# Patient Record
Sex: MALE | Employment: OTHER | URBAN - METROPOLITAN AREA
[De-identification: names, ages, dates, MRNs, and addresses within clinical notes are randomized per-mention and may not be internally consistent; named-entity substitution may affect disease eponyms.]

---

## 2019-01-14 ENCOUNTER — NEW PATIENT (OUTPATIENT)
Dept: URBAN - METROPOLITAN AREA CLINIC 19 | Facility: CLINIC | Age: 64
End: 2019-01-14

## 2019-01-14 DIAGNOSIS — H43.391: ICD-10-CM

## 2019-01-14 DIAGNOSIS — H43.813: ICD-10-CM

## 2019-01-14 PROCEDURE — 92134 CPTRZ OPH DX IMG PST SGM RTA: CPT

## 2019-01-14 PROCEDURE — 1036F TOBACCO NON-USER: CPT

## 2019-01-14 PROCEDURE — G8427 DOCREV CUR MEDS BY ELIG CLIN: HCPCS

## 2019-01-14 PROCEDURE — 92225 OPHTHALMOSCOPY (INITIAL): CPT

## 2019-01-14 PROCEDURE — 4040F PNEUMOC VAC/ADMIN/RCVD: CPT

## 2019-01-14 PROCEDURE — G8482 FLU IMMUNIZE ORDER/ADMIN: HCPCS

## 2019-01-14 PROCEDURE — 99244 OFF/OP CNSLTJ NEW/EST MOD 40: CPT

## 2019-01-14 PROCEDURE — G9903 PT SCRN TBCO ID AS NON USER: HCPCS

## 2019-01-14 ASSESSMENT — VISUAL ACUITY
OD_CC: 20/16-2
OS_CC: 20/16
OD_PH: 20/16

## 2019-01-14 ASSESSMENT — TONOMETRY
OD_IOP_MMHG: 16
OS_IOP_MMHG: 11

## 2019-02-25 ENCOUNTER — TRANSFERRED RECORDS (OUTPATIENT)
Dept: HEALTH INFORMATION MANAGEMENT | Facility: CLINIC | Age: 64
End: 2019-02-25

## 2019-02-28 ENCOUNTER — TELEPHONE (OUTPATIENT)
Dept: OPHTHALMOLOGY | Facility: CLINIC | Age: 64
End: 2019-02-28

## 2019-02-28 NOTE — TELEPHONE ENCOUNTER
2-25-19 referral note states possible TIA-- not new event  Reviewed with pt     Vision changes note January 11th, 2019  Visual field defect right eye temporally and not noticed in left eye  kaleidescope effects in right eye lasting few minutes daily     Concern of brain lesion also    Note to facilitator/Dr. La to assist in review of notes/visual field  Visual field not scanned-- may have been mailed for review and f/u if earlier appt applicable/able with Dr. Mauricio  Pt currently scheduled in 2 weeks march 15th  Reviewed to call clinic for any new/sudden vision changes  Reviewed if has s/s of TIA/stroke to go to emergency room  Pt verbally demonstrated understanding  States no previous s/s of stroke/TIA prior to vision changes  Elias Gallagher RN 12:20 PM 02/28/19        [facility]    Phone Message    May a detailed message be left on voicemail: yes    Reason for Call: Other: Pt is wondering if Dr. Jorge Mauricio feels that pt should come in earlier then 3/15/19 as he feels it urgent and dealing with the brain, possible TIA per pt. Please call the pt back. Thank you     Action Taken: Message routed to:  [facility] ([facility] Eye

## 2019-03-01 ENCOUNTER — TELEPHONE (OUTPATIENT)
Dept: OPHTHALMOLOGY | Facility: CLINIC | Age: 64
End: 2019-03-01

## 2019-03-01 NOTE — TELEPHONE ENCOUNTER
Left message with direct number to review further  Stated may want to order a MRI , will assess at visit, may repeat visual field, likely dilate, may have other images  Elias Gallagher RN 11:13 AM 03/01/19        [facility]    Phone Message    May a detailed message be left on voicemail: yes    Reason for Call: Other: per pt- has a new neuro appt on 03/6/19 with - pt is wondering what the visit will consist of, if he will need an MRI. etc.. please call pt back thank you      Action Taken: Message routed to:  [facility]

## 2019-03-04 ENCOUNTER — DOCUMENTATION ONLY (OUTPATIENT)
Dept: CARE COORDINATION | Facility: CLINIC | Age: 64
End: 2019-03-04

## 2019-03-04 ENCOUNTER — PRE VISIT (OUTPATIENT)
Dept: OPHTHALMOLOGY | Facility: CLINIC | Age: 64
End: 2019-03-04

## 2019-03-04 NOTE — TELEPHONE ENCOUNTER
FUTURE VISIT INFORMATION      FUTURE VISIT INFORMATION:    Date: 3/6/19     Time: 8:00am    Location: [facility]  REFERRAL INFORMATION:    Referring provider:  Dr. Corinne Huggins    Referring providers clinic:  [facility]    Reason for visit/diagnosis  Possible occiptal lobe lesion v. an old TIA    RECORDS REQUESTED FROM:       Clinic name Comments Records Status Imaging Status   [facility] Records scanned into Chart EPIC

## 2019-03-06 ENCOUNTER — OFFICE VISIT (OUTPATIENT)
Dept: OPHTHALMOLOGY | Facility: CLINIC | Age: 64
End: 2019-03-06
Payer: COMMERCIAL

## 2019-03-06 ENCOUNTER — ANCILLARY PROCEDURE (OUTPATIENT)
Dept: MRI IMAGING | Facility: CLINIC | Age: 64
End: 2019-03-06
Attending: OPHTHALMOLOGY
Payer: COMMERCIAL

## 2019-03-06 DIAGNOSIS — H53.10 SUBJECTIVE VISUAL DISTURBANCE: Primary | ICD-10-CM

## 2019-03-06 DIAGNOSIS — H53.40 VISUAL FIELD DEFECT: ICD-10-CM

## 2019-03-06 DIAGNOSIS — D49.6 BRAIN TUMOR (H): ICD-10-CM

## 2019-03-06 DIAGNOSIS — H53.40 VISUAL FIELD DEFECT: Primary | ICD-10-CM

## 2019-03-06 LAB — RADIOLOGIST FLAGS: NORMAL

## 2019-03-06 RX ORDER — LISINOPRIL/HYDROCHLOROTHIAZIDE 10-12.5 MG
1 TABLET ORAL DAILY
COMMUNITY
End: 2019-03-13

## 2019-03-06 RX ORDER — METHOTREXATE 25 MG/ML
INJECTION, SOLUTION INTRA-ARTERIAL; INTRAMUSCULAR; INTRAVENOUS
Status: ON HOLD | COMMUNITY
End: 2019-03-25

## 2019-03-06 RX ORDER — ATENOLOL 50 MG/1
50 TABLET ORAL DAILY
COMMUNITY
End: 2019-11-17

## 2019-03-06 RX ORDER — GADOBUTROL 604.72 MG/ML
10 INJECTION INTRAVENOUS ONCE
Status: COMPLETED | OUTPATIENT
Start: 2019-03-06 | End: 2019-03-06

## 2019-03-06 RX ADMIN — GADOBUTROL 10 ML: 604.72 INJECTION INTRAVENOUS at 15:30

## 2019-03-06 ASSESSMENT — VISUAL ACUITY
OD_CC: 20/20
METHOD: SNELLEN - LINEAR
OD_CC+: -1
OS_CC: 20/20
CORRECTION_TYPE: GLASSES

## 2019-03-06 ASSESSMENT — TONOMETRY
IOP_METHOD: ICARE
OS_IOP_MMHG: 08
OD_IOP_MMHG: 09

## 2019-03-06 ASSESSMENT — SLIT LAMP EXAM - LIDS
COMMENTS: NORMAL
COMMENTS: NORMAL

## 2019-03-06 ASSESSMENT — CONF VISUAL FIELD
OS_NORMAL: 1
OD_INFERIOR_TEMPORAL_RESTRICTION: 3
METHOD: COUNTING FINGERS

## 2019-03-06 ASSESSMENT — CUP TO DISC RATIO
OS_RATIO: 0.2
OD_RATIO: 0.2

## 2019-03-06 NOTE — LETTER
3/6/2019         RE:  :  MRN: Jayden Lcakey  1955  [id number]     Dear Dr. Huggins,    Thank you for asking me to see your very pleasant patient, Jayden Lackey, in neuro-ophthalmic consultation.  I would like to thank you for sending your records and I have summarized them in the history of present illness.  My assessment and plan are below.  For further details, please see my attached clinic note.      Assessment & Plan   Jyaden Lackey is a 64 year old male with the following diagnoses:   1. Visual field defect       On 2019 noticed difficulty seeing to the right while on a cruise.  When he got back from the cruise the next day, he had to drive to [place] to because his father in law was dying.  He saw a retina specialist in [place] who found no difficulties.  He returned to [place], and a visual field was performed. He has seen [place] lights in the area of deficit several times a day.   He can see it with his eyes closed.  He has not had a brain scan.  He is not sure if it is in just the RIGHT eye vs. Both eyes.  He thinks it has stayed the same since onset.  History of hypertension, hypercholesterolemia, and rheumatoid arthritis.      Visual acuity 20/20 both eyes.  Anterior segment exam with mild nuclear sclerotic cataract both eyes.  Fundus exam normal both eyes.  Visual field showed a right visual field defect both eyes but does not respect the vertical.  The visual casiano from Dr. Huggins's office did not show a clear homonymous hemianopia either.      It is my impression that patient has a visual field defect in both eyes.  It is possible that this represents a stroke given that it was painless, sudden in onset, and absolute. I will obtain MRI brain and I have asked him to take a baby aspirin daily.  If he had a stroke, then will ask him to see a stroke neurologist.       Again, thank you for allowing me to participate in the care of your patient.      Sincerely,    MD Magdy Hughes   Ophthalmology Residency   Director of Neuro-Ophthalmology  [facility] Endowed Chair  Departments of Ophthalmology, Neurology, and Neurosurgery  [facility] [address] [place]  [address]  T - [phone]  F - [phone]  BAIRON [email or web address]

## 2019-03-06 NOTE — PROGRESS NOTES
Assessment & Plan     Jayden Lackey is a 64 year old male with the following diagnoses:   1. Visual field defect       On January 11, 2019 noticed difficulty seeing to the right while on a cruise.  When he got back from the cruise the next day, he had to drive to [place] to because his father in law was dying.  He saw a retina specialist in [place] who found no difficulties.  He returned to [place], and a visual field was performed. He has seen [place] lights in the area of deficit several times a day.   He can see it with his eyes closed.  He has not had a brain scan.  He is not sure if it is in just the RIGHT eye vs. Both eyes.  He thinks it has stayed the same since onset.  History of hypertension, hypercholesterolemia, and rheumatoid arthritis.      Visual acuity 20/20 both eyes.  Anterior segment exam with mild nuclear sclerotic cataract both eyes.  Fundus exam normal both eyes.  Visual field showed a right visual field defect both eyes but does not respect the vertical.  The visual casiano from Dr. Huggins's office did not show a clear homonymous hemianopia either.      It is my impression that patient has a visual field defect in both eyes.  It is possible that this represents a stroke given that it was painless, sudden in onset, and absolute. I will obtain MRI brain and I have asked him to take a baby aspirin daily.  If he had a stroke, then will ask him to see a stroke neurologist.            Attending Physician Attestation:  Complete documentation of historical and exam elements from today's encounter can be found in the full encounter summary report (not reduplicated in this progress note).  I personally obtained the chief complaint(s) and history of present illness.  I confirmed and edited as necessary the review of systems, past medical/surgical history, family history, social history, and examination findings as documented by others; and I examined the patient myself.  I personally reviewed the relevant tests,  images, and reports as documented above.  I formulated and edited as necessary the assessment and plan and discussed the findings and management plan with the patient and family. - Jorge Mauricio MD

## 2019-03-06 NOTE — DISCHARGE INSTRUCTIONS
MRI Contrast Discharge Instructions    The IV contrast you received today will pass out of your body in your  urine. This will happen in the next 24 hours. You will not feel this process.  Your urine will not change color.    Drink at least 4 extra glasses of water or juice today (unless your doctor  has restricted your fluids). This reduces the stress on your kidneys.  You may take your regular medicines.    If you are on dialysis: It is best to have dialysis today.    If you have a reaction: Most reactions happen right away. If you have  any new symptoms after leaving the hospital (such as hives or swelling),  call your hospital at the correct number below. Or call your family doctor.  If you have breathing distress or wheezing, call 911.    Special instructions: ***    I have read and understand the above information.    Signature:______________________________________ Date:___________    Staff:__________________________________________ Date:___________     Time:__________    [place] [facility] Departments:    ___[place]: [phone]  ___[facility]: [phone]  ___[place]: [phone] ___[facility]: [phone]  ___[facility]: [phone]  ___[facility]: [phone]  ___[phone]  ___[facility]: [phone]  ___[phone]

## 2019-03-07 ENCOUNTER — TELEPHONE (OUTPATIENT)
Dept: OPHTHALMOLOGY | Facility: CLINIC | Age: 64
End: 2019-03-07

## 2019-03-07 ENCOUNTER — HOSPITAL ENCOUNTER (OUTPATIENT)
Dept: CT IMAGING | Facility: CLINIC | Age: 64
Discharge: HOME | End: 2019-03-07
Attending: OPHTHALMOLOGY | Admitting: OPHTHALMOLOGY
Payer: COMMERCIAL

## 2019-03-07 DIAGNOSIS — H53.40 VISUAL FIELD DEFECT: ICD-10-CM

## 2019-03-07 DIAGNOSIS — D49.6 BRAIN TUMOR (H): ICD-10-CM

## 2019-03-07 LAB — RADIOLOGIST FLAGS: NORMAL

## 2019-03-07 PROCEDURE — 25000128 H RX IP 250 OP 636: Performed by: OPHTHALMOLOGY

## 2019-03-07 PROCEDURE — 71260 CT THORAX DX C+: CPT

## 2019-03-07 PROCEDURE — 74177 CT ABD & PELVIS W/CONTRAST: CPT

## 2019-03-07 PROCEDURE — 25000125 ZZHC RX 250: Performed by: OPHTHALMOLOGY

## 2019-03-07 RX ORDER — IOPAMIDOL 755 MG/ML
100 INJECTION, SOLUTION INTRAVASCULAR ONCE
Status: COMPLETED | OUTPATIENT
Start: 2019-03-07 | End: 2019-03-07

## 2019-03-07 RX ADMIN — SODIUM CHLORIDE 59 ML: 9 INJECTION, SOLUTION INTRAVENOUS at 15:44

## 2019-03-07 RX ADMIN — IOPAMIDOL 100 ML: 755 INJECTION, SOLUTION INTRAVENOUS at 15:43

## 2019-03-07 NOTE — TELEPHONE ENCOUNTER
Left  for patient to call me about his MRI results.  I did not talk about what was seen on the MRI and I would like to tell him myself.

## 2019-03-07 NOTE — RESULT ENCOUNTER NOTE
MrDelisa Lackey,    As we discussed on the phone, the radiologist read your scan as suggestive of some kind of cancerous process.  I have reached out to Dr. Olmos, our neuro-oncologist, who asked me to obtain a CT scan of your chest/abdomen/pelvis.  Someone from her office should be contacting you very soon, probably today, to set up an appointment soon. After we get things better figured out, we should recheck your visual field, maybe in a couple of months.     Thank you for allowing me to share in your care.     Jorge Mauricio MD

## 2019-03-08 ENCOUNTER — TELEPHONE (OUTPATIENT)
Dept: OPHTHALMOLOGY | Facility: CLINIC | Age: 64
End: 2019-03-08

## 2019-03-08 NOTE — TELEPHONE ENCOUNTER
Jayden F Nelson called wanting to know the results of his CT scan done yesterday.  Please call patient with results.  Best number to call [phone].

## 2019-03-11 ENCOUNTER — OFFICE VISIT (OUTPATIENT)
Dept: NEUROSURGERY | Facility: CLINIC | Age: 64
End: 2019-03-11
Attending: NEUROLOGICAL SURGERY
Payer: COMMERCIAL

## 2019-03-11 ENCOUNTER — CARE COORDINATION (OUTPATIENT)
Dept: ONCOLOGY | Facility: CLINIC | Age: 64
End: 2019-03-11

## 2019-03-11 ENCOUNTER — TUMOR CONFERENCE (OUTPATIENT)
Dept: ONCOLOGY | Facility: CLINIC | Age: 64
End: 2019-03-11

## 2019-03-11 VITALS
DIASTOLIC BLOOD PRESSURE: 81 MMHG | WEIGHT: 218.7 LBS | OXYGEN SATURATION: 99 % | TEMPERATURE: 97 F | SYSTOLIC BLOOD PRESSURE: 137 MMHG | HEART RATE: 64 BPM

## 2019-03-11 DIAGNOSIS — C71.4 MALIGNANT NEOPLASM OF OCCIPITAL LOBE OF BRAIN (H): Primary | ICD-10-CM

## 2019-03-11 DIAGNOSIS — C71.4 GLIOBLASTOMA MULTIFORME OF OCCIPITAL LOBE (H): Primary | ICD-10-CM

## 2019-03-11 PROCEDURE — G0463 HOSPITAL OUTPT CLINIC VISIT: HCPCS

## 2019-03-11 RX ORDER — ROSUVASTATIN CALCIUM 5 MG/1
5 TABLET, COATED ORAL AT BEDTIME
COMMUNITY
Start: 2019-03-11 | End: 2019-10-17

## 2019-03-11 RX ORDER — OMEGA-3 FATTY ACIDS/FISH OIL 300-1000MG
1 CAPSULE ORAL AT BEDTIME
Status: ON HOLD | COMMUNITY
Start: 2014-06-09 | End: 2019-03-25

## 2019-03-11 RX ORDER — ASPIRIN 81 MG/1
81 TABLET, CHEWABLE ORAL AT BEDTIME
Status: ON HOLD | COMMUNITY
End: 2019-03-25

## 2019-03-11 RX ORDER — METHOTREXATE 25 MG/ML
INJECTION INTRA-ARTERIAL; INTRAMUSCULAR; INTRATHECAL; INTRAVENOUS
Refills: 0 | Status: ON HOLD | COMMUNITY
Start: 2018-06-07 | End: 2019-03-25

## 2019-03-11 RX ORDER — NAPROXEN 500 MG/1
500 TABLET ORAL PRN
Status: ON HOLD | COMMUNITY
Start: 2017-12-07 | End: 2019-03-25

## 2019-03-11 NOTE — TUMOR CONFERENCE
Tumor Conference Information  Tumor Conference:    Specialties Present:  Surgery, Medical Oncology, Radiation Oncology, Radiology  Patient Status:  New patient  Pathology:  Not Discussed  Treatment to Date:  None  Clinical Trial Eligibility:  Not discussed  Recommended Plan:  Referral to (see comment), Surgery (Comment: see Dr. Hayes to discuss surgical options)  Did the review exceed 30 minutes?:  did not           Documentation / Disclaimer Cancer Tumor Board Note  Cancer tumor board recommendations do not override what is determined to be reasonable care and treatment, which is dependent on the circumstances of a patient's case; the patient's medical, social, and personal concerns; and the clinical judgment of the oncologist [physician].

## 2019-03-11 NOTE — NURSING NOTE
Oncology Rooming Note    March 11, 2019 2:12 PM   Jayden Lackey is a 64 year old male who presents for:    Chief Complaint   Patient presents with     Consult     New Eval- brain tumor     Initial Vitals: /81 (BP Location: Right arm, Patient Position: Sitting, Cuff Size: Adult Regular)   Pulse 64   Temp 97  F (36.1  C) (Oral)   Wt 99.2 kg (218 lb 11.2 oz)   SpO2 99%  There is no height or weight on file to calculate BMI. There is no height or weight on file to calculate BSA.  Data Unavailable Comment: Data Unavailable   No LMP for male patient.  Allergies reviewed: Yes  Medications reviewed: Yes    Medications: Medication refills not needed today.  Pharmacy name entered into [employer]: [id number] [place] [id number]    Clinical concerns: Talk about possible side-efects of Methotrexate. Discuss when biopsy should be done.     Victoria Shaikh, CMA

## 2019-03-11 NOTE — LETTER
3/11/2019       RE: Jayden Lackey  [address]  [facility] [address]     Dear Colleague,    Thank you for referring your patient, Jayden Lackey, to the [facility]. Please see a copy of my visit note below.    3/11/2019  Neurosurgery Clinic Visit - New Patient    History of present illness:  Mr. Lackey is a 63 yo M with PMH HTN, RA on methotrexate referred by ophthalmology from Dr. Mauricio presenting for evaluation of brain mass. Symptoms started 1/11/19 while on a cruise, had difficulty seeing to the right, seeing flashing [place] lights on the right lasting about 2 minutes. Found to have right visual field deficit in both eyes. Stable over past 2 months. Denies headache, pain, blurry vision, nausea/vomiting, weakness, numbness/tingling, bowel/bladder symptoms. Been on methotrexate for over 20 years for rheumatoid arthritis, wonders if that could have played a factor in development of the brain mass.    Past Medical History:   Past Medical History:   Diagnosis Date     Hypertension    Rheumatoid arthritis  HLD    Surgical History:   Lumbar surgeries x2 2009    Social history:   Social History     Tobacco Use     Smoking status: Former Smoker     Smokeless tobacco: Never Used     Tobacco comment: 40+ years ago   Substance Use Topics     Alcohol use: Yes     Comment: 1-3 beers a week     Drug use: Not on file   Quit over 40 years ago. Lives in [place]. 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    Family history:   Family History   Problem Relation Age of Onset     Macular Degeneration Mother      Glaucoma No family hx of    Grandmother - pancreatic cancer  - Autoimmune conditions    Medications:  Current Outpatient Medications   Medication     aspirin (ASA) 81 MG chewable tablet     atenolol (TENORMIN) 50 MG tablet     FOLIC ACID PO     lisinopril-hydrochlorothiazide (PRINZIDE/ZESTORETIC) 10-12.5 MG tablet     methotrexate 50 MG/2ML injection CHEMO      methotrexate sodium, pres-free, 50 MG/2ML SOLN injection CHEMO     omega 3 1000 MG CAPS     rosuvastatin (CRESTOR) 5 MG tablet     naproxen (NAPROSYN) 500 MG tablet     No current facility-administered medications for this visit.    Aspirin    Allergies:     Allergies   Allergen Reactions     No Clinical Screening - See Comments      Lupine bean doesn't remember reaction     Shellfish Allergy Difficulty breathing, Nausea and Vomiting and Swelling       Review of systems: 10 point ROS negative except for as detailed in HPI    Physical exam:   /81 (BP Location: Right arm, Patient Position: Sitting, Cuff Size: Adult Regular)   Pulse 64   Temp 97  F (36.1  C) (Oral)   Wt 99.2 kg (218 lb 11.2 oz)   SpO2 99%     General: Awake and alert and in no acute distress.  Pulm: Breathing comfortably on room air  CN: Symmetric browlift, smile, tongue protrusion, palate elevation, and sternocleidomastoids. No dysarthria. Extraocular muscles are all intact. Pupils react bilaterally and equally. Right visual field deficit in both eyes.  Coordination: Intact finger-nose-finger bilaterally.   Motor: No pronator drift. Good muscle bulk throughout. 5 out of 5 strength in bilateral upper and lower extremities  Sensation: Sensation grossly intact to light touch in all extremities.  Gait: Intact gait, unsteady tandem gait.  Reflexes: 2+ reflexes bilateral biceps, brachioradialis, and patellar tendons.     Imaging:  MRI brain 3/6/19:  1. Masses in the left occipital lobe suspicious for high-grade primary  brain tumor or lymphoma. Highly cellular metastatic tumors such as  small cell lung carcinoma are other possibilities.   2. Normal MR angiography of the brain and neck.    Assessment:    # Masses in the left occipital lobe suspicious for high-grade primary  brain tumor, lymphoma, or metastases, with right visual field deficit in both eyes  # RA on methotrexate    Plan:    - Plan for surgical resection. He will need to hold his  methotrexate around the time of surgery. The risks benefits and alternatives to the procedure were discussed, questions solicited and answered, and the patient wishes to proceed with surgery.       Patient seen and discussed with Dr. Irving Padgett MD  Neurosurgery Resident PGY1    I saw the patient with the resident.  I have reviewed and edited the resident note and agree with the plan of care.      Irving Hayes MD

## 2019-03-11 NOTE — PROGRESS NOTES
RN Care Coordination Note  Writer met with Jayden and his wife Kailey Edmondson after clinic visit/consultation appt with Dr. Hayes.  Pre-operative packet provided to pt re: surgical procedure (date tbd - Keturah will call in the next few days), need for History and Physical Preoperative Assessment Center appointment (date TBD).  Introduced self and role of RN Care Coordinator at [facility]. Provided Keshia Gonzalez RN CC's contact information, [facility] Nurse Triage Line (available 24/7) and [facility] Scheduling Line.    Auth to Discuss PHI form completed by pt and original sent to scanning.   Jayden's wife is an occupational therapist and would like to receive some information about support for families with brain cancer (financial and emotional). Writer sent referral to onc SW and pt's wife knows to expect a phone call from same.  Jayden and Kailey Edmondson voiced understanding and appreciation of above information and denies any further questions, and they understand that Keshia Gonzalez RN will follow as RN CC and provide coordination as needed.  Sharon Lopez, RN, BSN, OCN  Care Coordinator  [facility]

## 2019-03-11 NOTE — PROGRESS NOTES
3/11/2019  Neurosurgery Clinic Visit - New Patient    History of present illness:  Mr. Lackey is a 63 yo M with PMH HTN, RA on methotrexate referred by ophthalmology from Dr. Mauricio presenting for evaluation of brain mass. Symptoms started 1/11/19 while on a cruise, had difficulty seeing to the right, seeing flashing [place] lights on the right lasting about 2 minutes. Found to have right visual field deficit in both eyes. Stable over past 2 months. Denies headache, pain, blurry vision, nausea/vomiting, weakness, numbness/tingling, bowel/bladder symptoms. Been on methotrexate for over 20 years for rheumatoid arthritis, wonders if that could have played a factor in development of the brain mass.    Past Medical History:   Past Medical History:   Diagnosis Date     Hypertension    Rheumatoid arthritis  HLD    Surgical History:   Lumbar surgeries x2 2009    Social history:   Social History     Tobacco Use     Smoking status: Former Smoker     Smokeless tobacco: Never Used     Tobacco comment: 40+ years ago   Substance Use Topics     Alcohol use: Yes     Comment: 1-3 beers a week     Drug use: Not on file   Quit over 40 years ago. Lives in [place]. 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    Family history:   Family History   Problem Relation Age of Onset     Macular Degeneration Mother      Glaucoma No family hx of    Grandmother - pancreatic cancer  - Autoimmune conditions    Medications:  Current Outpatient Medications   Medication     aspirin (ASA) 81 MG chewable tablet     atenolol (TENORMIN) 50 MG tablet     FOLIC ACID PO     lisinopril-hydrochlorothiazide (PRINZIDE/ZESTORETIC) 10-12.5 MG tablet     methotrexate 50 MG/2ML injection CHEMO     methotrexate sodium, pres-free, 50 MG/2ML SOLN injection CHEMO     omega 3 1000 MG CAPS     rosuvastatin (CRESTOR) 5 MG tablet     naproxen (NAPROSYN) 500 MG tablet     No current facility-administered medications for this visit.     Aspirin    Allergies:     Allergies   Allergen Reactions     No Clinical Screening - See Comments      Lupine bean doesn't remember reaction     Shellfish Allergy Difficulty breathing, Nausea and Vomiting and Swelling       Review of systems: 10 point ROS negative except for as detailed in HPI    Physical exam:   /81 (BP Location: Right arm, Patient Position: Sitting, Cuff Size: Adult Regular)   Pulse 64   Temp 97  F (36.1  C) (Oral)   Wt 99.2 kg (218 lb 11.2 oz)   SpO2 99%     General: Awake and alert and in no acute distress.  Pulm: Breathing comfortably on room air  CN: Symmetric browlift, smile, tongue protrusion, palate elevation, and sternocleidomastoids. No dysarthria. Extraocular muscles are all intact. Pupils react bilaterally and equally. Right visual field deficit in both eyes.  Coordination: Intact finger-nose-finger bilaterally.   Motor: No pronator drift. Good muscle bulk throughout. 5 out of 5 strength in bilateral upper and lower extremities  Sensation: Sensation grossly intact to light touch in all extremities.  Gait: Intact gait, unsteady tandem gait.  Reflexes: 2+ reflexes bilateral biceps, brachioradialis, and patellar tendons.     Imaging:  MRI brain 3/6/19:  1. Masses in the left occipital lobe suspicious for high-grade primary  brain tumor or lymphoma. Highly cellular metastatic tumors such as  small cell lung carcinoma are other possibilities.   2. Normal MR angiography of the brain and neck.    Assessment:    # Masses in the left occipital lobe suspicious for high-grade primary  brain tumor, lymphoma, or metastases, with right visual field deficit in both eyes  # RA on methotrexate    Plan:    - Plan for surgical resection. He will need to hold his methotrexate around the time of surgery. The risks benefits and alternatives to the procedure were discussed, questions solicited and answered, and the patient wishes to proceed with surgery.       Patient seen and discussed with   Irving Padgett MD  Neurosurgery Resident PGY1    I saw the patient with the resident.  I have reviewed and edited the resident note and agree with the plan of care.      Irving Hayes MD

## 2019-03-13 ENCOUNTER — OFFICE VISIT (OUTPATIENT)
Dept: SURGERY | Facility: CLINIC | Age: 64
End: 2019-03-13
Payer: COMMERCIAL

## 2019-03-13 ENCOUNTER — ANESTHESIA EVENT (OUTPATIENT)
Dept: SURGERY | Facility: CLINIC | Age: 64
DRG: 027 | End: 2019-03-13
Payer: COMMERCIAL

## 2019-03-13 ENCOUNTER — CARE COORDINATION (OUTPATIENT)
Dept: ONCOLOGY | Facility: CLINIC | Age: 64
End: 2019-03-13

## 2019-03-13 VITALS
TEMPERATURE: 98.9 F | HEART RATE: 70 BPM | OXYGEN SATURATION: 98 % | HEIGHT: 74 IN | WEIGHT: 217.1 LBS | DIASTOLIC BLOOD PRESSURE: 94 MMHG | SYSTOLIC BLOOD PRESSURE: 149 MMHG | BODY MASS INDEX: 27.86 KG/M2 | RESPIRATION RATE: 18 BRPM

## 2019-03-13 DIAGNOSIS — G93.89 BRAIN MASS: Primary | ICD-10-CM

## 2019-03-13 DIAGNOSIS — Z01.818 PREOP EXAMINATION: ICD-10-CM

## 2019-03-13 DIAGNOSIS — G93.89 BRAIN MASS: ICD-10-CM

## 2019-03-13 LAB
ANION GAP SERPL CALCULATED.3IONS-SCNC: 3 MMOL/L (ref 3–14)
BUN SERPL-MCNC: 14 MG/DL (ref 7–30)
CALCIUM SERPL-MCNC: 8.4 MG/DL (ref 8.5–10.1)
CHLORIDE SERPL-SCNC: 106 MMOL/L (ref 94–109)
CO2 SERPL-SCNC: 30 MMOL/L (ref 20–32)
CREAT SERPL-MCNC: 0.83 MG/DL (ref 0.66–1.25)
ERYTHROCYTE [DISTWIDTH] IN BLOOD BY AUTOMATED COUNT: 14.1 % (ref 10–15)
GFR SERPL CREATININE-BSD FRML MDRD: >90 ML/MIN/{1.73_M2}
GLUCOSE SERPL-MCNC: 95 MG/DL (ref 70–99)
HCT VFR BLD AUTO: 42.2 % (ref 40–53)
HGB BLD-MCNC: 14.3 G/DL (ref 13.3–17.7)
INR PPP: 1.1 (ref 0.86–1.14)
MCH RBC QN AUTO: 30.1 PG (ref 26.5–33)
MCHC RBC AUTO-ENTMCNC: 33.9 G/DL (ref 31.5–36.5)
MCV RBC AUTO: 89 FL (ref 78–100)
PLATELET # BLD AUTO: 217 10E9/L (ref 150–450)
POTASSIUM SERPL-SCNC: 3.7 MMOL/L (ref 3.4–5.3)
RBC # BLD AUTO: 4.75 10E12/L (ref 4.4–5.9)
SODIUM SERPL-SCNC: 138 MMOL/L (ref 133–144)
WBC # BLD AUTO: 4.6 10E9/L (ref 4–11)

## 2019-03-13 RX ORDER — LISINOPRIL AND HYDROCHLOROTHIAZIDE 20; 25 MG/1; MG/1
1 TABLET ORAL AT BEDTIME
COMMUNITY
Start: 2019-01-24 | End: 2019-10-17

## 2019-03-13 ASSESSMENT — MIFFLIN-ST. JEOR: SCORE: 1841.57

## 2019-03-13 ASSESSMENT — ENCOUNTER SYMPTOMS: SEIZURES: 1

## 2019-03-13 ASSESSMENT — LIFESTYLE VARIABLES: TOBACCO_USE: 1

## 2019-03-13 NOTE — PATIENT INSTRUCTIONS
Preparing for Your Surgery      Name:  Jayden Lackey   MRN:  [id number]   :  1955   Today's Date:  3/13/2019     Arriving for surgery:  Surgery date:  3/22/19  Arrival time:  05:00 am  Please come to:       [facility] [address]  [address] [place]  [address]    -   parking is available in front of the hospital from 5:15 am to 8:00 pm    -  Stop at the Information Desk in the lobby    -   Inform the information person that you are here for surgery. An escort to 3c will be provided. If you would not like an escort, please proceed to 3C on the 3rd floor. [phone]     What can I eat or drink?  -  You may have solid food or milk products until 8 hours prior to your surgery.  -  You may have water, apple juice or 7up/Sprite until 2 hours prior to your surgery.    Which medicines can I take?  Stop Aspirin, vitamins and supplements ( Fish oil ) one week prior to surgery.  Hold Ibuprofen for 24 hours and/or Naproxen for 48 hours prior to surgery.   -  Do NOT take these medications in the morning, the day of surgery:  Lisinopril-hydrochlorothiazide if normally taken in the morning.  -  Please take these medications the day of surgery:  Tylenol if needed; take all other scheduled medications normally taken in the morning.    How do I prepare myself?  -  Take two showers: one the night before surgery; and one the morning of surgery.         Use Scrubcare or Hibiclens to wash from neck down.  You may use your own     shampoo and conditioner. No other hair products.   -  Do NOT use lotion, powder, deodorant, or antiperspirant the day of your surgery.  -  Do NOT wear any makeup, fingernail polish or jewelry.  - Do not bring your own medications to the hospital, except for inhalers and eye   drops.  -  Bring your ID and insurance card.    Questions or Concerns:  -If you have questions or concerns regarding the day of surgery, please call [phone].     -For questions  after surgery please call your surgeons office.

## 2019-03-13 NOTE — H&P
"  Pre-Operative H & P     CC:  Preoperative exam to assess for increased cardiopulmonary risk while undergoing surgery and anesthesia.    Date of Encounter: 3/13/2019  Primary Care Physician:  Gideon Pineda  Reason for visit: Glioblastoma multiforme of occipital lobe (H) [C71.4]  - Primary     HPI  Jayden Lackey is a 64 year old male who presents for pre-operative H & P in preparation for Left stealth craniotomy and tumor resection with Dr. Hayes on 3/22/19 at [facility]. History is obtained from the patient.     Patient who was recently referred to Dr. Hayes with concerns for newly discovered brain mass. His symptoms began on 1/11/19 while on a cruise. He noted difficulty seeing to the right and saw flashing lights (\"[place] lights\") on the right lasting for two minutes. After his trip he was found to have a right visual field defect in both eyes. His imaging revealed left occipital lobe masses concerning for malignancy. He has been counseled for above procedure.     His history is otherwise significant for dyslipidemia, HTN, and RA on Methotrexate.     Past Medical History  Past Medical History:   Diagnosis Date     Brain mass      Colon polyp      Dyslipidemia      Hypertension      Lumbar disc herniation     L4-5     Rheumatoid arthritis (H)        Past Surgical History  Past Surgical History:   Procedure Laterality Date     ANAL SPHINCTEROTOMY       BACK SURGERY  2009    L4-5     HERNIA REPAIR       SEPTOPLASTY       VASECTOMY         Hx of Blood transfusions/reactions: Denies.      Hx of abnormal bleeding or anti-platelet use: ASA 81 mg daily.     Menstrual history: No LMP for male patient.    Steroid use in the last year: Denies.     Personal or FH with difficulty with Anesthesia:  Denies.     Prior to Admission Medications  Current Outpatient Medications   Medication Sig Dispense Refill     aspirin (ASA) 81 MG chewable tablet Take 81 mg by mouth At Bedtime    "     atenolol (TENORMIN) 50 MG tablet Take 50 mg by mouth At Bedtime        FOLIC ACID PO Take 1 mg by mouth At Bedtime        lisinopril-hydrochlorothiazide (PRINZIDE/ZESTORETIC) 20-25 MG tablet Take 1 tablet by mouth At Bedtime       methotrexate 50 MG/2ML injection CHEMO Doesn't inject, drinks solution       methotrexate sodium, pres-free, 50 MG/2ML SOLN injection CHEMO INJECT 17.5 MG (0.70ML) INTRAMUSCULAR/INTRAVENOUS ONCE WEEKLY.  0     Nutritional Supplements (JUICE PLUS FIBRE PO) Take 1 tablet by mouth At Bedtime       omega 3 1000 MG CAPS Take 1 tablet by mouth At Bedtime        rosuvastatin (CRESTOR) 5 MG tablet Take 5 mg by mouth At Bedtime        naproxen (NAPROSYN) 500 MG tablet Take 500 mg by mouth as needed          Allergies  Allergies   Allergen Reactions     No Clinical Screening - See Comments      Lupine bean doesn't remember reaction     Shellfish Allergy Difficulty breathing, Nausea and Vomiting and Swelling       Social History  Social History     Socioeconomic History     Marital status:      Spouse name: Not on file     Number of children: Not on file     Years of education: Not on file     Highest education level: Not on file   Occupational History     Not on file   Social Needs     Financial resource strain: Not on file     Food insecurity:     Worry: Not on file     Inability: Not on file     Transportation needs:     Medical: Not on file     Non-medical: Not on file   Tobacco Use     Smoking status: Former Smoker     Smokeless tobacco: Never Used     Tobacco comment: 40+ years ago   Substance and Sexual Activity     Alcohol use: Yes     Comment: 1-3 beers a week     Drug use: Not on file     Sexual activity: Not on file   Lifestyle     Physical activity:     Days per week: Not on file     Minutes per session: Not on file     Stress: Not on file   Relationships     Social connections:     Talks on phone: Not on file     Gets together: Not on file     Attends Muslim service: Not on  file     Active member of club or organization: Not on file     Attends meetings of clubs or organizations: Not on file     Relationship status: Not on file     Intimate partner violence:     Fear of current or ex partner: Not on file     Emotionally abused: Not on file     Physically abused: Not on file     Forced sexual activity: Not on file   Other Topics Concern     Not on file   Social History Narrative     Not on file       Family History  Family History   Problem Relation Age of Onset     Macular Degeneration Mother      Diabetes Mother      Heart Failure Father      Diabetes Sister      Heart Disease Maternal Grandfather      Glaucoma No family hx of        Review of Systems    ROS/MED HX  The complete review of systems is negative other than noted in the HPI or here.   ENT/Pulmonary:     (+)tobacco use, Past use , . .    Neurologic:     (+)other neuro occipital brain masses    Cardiovascular:     (+) Dyslipidemia, hypertension----. Taking blood thinners Pt has received instructions: Instructions Given to patient: Planned 7 day hold for surgery. . . :. . No previous cardiac testing       METS/Exercise Tolerance:  >4 METS   Hematologic:  - neg hematologic  ROS       Musculoskeletal:   (+) , , other musculoskeletal- RA      GI/Hepatic:  - neg GI/hepatic ROS       Renal/Genitourinary:  - ROS Renal section negative       Endo:  - neg endo ROS       Psychiatric:  - neg psychiatric ROS       Infectious Disease:  - neg infectious disease ROS       Malignancy:   (+) Malignancy History of Other  Other CA brain mass concerrning for malignancy Active status post         Other:    (+) No chance of pregnancy C-spine cleared: N/A, no H/O Chronic Pain,no other significant disability            PHYSICAL EXAM:   Mental Status/Neuro: A/A/O; Age Appropriate   Airway: Facies: Feasible  Mallampati: I  Mouth/Opening: Full  TM distance: > 6 cm  Neck ROM: Full   Respiratory: Auscultation: CTAB     Resp. Rate: Normal     Resp.  "Effort: Normal      CV: Rhythm: Regular  Heart: Normal Sounds   Comments:      Preop Vitals  BP Readings from Last 3 Encounters:   03/11/19 137/81    Pulse Readings from Last 3 Encounters:   03/11/19 64      Resp Readings from Last 3 Encounters:   No data found for Resp    SpO2 Readings from Last 3 Encounters:   03/11/19 99%      Temp Readings from Last 1 Encounters:   03/11/19 97  F (36.1  C) (Oral)    Ht Readings from Last 1 Encounters:   No data found for Ht      Wt Readings from Last 1 Encounters:   03/11/19 99.2 kg (218 lb 11.2 oz)    There is no height or weight on file to calculate BMI.         Temp: 98.9  F (37.2  C) Temp src: Oral BP: (!) 149/94 Pulse: 70   Resp: 18 SpO2: 98 %         217 lbs 1.6 oz  6' 1.5\"   Body mass index is 28.25 kg/m .       Physical Exam  Constitutional: Awake, alert, cooperative, no apparent distress, and appears stated age. Accompanied by wife.   Eyes: No light to eyes, extra ocular muscles intact, sclera clear, conjunctiva normal. Glasses on.  HENT: Normocephalic, oral pharynx with moist mucus membranes, good dentition. No goiter appreciated.   Respiratory: Clear to auscultation bilaterally, no crackles or wheezing. No cough or obvious dyspnea.  Cardiovascular: Regular rate and rhythm, normal S1 and S2, and no murmur noted.  Carotids +2, no bruits. No edema. Palpable pulses to radial  DP and PT arteries.   GI: Normal bowel sounds, soft, non-distended, non-tender, no masses palpated, no hepatosplenomegaly.    Lymph/Hematologic: No cervical lymphadenopathy and no supraclavicular lymphadenopathy.  Genitourinary:  Deferred.   Skin: Warm and dry.  No rashes at anticipated surgical site.   Musculoskeletal: Full ROM of neck. There is no redness, warmth, or swelling of the joints. Gross motor strength is normal.    Neurologic: Awake, alert, oriented to name, place and time. Cranial nerves II-XII are grossly intact. Gait is normal.   Neuropsychiatric: Calm, cooperative. Normal affect. "     Labs: (personally reviewed)   Lab Results   Component Value Date    WBC 4.6 03/13/2019     Lab Results   Component Value Date    RBC 4.75 03/13/2019     Lab Results   Component Value Date    HGB 14.3 03/13/2019     Lab Results   Component Value Date    HCT 42.2 03/13/2019     Lab Results   Component Value Date    MCV 89 03/13/2019     Lab Results   Component Value Date    MCH 30.1 03/13/2019     Lab Results   Component Value Date    MCHC 33.9 03/13/2019     Lab Results   Component Value Date    RDW 14.1 03/13/2019     Lab Results   Component Value Date     03/13/2019     Last Comprehensive Metabolic Panel:  Sodium   Date Value Ref Range Status   03/13/2019 138 133 - 144 mmol/L Final     Potassium   Date Value Ref Range Status   03/13/2019 3.7 3.4 - 5.3 mmol/L Final     Chloride   Date Value Ref Range Status   03/13/2019 106 94 - 109 mmol/L Final     Carbon Dioxide   Date Value Ref Range Status   03/13/2019 30 20 - 32 mmol/L Final     Anion Gap   Date Value Ref Range Status   03/13/2019 3 3 - 14 mmol/L Final     Glucose   Date Value Ref Range Status   03/13/2019 95 70 - 99 mg/dL Final     Urea Nitrogen   Date Value Ref Range Status   03/13/2019 14 7 - 30 mg/dL Final     Creatinine   Date Value Ref Range Status   03/13/2019 0.83 0.66 - 1.25 mg/dL Final     GFR Estimate   Date Value Ref Range Status   03/13/2019 >90 >60 mL/min/[1.73_m2] Final     Comment:     Non  GFR Calc  Starting 12/18/2018, serum creatinine based estimated GFR (eGFR) will be   calculated using the Chronic Kidney Disease Epidemiology Collaboration   (CKD-EPI) equation.       Calcium   Date Value Ref Range Status   03/13/2019 8.4 (L) 8.5 - 10.1 mg/dL Final   INR 1.10  EKG: Not indicated.   CT CAP 3/7/19                                                                   Impression:  1. Multiple indeterminate bilateral pulmonary nodules measuring up to  4 mm which could be related to metastasis considered to  patient's  history. Attention on follow-up studies.  2. 1.4 cm soft tissue nodule in the left breast at the upper inner  quadrant. Recommend further evaluation with dedicated left breast  mammogram and ultrasound for better characterization.  3. The stomach is decompressed with fatty mural infiltration likely  from chronic inflammatory changes.  4. Enhancing focus measuring 2.9 cm along the gastric antrum/pylorus  posterior wall. Recommend further evaluation with upper GI endoscopy  for better characterization.  5. Colonic diverticulosis.  6. Prominent retroperitoneal and allie hepatis lymph nodes, not  enlarged by size criteria. Attention on follow-up studies.     [Consider Follow Up: Left breast nodule; gastric wall enhancing  focus.]     MRI Brain 3/6/19                                                                   Impression:   1. Masses in the left occipital lobe suspicious for high-grade primary  brain tumor or lymphoma. Highly cellular metastatic tumors such as  small cell lung carcinoma are other possibilities.   2. Normal MR angiography of the brain and neck.      [Consider Follow Up: Left occipital lobe masses]     Imaging reviewed by this provider    Outside records reviewed from: Care Everywhere    ASSESSMENT and PLAN  Jayden Lackey is a 64 year old male scheduled to undergo Left stealth craniotomy and tumor resection with Dr. Hayes on 3/22/19. He has the following specific operative considerations:   - RCRI : 0.4% risk of major adverse cardiac event.   - Anesthesia considerations:  Refer to PAC assessment in anesthesia records  - VTE risk: 3%  - NICOLÁS # of risks 3/8 = Intermediate risk  - Risk of PONV score = 2.  If > 2, anti-emetic intervention recommended.    --Left occipital lobe masses causing right visual field defect. Some flashing lights, thought possibly to be seizure activity. Above procedure now planned.   --Dyslipidemia. Rosuvasatin. HTN. Atenolol and Lisinopril-hydrochlorothiazide at HS.  ASA 81 but will hold for 7 days prior to surgery. No other cardiac history, symptoms or meds. History of EKG and negative stress test at age 51 due to family history. Good exercise tolerance. Plays hockey.  --Former smoker. Quit 40 years ago. Denies pulmonary symptoms. Snores but history of neg sleep study.   --Rheumatoid arthritis, small joints. Methotrexate weekly for many years. Was concerned about holding. He will contact Rheumatology for specific instructions for surgery.   --History of lumbar surgery X 2 in 2009. Left knee injury. Will require careful positioning.   Type and screen drawn today.     Arrival time, NPO, shower and medication instructions provided by nursing staff today. Preparing For Your Surgery handout given.    Patient was discussed with Dr Wilson.    ALEJA Rodríguez CNS  Preoperative Assessment Center  [facility]  Cl[facility] and [facility] Center  Phone: [phone]  Fax: [phone]

## 2019-03-13 NOTE — PROGRESS NOTES
Placed call to patient to discussed questions related to upcoming surgery. Pt  verbalizes understanding of Dr. Hayes's plan of care. Pre-operative instructions/routine and requirements to include:  Surgical procedure, need for History and Physical Preoperative Assessment Center appointment, NPO prior to surgery, showering instructions, medications to avoid,  post-operative expectations, pain control, follow-up after surgery and contact information for questions or concerns prior to surgery. Also briefly discussed chemo/radation and what to except for future testing. All of patient's questions were answered to his satisfaction.

## 2019-03-13 NOTE — ANESTHESIA PREPROCEDURE EVALUATION
Anesthesia Pre-Procedure Evaluation    Patient: Jayden Lackey   MRN:     [id number] Gender:   male   Age:    64 year old :      1955        Preoperative Diagnosis: Glioblastoma Multiforme Of Occipital Lobe   Procedure(s):  Left Stealth Assisted Craniotomy And Tumor Resection     Past Medical History:   Diagnosis Date     Brain mass      Colon polyp      Dyslipidemia      Hypertension      Lumbar disc herniation     L4-5     Rheumatoid arthritis (H)       Past Surgical History:   Procedure Laterality Date     ANAL SPHINCTEROTOMY       BACK SURGERY  2009    L4-5     HERNIA REPAIR       SEPTOPLASTY       VASECTOMY            Anesthesia Evaluation     . Pt has had prior anesthetic. Type: General and MAC    No history of anesthetic complications          ROS/MED HX    ENT/Pulmonary: Comment: History neg sleep study several years ago    (+)NICOLÁS risk factors snores loudly, hypertension, tobacco use, Past use , . .    Neurologic:     (+)seizures features: recent intermittent flashing lights, possible seizure activity, other neuro occipital brain masses    Cardiovascular: Comment: Reports EKG and stress test age 51 due to family history neg    (+) Dyslipidemia, hypertension-range: 120s/80s at home, ---. Taking blood thinners Pt has received instructions: Instructions Given to patient: Planned 7 day hold for surgery. . . :. . No previous cardiac testing       METS/Exercise Tolerance: Comment: Plays hockey. >4 METS   Hematologic:  - neg hematologic  ROS       Musculoskeletal: Comment: Left knee injury, s/p two back surgeries in past  (+) , , other musculoskeletal- RA small joints      GI/Hepatic:  - neg GI/hepatic ROS       Renal/Genitourinary:  - ROS Renal section negative       Endo:  - neg endo ROS       Psychiatric:  - neg psychiatric ROS       Infectious Disease:  - neg infectious disease ROS       Malignancy:   (+) Malignancy History of Other  Other CA brain mass concerrning for malignancy Active status post          Other:    (+) No chance of pregnancy C-spine cleared: N/A, no H/O Chronic Pain,no other significant disability                        PHYSICAL EXAM:   Mental Status/Neuro: A/A/O; Age Appropriate   Airway: Facies: Feasible  Mallampati: II  Mouth/Opening: Full  TM distance: > 6 cm  Neck ROM: Full   Respiratory: Auscultation: CTAB     Resp. Rate: Normal     Resp. Effort: Normal      CV: Rhythm: Regular  Heart: Normal Sounds   Comments:      Dental: Normal                  No results found for: WBC, HGB, HCT, PLT, CRP, SED, NA, POTASSIUM, CHLORIDE, CO2, BUN, CR, GLC, BEATRIS, PHOS, MAG, ALBUMIN, PROTTOTAL, ALT, AST, GGT, ALKPHOS, BILITOTAL, BILIDIRECT, LIPASE, AMYLASE, AURORA, PTT, INR, FIBR, TSH, T4, T3, HCG, HCGS, CKTOTAL, CKMB, TROPN    Preop Vitals  BP Readings from Last 3 Encounters:   03/11/19 137/81    Pulse Readings from Last 3 Encounters:   03/11/19 64      Resp Readings from Last 3 Encounters:   No data found for Resp    SpO2 Readings from Last 3 Encounters:   03/11/19 99%      Temp Readings from Last 1 Encounters:   03/11/19 97  F (36.1  C) (Oral)    Ht Readings from Last 1 Encounters:   No data found for Ht      Wt Readings from Last 1 Encounters:   03/11/19 99.2 kg (218 lb 11.2 oz)    There is no height or weight on file to calculate BMI.     LDA:            Assessment:   ASA SCORE: 3    NPO Status: > 6 hours since completed [employer]   Documentation: H&P complete; Preop Testing complete; Consents complete   Proceeding: Proceed without further delay  Tobacco Use:  NO Active use of Tobacco/UNKNOWN Tobacco use status     Plan:   Anes. Type:  General   Pre-Induction: Acetaminophen PO     Drips/Meds-Preparation: Phenylephrine   Induction:  IV (Standard)   Airway: Oral ETT   Access/Monitoring: PIV; 2nd PIV; US; A-Line     Advanced Monitoring: BIS   Maintenance: Balanced   Emergence: Procedure Site   Logistics: ICU Admission     Postop Pain/Sedation Strategy:  Standard-Options: Opioids PRN     PONV  Management:  Adult Risk Factors:, Non-Smoker, Postop Opioids  Prevention: Ondansetron; Dexamethasone     CONSENT: Direct conversation   Plan and risks discussed with: Patient   Blood Products: Consented (ALL Blood Products)                  PAC Discussion and Assessment    ASA Classification: 3  Case is suitable for: Coy  Anesthetic techniques and relevant risks discussed: GA  Invasive monitoring and risk discussed: No  Types:   Possibility and Risk of blood transfusion discussed: Yes  NPO instructions given:   Additional anesthetic preparation and risks discussed:   Needs early admission to pre-op area:   Other:     PAC Resident/NP Anesthesia Assessment:  Jayden Lackey is a 64 year old male scheduled to undergo Left stealth craniotomy and tumor resection with Dr. Hayes on 3/22/19. He has the following specific operative considerations:   - RCRI : 0.4% risk of major adverse cardiac event.   - VTE risk: 3%  - NICOLÁS # of risks 3/8 = Intermediate risk  - Risk of PONV score = 2.  If > 2, anti-emetic intervention recommended.    --Left occipital lobe masses causing right visual field defect. Some flashing lights, thought possibly to be seizure activity. Above procedure now planned.   --Dyslipidemia. Rosuvasatin. HTN. Atenolol and Lisinopril-hydrochlorothiazide at HS. ASA 81 but will hold for 7 days prior to surgery. No other cardiac history, symptoms or meds. History of EKG and negative stress test at age 51 due to family history. Good exercise tolerance. Plays hockey.  --Former smoker. Quit 40 years ago. Denies pulmonary symptoms. Snores but history of neg sleep study.   --Rheumatoid arthritis, small joints. Methotrexate weekly for many years. Was concerned about holding. He will contact Rheumatology for specific instructions for surgery.   --History of lumbar surgery X 2 in 2009. Left knee injury. Will require careful positioning.   Type and screen drawn today.     Patient was discussed with Dr Wilson.      Reviewed  and Signed by PAC Mid-Level Provider/Resident  Mid-Level Provider/Resident: ALEJA Jaramillo CNS  Date: 3/13/19  Time: 2:52pm    Attending Anesthesiologist Anesthesia Assessment:        Anesthesiologist:   Date:   Time:   Pass/Fail:   Disposition:     PAC Pharmacist Assessment:        Pharmacist:   Date:   Time:        ALEJA Rodríguez

## 2019-03-22 ENCOUNTER — HOSPITAL ENCOUNTER (INPATIENT)
Facility: CLINIC | Age: 64
LOS: 3 days | Discharge: HOME | DRG: 027 | End: 2019-03-25
Attending: NEUROLOGICAL SURGERY | Admitting: NEUROLOGICAL SURGERY
Payer: COMMERCIAL

## 2019-03-22 ENCOUNTER — HOSPITAL ENCOUNTER (OUTPATIENT)
Dept: CT IMAGING | Facility: CLINIC | Age: 64
DRG: 027 | End: 2019-03-22
Attending: NEUROLOGICAL SURGERY | Admitting: NEUROLOGICAL SURGERY
Payer: COMMERCIAL

## 2019-03-22 ENCOUNTER — HOSPITAL ENCOUNTER (OUTPATIENT)
Dept: MRI IMAGING | Facility: CLINIC | Age: 64
DRG: 027 | End: 2019-03-22
Attending: NEUROLOGICAL SURGERY | Admitting: NEUROLOGICAL SURGERY
Payer: COMMERCIAL

## 2019-03-22 ENCOUNTER — ANESTHESIA (OUTPATIENT)
Dept: SURGERY | Facility: CLINIC | Age: 64
DRG: 027 | End: 2019-03-22
Payer: COMMERCIAL

## 2019-03-22 DIAGNOSIS — C71.9 GLIOMA (H): ICD-10-CM

## 2019-03-22 DIAGNOSIS — Z98.890 S/P CRANIOTOMY: Primary | ICD-10-CM

## 2019-03-22 DIAGNOSIS — C71.4 GLIOBLASTOMA MULTIFORME OF OCCIPITAL LOBE (H): ICD-10-CM

## 2019-03-22 LAB
ABO + RH BLD: NORMAL
ABO + RH BLD: NORMAL
BASE EXCESS BLDA CALC-SCNC: 1.1 MMOL/L
BLD GP AB SCN SERPL QL: NORMAL
BLOOD BANK CMNT PATIENT-IMP: NORMAL
CA-I BLD-MCNC: 4.6 MG/DL (ref 4.4–5.2)
GLUCOSE BLD-MCNC: 128 MG/DL (ref 70–99)
GLUCOSE BLDC GLUCOMTR-MCNC: 108 MG/DL (ref 70–99)
GLUCOSE BLDC GLUCOMTR-MCNC: 132 MG/DL (ref 70–99)
HCO3 BLD-SCNC: 26 MMOL/L (ref 21–28)
HGB BLD-MCNC: 12.3 G/DL (ref 13.3–17.7)
O2/TOTAL GAS SETTING VFR VENT: 33 %
PCO2 BLD: 41 MM HG (ref 35–45)
PH BLD: 7.41 PH (ref 7.35–7.45)
PO2 BLD: 148 MM HG (ref 80–105)
POTASSIUM BLD-SCNC: 3.7 MMOL/L (ref 3.4–5.3)
SODIUM BLD-SCNC: 138 MMOL/L (ref 133–144)
SPECIMEN EXP DATE BLD: NORMAL

## 2019-03-22 PROCEDURE — 71000014 ZZH RECOVERY PHASE 1 LEVEL 2 FIRST HR: Performed by: NEUROLOGICAL SURGERY

## 2019-03-22 PROCEDURE — 37000009 ZZH ANESTHESIA TECHNICAL FEE, EACH ADDTL 15 MIN: Performed by: NEUROLOGICAL SURGERY

## 2019-03-22 PROCEDURE — 88342 IMHCHEM/IMCYTCHM 1ST ANTB: CPT | Performed by: NEUROLOGICAL SURGERY

## 2019-03-22 PROCEDURE — 25000132 ZZH RX MED GY IP 250 OP 250 PS 637: Performed by: NURSE PRACTITIONER

## 2019-03-22 PROCEDURE — 27210995 ZZH RX 272: Performed by: NEUROLOGICAL SURGERY

## 2019-03-22 PROCEDURE — 25000128 H RX IP 250 OP 636: Performed by: NEUROLOGICAL SURGERY

## 2019-03-22 PROCEDURE — 40000014 ZZH STATISTIC ARTERIAL MONITORING DAILY

## 2019-03-22 PROCEDURE — 20000004 ZZH R&B ICU UMMC

## 2019-03-22 PROCEDURE — 82947 ASSAY GLUCOSE BLOOD QUANT: CPT | Performed by: ANESTHESIOLOGY

## 2019-03-22 PROCEDURE — 25000131 ZZH RX MED GY IP 250 OP 636 PS 637: Performed by: NURSE PRACTITIONER

## 2019-03-22 PROCEDURE — 27810169 ZZH OR IMPLANT GENERAL: Performed by: NEUROLOGICAL SURGERY

## 2019-03-22 PROCEDURE — 25800030 ZZH RX IP 258 OP 636: Performed by: STUDENT IN AN ORGANIZED HEALTH CARE EDUCATION/TRAINING PROGRAM

## 2019-03-22 PROCEDURE — 25000128 H RX IP 250 OP 636: Performed by: STUDENT IN AN ORGANIZED HEALTH CARE EDUCATION/TRAINING PROGRAM

## 2019-03-22 PROCEDURE — 40000275 ZZH STATISTIC RCP TIME EA 10 MIN

## 2019-03-22 PROCEDURE — 25000125 ZZHC RX 250: Performed by: NEUROLOGICAL SURGERY

## 2019-03-22 PROCEDURE — 88307 TISSUE EXAM BY PATHOLOGIST: CPT | Performed by: NEUROLOGICAL SURGERY

## 2019-03-22 PROCEDURE — 84295 ASSAY OF SERUM SODIUM: CPT | Performed by: ANESTHESIOLOGY

## 2019-03-22 PROCEDURE — 70552 MRI BRAIN STEM W/DYE: CPT

## 2019-03-22 PROCEDURE — 36000076 ZZH SURGERY LEVEL 6 EA 15 ADDTL MIN - UMMC: Performed by: NEUROLOGICAL SURGERY

## 2019-03-22 PROCEDURE — C1713 ANCHOR/SCREW BN/BN,TIS/BN: HCPCS | Performed by: NEUROLOGICAL SURGERY

## 2019-03-22 PROCEDURE — 36000074 ZZH SURGERY LEVEL 6 1ST 30 MIN - UMMC: Performed by: NEUROLOGICAL SURGERY

## 2019-03-22 PROCEDURE — A9585 GADOBUTROL INJECTION: HCPCS | Performed by: NEUROLOGICAL SURGERY

## 2019-03-22 PROCEDURE — 40000170 ZZH STATISTIC PRE-PROCEDURE ASSESSMENT II: Performed by: NEUROLOGICAL SURGERY

## 2019-03-22 PROCEDURE — 00B00ZZ EXCISION OF BRAIN, OPEN APPROACH: ICD-10-PCS | Performed by: NEUROLOGICAL SURGERY

## 2019-03-22 PROCEDURE — 00000146 ZZHCL STATISTIC GLUCOSE BY METER IP

## 2019-03-22 PROCEDURE — 25500064 ZZH RX 255 OP 636: Performed by: NEUROLOGICAL SURGERY

## 2019-03-22 PROCEDURE — 25000566 ZZH SEVOFLURANE, EA 15 MIN: Performed by: NEUROLOGICAL SURGERY

## 2019-03-22 PROCEDURE — 82330 ASSAY OF CALCIUM: CPT | Performed by: ANESTHESIOLOGY

## 2019-03-22 PROCEDURE — 25000128 H RX IP 250 OP 636: Performed by: NURSE PRACTITIONER

## 2019-03-22 PROCEDURE — 25000125 ZZHC RX 250: Performed by: STUDENT IN AN ORGANIZED HEALTH CARE EDUCATION/TRAINING PROGRAM

## 2019-03-22 PROCEDURE — 81120 IDH1 COMMON VARIANTS: CPT | Performed by: NEUROLOGICAL SURGERY

## 2019-03-22 PROCEDURE — 25800030 ZZH RX IP 258 OP 636: Performed by: NEUROLOGICAL SURGERY

## 2019-03-22 PROCEDURE — 81405 MOPATH PROCEDURE LEVEL 6: CPT | Performed by: NEUROLOGICAL SURGERY

## 2019-03-22 PROCEDURE — 81287 MGMT GENE PRMTR MTHYLTN ALYS: CPT | Performed by: NEUROLOGICAL SURGERY

## 2019-03-22 PROCEDURE — 88331 PATH CONSLTJ SURG 1 BLK 1SPC: CPT | Performed by: NEUROLOGICAL SURGERY

## 2019-03-22 PROCEDURE — 81121 IDH2 COMMON VARIANTS: CPT | Performed by: NEUROLOGICAL SURGERY

## 2019-03-22 PROCEDURE — 27210794 ZZH OR GENERAL SUPPLY STERILE: Performed by: NEUROLOGICAL SURGERY

## 2019-03-22 PROCEDURE — 25800025 ZZH RX 258: Performed by: NEUROLOGICAL SURGERY

## 2019-03-22 PROCEDURE — 27211241 CT HEAD W/O CONTRAST

## 2019-03-22 PROCEDURE — 37000008 ZZH ANESTHESIA TECHNICAL FEE, 1ST 30 MIN: Performed by: NEUROLOGICAL SURGERY

## 2019-03-22 PROCEDURE — 8E09XBZ COMPUTER ASSISTED PROCEDURE OF HEAD AND NECK REGION: ICD-10-PCS | Performed by: NEUROLOGICAL SURGERY

## 2019-03-22 PROCEDURE — 88341 IMHCHEM/IMCYTCHM EA ADD ANTB: CPT | Performed by: NEUROLOGICAL SURGERY

## 2019-03-22 PROCEDURE — 00000159 ZZHCL STATISTIC H-SEND OUTS PREP: Performed by: NEUROLOGICAL SURGERY

## 2019-03-22 PROCEDURE — 82803 BLOOD GASES ANY COMBINATION: CPT | Performed by: ANESTHESIOLOGY

## 2019-03-22 PROCEDURE — 84132 ASSAY OF SERUM POTASSIUM: CPT | Performed by: ANESTHESIOLOGY

## 2019-03-22 PROCEDURE — 25800030 ZZH RX IP 258 OP 636: Performed by: NURSE PRACTITIONER

## 2019-03-22 PROCEDURE — 71000015 ZZH RECOVERY PHASE 1 LEVEL 2 EA ADDTL HR: Performed by: NEUROLOGICAL SURGERY

## 2019-03-22 DEVICE — IMP SCR SYN MATRIX LOW PRO 1.5X04MM SELF DRILL 04.503.104.01
Type: IMPLANTABLE DEVICE | Site: SKULL | Status: NON-FUNCTIONAL
Removed: 2019-10-24

## 2019-03-22 DEVICE — IMP BUR HOLE COVER 17MM LOW PROFILE TI 421.527
Type: IMPLANTABLE DEVICE | Site: SKULL | Status: NON-FUNCTIONAL
Removed: 2019-10-24

## 2019-03-22 RX ORDER — SODIUM CHLORIDE, SODIUM LACTATE, POTASSIUM CHLORIDE, CALCIUM CHLORIDE 600; 310; 30; 20 MG/100ML; MG/100ML; MG/100ML; MG/100ML
INJECTION, SOLUTION INTRAVENOUS CONTINUOUS
Status: DISCONTINUED | OUTPATIENT
Start: 2019-03-22 | End: 2019-03-22 | Stop reason: HOSPADM

## 2019-03-22 RX ORDER — ACETAMINOPHEN 325 MG/1
650 TABLET ORAL EVERY 4 HOURS PRN
Status: DISCONTINUED | OUTPATIENT
Start: 2019-03-25 | End: 2019-03-25 | Stop reason: HOSPADM

## 2019-03-22 RX ORDER — LABETALOL 20 MG/4 ML (5 MG/ML) INTRAVENOUS SYRINGE
10 EVERY 10 MIN PRN
Status: DISCONTINUED | OUTPATIENT
Start: 2019-03-22 | End: 2019-03-22 | Stop reason: HOSPADM

## 2019-03-22 RX ORDER — PROCHLORPERAZINE MALEATE 10 MG
10 TABLET ORAL EVERY 6 HOURS PRN
Status: DISCONTINUED | OUTPATIENT
Start: 2019-03-22 | End: 2019-03-25 | Stop reason: HOSPADM

## 2019-03-22 RX ORDER — LIDOCAINE HYDROCHLORIDE 20 MG/ML
INJECTION, SOLUTION INFILTRATION; PERINEURAL PRN
Status: DISCONTINUED | OUTPATIENT
Start: 2019-03-22 | End: 2019-03-22

## 2019-03-22 RX ORDER — LIDOCAINE 40 MG/G
CREAM TOPICAL
Status: DISCONTINUED | OUTPATIENT
Start: 2019-03-22 | End: 2019-03-25 | Stop reason: HOSPADM

## 2019-03-22 RX ORDER — ONDANSETRON 2 MG/ML
4 INJECTION INTRAMUSCULAR; INTRAVENOUS EVERY 30 MIN PRN
Status: DISCONTINUED | OUTPATIENT
Start: 2019-03-22 | End: 2019-03-22 | Stop reason: HOSPADM

## 2019-03-22 RX ORDER — ROSUVASTATIN CALCIUM 5 MG/1
5 TABLET, COATED ORAL AT BEDTIME
Status: DISCONTINUED | OUTPATIENT
Start: 2019-03-22 | End: 2019-03-25 | Stop reason: HOSPADM

## 2019-03-22 RX ORDER — ATENOLOL 50 MG/1
50 TABLET ORAL AT BEDTIME
Status: DISCONTINUED | OUTPATIENT
Start: 2019-03-22 | End: 2019-03-25 | Stop reason: HOSPADM

## 2019-03-22 RX ORDER — FENTANYL CITRATE 50 UG/ML
INJECTION, SOLUTION INTRAMUSCULAR; INTRAVENOUS PRN
Status: DISCONTINUED | OUTPATIENT
Start: 2019-03-22 | End: 2019-03-22

## 2019-03-22 RX ORDER — DEXAMETHASONE SODIUM PHOSPHATE 4 MG/ML
INJECTION, SOLUTION INTRA-ARTICULAR; INTRALESIONAL; INTRAMUSCULAR; INTRAVENOUS; SOFT TISSUE PRN
Status: DISCONTINUED | OUTPATIENT
Start: 2019-03-22 | End: 2019-03-22

## 2019-03-22 RX ORDER — ACETAMINOPHEN 325 MG/1
975 TABLET ORAL EVERY 8 HOURS
Status: COMPLETED | OUTPATIENT
Start: 2019-03-22 | End: 2019-03-25

## 2019-03-22 RX ORDER — BUPIVACAINE HYDROCHLORIDE AND EPINEPHRINE 5; 5 MG/ML; UG/ML
INJECTION, SOLUTION PERINEURAL PRN
Status: DISCONTINUED | OUTPATIENT
Start: 2019-03-22 | End: 2019-03-22

## 2019-03-22 RX ORDER — PANTOPRAZOLE SODIUM 40 MG/1
40 TABLET, DELAYED RELEASE ORAL
Status: DISCONTINUED | OUTPATIENT
Start: 2019-03-23 | End: 2019-03-25 | Stop reason: HOSPADM

## 2019-03-22 RX ORDER — LISINOPRIL AND HYDROCHLOROTHIAZIDE 20; 25 MG/1; MG/1
1 TABLET ORAL AT BEDTIME
Status: DISCONTINUED | OUTPATIENT
Start: 2019-03-22 | End: 2019-03-25 | Stop reason: HOSPADM

## 2019-03-22 RX ORDER — CEFAZOLIN SODIUM 1 G/3ML
1 INJECTION, POWDER, FOR SOLUTION INTRAMUSCULAR; INTRAVENOUS SEE ADMIN INSTRUCTIONS
Status: DISCONTINUED | OUTPATIENT
Start: 2019-03-22 | End: 2019-03-22 | Stop reason: HOSPADM

## 2019-03-22 RX ORDER — NALOXONE HYDROCHLORIDE 0.4 MG/ML
.1-.4 INJECTION, SOLUTION INTRAMUSCULAR; INTRAVENOUS; SUBCUTANEOUS
Status: DISCONTINUED | OUTPATIENT
Start: 2019-03-22 | End: 2019-03-25 | Stop reason: HOSPADM

## 2019-03-22 RX ORDER — AMOXICILLIN 250 MG
1 CAPSULE ORAL 2 TIMES DAILY
Status: DISCONTINUED | OUTPATIENT
Start: 2019-03-22 | End: 2019-03-25 | Stop reason: HOSPADM

## 2019-03-22 RX ORDER — CEFAZOLIN SODIUM 2 G/100ML
2 INJECTION, SOLUTION INTRAVENOUS
Status: COMPLETED | OUTPATIENT
Start: 2019-03-22 | End: 2019-03-22

## 2019-03-22 RX ORDER — ONDANSETRON 2 MG/ML
INJECTION INTRAMUSCULAR; INTRAVENOUS PRN
Status: DISCONTINUED | OUTPATIENT
Start: 2019-03-22 | End: 2019-03-22

## 2019-03-22 RX ORDER — ONDANSETRON 4 MG/1
4 TABLET, ORALLY DISINTEGRATING ORAL EVERY 30 MIN PRN
Status: DISCONTINUED | OUTPATIENT
Start: 2019-03-22 | End: 2019-03-22 | Stop reason: HOSPADM

## 2019-03-22 RX ORDER — DEXAMETHASONE 1 MG
2 TABLET ORAL DAILY
Status: DISCONTINUED | OUTPATIENT
Start: 2019-04-03 | End: 2019-03-25 | Stop reason: HOSPADM

## 2019-03-22 RX ORDER — GADOBUTROL 604.72 MG/ML
10 INJECTION INTRAVENOUS ONCE
Status: COMPLETED | OUTPATIENT
Start: 2019-03-22 | End: 2019-03-22

## 2019-03-22 RX ORDER — POLYETHYLENE GLYCOL 3350 17 G/17G
17 POWDER, FOR SOLUTION ORAL DAILY
Status: DISCONTINUED | OUTPATIENT
Start: 2019-03-22 | End: 2019-03-25 | Stop reason: HOSPADM

## 2019-03-22 RX ORDER — FENTANYL CITRATE 50 UG/ML
25-50 INJECTION, SOLUTION INTRAMUSCULAR; INTRAVENOUS
Status: DISCONTINUED | OUTPATIENT
Start: 2019-03-22 | End: 2019-03-22 | Stop reason: HOSPADM

## 2019-03-22 RX ORDER — NALOXONE HYDROCHLORIDE 0.4 MG/ML
.1-.4 INJECTION, SOLUTION INTRAMUSCULAR; INTRAVENOUS; SUBCUTANEOUS
Status: DISCONTINUED | OUTPATIENT
Start: 2019-03-22 | End: 2019-03-22

## 2019-03-22 RX ORDER — SODIUM CHLORIDE, SODIUM LACTATE, POTASSIUM CHLORIDE, CALCIUM CHLORIDE 600; 310; 30; 20 MG/100ML; MG/100ML; MG/100ML; MG/100ML
INJECTION, SOLUTION INTRAVENOUS CONTINUOUS PRN
Status: DISCONTINUED | OUTPATIENT
Start: 2019-03-22 | End: 2019-03-22

## 2019-03-22 RX ORDER — HYDROMORPHONE HYDROCHLORIDE 1 MG/ML
.3-.5 INJECTION, SOLUTION INTRAMUSCULAR; INTRAVENOUS; SUBCUTANEOUS EVERY 5 MIN PRN
Status: DISCONTINUED | OUTPATIENT
Start: 2019-03-22 | End: 2019-03-22 | Stop reason: HOSPADM

## 2019-03-22 RX ORDER — SODIUM CHLORIDE, SODIUM LACTATE, POTASSIUM CHLORIDE, AND CALCIUM CHLORIDE .6; .31; .03; .02 G/100ML; G/100ML; G/100ML; G/100ML
IRRIGANT IRRIGATION PRN
Status: DISCONTINUED | OUTPATIENT
Start: 2019-03-22 | End: 2019-03-22

## 2019-03-22 RX ORDER — OXYCODONE HYDROCHLORIDE 5 MG/1
5-10 TABLET ORAL
Status: DISCONTINUED | OUTPATIENT
Start: 2019-03-22 | End: 2019-03-25 | Stop reason: HOSPADM

## 2019-03-22 RX ORDER — ONDANSETRON 2 MG/ML
4 INJECTION INTRAMUSCULAR; INTRAVENOUS EVERY 6 HOURS PRN
Status: DISCONTINUED | OUTPATIENT
Start: 2019-03-22 | End: 2019-03-25 | Stop reason: HOSPADM

## 2019-03-22 RX ORDER — AMOXICILLIN 250 MG
2 CAPSULE ORAL 2 TIMES DAILY
Status: DISCONTINUED | OUTPATIENT
Start: 2019-03-22 | End: 2019-03-25 | Stop reason: HOSPADM

## 2019-03-22 RX ORDER — ONDANSETRON 4 MG/1
4 TABLET, ORALLY DISINTEGRATING ORAL EVERY 6 HOURS PRN
Status: DISCONTINUED | OUTPATIENT
Start: 2019-03-22 | End: 2019-03-25 | Stop reason: HOSPADM

## 2019-03-22 RX ORDER — DIAZEPAM 10 MG/2ML
5 INJECTION, SOLUTION INTRAMUSCULAR; INTRAVENOUS EVERY 6 HOURS PRN
Status: DISCONTINUED | OUTPATIENT
Start: 2019-03-22 | End: 2019-03-25 | Stop reason: HOSPADM

## 2019-03-22 RX ORDER — HYDRALAZINE HYDROCHLORIDE 20 MG/ML
10-20 INJECTION INTRAMUSCULAR; INTRAVENOUS EVERY 30 MIN PRN
Status: DISCONTINUED | OUTPATIENT
Start: 2019-03-22 | End: 2019-03-25 | Stop reason: HOSPADM

## 2019-03-22 RX ORDER — LABETALOL 20 MG/4 ML (5 MG/ML) INTRAVENOUS SYRINGE
PRN
Status: DISCONTINUED | OUTPATIENT
Start: 2019-03-22 | End: 2019-03-22

## 2019-03-22 RX ORDER — DEXAMETHASONE 4 MG/1
4 TABLET ORAL EVERY 12 HOURS SCHEDULED
Status: DISCONTINUED | OUTPATIENT
Start: 2019-03-25 | End: 2019-03-25 | Stop reason: HOSPADM

## 2019-03-22 RX ORDER — LABETALOL 20 MG/4 ML (5 MG/ML) INTRAVENOUS SYRINGE
10-40 EVERY 10 MIN PRN
Status: DISCONTINUED | OUTPATIENT
Start: 2019-03-22 | End: 2019-03-25 | Stop reason: HOSPADM

## 2019-03-22 RX ORDER — SODIUM CHLORIDE 9 MG/ML
INJECTION, SOLUTION INTRAVENOUS CONTINUOUS
Status: DISCONTINUED | OUTPATIENT
Start: 2019-03-22 | End: 2019-03-23

## 2019-03-22 RX ORDER — PROPOFOL 10 MG/ML
INJECTION, EMULSION INTRAVENOUS PRN
Status: DISCONTINUED | OUTPATIENT
Start: 2019-03-22 | End: 2019-03-22

## 2019-03-22 RX ORDER — DEXAMETHASONE 1 MG
2 TABLET ORAL EVERY 12 HOURS SCHEDULED
Status: DISCONTINUED | OUTPATIENT
Start: 2019-03-29 | End: 2019-03-25 | Stop reason: HOSPADM

## 2019-03-22 RX ORDER — FOLIC ACID 1 MG/1
1 TABLET ORAL AT BEDTIME
Status: DISCONTINUED | OUTPATIENT
Start: 2019-03-22 | End: 2019-03-25 | Stop reason: HOSPADM

## 2019-03-22 RX ADMIN — LABETALOL 20 MG/4 ML (5 MG/ML) INTRAVENOUS SYRINGE 5 MG: at 13:47

## 2019-03-22 RX ADMIN — LISINOPRIL AND HYDROCHLOROTHIAZIDE 1 TABLET: 25; 20 TABLET ORAL at 21:49

## 2019-03-22 RX ADMIN — Medication 0.3 MG: at 17:50

## 2019-03-22 RX ADMIN — ROCURONIUM BROMIDE 10 MG: 10 INJECTION INTRAVENOUS at 10:46

## 2019-03-22 RX ADMIN — ROCURONIUM BROMIDE 10 MG: 10 INJECTION INTRAVENOUS at 10:15

## 2019-03-22 RX ADMIN — FENTANYL CITRATE 50 MCG: 50 INJECTION, SOLUTION INTRAMUSCULAR; INTRAVENOUS at 09:20

## 2019-03-22 RX ADMIN — HYDRALAZINE HYDROCHLORIDE 10 MG: 20 INJECTION INTRAMUSCULAR; INTRAVENOUS at 22:29

## 2019-03-22 RX ADMIN — LIDOCAINE HYDROCHLORIDE 100 MG: 20 INJECTION, SOLUTION INFILTRATION; PERINEURAL at 07:58

## 2019-03-22 RX ADMIN — PROPOFOL 20 MG: 10 INJECTION, EMULSION INTRAVENOUS at 09:20

## 2019-03-22 RX ADMIN — CEFAZOLIN 1 G: 1 INJECTION, POWDER, FOR SOLUTION INTRAMUSCULAR; INTRAVENOUS at 10:46

## 2019-03-22 RX ADMIN — HYDRALAZINE HYDROCHLORIDE 10 MG: 20 INJECTION INTRAMUSCULAR; INTRAVENOUS at 15:48

## 2019-03-22 RX ADMIN — PROPOFOL 30 MG: 10 INJECTION, EMULSION INTRAVENOUS at 08:10

## 2019-03-22 RX ADMIN — ONDANSETRON 4 MG: 2 INJECTION INTRAMUSCULAR; INTRAVENOUS at 18:33

## 2019-03-22 RX ADMIN — DEXAMETHASONE SODIUM PHOSPHATE 8 MG: 4 INJECTION, SOLUTION INTRA-ARTICULAR; INTRALESIONAL; INTRAMUSCULAR; INTRAVENOUS; SOFT TISSUE at 08:57

## 2019-03-22 RX ADMIN — SUGAMMADEX 200 MG: 100 INJECTION, SOLUTION INTRAVENOUS at 13:53

## 2019-03-22 RX ADMIN — LABETALOL 20 MG/4 ML (5 MG/ML) INTRAVENOUS SYRINGE 5 MG: at 13:42

## 2019-03-22 RX ADMIN — PROPOFOL 30 MG: 10 INJECTION, EMULSION INTRAVENOUS at 09:02

## 2019-03-22 RX ADMIN — HYDRALAZINE HYDROCHLORIDE 10 MG: 20 INJECTION INTRAMUSCULAR; INTRAVENOUS at 14:30

## 2019-03-22 RX ADMIN — ONDANSETRON 4 MG: 2 INJECTION INTRAMUSCULAR; INTRAVENOUS at 13:14

## 2019-03-22 RX ADMIN — CEFAZOLIN 1 G: 1 INJECTION, POWDER, FOR SOLUTION INTRAMUSCULAR; INTRAVENOUS at 12:47

## 2019-03-22 RX ADMIN — FENTANYL CITRATE 250 MCG: 50 INJECTION, SOLUTION INTRAMUSCULAR; INTRAVENOUS at 07:57

## 2019-03-22 RX ADMIN — DEXAMETHASONE 6 MG: 2 TABLET ORAL at 20:56

## 2019-03-22 RX ADMIN — Medication 0.3 MG: at 16:15

## 2019-03-22 RX ADMIN — FENTANYL CITRATE 50 MCG: 50 INJECTION, SOLUTION INTRAMUSCULAR; INTRAVENOUS at 09:47

## 2019-03-22 RX ADMIN — GADOBUTROL 10 ML: 604.72 INJECTION INTRAVENOUS at 07:17

## 2019-03-22 RX ADMIN — LABETALOL 20 MG/4 ML (5 MG/ML) INTRAVENOUS SYRINGE 5 MG: at 13:36

## 2019-03-22 RX ADMIN — FENTANYL CITRATE 50 MCG: 50 INJECTION, SOLUTION INTRAMUSCULAR; INTRAVENOUS at 13:05

## 2019-03-22 RX ADMIN — LABETALOL 20 MG/4 ML (5 MG/ML) INTRAVENOUS SYRINGE 5 MG: at 14:06

## 2019-03-22 RX ADMIN — ACETAMINOPHEN 975 MG: 325 TABLET, FILM COATED ORAL at 20:58

## 2019-03-22 RX ADMIN — PROPOFOL 150 MG: 10 INJECTION, EMULSION INTRAVENOUS at 07:57

## 2019-03-22 RX ADMIN — ROSUVASTATIN CALCIUM 5 MG: 5 TABLET, FILM COATED ORAL at 21:49

## 2019-03-22 RX ADMIN — HYDRALAZINE HYDROCHLORIDE 10 MG: 20 INJECTION INTRAMUSCULAR; INTRAVENOUS at 19:51

## 2019-03-22 RX ADMIN — FOLIC ACID 1 MG: 1 TABLET ORAL at 21:49

## 2019-03-22 RX ADMIN — ATENOLOL 50 MG: 50 TABLET ORAL at 21:48

## 2019-03-22 RX ADMIN — FENTANYL CITRATE 50 MCG: 50 INJECTION INTRAMUSCULAR; INTRAVENOUS at 15:04

## 2019-03-22 RX ADMIN — ROCURONIUM BROMIDE 100 MG: 10 INJECTION INTRAVENOUS at 07:58

## 2019-03-22 RX ADMIN — FENTANYL CITRATE 50 MCG: 50 INJECTION, SOLUTION INTRAMUSCULAR; INTRAVENOUS at 11:01

## 2019-03-22 RX ADMIN — SODIUM CHLORIDE 75 ML/HR: 9 INJECTION, SOLUTION INTRAVENOUS at 16:01

## 2019-03-22 RX ADMIN — ROCURONIUM BROMIDE 20 MG: 10 INJECTION INTRAVENOUS at 12:18

## 2019-03-22 RX ADMIN — ROCURONIUM BROMIDE 20 MG: 10 INJECTION INTRAVENOUS at 09:30

## 2019-03-22 RX ADMIN — HYDRALAZINE HYDROCHLORIDE 10 MG: 20 INJECTION INTRAMUSCULAR; INTRAVENOUS at 21:06

## 2019-03-22 RX ADMIN — CEFAZOLIN SODIUM 2 G: 2 INJECTION, SOLUTION INTRAVENOUS at 08:45

## 2019-03-22 RX ADMIN — ROCURONIUM BROMIDE 10 MG: 10 INJECTION INTRAVENOUS at 12:45

## 2019-03-22 RX ADMIN — LABETALOL 20 MG/4 ML (5 MG/ML) INTRAVENOUS SYRINGE 10 MG: at 18:34

## 2019-03-22 RX ADMIN — SODIUM CHLORIDE, POTASSIUM CHLORIDE, SODIUM LACTATE AND CALCIUM CHLORIDE: 600; 310; 30; 20 INJECTION, SOLUTION INTRAVENOUS at 07:52

## 2019-03-22 RX ADMIN — PROCHLORPERAZINE EDISYLATE 10 MG: 5 INJECTION INTRAMUSCULAR; INTRAVENOUS at 19:56

## 2019-03-22 RX ADMIN — ROCURONIUM BROMIDE 20 MG: 10 INJECTION INTRAVENOUS at 11:22

## 2019-03-22 ASSESSMENT — ACTIVITIES OF DAILY LIVING (ADL)
SWALLOWING: 0-->SWALLOWS FOODS/LIQUIDS WITHOUT DIFFICULTY
RETIRED_COMMUNICATION: 0-->UNDERSTANDS/COMMUNICATES WITHOUT DIFFICULTY
BATHING: 0-->INDEPENDENT
AMBULATION: 0-->INDEPENDENT
COGNITION: 0 - NO COGNITION ISSUES REPORTED
FALL_HISTORY_WITHIN_LAST_SIX_MONTHS: NO
ADLS_ACUITY_SCORE: 13
DRESS: 0-->INDEPENDENT
RETIRED_EATING: 0-->INDEPENDENT
TRANSFERRING: 0-->INDEPENDENT
TOILETING: 0-->INDEPENDENT

## 2019-03-22 ASSESSMENT — VISUAL ACUITY
OU: NORMAL ACUITY

## 2019-03-22 ASSESSMENT — MIFFLIN-ST. JEOR: SCORE: 1844.88

## 2019-03-22 NOTE — ANESTHESIA POSTPROCEDURE EVALUATION
Anesthesia POST Procedure Evaluation    Patient: Jayden Lackey   MRN:     [id number] Gender:   male   Age:    64 year old :      1955        Preoperative Diagnosis: Glioblastoma Multiforme Of Occipital Lobe   Procedure(s):  Left Stealth Assisted Craniotomy And Tumor Resection   Postop Comments: No value filed.       Anesthesia Type:  General    JZG FV AN POST EVALUATION    Last Anesthesia Record Vitals:  CRNA VITALS  3/22/2019 1328 - 3/22/2019 1428      3/22/2019             Resp Rate (set):  10          Last PACU/Preop Vitals:  Vitals:    19 1445 19 1500 19 1515   BP: 133/77 127/71 121/68   Pulse:  65    Resp: 16 15 10   Temp: 36.2  C (97.2  F)     SpO2: 99% 100% 98%         Electronically Signed By: Naga Rudolph MD, 2019, 4:18 PM

## 2019-03-22 NOTE — OR NURSING
"Patient confused to date/year and having word finding difficulties. Patient states, \"I am having trouble processing\". Neurosurgery paged and came to evaluate patient. Plan to continue to monitor and page if any further changes.   "

## 2019-03-22 NOTE — ANESTHESIA CARE TRANSFER NOTE
Patient: Jayden Lackey    Procedure(s):  Left Stealth Assisted Craniotomy And Tumor Resection    Diagnosis: Glioblastoma Multiforme Of Occipital Lobe  Diagnosis Additional Information: No value filed.    Anesthesia Type:   No value filed.     Note:  Airway :Face Mask  Patient transferred to:PACU  Handoff Report: Identifed the Patient, Identified the Reponsible Provider, Reviewed the pertinent medical history, Discussed the surgical course, Reviewed Intra-OP anesthesia mangement and issues during anesthesia, Set expectations for post-procedure period and Allowed opportunity for questions and acknowledgement of understanding      Vitals: (Last set prior to Anesthesia Care Transfer)    CRNA VITALS  3/22/2019 1328 - 3/22/2019 1407      3/22/2019             Resp Rate (set):  10                Electronically Signed By: Anabel Horowitz MD  March 22, 2019  2:07 PM

## 2019-03-22 NOTE — BRIEF OP NOTE
[facility], [place]    Brief Operative Note    Pre-operative diagnosis: Glioblastoma Multiforme Of Occipital Lobe  Post-operative diagnosis * No post-op diagnosis entered *  Procedure: Procedure(s):  Left Stealth Assisted Craniotomy And Tumor Resection  Surgeon: Surgeon(s) and Role:     * Irving Hayes MD - Primary     * Gerardo Solares MD - Resident - Assisting  Anesthesia: General   Estimated blood loss: Minimal  Drains: None  Specimens:   ID Type Source Tests Collected by Time Destination   A : Left Occipital Tumor Tissue Brain SURGICAL PATHOLOGY EXAM Irving Hayes MD 3/22/2019 10:20 AM    B : Left Occipital Tumor Gopi Contents Tissue Brain SURGICAL PATHOLOGY EXAM Irving Hayes MD 3/22/2019 11:44 AM    C : Left Occipital Tumor Tissue Brain SURGICAL PATHOLOGY EXAM Irving Hayes MD 3/22/2019 11:49 AM      Findings:   None.  Complications: None.  Implants: None.

## 2019-03-22 NOTE — LETTER
Transition Communication Hand-off for Care Transitions to Next Level of Care Provider    Name: Jayden Lackey  : 1955  MRN #: [id number]  Primary Care Provider: FARRUKH SOUZA     Primary Clinic: [address] [address]     Reason for Hospitalization:  Brain tumor (H) [D49.6]  Admit Date/Time: 3/22/2019  5:03 AM  Discharge Date: 3/25/2019  Payor Source: Payor: [facility] / Plan: [facility] COMMERCIAL / Product Type: HMO /     64 male s/p intraventricular tumor resection    Discharge Plan:  Home with outpatient OT and SLP.      Discharge Needs Assessment:  Needs      Most Recent Value   Equipment Currently Used at Home  none        Follow-up plan:    Future Appointments   Date Time Provider Department Center   3/26/2019  6:00 AM Ani Cheatham OT [facility]   2019 12:00 PM Dolly Ramirez PA-C [facility]       Any outstanding tests or procedures:        Referrals     Future Labs/Procedures    Occupational Therapy Referral     Process Instructions:    Work Related Injury: Functional Capacity and Work Conditioning are only offered at [facility] and [facility] (service can be provided by PT or OT).    *This therapy referral will be filtered to a centralized scheduling office at [facility] and the patient will receive a call to schedule an appointment at a [place] location most convenient for them. *    Comments:    If you have not heard from the scheduling office within 2 business days, please call [phone] for all locations, with the exception of [place], please call [phone] and [place], please call [phone].    Please be aware that coverage of these services is subject to the terms and limitations of your health insurance plan.  Call member services at your health plan with any benefit or coverage questions.    Speech Therapy Referral     Process Instructions:    *This therapy referral will be  filtered to a centralized scheduling office at [facility] and the patient will receive a call to schedule an appointment at a [place] location most convenient for them. *    Comments:    If you have not heard from the scheduling office within 2 business days, please call [phone] for all locations, with the exception of [place], please call [phone] and [place], please call [phone].    Please be aware that coverage of these services is subject to the terms and limitations of your health insurance plan.  Call member services at your health plan with any benefit or coverage questions.            Brianna Espana RNCC    AVS/Discharge Summary is the source of truth; this is a helpful guide for improved communication of patient story

## 2019-03-22 NOTE — ANESTHESIA PROCEDURE NOTES
Arterial Line Procedure Note  Staff:     Anesthesiologist:  Jhoan Hollis MD    Resident/CRNA:  Anabel Horowitz MD    Arterial line performed by resident/CRNA in presence of a teaching physician    Location: In OR After Induction  Procedure Start/Stop Times:     patient identified, IV checked, site marked, risks and benefits discussed, informed consent, monitors and equipment checked, pre-op evaluation and at physician/surgeon's request      Correct Patient: Yes      Correct Position: Yes      Correct Site: Yes      Correct Procedure: Yes      Correct Laterality:  Yes    Site Marked:  Yes  Line Placement:     Procedure:  Arterial Line    Insertion Site:  Radial    Insertion laterality:  Left    Skin Prep: Chloraprep      Patient Prep: patient draped, mask, sterile gloves, hat and hand hygiene      Local skin infiltration:  None    Ultrasound Guided?: No      Catheter size:  20 gauge, 12 cm    Cath secured with: suture      Dressing:  Tegaderm    Complications:  None obvious    Arterial waveform: Yes      IBP within 10% of NIBP: Yes

## 2019-03-23 ENCOUNTER — APPOINTMENT (OUTPATIENT)
Dept: OCCUPATIONAL THERAPY | Facility: CLINIC | Age: 64
DRG: 027 | End: 2019-03-23
Attending: NURSE PRACTITIONER
Payer: COMMERCIAL

## 2019-03-23 ENCOUNTER — APPOINTMENT (OUTPATIENT)
Dept: MRI IMAGING | Facility: CLINIC | Age: 64
DRG: 027 | End: 2019-03-23
Attending: STUDENT IN AN ORGANIZED HEALTH CARE EDUCATION/TRAINING PROGRAM
Payer: COMMERCIAL

## 2019-03-23 LAB
ANION GAP SERPL CALCULATED.3IONS-SCNC: 10 MMOL/L (ref 3–14)
BUN SERPL-MCNC: 16 MG/DL (ref 7–30)
CALCIUM SERPL-MCNC: 7.6 MG/DL (ref 8.5–10.1)
CHLORIDE SERPL-SCNC: 106 MMOL/L (ref 94–109)
CO2 SERPL-SCNC: 23 MMOL/L (ref 20–32)
CREAT SERPL-MCNC: 0.84 MG/DL (ref 0.66–1.25)
ERYTHROCYTE [DISTWIDTH] IN BLOOD BY AUTOMATED COUNT: 14.6 % (ref 10–15)
GFR SERPL CREATININE-BSD FRML MDRD: >90 ML/MIN/{1.73_M2}
GLUCOSE SERPL-MCNC: 146 MG/DL (ref 70–99)
HCT VFR BLD AUTO: 29.5 % (ref 40–53)
HGB BLD-MCNC: 10.4 G/DL (ref 13.3–17.7)
MCH RBC QN AUTO: 31.4 PG (ref 26.5–33)
MCHC RBC AUTO-ENTMCNC: 35.3 G/DL (ref 31.5–36.5)
MCV RBC AUTO: 89 FL (ref 78–100)
PLATELET # BLD AUTO: 174 10E9/L (ref 150–450)
POTASSIUM SERPL-SCNC: 3.3 MMOL/L (ref 3.4–5.3)
RADIOLOGIST FLAGS: ABNORMAL
RBC # BLD AUTO: 3.31 10E12/L (ref 4.4–5.9)
SODIUM SERPL-SCNC: 139 MMOL/L (ref 133–144)
SP GR UR STRIP: 1 (ref 1–1.03)
WBC # BLD AUTO: 11.7 10E9/L (ref 4–11)

## 2019-03-23 PROCEDURE — 40000141 ZZH STATISTIC PERIPHERAL IV START W/O US GUIDANCE

## 2019-03-23 PROCEDURE — 40000014 ZZH STATISTIC ARTERIAL MONITORING DAILY

## 2019-03-23 PROCEDURE — 97530 THERAPEUTIC ACTIVITIES: CPT | Mod: GO | Performed by: OCCUPATIONAL THERAPIST

## 2019-03-23 PROCEDURE — 25000132 ZZH RX MED GY IP 250 OP 250 PS 637: Performed by: STUDENT IN AN ORGANIZED HEALTH CARE EDUCATION/TRAINING PROGRAM

## 2019-03-23 PROCEDURE — 85027 COMPLETE CBC AUTOMATED: CPT | Performed by: NEUROLOGICAL SURGERY

## 2019-03-23 PROCEDURE — 97535 SELF CARE MNGMENT TRAINING: CPT | Mod: GO | Performed by: OCCUPATIONAL THERAPIST

## 2019-03-23 PROCEDURE — 25000132 ZZH RX MED GY IP 250 OP 250 PS 637: Performed by: NURSE PRACTITIONER

## 2019-03-23 PROCEDURE — 97165 OT EVAL LOW COMPLEX 30 MIN: CPT | Mod: GO | Performed by: OCCUPATIONAL THERAPIST

## 2019-03-23 PROCEDURE — 12000001 ZZH R&B MED SURG/OB UMMC

## 2019-03-23 PROCEDURE — 81003 URINALYSIS AUTO W/O SCOPE: CPT | Performed by: NEUROLOGICAL SURGERY

## 2019-03-23 PROCEDURE — A9585 GADOBUTROL INJECTION: HCPCS | Performed by: NEUROLOGICAL SURGERY

## 2019-03-23 PROCEDURE — 25000131 ZZH RX MED GY IP 250 OP 636 PS 637: Performed by: NURSE PRACTITIONER

## 2019-03-23 PROCEDURE — 80048 BASIC METABOLIC PNL TOTAL CA: CPT | Performed by: NEUROLOGICAL SURGERY

## 2019-03-23 PROCEDURE — 25500064 ZZH RX 255 OP 636: Performed by: NEUROLOGICAL SURGERY

## 2019-03-23 PROCEDURE — 70553 MRI BRAIN STEM W/O & W/DYE: CPT

## 2019-03-23 RX ORDER — POTASSIUM CHLORIDE 29.8 MG/ML
20 INJECTION INTRAVENOUS
Status: DISCONTINUED | OUTPATIENT
Start: 2019-03-23 | End: 2019-03-25 | Stop reason: HOSPADM

## 2019-03-23 RX ORDER — MAGNESIUM SULFATE HEPTAHYDRATE 40 MG/ML
4 INJECTION, SOLUTION INTRAVENOUS EVERY 4 HOURS PRN
Status: DISCONTINUED | OUTPATIENT
Start: 2019-03-23 | End: 2019-03-25 | Stop reason: HOSPADM

## 2019-03-23 RX ORDER — GADOBUTROL 604.72 MG/ML
10 INJECTION INTRAVENOUS ONCE
Status: COMPLETED | OUTPATIENT
Start: 2019-03-23 | End: 2019-03-23

## 2019-03-23 RX ORDER — POTASSIUM CHLORIDE 7.45 MG/ML
10 INJECTION INTRAVENOUS
Status: DISCONTINUED | OUTPATIENT
Start: 2019-03-23 | End: 2019-03-25 | Stop reason: HOSPADM

## 2019-03-23 RX ORDER — POTASSIUM CL/LIDO/0.9 % NACL 10MEQ/0.1L
10 INTRAVENOUS SOLUTION, PIGGYBACK (ML) INTRAVENOUS
Status: DISCONTINUED | OUTPATIENT
Start: 2019-03-23 | End: 2019-03-25 | Stop reason: HOSPADM

## 2019-03-23 RX ORDER — POTASSIUM CHLORIDE 750 MG/1
20-40 TABLET, EXTENDED RELEASE ORAL
Status: DISCONTINUED | OUTPATIENT
Start: 2019-03-23 | End: 2019-03-25 | Stop reason: HOSPADM

## 2019-03-23 RX ORDER — POTASSIUM CHLORIDE 1.5 G/1.58G
20-40 POWDER, FOR SOLUTION ORAL
Status: DISCONTINUED | OUTPATIENT
Start: 2019-03-23 | End: 2019-03-25 | Stop reason: HOSPADM

## 2019-03-23 RX ADMIN — ACETAMINOPHEN 975 MG: 325 TABLET, FILM COATED ORAL at 04:45

## 2019-03-23 RX ADMIN — SENNOSIDES AND DOCUSATE SODIUM 2 TABLET: 8.6; 5 TABLET ORAL at 21:09

## 2019-03-23 RX ADMIN — DEXAMETHASONE 6 MG: 2 TABLET ORAL at 08:17

## 2019-03-23 RX ADMIN — POTASSIUM CHLORIDE 40 MEQ: 750 TABLET, EXTENDED RELEASE ORAL at 10:18

## 2019-03-23 RX ADMIN — ATENOLOL 50 MG: 50 TABLET ORAL at 21:10

## 2019-03-23 RX ADMIN — ACETAMINOPHEN 975 MG: 325 TABLET, FILM COATED ORAL at 21:08

## 2019-03-23 RX ADMIN — DEXAMETHASONE 6 MG: 2 TABLET ORAL at 21:08

## 2019-03-23 RX ADMIN — ACETAMINOPHEN 975 MG: 325 TABLET, FILM COATED ORAL at 12:44

## 2019-03-23 RX ADMIN — FOLIC ACID 1 MG: 1 TABLET ORAL at 21:10

## 2019-03-23 RX ADMIN — SENNOSIDES AND DOCUSATE SODIUM 1 TABLET: 8.6; 5 TABLET ORAL at 08:17

## 2019-03-23 RX ADMIN — POTASSIUM CHLORIDE 20 MEQ: 750 TABLET, EXTENDED RELEASE ORAL at 13:42

## 2019-03-23 RX ADMIN — GADOBUTROL 10 ML: 604.72 INJECTION INTRAVENOUS at 15:38

## 2019-03-23 RX ADMIN — LISINOPRIL AND HYDROCHLOROTHIAZIDE 1 TABLET: 25; 20 TABLET ORAL at 21:08

## 2019-03-23 RX ADMIN — PANTOPRAZOLE SODIUM 40 MG: 40 TABLET, DELAYED RELEASE ORAL at 08:17

## 2019-03-23 RX ADMIN — ROSUVASTATIN CALCIUM 5 MG: 5 TABLET, FILM COATED ORAL at 22:15

## 2019-03-23 ASSESSMENT — VISUAL ACUITY
OU: NORMAL ACUITY

## 2019-03-23 ASSESSMENT — MIFFLIN-ST. JEOR: SCORE: 1963.88

## 2019-03-23 ASSESSMENT — ACTIVITIES OF DAILY LIVING (ADL)
ADLS_ACUITY_SCORE: 13
ADLS_ACUITY_SCORE: 13
ADLS_ACUITY_SCORE: 15
ADLS_ACUITY_SCORE: 12
ADLS_ACUITY_SCORE: 13
ADLS_ACUITY_SCORE: 13

## 2019-03-23 NOTE — PROGRESS NOTES
Neurosurgery Progress Note    S: does not believe that his vision has changed much on the right side     O:  Exam:  General: Awake;  Alert, In No Acute Distress  Pulm: Breathing Comfortably  Mental status: Oriented x3  Cranial Nerves: face symmetric. No dysarthria. Symmetric tongue protrusion. Extraocular muscles intact. Right homonymous hemianopsia present.   Strength:      Del Tr Bi WE WF Gr  R 5 5 5 5 5 5  L 5 5 5 5 5 5     HF KE KF DF PF   R 5 5 5 5 5   L 5 5 5 5 5   Pronator Drift: slight right sided pronator drift present   Sensory: Intact to Light Touch  INCISION: clean/dry/intact     Assessment:   64M s/p intraventricular tumor resection; doing well post-operatively.     Plan by problem:     Neuro:   Decadron taper to 2 mg qday for treatment of cerebral edema   Sutures out: 4/5   Required imaging: post-op MRI brain     Rheum: hold methotrexate for 2 weeks to facilitate wound healing     Cardiovascular: continue home atenolol, lisinopril, HCTZ, statin     Pulmonary: Incentive spirometry     Gastrointestinal: regular diet     Renal: discontinue reyes; monitor intake and output     Heme: No issues     Endocrine: No issues    Infectious Monitor for fever    PT/OT: pending    DVT prophylaxis: Sequential compression devices    Ulcer prophylaxis: protonix while on decadron     DISPO:  Transfer to     Barriers: MRI brain, evaluation by therapies      -----------------------------------  Misael Best MD, MS  Neurosurgery PGY-2      I have reviewed the history. I have seen and examined the patient myself and agree with the assessment and plan above. Postoperative MRI shows good resection of tumor with no gross residual; ischemic changes in left occipital pole. Continue current care.   JOHN Gaines MD

## 2019-03-23 NOTE — PLAN OF CARE
4A PT: CANCEL and DEFER- per discussion with OT, patient is moving well and all needs can be addressed with one discipline. Will defer PT evaluation and patient is mobilizing safely with SBA only.

## 2019-03-23 NOTE — PROGRESS NOTES
03/23/19 1045   Quick Adds   Type of Visit Initial Occupational Therapy Evaluation   Living Environment   Lives With spouse   Living Arrangements other (see comments)  (townhouse)   Home Accessibility stairs to enter home   Number of Stairs, Main Entrance 2  (through garage)   Transportation Anticipated family or friend will provide   Living Environment Comment lives with spouse (who is an OT) in a townhouse, everything on main level. has walk in shower and jacuzi tub. pt.very indep. at baseline, playing hockey~3 days ago.   Self-Care   Usual Activity Tolerance good   Current Activity Tolerance fair   Equipment Currently Used at Home none   Functional Level   Ambulation 0-->independent   Transferring 0-->independent   Toileting 0-->independent   Bathing 0-->independent   Dressing 0-->independent   Eating 0-->independent   Communication 0-->understands/communicates without difficulty   Swallowing 0-->swallows foods/liquids without difficulty   Cognition 0 - no cognition issues reported   Fall history within last six months no   Which of the above functional risks had a recent onset or change? ambulation;transferring;toileting;bathing;dressing   General Information   Onset of Illness/Injury or Date of Surgery - Date 03/22/19   Referring Physician Maryanne Henderson, APRN CNP   Additional Occupational Profile Info/Pertinent History of Current Problem Glioblastoma Multiforme Of Occipital Lobe; Left Stealth Assisted Craniotomy And Tumor Resections   Precautions/Limitations other (see comments)  (crani precautions)   General Info Comments activity orders; up with A   Cognitive Status Examination   Orientation orientation to person, place and time   Level of Consciousness alert   Cognitive Comment pt. noting word finding deficits at times   Visual Perception   Visual Perception Comments R field cut prior to crani, pt. reporting increased deficit and [place] involvement. pt. able to read menu using scanning  "strategies, past midline to R side of page or screen of tv.  pt. able to see/utilize cell phone indep.   Pain Assessment   Patient Currently in Pain (\"I can feel they did something.\")   Range of Motion (ROM)   ROM Quick Adds No deficits were identified   Strength   Manual Muscle Testing Quick Adds (general post op weakness)   Transfer Skill: Bed to Chair/Chair to Bed   Level of [place]: Bed to Chair contact guard   Physical Assist/Nonphysical Assist: Bed to Chair set-up required;1 person assist   Transfer Skill: Sit to Stand   Level of [place]: Sit/Stand stand-by assist   Physical Assist/Nonphysical Assist: Sit/Stand set-up required;1 person assist   Upper Body Dressing   Level of [place]: Dress Upper Body stand-by assist   Physical Assist/Nonphysical Assist: Dress Upper Body set-up required   Lower Body Dressing   Level of [place]: Dress Lower Body stand-by assist   Physical Assist/Nonphysical Assist: Dress Lower Body set-up required   Grooming   Level of [place]: Grooming stand-by assist   Eating/Self Feeding   Level of [place]: Eating independent   Instrumental Activities of Daily Living (IADL)   Previous Responsibilities housekeeping;meal prep;laundry;shopping;medication management;driving;finances  (plays hockey regularly)   IADL Comments spouse and sister are both OTs, involved and very supportive   Activities of Daily Living Analysis   Impairments Contributing to Impaired Activities of Daily Living post surgical precautions;strength decreased;balance impaired   General Therapy Interventions   Planned Therapy Interventions ADL retraining;home program guidelines;visual perception;transfer training;strengthening   Clinical Impression   Criteria for Skilled Therapeutic Interventions Met yes, treatment indicated   OT Diagnosis impaired ADLs/mobility   Influenced by the following impairments post op  crani, visual deficits   Assessment of Occupational Performance 3-5 Performance " "Deficits   Identified Performance Deficits dressing, toileting, bathing, home mgmt.   Clinical Decision Making (Complexity) Low complexity   Therapy Frequency daily   Predicted Duration of Therapy Intervention (days/wks) ~5 days   Anticipated Equipment Needs at Discharge (TBD)   Anticipated Discharge Disposition Home with Assist   Risks and Benefits of Treatment have been explained. Yes   Patient, Family & other staff in agreement with plan of care Yes   [facility] TM \"6 Clicks\"   2016, Trustees of [facility], under license to [employer].  All rights reserved.   6 Clicks Short Forms Daily Activity Inpatient Short Form   [facility]  \"6 Clicks\" Daily Activity Inpatient Short Form   1. Putting on and taking off regular lower body clothing? 3 - A Little   2. Bathing (including washing, rinsing, drying)? 3 - A Little   3. Toileting, which includes using toilet, bedpan or urinal? 3 - A Little   4. Putting on and taking off regular upper body clothing? 4 - None   5. Taking care of personal grooming such as brushing teeth? 4 - None   6. Eating meals? 4 - None   Daily Activity Raw Score (Score out of 24.Lower scores equate to lower levels of function) 21   Total Evaluation Time   Total Evaluation Time (Minutes) 10     "

## 2019-03-23 NOTE — PROGRESS NOTES
Admitted/transferred from: PACU  Reason for admission/transfer: crani  Patient status upon admission/transfer: stable  Interventions: zofran, labetalol  Plan: Q neuros  2 RN skin assessment: completed by Emperatriz  Result of skin assessment and interventions/actions: preventative mepilex in place  Height, weight, drug calc weight: done  Patient belongings: in room- 2 bags  MDRO education (if applicable): NA

## 2019-03-23 NOTE — PROGRESS NOTES
"Social Work Services Progress Note    Hospital Day: 2  Date of Initial Social Work Evaluation:  Not completed   Collaborated with:  Pt's sister, Griselda     Data:  SW received handoff note that pt's wife would like to apply for Social Security Disability \"asap\". No additional information provided in pt's chart.     Intervention:  Met with pt's sister Griselda, who was in pt's room when SW came in. Pt sleeping soundly. Per Griselda, pt's wife had gone home to get some rest. Provided Griselda with typed information sheet on the 3 ways one can apply for SSDI (online/phone/local office). Griselda will give this to pt's wife and wrote down SW's information if pt's wife has additional questions.     Griselda attended pt's OT session this morning and reports it went well. She and pt's wife are both former OT's. She stated that the current discharge plan is for pt to return home. Pt lives in a townhouse with everything on the main level.     No additional concerns identified at this time.     Addendum 1600  [facility] Reviewed SSDI information. Answered her questions related to this. Provided supportive counseling related to pt's prognosis, upcoming surgeries and medical appointments, and feelings of caregiver burden. Discussed transportation options for pt's radiation treatment, which they would like to do at [facility]. Cristiana may be interested in applying for [employer].     Discussed that pt will need to apply for additional healthcare coverage as his COBRA benefits are ending in 4-5 months, per Cristiana. Provided information on [employer] healthcare exchange ([place] [employer]) and how to apply for this, per Cristiana's request.     Cristiana is aware of how to contact  for additional questions.      Plan:    Anticipated Disposition:  Home with services    Barriers to d/c plan:  None identified     Follow Up:  No follow up needed at this time. Please place a new consult if needs arise.     Nely Allen " BECK LICSW  3/23/2019    ON CALL PAGER   0800 - 1600   [phone]    ON CALL COVERAGE AFTER 1600  [phone]

## 2019-03-23 NOTE — PLAN OF CARE
Shift Summary [id number]:     Neuro: Pt is alert, intermittent reorientation to place, time req'd, intermittent word-finding difficulty present. Pupils equal and reactive. Pt has right visual field cut present (from center of right eye towards right) - MD aware, no orders recieved.  Pt this evening describes being unable to recognize what he is seeing at times. Pt moves all extremities equally and follows commands. Pt denies pain, is using own home pillow for positioning to alleviate incisional pain.     Cardiac: SR. HR 60s-80s.      Respiratory: On RA. Lung sounds clear.      GI/: Pt tolerating reg diet with no further nausea today.  Positive BS, passing flatus, no BM. High urine output today, see I/Os.  MDs aware, specific gravity sent 1.005, WNL. Beal removed at 1730, pt with one small void at 1745, assess closely for UO.     Incisons: Left head incision dressing dry/intact.     Lines: PIVx1.    K replaced, recheck to be drawn at 1800.     Family present at bedside.  Continue to monitor and update MD as needed. Continue plan of care. Pt to transfer to , will transport to do bedside neuro.

## 2019-03-23 NOTE — PLAN OF CARE
Discharge Planner OT   Patient plan for discharge: home with A prn  Current status: pt. SBA/I with BADLs and functional mobility, ambulating with/without iv pole. Pt. tolerated session well on RA. O2 90's, HR 50's-60's, /76 sitting EOB, 113/59 after amb. Vision deficits main barrier at time of session. Pt. adapting well, utilizing . to compensate for vision deficit.  Barriers to return to prior living situation: medical readiness  Recommendations for discharge: home with A; pt.s spouse and sister are very supportive/OTs by background.  Rationale for recommendations: max. Safety/indep. With ADLs/mobility in home/community setting.       Entered by: Kathia Morales 03/23/2019 11:08 AM

## 2019-03-23 NOTE — PLAN OF CARE
Shift Summary [id number]:    Neuro: Pt remains A&Ox4 with continued word-finding difficulty present. Pupils equal and reactive. Pt has right visual field cut present (from center of right eye towards right) - MD aware, no orders recieved. Pt moves all extremities equally and follows commands. Pt reports a mild headache that is improving.      Cardiac: SR. HR 60s-80s. SBP<140 - PRN Hydralazine given x3 with minimal effect.     Respiratory: On RA. Lung sounds clear.     GI/: At 1900, pt had nausea and emesis; PRN Compazine given which was effective. Pt since tolerating meds and clear liquids. Urine output 45-75ml/hr.    Incisons: Left head incision dressing dry/intact.    Lines: PIVx2. L radial arterial line.    Family present at bedside overnight.  Continue to monitor and update MD as needed. Continue plan of care.

## 2019-03-24 LAB — POTASSIUM SERPL-SCNC: 4 MMOL/L (ref 3.4–5.3)

## 2019-03-24 PROCEDURE — 36415 COLL VENOUS BLD VENIPUNCTURE: CPT | Performed by: NEUROLOGICAL SURGERY

## 2019-03-24 PROCEDURE — 12000001 ZZH R&B MED SURG/OB UMMC

## 2019-03-24 PROCEDURE — 84132 ASSAY OF SERUM POTASSIUM: CPT | Performed by: NEUROLOGICAL SURGERY

## 2019-03-24 PROCEDURE — 25000132 ZZH RX MED GY IP 250 OP 250 PS 637: Performed by: NURSE PRACTITIONER

## 2019-03-24 PROCEDURE — 25000131 ZZH RX MED GY IP 250 OP 636 PS 637: Performed by: NURSE PRACTITIONER

## 2019-03-24 RX ADMIN — POLYETHYLENE GLYCOL 3350 17 G: 17 POWDER, FOR SOLUTION ORAL at 08:20

## 2019-03-24 RX ADMIN — ATENOLOL 50 MG: 50 TABLET ORAL at 21:07

## 2019-03-24 RX ADMIN — ACETAMINOPHEN 975 MG: 325 TABLET, FILM COATED ORAL at 21:06

## 2019-03-24 RX ADMIN — SENNOSIDES AND DOCUSATE SODIUM 1 TABLET: 8.6; 5 TABLET ORAL at 21:07

## 2019-03-24 RX ADMIN — SENNOSIDES AND DOCUSATE SODIUM 2 TABLET: 8.6; 5 TABLET ORAL at 08:21

## 2019-03-24 RX ADMIN — PANTOPRAZOLE SODIUM 40 MG: 40 TABLET, DELAYED RELEASE ORAL at 08:20

## 2019-03-24 RX ADMIN — ACETAMINOPHEN 975 MG: 325 TABLET, FILM COATED ORAL at 03:42

## 2019-03-24 RX ADMIN — DEXAMETHASONE 6 MG: 2 TABLET ORAL at 08:21

## 2019-03-24 RX ADMIN — LISINOPRIL AND HYDROCHLOROTHIAZIDE 1 TABLET: 25; 20 TABLET ORAL at 21:06

## 2019-03-24 RX ADMIN — DEXAMETHASONE 6 MG: 2 TABLET ORAL at 21:07

## 2019-03-24 RX ADMIN — FOLIC ACID 1 MG: 1 TABLET ORAL at 21:07

## 2019-03-24 RX ADMIN — ROSUVASTATIN CALCIUM 5 MG: 5 TABLET, FILM COATED ORAL at 21:06

## 2019-03-24 RX ADMIN — ACETAMINOPHEN 975 MG: 325 TABLET, FILM COATED ORAL at 12:16

## 2019-03-24 ASSESSMENT — VISUAL ACUITY
OU: NORMAL ACUITY
OU: NORMAL ACUITY

## 2019-03-24 ASSESSMENT — ACTIVITIES OF DAILY LIVING (ADL)
ADLS_ACUITY_SCORE: 15

## 2019-03-24 ASSESSMENT — PAIN DESCRIPTION - DESCRIPTORS: DESCRIPTORS: HEADACHE

## 2019-03-24 NOTE — PLAN OF CARE
Cared for pt. [id number]  Status: Pt POD 1 R crani/glio resection    Vs: VSS  Neuros: A/O x1, R field cuts and blurred vision.  Gi: Reg diet.  Gu: Beal out this afternoon, voiding spontaneously without difficulty  Iv: PIV SL  Activity: Up w/SBA  Pain: Denies  Respiratory/Trach: RA, O2 sats stable, Lungs clear  Skin: R head incision dressing CDI  Social: Family at bedside  Plan of care: Continue to monitor and follow POC

## 2019-03-24 NOTE — PLAN OF CARE
Status: Pt is POD #2 Left side craniotomy for GBM resection.    VS: VSS on RA  Neuros: Slight waxing & waning of orientation. Oriented to self, month, & place. Pt. seems aware of their orientation issues & is easily reoriented. R side field cut & blurred vision. Pt is slightly [place].   GI: Reg diet. No BM overnight. Passing flatus.   : Voiding spontaneously w/o difficulty  IV: PIV SL  Activity: Up w/SBA & GB. Ambulated to bathroom x2.   Pain: Denies  Respiratory/Trach: LS clear  Skin: Left sided head incision covered w/ dressing - CDI. Mepliex on coccyx.   Plan of care: Continue to monitor and follow POC.

## 2019-03-24 NOTE — PROGRESS NOTES
Talked to family today, they are concerned about episodes of word finding and confusion intermitently today. We discussed Speech therapy and a recheck by Occupational therapy to ensure there are no additional needs/required cares at home prior to discharge. They were appreciative regarding this plan of care.

## 2019-03-24 NOTE — PROGRESS NOTES
Neurosurgery Progress Note    S: No acute events. Transferred to floor yesterday.    O:  Exam:  General: Awake;  Alert, In No Acute Distress  Pulm: Breathing Comfortably  Mental status: Oriented x3  Cranial Nerves: face symmetric. No dysarthria. Symmetric tongue protrusion. Extraocular muscles intact. Right homonymous hemianopsia present.   Strength:      Del Tr Bi WE WF Gr  R 5 5 5 5 5 5  L 5 5 5 5 5 5     HF KE KF DF PF   R 5 5 5 5 5   L 5 5 5 5 5   Pronator Drift: slight right sided pronator drift present   Sensory: Intact to Light Touch  INCISION: clean/dry/intact     Assessment:   64M s/p intraventricular tumor resection; doing well post-operatively.     Plan by problem:     Neuro:   Decadron taper to 2 mg qday for treatment of cerebral edema   Sutures out: 4/5   Post-op MRI brain obtained and reviewed    Rheum: hold methotrexate for 2 weeks to facilitate wound healing     Cardiovascular: continue home atenolol, lisinopril, HCTZ, statin     Pulmonary: Incentive spirometry     Gastrointestinal: regular diet     Renal: monitor intake and output     Heme: No issues     Endocrine: No issues    Infectious Monitor for fever    PT: no needs; OT: home with assist    DVT prophylaxis: Sequential compression devices    Ulcer prophylaxis: protonix while on decadron     DISPO: 6A    Barriers: none      -----------------------------------  Misael Best MD, MS  Neurosurgery PGY-2    I have reviewed the history. I have seen and examined the patient myself and agree with the assessment and plan above.  JOHN Gaines MD

## 2019-03-24 NOTE — PLAN OF CARE
Patient arrived here at 1847 via wheelchair and was accompany with wife and 4A RN with all their belongings. Neuro assessment done at bedside( See flow sheet). Call light in place. Report given for oncoming RN.Continue to monitor and update Neurosurgery team with any changes.

## 2019-03-24 NOTE — PLAN OF CARE
Vitals stable,oriented x 3,intermittent disorientation to time.Blurry vision,difficulty finding words,Denied numbness or tingling.Craniotomy incision site covered,dressing intact.Pain is negligible per patient and  comfortably managed with scheduled tylenol.Ambulated with standby assist and gait belt.Appetite fair.Voiding but not saving.,had a large bowel movement.Speech and OT consult ordered,yet to be seen.Continue per plan of care.

## 2019-03-25 ENCOUNTER — APPOINTMENT (OUTPATIENT)
Dept: SPEECH THERAPY | Facility: CLINIC | Age: 64
DRG: 027 | End: 2019-03-25
Attending: STUDENT IN AN ORGANIZED HEALTH CARE EDUCATION/TRAINING PROGRAM
Payer: COMMERCIAL

## 2019-03-25 ENCOUNTER — APPOINTMENT (OUTPATIENT)
Dept: OCCUPATIONAL THERAPY | Facility: CLINIC | Age: 64
DRG: 027 | End: 2019-03-25
Attending: NEUROLOGICAL SURGERY
Payer: COMMERCIAL

## 2019-03-25 VITALS
RESPIRATION RATE: 16 BRPM | SYSTOLIC BLOOD PRESSURE: 143 MMHG | DIASTOLIC BLOOD PRESSURE: 80 MMHG | HEART RATE: 64 BPM | BODY MASS INDEX: 31.32 KG/M2 | WEIGHT: 244.05 LBS | HEIGHT: 74 IN | OXYGEN SATURATION: 99 % | TEMPERATURE: 97.8 F

## 2019-03-25 PROCEDURE — 25000132 ZZH RX MED GY IP 250 OP 250 PS 637: Performed by: NURSE PRACTITIONER

## 2019-03-25 PROCEDURE — 25000131 ZZH RX MED GY IP 250 OP 636 PS 637: Performed by: NURSE PRACTITIONER

## 2019-03-25 PROCEDURE — 97535 SELF CARE MNGMENT TRAINING: CPT | Mod: GO

## 2019-03-25 PROCEDURE — 92523 SPEECH SOUND LANG COMPREHEN: CPT | Mod: GN

## 2019-03-25 PROCEDURE — 92507 TX SP LANG VOICE COMM INDIV: CPT | Mod: GN

## 2019-03-25 RX ORDER — DEXAMETHASONE 2 MG/1
2 TABLET ORAL
Qty: 60 TABLET | Refills: 1 | Status: SHIPPED | OUTPATIENT
Start: 2019-04-03 | End: 2019-04-09

## 2019-03-25 RX ORDER — DEXAMETHASONE 6 MG/1
6 TABLET ORAL DAILY
Qty: 1 TABLET | Refills: 0 | Status: SHIPPED | OUTPATIENT
Start: 2019-03-25 | End: 2019-04-09

## 2019-03-25 RX ORDER — PANTOPRAZOLE SODIUM 40 MG/1
40 TABLET, DELAYED RELEASE ORAL
Qty: 80 TABLET | Refills: 3 | Status: SHIPPED | OUTPATIENT
Start: 2019-03-26 | End: 2019-04-09

## 2019-03-25 RX ORDER — DEXAMETHASONE 2 MG/1
2 TABLET ORAL 2 TIMES DAILY WITH MEALS
Qty: 8 TABLET | Refills: 0 | Status: SHIPPED | OUTPATIENT
Start: 2019-03-30 | End: 2019-04-09

## 2019-03-25 RX ORDER — DEXAMETHASONE 4 MG/1
4 TABLET ORAL EVERY 12 HOURS
Qty: 8 TABLET | Refills: 0 | Status: SHIPPED | OUTPATIENT
Start: 2019-03-26 | End: 2019-04-09

## 2019-03-25 RX ADMIN — ACETAMINOPHEN 975 MG: 325 TABLET, FILM COATED ORAL at 12:42

## 2019-03-25 RX ADMIN — DEXAMETHASONE 6 MG: 2 TABLET ORAL at 08:14

## 2019-03-25 RX ADMIN — PANTOPRAZOLE SODIUM 40 MG: 40 TABLET, DELAYED RELEASE ORAL at 08:14

## 2019-03-25 RX ADMIN — SENNOSIDES AND DOCUSATE SODIUM 1 TABLET: 8.6; 5 TABLET ORAL at 08:15

## 2019-03-25 RX ADMIN — ACETAMINOPHEN 975 MG: 325 TABLET, FILM COATED ORAL at 04:17

## 2019-03-25 ASSESSMENT — VISUAL ACUITY: OU: NORMAL ACUITY

## 2019-03-25 ASSESSMENT — ACTIVITIES OF DAILY LIVING (ADL)
ADLS_ACUITY_SCORE: 15
ADLS_ACUITY_SCORE: 13

## 2019-03-25 NOTE — PROGRESS NOTES
03/25/19 1100   General Information   Type of Visit Initial   Start of care date 03/25/19   Patient/Family Goals Statement Go home    General Observations Pt and family's greatest concerns are pt's ongoing visual deficits, and intermittent word finding. Pt is retired, he was evaluated at bedside with wife and daughter present. Vision appears to be pt and family greatest concern. Pt's wife notes him to be a very thoughtful person, he does not act or speak quickly at baseline, but his current level of communication is below baseline. Pt feels his thinking and word generation are much more effortful that at baseline and he feels very slow during the tasks completed within the evaluation.    Precautions/Limitations: Hearing WFL   Precautions/Limitations: Vision impaired  (worse following surgery per pt and family )   General Info Comments Mr. Lackey is a 64-year-old gentleman who originally presented to neuroophthalmologist Dr. Mauricio for visual disturbance where he was found to have a right visual field deficit in both eyes, and went on to obtain an MRI which revealed a contrast-enhancing lesion in the left occipital lobe suspicious for a high-grade primary brain tumor.  He underwent the informed consent process for a left-sided craniotomy for resection of the lesion   Cognitive Status Examination   Behavioral Observations WFL   Orientation Oriented    Attention intact   Visual Field impaired   Visual Scanning Left To Right On Paper Task impaired   Visual Impairment Includes visual scanning   Short Term Memory (Pt reports impaired for first days of admission )   Cognitive Status Examination comments Overall, cognition appears intact, errors and communication deficits language based    Auditory Comprehension   Quick Adds Yes   SLP Auditory Comprehension   Auditory Comprehension intact  (For functional tasks during eval and up to 3 step commands )   Able To Identify Objects field of 4 or more   Able To Identify Pictures  field of 4 or more   Able To Follow Commands 3-step commands;Right/left discrimination   Able To Follow Commands 3-step commands   SLP Verbal Expression   Verbal Expression impaired   Repetition word;phrase;sentence   Fluency fluent   Confrontation Naming Intact   Responsive Naming -Object Function Intact   SLP Reading   Reading impaired  (Impacted by visual deficits )   Matching Ability picture/word;object/word;word/word   Comprehension sentence length   Writing   Quick Adds Yes   Writing comments Sementic and phonemic errors noted within writing tasks, pt is aware and able to self correct    Voice   Quick Adds Yes   General Therapy Interventions   Planned Therapy Interventions Language   Language Auditory comprehension;Reading comprehension;Verbal expression;Written expression   Clinical Impression   Criteria for skilled therapeutic interventions met Yes   SLP diagnosis Mild comprehensive aphasia, mild-moderate expressive aphasia    Influenced by the following factors/impairments Visual impairements    Rehab potential Good, to achieve stated therapy goals   Therapy frequency 5 times/week   Risks and benefits have been explained Yes   Patient, family and other staff in agreement with plan of care Yes   Clinical Impressions comment  Pt seen for speech/language evaluation secondary to pt and family's concern for pt's word finding deficits. Pt is s/p left side craniotomy. Pt and family's greatest concern at this time are pt's visual impairments and word finding. Pt denies any difficulty swallowing or dysarthria. Pt completed information evaluation to further assess speech and language. Pt is fully oriented, pt's family states this is an improvement over the last day or so, pt was making phonemic errors (saying it was May vs March etc). Pt feels his short term memory is slightly impaired, and wife feels this is primarily related to the first day or two he was hear prior to surgery, overall they otherwise feel his  cognition is intact. Pt was fully oriented, answer yes/no questions without difficulty, followed 1, 2 and 3 step commands. He was able to identify objects and pictures, and completed confrontational naming tasks without difficulty. Pt completed single word, phrase and sentence reading tasks with 100% accuracy - he reports difficulty with vision and additional effort required secondary to this, but regardless could complete tasks correctly and independently. Pt also completed functional reading/writing tasks with 100% accuracy, he noted this to be effortful and resulted in him being fatigued. Semantic errors noted within writing tasks, pt aware of these errors and able to self correct, independently. During evaluation right neglect was noted on 2 occasions when pt asked for directions as he was unsure how to complete the task as he did not see the right side of the page. Once attention was drawn to the information pt completed all tasks correctly. Pt presents with adequate comprehension within conversation and across tasks for evaluation, however recommend additional evaluation for more complex level tasks. Pt presents with mild expressive language errors and right visual neglect. He is aware of his errors and appears able to self correct and use strategies independently to communication. Recommend ongoing SLP services in the outpatient setting for a more thorough and formal/standardized evaluation of his speech/language. Education was provided to pt and family regarding recommendation and strategies provided to aid in communication and visual deficits.    Total Evaluation Time   Total Evaluation Time (Minutes) 30

## 2019-03-25 NOTE — PLAN OF CARE
POD #3 L crani. VSS aside from HTN within parameters. No c/o pain. L head incision intact with sutures. PIV SL. Adequate PO. Voiding spontaneously. Up with SBA. Discharge instructions/medications reviewed with pt and family; both stated understanding. Discharged to home at 1440. MGMT Promoter Methylation Tumor lab ordered but pt already discharged; MD Murillo notified.

## 2019-03-25 NOTE — PLAN OF CARE
Discharge Planner OT   Patient plan for discharge: Home with OP therapies  Current status: Completed in-depth visual assessment, pt demonstrating significant R sided neglect with majority of deficits in upper quadrant. Pt demonstrating fair saccades and pursuits, increased deficits in convergence/divergence as pt with minimal inward eye movement. Educated pt and family on inattention/neglect compensatory strategies, handout provided. Educated pt on convergence/eye exercises to strengthen occulomotor muscles for increased independence during ADLs/IADLs, handouts and resources provided.  Barriers to return to prior living situation: visual deficits, word finding difficulties  Recommendations for discharge: Home with assist as needed and OP OT and Speech services, will benefit from optometry/ophthalmology consult at discharge  Rationale for recommendations: Pt demonstrates safety and independence in compensatory strategies in order to complete ADLs/IADLs. Recommend OP OT services to address remaining visual deficits including muscle weakness/compensatory neglect strategies, pt and family in agreement. Also recommend optometry/ophthalmology consult following discharge for specialized lenses.       Entered by: Dagmar Schmidt 03/25/2019 11:14 AM     Occupational Therapy Discharge Summary    Reason for therapy discharge:    Discharged to home with outpatient therapy.    Progress towards therapy goal(s). See goals on Care Plan in [facility] electronic health record for goal details.  Goals partially met.  Barriers to achieving goals:   discharge from facility.    Therapy recommendation(s):    Continued therapy is recommended.  Rationale/Recommendations:  Pt would benefit from continued OP OT and Speech services due to continued deficits listed above in order to return to PLOF.

## 2019-03-25 NOTE — OP NOTE
Procedure Date: 03/22/2019      PREOPERATIVE DIAGNOSIS:  Left occipital brain tumor.      POSTOPERATIVE DIAGNOSIS:  Left occipital brain tumor.      PROCEDURES PERFORMED:   1. Left Stealth-assisted craniotomy for brain tumor.   2. Intraoperative use of microscope.      ATTENDING SURGEON:  Irving Hayes MD.      RESIDENT SURGEON:  Gerardo Solares MD.      ANESTHESIA:  General.      OPERATIVE INDICATIONS:  Mr. Lackey is a 64-year-old gentleman who originally presented to neuroophthalmologist Dr. Mauricio for visual disturbance where he was found to have a right visual field deficit in both eyes, and went on to obtain an MRI which revealed a contrast-enhancing lesion in the left occipital lobe suspicious for a high-grade primary brain tumor.  He underwent the informed consent process for a left-sided craniotomy for resection of the lesion including discussion of potential benefits, potential risks, and alternatives.      DESCRIPTION OF PROCEDURE:  After the informed consent was verified, Mr. Lackey was brought to the operating room where he underwent successful endotracheal intubation with general anesthesia.  An arterial line and Beal catheter were placed and a Loyd head sumner was applied to his skull.  He was flipped into the prone position on the operating room table where his head was affixed to the operating room table.      His preoperatively obtained stereotactic CT and MRI were transferred to the operating room navigation station where optimal planning of trajectory including the incision craniotomy and resection were performed.  His head was co-registered with the scans with excellent accuracy using the Medtronic Stealth system, and his head was shaved, cleaned, prepped, and draped in the usual sterile fashion and a timeout was performed.      Then 20 mL of 0.5% Marcaine with 1:200,000 epinephrine was injected into the subcutaneous tissue underlying a planned inverted trap door incision eccentric to the left  side.  After a sufficient amount of time was waited for maximal effect of local anesthetic and epinephrine, a #10 scalpel was used to sharply incise the skin along the planned incision.  Mariangel clips were used for hemostasis, as well as monopolar cautery which aided in dissection of the periosteum.  Once retractors were used to retract the myocutaneous flap inferiorly, neuronavigation was used to plan 3 alyssa holes including 2 parasagittal and 1 inferolateral on the left side.  A high speed drill with  was used to create these 3 alyssa holes and the edges of the alyssa holes were waxed and remaining bone fragments were removed with curettes.  The dura was carefully dissected off the inner table of the skull, between the alyssa holes and around the area of the proposed craniotomy.  A high speed drill with B1 bit and footplate was used to then create a square craniotomy flap exposing the left occipital and parietal lobe.  The bone flap was plated and placed in bacitracin irrigation.  Hemostasis obtained with bipolar cautery and Surgiflo.  The C1 bit and 4-0 Nurolon were used to provide tack ups around the edges of the craniotomy and a #15 scalpel was used to incise the dura flapping the dura medially further opening with Metzenbaum scissors.  The microscope was brought into the field and neuronavigation was used to identify a trajectory towards the contrast-enhancing lesions.  Bipolar cautery was used to provide hemostasis along a planned corticectomy and a #15 blade was used to sharply incise the cauterized sergio.  Bipolar cautery was then used to obtain hemostasis and carefully dissect towards the contrast-enhancing lesions, which were quickly encountered.  Frozen section was obtained and revealed to include features of a high-grade glioma.  Specimen forceps were used to remove more of the contrast-enhancing lesion, which appeared grossly abnormal and were sent for permanent pathology.      The Aly in combination  with bipolar cautery, was used to carefully resect abnormal tissue present within the contrast-enhancing lesion.  We followed the abnormal tissue laterally and superiorly and finally medially and inferiorly until the resection cavity was bordered by normal tissue.  Hemostasis was obtained with bipolar cautery, though when satisfied resection cavity was copiously irrigated and inspected for any bleeding.  No bleeding was noted and cottonoids were used to try the resection cavity.  When satisfied, we turned our attention to closing.      Dural flap was reapproximated and 4-0 Nurolon were used in an interrupted fashion.  The intracranial air was then displaced as much as possible with normal saline irrigation.  A piece of Duragen was applied over the dural reapproximation followed by a piece of Gelfoam.  A craniotomy flap was positioned with plated alyssa hole covers and 4 mm screws were used to secure the craniotomy peripherally.  The wound was then copiously irrigated with bacitracin irrigation and we turned our attention to closing the myocutaneous flap.  Inverted 2-0 Vicryl sutures were used to reapproximate the galea layer.  Bipolar cautery was used to obtain hemostasis and the skin was reapproximated with a running 3-0 nylon.  The wound was cleaned, prepped and bandaged.      All counts were correct at the end of the case x 2.      Dr. Davis was present for all critical portions of the operation and immediately available at all times.      Revised [place] 2019 [facility] [place]      I, Dr. Nazario Davis, was present for the key portions of the procedure and immediately available for the entire operation.         NAZARIO DAVIS MD       As dictated by CURT HOLLINS MD            D: 2019   T: 2019   MT: KANCHAN      Name:     ISAÍAS MALDONADO   MRN:      [id number]        Account:        [id number]   :      1955           Procedure Date: 2019      Document: [id number]

## 2019-03-25 NOTE — DISCHARGE SUMMARY
[facility] Discharge Summary and Instructions    Jayden Lackey MRN# [id number]   Age: 64 year old YOB: 1955     Date of Admission:  3/22/2019  Date of Discharge::  3/25/2019  2:40 PM  Admitting Physician:  Irving Hayes MD  Discharge Physician:  Irving Hayes MD          Admission Diagnoses:   Brain tumor (H) [D49.6]  Astrocytoma, grade pending          Discharge Diagnosis:   Glioblastoma.          Procedures:   3/22/19: Left craniotomy for tumor resection           Brief History of Illness:   Mr. Lackey is a 64-year-old gentleman who originally presented to neuroophthalmologist Dr. Mauricio for visual disturbance where he was found to have a right visual field deficit in both eyes, and went on to obtain an MRI which revealed a contrast-enhancing lesion in the left occipital lobe suspicious for a high-grade primary brain tumor.           Hospital Course:   Patient underwent above-mentioned procedure on 3/22/19. The operation was uncomplicated and he was admitted to the surgical ICU for routine post operative cares. He was found to have a worsened right homonymous hemianopsia postoperatively. 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 Following the MRI, POD#1, he was doing well and transferred to the floor. He had increased urine output, however, specific gravity was normal. On post operative day 3, he was ambulating, voiding without a reyes, eating a regular diet, and pain was well controlled. Therapies recommended outpatient OT and speech, which was arranged.    Exam on discharge:  General: Awake;  Alert, In No Acute Distress  Pulm: Breathing Comfortably  Mental status: Oriented x3  Cranial Nerves: face symmetric. No dysarthria. Symmetric tongue protrusion. Extraocular muscles intact. Right homonymous hemianopsia present.   Strength:      Del Tr Bi WE WF Gr  R 5 5 5 5 5 5  L 5 5 5 5 5 5     HF KE KF DF PF   R 5 5 5 5 5   L 5 5 5 5 5   Pronator  Drift: slight right sided pronator drift present   Sensory: Intact to Light Touch  INCISION: clean/dry/intact          Discharge Medications:     Current Discharge Medication List      START taking these medications    Details   !! dexamethasone (DECADRON) 2 MG tablet Take 2 mg by mouth 2 times daily (with meals) for 4 days.  Qty: 8 tablet, Refills: 0    Comments: 3rd in taper.  Associated Diagnoses: S/P craniotomy      !! dexamethasone (DECADRON) 2 MG tablet Take 2 mg by mouth daily (with breakfast).  Qty: 60 tablet, Refills: 1    Comments: Please include the quantity of tablets and (strength) per dose in sig.  Associated Diagnoses: S/P craniotomy      !! dexamethasone (DECADRON) 4 MG tablet Take 4 mg by mouth every 12 hours for 4 days.  Qty: 8 tablet, Refills: 0    Comments: 2nd in taper.  Associated Diagnoses: S/P craniotomy      !! dexamethasone (DECADRON) 6 MG tablet Take 6 mg by mouth daily.  Qty: 1 tablet, Refills: 0    Comments: 1st in taper.  Associated Diagnoses: S/P craniotomy      pantoprazole (PROTONIX) 40 MG EC tablet Take 1 tablet (40 mg) by mouth every morning (before breakfast)  Qty: 80 tablet, Refills: 3    Comments: Continue to take while taking decadron.  Associated Diagnoses: S/P craniotomy       !! - Potential duplicate medications found. Please discuss with provider.      CONTINUE these medications which have NOT CHANGED    Details   atenolol (TENORMIN) 50 MG tablet Take 50 mg by mouth At Bedtime       FOLIC ACID PO Take 1 mg by mouth At Bedtime       Nutritional Supplements (JUICE PLUS FIBRE PO) Take 1 tablet by mouth At Bedtime      rosuvastatin (CRESTOR) 5 MG tablet Take 5 mg by mouth At Bedtime       lisinopril-hydrochlorothiazide (PRINZIDE/ZESTORETIC) 20-25 MG tablet Take 1 tablet by mouth At Bedtime         STOP taking these medications       aspirin (ASA) 81 MG chewable tablet Comments:   Reason for Stopping:         methotrexate 50 MG/2ML injection CHEMO Comments:   Reason for  Stopping:         methotrexate sodium, pres-free, 50 MG/2ML SOLN injection CHEMO Comments:   Reason for Stopping:         naproxen (NAPROSYN) 500 MG tablet Comments:   Reason for Stopping:         omega 3 1000 MG CAPS Comments:   Reason for Stopping:                       Discharge Instructions and Follow-Up:   Discharge diet: Regular   Discharge activity: Do not do any bending, twisting, strenuous exercise, or heavy lifting (greater than 10 pounds) for 4-6 weeks. Be careful and ask for assistance when walking or going up and down stairs. Avoid any activities that could result in trauma to the surgical wound.     Discharge follow-up: You should follow up with Ana Paula Alvarez NP in Neurosurgery clinic in 2 weeks for suture removal (sutures to be removed 4/5/2019), wound check, and general post-operative care. You should call [phone] or [phone] if you have not heard from the hospital within 2 days of discharge regarding your follow-up appointment.     Wound care: You should keep the wound undressed and open to air. You are allowed to take showers and get the wound wet but you may not scrub or soak the wound or keep it submerged under water. If you do happen to get the wound wet, be sure to pat dry it rather than scrubbing it with a towel.       You may restart your aspirin 3/29. You may restart your methotrexate 4/5.    Occupational therapy referral  Speech Therapy referral    Please call if you have:  1. increased pain, redness, drainage, swelling at your incision  2. fevers > 101.5 F degrees  3. with any questions or concerns.  You may reach the Neurosurgery clinic at [phone] during regular work hours. ER at [phone].    and ask for the Neurosurgery Resident on call at [phone], during off hours or weekends.         Discharge Disposition:   Discharged to home

## 2019-03-25 NOTE — PLAN OF CARE
Status: Pt is POD #3 Left side craniotomy for GBM resection.    VS: VSS on RA  Neuros: D/o to time - needs choices. Mild word-finding difficulty & expressive aphasia. R side field cut unchanged per pt.   GI: Reg diet. No BM overnight.  : Voiding spontaneously w/o difficulty  IV: PIV SL  Activity: Up w/SBA & GB. Ambulated to bathroom x1.   Pain: Denies  Respiratory/Trach: LS clear  Skin: Left sided head incision RASHI, WNL.   Plan of care: Continue to monitor and follow POC. OT & speech to follow up before discharge.

## 2019-03-25 NOTE — PLAN OF CARE
Discharge Planner SLP   Patient plan for discharge: Home today   Current status: Pt seen for speech/language evaluation secondary to pt and family's concern for pt's word finding deficits. Pt is s/p left side craniotomy. Pt and family's greatest concern at this time are pt's visual impairments and word finding. Pt denies any difficulty swallowing or dysarthria. Pt completed information evaluation to further assess speech and language. Pt is fully oriented, pt's family states this is an improvement over the last day or so, pt was making phonemic errors (saying it was May vs March etc). Pt feels his short term memory is slightly impaired, and wife feels this is primarily related to the first day or two he was hear prior to surgery, overall they otherwise feel his cognition is intact. Pt was fully oriented, answer yes/no questions without difficulty, followed 1, 2 and 3 step commands. He was able to identify objects and pictures, and completed confrontational naming tasks without difficulty. Pt completed single word, phrase and sentence reading tasks with 100% accuracy - he reports difficulty with vision and additional effort required secondary to this, but regardless could complete tasks correctly and independently. Pt also completed functional reading/writing tasks with 100% accuracy, he noted this to be effortful and resulted in him being fatigued. Semantic errors noted within writing tasks, pt aware of these errors and able to self correct, independently. During evaluation right neglect was noted on 2 occasions when pt asked for directions as he was unsure how to complete the task as he did not see the right side of the page. Once attention was drawn to the information pt completed all tasks correctly. Education was provided to pt and family regarding recommendation and strategies provided to aid in communication and visual deficits.     Pt presents with adequate comprehension within conversation and across tasks  for evaluation, however recommend additional evaluation for more complex level tasks. Pt presents with mild expressive language errors and right visual neglect. He is aware of his errors and appears able to self correct and use strategies independently to communication.    Barriers to return to prior living situation: None per SLP  Recommendations for discharge: Home with OP SLP and OT  Rationale for recommendations: OP SLP d/t need for higher level speech/language evaluation and treatment given pt's language is below baseline          Entered by: Aminah Reich 03/25/2019 11:36 AM

## 2019-03-25 NOTE — PROGRESS NOTES
Neurosurgery Progress Note    S: no significant improvement in vision    O:  Exam:  General: Awake;  Alert, In No Acute Distress  Pulm: Breathing Comfortably  Mental status: Oriented x3  Cranial Nerves: face symmetric. No dysarthria. Symmetric tongue protrusion. Extraocular muscles intact. Right homonymous hemianopsia present.   Strength:      Del Tr Bi WE WF Gr  R 5 5 5 5 5 5  L 5 5 5 5 5 5     HF KE KF DF PF   R 5 5 5 5 5   L 5 5 5 5 5   Pronator Drift: slight right sided pronator drift present   Sensory: Intact to Light Touch  INCISION: clean/dry/intact     Assessment:   64M s/p intraventricular tumor resection; doing well post-operatively.     Plan by problem:     Neuro:   Decadron taper to 2 mg qday for treatment of cerebral edema   Sutures out: 4/5   Post-op MRI brain obtained and reviewed    Rheum: hold methotrexate for 2 weeks to facilitate wound healing     Cardiovascular: continue home atenolol, lisinopril, HCTZ, statin     Pulmonary: Incentive spirometry     Gastrointestinal: regular diet     Renal: monitor intake and output     Heme: No issues     Endocrine: No issues    Infectious Monitor for fever    PT: no needs; OT: home with assist    DVT prophylaxis: Sequential compression devices    Ulcer prophylaxis: protonix while on decadron     DISPO: 6A    Barriers: none    -----------------------------------  Misael Best MD, MS  Neurosurgery PGY-2      I have reviewed the history above and agree with the resident's assessment and plan.  JOHN Gaines MD

## 2019-03-25 NOTE — OP NOTE
Procedure Date: 03/22/2019      PREOPERATIVE DIAGNOSIS:  Brain tumor.      POSTOPERATIVE DIAGNOSIS:  Brain tumor.      PROCEDURES PERFORMED:   1.  Left Stealth-assisted craniotomy for brain tumor.   2.  Intraoperative use of microscope.      ATTENDING SURGEON:  Irving Hayes MD.      RESIDENT SURGEON:  Gerardo Solares MD.      ANESTHESIA:  General.      OPERATIVE INDICATIONS:  Mr. Lackey is a 64-year-old gentleman, who originally presented to neuroophthalmologist, Dr. Mauricio, for visual disturbance where he was found to have a right visual field deficit in both eyes, and went on to obtain an MRI which revealed a contrast-enhancing lesion in the left occipital lobe suspicious for a high-grade primary brain tumor.  He underwent the informed consent process for a left-sided craniotomy for resection of the lesion including discussion of potential benefits, potential risks, and alternatives.      DESCRIPTION OF PROCEDURE:  After the informed consent was verified, Mr. Lackey was brought to the operating room where he underwent successful endotracheal intubation with general anesthesia.  An arterial line and Beal catheter were placed and a Loyd head sumner was applied to his skull.  He was flipped into the prone position on the operating room table where his head was affixed to the operating room table.      His preoperatively obtained stereotactic CT and MRI were transferred to the operating room navigation station where optimal planning of trajectory including the incision craniotomy and resection were performed.  His head was co-registered with the scans with excellent accuracy using the Medtronic Stealth system, and his head was shaved, cleaned, prepped, and draped in the usual sterile fashion and a timeout was performed.        Then 20 mL of 0.5% Marcaine with 1:200,000 epinephrine was injected into the subcutaneous tissue underlying a planned inverted trap door incision eccentric to the left side.  After a  sufficient amount of time was waited for maximal effect of local anesthetic and epinephrine, a #10 scalpel was used to sharply incise the skin along the planned incision.  Mariangel clips were used for hemostasis, as well as monopolar cautery which aided in dissection of the periosteum.  Once retractors were used to retract the myocutaneous flap inferiorly, neuronavigation was used to plan 3 alyssa holes including 2 parasagittal and 1 inferolateral on the left side.  A high speed drill with  was used to create these 3 alyssa holes and the edges of the alyssa holes were waxed and remaining bone fragments were removed with curettes.  The dura was carefully dissected off the inner table of the skull, between the alyssa holes and around the area of the proposed craniotomy.  A high speed drill with B1 bit and footplate was used to then create a square craniotomy flap exposing the left occipital and parietal lobe.  The bone flap was plated and placed in bacitracin irrigation.  Hemostasis obtained with bipolar cautery and Surgiflo.  The C1 bit and 4-0 Nurolon were used to provide tack ups around the edges of the craniotomy and a #15 scalpel was used to incise the dura flapping the dura medially further opening with Metzenbaum scissors.  The microscope was brought into the field and neuronavigation was used to identify a trajectory towards the contrast-enhancing lesions.  Bipolar cautery was used to provide hemostasis along a planned corticectomy and a #15 blade was used to sharply incise the cauterized sergio.  Bipolar cautery was then used to obtain hemostasis and carefully dissect towards the contrast-enhancing lesions, which were quickly encountered.  Frozen section was obtained and revealed to include features of a high-grade glioma.  Specimen forceps were used to remove more of the contrast-enhancing lesion, which appeared grossly abnormal and were sent for permanent pathology.      The Aly, in combination with bipolar  cautery, was used to carefully resect abnormal tissue present within the contrast-enhancing lesion.  We followed the abnormal tissue laterally and superiorly and finally medially and inferiorly until the resection cavity was bordered by normal tissue.  Hemostasis was obtained with bipolar cautery, though when satisfied resection cavity was copiously irrigated and inspected for any bleeding.  No bleeding was noted and cottonoids were used to try the resection cavity.  When satisfied, we turned our attention to closing.      Dural flap was reapproximated and 4-0 Nurolon were used in an interrupted fashion.  The intracranial air was then displaced as much as possible with normal saline irrigation.  A piece of Duragen was applied over the dural reapproximation followed by a piece of Gelfoam.  A craniotomy flap was positioned with plated alyssa hole covers and 4 mm screws were used to secure the craniotomy peripherally.  The wound was then copiously irrigated with bacitracin irrigation and we turned our attention to closing the myocutaneous flap.  Inverted 2-0 Vicryl sutures were used to reapproximate the galea layer.  Bipolar cautery was used to obtain hemostasis and the skin was reapproximated with a running 3-0 nylon.  The wound was cleaned, prepped and bandaged.      All counts were correct at the end of the case x 2.      Dr. Davis was present for all critical portions of the operation and immediately available at all times.         NAZARIO DAVIS MD       As dictated by CURT HOLLINS MD            D: 2019   T: 2019   MT: SL      Name:     ISAÍAS MALDONADO   MRN:      [id number]        Account:        [id number]   :      1955           Procedure Date: 2019      Document: [id number]

## 2019-03-26 ENCOUNTER — PATIENT OUTREACH (OUTPATIENT)
Dept: CARE COORDINATION | Facility: CLINIC | Age: 64
End: 2019-03-26

## 2019-03-29 ENCOUNTER — TELEPHONE (OUTPATIENT)
Dept: CARE COORDINATION | Facility: CLINIC | Age: 64
End: 2019-03-29

## 2019-03-29 ENCOUNTER — CARE COORDINATION (OUTPATIENT)
Dept: ONCOLOGY | Facility: CLINIC | Age: 64
End: 2019-03-29
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
## 2019-03-29 NOTE — PROGRESS NOTES
Reason for Outgoing Call:   Patient's wife called inquiring about pathology results.  Patient Questions/Concerns:   Patient's wife states they have been calling number on discharge papers from inpatient stay. She states they have left several messages with no return calls.   Wife also questions when pathology results will be available as they were told it would be by Wednesday and have no heard anything yet.   Nursing Action/Patient Instruction:  Reviewed with patient's wife appropriate phone numbers to be calling for questions and or concerns. Instructed her to be calling [phone] to reach oncology clinic. Apologized for lack of return calls.   Reviewed discharge medications and dexamethasone taper. Wife states that patient is doing well. He has very little pain and seems to be recovering well from surgery. Does endorse he has had some loss of vision as result of surgery.   Also review pathology results are not yet available. Discussed that pathology can often take longer to result d/t different stains and sequencing tests that are often needed to determine tumor type and grade.     Patient Response/Evaluation:   Patient wife was very appreciative of call and information provided. At the end of the call she had not further questions or concerns.

## 2019-03-30 ENCOUNTER — TELEPHONE (OUTPATIENT)
Dept: NEUROSURGERY | Facility: CLINIC | Age: 64
End: 2019-03-30

## 2019-03-30 DIAGNOSIS — C71.4 GLIOBLASTOMA MULTIFORME OF OCCIPITAL LOBE (H): ICD-10-CM

## 2019-03-30 NOTE — TELEPHONE ENCOUNTER
----- Message from Keshia Gonzalez RN sent at 3/29/2019 11:35 AM CDT -----  Regarding: Path results   Hi Bill,     Another case that is very anxious to know path results. I did talk with about the results not being finalized yet. They are good for now but wanted to give you a heads up.     Thanks,  Keshia

## 2019-03-31 PROBLEM — C71.4: Status: ACTIVE | Noted: 2019-03-31

## 2019-03-31 NOTE — TELEPHONE ENCOUNTER
Spoke to patient and wife about pathology results and explained the upcoming treatments. We reviewed upcoming treatments including radiation oncology and chemotherapy. Will make referrals to Dr. Olmos and Radiation Oncology. They would prefer [facility] for radiation if possible.

## 2019-04-02 ENCOUNTER — CARE COORDINATION (OUTPATIENT)
Dept: ONCOLOGY | Facility: CLINIC | Age: 64
End: 2019-04-02

## 2019-04-02 ENCOUNTER — TELEPHONE (OUTPATIENT)
Dept: ONCOLOGY | Facility: CLINIC | Age: 64
End: 2019-04-02

## 2019-04-02 NOTE — TELEPHONE ENCOUNTER
ONCOLOGY INTAKE: Records Information      APPT INFORMATION: 4/8/19 at 9:30AM  Referring provider:  Dr. Irving Hayes  Referring provider s clinic:  [facility]  Reason for visit/diagnosis:  Glioblastoma Multiforme of Occipital Lobe    RECORDS INFORMATION:  Were the records received with the referral (via [employer])? No    Has patient been seen for any external appt for this diagnosis? No    Has patient had any imaging or procedures outside of [facility] for this condition? No    ADDITIONAL INFORMATION:  No - per pt's wife, he has not been seen or treated for this outside of [facility].

## 2019-04-02 NOTE — PROGRESS NOTES
"Patient's wife called stating they had questions regarding using Naproxen and Tylenol. Patient also has rheumatoid arthritis and was previously taking methotrexate. Per wife, he stopped taking \"couple weeks before surgery.\" Reports that he has been having increased arthritis pain in feet and ankles. They spoke with neurosurgery resident over weekend who instructed to use Naproxen sodium and Tylenol. RNCC discussed using this on a schedule. Naproxen every 12 hours and Tylenol between doses. Also discussed he may use 1000mg at a time but no more than 4x day to not exceed 4000mg daily max dose. Also suggested reaching out to rheumatologist for additional suggestions. Discussed patient may not be able to restart methotrexate for another 2 weeks (4 weeks from surgery) to ensure full healing. We also discussed methotrexate use will need to be discussed with Dr Olmos, as patient will likely be starting Temodar. Recommended not restarting methotrexate until initial oncology visit.     Patient's wife also states they have not been able to talk with new patient scheduling yet, she missed call and did not have call back number. Provided number for wife to call. States she will call them today. Requests [employer] message be sent with diagnosis and code. Patient is She had no further questions or concerns at this time.       "

## 2019-04-03 NOTE — PROGRESS NOTES
Reason for Outgoing Call:   Received call from patient's wife inquiring about restarting methotrexate   Nursing Action/Patient Instruction:  Per Dr Olmos: From a onc standpoint, ok to restart methotrexate.   I would have him touch base with the prescribing MD for the methotrexate to check his medications list for other contraindicated medications with methotrexate (NSAIDs.)   Thanks,   Nasra Olmos MD   Neuro-oncology   4/2/2019     RNCC please return call to patient's wife to discuss above. Advised to hold methotrexate until post-op appointment and suture removal on 4/5/19 with Dolly Ramirez PA-C. If incision is healing well without concerns, he may be able to restart with approval from [facility]. Encouraged them to follow-up with rheumatology regarding Naproxen use and methotrexate.   Unable to reach wife, left detailed message with above information. Asked she return call with any questions or concerns.

## 2019-04-04 LAB — COPATH REPORT: NORMAL

## 2019-04-05 ENCOUNTER — ONCOLOGY VISIT (OUTPATIENT)
Dept: ONCOLOGY | Facility: CLINIC | Age: 64
End: 2019-04-05
Attending: PHYSICIAN ASSISTANT
Payer: COMMERCIAL

## 2019-04-05 VITALS
BODY MASS INDEX: 27.46 KG/M2 | WEIGHT: 214 LBS | TEMPERATURE: 98.2 F | RESPIRATION RATE: 18 BRPM | HEART RATE: 49 BPM | HEIGHT: 74 IN | SYSTOLIC BLOOD PRESSURE: 121 MMHG | OXYGEN SATURATION: 99 % | DIASTOLIC BLOOD PRESSURE: 81 MMHG

## 2019-04-05 DIAGNOSIS — C71.9 GLIOBLASTOMA (H): Primary | ICD-10-CM

## 2019-04-05 LAB — COPATH REPORT: NORMAL

## 2019-04-05 PROCEDURE — 99213 OFFICE O/P EST LOW 20 MIN: CPT | Mod: ZP | Performed by: PHYSICIAN ASSISTANT

## 2019-04-05 PROCEDURE — G0463 HOSPITAL OUTPT CLINIC VISIT: HCPCS | Mod: ZF

## 2019-04-05 RX ORDER — METHOTREXATE 25 MG/ML
150 INJECTION INTRA-ARTERIAL; INTRAMUSCULAR; INTRATHECAL; INTRAVENOUS
Refills: 0 | Status: ON HOLD | COMMUNITY
Start: 2019-02-11 | End: 2019-10-28

## 2019-04-05 RX ORDER — FOLIC ACID 1 MG/1
1 TABLET ORAL DAILY
COMMUNITY
Start: 2019-03-20 | End: 2020-01-31

## 2019-04-05 ASSESSMENT — MIFFLIN-ST. JEOR: SCORE: 1827.58

## 2019-04-05 ASSESSMENT — PAIN SCALES - GENERAL: PAINLEVEL: MODERATE PAIN (5)

## 2019-04-05 NOTE — NURSING NOTE
"Oncology Rooming Note    April 5, 2019 8:52 AM   Jayden Lackey is a 64 year old male who presents for:    Chief Complaint   Patient presents with     Oncology Clinic Visit     Return visit related to Glioblastoma     Initial Vitals: /81 (BP Location: Left arm, Patient Position: Sitting, Cuff Size: Adult Large)   Pulse (!) 49   Temp 98.2  F (36.8  C) (Tympanic)   Resp 18   Ht 1.867 m (6' 1.5\")   Wt 97.1 kg (214 lb)   SpO2 99%   BMI 27.85 kg/m   Estimated body mass index is 27.85 kg/m  as calculated from the following:    Height as of this encounter: 1.867 m (6' 1.5\").    Weight as of this encounter: 97.1 kg (214 lb). Body surface area is 2.24 meters squared.  Moderate Pain (5) Comment: Data Unavailable   No LMP for male patient.  Allergies reviewed: Yes  Medications reviewed: Yes    Medications: Medication refills not needed today.  Pharmacy name entered into OB10: [id number] - [place] [id number]    Clinical concerns: No new concerns. Provider was notified.      Norma Urias LPN            "

## 2019-04-05 NOTE — PROGRESS NOTES
"Neurosurgery Post-Op Followup  Apr 5, 2019    Date of Surgery: 3/22/2019  Performed by: Dr. Irving Hayes  PROCEDURES:   1. Left Stealth-assisted craniotomy for brain tumor.   2. Intraoperative use of microscope.     Closed with 3-0 Nylon    Discharged on: POD#3, 3/25/19    HPI:  Mr. Lackey is a 64-year-old gentleman who originally presented to neuroophthalmologist Dr. Mauricio for visual disturbance where he was found to have a right visual field deficit in both eyes, and went on to obtain an MRI which revealed a contrast-enhancing lesion in the left occipital lobe suspicious for a high-grade primary brain tumor.    Interim History:  Jayden presents today with his wife, Cristiana.     He has been doing well overall. He is frustrated that he still does not have right sided vision. He has been adjusting to this, but will still bump into wall and things. He has not been able to drive and this is hard for his independence. Does have some difficulties with word finding ability which is also frustrating.     He has a h/o RA and had to stop his methotrexate for the surgery so he is having increasing arthritis pain. He mainly has pain in his right heel and left hip. He also had been having to limit his activity and he is usually an active person. He is having very little to no pain at the wound site. No erythema, discharge or edema to the area. No fevers or chills.     Status Report:  Work: Interested in applying for disability     Pathology:  A. Brain, \"left occipital tumor\", biopsy:   - GLIOBLASTOMA (WHO GRADE IV), SEE MICROSCOPIC DESCRIPTION AND COMMENT.     B. Brain, \"left occipital tumor Gopi contents\", excision:   - GLIOBLASTOMA (WHO GRADE IV), SEE MICROSCOPIC DESCRIPTION AND COMMENT.     C. Brain, \"left occipital tumor\", excision:   - GLIOBLASTOMA (WHO GRADE IV), SEE MICROSCOPIC DESCRIPTION AND COMMENT.     MGMT promoter: Positive for MGMT promoter methylation    Exam:  General: Alert, Oriented  HEENT: PERRLA, no sclera " icterus, mucus membranes moist, no lesions or thrush  CV: RRR, no m/r/g  Pulm: CTA-B, no wheezes or rales  Ext: No pitting edema  Neuro: Right sided hemianopsia b/l, strength 5/5 throughout, reflexes 2+ b/l, gait normal    Wound: No redness, drainage or swelling. Well healed, very little scabbing. Area cleaned with Chloroprep, sutures removed. Removed 2 staples from right side of head    Assessment and Plan:     Dx:  1. Glioblastoma   2. S/p Craniotomy   3. Post operative State    Answered all questions by Jayden and his wife. Brief overview of standard of care chemotherapy and radiation. Discussed common SE of TMZ and what QOL will look like on these therapies. Will have a more in depth conversation with Dr. Olmos on Monday.    Wound  -keep wound open to air  -ok to use mild shampoo after 40 hours, no conditioner for 2 weeks   -can swim/submerge wound when all scabbing disappears    Steroids  -currently on dexamethasone 2 mg daily. Discuss use and taper with Dr. Olmos    Seizures  -no known seizures. No need for AEDs    Pain  -no pain related to surgery  -hip and heel pain 2/2 RA. Can use Naproxen and APAP for pain right now, though will have to avoid NSAIDs with chemo    Activity   -should not be driving with hemianopsia. Will be getting visual OT. Could reassess in a couple months   -activity restriction for 1 month postop, no lifting over 10 lbs. Can resume normal physical activites within reason (besides lifting)    RA  -has been maintained on methotrexate, though stopped due the surgery. Wound is now healed and he is ok to restart at previous dose. Does not interact with TMZ    Followup: Will follow-up with Dr. Olmos, neuro-oncology, on Monday 4/8, expressed interest in rad onc at [facility]    No SW around today. Should meet with SW on Monday to discuss finance options.     Dolly Ramirez PA-C  [facility]  [address], [place] [id number] [phone]

## 2019-04-05 NOTE — LETTER
"4/5/2019      RE: Jayden Lackey  [address]  [place] [address]       Neurosurgery Post-Op Followup  Apr 5, 2019    Date of Surgery: 3/22/2019  Performed by: Dr. Irving Hayes  PROCEDURES:   1. Left Stealth-assisted craniotomy for brain tumor.   2. Intraoperative use of microscope.     Closed with 3-0 Nylon    Discharged on: POD#3, 3/25/19    HPI:  Mr. Lackey is a 64-year-old gentleman who originally presented to neuroophthalmologist Dr. Mauricio for visual disturbance where he was found to have a right visual field deficit in both eyes, and went on to obtain an MRI which revealed a contrast-enhancing lesion in the left occipital lobe suspicious for a high-grade primary brain tumor.    Interim History:  Jayden presents today with his wife, Cristiana.     He has been doing well overall. He is frustrated that he still does not have right sided vision. He has been adjusting to this, but will still bump into wall and things. He has not been able to drive and this is hard for his independence. Does have some difficulties with word finding ability which is also frustrating.     He has a h/o RA and had to stop his methotrexate for the surgery so he is having increasing arthritis pain. He mainly has pain in his right heel and left hip. He also had been having to limit his activity and he is usually an active person. He is having very little to no pain at the wound site. No erythema, discharge or edema to the area. No fevers or chills.     Status Report:  Work: Interested in applying for disability     Pathology:  A. Brain, \"left occipital tumor\", biopsy:   - GLIOBLASTOMA (WHO GRADE IV), SEE MICROSCOPIC DESCRIPTION AND COMMENT.     B. Brain, \"left occipital tumor Gopi contents\", excision:   - GLIOBLASTOMA (WHO GRADE IV), SEE MICROSCOPIC DESCRIPTION AND COMMENT.     C. Brain, \"left occipital tumor\", excision:   - GLIOBLASTOMA (WHO GRADE IV), SEE MICROSCOPIC DESCRIPTION AND COMMENT.     MGMT promoter: Positive for " MGMT promoter methylation    Exam:  General: Alert, Oriented  HEENT: PERRLA, no sclera icterus, mucus membranes moist, no lesions or thrush  CV: RRR, no m/r/g  Pulm: CTA-B, no wheezes or rales  Ext: No pitting edema  Neuro: Right sided hemianopsia b/l, strength 5/5 throughout, reflexes 2+ b/l, gait normal    Wound: No redness, drainage or swelling. Well healed, very little scabbing. Area cleaned with Chloroprep, sutures removed. Removed 2 staples from right side of head    Assessment and Plan:     Dx:  1. Glioblastoma   2. S/p Craniotomy   3. Post operative State    Answered all questions by Jayden and his wife. Brief overview of standard of care chemotherapy and radiation. Discussed common SE of TMZ and what QOL will look like on these therapies. Will have a more in depth conversation with Dr. Olmos on Monday.    Wound  -keep wound open to air  -ok to use mild shampoo after 40 hours, no conditioner for 2 weeks   -can swim/submerge wound when all scabbing disappears    Steroids  -currently on dexamethasone 2 mg daily. Discuss use and taper with Dr. Olmos    Seizures  -no known seizures. No need for AEDs    Pain  -no pain related to surgery  -hip and heel pain 2/2 RA. Can use Naproxen and APAP for pain right now, though will have to avoid NSAIDs with chemo    Activity   -should not be driving with hemianopsia. Will be getting visual OT. Could reassess in a couple months   -activity restriction for 1 month postop, no lifting over 10 lbs. Can resume normal physical activites within reason (besides lifting)    RA  -has been maintained on methotrexate, though stopped due the surgery. Wound is now healed and he is ok to restart at previous dose. Does not interact with TMZ    Followup: Will follow-up with Dr. Olmos, neuro-oncology, on Monday 4/8, expressed interest in rad onc at [facility] around today. Should meet with  on Monday to discuss finance options.     Dolly Ramirez PA-C  [facility] [address] [place] [address]  [phone]        Dolly Ramirez PA-C

## 2019-04-05 NOTE — TELEPHONE ENCOUNTER
RECORDS STATUS - ALL OTHER DIAGNOSIS      RECORDS RECEIVED FROM: [facility]   DATE RECEIVED: 4/5/19   NOTES STATUS DETAILS   OFFICE NOTE from referring provider  Epic   OFFICE NOTE from medical oncologist NA    DISCHARGE SUMMARY from hospital  Epic   DISCHARGE REPORT from the ER NA    OPERATIVE REPORT  Epic   MEDICATION LIST  [facility]   CLINICAL TRIAL TREATMENTS TO DATE     LABS     PATHOLOGY REPORTS 3/22/19 [facility]   ANYTHING RELATED TO DIAGNOSIS  Epic   GENONOMIC TESTING     TYPE: 3/22/19: Next Generation Sequencing Epic   IMAGING (NEED IMAGES & REPORT)     CT SCANS 3/22/19, 3/7/19 PACS   MRI 3/22/19, 3/6/19 PACS   MAMMO NA    ULTRASOUND NA    PET NA

## 2019-04-08 ENCOUNTER — ONCOLOGY VISIT (OUTPATIENT)
Dept: ONCOLOGY | Facility: CLINIC | Age: 64
End: 2019-04-08
Attending: NEUROLOGICAL SURGERY
Payer: COMMERCIAL

## 2019-04-08 ENCOUNTER — ONCOLOGY VISIT (OUTPATIENT)
Dept: ONCOLOGY | Facility: CLINIC | Age: 64
End: 2019-04-08

## 2019-04-08 ENCOUNTER — TELEPHONE (OUTPATIENT)
Dept: ONCOLOGY | Facility: CLINIC | Age: 64
End: 2019-04-08

## 2019-04-08 ENCOUNTER — TUMOR CONFERENCE (OUTPATIENT)
Dept: ONCOLOGY | Facility: CLINIC | Age: 64
End: 2019-04-08

## 2019-04-08 ENCOUNTER — PRE VISIT (OUTPATIENT)
Dept: ONCOLOGY | Facility: CLINIC | Age: 64
End: 2019-04-08

## 2019-04-08 VITALS
OXYGEN SATURATION: 99 % | SYSTOLIC BLOOD PRESSURE: 106 MMHG | DIASTOLIC BLOOD PRESSURE: 68 MMHG | TEMPERATURE: 98.8 F | HEART RATE: 53 BPM | WEIGHT: 217.8 LBS | HEIGHT: 74 IN | BODY MASS INDEX: 27.95 KG/M2

## 2019-04-08 DIAGNOSIS — R11.2 CHEMOTHERAPY-INDUCED NAUSEA AND VOMITING: ICD-10-CM

## 2019-04-08 DIAGNOSIS — T45.1X5A CHEMOTHERAPY INDUCED NEUTROPENIA (H): ICD-10-CM

## 2019-04-08 DIAGNOSIS — Z51.11 CHEMOTHERAPY MANAGEMENT, ENCOUNTER FOR: ICD-10-CM

## 2019-04-08 DIAGNOSIS — R91.8 PULMONARY NODULES: ICD-10-CM

## 2019-04-08 DIAGNOSIS — Z91.89 HIGH RISK FOR CHEMOTHERAPY-INDUCED INFECTIOUS COMPLICATION: ICD-10-CM

## 2019-04-08 DIAGNOSIS — T45.1X5A CHEMOTHERAPY-INDUCED NAUSEA AND VOMITING: ICD-10-CM

## 2019-04-08 DIAGNOSIS — H53.40 VISUAL FIELD CUT: ICD-10-CM

## 2019-04-08 DIAGNOSIS — D70.1 CHEMOTHERAPY INDUCED NEUTROPENIA (H): ICD-10-CM

## 2019-04-08 DIAGNOSIS — C71.9 GLIOBLASTOMA (H): Primary | ICD-10-CM

## 2019-04-08 DIAGNOSIS — Z79.899 ENCOUNTER FOR LONG-TERM (CURRENT) USE OF MEDICATIONS: ICD-10-CM

## 2019-04-08 PROBLEM — C71.4: Status: RESOLVED | Noted: 2019-03-31 | Resolved: 2019-04-08

## 2019-04-08 LAB
ALBUMIN SERPL-MCNC: 3.6 G/DL (ref 3.4–5)
ALP SERPL-CCNC: 55 U/L (ref 40–150)
ALT SERPL W P-5'-P-CCNC: 35 U/L (ref 0–70)
ANION GAP SERPL CALCULATED.3IONS-SCNC: 5 MMOL/L (ref 3–14)
AST SERPL W P-5'-P-CCNC: 21 U/L (ref 0–45)
BASOPHILS # BLD AUTO: 0.1 10E9/L (ref 0–0.2)
BASOPHILS NFR BLD AUTO: 0.5 %
BILIRUB SERPL-MCNC: 0.4 MG/DL (ref 0.2–1.3)
BUN SERPL-MCNC: 16 MG/DL (ref 7–30)
CALCIUM SERPL-MCNC: 8.9 MG/DL (ref 8.5–10.1)
CHLORIDE SERPL-SCNC: 104 MMOL/L (ref 94–109)
CO2 SERPL-SCNC: 26 MMOL/L (ref 20–32)
COPATH REPORT: NORMAL
CREAT SERPL-MCNC: 0.74 MG/DL (ref 0.66–1.25)
DIFFERENTIAL METHOD BLD: ABNORMAL
EOSINOPHIL # BLD AUTO: 0.2 10E9/L (ref 0–0.7)
EOSINOPHIL NFR BLD AUTO: 1.8 %
ERYTHROCYTE [DISTWIDTH] IN BLOOD BY AUTOMATED COUNT: 13.5 % (ref 10–15)
GFR SERPL CREATININE-BSD FRML MDRD: >90 ML/MIN/{1.73_M2}
GLUCOSE SERPL-MCNC: 96 MG/DL (ref 70–99)
HBV CORE AB SERPL QL IA: NONREACTIVE
HBV SURFACE AG SERPL QL IA: NONREACTIVE
HCT VFR BLD AUTO: 38.3 % (ref 40–53)
HGB BLD-MCNC: 12.6 G/DL (ref 13.3–17.7)
IMM GRANULOCYTES # BLD: 0 10E9/L (ref 0–0.4)
IMM GRANULOCYTES NFR BLD: 0.4 %
LYMPHOCYTES # BLD AUTO: 1.8 10E9/L (ref 0.8–5.3)
LYMPHOCYTES NFR BLD AUTO: 19.4 %
MCH RBC QN AUTO: 29.4 PG (ref 26.5–33)
MCHC RBC AUTO-ENTMCNC: 32.9 G/DL (ref 31.5–36.5)
MCV RBC AUTO: 90 FL (ref 78–100)
MONOCYTES # BLD AUTO: 0.6 10E9/L (ref 0–1.3)
MONOCYTES NFR BLD AUTO: 6.2 %
NEUTROPHILS # BLD AUTO: 6.7 10E9/L (ref 1.6–8.3)
NEUTROPHILS NFR BLD AUTO: 71.7 %
NRBC # BLD AUTO: 0 10*3/UL
NRBC BLD AUTO-RTO: 0 /100
PLATELET # BLD AUTO: 215 10E9/L (ref 150–450)
POTASSIUM SERPL-SCNC: 4 MMOL/L (ref 3.4–5.3)
PROT SERPL-MCNC: 6.8 G/DL (ref 6.8–8.8)
RBC # BLD AUTO: 4.28 10E12/L (ref 4.4–5.9)
SODIUM SERPL-SCNC: 136 MMOL/L (ref 133–144)
WBC # BLD AUTO: 9.4 10E9/L (ref 4–11)

## 2019-04-08 PROCEDURE — G0463 HOSPITAL OUTPT CLINIC VISIT: HCPCS | Mod: ZF

## 2019-04-08 PROCEDURE — 86704 HEP B CORE ANTIBODY TOTAL: CPT | Performed by: PSYCHIATRY & NEUROLOGY

## 2019-04-08 PROCEDURE — 99205 OFFICE O/P NEW HI 60 MIN: CPT | Mod: ZP | Performed by: PSYCHIATRY & NEUROLOGY

## 2019-04-08 PROCEDURE — 85025 COMPLETE CBC W/AUTO DIFF WBC: CPT | Performed by: PSYCHIATRY & NEUROLOGY

## 2019-04-08 PROCEDURE — 87340 HEPATITIS B SURFACE AG IA: CPT | Performed by: PSYCHIATRY & NEUROLOGY

## 2019-04-08 PROCEDURE — 80053 COMPREHEN METABOLIC PANEL: CPT | Performed by: PSYCHIATRY & NEUROLOGY

## 2019-04-08 RX ORDER — ONDANSETRON 4 MG/1
4 TABLET, FILM COATED ORAL AT BEDTIME
Qty: 30 TABLET | Refills: 3 | Status: SHIPPED | OUTPATIENT
Start: 2019-04-08 | End: 2019-07-08

## 2019-04-08 RX ORDER — CHLORAL HYDRATE 500 MG
1 CAPSULE ORAL DAILY
COMMUNITY
End: 2019-11-17

## 2019-04-08 RX ORDER — ASPIRIN 81 MG/1
81 TABLET ORAL AT BEDTIME
Status: ON HOLD | COMMUNITY
End: 2019-10-28

## 2019-04-08 RX ORDER — TEMOZOLOMIDE 180 MG/1
75 CAPSULE ORAL AT BEDTIME
Qty: 42 CAPSULE | Refills: 0 | Status: SHIPPED | OUTPATIENT
Start: 2019-04-08 | End: 2019-06-12

## 2019-04-08 RX ORDER — NAPROXEN 500 MG/1
500 TABLET ORAL 2 TIMES DAILY PRN
Status: ON HOLD | COMMUNITY
End: 2019-11-20

## 2019-04-08 ASSESSMENT — PAIN SCALES - GENERAL: PAINLEVEL: NO PAIN (0)

## 2019-04-08 ASSESSMENT — MIFFLIN-ST. JEOR: SCORE: 1849.81

## 2019-04-08 NOTE — LETTER
4/8/2019       RE: Jayden Lackey  [address]  [place] [address]     Dear Colleague,    Thank you for referring your patient, Jayden Lackey, to the [facility]. Please see a copy of my visit note below.    NEURO-ONCOLOGY INITIAL VISIT  Apr 8, 2019    CHIEF COMPLAINT: Mr. 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 He is presenting to this initial clinic visit as referred by Dr. Irving Hayes, neurosurgery at the [facility], for evaluation and recommendations on treatment.     Accompanying him to this visit is Oz (wife).      HISTORY OF PRESENT ILLNESS  A summary of the patient s oncologic history is as follows;   -1/2019 PRESENTATION: Visual disturbance.   -3/6/2019 Evaluated by Dr. Mauricio, neuro-ophthalmologist, found to have a right sided homonymous hemianopsia. MRI ordered.   -3/6/2019 MR brain imaging revealed a contrast-enhancing lesion in the left occipital lobe suspicious for a high-grade primary brain tumor.  -3/22/2019 SURGERY: Craniotomy with a gross total resection by Dr. Irving Hayes.   PATHOLOGY: Glioblastoma; Wildtype status for IDH1/2 by next generation sequencing. MGMT promoter methylated. Wildtype PTEN, TP53.  -4/8/2019 NEURO-ONC: Recommending chemoradiotherapy.     Today in clinic;   -Overall doing well, but Jayden continues to express frustration with issues related to his visual field cut. Bumping into things when walking. Not driving and this is hard for his independence.   -Some difficulties with word finding and short term memory issues.   -With restarting methotrexate, arthritis pain is about the same, hoping things improve in the next few weeks. Most pain is in his right heel and left hip.  -Denies any concerning changes in mood/ behavior, excessive fatigue that impedes ADLs, headaches, weakness or changes in  sensation.  -Denies any episodes concerning for a seizure, including unexplained episodes of loss of consciousness, unexplained falls, unexplained loss of bowel/ bladder control or tongue biting, unexplained bruising/ myalgia, periods of loss of time, or witnessed shaking/ jerking movements.   -Off dexamethasone.    REVIEW OF SYSTEMS  A comprehensive ROS negative except as in HPI.      MEDICATIONS   Current Outpatient Medications   Medication Sig Dispense Refill     aspirin 81 MG EC tablet Take 81 mg by mouth daily       atenolol (TENORMIN) 50 MG tablet Take 50 mg by mouth At Bedtime        fish oil-omega-3 fatty acids 1000 MG capsule Take by mouth daily Unknown dose       folic acid (FOLVITE) 1 MG tablet Take 1 mg by mouth       lisinopril-hydrochlorothiazide (PRINZIDE/ZESTORETIC) 20-25 MG tablet Take 1 tablet by mouth At Bedtime       methotrexate sodium, pres-free, 50 MG/2ML SOLN injection CHEMO TAKE 0.7ML (17.5MG) SUBCUTANEOUS WEEKLY ORAL.  0     naproxen (NAPROSYN DR) 500 MG EC tablet        Nutritional Supplements (JUICE PLUS FIBRE PO) Take 1 tablet by mouth At Bedtime       ondansetron (ZOFRAN) 4 MG tablet Take 1 tablet (4 mg) by mouth At Bedtime ; 30 minutes prior to chemotherapy dosing and repeat every 8 hours as needed for nausea. 30 tablet 3     rosuvastatin (CRESTOR) 5 MG tablet Take 5 mg by mouth At Bedtime        FOLIC ACID PO Take 1 mg by mouth At Bedtime        Temozolomide (TEMODAR) 180 MG capsule Take 1 capsule (180 mg) by mouth At Bedtime for 42 doses Take on an empty stomach with nausea medication 30-60 min before temozolomide. 42 capsule 0     DRUG ALLERGIES   Allergies   Allergen Reactions     No Clinical Screening - See Comments      Lupine bean doesn't remember reaction     Shellfish Allergy Difficulty breathing, Nausea and Vomiting and Swelling       PAST MEDICAL HISTORY   Past Medical History:   Diagnosis Date     Colon polyp      Dyslipidemia      Hypertension      Lumbar disc herniation   "   L4-5     Rheumatoid arthritis (H)      PAST SURGICAL HISTORY   Past Surgical History:   Procedure Laterality Date     ANAL SPHINCTEROTOMY       BACK SURGERY  2009    L4-5     HERNIA REPAIR       OPTICAL TRACKING SYSTEM CRANIOTOMY, EXCISE TUMOR, COMBINED Left 3/22/2019    Procedure: Left Stealth Assisted Craniotomy And Tumor Resection;  Surgeon: Irving Hayes MD;  Location: [facility] OR     SEPTOPLASTY       VASECTOMY       SOCIAL HISTORY   History   Smoking Status     Former Smoker   Smokeless Tobacco     Never Used     Comment: 40+ years ago    Social History    Substance and Sexual Activity      Alcohol use: Yes        Comment: 1-3 beers a week     History   Drug Use Not on file   .   Employment: On disability.     FAMILY HISTORY   Family History   Problem Relation Age of Onset     Macular Degeneration Mother      Diabetes Mother      Heart Failure Father      Diabetes Sister      Heart Disease Maternal Grandfather      Glaucoma No family hx of        PHYSICAL EXAMINATION  /68   Pulse 53   Temp 98.8  F (37.1  C) (Oral)   Ht 1.875 m (6' 1.82\")   Wt 98.8 kg (217 lb 12.8 oz)   SpO2 99%   BMI 28.10 kg/m      Wt Readings from Last 2 Encounters:   04/08/19 98.8 kg (217 lb 12.8 oz)   04/05/19 97.1 kg (214 lb)      Ht Readings from Last 2 Encounters:   04/08/19 1.875 m (6' 1.82\")   04/05/19 1.867 m (6' 1.5\")     KPS: 90    -Generally well appearing.  -Throat: No oral thrush.  -Respiratory: Normal breath sounds, no audible wheezing.   -Skin: No rashes. Healing head incision.  -Hematologic/ lymphatic: No abnormal bruising. No leg swelling.  -Psychiatric: Normal mood and affect. Pleasant, talkative.  -Neurologic:   MENTAL STATUS:     Alert, oriented to date.    Recall: Immediate 3/3, delayed 1/3, improved to 3/3 with clues.    Speech fluent. Comprehension intact to multi-step commands.   Normal naming, repetition. Able to read.   Good right-left orientation.     CRANIAL NERVES:     Discs flat on " fundoscopy.    Pupils are equal, round, reactive to light.     Extraocular movements full, patient denies diplopia.     Homonymous hemianopsia, right-side.    Facial sensation intact to light touch.   Symmetric facial movements.   Hearing intact.   Palate moves symmetrically.     Sternocleidomastoid and trapezius strength intact.   Tongue midline.  MOTOR:    Normal and symmetric tone.   Grossly 5/5 throughout.    No pronation or drift. No orbiting.   Able to rise from a chair without use of arms.   On toe/ heel walk, equal distance from floor to heels/ toes.   SENSATION:    Intact to light touch throughout.  COORDINATION:   Intact finger-nose with eyes open and closed on left, mild impairment on right.   REFLEXES:    Normal and symmetric.    Toes not tested. No clonus. No Hoffmans.   No grasp.    GAIT:  Walks without assistance.   Good speed. Normal stride length and heel strike. Normal turns. Normal arm swing.   Able to toe, heel walk. Able to tandem walk.       MEDICAL RECORDS  Obtained and personally reviewed all available outside medical records in addition to reviewing any records available in our electronic system.     LABS  Personally reviewed all available lab results.     IMAGING  Personally reviewed pre- and post-operative MR brain imaging from last month. To my eye, the heterogeneously contrast enhancing lesion in the left occipital lobe was gross totally resected.     Imaging was shown to and results were reviewed with Aracelis.     Imaging and case reviewed and discussed at Brain Tumor Conference.       IMPRESSION  For the 60 minute appointment, more than 50% of the encounter was spent discussing in detail the nature of this tumor, providing emotional support, answering questions pertaining to my recommendations, and devising the treatment plan as outlined below. It was explained to Aracelis that he has a brain tumor for which there is currently no cure. Therefore, cancer-directed  treatment strives to slow further growth and increase the time interval to recurrence.    With regard to cancer-directed therapy, a multimodal treatment approach first involves attempting a maximum safe surgical resection. In this case, a gross total resection was performed. Following surgery, standard of care for glioblastoma is chemoradiotherapy with temozolomide dosed at 75 mg/m2 daily concomitantly with radiation therapy.    Risks/ benefits of temozolomide were reviewed and the following common, anticipated side effects of this treatment were discussed today including, but not limited to, fatigue, nausea, and constipation. Any AST/ ALT elevations are typically reversible and any rash is manageable with antihistaminics and steroid premedication. Concomitant radiation can result in increased cerebral edema and therefore, symptoms of malaise and fatigue generally worsen, but do eventually improve. As a result, dexamethasone dosage may need to be increased. The combination therapy can result in bone marrow suppression; leukopenia and thrombocytopenia.     PROBLEM LIST  Glioblastoma, MGMT methylated and IDH1 wildtype by NGS  Visual field cut; homonymous hemianopsia, right-side  Memory issues    PLAN  -CANCER-DIRECTED THERAPY-  Will initiate chemoradiotherapy with temozolomide 75mg/m2 (180mg).   -Instructed to start temozolomide the evening before the start date of radiation and continue daily until the completion of radiation.   -Take temozolomide on an empty stomach, 30 minutes after Zofran dosing.  -Additional temozolomide teaching performed by RN/ pharmacy staff.     -Referral to Dr. La, radiation oncology at [facility].     -Initial labs ordered; CBC with differential, CMP without concerns. Hepatitis B serologies NR.  -Will continue weekly CBC at [facility] and repeat CBC, renal, and LFT at follow-up.    -Next generation sequencing can be ordered if further disease progression necessitates evaluating for  targeted therapy.    -Medications prescribed;        -Zofran 4mg (1 to 2 tabs qHS 30 minutes prior to chemotherapy and then PRN nausea).       -For lymphopenia, prophylactic antibiotics are recommended during concurrent treatment. Pentamidine inhalation for pneumocystis prophylaxis. (Bactrim with a class D interaction with methotrexate.)       -Docusate 100 mg; 1 cap orally 3 times a day (stool softener for constipation)       -Senna 8.6 m tabs orally at bedtime (laxative as needed for constipation)    -STEROIDS-  -Currently off dexamethasone.  -Dose may increase while undergoing radiation.    -SEIZURE MANAGEMENT-  -While this patient is at increased risk of having seizures, given the lack of seizure history, there is no indication to prescribe an antiepileptic at this time.     -Quality of life/ MOOD/ FATIGUE-  -Denies any mood issues.  -Continue to monitor mood as untreated/ undertreated depression can worsen fatigue, dysorexia, and quality of life.  -Added to Brain Tumor Support Group email list; [email or web address]    -MEMORY COMPLAINTS-  -Consideration for referral to Dr. Aleksandr Garcia for neuro-psych testing.     -OTHER SUPPORTIVE MEDICATIONS-  -Continue methotrexate for arthritis. No drug-drug interactions with temozolomide, Zofran, or pentamidine.     -ADDITIONAL SUPPORTIVE MANAGEMENT-  -Social work to discuss disability options.   -Continue to follow with OT for help with adaptation to visual field cut.   -CT with lung nodules; Referral to Lung Nodule Clinic.   -Will need a referral to GI for evaluation of the enhancing focus measuring 2.9 cm along the gastric antrum/pylorus posterior wall.    Return to clinic at midpoint of chemoradiotherapy in about 5 weeks.    In the meantime, Aracelis know to call with questions or concerns or to report new complaints and can be seen sooner if needed. Urgent evaluation is needed in the setting of acute onset of severe headache, abrupt change in mental  status, on-going seizures, new focal deficits, or new leg swelling/ pain. Everyone in attendance voiced understanding.    Nasra Olmos MD  Neuro-oncology

## 2019-04-08 NOTE — NURSING NOTE
"Oncology Rooming Note    April 8, 2019 9:42 AM   Jayden Lackey is a 64 year old male who presents for:    Chief Complaint   Patient presents with     Oncology Clinic Visit     New; Glioblastoma     Initial Vitals: /68   Pulse 53   Temp 98.8  F (37.1  C) (Oral)   Wt 98.8 kg (217 lb 12.8 oz)   SpO2 99%   BMI 28.34 kg/m   Estimated body mass index is 28.34 kg/m  as calculated from the following:    Height as of 4/5/19: 1.867 m (6' 1.5\").    Weight as of this encounter: 98.8 kg (217 lb 12.8 oz). Body surface area is 2.26 meters squared.  No Pain (0) Comment: Data Unavailable   No LMP for male patient.  Allergies reviewed: Yes  Medications reviewed: Yes    Medications: Medication refills not needed today.  Pharmacy name entered into [facility]: [id number] - [place] [id number]    Clinical concerns:  Here to discuss chemoradiation therapy.  Dr Olmos was notified.      Kimberly Evans CMA              "

## 2019-04-08 NOTE — PATIENT INSTRUCTIONS
Standard of care for glioblastoma is radiation therapy + chemotherapy with temozolomide (Temodar)  How to take your chemotherapy:  -Your temozolomide dose is 180 mg  -Start date: Evening before the start of radiation; TBD.  -Continue every day, regardless of radiation schedule, until the night before your last day of radiation  -Take on an empty stomach and after taking anti-nausea (Zofran) medication;    Stop eating 2 hours before bedtime   After 1.5 hours of fasting, take Zofran   Wait at least another 30 minutes   Take temozolomide and go to bed  -Only the patient should handle the chemotherapy capsules    Referral to radiation oncology to [facility].     -Weekly blood count testing: Plan: At [facility]     Anticipated side effects:  -Nausea   -Decreased blood counts   -Constipation   -Elevation in liver enzymes   -Fatigue/ Malaise* (*worse with combine radiation therapy)  -Rash (rare)     Supportive medications:  -Nausea: Zofran 4mg; 1 to 2 tabs 30 minutes prior to chemotherapy and then every 8 hours as needed  -Constipation: Docusate 100 mg; 1 cap orally 3 times a day (stool softener)**                         Senna 8.6 m tabs orally at bedtime (laxative)**  -Antibiotic precaution: Pentamidine inhalation for pneumocystis prophylaxis    Other supportive measures:  -Oral hygiene/ keep mouth and lips moist by rinsing with water every 2 hours while awake. If needed, add salt or baking soda (1/2 to 1 teaspoon in 8oz of water), use commercially available saliva substitute (ex. Biotene), apply lipmoisturizer/chap stick, and/ or suck on hard candies.    Always call with any questions or concerns or to report new complaints:  -Cancer patients are at increased risk of developing clots. Please call if you notice leg swelling.  -Mood changes (depression/ agitation/ anxiety) are to be expected and may be worsened by some medications, but please note that untreated/undertreated depression can decrease one s  energy, appetite, and mood.   -It is important to maintain a healthy, well-balanced diet. Please disclose all alternative medications/ supplements, as these substances may interact with or even reverse the effect of your cancer-directed treatments.  -Partake in moderate physical activity, as tolerated, to help boost energy and combat constipation.  -Strongly recommend annual flu vaccination, but vaccines should never be with a live virus.  -Call 9-1-1 or go to the nearest emergency room if you develop a severe headache, abrupt change in mental status, seizures, or significantly and new neurological symptoms.    Overall plan:  Following completion of radiation therapy + chemotherapy, there will be a 1 month period without treatment. After this time, there will be a new post-radiation MR brain scan and we will discuss starting temozolomide at a higher dose taken for 5 consecutive days/ month.      Orders for today:  Labs: CBC, CMP, Hep B testing.   Pentamidine inhalation.     Pharmacy to do teaching today.    to reach out.     Continue to follow with OT.     Added to Brain Tumor Support Group email list; [email or web address]    Referral to Lung Nodule Clinic.   Consideration for referral to Dr. Aleksandr Garcia for neuro-psych testing.     Return to clinic: At mid-point of chemoradiotherapy in about 5 weeks.     Nasra Olmos MD  Neuro-oncology  4/8/2019

## 2019-04-08 NOTE — LETTER
4/8/2019       RE: Jayden Lackey  [address]  [place] [address]     Dear Colleague,    Thank you for referring your patient, Jayden Lackey, to the [facility]. Please see a copy of my visit note below.    See Oral Onc Mgmt note.    Again, thank you for allowing me to participate in the care of your patient.      Sincerely,    Jose Vargas RPH

## 2019-04-08 NOTE — PROGRESS NOTES
NEURO-ONCOLOGY INITIAL VISIT  Apr 8, 2019    CHIEF COMPLAINT: Mr. 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 He is presenting to this initial clinic visit as referred by Dr. Irving Hayes, neurosurgery at the [facility], for evaluation and recommendations on treatment.     Accompanying him to this visit is Oz (wife).      HISTORY OF PRESENT ILLNESS  A summary of the patient s oncologic history is as follows;   -1/2019 PRESENTATION: Visual disturbance.   -3/6/2019 Evaluated by Dr. Mauricio, neuro-ophthalmologist, found to have a right sided homonymous hemianopsia. MRI ordered.   -3/6/2019 MR brain imaging revealed a contrast-enhancing lesion in the left occipital lobe suspicious for a high-grade primary brain tumor.  -3/22/2019 SURGERY: Craniotomy with a gross total resection by Dr. Irving Hayes.   PATHOLOGY: Glioblastoma; Wildtype status for IDH1/2 by next generation sequencing. MGMT promoter methylated. Wildtype PTEN, TP53.  -4/8/2019 NEURO-ONC: Recommending chemoradiotherapy.     Today in clinic;   -Overall doing well, but Jayden continues to express frustration with issues related to his visual field cut. Bumping into things when walking. Not driving and this is hard for his independence.   -Some difficulties with word finding and short term memory issues.   -With restarting methotrexate, arthritis pain is about the same, hoping things improve in the next few weeks. Most pain is in his right heel and left hip.  -Denies any concerning changes in mood/ behavior, excessive fatigue that impedes ADLs, headaches, weakness or changes in sensation.  -Denies any episodes concerning for a seizure, including unexplained episodes of loss of consciousness, unexplained falls, unexplained loss of bowel/ bladder control or tongue biting, unexplained bruising/ myalgia, periods of loss of time, or  witnessed shaking/ jerking movements.   -Off dexamethasone.    REVIEW OF SYSTEMS  A comprehensive ROS negative except as in HPI.      MEDICATIONS   Current Outpatient Medications   Medication Sig Dispense Refill     aspirin 81 MG EC tablet Take 81 mg by mouth daily       atenolol (TENORMIN) 50 MG tablet Take 50 mg by mouth At Bedtime        fish oil-omega-3 fatty acids 1000 MG capsule Take by mouth daily Unknown dose       folic acid (FOLVITE) 1 MG tablet Take 1 mg by mouth       lisinopril-hydrochlorothiazide (PRINZIDE/ZESTORETIC) 20-25 MG tablet Take 1 tablet by mouth At Bedtime       methotrexate sodium, pres-free, 50 MG/2ML SOLN injection CHEMO TAKE 0.7ML (17.5MG) SUBCUTANEOUS WEEKLY ORAL.  0     naproxen (NAPROSYN DR) 500 MG EC tablet        Nutritional Supplements (JUICE PLUS FIBRE PO) Take 1 tablet by mouth At Bedtime       ondansetron (ZOFRAN) 4 MG tablet Take 1 tablet (4 mg) by mouth At Bedtime ; 30 minutes prior to chemotherapy dosing and repeat every 8 hours as needed for nausea. 30 tablet 3     rosuvastatin (CRESTOR) 5 MG tablet Take 5 mg by mouth At Bedtime        FOLIC ACID PO Take 1 mg by mouth At Bedtime        Temozolomide (TEMODAR) 180 MG capsule Take 1 capsule (180 mg) by mouth At Bedtime for 42 doses Take on an empty stomach with nausea medication 30-60 min before temozolomide. 42 capsule 0     DRUG ALLERGIES   Allergies   Allergen Reactions     No Clinical Screening - See Comments      Lupine bean doesn't remember reaction     Shellfish Allergy Difficulty breathing, Nausea and Vomiting and Swelling       PAST MEDICAL HISTORY   Past Medical History:   Diagnosis Date     Colon polyp      Dyslipidemia      Hypertension      Lumbar disc herniation     L4-5     Rheumatoid arthritis (H)      PAST SURGICAL HISTORY   Past Surgical History:   Procedure Laterality Date     ANAL SPHINCTEROTOMY       BACK SURGERY  2009    L4-5     HERNIA REPAIR       OPTICAL TRACKING SYSTEM CRANIOTOMY, EXCISE TUMOR,  "COMBINED Left 3/22/2019    Procedure: Left Stealth Assisted Craniotomy And Tumor Resection;  Surgeon: Irving Hayes MD;  Location: [facility] OR     SEPTOPLASTY       VASECTOMY       SOCIAL HISTORY   History   Smoking Status     Former Smoker   Smokeless Tobacco     Never Used     Comment: 40+ years ago    Social History    Substance and Sexual Activity      Alcohol use: Yes        Comment: 1-3 beers a week     History   Drug Use Not on file   .   Employment: On disability.     FAMILY HISTORY   Family History   Problem Relation Age of Onset     Macular Degeneration Mother      Diabetes Mother      Heart Failure Father      Diabetes Sister      Heart Disease Maternal Grandfather      Glaucoma No family hx of        PHYSICAL EXAMINATION  /68   Pulse 53   Temp 98.8  F (37.1  C) (Oral)   Ht 1.875 m (6' 1.82\")   Wt 98.8 kg (217 lb 12.8 oz)   SpO2 99%   BMI 28.10 kg/m     Wt Readings from Last 2 Encounters:   04/08/19 98.8 kg (217 lb 12.8 oz)   04/05/19 97.1 kg (214 lb)      Ht Readings from Last 2 Encounters:   04/08/19 1.875 m (6' 1.82\")   04/05/19 1.867 m (6' 1.5\")     KPS: 90    -Generally well appearing.  -Throat: No oral thrush.  -Respiratory: Normal breath sounds, no audible wheezing.   -Skin: No rashes. Healing head incision.  -Hematologic/ lymphatic: No abnormal bruising. No leg swelling.  -Psychiatric: Normal mood and affect. Pleasant, talkative.  -Neurologic:   MENTAL STATUS:     Alert, oriented to date.    Recall: Immediate 3/3, delayed 1/3, improved to 3/3 with clues.    Speech fluent. Comprehension intact to multi-step commands.   Normal naming, repetition. Able to read.   Good right-left orientation.     CRANIAL NERVES:     Discs flat on fundoscopy.    Pupils are equal, round, reactive to light.     Extraocular movements full, patient denies diplopia.     Homonymous hemianopsia, right-side.    Facial sensation intact to light touch.   Symmetric facial movements.   Hearing intact.   Palate " moves symmetrically.     Sternocleidomastoid and trapezius strength intact.   Tongue midline.  MOTOR:    Normal and symmetric tone.   Grossly 5/5 throughout.    No pronation or drift. No orbiting.   Able to rise from a chair without use of arms.   On toe/ heel walk, equal distance from floor to heels/ toes.   SENSATION:    Intact to light touch throughout.  COORDINATION:   Intact finger-nose with eyes open and closed on left, mild impairment on right.   REFLEXES:    Normal and symmetric.    Toes not tested. No clonus. No Hoffmans.   No grasp.    GAIT:  Walks without assistance.   Good speed. Normal stride length and heel strike. Normal turns. Normal arm swing.   Able to toe, heel walk. Able to tandem walk.       MEDICAL RECORDS  Obtained and personally reviewed all available outside medical records in addition to reviewing any records available in our electronic system.     LABS  Personally reviewed all available lab results.     IMAGING  Personally reviewed pre- and post-operative MR brain imaging from last month. To my eye, the heterogeneously contrast enhancing lesion in the left occipital lobe was gross totally resected.     Imaging was shown to and results were reviewed with Aracelis.     Imaging and case reviewed and discussed at Brain Tumor Conference.       IMPRESSION  For the 60 minute appointment, more than 50% of the encounter was spent discussing in detail the nature of this tumor, providing emotional support, answering questions pertaining to my recommendations, and devising the treatment plan as outlined below. It was explained to Aracelis that he has a brain tumor for which there is currently no cure. Therefore, cancer-directed treatment strives to slow further growth and increase the time interval to recurrence.    With regard to cancer-directed therapy, a multimodal treatment approach first involves attempting a maximum safe surgical resection. In this case, a gross total resection  was performed. Following surgery, standard of care for glioblastoma is chemoradiotherapy with temozolomide dosed at 75 mg/m2 daily concomitantly with radiation therapy.    Risks/ benefits of temozolomide were reviewed and the following common, anticipated side effects of this treatment were discussed today including, but not limited to, fatigue, nausea, and constipation. Any AST/ ALT elevations are typically reversible and any rash is manageable with antihistaminics and steroid premedication. Concomitant radiation can result in increased cerebral edema and therefore, symptoms of malaise and fatigue generally worsen, but do eventually improve. As a result, dexamethasone dosage may need to be increased. The combination therapy can result in bone marrow suppression; leukopenia and thrombocytopenia.     PROBLEM LIST  Glioblastoma, MGMT methylated and IDH1 wildtype by NGS  Visual field cut; homonymous hemianopsia, right-side  Memory issues    PLAN  -CANCER-DIRECTED THERAPY-  Will initiate chemoradiotherapy with temozolomide 75mg/m2 (180mg).   -Instructed to start temozolomide the evening before the start date of radiation and continue daily until the completion of radiation.   -Take temozolomide on an empty stomach, 30 minutes after Zofran dosing.  -Additional temozolomide teaching performed by RN/ pharmacy staff.     -Referral to Dr. La, radiation oncology at [facility].     -Initial labs ordered; CBC with differential, CMP without concerns. Hepatitis B serologies NR.  -Will continue weekly CBC at [facility] and repeat CBC, renal, and LFT at follow-up.    -Next generation sequencing can be ordered if further disease progression necessitates evaluating for targeted therapy.    -Medications prescribed;        -Zofran 4mg (1 to 2 tabs qHS 30 minutes prior to chemotherapy and then PRN nausea).       -For lymphopenia, prophylactic antibiotics are recommended during concurrent treatment. Pentamidine inhalation for  pneumocystis prophylaxis. (Bactrim with a class D interaction with methotrexate.)       -Docusate 100 mg; 1 cap orally 3 times a day (stool softener for constipation)       -Senna 8.6 m tabs orally at bedtime (laxative as needed for constipation)    -STEROIDS-  -Currently off dexamethasone.  -Dose may increase while undergoing radiation.    -SEIZURE MANAGEMENT-  -While this patient is at increased risk of having seizures, given the lack of seizure history, there is no indication to prescribe an antiepileptic at this time.     -Quality of life/ MOOD/ FATIGUE-  -Denies any mood issues.  -Continue to monitor mood as untreated/ undertreated depression can worsen fatigue, dysorexia, and quality of life.  -Added to Brain Tumor Support Group email list; [email or web address]    -MEMORY COMPLAINTS-  -Consideration for referral to Dr. Aleksandr Garcia for neuro-psych testing.     -OTHER SUPPORTIVE MEDICATIONS-  -Continue methotrexate for arthritis. No drug-drug interactions with temozolomide, Zofran, or pentamidine.     -ADDITIONAL SUPPORTIVE MANAGEMENT-  -Social work to discuss disability options.   -Continue to follow with OT for help with adaptation to visual field cut.   -CT with lung nodules; Referral to Lung Nodule Clinic.   -Will need a referral to GI for evaluation of the enhancing focus measuring 2.9 cm along the gastric antrum/pylorus posterior wall.    Return to clinic at midpoint of chemoradiotherapy in about 5 weeks.    In the meantime, Jayden and Oz know to call with questions or concerns or to report new complaints and can be seen sooner if needed. Urgent evaluation is needed in the setting of acute onset of severe headache, abrupt change in mental status, on-going seizures, new focal deficits, or new leg swelling/ pain. Everyone in attendance voiced understanding.    Nasra Olmos MD  Neuro-oncology

## 2019-04-08 NOTE — ORAL ONC MGMT
"Oral Chemotherapy Monitoring Program    Primary Oncologist: Dr. Olmos  Primary Oncology Clinic: [facility]  Cancer Diagnosis: Glioblastoma    Drug: Temodar  Start Date: concurrent with radiation which will be done at [facility]  Dose is appropriate for patients: 2.27m2 BSA   Expected duration of therapy: 42 days concurrent with radiation    Drug Interaction Assessment: There were no significant drug interactions identified upon review of medication list with chemotherapy agents.     Drugs checked include: Aspirin -Atenolol -Dexamethasone -Folic Acid -Lisinopril and Hydrochlorothiazide -Methotrexate -Naproxen -Omega-3 Fatty Acids -Ondansetron -Pantoprazole -Rosuvastatin -Temodar -Pentamidine -Senna -Docusate -MiraLax    Lab Monitoring Plan  Monitoring plan for daily x 42 days concurrent with XRT regimen:  CBC weekly  CMP Q3 weeks  Labs drawn outside of [place]: at [facility]  Subjective/Objective:  Jayden Lackey is a 64 year old male seen in clinic for an initial visit for oral chemotherapy education.      ORAL CHEMOTHERAPY 4/8/2019   Drug Name Temodar (Temozolomide)   Current Dosage 180mg   Current Schedule QHS   Cycle Details Continuous for 42 days during XRT       Last PHQ-2 Score on record:   PHQ-2 ( 1999 [employer]) 3/6/2019   Q1: Little interest or pleasure in doing things 0   Q2: Feeling down, depressed or hopeless 0   PHQ-2 Score 0   Q1: Little interest or pleasure in doing things Not at all   Q2: Feeling down, depressed or hopeless Not at all   PHQ-2 Score 0       Vitals:  BP:   BP Readings from Last 1 Encounters:   04/08/19 106/68     Wt Readings from Last 1 Encounters:   04/08/19 98.8 kg (217 lb 12.8 oz)     Estimated body surface area is 2.27 meters squared as calculated from the following:    Height as of an earlier encounter on 4/8/19: 1.875 m (6' 1.82\").    Weight as of an earlier encounter on 4/8/19: 98.8 kg (217 lb 12.8 oz).      Labs:  _  Result Component Current Result Ref Range   Sodium " 136 (4/8/2019) 133 - 144 mmol/L     _  Result Component Current Result Ref Range   Potassium 4.0 (4/8/2019) 3.4 - 5.3 mmol/L     _  Result Component Current Result Ref Range   Calcium 8.9 (4/8/2019) 8.5 - 10.1 mg/dL     No results found for Mag within last 30 days.     No results found for Phos within last 30 days.     _  Result Component Current Result Ref Range   Albumin 3.6 (4/8/2019) 3.4 - 5.0 g/dL     _  Result Component Current Result Ref Range   Urea Nitrogen 16 (4/8/2019) 7 - 30 mg/dL     _  Result Component Current Result Ref Range   Creatinine 0.74 (4/8/2019) 0.66 - 1.25 mg/dL       _  Result Component Current Result Ref Range   AST 21 (4/8/2019) 0 - 45 U/L     _  Result Component Current Result Ref Range   ALT 35 (4/8/2019) 0 - 70 U/L     _  Result Component Current Result Ref Range   Bilirubin Total 0.4 (4/8/2019) 0.2 - 1.3 mg/dL       _  Result Component Current Result Ref Range   WBC 9.4 (4/8/2019) 4.0 - 11.0 10e9/L     _  Result Component Current Result Ref Range   Hemoglobin 12.6 (L) (4/8/2019) 13.3 - 17.7 g/dL     _  Result Component Current Result Ref Range   Platelet Count 215 (4/8/2019) 150 - 450 10e9/L     _  Result Component Current Result Ref Range   Absolute Neutrophil 6.7 (4/8/2019) 1.6 - 8.3 10e9/L       Assessment:  Patient is appropriate to start therapy.    Plan:  Basic chemotherapy teaching was reviewed with the patient and the patient's family including indication, start date of therapy, dose, administration, adverse effects, missed doses, food and drug interactions, monitoring, side effect management, office contact information, and safe handling. Written materials were provided and all questions answered to Jayden and his wife's stated satisfaction.    Follow-Up:  About one week after start of Jefe NguyễnD  [facility]  [phone]  April 8, 2019

## 2019-04-09 ENCOUNTER — COMMUNICATION - HEALTHEAST (OUTPATIENT)
Dept: ONCOLOGY | Facility: HOSPITAL | Age: 64
End: 2019-04-09

## 2019-04-09 PROBLEM — Z91.89 HIGH RISK FOR CHEMOTHERAPY-INDUCED INFECTIOUS COMPLICATION: Status: ACTIVE | Noted: 2019-04-09

## 2019-04-09 PROBLEM — Z51.11 CHEMOTHERAPY MANAGEMENT, ENCOUNTER FOR: Status: ACTIVE | Noted: 2019-04-09

## 2019-04-09 PROBLEM — D70.1 CHEMOTHERAPY INDUCED NEUTROPENIA (H): Status: ACTIVE | Noted: 2019-04-09

## 2019-04-09 PROBLEM — T45.1X5A CHEMOTHERAPY INDUCED NEUTROPENIA (H): Status: ACTIVE | Noted: 2019-04-09

## 2019-04-09 RX ORDER — PENTAMIDINE ISETHIONATE 300 MG/300MG
300 INHALANT RESPIRATORY (INHALATION) ONCE
Status: CANCELLED
Start: 2019-04-15

## 2019-04-09 RX ORDER — ALBUTEROL SULFATE 0.83 MG/ML
2.5 SOLUTION RESPIRATORY (INHALATION) ONCE
Status: CANCELLED
Start: 2019-04-15

## 2019-04-12 ENCOUNTER — OFFICE VISIT - HEALTHEAST (OUTPATIENT)
Dept: RADIATION ONCOLOGY | Facility: CLINIC | Age: 64
End: 2019-04-12

## 2019-04-12 DIAGNOSIS — C71.9 GBM (GLIOBLASTOMA MULTIFORME) (H): ICD-10-CM

## 2019-04-15 NOTE — TUMOR CONFERENCE
Tumor Conference Information  Tumor Conference:    Specialties Present:  Surgery, Medical Oncology, Radiation Oncology, Radiology, Pathology  Patient Status:  Current patient  Pathology:  Discussed (see comment) (Comment: Glioblastoma, WHO grade IV)  Treatment to Date:  Surgical intervention(s)  Clinical Trial Eligibility:  Not discussed  Recommended Plan:  Concurrent chemoradiation (Comment: patient to see Dr. Olmos and radiation oncology to discuss concurrent chemoradiation treatment)  Did the review exceed 30 minutes?:  did not           Documentation / Disclaimer Cancer Tumor Board Note  Cancer tumor board recommendations do not override what is determined to be reasonable care and treatment, which is dependent on the circumstances of a patient's case; the patient's medical, social, and personal concerns; and the clinical judgment of the oncologist [physician].

## 2019-04-16 ENCOUNTER — AMBULATORY - HEALTHEAST (OUTPATIENT)
Dept: ONCOLOGY | Facility: HOSPITAL | Age: 64
End: 2019-04-16

## 2019-04-16 ENCOUNTER — AMBULATORY - HEALTHEAST (OUTPATIENT)
Dept: RADIATION ONCOLOGY | Facility: HOSPITAL | Age: 64
End: 2019-04-16

## 2019-04-25 ENCOUNTER — AMBULATORY - HEALTHEAST (OUTPATIENT)
Dept: RADIATION ONCOLOGY | Facility: HOSPITAL | Age: 64
End: 2019-04-25

## 2019-04-29 ENCOUNTER — PATIENT OUTREACH (OUTPATIENT)
Dept: ONCOLOGY | Facility: CLINIC | Age: 64
End: 2019-04-29

## 2019-04-29 ENCOUNTER — TELEPHONE (OUTPATIENT)
Dept: ONCOLOGY | Facility: CLINIC | Age: 64
End: 2019-04-29

## 2019-04-29 NOTE — PROGRESS NOTES
"Received message from patient's wife asking to review \"things before starting chemo and radiation.\" Returned call to patient's wife. She states patient received a call from pharmacist and feels comfortable with the information he received. Per wife, she has several questions regarding chemotherapy safety precautions. Reviewed using gloves to handle chemo pills, using condoms during intercourse, and flushing toilet twice after use. Discussed rationale for these precautions. Also reviewed patient will be needing weekly CBC and pentamidine this week. Informed wife RNCC spoke with Aminah at [facility], who confirmed they were able to see all orders for lab draws and pentamidine. She will plan to follow up with [facility] for appointment times. She had no further questions or concerns and knows to call clinic with any additional questions or concerns.           "

## 2019-04-29 NOTE — ORAL ONC MGMT
Oral Chemotherapy Monitoring Program     Placed call to patient in follow up of Temodar therapy. Jayden said he plans to start Temodar tonight and first radiation appointment is scheduled for tomorrow. All of his questions were answered to his stated satisfaction.     Jose Vargas PharmD  [facility]  [phone]  April 29, 2019

## 2019-04-30 ENCOUNTER — AMBULATORY - HEALTHEAST (OUTPATIENT)
Dept: RADIATION ONCOLOGY | Facility: HOSPITAL | Age: 64
End: 2019-04-30

## 2019-04-30 ENCOUNTER — TELEPHONE (OUTPATIENT)
Dept: ONCOLOGY | Facility: CLINIC | Age: 64
End: 2019-04-30

## 2019-04-30 DIAGNOSIS — Z29.89 NEED FOR PNEUMOCYSTIS PROPHYLAXIS: ICD-10-CM

## 2019-04-30 DIAGNOSIS — C71.9 GBM (GLIOBLASTOMA MULTIFORME) (H): ICD-10-CM

## 2019-04-30 NOTE — TELEPHONE ENCOUNTER
Maryanne from [facility] called with questions about patient's order for Pentamidine.      This RNCC talked to Aminah at [facility] at the time this referral was made to [facility] and she (Aminah) reassured writer they were able to see the orders and were able to administer the nebulizer no problem.     Writer faxed Pentamidine order as well as standing CBC order to Maryanne at [facility] at [phone].     Writer updated patient's RNCC, Omar Gonzalez of this interaction as well.     Irena Vee, RN BSN   [facility]  Nurse Coordinator

## 2019-05-01 ENCOUNTER — AMBULATORY - HEALTHEAST (OUTPATIENT)
Dept: RADIATION ONCOLOGY | Facility: HOSPITAL | Age: 64
End: 2019-05-01

## 2019-05-01 ENCOUNTER — OFFICE VISIT - HEALTHEAST (OUTPATIENT)
Dept: RADIATION ONCOLOGY | Facility: HOSPITAL | Age: 64
End: 2019-05-01

## 2019-05-01 DIAGNOSIS — C71.9 GBM (GLIOBLASTOMA MULTIFORME) (H): ICD-10-CM

## 2019-05-02 ENCOUNTER — HOSPITAL ENCOUNTER (OUTPATIENT)
Dept: RESPIRATORY THERAPY | Facility: CLINIC | Age: 64
Discharge: HOME | End: 2019-05-02

## 2019-05-02 ENCOUNTER — AMBULATORY - HEALTHEAST (OUTPATIENT)
Dept: RADIATION ONCOLOGY | Facility: HOSPITAL | Age: 64
End: 2019-05-02

## 2019-05-02 DIAGNOSIS — Z29.89 NEED FOR PNEUMOCYSTIS PROPHYLAXIS: ICD-10-CM

## 2019-05-02 DIAGNOSIS — C71.9 GBM (GLIOBLASTOMA MULTIFORME) (H): ICD-10-CM

## 2019-05-03 ENCOUNTER — AMBULATORY - HEALTHEAST (OUTPATIENT)
Dept: RADIATION ONCOLOGY | Facility: HOSPITAL | Age: 64
End: 2019-05-03

## 2019-05-03 ENCOUNTER — AMBULATORY - HEALTHEAST (OUTPATIENT)
Dept: LAB | Facility: HOSPITAL | Age: 64
End: 2019-05-03

## 2019-05-03 DIAGNOSIS — C71.9 GBM (GLIOBLASTOMA MULTIFORME) (H): ICD-10-CM

## 2019-05-03 LAB
BASOPHILS # BLD AUTO: 0.1 THOU/UL (ref 0–0.2)
BASOPHILS NFR BLD AUTO: 1 % (ref 0–2)
EOSINOPHIL # BLD AUTO: 0.1 THOU/UL (ref 0–0.4)
EOSINOPHIL NFR BLD AUTO: 2 % (ref 0–6)
ERYTHROCYTE [DISTWIDTH] IN BLOOD BY AUTOMATED COUNT: 13.3 % (ref 11–14.5)
HCT VFR BLD AUTO: 37.9 % (ref 40–54)
HGB BLD-MCNC: 13.2 G/DL (ref 14–18)
LYMPHOCYTES # BLD AUTO: 2 THOU/UL (ref 0.8–4.4)
LYMPHOCYTES NFR BLD AUTO: 31 % (ref 20–40)
MCH RBC QN AUTO: 29.7 PG (ref 27–34)
MCHC RBC AUTO-ENTMCNC: 34.8 G/DL (ref 32–36)
MCV RBC AUTO: 85 FL (ref 80–100)
MONOCYTES # BLD AUTO: 0.8 THOU/UL (ref 0–0.9)
MONOCYTES NFR BLD AUTO: 12 % (ref 2–10)
NEUTROPHILS # BLD AUTO: 3.4 THOU/UL (ref 2–7.7)
NEUTROPHILS NFR BLD AUTO: 54 % (ref 50–70)
PLATELET # BLD AUTO: 245 THOU/UL (ref 140–440)
PMV BLD AUTO: 8.9 FL (ref 8.5–12.5)
RBC # BLD AUTO: 4.44 MILL/UL (ref 4.4–6.2)
WBC: 6.3 THOU/UL (ref 4–11)

## 2019-05-06 ENCOUNTER — AMBULATORY - HEALTHEAST (OUTPATIENT)
Dept: LAB | Facility: HOSPITAL | Age: 64
End: 2019-05-06

## 2019-05-06 ENCOUNTER — AMBULATORY - HEALTHEAST (OUTPATIENT)
Dept: ONCOLOGY | Facility: HOSPITAL | Age: 64
End: 2019-05-06

## 2019-05-06 ENCOUNTER — AMBULATORY - HEALTHEAST (OUTPATIENT)
Dept: RADIATION ONCOLOGY | Facility: HOSPITAL | Age: 64
End: 2019-05-06

## 2019-05-06 DIAGNOSIS — C71.9 GBM (GLIOBLASTOMA MULTIFORME) (H): ICD-10-CM

## 2019-05-06 LAB
BASOPHILS # BLD AUTO: 0.1 THOU/UL (ref 0–0.2)
BASOPHILS NFR BLD AUTO: 1 % (ref 0–2)
EOSINOPHIL # BLD AUTO: 0.1 THOU/UL (ref 0–0.4)
EOSINOPHIL NFR BLD AUTO: 2 % (ref 0–6)
ERYTHROCYTE [DISTWIDTH] IN BLOOD BY AUTOMATED COUNT: 13.7 % (ref 11–14.5)
HCT VFR BLD AUTO: 37.5 % (ref 40–54)
HGB BLD-MCNC: 13 G/DL (ref 14–18)
LYMPHOCYTES # BLD AUTO: 1.6 THOU/UL (ref 0.8–4.4)
LYMPHOCYTES NFR BLD AUTO: 26 % (ref 20–40)
MCH RBC QN AUTO: 30 PG (ref 27–34)
MCHC RBC AUTO-ENTMCNC: 34.7 G/DL (ref 32–36)
MCV RBC AUTO: 87 FL (ref 80–100)
MONOCYTES # BLD AUTO: 0.7 THOU/UL (ref 0–0.9)
MONOCYTES NFR BLD AUTO: 11 % (ref 2–10)
NEUTROPHILS # BLD AUTO: 3.8 THOU/UL (ref 2–7.7)
NEUTROPHILS NFR BLD AUTO: 60 % (ref 50–70)
PLATELET # BLD AUTO: 236 THOU/UL (ref 140–440)
PMV BLD AUTO: 8.8 FL (ref 8.5–12.5)
RBC # BLD AUTO: 4.33 MILL/UL (ref 4.4–6.2)
WBC: 6.3 THOU/UL (ref 4–11)

## 2019-05-07 ENCOUNTER — AMBULATORY - HEALTHEAST (OUTPATIENT)
Dept: RADIATION ONCOLOGY | Facility: HOSPITAL | Age: 64
End: 2019-05-07

## 2019-05-07 ENCOUNTER — TELEPHONE (OUTPATIENT)
Dept: ONCOLOGY | Facility: CLINIC | Age: 64
End: 2019-05-07

## 2019-05-07 ENCOUNTER — PATIENT OUTREACH (OUTPATIENT)
Dept: ONCOLOGY | Facility: CLINIC | Age: 64
End: 2019-05-07

## 2019-05-07 DIAGNOSIS — K31.89 GASTRIC MASS: Primary | ICD-10-CM

## 2019-05-07 NOTE — PROGRESS NOTES
Patient called with questions regarding nodules seen on chest CT. Per Dr Olmos, patient to be seen in Lung Nodule clinic, referral placed. RNCC sent message to scheduling team with scheduling request. Also discussed a spot in GI track was also seen, which Dr Olmos is recommending consult with GI team for workup. Patient voiced understanding.     Patient also questions how often he needs pentamidine. Discussed he will receive monthly during chemorads. During adj treatment, need it will be based on lab values. Patient voiced understanding and had no further questions or concerns.

## 2019-05-07 NOTE — TELEPHONE ENCOUNTER
RECORDS STATUS - ALL OTHER DIAGNOSIS      RECORDS RECEIVED FROM: [facility]   DATE RECEIVED: 5/7/19   NOTES STATUS DETAILS   OFFICE NOTE from referring provider  Epic   OFFICE NOTE from medical oncologist     DISCHARGE SUMMARY from hospital     DISCHARGE REPORT from the ER     OPERATIVE REPORT  Epic   MEDICATION LIST As of 4/8/19 [facility]   CLINICAL TRIAL TREATMENTS TO DATE     LABS     PATHOLOGY REPORTS  [facility]   ANYTHING RELATED TO DIAGNOSIS 5/6/19 Epic   GENONOMIC TESTING     TYPE:     IMAGING (NEED IMAGES & REPORT)     CT SCANS 3/22/19, 3/7/19 PACS   MRI 3/22/19, 3/6/19 PACS   MAMMO     ULTRASOUND     PET

## 2019-05-07 NOTE — ORAL ONC MGMT
Oral Chemotherapy Monitoring Program    Primary Oncologist: Dr. Olmos  Primary Oncology Clinic: [facility]  Cancer Diagnosis: Glioblastoma    Therapy: Temodar 180 mg capsule at bedtime for 42 days with concurrent XRT (done at [facility])  Start Date: 4/29/2019    Drug Interaction Assessment: No new drug interactions identified.    Lab Monitoring Plan  Monitoring plan for daily x 42 days concurrent with XRT regimen:  CBC weekly  CMP Q3 weeks  Labs drawn outside of [place]: at [facility]  Subjective/Objective:  Jayden Lackey is a 64 year old male contacted by phone for a follow-up visit for oral chemotherapy.  Jayden reports that he is tolerating his Temodar therapy well at this time. He states that he takes Zofran 30 minutes before taking Temodar on an empty stomach at bedtime and that has prevented him from having nausea/vomiting. He denies diarrhea, constipation, edema, mucositis, and skin rashes. He states that his appetite is pretty good. Jayden reports that his skin has been feeling a little dry. He agreed to trying a scent-free moisturizer as needed and increasing his water intake with a goal of 2 liters daily. His local lab results from [facility] from 5/6 were reviewed with him. There were no concerning abnormalities at this time. Jayden had a question regarding a nebulized antibiotic and scheduling a CT scan, which has been forwarded to his care team for review.    ORAL CHEMOTHERAPY 4/8/2019 5/7/2019   Drug Name Temodar (Temozolomide) Temodar (Temozolomide)   Current Dosage 180mg 180mg   Current Schedule QHS QHS   Cycle Details Continuous for 42 days during XRT Continuous for 42 days during XRT   Start Date of Last Cycle - 4/29/2019   Doses missed in last 2 weeks - 0   Adherence Assessment - Adherent   Adverse Effects - No AE identified during assessment   Home BPs - not needed   Any new drug interactions? - No   Is the dose as ordered appropriate for the patient? - Yes       Vitals:  BP:   BP  "Readings from Last 1 Encounters:   04/08/19 106/68     Wt Readings from Last 1 Encounters:   04/08/19 98.8 kg (217 lb 12.8 oz)     Estimated body surface area is 2.27 meters squared as calculated from the following:    Height as of 4/8/19: 1.875 m (6' 1.82\").    Weight as of 4/8/19: 98.8 kg (217 lb 12.8 oz).    Labs:  _  Result Component Current Result Ref Range   Sodium 136 (4/8/2019) 133 - 144 mmol/L     _  Result Component Current Result Ref Range   Potassium 4.0 (4/8/2019) 3.4 - 5.3 mmol/L     _  Result Component Current Result Ref Range   Calcium 8.9 (4/8/2019) 8.5 - 10.1 mg/dL     No results found for Mag within last 30 days.     No results found for Phos within last 30 days.     _  Result Component Current Result Ref Range   Albumin 3.6 (4/8/2019) 3.4 - 5.0 g/dL     _  Result Component Current Result Ref Range   Urea Nitrogen 16 (4/8/2019) 7 - 30 mg/dL     _  Result Component Current Result Ref Range   Creatinine 0.74 (4/8/2019) 0.66 - 1.25 mg/dL       _  Result Component Current Result Ref Range   AST 21 (4/8/2019) 0 - 45 U/L     _  Result Component Current Result Ref Range   ALT 35 (4/8/2019) 0 - 70 U/L     _  Result Component Current Result Ref Range   Bilirubin Total 0.4 (4/8/2019) 0.2 - 1.3 mg/dL       _  Result Component Current Result Ref Range   WBC 9.4 (4/8/2019) 4.0 - 11.0 10e9/L     _  Result Component Current Result Ref Range   Hemoglobin 12.6 (L) (4/8/2019) 13.3 - 17.7 g/dL     _  Result Component Current Result Ref Range   Platelet Count 215 (4/8/2019) 150 - 450 10e9/L     _  Result Component Current Result Ref Range   Absolute Neutrophil 6.7 (4/8/2019) 1.6 - 8.3 10e9/L         Assessment:  Patient is tolerating Temodar therapy concurrent with XRT well at this time with no reported side effects. He may benefit from increased water intake and moisturizing lotion to help with drier skin.    Plan:  - Continue Temodar therapy  - Use scent-less moisturizer as needed for dry skin  - Increase water " intake to 2 liters daily    Follow-Up:  Questions regarding CT scan and nebulizer treatment have been forwarded to care team for review.      Blessing Valadez  Pharmacy Intern  [facility]  [phone]

## 2019-05-08 ENCOUNTER — AMBULATORY - HEALTHEAST (OUTPATIENT)
Dept: RADIATION ONCOLOGY | Facility: HOSPITAL | Age: 64
End: 2019-05-08

## 2019-05-08 ENCOUNTER — OFFICE VISIT - HEALTHEAST (OUTPATIENT)
Dept: RADIATION ONCOLOGY | Facility: HOSPITAL | Age: 64
End: 2019-05-08

## 2019-05-08 DIAGNOSIS — C71.9 GBM (GLIOBLASTOMA MULTIFORME) (H): ICD-10-CM

## 2019-05-08 DIAGNOSIS — Z29.89 NEED FOR PNEUMOCYSTIS PROPHYLAXIS: ICD-10-CM

## 2019-05-09 ENCOUNTER — AMBULATORY - HEALTHEAST (OUTPATIENT)
Dept: RADIATION ONCOLOGY | Facility: HOSPITAL | Age: 64
End: 2019-05-09

## 2019-05-10 ENCOUNTER — AMBULATORY - HEALTHEAST (OUTPATIENT)
Dept: RADIATION ONCOLOGY | Facility: HOSPITAL | Age: 64
End: 2019-05-10

## 2019-05-11 DIAGNOSIS — R11.2 CHEMOTHERAPY-INDUCED NAUSEA AND VOMITING: ICD-10-CM

## 2019-05-11 DIAGNOSIS — T45.1X5A CHEMOTHERAPY-INDUCED NAUSEA AND VOMITING: ICD-10-CM

## 2019-05-11 DIAGNOSIS — Z51.11 CHEMOTHERAPY MANAGEMENT, ENCOUNTER FOR: ICD-10-CM

## 2019-05-13 ENCOUNTER — AMBULATORY - HEALTHEAST (OUTPATIENT)
Dept: LAB | Facility: HOSPITAL | Age: 64
End: 2019-05-13

## 2019-05-13 ENCOUNTER — AMBULATORY - HEALTHEAST (OUTPATIENT)
Dept: RADIATION ONCOLOGY | Facility: HOSPITAL | Age: 64
End: 2019-05-13

## 2019-05-13 ENCOUNTER — TELEPHONE (OUTPATIENT)
Dept: PHARMACY | Facility: CLINIC | Age: 64
End: 2019-05-13

## 2019-05-13 DIAGNOSIS — C71.9 GBM (GLIOBLASTOMA MULTIFORME) (H): ICD-10-CM

## 2019-05-13 LAB
BASOPHILS # BLD AUTO: 0.1 THOU/UL (ref 0–0.2)
BASOPHILS NFR BLD AUTO: 1 % (ref 0–2)
EOSINOPHIL # BLD AUTO: 0.1 THOU/UL (ref 0–0.4)
EOSINOPHIL NFR BLD AUTO: 2 % (ref 0–6)
ERYTHROCYTE [DISTWIDTH] IN BLOOD BY AUTOMATED COUNT: 13.7 % (ref 11–14.5)
HCT VFR BLD AUTO: 37.8 % (ref 40–54)
HGB BLD-MCNC: 13 G/DL (ref 14–18)
LYMPHOCYTES # BLD AUTO: 1.4 THOU/UL (ref 0.8–4.4)
LYMPHOCYTES NFR BLD AUTO: 21 % (ref 20–40)
MCH RBC QN AUTO: 29.5 PG (ref 27–34)
MCHC RBC AUTO-ENTMCNC: 34.4 G/DL (ref 32–36)
MCV RBC AUTO: 86 FL (ref 80–100)
MONOCYTES # BLD AUTO: 0.7 THOU/UL (ref 0–0.9)
MONOCYTES NFR BLD AUTO: 11 % (ref 2–10)
NEUTROPHILS # BLD AUTO: 4.4 THOU/UL (ref 2–7.7)
NEUTROPHILS NFR BLD AUTO: 66 % (ref 50–70)
PLATELET # BLD AUTO: 213 THOU/UL (ref 140–440)
PMV BLD AUTO: 8.9 FL (ref 8.5–12.5)
RBC # BLD AUTO: 4.41 MILL/UL (ref 4.4–6.2)
WBC: 6.8 THOU/UL (ref 4–11)

## 2019-05-13 NOTE — ORAL ONC MGMT
Oral Chemotherapy Monitoring Program  Lab Follow Up    Patient currently on temodar therapy.    Reviewed lab results from 5/13.    Labs:      Assessment & Plan:  No concerning abnormalities. Called patient to discuss results but had to leave a message. Phone number given if any follow up questions.    Follow-Up:  5/20: weekly labs    Eugene Schaffer PharmD, MS  Hematology/Oncology Clinical Pharmacist  [facility]  [facility]  [phone]

## 2019-05-14 ENCOUNTER — AMBULATORY - HEALTHEAST (OUTPATIENT)
Dept: RADIATION ONCOLOGY | Facility: HOSPITAL | Age: 64
End: 2019-05-14

## 2019-05-15 ENCOUNTER — OFFICE VISIT - HEALTHEAST (OUTPATIENT)
Dept: RADIATION ONCOLOGY | Facility: HOSPITAL | Age: 64
End: 2019-05-15

## 2019-05-15 ENCOUNTER — AMBULATORY - HEALTHEAST (OUTPATIENT)
Dept: RADIATION ONCOLOGY | Facility: HOSPITAL | Age: 64
End: 2019-05-15

## 2019-05-15 DIAGNOSIS — C71.9 GBM (GLIOBLASTOMA MULTIFORME) (H): ICD-10-CM

## 2019-05-15 DIAGNOSIS — K59.01 SLOW TRANSIT CONSTIPATION: ICD-10-CM

## 2019-05-16 ENCOUNTER — AMBULATORY - HEALTHEAST (OUTPATIENT)
Dept: RADIATION ONCOLOGY | Facility: HOSPITAL | Age: 64
End: 2019-05-16

## 2019-05-16 DIAGNOSIS — K31.89 GASTRIC MASS: Primary | ICD-10-CM

## 2019-05-17 ENCOUNTER — AMBULATORY - HEALTHEAST (OUTPATIENT)
Dept: RADIATION ONCOLOGY | Facility: HOSPITAL | Age: 64
End: 2019-05-17

## 2019-05-17 ENCOUNTER — TELEPHONE (OUTPATIENT)
Dept: GASTROENTEROLOGY | Facility: CLINIC | Age: 64
End: 2019-05-17

## 2019-05-18 RX ORDER — ONDANSETRON 4 MG/1
4 TABLET, FILM COATED ORAL AT BEDTIME
Qty: 30 TABLET | Refills: 3 | Status: CANCELLED | OUTPATIENT
Start: 2019-05-18

## 2019-05-19 ENCOUNTER — AMBULATORY - HEALTHEAST (OUTPATIENT)
Dept: RADIATION ONCOLOGY | Facility: HOSPITAL | Age: 64
End: 2019-05-19

## 2019-05-20 ENCOUNTER — AMBULATORY - HEALTHEAST (OUTPATIENT)
Dept: RADIATION ONCOLOGY | Facility: HOSPITAL | Age: 64
End: 2019-05-20

## 2019-05-20 ENCOUNTER — AMBULATORY - HEALTHEAST (OUTPATIENT)
Dept: ONCOLOGY | Facility: HOSPITAL | Age: 64
End: 2019-05-20

## 2019-05-20 ENCOUNTER — ONCOLOGY VISIT (OUTPATIENT)
Dept: ONCOLOGY | Facility: CLINIC | Age: 64
End: 2019-05-20
Attending: PSYCHIATRY & NEUROLOGY
Payer: COMMERCIAL

## 2019-05-20 VITALS
SYSTOLIC BLOOD PRESSURE: 127 MMHG | TEMPERATURE: 97.9 F | OXYGEN SATURATION: 100 % | BODY MASS INDEX: 28.62 KG/M2 | DIASTOLIC BLOOD PRESSURE: 79 MMHG | WEIGHT: 221.8 LBS | HEART RATE: 56 BPM

## 2019-05-20 DIAGNOSIS — C71.9 GLIOBLASTOMA (H): Primary | ICD-10-CM

## 2019-05-20 DIAGNOSIS — T45.1X5A CHEMOTHERAPY-INDUCED NAUSEA AND VOMITING: ICD-10-CM

## 2019-05-20 DIAGNOSIS — D70.1 CHEMOTHERAPY INDUCED NEUTROPENIA (H): ICD-10-CM

## 2019-05-20 DIAGNOSIS — T45.1X5A CHEMOTHERAPY INDUCED NEUTROPENIA (H): ICD-10-CM

## 2019-05-20 DIAGNOSIS — R53.0 NEOPLASTIC MALIGNANT RELATED FATIGUE: ICD-10-CM

## 2019-05-20 DIAGNOSIS — Z51.11 CHEMOTHERAPY MANAGEMENT, ENCOUNTER FOR: ICD-10-CM

## 2019-05-20 DIAGNOSIS — C71.9 GBM (GLIOBLASTOMA MULTIFORME) (H): ICD-10-CM

## 2019-05-20 DIAGNOSIS — H53.40 VISUAL FIELD CUT: ICD-10-CM

## 2019-05-20 DIAGNOSIS — R11.2 CHEMOTHERAPY-INDUCED NAUSEA AND VOMITING: ICD-10-CM

## 2019-05-20 LAB
BASOPHILS # BLD AUTO: 0 THOU/UL (ref 0–0.2)
BASOPHILS NFR BLD AUTO: 1 % (ref 0–2)
EOSINOPHIL # BLD AUTO: 0.1 THOU/UL (ref 0–0.4)
EOSINOPHIL NFR BLD AUTO: 3 % (ref 0–6)
ERYTHROCYTE [DISTWIDTH] IN BLOOD BY AUTOMATED COUNT: 13.9 % (ref 11–14.5)
HCT VFR BLD AUTO: 38 % (ref 40–54)
HGB BLD-MCNC: 13 G/DL (ref 14–18)
LYMPHOCYTES # BLD AUTO: 1 THOU/UL (ref 0.8–4.4)
LYMPHOCYTES NFR BLD AUTO: 21 % (ref 20–40)
MCH RBC QN AUTO: 29.5 PG (ref 27–34)
MCHC RBC AUTO-ENTMCNC: 34.2 G/DL (ref 32–36)
MCV RBC AUTO: 86 FL (ref 80–100)
MONOCYTES # BLD AUTO: 0.5 THOU/UL (ref 0–0.9)
MONOCYTES NFR BLD AUTO: 11 % (ref 2–10)
NEUTROPHILS # BLD AUTO: 3.1 THOU/UL (ref 2–7.7)
NEUTROPHILS NFR BLD AUTO: 65 % (ref 50–70)
PLATELET # BLD AUTO: 226 THOU/UL (ref 140–440)
PMV BLD AUTO: 8.9 FL (ref 8.5–12.5)
RBC # BLD AUTO: 4.41 MILL/UL (ref 4.4–6.2)
WBC: 4.7 THOU/UL (ref 4–11)

## 2019-05-20 PROCEDURE — G0463 HOSPITAL OUTPT CLINIC VISIT: HCPCS | Mod: ZF

## 2019-05-20 PROCEDURE — 99215 OFFICE O/P EST HI 40 MIN: CPT | Mod: ZP | Performed by: PSYCHIATRY & NEUROLOGY

## 2019-05-20 RX ORDER — DOCUSATE SODIUM 100 MG/1
100 CAPSULE, LIQUID FILLED ORAL
COMMUNITY
Start: 2019-05-15 | End: 2019-11-17

## 2019-05-20 RX ORDER — PENTAMIDINE ISETHIONATE 300 MG/300MG
300 INHALANT RESPIRATORY (INHALATION)
COMMUNITY
Start: 2019-05-30 | End: 2019-07-16

## 2019-05-20 ASSESSMENT — PAIN SCALES - GENERAL: PAINLEVEL: NO PAIN (0)

## 2019-05-20 NOTE — PATIENT INSTRUCTIONS
Suggested bowel regimen;       -Docusate 100 mg; 1-3 capsule(s) orally day (stool softener for constipation)       -Senna 8.6 m-2 tab(s) orally at bedtime (laxative as needed for constipation)       -MiraLax 1 package 1-2 times a day       * Adjust doses of the above medications as needed to meet a goal of one, soft bowel movement per day.    Consideration for referral to Dr. Eugene Vargas to discuss coping.   Consideration for starting mood medication as needed.     [place] neuro-oncologist.     Discussed the treatment option with the medical device, Optune, by [employer]. Please see their website ([email or web address]) for more information.    Return to clinic with Dolly Ramirez the week of .     Nasra lOmos MD  Neuro-oncology  2019

## 2019-05-20 NOTE — PROGRESS NOTES
NEURO-ONCOLOGY VISIT  May 20, 2019    CHIEF COMPLAINT: Mr. 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 He is currently undergoing chemoradiotherapy.     Jayden is presenting in follow-up for continued evaluation and recommendations on treatment accompanied by Oz (wife).      HISTORY OF PRESENT ILLNESS  -Overall doing well.  -Overall, tolerating chemoradiotherapy well. Nausea is well controlled on supportive medications. Decreased appetite.   -Issues with constipation on current bowel regimen.   -Continued visual field cut.   -Improved word finding and better short term memory. Continued difficulty with math/ numbers.   -Joint pain is improved.  -Energy reducing.  -Mood is down.   -Scalp tenderness at site of radiation.   -Denies any concerning headaches, weakness or changes in sensation.  -Denies any episodes concerning for a seizure.   -Off dexamethasone.    REVIEW OF SYSTEMS  A comprehensive ROS negative except as in HPI.      MEDICATIONS   Current Outpatient Medications   Medication Sig Dispense Refill     aspirin 81 MG EC tablet Take 81 mg by mouth daily       atenolol (TENORMIN) 50 MG tablet Take 50 mg by mouth At Bedtime        docusate sodium (COLACE) 100 MG capsule Take 100 mg by mouth       fish oil-omega-3 fatty acids 1000 MG capsule Take by mouth daily Unknown dose       FOLIC ACID PO Take 1 mg by mouth At Bedtime        lisinopril-hydrochlorothiazide (PRINZIDE/ZESTORETIC) 20-25 MG tablet Take 1 tablet by mouth At Bedtime       methotrexate sodium, pres-free, 50 MG/2ML SOLN injection CHEMO TAKE 0.7ML (17.5MG) SUBCUTANEOUS WEEKLY ORAL.  0     Nutritional Supplements (JUICE PLUS FIBRE PO) Take 1 tablet by mouth At Bedtime       ondansetron (ZOFRAN) 4 MG tablet Take 1 tablet (4 mg) by mouth At Bedtime ; 30 minutes prior to chemotherapy dosing and repeat every 8 hours  as needed for nausea. 30 tablet 3     [START ON 5/30/2019] pentamidine (NEBUPENT) 300 MG neb solution Inhale 300 mg into the lungs       rosuvastatin (CRESTOR) 5 MG tablet Take 5 mg by mouth At Bedtime        Temozolomide (TEMODAR) 180 MG capsule Take 1 capsule (180 mg) by mouth At Bedtime for 42 doses Take on an empty stomach with nausea medication 30-60 min before temozolomide. 42 capsule 0     folic acid (FOLVITE) 1 MG tablet Take 1 mg by mouth       naproxen (NAPROSYN DR) 500 MG EC tablet        DRUG ALLERGIES   Allergies   Allergen Reactions     No Clinical Screening - See Comments      Lupine bean doesn't remember reaction     Shellfish Allergy Difficulty breathing, Nausea and Vomiting and Swelling       ONCOLOGIC HISTORY  -1/2019 PRESENTATION: Visual disturbance.   -3/6/2019 Evaluated by Dr. Mauricio, neuro-ophthalmologist, found to have a right sided homonymous hemianopsia. MRI ordered.   -3/6/2019 MR brain imaging revealed a contrast-enhancing lesion in the left occipital lobe suspicious for a high-grade primary brain tumor.  -3/22/2019 SURGERY: Craniotomy with a gross total resection by Dr. Irving Hayes.   PATHOLOGY: Glioblastoma; Wildtype status for IDH1/2 by next generation sequencing. MGMT promoter methylated. Wildtype PTEN, TP53.  -4/8/2019 NEURO-ONC: Recommending chemoradiotherapy.   -4/30 - 6/11/2019 CHEMORADS 6000cGy in 30 fractions with concurrent temozolomide 75mg/m2 (180mg).  -5/20/2019 NEURO-ONC: Not interested in Optune at this time.     SOCIAL HISTORY   Tobacco use: Former smoker, quit 40 years ago.   Alcohol use: Social use.  Drug use: Denies marijuana use.  Supplement, complimentary/ alternative medicine: None.    .   Employment: On disability.       PHYSICAL EXAMINATION  /79   Pulse 56   Temp 97.9  F (36.6  C) (Oral)   Wt 100.6 kg (221 lb 12.8 oz)   SpO2 100%   BMI 28.62 kg/m     Wt Readings from Last 2 Encounters:   05/20/19 100.6 kg (221 lb 12.8 oz)   04/08/19 98.8 kg (217 lb  "12.8 oz)      Ht Readings from Last 2 Encounters:   04/08/19 1.875 m (6' 1.82\")   04/05/19 1.867 m (6' 1.5\")     KPS: 90    -Generally well appearing.  -Throat: No oral thrush.  -Respiratory: Normal breath sounds, no audible wheezing.   -Skin: No rashes. Healed head incision.  -Hematologic/ lymphatic: No abnormal bruising. No leg swelling.  -Psychiatric: Normal mood and affect. Pleasant, talkative.  -Neurologic:   MENTAL STATUS:     Alert, oriented to date.    Recall: Intact.   Issues to math, numbers.     Speech fluent. Comprehension intact to multi-step commands.   Normal naming, repetition. Able to read.   Good right-left orientation.     CRANIAL NERVES:     Discs flat on fundoscopy.    Pupils are equal, round, reactive to light.     Extraocular movements full, patient denies diplopia.     Homonymous hemianopsia, right-side.    Facial sensation intact to light touch.   Symmetric facial movements.   Hearing intact.   Palate moves symmetrically.     Sternocleidomastoid and trapezius strength intact.   Tongue midline.  MOTOR:    Normal and symmetric tone.   Grossly 5/5 throughout.    No pronation or drift. No orbiting.   Able to rise from a chair without use of arms.   On toe/ heel walk, equal distance from floor to heels/ toes.   SENSATION:    Intact to light touch throughout.  COORDINATION:   Intact finger-nose with eyes open and closed bilaterally.   REFLEXES:    Normal and symmetric.    Toes not tested. No clonus. No Hoffmans.   No grasp.    GAIT:  Walks without assistance.   Good speed. Normal stride length and heel strike. Normal turns. Normal arm swing.   Able to toe, heel walk. Able to tandem walk.       MEDICAL RECORDS  Obtained and personally reviewed all available outside medical records in addition to reviewing any records available in our electronic system.     LABS  Personally reviewed all available lab results.     IMAGING  No new neuro-imaging to review.        IMPRESSION  For the 30 minute " appointment, more than 50% of the encounter was spent discussing in detail the nature of this tumor in light of his current treatment plan. This is in addition to providing emotional support, answering questions pertaining to my recommendations, and devising the treatment plan as outlined below.    Currently undergoing chemoradiotherapy and doing well. Discussed Optune and Jayden is not interested in this treatment option at this time.    PROBLEM LIST  Glioblastoma, MGMT methylated and IDH1 wildtype by NGS  Visual field cut; homonymous hemianopsia, right-side  Memory issues  Drug-induced constipation    PLAN  -CANCER-DIRECTED THERAPY-  -Continue chemoradiotherapy with temozolomide 75mg/m2 (180mg).   -Radiation with Dr. Nasra Bailey at [facility].   -Supportive medications; Zofran and Pentamidine + bowel regimen.   -Will continue weekly CBC at [facility] and repeat CBC, renal, and LFT at follow-up.  -Not interested in Optune at this time.   -Dr. Thompson Zarco is a neuro-oncologist at [facility] in [place].     -STEROIDS-  -Currently off dexamethasone.  -Dose may increase while undergoing radiation.    -SEIZURE MANAGEMENT-  -While this patient is at increased risk of having seizures, given the lack of seizure history, there is no indication to prescribe an antiepileptic at this time.     -Quality of life/ MOOD/ FATIGUE-  -Increased mood issues; depression.  -Consideration for referral to Dr. Eugene Vargas to discuss coping.   -Consideration for starting mood medication as needed.   -Continue to monitor mood as untreated/ undertreated depression can worsen fatigue, dysorexia, and quality of life.  -Added to Brain Tumor Support Group email list; [email or web address]    -MEMORY COMPLAINTS-  -Consideration for referral to Dr. Aleksandr Garcia for neuro-psych testing.     -OTHER SUPPORTIVE MEDICATIONS-  -Continue methotrexate for arthritis. No drug-drug interactions with temozolomide, Zofran, or pentamidine.      -ADDITIONAL SUPPORTIVE MANAGEMENT-  -Social work discussed disability options.   -CT with lung nodules; Referral to [facility].   -Ordered EGD to evaluate enhancing focus measuring 2.9 cm along the gastric antrum/pylorus posterior wall.    Return to clinic with Dolly Ramirez at the end of chemoradiotherapy in about 3 weeks.    In the meantime, Jayden and Oz know to call with questions or concerns or to report new complaints and can be seen sooner if needed. Urgent evaluation is needed in the setting of acute onset of severe headache, abrupt change in mental status, on-going seizures, new focal deficits, or new leg swelling/ pain. Everyone in attendance voiced understanding.    Nasra Olmos MD  Neuro-oncology

## 2019-05-20 NOTE — LETTER
5/20/2019       RE: Jayden Lackey  [address]  [place] [address]     Dear Colleague,    Thank you for referring your patient, Jayden Lackey, to the [facility]. Please see a copy of my visit note below.    NEURO-ONCOLOGY VISIT  May 20, 2019    CHIEF COMPLAINT: Mr. 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 He is currently undergoing chemoradiotherapy.     Jayden is presenting in follow-up for continued evaluation and recommendations on treatment accompanied by Oz (wife).      HISTORY OF PRESENT ILLNESS  -Overall doing well.  -Overall, tolerating chemoradiotherapy well. Nausea is well controlled on supportive medications. Decreased appetite.   -Issues with constipation on current bowel regimen.   -Continued visual field cut.   -Improved word finding and better short term memory. Continued difficulty with math/ numbers.   -Joint pain is improved.  -Energy reducing.  -Mood is down.   -Scalp tenderness at site of radiation.   -Denies any concerning headaches, weakness or changes in sensation.  -Denies any episodes concerning for a seizure.   -Off dexamethasone.    REVIEW OF SYSTEMS  A comprehensive ROS negative except as in HPI.      MEDICATIONS   Current Outpatient Medications   Medication Sig Dispense Refill     aspirin 81 MG EC tablet Take 81 mg by mouth daily       atenolol (TENORMIN) 50 MG tablet Take 50 mg by mouth At Bedtime        docusate sodium (COLACE) 100 MG capsule Take 100 mg by mouth       fish oil-omega-3 fatty acids 1000 MG capsule Take by mouth daily Unknown dose       FOLIC ACID PO Take 1 mg by mouth At Bedtime        lisinopril-hydrochlorothiazide (PRINZIDE/ZESTORETIC) 20-25 MG tablet Take 1 tablet by mouth At Bedtime       methotrexate sodium, pres-free, 50 MG/2ML SOLN injection CHEMO TAKE 0.7ML (17.5MG) SUBCUTANEOUS  WEEKLY ORAL.  0     Nutritional Supplements (JUICE PLUS FIBRE PO) Take 1 tablet by mouth At Bedtime       ondansetron (ZOFRAN) 4 MG tablet Take 1 tablet (4 mg) by mouth At Bedtime ; 30 minutes prior to chemotherapy dosing and repeat every 8 hours as needed for nausea. 30 tablet 3     [START ON 5/30/2019] pentamidine (NEBUPENT) 300 MG neb solution Inhale 300 mg into the lungs       rosuvastatin (CRESTOR) 5 MG tablet Take 5 mg by mouth At Bedtime        Temozolomide (TEMODAR) 180 MG capsule Take 1 capsule (180 mg) by mouth At Bedtime for 42 doses Take on an empty stomach with nausea medication 30-60 min before temozolomide. 42 capsule 0     folic acid (FOLVITE) 1 MG tablet Take 1 mg by mouth       naproxen (NAPROSYN DR) 500 MG EC tablet        DRUG ALLERGIES   Allergies   Allergen Reactions     No Clinical Screening - See Comments      Lupine bean doesn't remember reaction     Shellfish Allergy Difficulty breathing, Nausea and Vomiting and Swelling       ONCOLOGIC HISTORY  -1/2019 PRESENTATION: Visual disturbance.   -3/6/2019 Evaluated by Dr. Mauricio, neuro-ophthalmologist, found to have a right sided homonymous hemianopsia. MRI ordered.   -3/6/2019 MR brain imaging revealed a contrast-enhancing lesion in the left occipital lobe suspicious for a high-grade primary brain tumor.  -3/22/2019 SURGERY: Craniotomy with a gross total resection by Dr. Irving Hayes.   PATHOLOGY: Glioblastoma; Wildtype status for IDH1/2 by next generation sequencing. MGMT promoter methylated. Wildtype PTEN, TP53.  -4/8/2019 NEURO-ONC: Recommending chemoradiotherapy.   -4/30 - 6/11/2019 CHEMORADS 6000cGy in 30 fractions with concurrent temozolomide 75mg/m2 (180mg).  -5/20/2019 NEURO-ONC: Not interested in Optune at this time.     SOCIAL HISTORY   Tobacco use: Former smoker, quit 40 years ago.   Alcohol use: Social use.  Drug use: Denies marijuana use.  Supplement, complimentary/ alternative medicine: None.    .   Employment: On disability.  "      PHYSICAL EXAMINATION  /79   Pulse 56   Temp 97.9  F (36.6  C) (Oral)   Wt 100.6 kg (221 lb 12.8 oz)   SpO2 100%   BMI 28.62 kg/m      Wt Readings from Last 2 Encounters:   05/20/19 100.6 kg (221 lb 12.8 oz)   04/08/19 98.8 kg (217 lb 12.8 oz)      Ht Readings from Last 2 Encounters:   04/08/19 1.875 m (6' 1.82\")   04/05/19 1.867 m (6' 1.5\")     KPS: 90    -Generally well appearing.  -Throat: No oral thrush.  -Respiratory: Normal breath sounds, no audible wheezing.   -Skin: No rashes. Healed head incision.  -Hematologic/ lymphatic: No abnormal bruising. No leg swelling.  -Psychiatric: Normal mood and affect. Pleasant, talkative.  -Neurologic:   MENTAL STATUS:     Alert, oriented to date.    Recall: Intact.   Issues to math, numbers.     Speech fluent. Comprehension intact to multi-step commands.   Normal naming, repetition. Able to read.   Good right-left orientation.     CRANIAL NERVES:     Discs flat on fundoscopy.    Pupils are equal, round, reactive to light.     Extraocular movements full, patient denies diplopia.     Homonymous hemianopsia, right-side.    Facial sensation intact to light touch.   Symmetric facial movements.   Hearing intact.   Palate moves symmetrically.     Sternocleidomastoid and trapezius strength intact.   Tongue midline.  MOTOR:    Normal and symmetric tone.   Grossly 5/5 throughout.    No pronation or drift. No orbiting.   Able to rise from a chair without use of arms.   On toe/ heel walk, equal distance from floor to heels/ toes.   SENSATION:    Intact to light touch throughout.  COORDINATION:   Intact finger-nose with eyes open and closed bilaterally.   REFLEXES:    Normal and symmetric.    Toes not tested. No clonus. No Hoffmans.   No grasp.    GAIT:  Walks without assistance.   Good speed. Normal stride length and heel strike. Normal turns. Normal arm swing.   Able to toe, heel walk. Able to tandem walk.       MEDICAL RECORDS  Obtained and personally reviewed all " available outside medical records in addition to reviewing any records available in our electronic system.     LABS  Personally reviewed all available lab results.     IMAGING  No new neuro-imaging to review.        IMPRESSION  For the 30 minute appointment, more than 50% of the encounter was spent discussing in detail the nature of this tumor in light of his current treatment plan. This is in addition to providing emotional support, answering questions pertaining to my recommendations, and devising the treatment plan as outlined below.    Currently undergoing chemoradiotherapy and doing well. Discussed Optune and Jayden is not interested in this treatment option at this time.    PROBLEM LIST  Glioblastoma, MGMT methylated and IDH1 wildtype by NGS  Visual field cut; homonymous hemianopsia, right-side  Memory issues  Drug-induced constipation    PLAN  -CANCER-DIRECTED THERAPY-  -Continue chemoradiotherapy with temozolomide 75mg/m2 (180mg).   -Radiation with Dr. Nasra Bailey at [facility].   -Supportive medications; Zofran and Pentamidine + bowel regimen.   -Will continue weekly CBC at [facility] and repeat CBC, renal, and LFT at follow-up.  -Not interested in Optune at this time.   -Dr. Thompson Zarco is a neuro-oncologist at [facility] in [place].     -STEROIDS-  -Currently off dexamethasone.  -Dose may increase while undergoing radiation.    -SEIZURE MANAGEMENT-  -While this patient is at increased risk of having seizures, given the lack of seizure history, there is no indication to prescribe an antiepileptic at this time.     -Quality of life/ MOOD/ FATIGUE-  -Increased mood issues; depression.  -Consideration for referral to Dr. Eugene Vargas to discuss coping.   -Consideration for starting mood medication as needed.   -Continue to monitor mood as untreated/ undertreated depression can worsen fatigue, dysorexia, and quality of life.  -Added to Brain Tumor Support Group email list;  [email or web address]    -MEMORY COMPLAINTS-  -Consideration for referral to Dr. Aleksandr Garcia for neuro-psych testing.     -OTHER SUPPORTIVE MEDICATIONS-  -Continue methotrexate for arthritis. No drug-drug interactions with temozolomide, Zofran, or pentamidine.     -ADDITIONAL SUPPORTIVE MANAGEMENT-  -Social work discussed disability options.   -CT with lung nodules; Referral to [facility]odule Clinic.   -Ordered EGD to evaluate enhancing focus measuring 2.9 cm along the gastric antrum/pylorus posterior wall.    Return to clinic with Dolly Ramirez at the end of chemoradiotherapy in about 3 weeks.    In the meantime, Jayden and Oz know to call with questions or concerns or to report new complaints and can be seen sooner if needed. Urgent evaluation is needed in the setting of acute onset of severe headache, abrupt change in mental status, on-going seizures, new focal deficits, or new leg swelling/ pain. Everyone in attendance voiced understanding.    Nasra Olmos MD  Neuro-oncology

## 2019-05-20 NOTE — NURSING NOTE
"Oncology Rooming Note    May 20, 2019 9:51 AM   Jayden Lackey is a 64 year old male who presents for:    Chief Complaint   Patient presents with     Oncology Clinic Visit     Return; Glioblastoma Multiforme of Occipitol Lobe     Initial Vitals: /79   Pulse 56   Temp 97.9  F (36.6  C) (Oral)   Wt 100.6 kg (221 lb 12.8 oz)   SpO2 100%   BMI 28.62 kg/m   Estimated body mass index is 28.62 kg/m  as calculated from the following:    Height as of 4/8/19: 1.875 m (6' 1.82\").    Weight as of this encounter: 100.6 kg (221 lb 12.8 oz). Body surface area is 2.29 meters squared.  No Pain (0) Comment: Data Unavailable   No LMP for male patient.  Allergies reviewed: Yes  Medications reviewed: Yes    Medications: Medication refills not needed today.  Pharmacy name entered into [employer]:    [id number] - [address] [facility] - [place], [place] - [address]    Clinical concerns: Patient would like to discuss more options for Stool softeners and Laxatives for preparation, as when he does get constipated, it is very difficult. They will be having a dorothy in insurance and would like to discuss filling medications to prepare for this too.  Nickolas was notified.      Marcy Lora [facility]              "

## 2019-05-21 ENCOUNTER — AMBULATORY - HEALTHEAST (OUTPATIENT)
Dept: RADIATION ONCOLOGY | Facility: HOSPITAL | Age: 64
End: 2019-05-21

## 2019-05-21 ENCOUNTER — PATIENT OUTREACH (OUTPATIENT)
Dept: ONCOLOGY | Facility: CLINIC | Age: 64
End: 2019-05-21

## 2019-05-21 DIAGNOSIS — K31.89 GASTRIC MASS: Primary | ICD-10-CM

## 2019-05-21 NOTE — PROGRESS NOTES
Placed call to patient's wife to discuss referral for gastric mass. Patient would like to be seen at [facility] for convenience. Discussed with patient's wife LEV ROMERO works with [facility]. We will send referral to them for consult. Patient will likely need to be seen in consult and that MD will decide if EGD is appropriate. If biopsy is done and if it shows malignancy they can then decide if they would like to see oncologist at [facility] or [facility]. Patient and wife both voiced understanding of plan and were in agreement. We also review upcoming follow up appointments with Dolly Ramirez PA-C. They had no further questions or concerns at this time.     Referral faxed to [facility] - at [phone]. Fax transmission received.

## 2019-05-22 ENCOUNTER — AMBULATORY - HEALTHEAST (OUTPATIENT)
Dept: RADIATION ONCOLOGY | Facility: HOSPITAL | Age: 64
End: 2019-05-22

## 2019-05-22 ENCOUNTER — COMMUNICATION - HEALTHEAST (OUTPATIENT)
Dept: ONCOLOGY | Facility: HOSPITAL | Age: 64
End: 2019-05-22

## 2019-05-22 ENCOUNTER — AMBULATORY - HEALTHEAST (OUTPATIENT)
Dept: ONCOLOGY | Facility: HOSPITAL | Age: 64
End: 2019-05-22

## 2019-05-22 ENCOUNTER — OFFICE VISIT - HEALTHEAST (OUTPATIENT)
Dept: RADIATION ONCOLOGY | Facility: HOSPITAL | Age: 64
End: 2019-05-22

## 2019-05-22 DIAGNOSIS — N63.20 LEFT BREAST MASS: ICD-10-CM

## 2019-05-22 DIAGNOSIS — K31.89 GASTRIC MASS: ICD-10-CM

## 2019-05-22 DIAGNOSIS — C71.9 GBM (GLIOBLASTOMA MULTIFORME) (H): ICD-10-CM

## 2019-05-23 ENCOUNTER — AMBULATORY - HEALTHEAST (OUTPATIENT)
Dept: RADIATION ONCOLOGY | Facility: HOSPITAL | Age: 64
End: 2019-05-23

## 2019-05-24 ENCOUNTER — RECORDS - HEALTHEAST (OUTPATIENT)
Dept: RADIOLOGY | Facility: CLINIC | Age: 64
End: 2019-05-24

## 2019-05-24 ENCOUNTER — AMBULATORY - HEALTHEAST (OUTPATIENT)
Dept: RADIATION ONCOLOGY | Facility: HOSPITAL | Age: 64
End: 2019-05-24

## 2019-05-24 ENCOUNTER — RECORDS - HEALTHEAST (OUTPATIENT)
Dept: ADMINISTRATIVE | Facility: OTHER | Age: 64
End: 2019-05-24

## 2019-05-28 ENCOUNTER — AMBULATORY - HEALTHEAST (OUTPATIENT)
Dept: RADIATION ONCOLOGY | Facility: HOSPITAL | Age: 64
End: 2019-05-28

## 2019-05-28 ENCOUNTER — AMBULATORY - HEALTHEAST (OUTPATIENT)
Dept: LAB | Facility: HOSPITAL | Age: 64
End: 2019-05-28

## 2019-05-28 DIAGNOSIS — C71.9 GBM (GLIOBLASTOMA MULTIFORME) (H): ICD-10-CM

## 2019-05-28 LAB
BASOPHILS # BLD AUTO: 0 THOU/UL (ref 0–0.2)
BASOPHILS NFR BLD AUTO: 1 % (ref 0–2)
EOSINOPHIL # BLD AUTO: 0.1 THOU/UL (ref 0–0.4)
EOSINOPHIL NFR BLD AUTO: 3 % (ref 0–6)
ERYTHROCYTE [DISTWIDTH] IN BLOOD BY AUTOMATED COUNT: 14.1 % (ref 11–14.5)
HCT VFR BLD AUTO: 39.6 % (ref 40–54)
HGB BLD-MCNC: 13.3 G/DL (ref 14–18)
LYMPHOCYTES # BLD AUTO: 1 THOU/UL (ref 0.8–4.4)
LYMPHOCYTES NFR BLD AUTO: 21 % (ref 20–40)
MCH RBC QN AUTO: 28.7 PG (ref 27–34)
MCHC RBC AUTO-ENTMCNC: 33.6 G/DL (ref 32–36)
MCV RBC AUTO: 86 FL (ref 80–100)
MONOCYTES # BLD AUTO: 0.6 THOU/UL (ref 0–0.9)
MONOCYTES NFR BLD AUTO: 13 % (ref 2–10)
NEUTROPHILS # BLD AUTO: 2.8 THOU/UL (ref 2–7.7)
NEUTROPHILS NFR BLD AUTO: 63 % (ref 50–70)
PLATELET # BLD AUTO: 216 THOU/UL (ref 140–440)
PMV BLD AUTO: 9.2 FL (ref 8.5–12.5)
RBC # BLD AUTO: 4.63 MILL/UL (ref 4.4–6.2)
WBC: 4.5 THOU/UL (ref 4–11)

## 2019-05-29 ENCOUNTER — HOSPITAL ENCOUNTER (OUTPATIENT)
Dept: MAMMOGRAPHY | Facility: CLINIC | Age: 64
Discharge: HOME | End: 2019-05-29
Attending: RADIOLOGY

## 2019-05-29 ENCOUNTER — OFFICE VISIT - HEALTHEAST (OUTPATIENT)
Dept: RADIATION ONCOLOGY | Facility: HOSPITAL | Age: 64
End: 2019-05-29

## 2019-05-29 ENCOUNTER — AMBULATORY - HEALTHEAST (OUTPATIENT)
Dept: ONCOLOGY | Facility: HOSPITAL | Age: 64
End: 2019-05-29

## 2019-05-29 ENCOUNTER — AMBULATORY - HEALTHEAST (OUTPATIENT)
Dept: RADIATION ONCOLOGY | Facility: HOSPITAL | Age: 64
End: 2019-05-29

## 2019-05-29 DIAGNOSIS — N63.20 LEFT BREAST MASS: ICD-10-CM

## 2019-05-29 DIAGNOSIS — C71.9 GBM (GLIOBLASTOMA MULTIFORME) (H): ICD-10-CM

## 2019-05-30 ENCOUNTER — AMBULATORY - HEALTHEAST (OUTPATIENT)
Dept: RADIATION ONCOLOGY | Facility: HOSPITAL | Age: 64
End: 2019-05-30

## 2019-05-30 ENCOUNTER — HOSPITAL ENCOUNTER (OUTPATIENT)
Dept: RESPIRATORY THERAPY | Facility: HOSPITAL | Age: 64
Discharge: HOME | End: 2019-05-30
Attending: RADIOLOGY

## 2019-05-30 ENCOUNTER — TELEPHONE (OUTPATIENT)
Dept: ONCOLOGY | Facility: CLINIC | Age: 64
End: 2019-05-30

## 2019-05-30 DIAGNOSIS — C71.9 GBM (GLIOBLASTOMA MULTIFORME) (H): ICD-10-CM

## 2019-05-30 DIAGNOSIS — Z29.89 NEED FOR PNEUMOCYSTIS PROPHYLAXIS: ICD-10-CM

## 2019-05-30 NOTE — ORAL ONC MGMT
Oral Chemotherapy Monitoring Program    Placed call to patient's wife, Cristiana, in follow up of lab results from 5/28/19. Patient reported that labs were discussed with them by Dr. Bailey at [facility]. There were no concerning abnormalities. They had no further questions or concerns.    Blessing Valadez  Pharmacy Intern  [facility]  [phone]

## 2019-05-31 ENCOUNTER — AMBULATORY - HEALTHEAST (OUTPATIENT)
Dept: RADIATION ONCOLOGY | Facility: HOSPITAL | Age: 64
End: 2019-05-31

## 2019-06-03 ENCOUNTER — AMBULATORY - HEALTHEAST (OUTPATIENT)
Dept: LAB | Facility: HOSPITAL | Age: 64
End: 2019-06-03

## 2019-06-03 ENCOUNTER — AMBULATORY - HEALTHEAST (OUTPATIENT)
Dept: RADIATION ONCOLOGY | Facility: HOSPITAL | Age: 64
End: 2019-06-03

## 2019-06-03 DIAGNOSIS — C71.9 GBM (GLIOBLASTOMA MULTIFORME) (H): ICD-10-CM

## 2019-06-03 LAB
BASOPHILS # BLD AUTO: 0 THOU/UL (ref 0–0.2)
BASOPHILS NFR BLD AUTO: 1 % (ref 0–2)
EOSINOPHIL # BLD AUTO: 0.1 THOU/UL (ref 0–0.4)
EOSINOPHIL NFR BLD AUTO: 2 % (ref 0–6)
ERYTHROCYTE [DISTWIDTH] IN BLOOD BY AUTOMATED COUNT: 14.2 % (ref 11–14.5)
HCT VFR BLD AUTO: 36.9 % (ref 40–54)
HGB BLD-MCNC: 12.6 G/DL (ref 14–18)
LYMPHOCYTES # BLD AUTO: 0.7 THOU/UL (ref 0.8–4.4)
LYMPHOCYTES NFR BLD AUTO: 14 % (ref 20–40)
MCH RBC QN AUTO: 29 PG (ref 27–34)
MCHC RBC AUTO-ENTMCNC: 34.1 G/DL (ref 32–36)
MCV RBC AUTO: 85 FL (ref 80–100)
MONOCYTES # BLD AUTO: 0.5 THOU/UL (ref 0–0.9)
MONOCYTES NFR BLD AUTO: 11 % (ref 2–10)
NEUTROPHILS # BLD AUTO: 3.6 THOU/UL (ref 2–7.7)
NEUTROPHILS NFR BLD AUTO: 72 % (ref 50–70)
PLATELET # BLD AUTO: 177 THOU/UL (ref 140–440)
PMV BLD AUTO: 8.9 FL (ref 8.5–12.5)
RBC # BLD AUTO: 4.34 MILL/UL (ref 4.4–6.2)
WBC: 5 THOU/UL (ref 4–11)

## 2019-06-04 ENCOUNTER — AMBULATORY - HEALTHEAST (OUTPATIENT)
Dept: RADIATION ONCOLOGY | Facility: HOSPITAL | Age: 64
End: 2019-06-04

## 2019-06-05 ENCOUNTER — HOSPITAL ENCOUNTER (OUTPATIENT)
Dept: MAMMOGRAPHY | Facility: CLINIC | Age: 64
Discharge: HOME | End: 2019-06-05
Attending: RADIOLOGY | Admitting: RADIOLOGY

## 2019-06-05 ENCOUNTER — HOSPITAL ENCOUNTER (OUTPATIENT)
Dept: MAMMOGRAPHY | Facility: CLINIC | Age: 64
Discharge: HOME | End: 2019-06-05
Attending: RADIOLOGY

## 2019-06-05 ENCOUNTER — OFFICE VISIT - HEALTHEAST (OUTPATIENT)
Dept: RADIATION ONCOLOGY | Facility: HOSPITAL | Age: 64
End: 2019-06-05

## 2019-06-05 ENCOUNTER — AMBULATORY - HEALTHEAST (OUTPATIENT)
Dept: RADIATION ONCOLOGY | Facility: HOSPITAL | Age: 64
End: 2019-06-05

## 2019-06-05 DIAGNOSIS — R92.8 ABNORMAL FINDING ON BREAST IMAGING: ICD-10-CM

## 2019-06-05 DIAGNOSIS — N63.20 LEFT BREAST MASS: ICD-10-CM

## 2019-06-05 DIAGNOSIS — C71.9 GBM (GLIOBLASTOMA MULTIFORME) (H): ICD-10-CM

## 2019-06-06 ENCOUNTER — TELEPHONE (OUTPATIENT)
Dept: ONCOLOGY | Facility: CLINIC | Age: 64
End: 2019-06-06

## 2019-06-06 ENCOUNTER — AMBULATORY - HEALTHEAST (OUTPATIENT)
Dept: RADIATION ONCOLOGY | Facility: HOSPITAL | Age: 64
End: 2019-06-06

## 2019-06-06 DIAGNOSIS — R11.2 CHEMOTHERAPY-INDUCED NAUSEA AND VOMITING: ICD-10-CM

## 2019-06-06 DIAGNOSIS — Z51.11 CHEMOTHERAPY MANAGEMENT, ENCOUNTER FOR: ICD-10-CM

## 2019-06-06 DIAGNOSIS — T45.1X5A CHEMOTHERAPY-INDUCED NAUSEA AND VOMITING: ICD-10-CM

## 2019-06-06 NOTE — ORAL ONC MGMT
"Oral Chemotherapy Monitoring Program    Primary Oncologist: Dr. Olmos  Primary Oncology Clinic: [facility]  Cancer Diagnosis: Glioblastoma     Therapy History:  Temodar 180 mg (1 X 180 mg) by mouth daily at bedtime for 42 days  C1D1=4/29/2019    Drug Interaction Assessment: No new known drug interactions    Lab Monitoring Plan  CBC weekly, CMP Q3 weeks  Subjective/Objective:  Jayden Lackey is a 64 year old male contacted by phone for a follow-up visit for oral chemotherapy. I spoke with Oz today about Jayden's Temodar and recent lab results. I notified her that I had reviewed Jayden's labs with a pharmacist, and there were no concerning abnormalities. When I asked about side effects of the Temodar, she said he was constipated for a little bit, but it is better now. She said he is more tired than he used to be, but it hasn't stopped him from doing his daily tasks. We talked about how getting outside and doing things he enjoys has helped with his fatigue lately. She said he has had hair loss, but she thinks it is the radiation causing this. He has vision issues that started after surgery and they have not gotten better or worse. She said he was in a \"depressive funk\" last week, so she scheduled a meeting with a psychologist for next week. She said he is feeling better this week. She noted that their insurance would be changing on August 1st and that she hopes to have his medication coverage figured out before then. She also noted how it is hard to get down to the [facility], so she wants the scheduling phone number so she can discuss times that work best for them.    ORAL CHEMOTHERAPY 4/8/2019 5/7/2019 6/6/2019   Drug Name Temodar (Temozolomide) Temodar (Temozolomide) Temodar (Temozolomide)   Current Dosage 180mg 180mg 180mg   Current Schedule QHS QHS QHS   Cycle Details Continuous for 42 days during XRT Continuous for 42 days during XRT Continuous for 42 days during XRT   Start Date of Last Cycle - 4/29/2019 " "4/29/2019   Doses missed in last 2 weeks - 0 0   Adherence Assessment - Adherent Adherent   Adverse Effects - No AE identified during assessment Constipation;Fatigue   Constipation - - Resolved due to intervention   Fatigue - - Grade 1   Pharmacist Intervention(fatigue) - - Yes   Intervention(s) - - Patient education   Home BPs - not needed -   Any new drug interactions? - No No   Is the dose as ordered appropriate for the patient? - Yes Yes     Vitals:  BP:   BP Readings from Last 1 Encounters:   05/20/19 127/79     Wt Readings from Last 1 Encounters:   05/20/19 100.6 kg (221 lb 12.8 oz)     Estimated body surface area is 2.29 meters squared as calculated from the following:    Height as of 4/8/19: 1.875 m (6' 1.82\").    Weight as of 5/20/19: 100.6 kg (221 lb 12.8 oz).    Assessment:  Jayden is tolerating Temodar therapy well    Plan:  -Continue Temodar therapy as prescribed   -DNAe LTDt message the scheduling phone # ayo Carpenter    Follow-Up:  Review Ramirez appt on 6/12    Jennie Song  Pharmacy Intern  [facility]  [phone]      "

## 2019-06-07 ENCOUNTER — AMBULATORY - HEALTHEAST (OUTPATIENT)
Dept: RADIATION ONCOLOGY | Facility: HOSPITAL | Age: 64
End: 2019-06-07

## 2019-06-07 LAB
LAB AP CHARGES (HE HISTORICAL CONVERSION): NORMAL
PATH REPORT.COMMENTS IMP SPEC: NORMAL
PATH REPORT.COMMENTS IMP SPEC: NORMAL
PATH REPORT.FINAL DX SPEC: NORMAL
PATH REPORT.GROSS SPEC: NORMAL
PATH REPORT.MICROSCOPIC SPEC OTHER STN: NORMAL
PATH REPORT.RELEVANT HX SPEC: NORMAL
RESULT FLAG (HE HISTORICAL CONVERSION): NORMAL

## 2019-06-10 ENCOUNTER — AMBULATORY - HEALTHEAST (OUTPATIENT)
Dept: LAB | Facility: HOSPITAL | Age: 64
End: 2019-06-10

## 2019-06-10 ENCOUNTER — COMMUNICATION - HEALTHEAST (OUTPATIENT)
Dept: MAMMOGRAPHY | Facility: CLINIC | Age: 64
End: 2019-06-10

## 2019-06-10 ENCOUNTER — AMBULATORY - HEALTHEAST (OUTPATIENT)
Dept: RADIATION ONCOLOGY | Facility: HOSPITAL | Age: 64
End: 2019-06-10

## 2019-06-10 DIAGNOSIS — C71.9 GBM (GLIOBLASTOMA MULTIFORME) (H): ICD-10-CM

## 2019-06-10 DIAGNOSIS — C71.9 GLIOBLASTOMA (H): ICD-10-CM

## 2019-06-10 LAB
BASOPHILS # BLD AUTO: 0 THOU/UL (ref 0–0.2)
BASOPHILS NFR BLD AUTO: 1 % (ref 0–2)
EOSINOPHIL # BLD AUTO: 0.2 THOU/UL (ref 0–0.4)
EOSINOPHIL NFR BLD AUTO: 4 % (ref 0–6)
ERYTHROCYTE [DISTWIDTH] IN BLOOD BY AUTOMATED COUNT: 14 % (ref 11–14.5)
HCT VFR BLD AUTO: 39.3 % (ref 40–54)
HGB BLD-MCNC: 13.1 G/DL (ref 14–18)
LYMPHOCYTES # BLD AUTO: 0.8 THOU/UL (ref 0.8–4.4)
LYMPHOCYTES NFR BLD AUTO: 19 % (ref 20–40)
MCH RBC QN AUTO: 29 PG (ref 27–34)
MCHC RBC AUTO-ENTMCNC: 33.3 G/DL (ref 32–36)
MCV RBC AUTO: 87 FL (ref 80–100)
MONOCYTES # BLD AUTO: 0.6 THOU/UL (ref 0–0.9)
MONOCYTES NFR BLD AUTO: 14 % (ref 2–10)
NEUTROPHILS # BLD AUTO: 2.7 THOU/UL (ref 2–7.7)
NEUTROPHILS NFR BLD AUTO: 63 % (ref 50–70)
PLATELET # BLD AUTO: 176 THOU/UL (ref 140–440)
PMV BLD AUTO: 9.2 FL (ref 8.5–12.5)
RBC # BLD AUTO: 4.52 MILL/UL (ref 4.4–6.2)
WBC: 4.3 THOU/UL (ref 4–11)

## 2019-06-11 ENCOUNTER — OFFICE VISIT - HEALTHEAST (OUTPATIENT)
Dept: ONCOLOGY | Facility: HOSPITAL | Age: 64
End: 2019-06-11

## 2019-06-11 ENCOUNTER — AMBULATORY - HEALTHEAST (OUTPATIENT)
Dept: RADIATION ONCOLOGY | Facility: HOSPITAL | Age: 64
End: 2019-06-11

## 2019-06-11 DIAGNOSIS — F43.23 ADJUSTMENT DISORDER WITH MIXED ANXIETY AND DEPRESSED MOOD: ICD-10-CM

## 2019-06-12 ENCOUNTER — OFFICE VISIT (OUTPATIENT)
Dept: PULMONOLOGY | Facility: CLINIC | Age: 64
End: 2019-06-12
Attending: PSYCHIATRY & NEUROLOGY
Payer: COMMERCIAL

## 2019-06-12 ENCOUNTER — ANCILLARY PROCEDURE (OUTPATIENT)
Dept: CT IMAGING | Facility: CLINIC | Age: 64
End: 2019-06-12
Attending: INTERNAL MEDICINE
Payer: COMMERCIAL

## 2019-06-12 ENCOUNTER — AMBULATORY - HEALTHEAST (OUTPATIENT)
Dept: ONCOLOGY | Facility: HOSPITAL | Age: 64
End: 2019-06-12

## 2019-06-12 ENCOUNTER — ONCOLOGY VISIT (OUTPATIENT)
Dept: ONCOLOGY | Facility: CLINIC | Age: 64
End: 2019-06-12
Attending: PSYCHIATRY & NEUROLOGY
Payer: COMMERCIAL

## 2019-06-12 ENCOUNTER — PRE VISIT (OUTPATIENT)
Dept: PULMONOLOGY | Facility: CLINIC | Age: 64
End: 2019-06-12

## 2019-06-12 VITALS
HEIGHT: 74 IN | SYSTOLIC BLOOD PRESSURE: 113 MMHG | RESPIRATION RATE: 18 BRPM | DIASTOLIC BLOOD PRESSURE: 72 MMHG | WEIGHT: 220.7 LBS | BODY MASS INDEX: 28.32 KG/M2 | TEMPERATURE: 97.6 F | OXYGEN SATURATION: 98 % | HEART RATE: 52 BPM

## 2019-06-12 VITALS
WEIGHT: 218 LBS | HEART RATE: 52 BPM | HEIGHT: 74 IN | OXYGEN SATURATION: 98 % | BODY MASS INDEX: 27.98 KG/M2 | RESPIRATION RATE: 18 BRPM | SYSTOLIC BLOOD PRESSURE: 113 MMHG | DIASTOLIC BLOOD PRESSURE: 72 MMHG | TEMPERATURE: 97.6 F

## 2019-06-12 DIAGNOSIS — C71.9 GLIOBLASTOMA (H): Primary | ICD-10-CM

## 2019-06-12 DIAGNOSIS — R91.8 PULMONARY NODULES: ICD-10-CM

## 2019-06-12 PROCEDURE — G0463 HOSPITAL OUTPT CLINIC VISIT: HCPCS | Mod: ZF

## 2019-06-12 PROCEDURE — 99214 OFFICE O/P EST MOD 30 MIN: CPT | Mod: ZP | Performed by: PHYSICIAN ASSISTANT

## 2019-06-12 ASSESSMENT — PAIN SCALES - GENERAL
PAINLEVEL: NO PAIN (0)
PAINLEVEL: NO PAIN (0)

## 2019-06-12 ASSESSMENT — MIFFLIN-ST. JEOR
SCORE: 1862.97
SCORE: 1850.72

## 2019-06-12 NOTE — NURSING NOTE
"Oncology Rooming Note    June 12, 2019 4:10 PM   Jayden Lackey is a 64 year old male who presents for:    Chief Complaint   Patient presents with     Oncology Clinic Visit     UMP NEW- LUNG NODULE     Initial Vitals: /72 (BP Location: Right arm, Patient Position: Chair, Cuff Size: Adult Regular)   Pulse 52   Temp 97.6  F (36.4  C) (Oral)   Resp 18   Ht 1.875 m (6' 1.82\")   Wt 100.1 kg (220 lb 11.2 oz)   SpO2 98%   BMI 28.48 kg/m   Estimated body mass index is 28.48 kg/m  as calculated from the following:    Height as of this encounter: 1.875 m (6' 1.82\").    Weight as of this encounter: 100.1 kg (220 lb 11.2 oz). Body surface area is 2.28 meters squared.  No Pain (0) Comment: Data Unavailable   No LMP for male patient.  Allergies reviewed: Yes  Medications reviewed: Yes    Medications: Medication refills not needed today.  Pharmacy name entered into [employer]:    [id number] - [address] [facility] - [place], [place] - [address]    Clinical concerns: No new concerns. Maggi was notified.      Emeterio Valdez LPN            "

## 2019-06-12 NOTE — NURSING NOTE
"Oncology Rooming Note    June 12, 2019 1:24 PM   Jayden Lackey is a 64 year old male who presents for:    Chief Complaint   Patient presents with     Oncology Clinic Visit     Return visit related to Glioblastoma     Initial Vitals: /72 (BP Location: Left arm, Patient Position: Sitting, Cuff Size: Adult Large)   Pulse 52   Temp 97.6  F (36.4  C) (Oral)   Resp 18   Ht 1.875 m (6' 1.82\")   Wt 98.9 kg (218 lb)   SpO2 98%   BMI 28.13 kg/m   Estimated body mass index is 28.13 kg/m  as calculated from the following:    Height as of this encounter: 1.875 m (6' 1.82\").    Weight as of this encounter: 98.9 kg (218 lb). Body surface area is 2.27 meters squared.  No Pain (0) Comment: Data Unavailable   No LMP for male patient.  Allergies reviewed: Yes  Medications reviewed: Yes    Medications: Medication refills not needed today.  Pharmacy name entered into [employer]:    [id number] - [address] [facility] - [place], [place] - [facility] [address]    Clinical concerns: No new concerns. Provider was notified.      Norma Urias LPN            "

## 2019-06-12 NOTE — LETTER
6/12/2019      RE: Jayden Lackey  [address]  [facility] [address]       NEURO-ONCOLOGY VISIT  Jun 12, 2019    CHIEF COMPLAINT: Mr. 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 He completed chemoradiotherapy yesterday.     Jayden is presenting in follow-up for continued evaluation accompanied by Oz (wife).      HISTORY OF PRESENT ILLNESS  -Appetite is decreased but still eating  -Saw SW for counseling. Has not gone recently. Plan to go more regularly once back from vacation. Has been having difficulties with depression and coping  -Has has not had N/V. Has some constipation though resolved with Colace daily  -Continues to struggle with word finding and numbers  -Working with OT on visual loss and being more aware. Feels like he may have gained some lower right vision back  -joint pain has been controlled. One flair  -Will see GI next month    He and his wife have a lot of questions about the plan and treatment.       REVIEW OF SYSTEMS  A comprehensive ROS negative except as in HPI.      MEDICATIONS   Current Outpatient Medications   Medication Sig Dispense Refill     aspirin 81 MG EC tablet Take 81 mg by mouth daily       atenolol (TENORMIN) 50 MG tablet Take 50 mg by mouth At Bedtime        docusate sodium (COLACE) 100 MG capsule Take 100 mg by mouth       fish oil-omega-3 fatty acids 1000 MG capsule Take by mouth daily Unknown dose       folic acid (FOLVITE) 1 MG tablet Take 1 mg by mouth       FOLIC ACID PO Take 1 mg by mouth At Bedtime        lisinopril-hydrochlorothiazide (PRINZIDE/ZESTORETIC) 20-25 MG tablet Take 1 tablet by mouth At Bedtime       methotrexate sodium, pres-free, 50 MG/2ML SOLN injection CHEMO TAKE 0.7ML (17.5MG) SUBCUTANEOUS WEEKLY ORAL.  0     naproxen (NAPROSYN DR) 500 MG EC tablet        Nutritional Supplements (JUICE PLUS FIBRE PO)  Take 1 tablet by mouth At Bedtime       ondansetron (ZOFRAN) 4 MG tablet Take 1 tablet (4 mg) by mouth At Bedtime ; 30 minutes prior to chemotherapy dosing and repeat every 8 hours as needed for nausea. 30 tablet 3     pentamidine (NEBUPENT) 300 MG neb solution Inhale 300 mg into the lungs       rosuvastatin (CRESTOR) 5 MG tablet Take 5 mg by mouth At Bedtime        Temozolomide (TEMODAR) 180 MG capsule Take 1 capsule (180 mg) by mouth At Bedtime for 42 doses Take on an empty stomach with nausea medication 30-60 min before temozolomide. 42 capsule 0     DRUG ALLERGIES   Allergies   Allergen Reactions     No Clinical Screening - See Comments      Lupine bean doesn't remember reaction     Shellfish Allergy Difficulty breathing, Nausea and Vomiting and Swelling       ONCOLOGIC HISTORY  -1/2019 PRESENTATION: Visual disturbance.   -3/6/2019 Evaluated by Dr. Mauricio, neuro-ophthalmologist, found to have a right sided homonymous hemianopsia. MRI ordered.   -3/6/2019 MR brain imaging revealed a contrast-enhancing lesion in the left occipital lobe suspicious for a high-grade primary brain tumor.  -3/22/2019 SURGERY: Craniotomy with a gross total resection by Dr. Irving Hayes.   PATHOLOGY: Glioblastoma; Wildtype status for IDH1/2 by next generation sequencing. MGMT promoter methylated. Wildtype PTEN, TP53.  -4/8/2019 NEURO-ONC: Recommending chemoradiotherapy.   -4/30 - 6/11/2019 CHEMORADS 6000cGy in 30 fractions with concurrent temozolomide 75mg/m2 (180mg).  -5/20/2019 NEURO-ONC: Not interested in Optune at this time.   -6/12/2019 NEURO-ONC: Completed radiation yesterday (6/11). Clinically stable.     SOCIAL HISTORY   Tobacco use: Former smoker, quit 40 years ago.   Alcohol use: Social use.  Drug use: Denies marijuana use.  Supplement, complimentary/ alternative medicine: None.    .   Employment: On disability.       PHYSICAL EXAMINATION  There were no vitals taken for this visit.   Wt Readings from Last 2 Encounters:  "  05/20/19 100.6 kg (221 lb 12.8 oz)   04/08/19 98.8 kg (217 lb 12.8 oz)      Ht Readings from Last 2 Encounters:   04/08/19 1.875 m (6' 1.82\")   04/05/19 1.867 m (6' 1.5\")     KPS: 90    -Generally well appearing.  -Throat: No oral thrush.  -Respiratory: Normal breath sounds, no audible wheezing.   -Skin: No rashes. Healed head incision.  -Hematologic/ lymphatic: No abnormal bruising. No leg swelling.  -Psychiatric: Normal mood and affect. Pleasant, talkative.  -Neurologic:   MENTAL STATUS:     Alert, oriented to date.    Recall: Intact.   Issues to math, numbers.     Speech fluent. Comprehension intact to multi-step commands.   Normal naming, repetition. Able to read.   Good right-left orientation.     CRANIAL NERVES:     Discs flat on fundoscopy.    Pupils are equal, round, reactive to light.     Extraocular movements full, patient denies diplopia.     Homonymous hemianopsia, right-side.    Facial sensation intact to light touch.   Symmetric facial movements.   Hearing intact.   Palate moves symmetrically.     Sternocleidomastoid and trapezius strength intact.   Tongue midline.  MOTOR:    Normal and symmetric tone.   Grossly 5/5 throughout.    No pronation or drift. No orbiting.   Able to rise from a chair without use of arms.   On toe/ heel walk, equal distance from floor to heels/ toes.   SENSATION:    Intact to light touch throughout.  COORDINATION:   Intact finger-nose with eyes open and closed bilaterally.   REFLEXES:    Normal and symmetric.    Toes not tested. No clonus. No Hoffmans.   No grasp.    GAIT:  Walks without assistance.   Good speed. Normal stride length and heel strike. Normal turns. Normal arm swing.   Able to toe, heel walk. Able to tandem walk.       MEDICAL RECORDS  Obtained and personally reviewed all available outside medical records in addition to reviewing any records available in our electronic system.     LABS  Personally reviewed all available lab results.     IMAGING  No new " "neuro-imaging to review.      IMPRESSION  Completed chemoradiation yesterday 6/11.     Discussed that now there will be a 1 month period without treatment and a new \"baseline\" MR brain scan will be performed. It was explained that this scan may appear worse, but the changes most like reflect anticipated response to treatment and not necessarily progressive disease. At this follow-up appointment, we will discuss starting adjuvant temozolomide cycle 1 dosed at 150mg/m2 for days 1-5 of a 28 day cycle.     PROBLEM LIST  Glioblastoma, MGMT methylated and IDH1 wildtype by NGS  Visual field cut; homonymous hemianopsia, right-side  Memory issues  Drug-induced constipation    PLAN  -CANCER-DIRECTED THERAPY-  -Completed chemoradiation on 6/11    -No further need for pentamidine nebs unless ALC persistently < 0.5  -Supportive meds prn now    -No labs needed until restart TMZ in 1 month  -Not interested in Optune at this time.   -Dr. Thompson Zarco is a neuro-oncologist at [facility] in [place].     -STEROIDS-  -Currently off dexamethasone.    -SEIZURE MANAGEMENT-  -While this patient is at increased risk of having seizures, given the lack of seizure history, there is no indication to prescribe an antiepileptic at this time.     -Quality of life/ MOOD/ FATIGUE-  -Increased mood issues; depression.  -Meeting with SW counseling. Should continue this as it will be beneficial for him   -Discussed starting Zoloft today, though he does not want to at this time. Has tried several medications in the past which were ineffective for him   -Continue to monitor mood as untreated/ undertreated depression can worsen fatigue, dysorexia, and quality of life.  -Added to Brain Tumor Support Group email list; [email or web address]    -MEMORY COMPLAINTS-  -Consideration for referral to Dr. Aleksandr Garcia for neuro-psych testing.   -Has not done ST, feel this would be beneficial     -OTHER SUPPORTIVE MEDICATIONS-  -Continue methotrexate for " arthritis. No drug-drug interactions with temozolomide, Zofran, or pentamidine.   -will have to monitor for cytopenias closely with higher dose TMZ    -ADDITIONAL SUPPORTIVE MANAGEMENT-  -Social work discussed disability options.   -CT with lung nodules.Will meet with lung Nodule Clinic today.   -Will meet with GI next month for consideration of EGD to evaluate enhancing focus measuring 2.9 cm along the gastric antrum/pylorus posterior wall.  -Breast biopsy showed necrotic issue, no malignancy     Will return in 4 weeks with new imaging and followup with Dr. Olmos.     In the meantime, Jayden and Oz know to call with questions or concerns or to report new complaints and can be seen sooner if needed. Urgent evaluation is needed in the setting of acute onset of severe headache, abrupt change in mental status, on-going seizures, new focal deficits, or new leg swelling/ pain. Everyone in attendance voiced understanding.    Dolly Ramirez PA-C  Neuro-oncology

## 2019-06-12 NOTE — PROGRESS NOTES
[facility] Nodule [facility] Initial Visit    Reason for Visit  Jayden Lackey is a 64 year old male who is referred by Dr Olmos for lung nodules  Pulmonary HPI    Had visual defects, led to brain MRI. He had glioblastoma resected, finished chemo first round and radiation all that is planned. Planning to resume in July and continue 6-12 months. CT chest done to eval for primary source of possible brain met (was ultimately determined to be glioblastoma) detected multiple tiny lung nodules.     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  The patient was seen and examined by Sylvia Tay MD   Current Outpatient Medications   Medication     aspirin 81 MG EC tablet     atenolol (TENORMIN) 50 MG tablet     docusate sodium (COLACE) 100 MG capsule     fish oil-omega-3 fatty acids 1000 MG capsule     folic acid (FOLVITE) 1 MG tablet     FOLIC ACID PO     lisinopril-hydrochlorothiazide (PRINZIDE/ZESTORETIC) 20-25 MG tablet     methotrexate sodium, pres-free, 50 MG/2ML SOLN injection CHEMO     naproxen (NAPROSYN DR) 500 MG EC tablet     Nutritional Supplements (JUICE PLUS FIBRE PO)     ondansetron (ZOFRAN) 4 MG tablet     pentamidine (NEBUPENT) 300 MG neb solution     rosuvastatin (CRESTOR) 5 MG tablet     No current facility-administered medications for this visit.      Allergies   Allergen Reactions     Shellfish Allergy Difficulty breathing, Nausea and Vomiting, Swelling and Shortness Of Breath     No Clinical Screening - See Comments       Lupine ferrell doesn't remember reaction     Other (Do Not Use)      Lupine bean doesn't remember reaction     Social History     Socioeconomic History     Marital status:      Spouse name: Not on file     Number of children: Not on file     Years of education: Not on file     Highest education level: Not on file   Occupational History     Not on file   Social Needs     Financial resource strain: Not on file     Food insecurity:     Worry: Not on file     Inability: Not on file     Transportation needs:     Medical: Not on file     Non-medical: Not on file   Tobacco Use     Smoking status: Former Smoker     Start date: 1974     Last attempt to quit: 1977     Years since quittin.0     Smokeless tobacco: Never Used   Substance and Sexual Activity     Alcohol use: Yes     Comment: 1-3 beers a week     Drug use: Not on file     Sexual activity: Not on file   Lifestyle     Physical activity:     Days per week: Not on file     Minutes per session: Not on file     Stress: Not on file   Relationships     Social connections:     Talks on phone: Not on file     Gets together: Not on file     Attends Hinduism service: Not on file     Active member of club or organization: Not on file     Attends meetings of clubs or organizations: Not on file     Relationship status: Not on file     Intimate partner violence:     Fear of current or ex partner: Not on file     Emotionally abused: Not on file     Physically abused: Not on file     Forced sexual activity: Not on file   Other Topics Concern     Not on file   Social History Narrative    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     Past Medical History:   Diagnosis Date     Colon polyp      Dyslipidemia      Hypertension      Lumbar disc herniation     L4-5     Rheumatoid arthritis (H)      Past Surgical History:   Procedure Laterality Date     ANAL SPHINCTEROTOMY       BACK SURGERY  2009    L4-5      "HERNIA REPAIR  1974     OPTICAL TRACKING SYSTEM CRANIOTOMY, EXCISE TUMOR, COMBINED Left 3/22/2019    Procedure: Left Stealth Assisted Craniotomy And Tumor Resection;  Surgeon: Irving Hayes MD;  Location: [facility] OR     SEPTOPLASTY       VASECTOMY       Family History   Problem Relation Age of Onset     Macular Degeneration Mother      Diabetes Mother      Heart Failure Father      Diabetes Sister      Heart Disease Maternal Grandfather      Pancreatic Cancer Maternal Grandmother      Glaucoma No family hx of        Exam:   /72 (BP Location: Right arm, Patient Position: Chair, Cuff Size: Adult Regular)   Pulse 52   Temp 97.6  F (36.4  C) (Oral)   Resp 18   Ht 1.875 m (6' 1.82\")   Wt 100.1 kg (220 lb 11.2 oz)   SpO2 98%   BMI 28.48 kg/m    GENERAL APPEARANCE: Well developed, well nourished, alert, and in no apparent distress.  EYES: PERRL, EOMI  HENT: Nasal mucosa with no edema and no hyperemia. No nasal polyps.  EARS: Canals clear, TMs normal  MOUTH: Oral mucosa is moist, without any lesions, no tonsillar enlargement, no oropharyngeal exudate.  NECK: supple, no masses, no thyromegaly.  LYMPHATICS: No significant axillary, cervical, or supraclavicular nodes.  RESP: normal percussion, good air flow throughout.  No crackles. No rhonchi. No wheezes.  CV: Normal S1, S2, regular rhythm, normal rate. No murmur.  No rub. No gallop. No LE edema.   ABDOMEN:  Bowel sounds normal, soft, nontender, no HSM or masses.   MS: extremities normal. No clubbing. No cyanosis.  SKIN: no rash on limited exam  NEURO: Mentation intact, speech normal, normal strength and tone, normal gait and stance  PSYCH: mentation appears normal. and affect normal/bright  Results:  - My interpretation of the images relevant for this visit includes: CT chest  3/7/19 images reviewed scattered tiny lung nodules compared to repeat CT ordered today stable    - My interpretation of the PFT's relevant for this visit includes: None "         Assessment and plan: Jayden is a 65 yo male with recently diagnosed glioblastoma and lung nodules  Lung nodule - seen on chest/abdomen CT, done to evaluate for lung primary source of brain lesion. Risk of lung cancer is low but rheumatoid nodules vs gliobastoma mets in the differential diagnosis.  Repeat CT ordered and performed today (after clinic visit). Patient returned to clinic to review images with me, which showed stable lung nodules.   RTC in 6 months with CT

## 2019-06-12 NOTE — LETTER
6/12/2019       RE: Jayden Lackey  [address]  [facility] [address]     Dear Colleague,    Thank you for referring your patient, Jayden Lackey, to the [facility] at [facility]. Please see a copy of my visit note below.    [facility] Nodule [facility] Initial Visit    Reason for Visit  Jayden Lackey is a 64 year old male who is referred by Dr Olmos for lung nodules  Pulmonary HPI    Had visual defects, led to brain MRI. He had glioblastoma resected, finished chemo first round and radiation all that is planned. Planning to resume in July and continue 6-12 months. CT chest done to eval for primary source of possible brain met (was ultimately determined to be glioblastoma) detected multiple tiny lung nodules.     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  The patient was seen and examined by Sylvia Tay MD   Current Outpatient Medications   Medication     aspirin 81 MG EC tablet     atenolol (TENORMIN) 50 MG tablet     docusate sodium (COLACE) 100 MG capsule     fish oil-omega-3 fatty acids 1000 MG capsule     folic acid (FOLVITE) 1 MG tablet     FOLIC ACID PO     lisinopril-hydrochlorothiazide (PRINZIDE/ZESTORETIC) 20-25 MG tablet     methotrexate sodium, pres-free, 50 MG/2ML SOLN injection CHEMO     naproxen (NAPROSYN DR) 500 MG EC tablet     Nutritional Supplements (JUICE PLUS FIBRE PO)     ondansetron (ZOFRAN) 4 MG tablet     pentamidine (NEBUPENT) 300 MG neb  solution     rosuvastatin (CRESTOR) 5 MG tablet     No current facility-administered medications for this visit.      Allergies   Allergen Reactions     Shellfish Allergy Difficulty breathing, Nausea and Vomiting, Swelling and Shortness Of Breath     No Clinical Screening - See Comments      Lupine bean doesn't remember reaction     Other (Do Not Use)      Lupine bean doesn't remember reaction     Social History     Socioeconomic History     Marital status:      Spouse name: Not on file     Number of children: Not on file     Years of education: Not on file     Highest education level: Not on file   Occupational History     Not on file   Social Needs     Financial resource strain: Not on file     Food insecurity:     Worry: Not on file     Inability: Not on file     Transportation needs:     Medical: Not on file     Non-medical: Not on file   Tobacco Use     Smoking status: Former Smoker     Start date: 1974     Last attempt to quit: 1977     Years since quittin.0     Smokeless tobacco: Never Used   Substance and Sexual Activity     Alcohol use: Yes     Comment: 1-3 beers a week     Drug use: Not on file     Sexual activity: Not on file   Lifestyle     Physical activity:     Days per week: Not on file     Minutes per session: Not on file     Stress: Not on file   Relationships     Social connections:     Talks on phone: Not on file     Gets together: Not on file     Attends Mu-ism service: Not on file     Active member of club or organization: Not on file     Attends meetings of clubs or organizations: Not on file     Relationship status: Not on file     Intimate partner violence:     Fear of current or ex partner: Not on file     Emotionally abused: Not on file     Physically abused: Not on file     Forced sexual activity: Not on file   Other Topics Concern     Not on file   Social History Narrative    Worked in construction/maintenance, [employer]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     Past Medical History:   Diagnosis Date     Colon polyp      Dyslipidemia      Hypertension      Lumbar disc herniation     L4-5     Rheumatoid arthritis (H)      Past Surgical History:   Procedure Laterality Date     ANAL SPHINCTEROTOMY       BACK SURGERY  2009    L4-5     HERNIA REPAIR  1974     OPTICAL TRACKING SYSTEM CRANIOTOMY, EXCISE TUMOR, COMBINED Left 3/22/2019    Procedure: Left Stealth Assisted Craniotomy And Tumor Resection;  Surgeon: Irving Hayes MD;  Location: [facility] OR     SEPTOPLASTY       VASECTOMY       Family History   Problem Relation Age of Onset     Macular Degeneration Mother      Diabetes Mother      Heart Failure Father      Diabetes Sister      Heart Disease Maternal Grandfather      Pancreatic Cancer Maternal Grandmother      Glaucoma No family hx of        Exam:   /72 (BP Location: Right arm, Patient Position: Chair, Cuff Size: Adult Regular)   Pulse 52   Temp 97.6  F (36.4  C) (Oral)   Resp 18   Ht 1.875 m (6' 1.82\")   Wt 100.1 kg (220 lb 11.2 oz)   SpO2 98%   BMI 28.48 kg/m     GENERAL APPEARANCE: Well developed, well nourished, alert, and in no apparent distress.  EYES: PERRL, EOMI  HENT: Nasal mucosa with no edema and no hyperemia. No nasal polyps.  EARS: Canals clear, TMs normal  MOUTH: Oral mucosa is moist, without any lesions, no tonsillar enlargement, no oropharyngeal exudate.  NECK: supple, no masses, no thyromegaly.  LYMPHATICS: No significant axillary, cervical, or supraclavicular nodes.  RESP: normal percussion, good air flow throughout.  No crackles. No rhonchi. No wheezes.  CV: Normal S1, S2, regular rhythm, normal rate. No murmur.  No rub. No gallop. No LE edema.   ABDOMEN:  Bowel sounds normal, soft, nontender, no HSM or masses.   MS: extremities normal. No clubbing. No cyanosis.  SKIN: no rash on limited exam  NEURO: Mentation intact, speech normal, normal strength and tone, normal gait and stance  PSYCH: mentation appears normal. and " affect normal/bright  Results:  - My interpretation of the images relevant for this visit includes: CT chest  3/7/19 images reviewed scattered tiny lung nodules compared to repeat CT ordered today stable    - My interpretation of the PFT's relevant for this visit includes: None         Assessment and plan: Jayden is a 63 yo male with recently diagnosed glioblastoma and lung nodules  Lung nodule - seen on chest/abdomen CT, done to evaluate for lung primary source of brain lesion. Risk of lung cancer is low but rheumatoid nodules vs gliobastoma mets in the differential diagnosis.  Repeat CT ordered and performed today (after clinic visit). Patient returned to clinic to review images with me, which showed stable lung nodules.   RTC in 6 months with CT         Again, thank you for allowing me to participate in the care of your patient.      Sincerely,    Sylvia Tay MD

## 2019-06-12 NOTE — PROGRESS NOTES
NEURO-ONCOLOGY VISIT  Jun 12, 2019    CHIEF COMPLAINT: Mr. 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 He completed chemoradiotherapy yesterday.     Jayden is presenting in follow-up for continued evaluation accompanied by Oz (wife).      HISTORY OF PRESENT ILLNESS  -Appetite is decreased but still eating  -Saw SW for counseling. Has not gone recently. Plan to go more regularly once back from vacation. Has been having difficulties with depression and coping  -Has has not had N/V. Has some constipation though resolved with Colace daily  -Continues to struggle with word finding and numbers  -Working with OT on visual loss and being more aware. Feels like he may have gained some lower right vision back  -joint pain has been controlled. One flair  -Will see GI next month    He and his wife have a lot of questions about the plan and treatment.       REVIEW OF SYSTEMS  A comprehensive ROS negative except as in HPI.      MEDICATIONS   Current Outpatient Medications   Medication Sig Dispense Refill     aspirin 81 MG EC tablet Take 81 mg by mouth daily       atenolol (TENORMIN) 50 MG tablet Take 50 mg by mouth At Bedtime        docusate sodium (COLACE) 100 MG capsule Take 100 mg by mouth       fish oil-omega-3 fatty acids 1000 MG capsule Take by mouth daily Unknown dose       folic acid (FOLVITE) 1 MG tablet Take 1 mg by mouth       FOLIC ACID PO Take 1 mg by mouth At Bedtime        lisinopril-hydrochlorothiazide (PRINZIDE/ZESTORETIC) 20-25 MG tablet Take 1 tablet by mouth At Bedtime       methotrexate sodium, pres-free, 50 MG/2ML SOLN injection CHEMO TAKE 0.7ML (17.5MG) SUBCUTANEOUS WEEKLY ORAL.  0     naproxen (NAPROSYN DR) 500 MG EC tablet        Nutritional Supplements (JUICE PLUS FIBRE PO) Take 1 tablet by mouth At Bedtime       ondansetron (ZOFRAN) 4 MG tablet Take 1 tablet (4  mg) by mouth At Bedtime ; 30 minutes prior to chemotherapy dosing and repeat every 8 hours as needed for nausea. 30 tablet 3     pentamidine (NEBUPENT) 300 MG neb solution Inhale 300 mg into the lungs       rosuvastatin (CRESTOR) 5 MG tablet Take 5 mg by mouth At Bedtime        Temozolomide (TEMODAR) 180 MG capsule Take 1 capsule (180 mg) by mouth At Bedtime for 42 doses Take on an empty stomach with nausea medication 30-60 min before temozolomide. 42 capsule 0     DRUG ALLERGIES   Allergies   Allergen Reactions     No Clinical Screening - See Comments      Lupine bean doesn't remember reaction     Shellfish Allergy Difficulty breathing, Nausea and Vomiting and Swelling       ONCOLOGIC HISTORY  -1/2019 PRESENTATION: Visual disturbance.   -3/6/2019 Evaluated by Dr. Mauricio, neuro-ophthalmologist, found to have a right sided homonymous hemianopsia. MRI ordered.   -3/6/2019 MR brain imaging revealed a contrast-enhancing lesion in the left occipital lobe suspicious for a high-grade primary brain tumor.  -3/22/2019 SURGERY: Craniotomy with a gross total resection by Dr. Irving Hayes.   PATHOLOGY: Glioblastoma; Wildtype status for IDH1/2 by next generation sequencing. MGMT promoter methylated. Wildtype PTEN, TP53.  -4/8/2019 NEURO-ONC: Recommending chemoradiotherapy.   -4/30 - 6/11/2019 CHEMORADS 6000cGy in 30 fractions with concurrent temozolomide 75mg/m2 (180mg).  -5/20/2019 NEURO-ONC: Not interested in [employer] at this time.   -6/12/2019 NEURO-ONC: Completed radiation yesterday (6/11). Clinically stable.     SOCIAL HISTORY   Tobacco use: Former smoker, quit 40 years ago.   Alcohol use: Social use.  Drug use: Denies marijuana use.  Supplement, complimentary/ alternative medicine: None.    .   Employment: On disability.       PHYSICAL EXAMINATION  There were no vitals taken for this visit.   Wt Readings from Last 2 Encounters:   05/20/19 100.6 kg (221 lb 12.8 oz)   04/08/19 98.8 kg (217 lb 12.8 oz)      Ht Readings from  "Last 2 Encounters:   04/08/19 1.875 m (6' 1.82\")   04/05/19 1.867 m (6' 1.5\")     KPS: 90    -Generally well appearing.  -Throat: No oral thrush.  -Respiratory: Normal breath sounds, no audible wheezing.   -Skin: No rashes. Healed head incision.  -Hematologic/ lymphatic: No abnormal bruising. No leg swelling.  -Psychiatric: Normal mood and affect. Pleasant, talkative.  -Neurologic:   MENTAL STATUS:     Alert, oriented to date.    Recall: Intact.   Issues to math, numbers.     Speech fluent. Comprehension intact to multi-step commands.   Normal naming, repetition. Able to read.   Good right-left orientation.     CRANIAL NERVES:     Discs flat on fundoscopy.    Pupils are equal, round, reactive to light.     Extraocular movements full, patient denies diplopia.     Homonymous hemianopsia, right-side.    Facial sensation intact to light touch.   Symmetric facial movements.   Hearing intact.   Palate moves symmetrically.     Sternocleidomastoid and trapezius strength intact.   Tongue midline.  MOTOR:    Normal and symmetric tone.   Grossly 5/5 throughout.    No pronation or drift. No orbiting.   Able to rise from a chair without use of arms.   On toe/ heel walk, equal distance from floor to heels/ toes.   SENSATION:    Intact to light touch throughout.  COORDINATION:   Intact finger-nose with eyes open and closed bilaterally.   REFLEXES:    Normal and symmetric.    Toes not tested. No clonus. No Hoffmans.   No grasp.    GAIT:  Walks without assistance.   Good speed. Normal stride length and heel strike. Normal turns. Normal arm swing.   Able to toe, heel walk. Able to tandem walk.       MEDICAL RECORDS  Obtained and personally reviewed all available outside medical records in addition to reviewing any records available in our electronic system.     LABS  Personally reviewed all available lab results.     IMAGING  No new neuro-imaging to review.      IMPRESSION  Completed chemoradiation yesterday 6/11.     Discussed that " "now there will be a 1 month period without treatment and a new \"baseline\" MR brain scan will be performed. It was explained that this scan may appear worse, but the changes most like reflect anticipated response to treatment and not necessarily progressive disease. At this follow-up appointment, we will discuss starting adjuvant temozolomide cycle 1 dosed at 150mg/m2 for days 1-5 of a 28 day cycle.     PROBLEM LIST  Glioblastoma, MGMT methylated and IDH1 wildtype by NGS  Visual field cut; homonymous hemianopsia, right-side  Memory issues  Drug-induced constipation    PLAN  -CANCER-DIRECTED THERAPY-  -Completed chemoradiation on 6/11    -No further need for pentamidine nebs unless ALC persistently < 0.5  -Supportive meds prn now    -No labs needed until restart TMZ in 1 month  -Not interested in "[employer] at this time.   -Dr. Thompson Zarco is a neuro-oncologist at [facility].     -STEROIDS-  -Currently off dexamethasone.    -SEIZURE MANAGEMENT-  -While this patient is at increased risk of having seizures, given the lack of seizure history, there is no indication to prescribe an antiepileptic at this time.     -Quality of life/ MOOD/ FATIGUE-  -Increased mood issues; depression.  -Meeting with SW counseling. Should continue this as it will be beneficial for him   -Discussed starting Zoloft today, though he does not want to at this time. Has tried several medications in the past which were ineffective for him   -Continue to monitor mood as untreated/ undertreated depression can worsen fatigue, dysorexia, and quality of life.  -Added to Brain Tumor Support Group email list; [email or web address]    -MEMORY COMPLAINTS-  -Consideration for referral to Dr. Aleksandr Garcia for neuro-psych testing.   -Has not done ST, feel this would be beneficial     -OTHER SUPPORTIVE MEDICATIONS-  -Continue methotrexate for arthritis. No drug-drug interactions with temozolomide, Zofran, or pentamidine.   -will have to monitor for " cytopenias closely with higher dose TMZ    -ADDITIONAL SUPPORTIVE MANAGEMENT-  -Social work discussed disability options.   -CT with lung nodules.Will meet with lung Nodule Clinic today.   -Will meet with GI next month for consideration of EGD to evaluate enhancing focus measuring 2.9 cm along the gastric antrum/pylorus posterior wall.  -Breast biopsy showed necrotic issue, no malignancy     Will return in 4 weeks with new imaging and followup with Dr. Olmos.     In the meantime, Jayden and Oz know to call with questions or concerns or to report new complaints and can be seen sooner if needed. Urgent evaluation is needed in the setting of acute onset of severe headache, abrupt change in mental status, on-going seizures, new focal deficits, or new leg swelling/ pain. Everyone in attendance voiced understanding.    Dolly Ramirez PA-C  Neuro-oncology

## 2019-06-13 LAB — RADIOLOGIST FLAGS: NORMAL

## 2019-06-14 ENCOUNTER — AMBULATORY - HEALTHEAST (OUTPATIENT)
Dept: RADIATION ONCOLOGY | Facility: HOSPITAL | Age: 64
End: 2019-06-14

## 2019-06-18 ENCOUNTER — OFFICE VISIT - HEALTHEAST (OUTPATIENT)
Dept: ONCOLOGY | Facility: HOSPITAL | Age: 64
End: 2019-06-18

## 2019-06-18 DIAGNOSIS — F43.25 ADJUSTMENT REACTION WITH MIXED DISTURBANCE OF EMOTIONS AND CONDUCT: ICD-10-CM

## 2019-06-19 ENCOUNTER — COMMUNICATION - HEALTHEAST (OUTPATIENT)
Dept: ADMINISTRATIVE | Facility: HOSPITAL | Age: 64
End: 2019-06-19

## 2019-06-20 ENCOUNTER — COMMUNICATION - HEALTHEAST (OUTPATIENT)
Dept: RADIATION ONCOLOGY | Facility: HOSPITAL | Age: 64
End: 2019-06-20

## 2019-06-22 ASSESSMENT — MIFFLIN-ST. JEOR: SCORE: 1845.87

## 2019-06-24 ASSESSMENT — MIFFLIN-ST. JEOR: SCORE: 1863.1

## 2019-06-25 ENCOUNTER — ANESTHESIA - HEALTHEAST (OUTPATIENT)
Dept: SURGERY | Facility: CLINIC | Age: 64
End: 2019-06-25

## 2019-06-25 ASSESSMENT — MIFFLIN-ST. JEOR: SCORE: 1847.68

## 2019-06-26 ENCOUNTER — SURGERY - HEALTHEAST (OUTPATIENT)
Dept: SURGERY | Facility: CLINIC | Age: 64
End: 2019-06-26

## 2019-06-26 ASSESSMENT — MIFFLIN-ST. JEOR: SCORE: 1816.38

## 2019-06-27 ENCOUNTER — AMBULATORY - HEALTHEAST (OUTPATIENT)
Dept: RADIATION ONCOLOGY | Facility: CLINIC | Age: 64
End: 2019-06-27

## 2019-06-27 DIAGNOSIS — K31.89 GASTRIC MASS: ICD-10-CM

## 2019-06-27 DIAGNOSIS — C71.9 GBM (GLIOBLASTOMA MULTIFORME) (H): ICD-10-CM

## 2019-06-27 ASSESSMENT — MIFFLIN-ST. JEOR: SCORE: 1810.03

## 2019-06-28 ENCOUNTER — HOME CARE/HOSPICE - HEALTHEAST (OUTPATIENT)
Dept: HOME HEALTH SERVICES | Facility: HOME HEALTH | Age: 64
End: 2019-06-28

## 2019-06-28 ENCOUNTER — COMMUNICATION - HEALTHEAST (OUTPATIENT)
Dept: RADIATION ONCOLOGY | Facility: HOSPITAL | Age: 64
End: 2019-06-28

## 2019-06-28 ASSESSMENT — MIFFLIN-ST. JEOR: SCORE: 1803.68

## 2019-06-30 ENCOUNTER — DOCUMENTATION ONLY (OUTPATIENT)
Dept: CARE COORDINATION | Facility: CLINIC | Age: 64
End: 2019-06-30

## 2019-06-30 NOTE — PROGRESS NOTES
Dear Gideon Knapp  Medicare Home Health regulations requires [facility] to provide an initial assessment visit either within 48 hours of the patient's return home, or on the physician ordered Start of Care date.    There will be a delay in the Initial Assessment for Jayden Lackey; MRN [id number]  We anticipate the Start of care will be 7/1/2019.      Sincerely [facility]  Curly Angel  [phone]

## 2019-07-01 ENCOUNTER — TELEPHONE (OUTPATIENT)
Dept: ONCOLOGY | Facility: CLINIC | Age: 64
End: 2019-07-01

## 2019-07-01 ENCOUNTER — TUMOR CONFERENCE (OUTPATIENT)
Dept: ONCOLOGY | Facility: CLINIC | Age: 64
End: 2019-07-01

## 2019-07-01 LAB — SODIUM SERPL-SCNC: 137 MMOL/L (ref 133–144)

## 2019-07-01 PROCEDURE — 84295 ASSAY OF SERUM SODIUM: CPT | Performed by: INTERNAL MEDICINE

## 2019-07-01 NOTE — TUMOR CONFERENCE
Tumor Conference Information  Tumor Conference:    Specialties Present:  Surgery, Medical Oncology, Radiation Oncology, Radiology, Pathology  Patient Status:  External consult  Pathology:  Not Discussed  Treatment to Date:  Surgical intervention(s), Chemoradiation  Clinical Trial Eligibility:  Not discussed  Recommended Plan:  Chemotherapy (Comment: start adjuvant chemotherapy, reimage in 1 month)  Did the review exceed 30 minutes?:  did not           Documentation / Disclaimer Cancer Tumor Board Note  Cancer tumor board recommendations do not override what is determined to be reasonable care and treatment, which is dependent on the circumstances of a patient's case; the patient's medical, social, and personal concerns; and the clinical judgment of the oncologist [physician].

## 2019-07-01 NOTE — TELEPHONE ENCOUNTER
Called and spoke to Jayden and Oz.  Discussed hospitalization; discussed recent outside imaging results, which we reviewed at Brain Tumor Conference today as well as outside pathology results.  Jayden is recovering well from the hospitalization/ seizures, but is still experiencing cognitive slowing.   Plan is for repeat MR brain imaging and appt with me as scheduled for 7/12.  Nasra Olmos MD  Neuro-oncology  7/1/2019

## 2019-07-08 RX ORDER — ONDANSETRON 4 MG/1
4 TABLET, FILM COATED ORAL AT BEDTIME
Qty: 60 TABLET | Refills: 3 | Status: SHIPPED | OUTPATIENT
Start: 2019-07-08 | End: 2019-07-12

## 2019-07-11 ENCOUNTER — AMBULATORY - HEALTHEAST (OUTPATIENT)
Dept: OTHER | Facility: CLINIC | Age: 64
End: 2019-07-11

## 2019-07-11 ENCOUNTER — DOCUMENTATION ONLY (OUTPATIENT)
Dept: OTHER | Facility: CLINIC | Age: 64
End: 2019-07-11

## 2019-07-12 ENCOUNTER — PATIENT OUTREACH (OUTPATIENT)
Dept: ONCOLOGY | Facility: CLINIC | Age: 64
End: 2019-07-12

## 2019-07-12 ENCOUNTER — HOSPITAL ENCOUNTER (OUTPATIENT)
Dept: MRI IMAGING | Facility: CLINIC | Age: 64
Discharge: HOME | End: 2019-07-12
Attending: PHYSICIAN ASSISTANT | Admitting: PHYSICIAN ASSISTANT
Payer: COMMERCIAL

## 2019-07-12 ENCOUNTER — ONCOLOGY VISIT (OUTPATIENT)
Dept: ONCOLOGY | Facility: CLINIC | Age: 64
End: 2019-07-12
Attending: PSYCHIATRY & NEUROLOGY
Payer: COMMERCIAL

## 2019-07-12 VITALS
RESPIRATION RATE: 14 BRPM | WEIGHT: 222 LBS | SYSTOLIC BLOOD PRESSURE: 134 MMHG | DIASTOLIC BLOOD PRESSURE: 79 MMHG | HEART RATE: 54 BPM | BODY MASS INDEX: 28.64 KG/M2 | OXYGEN SATURATION: 99 % | TEMPERATURE: 97.4 F

## 2019-07-12 DIAGNOSIS — T45.1X5A CHEMOTHERAPY-INDUCED NAUSEA AND VOMITING: ICD-10-CM

## 2019-07-12 DIAGNOSIS — C71.9 GLIOBLASTOMA (H): Primary | ICD-10-CM

## 2019-07-12 DIAGNOSIS — G93.6 BRAIN SWELLING (H): ICD-10-CM

## 2019-07-12 DIAGNOSIS — C71.9 GLIOBLASTOMA (H): ICD-10-CM

## 2019-07-12 DIAGNOSIS — R11.2 CHEMOTHERAPY-INDUCED NAUSEA AND VOMITING: ICD-10-CM

## 2019-07-12 LAB
BASOPHILS # BLD AUTO: 0 10E9/L (ref 0–0.2)
BASOPHILS NFR BLD AUTO: 0.7 %
DIFFERENTIAL METHOD BLD: ABNORMAL
EOSINOPHIL # BLD AUTO: 0.1 10E9/L (ref 0–0.7)
EOSINOPHIL NFR BLD AUTO: 4.6 %
ERYTHROCYTE [DISTWIDTH] IN BLOOD BY AUTOMATED COUNT: 14.1 % (ref 10–15)
HCT VFR BLD AUTO: 34.9 % (ref 40–53)
HGB BLD-MCNC: 11.7 G/DL (ref 13.3–17.7)
IMM GRANULOCYTES # BLD: 0 10E9/L (ref 0–0.4)
IMM GRANULOCYTES NFR BLD: 0 %
LYMPHOCYTES # BLD AUTO: 0.8 10E9/L (ref 0.8–5.3)
LYMPHOCYTES NFR BLD AUTO: 29.3 %
MCH RBC QN AUTO: 28.6 PG (ref 26.5–33)
MCHC RBC AUTO-ENTMCNC: 33.5 G/DL (ref 31.5–36.5)
MCV RBC AUTO: 85 FL (ref 78–100)
MONOCYTES # BLD AUTO: 0.3 10E9/L (ref 0–1.3)
MONOCYTES NFR BLD AUTO: 11.4 %
NEUTROPHILS # BLD AUTO: 1.5 10E9/L (ref 1.6–8.3)
NEUTROPHILS NFR BLD AUTO: 54 %
NRBC # BLD AUTO: 0 10*3/UL
NRBC BLD AUTO-RTO: 0 /100
PLATELET # BLD AUTO: 134 10E9/L (ref 150–450)
RBC # BLD AUTO: 4.09 10E12/L (ref 4.4–5.9)
WBC # BLD AUTO: 2.8 10E9/L (ref 4–11)

## 2019-07-12 PROCEDURE — 99215 OFFICE O/P EST HI 40 MIN: CPT | Mod: ZP | Performed by: PSYCHIATRY & NEUROLOGY

## 2019-07-12 PROCEDURE — 70553 MRI BRAIN STEM W/O & W/DYE: CPT

## 2019-07-12 PROCEDURE — 36415 COLL VENOUS BLD VENIPUNCTURE: CPT

## 2019-07-12 PROCEDURE — G0463 HOSPITAL OUTPT CLINIC VISIT: HCPCS | Mod: ZF

## 2019-07-12 PROCEDURE — A9585 GADOBUTROL INJECTION: HCPCS | Performed by: PHYSICIAN ASSISTANT

## 2019-07-12 PROCEDURE — 85025 COMPLETE CBC W/AUTO DIFF WBC: CPT | Performed by: PSYCHIATRY & NEUROLOGY

## 2019-07-12 PROCEDURE — 80053 COMPREHEN METABOLIC PANEL: CPT | Performed by: PSYCHIATRY & NEUROLOGY

## 2019-07-12 PROCEDURE — 25500064 ZZH RX 255 OP 636: Performed by: PHYSICIAN ASSISTANT

## 2019-07-12 RX ORDER — GADOBUTROL 604.72 MG/ML
10 INJECTION INTRAVENOUS ONCE
Status: COMPLETED | OUTPATIENT
Start: 2019-07-12 | End: 2019-07-12

## 2019-07-12 RX ORDER — LEVETIRACETAM 750 MG/1
1000 TABLET ORAL 2 TIMES DAILY
COMMUNITY
Start: 2019-06-28 | End: 2019-10-04 | Stop reason: DRUGHIGH

## 2019-07-12 RX ORDER — TEMOZOLOMIDE 180 MG/1
150 CAPSULE ORAL DAILY
Qty: 10 CAPSULE | Refills: 0 | Status: CANCELLED | OUTPATIENT
Start: 2019-07-12

## 2019-07-12 RX ORDER — DEXAMETHASONE 2 MG/1
2 TABLET ORAL
Qty: 30 TABLET | Refills: 3 | Status: SHIPPED | OUTPATIENT
Start: 2019-07-12 | End: 2019-08-12

## 2019-07-12 RX ORDER — ONDANSETRON 4 MG/1
4 TABLET, FILM COATED ORAL AT BEDTIME
Qty: 60 TABLET | Refills: 3 | Status: SHIPPED | OUTPATIENT
Start: 2019-07-12 | End: 2019-10-17

## 2019-07-12 RX ORDER — DILTIAZEM HYDROCHLORIDE 120 MG/1
180 CAPSULE, COATED, EXTENDED RELEASE ORAL DAILY
COMMUNITY
Start: 2019-06-29 | End: 2019-11-17

## 2019-07-12 RX ORDER — TEMOZOLOMIDE 180 MG/1
150 CAPSULE ORAL DAILY
Qty: 10 CAPSULE | Refills: 0 | Status: SHIPPED | OUTPATIENT
Start: 2019-07-12 | End: 2019-09-13

## 2019-07-12 RX ADMIN — GADOBUTROL 10 ML: 604.72 INJECTION INTRAVENOUS at 12:39

## 2019-07-12 ASSESSMENT — PAIN SCALES - GENERAL: PAINLEVEL: NO PAIN (0)

## 2019-07-12 NOTE — PATIENT INSTRUCTIONS
Monitor amount of fluid intake.   Continue to track sodium level.  Blood count labs today.   Repeat CBC and CMP on the week of  at [facility].    Starting adjuvant-dosed chemotherapy with temozolomide  -5 consectutive nights (In a 28 day cycle; 5 days on treatment and 23 days off.)  -Dose: 360mg  -Call clinic with your start date  -Take at bedtime on an empty stomach  -Take 30 minutes after Zofran dosing (can increase to 8mg if needed for control of nausea)   Dexamethasone 2mg daily as needed for nausea.   -Continue bowel regimen   Suggested bowel regimen;       -Docusate 100 mg; 1-3 capsule(s) orally day (stool softener for constipation)       -Senna 8.6 m-2 tab(s) orally at bedtime (laxative as needed for constipation)       -MiraLax 1 package 1-2 times a day       * Adjust doses of the above medications as needed to meet a goal of one, soft bowel movement per day.     Ok to return to playing drums and playing hockey.     Imaging to be discuss at Brain Tumor Conference.  I will call on Wednesday to update.     Return to clinic in 4 weeks to review cycle 1 and discuss dose escalation for cycle 2.    Nasra Olmos MD  Neuro-oncology  2019

## 2019-07-12 NOTE — PROGRESS NOTES
RN Care Coordination Note  1710 Met with pt after clinic visit today with Dr Olmos. Provided Oral Chemo team Temodar Pt Education folder per pharmacist request  Confirmed with [facility] retail pharmacy that  Temodar is ready downstairs for . Answered question about antinausea scripts to be picked up as well.  Explained that Oral Chemo team pharmacist will call on Monday   Asked pt / wife if they have any questions re: plan for me  Pt and his wife voiced understanding of above instructions and information and denied further questions today.  Sharon Lopez, RN, BSN, OCN  Care Coordinator  [facility]

## 2019-07-12 NOTE — PROGRESS NOTES
NEURO-ONCOLOGY VISIT  Jul 12, 2019    CHIEF COMPLAINT: Mr. 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 He completed chemoradiotherapy on 6/11/2019. Plan is to start adjuvant-dosed temozolomide.     Jayden is presenting in follow-up for continued evaluation accompanied by Oz (wife).      HISTORY OF PRESENT ILLNESS  -Enjoyed break from cancer-directed treatments, but interrupted by recent hospitalization.Recovering from hospitalization.   -No recurrent seizure events, tolerating Keppra well.   -Mood is slightly down; Continuing to have difficulties with depression and coping.  -Appetite remains decreased.  -Continues to struggle with word finding and numbers  -Working with OT on visual loss and being more aware. Visual field cut stable.   -Working with speech therapy; mild short-term memory issues and poor attention.   -Joint pain controlled.    REVIEW OF SYSTEMS  A comprehensive ROS negative except as in HPI.      MEDICATIONS   Current Outpatient Medications   Medication Sig Dispense Refill     aspirin 81 MG EC tablet Take 81 mg by mouth daily       atenolol (TENORMIN) 50 MG tablet Take 50 mg by mouth At Bedtime        dexamethasone (DECADRON) 2 MG tablet Take 1 tablet (2 mg) by mouth daily (with breakfast) 30 tablet 3     diltiazem ER COATED BEADS (CARDIZEM CD/CARTIA XT) 120 MG 24 hr capsule Take 120 mg by mouth       docusate sodium (COLACE) 100 MG capsule Take 100 mg by mouth       fish oil-omega-3 fatty acids 1000 MG capsule Take by mouth daily Unknown dose       folic acid (FOLVITE) 1 MG tablet Take 1 mg by mouth       FOLIC ACID PO Take 1 mg by mouth At Bedtime        levETIRAcetam (KEPPRA) 750 MG tablet Take 2 tablets by mouth       methotrexate sodium, pres-free, 50 MG/2ML SOLN injection CHEMO TAKE 0.7ML (17.5MG) SUBCUTANEOUS WEEKLY ORAL.  0     naproxen (NAPROSYN DR)  500 MG EC tablet        Nutritional Supplements (JUICE PLUS FIBRE PO) Take 1 tablet by mouth At Bedtime       ondansetron (ZOFRAN) 4 MG tablet Take 1 tablet (4 mg) by mouth At Bedtime ; 30 minutes prior to chemotherapy dosing and repeat every 8 hours as needed for nausea. 60 tablet 3     Temozolomide (TEMODAR) 180 MG capsule Take 2 capsules (360 mg) by mouth daily for 5 doses Take ondansetron 30-60 min before temozolomide. Take at bedtime on an empty stomach. 10 capsule 0     lisinopril-hydrochlorothiazide (PRINZIDE/ZESTORETIC) 20-25 MG tablet Take 1 tablet by mouth At Bedtime       rosuvastatin (CRESTOR) 5 MG tablet Take 5 mg by mouth At Bedtime        DRUG ALLERGIES   Allergies   Allergen Reactions     Shellfish Allergy Difficulty breathing, Nausea and Vomiting, Swelling and Shortness Of Breath     No Clinical Screening - See Comments      Lupine bean doesn't remember reaction     Other (Do Not Use)      Lupine bean doesn't remember reaction       ONCOLOGIC HISTORY  -1/2019 PRESENTATION: Visual disturbance.   -3/6/2019 Evaluated by Dr. Mauricio, neuro-ophthalmologist, found to have a right sided homonymous hemianopsia. MRI ordered.   -3/6/2019 MR brain imaging revealed a contrast-enhancing lesion in the left occipital lobe suspicious for a high-grade primary brain tumor.  -3/22/2019 SURGERY: Craniotomy with a gross total resection by Dr. Irving Hayes.   PATHOLOGY: Glioblastoma; Wildtype status for IDH1/2 by next generation sequencing. MGMT promoter methylated. Wildtype PTEN, TP53.  -4/8/2019 NEURO-ONC: Recommending chemoradiotherapy.   -4/30 - 6/11/2019 CHEMORADS 6000cGy in 30 fractions with concurrent temozolomide 75mg/m2 (180mg).  -5/20/2019 NEURO-ONC: Not interested in [employer] at this time.   -6/12/2019 NEURO-ONC: Clinically stable.   -6/21 - 6/28/2019 ADMISSION/ [facility]: Admitted to [facility] for first ever seizure event, provoked by hyponatremia. Urine studies consistent with SIADH ; etiology of SIADH unclear. Had 10mm  "gastric lesion biopsied on 6/26/2019 by way of an EGD.  PATHOLOGY of gastric lesion: Hyperplastic polyp with erosion and foci of atypia; favor reactive. No intestinal metaplasia, no helicobacter pylori, no high grade dysplasia or invasive carcinoma.   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   -7/12/2019 NEURO-ONC/ MRB/ CHEMO: Clinical stable. Imaging with stable to slightly increased enhancement of previously seen nodular lesions; associated with mild degree of elevated rCBV. Starting adjuvant-dosed temozolomide 150mg/m2 (360mg), cycle 1 (start date 7/12/2019). Monitor fluid intake, will be checking CMP/ Na+.     SOCIAL HISTORY   Tobacco use: Former smoker, quit 40 years ago.   Alcohol use: Social use.  Drug use: Denies marijuana use.  Supplement, complimentary/ alternative medicine: None.    .   Employment: On disability.       PHYSICAL EXAMINATION  /79   Pulse 54   Temp 97.4  F (36.3  C) (Oral)   Resp 14   Wt 100.7 kg (222 lb 0.1 oz)   SpO2 99%   BMI 28.64 kg/m     Wt Readings from Last 2 Encounters:   07/12/19 100.7 kg (222 lb 0.1 oz)   06/12/19 100.1 kg (220 lb 11.2 oz)      Ht Readings from Last 2 Encounters:   06/12/19 1.875 m (6' 1.82\")   06/12/19 1.875 m (6' 1.82\")     KPS: 90    -Generally well appearing. Slightly fatigued.   -Throat: No oral thrush.  -Respiratory: Normal breath sounds, no audible wheezing.   -Skin: No rashes. Healed head incision.  -Hematologic/ lymphatic: No abnormal bruising. No leg swelling.  -Psychiatric: Normal mood and affect. Pleasant, talkative.  -Neurologic:   MENTAL STATUS:     Alert, oriented to date.    Recall: Intact.   Issues to math, numbers.     Speech fluent. Comprehension intact to multi-step commands.   Normal naming, repetition. Able to read.   Good right-left orientation.     CRANIAL NERVES:     Discs flat on fundoscopy.    Pupils are equal, round, reactive to light.     Extraocular movements full, patient denies diplopia.     Homonymous " hemianopsia, right-side.    Facial sensation intact to light touch.   Symmetric facial movements.   Hearing intact.   Palate moves symmetrically.     Sternocleidomastoid and trapezius strength intact.   Tongue midline.  MOTOR:    Normal and symmetric tone.   Grossly 5/5 throughout.    No pronation or drift. No orbiting.   Able to rise from a chair without use of arms.   On toe/ heel walk, equal distance from floor to heels/ toes.   SENSATION:    Intact to light touch throughout.  COORDINATION:   Intact finger-nose with eyes open and closed bilaterally.   REFLEXES:    Normal and symmetric.    Toes not tested. No clonus. No Hoffmans.   No grasp.    GAIT:  Walks without assistance.   Good speed. Normal stride length and heel strike. Normal turns. Normal arm swing.   Able to toe, heel walk. Able to tandem walk.       MEDICAL RECORDS  Obtained and personally reviewed all available outside medical records in addition to reviewing any records available in our electronic system.     LABS  Personally reviewed all available lab results.     IMAGING  Personally reviewed MR brain imaging from today and compared to pre-radiation imaging and outside imaging from 2 weeks ago. To my eye,  imaging is largely stable to a slight increase in contrast enhancement of the previously seen nodular lesions about the right occipital resection cavity, which are associated with mild degree of elevated rCBV.     Imaging was shown to and results were reviewed with Aracel[facility].    Imaging and case reviewed and discussed at Brain Tumor Conference.         IMPRESSION  Clinic time was spent discussing in detail the nature of this tumor in light of repeat, post-radiation imaging from today and also answering many questions pertaining to his projected treatment plan and also, recent hospitalization. This is in addition to providing emotional support, answering questions pertaining to my recommendations, and devising the treatment plan as outlined  below.    With regard to cancer-directed therapy, a multimodal treatment approach first involves maximal surgical resection, followed by upfront chemoradiotherapy with temozolomide, and then, adjuvant temozolomide. In the adjuvant phase, temozolomide is dosed at 150mg/m2 for days 1-5 of a 28 day cycle for the first cycle, and then increased to 200mg/m2 if tolerated. The previously reviewed common side effects of temozolomide can still be anticipated and were discussed as including, but not limited to, fatigue, nausea, and constipation. Any AST/ ALT elevations are typically reversible and any rash is manageable with antihistaminics and steroid premedication. Bone marrow suppression can result in leukopenia and thrombocytopenia.     PROBLEM LIST  Glioblastoma, MGMT methylated and IDH1 wildtype by NGS  Visual field cut; homonymous hemianopsia, right-side  Memory issues  Drug-induced constipation  Hyperplastic gastric polyps, benign  H/o SIADH    PLAN  -CANCER DIRECTED THERAPY-  Will initiate adjuvant temozolomide at 150mg/m2 (360mg), cycle 1 (start date of 7/12).   -If this dose is well tolerated, will increase to 200mg/m2 for cycle 2.  -Instructed to take temozolomide in the evening on an empty stomach, 30 minutes after Zofran dosing.  -Supportive medications; Zofran + Dexamethasone 2mg daily as needed for nausea. Bowel regimen.   -If ALC is persistently < 0.5, will restart pentamidine.  -Repeat 28 day cycle if WBC >= 3, ANC >= 1.5, HgB >= 10, and platelets >= 100.    -Surveillance labs reviewed; WBC at 2.8, yet ANC at 1.5. Ok to start cycle.   -Will continue weekly CBC and repeat renal and LFT every 4 weeks.  -Next generation sequencing can be ordered if further disease progression necessitates evaluating for targeted therapy.    -Post-radiation imaging with changes most consistent with pseudoprogression/ radiation induced injury. Repeat imaging in 8 weeks, prior to cycle 3.    -Not interested in [employer] at this time.    -Dr. Thompson Zarco is a neuro-oncologist at [facility] in [place].     -STEROIDS-  -Currently off dexamethasone, but can use dexamethasone 2mg daily as needed for nausea. .    -SEIZURE MANAGEMENT-  -Given history of seizures, will continue Keppra.  -Epilepsy provoked by intracranial pathology, but also hyponatremia related to SIADH of unclear etiology.     -Quality of life/ MOOD/ FATIGUE-  -Continued mood issues; depression, poor coping.  -Previously discussed starting Zoloft today, though he does not want to at this time. Has tried several medications in the past which were ineffective for him.  -Continue to monitor mood as untreated/ undertreated depression can worsen fatigue, dysorexia, and quality of life.  -Added to Brain Tumor Support Group email list; [email or web address]    -HYPONATREMIA/ History of SIADH-  -Monitor amount of fluid intake.   -Continue to track sodium level on routine CMP.   -Repeat CBC and CMP on the week of 7/22 at [facility].    -MEMORY COMPLAINTS-  -Consideration for referral to Dr. Aleksandr Garcia for neuro-psych testing.   -Continue speech therapy.    -OTHER SUPPORTIVE MEDICATIONS-  -Continue methotrexate for arthritis. No drug-drug interactions with temozolomide, Zofran, or pentamidine. Bactrim with drug drug interactions.     -ADDITIONAL SUPPORTIVE MANAGEMENT-  -Social work following.   -CT with lung nodules. [facility].   -S/p EGD with benign pathology of the 10mm gastric antrum/pylorus posterior wall lesion.  -Breast biopsy showed necrotic issue, no malignancy.    Will return in 4 weeks with me + labs.      In the meantime, Jayden and Oz know to call with questions or concerns or to report new complaints and can be seen sooner if needed. Urgent evaluation is needed in the setting of acute onset of severe headache, abrupt change in mental status, on-going seizures, new focal deficits, or new leg swelling/ pain.    Nasra Olmos,  MD  Neuro-oncology

## 2019-07-12 NOTE — NURSING NOTE
"Oncology Rooming Note    July 12, 2019 2:54 PM   Jayden Lackey is a 64 year old male who presents for:    Chief Complaint   Patient presents with     Oncology Clinic Visit     GBM, MRI      Initial Vitals: /79   Pulse 54   Temp 97.4  F (36.3  C) (Oral)   Resp 14   Wt 100.7 kg (222 lb 0.1 oz)   SpO2 99%   BMI 28.64 kg/m   Estimated body mass index is 28.64 kg/m  as calculated from the following:    Height as of 6/12/19: 1.875 m (6' 1.82\").    Weight as of this encounter: 100.7 kg (222 lb 0.1 oz). Body surface area is 2.29 meters squared.  No Pain (0) Comment: Data Unavailable   No LMP for male patient.  Allergies reviewed: Yes  Medications reviewed: Yes    Medications: Medication refills not needed today.  Pharmacy name entered into [employer]:    [id number] - [place], [place] - [address] [place] [address] [facility] - [place], [place] - [address]    Clinical concerns: MRI results  Nickolas  was notified.      Rose Gomez MA              "

## 2019-07-12 NOTE — LETTER
7/12/2019       RE: Jayden Lackey  [address]  [place] [address]     Dear Colleague,    Thank you for referring your patient, Jayden Lackey, to the [facility]. Please see a copy of my visit note below.    NEURO-ONCOLOGY VISIT  Jul 12, 2019    CHIEF COMPLAINT: Mr. 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 He completed chemoradiotherapy on 6/11/2019. Plan is to start adjuvant-dosed temozolomide.     Jayden is presenting in follow-up for continued evaluation accompanied by Oz (wife).      HISTORY OF PRESENT ILLNESS  -Enjoyed break from cancer-directed treatments, but interrupted by recent hospitalization.Recovering from hospitalization.   -No recurrent seizure events, tolerating Keppra well.   -Mood is slightly down; Continuing to have difficulties with depression and coping.  -Appetite remains decreased.  -Continues to struggle with word finding and numbers  -Working with OT on visual loss and being more aware. Visual field cut stable.   -Working with speech therapy; mild short-term memory issues and poor attention.   -Joint pain controlled.    REVIEW OF SYSTEMS  A comprehensive ROS negative except as in HPI.      MEDICATIONS   Current Outpatient Medications   Medication Sig Dispense Refill     aspirin 81 MG EC tablet Take 81 mg by mouth daily       atenolol (TENORMIN) 50 MG tablet Take 50 mg by mouth At Bedtime        dexamethasone (DECADRON) 2 MG tablet Take 1 tablet (2 mg) by mouth daily (with breakfast) 30 tablet 3     diltiazem ER COATED BEADS (CARDIZEM CD/CARTIA XT) 120 MG 24 hr capsule Take 120 mg by mouth       docusate sodium (COLACE) 100 MG capsule Take 100 mg by mouth       fish oil-omega-3 fatty acids 1000 MG capsule Take by mouth daily Unknown dose       folic acid (FOLVITE) 1 MG tablet Take 1 mg by mouth       FOLIC ACID  PO Take 1 mg by mouth At Bedtime        levETIRAcetam (KEPPRA) 750 MG tablet Take 2 tablets by mouth       methotrexate sodium, pres-free, 50 MG/2ML SOLN injection CHEMO TAKE 0.7ML (17.5MG) SUBCUTANEOUS WEEKLY ORAL.  0     naproxen (NAPROSYN DR) 500 MG EC tablet        Nutritional Supplements (JUICE PLUS FIBRE PO) Take 1 tablet by mouth At Bedtime       ondansetron (ZOFRAN) 4 MG tablet Take 1 tablet (4 mg) by mouth At Bedtime ; 30 minutes prior to chemotherapy dosing and repeat every 8 hours as needed for nausea. 60 tablet 3     Temozolomide (TEMODAR) 180 MG capsule Take 2 capsules (360 mg) by mouth daily for 5 doses Take ondansetron 30-60 min before temozolomide. Take at bedtime on an empty stomach. 10 capsule 0     lisinopril-hydrochlorothiazide (PRINZIDE/ZESTORETIC) 20-25 MG tablet Take 1 tablet by mouth At Bedtime       rosuvastatin (CRESTOR) 5 MG tablet Take 5 mg by mouth At Bedtime        DRUG ALLERGIES   Allergies   Allergen Reactions     Shellfish Allergy Difficulty breathing, Nausea and Vomiting, Swelling and Shortness Of Breath     No Clinical Screening - See Comments      Lupine bean doesn't remember reaction     Other (Do Not Use)      Lupine bean doesn't remember reaction       ONCOLOGIC HISTORY  -1/2019 PRESENTATION: Visual disturbance.   -3/6/2019 Evaluated by Dr. Mauricio, neuro-ophthalmologist, found to have a right sided homonymous hemianopsia. MRI ordered.   -3/6/2019 MR brain imaging revealed a contrast-enhancing lesion in the left occipital lobe suspicious for a high-grade primary brain tumor.  -3/22/2019 SURGERY: Craniotomy with a gross total resection by Dr. Irving Hayes.   PATHOLOGY: Glioblastoma; Wildtype status for IDH1/2 by next generation sequencing. MGMT promoter methylated. Wildtype PTEN, TP53.  -4/8/2019 NEURO-ONC: Recommending chemoradiotherapy.   -4/30 - 6/11/2019 CHEMORADS 6000cGy in 30 fractions with concurrent temozolomide 75mg/m2 (180mg).  -5/20/2019 NEURO-ONC: Not interested in  "Optune at this time.   -6/12/2019 NEURO-ONC: Clinically stable.   -6/21 - 6/28/2019 ADMISSION/ SZ: Admitted to [facility] for first ever seizure event, provoked by hyponatremia. Urine studies consistent with SIADH ; etiology of SIADH unclear. Had 10mm gastric lesion biopsied on 6/26/2019 by way of an EGD.  PATHOLOGY of gastric lesion: Hyperplastic polyp with erosion and foci of atypia; favor reactive. No intestinal metaplasia, no helicobacter pylori, no high grade dysplasia or invasive carcinoma.   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   -7/12/2019 NEURO-ONC/ MRB/ CHEMO: Clinical stable. Imaging with stable to slightly increased enhancement of previously seen nodular lesions; associated with mild degree of elevated rCBV. Starting adjuvant-dosed temozolomide 150mg/m2 (360mg), cycle 1 (start date 7/12/2019). Monitor fluid intake, will be checking CMP/ Na+.     SOCIAL HISTORY   Tobacco use: Former smoker, quit 40 years ago.   Alcohol use: Social use.  Drug use: Denies marijuana use.  Supplement, complimentary/ alternative medicine: None.    .   Employment: On disability.       PHYSICAL EXAMINATION  /79   Pulse 54   Temp 97.4  F (36.3  C) (Oral)   Resp 14   Wt 100.7 kg (222 lb 0.1 oz)   SpO2 99%   BMI 28.64 kg/m      Wt Readings from Last 2 Encounters:   07/12/19 100.7 kg (222 lb 0.1 oz)   06/12/19 100.1 kg (220 lb 11.2 oz)      Ht Readings from Last 2 Encounters:   06/12/19 1.875 m (6' 1.82\")   06/12/19 1.875 m (6' 1.82\")     KPS: 90    -Generally well appearing. Slightly fatigued.   -Throat: No oral thrush.  -Respiratory: Normal breath sounds, no audible wheezing.   -Skin: No rashes. Healed head incision.  -Hematologic/ lymphatic: No abnormal bruising. No leg swelling.  -Psychiatric: Normal mood and affect. Pleasant, talkative.  -Neurologic:   MENTAL STATUS:     Alert, oriented to date.    Recall: Intact.   Issues to math, numbers.     Speech fluent. Comprehension intact to multi-step " commands.   Normal naming, repetition. Able to read.   Good right-left orientation.     CRANIAL NERVES:     Discs flat on fundoscopy.    Pupils are equal, round, reactive to light.     Extraocular movements full, patient denies diplopia.     Homonymous hemianopsia, right-side.    Facial sensation intact to light touch.   Symmetric facial movements.   Hearing intact.   Palate moves symmetrically.     Sternocleidomastoid and trapezius strength intact.   Tongue midline.  MOTOR:    Normal and symmetric tone.   Grossly 5/5 throughout.    No pronation or drift. No orbiting.   Able to rise from a chair without use of arms.   On toe/ heel walk, equal distance from floor to heels/ toes.   SENSATION:    Intact to light touch throughout.  COORDINATION:   Intact finger-nose with eyes open and closed bilaterally.   REFLEXES:    Normal and symmetric.    Toes not tested. No clonus. No Hoffmans.   No grasp.    GAIT:  Walks without assistance.   Good speed. Normal stride length and heel strike. Normal turns. Normal arm swing.   Able to toe, heel walk. Able to tandem walk.       MEDICAL RECORDS  Obtained and personally reviewed all available outside medical records in addition to reviewing any records available in our electronic system.     LABS  Personally reviewed all available lab results.     IMAGING  Personally reviewed MR brain imaging from today and compared to pre-radiation imaging and outside imaging from 2 weeks ago. To my eye,  imaging is largely stable to a slight increase in contrast enhancement of the previously seen nodular lesions about the right occipital resection cavity, which are associated with mild degree of elevated rCBV.     Imaging was shown to and results were reviewed with Aracelis.    Imaging and case reviewed and discussed at Brain Tumor Conference.         IMPRESSION  Clinic time was spent discussing in detail the nature of this tumor in light of repeat, post-radiation imaging from today and  also answering many questions pertaining to his projected treatment plan and also, recent hospitalization. This is in addition to providing emotional support, answering questions pertaining to my recommendations, and devising the treatment plan as outlined below.    With regard to cancer-directed therapy, a multimodal treatment approach first involves maximal surgical resection, followed by upfront chemoradiotherapy with temozolomide, and then, adjuvant temozolomide. In the adjuvant phase, temozolomide is dosed at 150mg/m2 for days 1-5 of a 28 day cycle for the first cycle, and then increased to 200mg/m2 if tolerated. The previously reviewed common side effects of temozolomide can still be anticipated and were discussed as including, but not limited to, fatigue, nausea, and constipation. Any AST/ ALT elevations are typically reversible and any rash is manageable with antihistaminics and steroid premedication. Bone marrow suppression can result in leukopenia and thrombocytopenia.     PROBLEM LIST  Glioblastoma, MGMT methylated and IDH1 wildtype by NGS  Visual field cut; homonymous hemianopsia, right-side  Memory issues  Drug-induced constipation  Hyperplastic gastric polyps, benign  H/o SIADH    PLAN  -CANCER DIRECTED THERAPY-  Will initiate adjuvant temozolomide at 150mg/m2 (360mg), cycle 1 (start date of 7/12).   -If this dose is well tolerated, will increase to 200mg/m2 for cycle 2.  -Instructed to take temozolomide in the evening on an empty stomach, 30 minutes after Zofran dosing.  -Supportive medications; Zofran + Dexamethasone 2mg daily as needed for nausea. Bowel regimen.   -If ALC is persistently < 0.5, will restart pentamidine.  -Repeat 28 day cycle if WBC >= 3, ANC >= 1.5, HgB >= 10, and platelets >= 100.    -Surveillance labs reviewed; WBC at 2.8, yet ANC at 1.5. Ok to start cycle.   -Will continue weekly CBC and repeat renal and LFT every 4 weeks.  -Next generation sequencing can be ordered if further  disease progression necessitates evaluating for targeted therapy.    -Post-radiation imaging with changes most consistent with pseudoprogression/ radiation induced injury. Repeat imaging in 8 weeks, prior to cycle 3.    -Not interested in [email or web address] at this time.   -Dr. Thompson Zarco is a neuro-oncologist at [facility] in [place].     -STEROIDS-  -Currently off dexamethasone, but can use dexamethasone 2mg daily as needed for nausea. .    -SEIZURE MANAGEMENT-  -Given history of seizures, will continue Keppra.  -Epilepsy provoked by intracranial pathology, but also hyponatremia related to SIADH of unclear etiology.     -Quality of life/ MOOD/ FATIGUE-  -Continued mood issues; depression, poor coping.  -Previously discussed starting Zoloft today, though he does not want to at this time. Has tried several medications in the past which were ineffective for him.  -Continue to monitor mood as untreated/ undertreated depression can worsen fatigue, dysorexia, and quality of life.  -Added to Brain Tumor Support Group email list; [email or web address]    -HYPONATREMIA/ History of SIADH-  -Monitor amount of fluid intake.   -Continue to track sodium level on routine CMP.   -Repeat CBC and CMP on the week of 7/22 at [facility].    -MEMORY COMPLAINTS-  -Consideration for referral to Dr. Aleksandr Garcia for neuro-psych testing.   -Continue speech therapy.    -OTHER SUPPORTIVE MEDICATIONS-  -Continue methotrexate for arthritis. No drug-drug interactions with temozolomide, Zofran, or pentamidine. Bactrim with drug drug interactions.     -ADDITIONAL SUPPORTIVE MANAGEMENT-  -Social work following.   -CT with lung nodules. Following with the [facility].   -S/p EGD with benign pathology of the 10mm gastric antrum/pylorus posterior wall lesion.  -Breast biopsy showed necrotic issue, no malignancy.    Will return in 4 weeks with me + labs.      In the meantime, Jayden and Oz know to call with questions or concerns or to report new  complaints and can be seen sooner if needed. Urgent evaluation is needed in the setting of acute onset of severe headache, abrupt change in mental status, on-going seizures, new focal deficits, or new leg swelling/ pain.    Nasra Olmos MD  Neuro-oncology

## 2019-07-15 ENCOUNTER — TUMOR CONFERENCE (OUTPATIENT)
Dept: ONCOLOGY | Facility: CLINIC | Age: 64
End: 2019-07-15

## 2019-07-15 ENCOUNTER — TELEPHONE (OUTPATIENT)
Dept: ONCOLOGY | Facility: CLINIC | Age: 64
End: 2019-07-15

## 2019-07-15 NOTE — TUMOR CONFERENCE
Tumor Conference Information  Tumor Conference:    Specialties Present:  Surgery, Medical Oncology, Radiation Oncology, Radiology, Pathology  Patient Status:  Current patient  Pathology:  Not Discussed  Treatment to Date:  Surgical intervention(s), Chemoradiation  Clinical Trial Eligibility:  Not discussed  Recommended Plan:  Chemotherapy (Comment: starting adjuvant chemo, radiology to review recent imaging and discuss with Dr. Olmos)  Did the review exceed 30 minutes?:  did not           Documentation / Disclaimer Cancer Tumor Board Note  Cancer tumor board recommendations do not override what is determined to be reasonable care and treatment, which is dependent on the circumstances of a patient's case; the patient's medical, social, and personal concerns; and the clinical judgment of the oncologist [physician].

## 2019-07-15 NOTE — TELEPHONE ENCOUNTER
"Oral Chemotherapy Monitoring Program    Primary Oncologist: Dr Nasra Olmos  Primary Oncology Clinic: [facility]  Cancer Diagnosis: Glioblastoma    Therapy History:  Temodar 360mg ([id number]) PO daily x 5 days, then 23 days off  C1D1 = 7/12/19    Lab Monitoring Plan  [facility] Q4 weeks; CBC per Dr Olmos (probably weekly)    Subjective/Objective:  Jayden Lackey is a 64 year old male contacted by phone for a follow-up visit for oral chemotherapy.  Patient stated that he felt kind of wiped out after the first couple of doses of Temodar, but had no nausea or vomiting.     ORAL CHEMOTHERAPY 4/8/2019 5/7/2019 6/6/2019 7/15/2019   Drug Name Temodar (Temozolomide) Temodar (Temozolomide) Temodar (Temozolomide) Temodar (Temozolomide)   Current Dosage 180mg 180mg 180mg 360mg   Current Schedule QHS QHS QHS Daily   Cycle Details Continuous for 42 days during XRT Continuous for 42 days during XRT Continuous for 42 days during XRT 5 days on, then 23 days off   Start Date of Last Cycle - 4/29/2019 4/29/2019 7/12/2019   Planned next cycle start date - - - 8/9/2019   Doses missed in last 2 weeks - 0 0 0   Adherence Assessment - Adherent Adherent Adherent   Adverse Effects - No AE identified during assessment Constipation;Fatigue No AE identified during assessment   Constipation - - Resolved due to intervention -   Fatigue - - Grade 1 -   Pharmacist Intervention(fatigue) - - Yes -   Intervention(s) - - Patient education -   Home BPs - not needed - -   Any new drug interactions? - No No -   Is the dose as ordered appropriate for the patient? - Yes Yes -     Vitals:  BP:   BP Readings from Last 1 Encounters:   07/12/19 134/79     Wt Readings from Last 1 Encounters:   07/12/19 100.7 kg (222 lb 0.1 oz)     Estimated body surface area is 2.29 meters squared as calculated from the following:    Height as of 6/12/19: 1.875 m (6' 1.82\").    Weight as of 7/12/19: 100.7 kg (222 lb 0.1 oz).    Labs:  _  Result Component Current Result Ref " Range   Sodium 137 (7/1/2019) 133 - 144 mmol/L     No results found for K within last 30 days.     No results found for CA within last 30 days.     No results found for Mag within last 30 days.     No results found for Phos within last 30 days.     No results found for ALBUMIN within last 30 days.     No results found for BUN within last 30 days.     No results found for CR within last 30 days.       No results found for AST within last 30 days.     No results found for ALT within last 30 days.     No results found for BILITOTAL within last 30 days.       _  Result Component Current Result Ref Range   WBC 2.8 (L) (7/12/2019) 4.0 - 11.0 10e9/L     _  Result Component Current Result Ref Range   Hemoglobin 11.7 (L) (7/12/2019) 13.3 - 17.7 g/dL     _  Result Component Current Result Ref Range   Platelet Count 134 (L) (7/12/2019) 150 - 450 10e9/L     _  Result Component Current Result Ref Range   Absolute Neutrophil 1.5 (L) (7/12/2019) 1.6 - 8.3 10e9/L       Assessment:  Patient has tolerated the start of his first cycle of adjuvant Temodar well.    Plan:  He will have his first labs done next week.    Follow-Up:  Will call patient next week with lab results.    Erika Whiteside, PharmD, BCPS, BCOP  Oncology Clinical Pharmacy Specialist  [facility]/ [facility]  [phone]

## 2019-07-18 ENCOUNTER — PATIENT OUTREACH (OUTPATIENT)
Dept: ONCOLOGY | Facility: CLINIC | Age: 64
End: 2019-07-18

## 2019-07-18 ENCOUNTER — TELEPHONE (OUTPATIENT)
Dept: CARE COORDINATION | Facility: CLINIC | Age: 64
End: 2019-07-18

## 2019-07-18 NOTE — TELEPHONE ENCOUNTER
Social Work Telephone Message Note  [facility] [place]    Patient Name:  Jayden Lackey  /Age:  1955 (64 year old)    Referral Source: [employer] LiaGriselda serrano  Reason for Referral:  Contact Patient's wife, Oz to offer resources and support.     contacted Oz via telephone on 2019. Oz answered, but was not able to talk at this time. Oz asked  to call back tomorrow.    Irena Levine [facility]  Outpatient Specialty Clinics  Direct Phone: [phone]  Pager:  [phone]

## 2019-07-18 NOTE — PROGRESS NOTES
Placed call to Kailey Edmondson, pt's wife to discuss her lab questions. She had already spoken to Dr Olmos today and had not additional questions.

## 2019-07-18 NOTE — PROGRESS NOTES
INCOMING CALL    Writer received VM 7/17 5:45pm  on 7/18 am:  Pt's wife reports:  We were supposed to be called by Dr. Olmos today re: lab results. We see on MyChart that some of the lab levels are very low, want to know what they mean and what we should do  CB number [phone]. I'll be working so I'd appreciate multiple attempts to reach me if needed.    Message routed to Oral Chemo monitoring team as it looks like they have been in touch with pt this week re: the lab results already.

## 2019-07-19 ENCOUNTER — AMBULATORY - HEALTHEAST (OUTPATIENT)
Dept: LAB | Facility: HOSPITAL | Age: 64
End: 2019-07-19

## 2019-07-19 ENCOUNTER — PATIENT OUTREACH (OUTPATIENT)
Dept: ONCOLOGY | Facility: CLINIC | Age: 64
End: 2019-07-19

## 2019-07-19 ENCOUNTER — TELEPHONE (OUTPATIENT)
Dept: ONCOLOGY | Facility: CLINIC | Age: 64
End: 2019-07-19

## 2019-07-19 DIAGNOSIS — E87.1 HYPONATREMIA: ICD-10-CM

## 2019-07-19 DIAGNOSIS — Z79.899 ENCOUNTER FOR LONG-TERM (CURRENT) USE OF MEDICATIONS: ICD-10-CM

## 2019-07-19 DIAGNOSIS — C71.9 BRAIN NEOPLASM MALIGNANT (H): ICD-10-CM

## 2019-07-19 LAB
BASOPHILS # BLD AUTO: 0 THOU/UL (ref 0–0.2)
BASOPHILS NFR BLD AUTO: 1 % (ref 0–2)
EOSINOPHIL # BLD AUTO: 0.2 THOU/UL (ref 0–0.4)
EOSINOPHIL NFR BLD AUTO: 5 % (ref 0–6)
ERYTHROCYTE [DISTWIDTH] IN BLOOD BY AUTOMATED COUNT: 14 % (ref 11–14.5)
HCT VFR BLD AUTO: 36.1 % (ref 40–54)
HGB BLD-MCNC: 12.3 G/DL (ref 14–18)
LYMPHOCYTES # BLD AUTO: 0.8 THOU/UL (ref 0.8–4.4)
LYMPHOCYTES NFR BLD AUTO: 25 % (ref 20–40)
MCH RBC QN AUTO: 28.5 PG (ref 27–34)
MCHC RBC AUTO-ENTMCNC: 34.1 G/DL (ref 32–36)
MCV RBC AUTO: 84 FL (ref 80–100)
MONOCYTES # BLD AUTO: 0.5 THOU/UL (ref 0–0.9)
MONOCYTES NFR BLD AUTO: 16 % (ref 2–10)
NEUTROPHILS # BLD AUTO: 1.8 THOU/UL (ref 2–7.7)
NEUTROPHILS NFR BLD AUTO: 54 % (ref 50–70)
PLATELET # BLD AUTO: 150 THOU/UL (ref 140–440)
PMV BLD AUTO: 9.3 FL (ref 8.5–12.5)
RBC # BLD AUTO: 4.32 MILL/UL (ref 4.4–6.2)
SODIUM SERPL-SCNC: 142 MMOL/L (ref 136–145)
WBC: 3.3 THOU/UL (ref 4–11)

## 2019-07-19 NOTE — TELEPHONE ENCOUNTER
Oral Chemotherapy Monitoring Program.    Patient currently on Temodar therapy.    Called to discuss lab results from today 7/19/19; no answer, so left voice mail.     No concerning abnormalities.    Will follow up next week with labs.    Erika Whiteside, PharmD, BCPS, [facility]  Oncology Clinical Pharmacy Specialist  [facility]/ [facility]  [phone]

## 2019-07-19 NOTE — PROGRESS NOTES
Standing lab orders (cbc) faxed to [facility] [phone]    Patient having labs drawn today

## 2019-07-26 ENCOUNTER — AMBULATORY - HEALTHEAST (OUTPATIENT)
Dept: LAB | Facility: HOSPITAL | Age: 64
End: 2019-07-26

## 2019-07-26 DIAGNOSIS — C71.9 GLIOBLASTOMA (H): ICD-10-CM

## 2019-07-26 LAB
BASOPHILS # BLD AUTO: 0 THOU/UL (ref 0–0.2)
BASOPHILS NFR BLD AUTO: 1 % (ref 0–2)
EOSINOPHIL # BLD AUTO: 0.1 THOU/UL (ref 0–0.4)
EOSINOPHIL NFR BLD AUTO: 3 % (ref 0–6)
ERYTHROCYTE [DISTWIDTH] IN BLOOD BY AUTOMATED COUNT: 14.4 % (ref 11–14.5)
HCT VFR BLD AUTO: 35.2 % (ref 40–54)
HGB BLD-MCNC: 12.1 G/DL (ref 14–18)
LYMPHOCYTES # BLD AUTO: 0.8 THOU/UL (ref 0.8–4.4)
LYMPHOCYTES NFR BLD AUTO: 22 % (ref 20–40)
MCH RBC QN AUTO: 28.7 PG (ref 27–34)
MCHC RBC AUTO-ENTMCNC: 34.4 G/DL (ref 32–36)
MCV RBC AUTO: 83 FL (ref 80–100)
MONOCYTES # BLD AUTO: 0.5 THOU/UL (ref 0–0.9)
MONOCYTES NFR BLD AUTO: 14 % (ref 2–10)
NEUTROPHILS # BLD AUTO: 2.3 THOU/UL (ref 2–7.7)
NEUTROPHILS NFR BLD AUTO: 60 % (ref 50–70)
PLATELET # BLD AUTO: 152 THOU/UL (ref 140–440)
PMV BLD AUTO: 9.5 FL (ref 8.5–12.5)
RBC # BLD AUTO: 4.22 MILL/UL (ref 4.4–6.2)
WBC: 3.8 THOU/UL (ref 4–11)

## 2019-07-29 ENCOUNTER — TELEPHONE (OUTPATIENT)
Dept: ONCOLOGY | Facility: CLINIC | Age: 64
End: 2019-07-29

## 2019-07-29 NOTE — ORAL ONC MGMT
Oral Chemotherapy Monitoring Program     I called and spoke with Oz, Jayden's wife about his labs from 7/26/19.  I told her that there wasn't anything concerning with the labs and specifically stated his WBC was 3.8, Plts 152.  Oz confirmed Jayden will get labs again on Friday 8/2/19.    Oz thanked me for the call.    Erika Del Toro, Pharm.D., [facility]  [phone]  07/29/19

## 2019-08-03 ENCOUNTER — AMBULATORY - HEALTHEAST (OUTPATIENT)
Dept: LAB | Facility: HOSPITAL | Age: 64
End: 2019-08-03

## 2019-08-03 DIAGNOSIS — C71.9 BRAIN NEOPLASM MALIGNANT (H): ICD-10-CM

## 2019-08-03 LAB
BASOPHILS # BLD AUTO: 0 THOU/UL (ref 0–0.2)
BASOPHILS NFR BLD AUTO: 1 % (ref 0–2)
EOSINOPHIL # BLD AUTO: 0.1 THOU/UL (ref 0–0.4)
EOSINOPHIL NFR BLD AUTO: 2 % (ref 0–6)
ERYTHROCYTE [DISTWIDTH] IN BLOOD BY AUTOMATED COUNT: 14.4 % (ref 11–14.5)
HCT VFR BLD AUTO: 36.2 % (ref 40–54)
HGB BLD-MCNC: 12.2 G/DL (ref 14–18)
LYMPHOCYTES # BLD AUTO: 0.9 THOU/UL (ref 0.8–4.4)
LYMPHOCYTES NFR BLD AUTO: 27 % (ref 20–40)
MCH RBC QN AUTO: 27.5 PG (ref 27–34)
MCHC RBC AUTO-ENTMCNC: 33.7 G/DL (ref 32–36)
MCV RBC AUTO: 82 FL (ref 80–100)
MONOCYTES # BLD AUTO: 0.3 THOU/UL (ref 0–0.9)
MONOCYTES NFR BLD AUTO: 10 % (ref 2–10)
NEUTROPHILS # BLD AUTO: 2 THOU/UL (ref 2–7.7)
NEUTROPHILS NFR BLD AUTO: 60 % (ref 50–70)
PLATELET # BLD AUTO: 169 THOU/UL (ref 140–440)
PMV BLD AUTO: 8.6 FL (ref 8.5–12.5)
RBC # BLD AUTO: 4.43 MILL/UL (ref 4.4–6.2)
WBC: 3.3 THOU/UL (ref 4–11)

## 2019-08-05 ENCOUNTER — TELEPHONE (OUTPATIENT)
Dept: ONCOLOGY | Facility: CLINIC | Age: 64
End: 2019-08-05

## 2019-08-05 ENCOUNTER — COMMUNICATION - HEALTHEAST (OUTPATIENT)
Dept: ONCOLOGY | Facility: HOSPITAL | Age: 64
End: 2019-08-05

## 2019-08-05 NOTE — TELEPHONE ENCOUNTER
Free medication approved 8/8/19 - 8/4/20  First shipment scheduled for 8/9 delivery.     [facility]oo[facility]  Oncology Pharmacy Liaison   [email or web address]  [phone] (phone  [phone] (fax

## 2019-08-05 NOTE — ORAL ONC MGMT
Oral Chemotherapy Monitoring Program  Placed call to patient's wife, Cristiana, in follow up of temozolomide therapy. Lab results show no concerning abnormalities and are stable from previous results.   I relayed to Cristiana that Dr. Olmos is considering a dose increase and thus wanted to wait for a refill until Jayden could be seen in clinic on 8/12/19. Cristiana was concerned about the financial burden of an increased dose as they are already paying a significant out-of-pocket amount each month. I deferred a discussion of Cristiana's financial concerns to the pharmacy liaison team.     HM1 (CBC with Diff) (08/03/2019 9:15 AM CDT)  Component Value Ref Range   WBC 3.3 (L) 4.0 - 11.0 thou/uL   RBC 4.43 4.40 - 6.20 mill/uL   Hemoglobin 12.2 (L) 14.0 - 18.0 g/dL   Hematocrit 36.2 (L) 40.0 - 54.0 %   MCV 82 80 - 100 fL   MCH 27.5 27.0 - 34.0 pg   MCHC 33.7 32.0 - 36.0 g/dL   RDW 14.4 11.0 - 14.5 %   Platelets 169 140 - 440 thou/uL   MPV 8.6 8.5 - 12.5 fL   Neutrophils % 60 50 - 70 %   Lymphocytes % 27 20 - 40 %   Monocytes % 10 2 - 10 %   Eosinophils % 2 0 - 6 %   Basophils % 1 0 - 2 %   Neutrophils Absolute 2.0 2.0 - 7.7 thou/uL   Lymphocytes Absolute 0.9 0.8 - 4.4 thou/uL   Monocytes Absolute 0.3 0.0 - 0.9 thou/uL   Eosinophils Absolute 0.1 0.0 - 0.4 thou/uL   Basophils Absolute 0.0 0.0 - 0.2 thou/uL     Chris Moe  Pharmacy Intern  Oral Chemotherapy Monitoring Program  [facility]  [phone]

## 2019-08-05 NOTE — TELEPHONE ENCOUNTER
Prior Authorization Approval    Authorization Effective Date: 8/1/2019  Authorization Expiration Date: 8/1/2020  Medication: Temozolomide PA Approval  Approved Dose/Quantity: 180mg 10/5  Reference #: [id number]   Insurance Company: [employer] [place] - Phone [phone] Fax [phone]  Expected CoPay: $961.59     CoPay Card Available:      Foundation Assistance Needed: Free Drug Application Pending  Which Pharmacy is filling the prescription (Not needed for infusion/clinic administered): [facility] [address] 6[address]  Pharmacy Notified: Yes  Patient Notified: Yes            Harlan Andre  [facility]  Oncology Pharmacy Liaison   [email or web address]  [phone] (phone  [phone] (fax

## 2019-08-10 ENCOUNTER — AMBULATORY - HEALTHEAST (OUTPATIENT)
Dept: LAB | Facility: HOSPITAL | Age: 64
End: 2019-08-10

## 2019-08-10 DIAGNOSIS — Z79.899 HISTORY OF LONG-TERM TREATMENT WITH HIGH-RISK MEDICATION: ICD-10-CM

## 2019-08-10 DIAGNOSIS — C71.9 BRAIN NEOPLASM MALIGNANT (H): ICD-10-CM

## 2019-08-10 LAB
BASOPHILS # BLD AUTO: 0.1 THOU/UL (ref 0–0.2)
BASOPHILS NFR BLD AUTO: 1 % (ref 0–2)
EOSINOPHIL # BLD AUTO: 0.2 THOU/UL (ref 0–0.4)
EOSINOPHIL NFR BLD AUTO: 4 % (ref 0–6)
ERYTHROCYTE [DISTWIDTH] IN BLOOD BY AUTOMATED COUNT: 14.7 % (ref 11–14.5)
HCT VFR BLD AUTO: 37 % (ref 40–54)
HGB BLD-MCNC: 12.6 G/DL (ref 14–18)
LYMPHOCYTES # BLD AUTO: 0.8 THOU/UL (ref 0.8–4.4)
LYMPHOCYTES NFR BLD AUTO: 20 % (ref 20–40)
MCH RBC QN AUTO: 28.3 PG (ref 27–34)
MCHC RBC AUTO-ENTMCNC: 34.1 G/DL (ref 32–36)
MCV RBC AUTO: 83 FL (ref 80–100)
MONOCYTES # BLD AUTO: 0.5 THOU/UL (ref 0–0.9)
MONOCYTES NFR BLD AUTO: 12 % (ref 2–10)
NEUTROPHILS # BLD AUTO: 2.7 THOU/UL (ref 2–7.7)
NEUTROPHILS NFR BLD AUTO: 63 % (ref 50–70)
PLATELET # BLD AUTO: 163 THOU/UL (ref 140–440)
PMV BLD AUTO: 8.5 FL (ref 8.5–12.5)
RBC # BLD AUTO: 4.46 MILL/UL (ref 4.4–6.2)
WBC: 4.3 THOU/UL (ref 4–11)

## 2019-08-12 ENCOUNTER — ONCOLOGY VISIT (OUTPATIENT)
Dept: ONCOLOGY | Facility: CLINIC | Age: 64
End: 2019-08-12
Attending: PSYCHIATRY & NEUROLOGY
Payer: COMMERCIAL

## 2019-08-12 ENCOUNTER — RECORDS - HEALTHEAST (OUTPATIENT)
Dept: ADMINISTRATIVE | Facility: OTHER | Age: 64
End: 2019-08-12

## 2019-08-12 VITALS
OXYGEN SATURATION: 99 % | DIASTOLIC BLOOD PRESSURE: 91 MMHG | SYSTOLIC BLOOD PRESSURE: 158 MMHG | WEIGHT: 218 LBS | HEART RATE: 57 BPM | BODY MASS INDEX: 28.13 KG/M2 | TEMPERATURE: 98.7 F | RESPIRATION RATE: 16 BRPM

## 2019-08-12 DIAGNOSIS — E87.1 HYPONATREMIA: ICD-10-CM

## 2019-08-12 DIAGNOSIS — Z51.11 CHEMOTHERAPY MANAGEMENT, ENCOUNTER FOR: ICD-10-CM

## 2019-08-12 DIAGNOSIS — C71.9 GLIOBLASTOMA (H): Primary | ICD-10-CM

## 2019-08-12 DIAGNOSIS — R11.2 CHEMOTHERAPY-INDUCED NAUSEA AND VOMITING: ICD-10-CM

## 2019-08-12 DIAGNOSIS — T45.1X5A CHEMOTHERAPY-INDUCED NAUSEA AND VOMITING: ICD-10-CM

## 2019-08-12 PROCEDURE — G0463 HOSPITAL OUTPT CLINIC VISIT: HCPCS | Mod: ZF

## 2019-08-12 PROCEDURE — 99214 OFFICE O/P EST MOD 30 MIN: CPT | Mod: ZP | Performed by: PSYCHIATRY & NEUROLOGY

## 2019-08-12 ASSESSMENT — PAIN SCALES - GENERAL: PAINLEVEL: NO PAIN (0)

## 2019-08-12 NOTE — PATIENT INSTRUCTIONS
Adjuvant-dosed temozolomide 150mg/m2 (360mg), cycle 2 (start date 8/13/2019).  Continue with labs; repeat on the week of 8/26 at [facility].   Need CMP drawn to look at sodium level.     Neuro-ophtho appt.     Repeat imaging in 4 weeks.     Return for clinic on 9/9 + imaging + labs.     Nasra Olmos MD  Neuro-oncology  8/12/2019

## 2019-08-12 NOTE — PROGRESS NOTES
NEURO-ONCOLOGY VISIT  Aug 12, 2019    CHIEF COMPLAINT: Mr. 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 He completed chemoradiotherapy on 6/11/2019. He is currently managed on adjuvant-dosed temozolomide.     Jayden is presenting in follow-up for continued evaluation accompanied by Oz (wife).      HISTORY OF PRESENT ILLNESS  -Overall, tolerating chemotherapy well. With cycle 1, nausea fairly well controlled on supportive medications, needing several additional doses of Zofran. Reduced appetite. Mild (like 1 event) of constipation on current bowel regimen.   -More fatigued with starting chemotherapy.   -No recurrent seizure events, tolerating Keppra well.   -Mood is better; some difficulties with depression and coping. Strong friend and family support.   -Continues to struggle with word finding and numbers  -Working with OT on visual loss and being more aware. Visual field cut stable.   -Working with speech therapy; mild short-term memory issues and poor attention.   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     REVIEW OF SYSTEMS  A comprehensive ROS negative except as in HPI.      MEDICATIONS   Current Outpatient Medications   Medication Sig Dispense Refill     aspirin 81 MG EC tablet Take 81 mg by mouth daily       atenolol (TENORMIN) 50 MG tablet Take 50 mg by mouth At Bedtime        diltiazem ER COATED BEADS (CARDIZEM CD/CARTIA XT) 120 MG 24 hr capsule Take 120 mg by mouth       docusate sodium (COLACE) 100 MG capsule Take 100 mg by mouth       fish oil-omega-3 fatty acids 1000 MG capsule Take by mouth daily Unknown dose       folic acid (FOLVITE) 1 MG tablet Take 1 mg by mouth       levETIRAcetam (KEPPRA) 750 MG tablet Take 2 tablets by mouth       lisinopril-hydrochlorothiazide (PRINZIDE/ZESTORETIC) 20-25 MG tablet Take 1 tablet by mouth At Bedtime       methotrexate  sodium, pres-free, 50 MG/2ML SOLN injection CHEMO TAKE 0.7ML (17.5MG) SUBCUTANEOUS WEEKLY ORAL.  0     naproxen (NAPROSYN DR) 500 MG EC tablet        Nutritional Supplements (JUICE PLUS FIBRE PO) Take 1 tablet by mouth At Bedtime       ondansetron (ZOFRAN) 4 MG tablet Take 1 tablet (4 mg) by mouth At Bedtime ; 30 minutes prior to chemotherapy dosing and repeat every 8 hours as needed for nausea. 60 tablet 3     rosuvastatin (CRESTOR) 5 MG tablet Take 5 mg by mouth At Bedtime        Temozolomide (TEMODAR) 180 MG capsule Take 2 capsules (360 mg) by mouth daily for 5 doses Take ondansetron 30-60 min before temozolomide. Take at bedtime on an empty stomach. 10 capsule 0     DRUG ALLERGIES   Allergies   Allergen Reactions     Shellfish Allergy Difficulty breathing, Nausea and Vomiting, Swelling and Shortness Of Breath     No Clinical Screening - See Comments      Lupine bean doesn't remember reaction     Other (Do Not Use)      Lupine bean doesn't remember reaction       ONCOLOGIC HISTORY  -1/2019 PRESENTATION: Visual disturbance.   -3/6/2019 Evaluated by Dr. Mauricio, neuro-ophthalmologist, found to have a right sided homonymous hemianopsia. MRI ordered.   -3/6/2019 MR brain imaging revealed a contrast-enhancing lesion in the left occipital lobe suspicious for a high-grade primary brain tumor.  -3/22/2019 SURGERY: Craniotomy with a gross total resection by Dr. Irving Hayes.   PATHOLOGY: Glioblastoma; Wildtype status for IDH1/2 by next generation sequencing. MGMT promoter methylated. Wildtype PTEN, TP53.  -4/8/2019 NEURO-ONC: Recommending chemoradiotherapy.   -4/30 - 6/11/2019 CHEMORADS 6000cGy in 30 fractions with concurrent temozolomide 75mg/m2 (180mg).  -5/20/2019 NEURO-ONC: Not interested in [employer] at this time.   -6/12/2019 NEURO-ONC: Clinically stable.   -6/21 - 6/28/2019 ADMISSION/ SZ: Admitted to [facility] for first ever seizure event, provoked by hyponatremia. Urine studies consistent with SIADH ; etiology of SIADH  "unclear. Had 10mm gastric lesion biopsied on 6/26/2019 by way of an EGD.  PATHOLOGY of gastric lesion: Hyperplastic polyp with erosion and foci of atypia; favor reactive. No intestinal metaplasia, no helicobacter pylori, no high grade dysplasia or invasive carcinoma.   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   -7/12/2019 NEURO-ONC/ MRB/ CHEMO: Clinical stable. Imaging with stable to slightly increased enhancement of previously seen nodular lesions; associated with mild degree of elevated rCBV. Starting adjuvant-dosed temozolomide 150mg/m2 (360mg), cycle 1 (start date 7/12/2019). Monitor fluid intake, will be checking CMP/ Na+.   -8/12/2019 NEURO-ONC/ CHEMO: Clinical stable. Adjuvant-dosed temozolomide 150mg/m2 (360mg), cycle 2 (start date 8/13/2019).    SOCIAL HISTORY   Tobacco use: Former smoker, quit 40 years ago.   Alcohol use: Social use.  Drug use: Denies marijuana use.  Supplement, complimentary/ alternative medicine: None.    .   Employment: On disability.       PHYSICAL EXAMINATION  BP (!) 158/91   Pulse 57   Temp 98.7  F (37.1  C) (Oral)   Resp 16   Wt 98.9 kg (218 lb)   SpO2 99%   BMI 28.13 kg/m     Wt Readings from Last 2 Encounters:   08/12/19 98.9 kg (218 lb)   07/12/19 100.7 kg (222 lb 0.1 oz)      Ht Readings from Last 2 Encounters:   06/12/19 1.875 m (6' 1.82\")   06/12/19 1.875 m (6' 1.82\")     KPS: 90    -Generally well appearing. Slightly fatigued.   -Throat: No oral thrush.  -Respiratory: Normal breath sounds, no audible wheezing.   -Skin: No rashes. Healed head incision.  -Hematologic/ lymphatic: No abnormal bruising. No leg swelling.  -Psychiatric: Normal mood and affect. Pleasant, talkative.  -Neurologic:   MENTAL STATUS:     Alert, oriented to date.    Recall: Intact.   Issues to math, numbers.     Speech fluent. Comprehension intact to multi-step commands.   Normal naming, repetition. Able to read.   Good right-left orientation.     CRANIAL NERVES:     Discs flat on " fundoscopy.    Pupils are equal, round, reactive to light.     Extraocular movements full, patient denies diplopia.     Homonymous hemianopsia, right-side.    Facial sensation intact to light touch.   Symmetric facial movements.   Hearing intact.   Palate moves symmetrically.     Sternocleidomastoid and trapezius strength intact.   Tongue midline.  MOTOR:    Normal and symmetric tone.   Grossly 5/5 throughout.    No pronation or drift. No orbiting.   Able to rise from a chair without use of arms.   On toe/ heel walk, equal distance from floor to heels/ toes.   SENSATION:    Intact to light touch throughout.  COORDINATION:   Intact finger-nose with eyes open and closed bilaterally.   REFLEXES:    Normal and symmetric.    Toes not tested. No clonus. No Hoffmans.   No grasp.    GAIT:  Walks without assistance.   Good speed. Normal stride length and heel strike. Normal turns. Normal arm swing.   Able to toe, heel walk. Able to tandem walk.       MEDICAL RECORDS  Obtained and personally reviewed all available outside medical records in addition to reviewing any records available in our electronic system.     LABS  Personally reviewed all available lab results.     IMAGING  No new neuro-imaging to review.         IMPRESSION  Clinic time was spent discussing in detail the nature of this tumor and his current treatment plan. This is in addition to providing emotional support, answering questions pertaining to my recommendations, and devising the treatment plan as outlined below.    With regard to cancer-directed therapy, cycle 1 of adjuvant temozolomide was dosed at 150mg/m2 and was fairly well tolerated except for a reduction in appetite and an increase in fatigue. 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 As a result, will not dose increased to 200mg/m2 for cycle 2.      PROBLEM LIST  Glioblastoma, MGMT methylated and IDH1 wildtype by NGS  Visual field cut; homonymous hemianopsia, right-side  Memory issues  Drug-induced constipation  Hyperplastic gastric polyps, benign  H/o SIADH    PLAN  -CANCER DIRECTED THERAPY-  -Continue adjuvant temozolomide at 150mg/m2 (360mg), cycle 2 (start date of 8/13).   -If this dose is well tolerated, will increase to 200mg/m2 for cycle 3.  -Instructed to take temozolomide in the evening on an empty stomach, 30 minutes after Zofran dosing.  -Supportive medications; Zofran + Dexamethasone 2mg daily as needed for nausea. Bowel regimen.   -If ALC is persistently < 0.5, will restart pentamidine.  -Repeat 28 day cycle if WBC >= 3, ANC >= 1.5, HgB >= 10, and platelets >= 100.    -Surveillance labs reviewed; Ok to start cycle.   -Will repeat every other week CBC at [facility] and repeat CMP every 4 weeks.  -Next generation sequencing can be ordered if further disease progression necessitates evaluating for targeted therapy.    -Repeat imaging in 4 weeks, prior to cycle 3.    -Not interested in [employer] at this time.   -Dr. Thompson Zarco is a neuro-oncologist at [facility].     -STEROIDS-  -Currently off dexamethasone, but can use dexamethasone 2mg daily as needed for nausea.    -SEIZURE MANAGEMENT-  -Given history of seizures, will continue Keppra.  -Epilepsy provoked by intracranial pathology, but also hyponatremia related to SIADH of unclear etiology.     -Quality of life/ MOOD/ FATIGUE-  -Continued mood issues; depression, poor coping.  -Previously discussed starting Zoloft.  -Continue to monitor mood as untreated/ undertreated depression can worsen fatigue, dysorexia, and quality of life.  -Added to Brain Tumor Support Group email list; [email or web address]    -HYPONATREMIA/ History of SIADH-  -Monitor amount of fluid intake.   -Continue to track sodium level on routine CMP (will repeat in 2 weeks).     -MEMORY  COMPLAINTS-  -Consideration for referral to Dr. Aleksandr Garcia for neuro-psych testing.   -Continue speech therapy.    -VISUAL FIELD LOSS-  -Looking to follow-up with neuro-ophtho.     -OTHER SUPPORTIVE MEDICATIONS-  -Continue methotrexate for arthritis. No drug-drug interactions with temozolomide, Zofran, or pentamidine. Bactrim with drug drug interactions.     -ADDITIONAL SUPPORTIVE MANAGEMENT-  -Social work following.   -CT with lung nodules. [facility].   -S/p EGD with benign pathology of the 10mm gastric antrum/pylorus posterior wall lesion.  -Breast biopsy showed necrotic issue, no malignancy.    Will return in 4 weeks with me + labs + imaging.      In the meantime, Jayden and Oz know to call with questions or concerns or to report new complaints and can be seen sooner if needed. Urgent evaluation is needed in the setting of acute onset of severe headache, abrupt change in mental status, on-going seizures, new focal deficits, or new leg swelling/ pain.    Nasra Olmos MD  Neuro-oncology

## 2019-08-12 NOTE — LETTER
8/12/2019       RE: Jayden Lackey  [address]  [place] [address]     Dear Colleague,    Thank you for referring your patient, Jayden Lackey, to the [facility]. Please see a copy of my visit note below.    NEURO-ONCOLOGY VISIT  Aug 12, 2019    CHIEF COMPLAINT: Mr. 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 He completed chemoradiotherapy on 6/11/2019. He is currently managed on adjuvant-dosed temozolomide.     Jayden is presenting in follow-up for continued evaluation accompanied by Oz (wife).      HISTORY OF PRESENT ILLNESS  -Overall, tolerating chemotherapy well. With cycle 1, nausea fairly well controlled on supportive medications, needing several additional doses of Zofran. Reduced appetite. Mild (like 1 event) of constipation on current bowel regimen.   -More fatigued with starting chemotherapy.   -No recurrent seizure events, tolerating Keppra well.   -Mood is better; some difficulties with depression and coping. Strong friend and family support.   -Continues to struggle with word finding and numbers  -Working with OT on visual loss and being more aware. Visual field cut stable.   -Working with speech therapy; mild short-term memory issues and poor attention.   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     REVIEW OF SYSTEMS  A comprehensive ROS negative except as in HPI.      MEDICATIONS   Current Outpatient Medications   Medication Sig Dispense Refill     aspirin 81 MG EC tablet Take 81 mg by mouth daily       atenolol (TENORMIN) 50 MG tablet Take 50 mg by mouth At Bedtime        diltiazem ER COATED BEADS (CARDIZEM CD/CARTIA XT) 120 MG 24 hr capsule Take 120 mg by mouth       docusate sodium (COLACE) 100 MG capsule Take 100 mg by mouth       fish oil-omega-3 fatty acids 1000 MG capsule Take by mouth daily Unknown dose        folic acid (FOLVITE) 1 MG tablet Take 1 mg by mouth       levETIRAcetam (KEPPRA) 750 MG tablet Take 2 tablets by mouth       lisinopril-hydrochlorothiazide (PRINZIDE/ZESTORETIC) 20-25 MG tablet Take 1 tablet by mouth At Bedtime       methotrexate sodium, pres-free, 50 MG/2ML SOLN injection CHEMO TAKE 0.7ML (17.5MG) SUBCUTANEOUS WEEKLY ORAL.  0     naproxen (NAPROSYN DR) 500 MG EC tablet        Nutritional Supplements (JUICE PLUS FIBRE PO) Take 1 tablet by mouth At Bedtime       ondansetron (ZOFRAN) 4 MG tablet Take 1 tablet (4 mg) by mouth At Bedtime ; 30 minutes prior to chemotherapy dosing and repeat every 8 hours as needed for nausea. 60 tablet 3     rosuvastatin (CRESTOR) 5 MG tablet Take 5 mg by mouth At Bedtime        Temozolomide (TEMODAR) 180 MG capsule Take 2 capsules (360 mg) by mouth daily for 5 doses Take ondansetron 30-60 min before temozolomide. Take at bedtime on an empty stomach. 10 capsule 0     DRUG ALLERGIES   Allergies   Allergen Reactions     Shellfish Allergy Difficulty breathing, Nausea and Vomiting, Swelling and Shortness Of Breath     No Clinical Screening - See Comments      Lupine bean doesn't remember reaction     Other (Do Not Use)      Lupine bean doesn't remember reaction       ONCOLOGIC HISTORY  -1/2019 PRESENTATION: Visual disturbance.   -3/6/2019 Evaluated by Dr. Mauricio, neuro-ophthalmologist, found to have a right sided homonymous hemianopsia. MRI ordered.   -3/6/2019 MR brain imaging revealed a contrast-enhancing lesion in the left occipital lobe suspicious for a high-grade primary brain tumor.  -3/22/2019 SURGERY: Craniotomy with a gross total resection by Dr. Irving Hayes.   PATHOLOGY: Glioblastoma; Wildtype status for IDH1/2 by next generation sequencing. MGMT promoter methylated. Wildtype PTEN, TP53.  -4/8/2019 NEURO-ONC: Recommending chemoradiotherapy.   -4/30 - 6/11/2019 CHEMORADS 6000cGy in 30 fractions with concurrent temozolomide 75mg/m2 (180mg).  -5/20/2019 NEURO-ONC: Not  "interested in [employer] at this time.   -6/12/2019 NEURO-ONC: Clinically stable.   -6/21 - 6/28/2019 ADMISSION/ SZ: Admitted to [facility] for first ever seizure event, provoked by hyponatremia. Urine studies consistent with SIADH ; etiology of SIADH unclear. Had 10mm gastric lesion biopsied on 6/26/2019 by way of an EGD.  PATHOLOGY of gastric lesion: Hyperplastic polyp with erosion and foci of atypia; favor reactive. No intestinal metaplasia, no helicobacter pylori, no high grade dysplasia or invasive carcinoma.   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   -7/12/2019 NEURO-ONC/ MRB/ CHEMO: Clinical stable. Imaging with stable to slightly increased enhancement of previously seen nodular lesions; associated with mild degree of elevated rCBV. Starting adjuvant-dosed temozolomide 150mg/m2 (360mg), cycle 1 (start date 7/12/2019). Monitor fluid intake, will be checking CMP/ Na+.   -8/12/2019 NEURO-ONC/ CHEMO: Clinical stable. Adjuvant-dosed temozolomide 150mg/m2 (360mg), cycle 2 (start date 8/13/2019).    SOCIAL HISTORY   Tobacco use: Former smoker, quit 40 years ago.   Alcohol use: Social use.  Drug use: Denies marijuana use.  Supplement, complimentary/ alternative medicine: None.    .   Employment: On disability.       PHYSICAL EXAMINATION  BP (!) 158/91   Pulse 57   Temp 98.7  F (37.1  C) (Oral)   Resp 16   Wt 98.9 kg (218 lb)   SpO2 99%   BMI 28.13 kg/m      Wt Readings from Last 2 Encounters:   08/12/19 98.9 kg (218 lb)   07/12/19 100.7 kg (222 lb 0.1 oz)      Ht Readings from Last 2 Encounters:   06/12/19 1.875 m (6' 1.82\")   06/12/19 1.875 m (6' 1.82\")     KPS: 90    -Generally well appearing. Slightly fatigued.   -Throat: No oral thrush.  -Respiratory: Normal breath sounds, no audible wheezing.   -Skin: No rashes. Healed head incision.  -Hematologic/ lymphatic: No abnormal bruising. No leg swelling.  -Psychiatric: Normal mood and affect. Pleasant, talkative.  -Neurologic:   MENTAL STATUS:  "    Alert, oriented to date.    Recall: Intact.   Issues to math, numbers.     Speech fluent. Comprehension intact to multi-step commands.   Normal naming, repetition. Able to read.   Good right-left orientation.     CRANIAL NERVES:     Discs flat on fundoscopy.    Pupils are equal, round, reactive to light.     Extraocular movements full, patient denies diplopia.     Homonymous hemianopsia, right-side.    Facial sensation intact to light touch.   Symmetric facial movements.   Hearing intact.   Palate moves symmetrically.     Sternocleidomastoid and trapezius strength intact.   Tongue midline.  MOTOR:    Normal and symmetric tone.   Grossly 5/5 throughout.    No pronation or drift. No orbiting.   Able to rise from a chair without use of arms.   On toe/ heel walk, equal distance from floor to heels/ toes.   SENSATION:    Intact to light touch throughout.  COORDINATION:   Intact finger-nose with eyes open and closed bilaterally.   REFLEXES:    Normal and symmetric.    Toes not tested. No clonus. No Hoffmans.   No grasp.    GAIT:  Walks without assistance.   Good speed. Normal stride length and heel strike. Normal turns. Normal arm swing.   Able to toe, heel walk. Able to tandem walk.       MEDICAL RECORDS  Obtained and personally reviewed all available outside medical records in addition to reviewing any records available in our electronic system.     LABS  Personally reviewed all available lab results.     IMAGING  No new neuro-imaging to review.         IMPRESSION  Clinic time was spent discussing in detail the nature of this tumor  and his current treatment plan. This is in addition to providing emotional support, answering questions pertaining to my recommendations, and devising the treatment plan as outlined below.    With regard to cancer-directed therapy, cycle 1 of adjuvant temozolomide was dosed at 150mg/m2 and was fairly well tolerated except for a reduction in appetite and an increase in fatigue. 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 As a result, will not dose increased to 200mg/m2 for cycle 2.     PROBLEM LIST  Glioblastoma, MGMT methylated and IDH1 wildtype by NGS  Visual field cut; homonymous hemianopsia, right-side  Memory issues  Drug-induced constipation  Hyperplastic gastric polyps, benign  H/o SIADH    PLAN  -CANCER DIRECTED THERAPY-  -Continue adjuvant temozolomide at 150mg/m2 (360mg), cycle 2 (start date of 8/13).   -If this dose is well tolerated, will increase to 200mg/m2 for cycle 3.  -Instructed to take temozolomide in the evening on an empty stomach, 30 minutes after Zofran dosing.  -Supportive medications; Zofran + Dexamethasone 2mg daily as needed for nausea. Bowel regimen.   -If ALC is persistently < 0.5, will restart pentamidine.  -Repeat 28 day cycle if WBC >= 3, ANC >= 1.5, HgB >= 10, and platelets >= 100.    -Surveillance labs reviewed; Ok to start cycle.   -Will repeat every other week CBC at [facility] and repeat CMP every 4 weeks.  -Next generation sequencing can be ordered if further disease progression necessitates evaluating for targeted therapy.    -Repeat imaging in 4 weeks, prior to cycle 3.    -Not interested in [employer] at this time.   -Dr. Thompson Zarco is a neuro-oncologist at [facility].     -STEROIDS-  -Currently off dexamethasone, but can use dexamethasone 2mg daily as needed for nausea.    -SEIZURE MANAGEMENT-  -Given history of seizures, will continue Keppra.  -Epilepsy provoked by intracranial pathology, but also hyponatremia related to SIADH of unclear etiology.     -Quality of life/ MOOD/ FATIGUE-  -Continued mood issues; depression, poor coping.  -Previously discussed starting Zoloft.  -Continue to monitor mood as untreated/ undertreated depression can worsen fatigue, dysorexia, and quality of  life.  -Added to Brain Tumor Support Group email list; [email or web address]    -HYPONATREMIA/ History of SIADH-  -Monitor amount of fluid intake.   -Continue to track sodium level on routine CMP (will repeat in 2 weeks).     -MEMORY COMPLAINTS-  -Consideration for referral to Dr. Aleksandr Garcia for neuro-psych testing.   -Continue speech therapy.    -VISUAL FIELD LOSS-  -Looking to follow-up with neuro-ophtho.     -OTHER SUPPORTIVE MEDICATIONS-  -Continue methotrexate for arthritis. No drug-drug interactions with temozolomide, Zofran, or pentamidine. Bactrim with drug drug interactions.     -ADDITIONAL SUPPORTIVE MANAGEMENT-  -Social work following.   -CT with lung nodules. Following with the [facility].   -S/p EGD with benign pathology of the 10mm gastric antrum/pylorus posterior wall lesion.  -Breast biopsy showed necrotic issue, no malignancy.    Will return in 4 weeks with me + labs + imaging.      In the meantime, Jayden and Oz know to call with questions or concerns or to report new complaints and can be seen sooner if needed. Urgent evaluation is needed in the setting of acute onset of severe headache, abrupt change in mental status, on-going seizures, new focal deficits, or new leg swelling/ pain.    Nasra Olmos MD  Neuro-oncology

## 2019-08-12 NOTE — NURSING NOTE
"Oncology Rooming Note    August 12, 2019 3:25 PM   Jayden Lackey is a 64 year old male who presents for:    Chief Complaint   Patient presents with     Oncology Clinic Visit     Glioblastoma     Initial Vitals: BP (!) 158/91   Pulse 57   Temp 98.7  F (37.1  C) (Oral)   Resp 16   Wt 98.9 kg (218 lb)   SpO2 99%   BMI 28.13 kg/m   Estimated body mass index is 28.13 kg/m  as calculated from the following:    Height as of 6/12/19: 1.875 m (6' 1.82\").    Weight as of this encounter: 98.9 kg (218 lb). Body surface area is 2.27 meters squared.  No Pain (0) Comment: Data Unavailable   No LMP for male patient.  Allergies reviewed: Yes  Medications reviewed: Yes    Medications: Medication refills not needed today.  Pharmacy name entered into [employer]:    [id number] - [address] [facility] - [place], [place] - [address]    Clinical concerns: n/a       UNRULY SCOTT CMA              "

## 2019-08-13 ENCOUNTER — PATIENT OUTREACH (OUTPATIENT)
Dept: ONCOLOGY | Facility: CLINIC | Age: 64
End: 2019-08-13

## 2019-08-13 NOTE — PROGRESS NOTES
Standing lab [facility] for [phone]. Patient's wife notified and verbalized understanding.    Griselda Jones, RN, BSN  Care Coordinator  [facility]

## 2019-08-16 ENCOUNTER — RECORDS - HEALTHEAST (OUTPATIENT)
Dept: LAB | Facility: HOSPITAL | Age: 64
End: 2019-08-16

## 2019-08-16 LAB — LEVETIRACETAM (KEPPRA): 10.2 UG/ML (ref 6–46)

## 2019-08-24 ENCOUNTER — AMBULATORY - HEALTHEAST (OUTPATIENT)
Dept: LAB | Facility: HOSPITAL | Age: 64
End: 2019-08-24

## 2019-08-24 DIAGNOSIS — C71.9 BRAIN NEOPLASM MALIGNANT (H): ICD-10-CM

## 2019-08-24 LAB
BASOPHILS # BLD AUTO: 0 THOU/UL (ref 0–0.2)
BASOPHILS NFR BLD AUTO: 1 % (ref 0–2)
EOSINOPHIL # BLD AUTO: 0.2 THOU/UL (ref 0–0.4)
EOSINOPHIL NFR BLD AUTO: 4 % (ref 0–6)
ERYTHROCYTE [DISTWIDTH] IN BLOOD BY AUTOMATED COUNT: 15.3 % (ref 11–14.5)
HCT VFR BLD AUTO: 34.8 % (ref 40–54)
HGB BLD-MCNC: 11.6 G/DL (ref 14–18)
LYMPHOCYTES # BLD AUTO: 1.1 THOU/UL (ref 0.8–4.4)
LYMPHOCYTES NFR BLD AUTO: 25 % (ref 20–40)
MCH RBC QN AUTO: 27.4 PG (ref 27–34)
MCHC RBC AUTO-ENTMCNC: 33.3 G/DL (ref 32–36)
MCV RBC AUTO: 82 FL (ref 80–100)
MONOCYTES # BLD AUTO: 0.5 THOU/UL (ref 0–0.9)
MONOCYTES NFR BLD AUTO: 11 % (ref 2–10)
NEUTROPHILS # BLD AUTO: 2.5 THOU/UL (ref 2–7.7)
NEUTROPHILS NFR BLD AUTO: 59 % (ref 50–70)
PLATELET # BLD AUTO: 180 THOU/UL (ref 140–440)
PMV BLD AUTO: 9.5 FL (ref 8.5–12.5)
RBC # BLD AUTO: 4.24 MILL/UL (ref 4.4–6.2)
WBC: 4.3 THOU/UL (ref 4–11)

## 2019-08-26 ENCOUNTER — TELEPHONE (OUTPATIENT)
Dept: ONCOLOGY | Facility: CLINIC | Age: 64
End: 2019-08-26

## 2019-08-26 NOTE — TELEPHONE ENCOUNTER
Oral Chemotherapy Monitoring Program    Primary Oncologist: Dr Nasra Olmos  Primary Oncology Clinic: [facility]  Cancer Diagnosis: Glioblastoma     Therapy History:  Temodar 360mg ([id number]) PO daily x 5 days, then 23 days off  C1D1 = 7/12/19  C2D1 = 8/13/19     Subjective/Objective:  Jayden Lackey is a 64 year old male contacted by phone (spoke to Oz, his wife) for a follow-up visit for oral chemotherapy.  She reports that after the last cycle he did have some days that he was not interested in eating much, and was more depressed.  She denied nausea/vomiting.    ORAL CHEMOTHERAPY 4/8/2019 5/7/2019 6/6/2019 7/15/2019 8/26/2019   Drug Name Temodar (Temozolomide) Temodar (Temozolomide) Temodar (Temozolomide) Temodar (Temozolomide) Temodar (Temozolomide)   Current Dosage 180mg 180mg 180mg 360mg 360mg   Current Schedule QHS QHS QHS Daily Daily   Cycle Details Continuous for 42 days during XRT Continuous for 42 days during XRT Continuous for 42 days during XRT 5 days on, then 23 days off 5 days on, then 23 days off   Start Date of Last Cycle - 4/29/2019 4/29/2019 7/12/2019 8/13/2019   Planned next cycle start date - - - 8/9/2019 9/10/2019   Doses missed in last 2 weeks - 0 0 0 0   Adherence Assessment - Adherent Adherent Adherent Adherent   Adverse Effects - No AE identified during assessment Constipation;Fatigue No AE identified during assessment No AE identified during assessment   Constipation - - Resolved due to intervention - -   Fatigue - - Grade 1 - -   Pharmacist Intervention(fatigue) - - Yes - -   Intervention(s) - - Patient education - -   Home BPs - not needed - - -   Any new drug interactions? - No No - -   Is the dose as ordered appropriate for the patient? - Yes Yes - Yes     Vitals:  BP:   BP Readings from Last 1 Encounters:   08/12/19 (!) 158/91     Wt Readings from Last 1 Encounters:   08/12/19 98.9 kg (218 lb)     Estimated body surface area is 2.27 meters squared as calculated from  "the following:    Height as of 6/12/19: 1.875 m (6' 1.82\").    Weight as of 8/12/19: 98.9 kg (218 lb).    Local labs showed 8/26: WBC 4.3, Hgb 11.6, , ANC 2.5, ALC 1.1.    Assessment:  Patient is tolerating Temodar treatment fairly well.    Plan:  No change at present.     Follow-Up:  Will call with biweekly labs.    Erika Whiteside, PharmD, BCPS, BCOP  Oncology Clinical Pharmacy Specialist  [facility]/ [facility]  [phone]    "

## 2019-09-04 ENCOUNTER — HOSPITAL ENCOUNTER (OUTPATIENT)
Dept: NEUROLOGY | Facility: HOSPITAL | Age: 64
Discharge: HOME | End: 2019-09-04
Attending: PSYCHIATRY & NEUROLOGY

## 2019-09-04 DIAGNOSIS — R56.9 SEIZURE (H): ICD-10-CM

## 2019-09-12 ENCOUNTER — TELEPHONE (OUTPATIENT)
Dept: ONCOLOGY | Facility: CLINIC | Age: 64
End: 2019-09-12

## 2019-09-12 NOTE — TELEPHONE ENCOUNTER
Called patient's spouse, relayed entire message below. She verbalized understanding. Message sent to scheduling for lab appt tomorrow morning.    Griselda Jones RN, BSN  Care Coordinator  [facility]        ----- Message from Nasra Olmos MD sent at 9/12/2019  1:42 PM CDT -----  Regarding: RE: Wife called again  Ok, they can redo CBC then too.   Thanks,   Nasra Olmos MD  Neuro-oncology  9/12/2019     ----- Message -----  From: Griselda Jones RN  Sent: 9/12/2019  10:46 AM  To: Nasra Olmos MD  Subject: RE: Wife called again                            He only got a BMP so not LFTs. I will get lab appt scheduled.    Griselda Florian  ----- Message -----  From: Nasra Olmos MD  Sent: 9/11/2019   4:51 PM  To: Griselda Jones RN  Subject: RE: Wife called again                            1. If he wants to restart for joint pain he can.   2. 8mg in AM and 4mg at noon starting tomorrow. I will continue to taper when I see them on Friday.   3. No MRI. If he got a CBC with diff and CMP this week, no labs needed.   Nasra Olmos MD  Neuro-oncology  9/11/2019         ----- Message -----  From: Griselda Jones RN  Sent: 9/11/2019   1:41 PM  To: Nasra Olmos MD  Subject: Wife called again                                Patient's wife called. She has some questions that she realizes can wait until Friday's appt but wanted me to ask in case you feel they are more urgent.     1. Hospital did not okay resuming Methotrexate so he has been off since being admitted. Should he resume?    2. He was discharged on 8mg Dex BID which she realizes is very high. Should she start tapering?    3. They've been getting calls to schedule an MRI. He had one at the hospital days ago. Do you want another one? Do you want labs?     I will call her with your responses.    Griselda Florian

## 2019-09-13 ENCOUNTER — ONCOLOGY VISIT (OUTPATIENT)
Dept: ONCOLOGY | Facility: CLINIC | Age: 64
End: 2019-09-13
Attending: PSYCHIATRY & NEUROLOGY
Payer: COMMERCIAL

## 2019-09-13 ENCOUNTER — APPOINTMENT (OUTPATIENT)
Dept: LAB | Facility: CLINIC | Age: 64
End: 2019-09-13
Attending: PSYCHIATRY & NEUROLOGY
Payer: COMMERCIAL

## 2019-09-13 VITALS
HEART RATE: 85 BPM | TEMPERATURE: 98.6 F | DIASTOLIC BLOOD PRESSURE: 80 MMHG | WEIGHT: 224.9 LBS | SYSTOLIC BLOOD PRESSURE: 127 MMHG | BODY MASS INDEX: 29.02 KG/M2 | OXYGEN SATURATION: 98 %

## 2019-09-13 DIAGNOSIS — R11.2 CHEMOTHERAPY-INDUCED NAUSEA AND VOMITING: ICD-10-CM

## 2019-09-13 DIAGNOSIS — C71.9 GLIOBLASTOMA (H): Primary | ICD-10-CM

## 2019-09-13 DIAGNOSIS — T45.1X5A CHEMOTHERAPY-INDUCED NAUSEA AND VOMITING: ICD-10-CM

## 2019-09-13 DIAGNOSIS — R06.6 HICCUPS: ICD-10-CM

## 2019-09-13 DIAGNOSIS — Z79.899 ENCOUNTER FOR LONG-TERM (CURRENT) USE OF MEDICATIONS: ICD-10-CM

## 2019-09-13 DIAGNOSIS — E87.1 HYPONATREMIA: ICD-10-CM

## 2019-09-13 DIAGNOSIS — Z51.11 CHEMOTHERAPY MANAGEMENT, ENCOUNTER FOR: ICD-10-CM

## 2019-09-13 DIAGNOSIS — K21.9 GASTROESOPHAGEAL REFLUX DISEASE WITHOUT ESOPHAGITIS: ICD-10-CM

## 2019-09-13 LAB
ALBUMIN SERPL-MCNC: 3 G/DL (ref 3.4–5)
ALP SERPL-CCNC: 53 U/L (ref 40–150)
ALT SERPL W P-5'-P-CCNC: 83 U/L (ref 0–70)
ANION GAP SERPL CALCULATED.3IONS-SCNC: 9 MMOL/L (ref 3–14)
AST SERPL W P-5'-P-CCNC: 16 U/L (ref 0–45)
BASOPHILS # BLD AUTO: 0 10E9/L (ref 0–0.2)
BASOPHILS NFR BLD AUTO: 0.2 %
BILIRUB SERPL-MCNC: 0.6 MG/DL (ref 0.2–1.3)
BUN SERPL-MCNC: 24 MG/DL (ref 7–30)
CALCIUM SERPL-MCNC: 7.8 MG/DL (ref 8.5–10.1)
CHLORIDE SERPL-SCNC: 105 MMOL/L (ref 94–109)
CO2 SERPL-SCNC: 24 MMOL/L (ref 20–32)
CREAT SERPL-MCNC: 0.76 MG/DL (ref 0.66–1.25)
DIFFERENTIAL METHOD BLD: ABNORMAL
EOSINOPHIL # BLD AUTO: 0 10E9/L (ref 0–0.7)
EOSINOPHIL NFR BLD AUTO: 0 %
ERYTHROCYTE [DISTWIDTH] IN BLOOD BY AUTOMATED COUNT: 15.9 % (ref 10–15)
GFR SERPL CREATININE-BSD FRML MDRD: >90 ML/MIN/{1.73_M2}
GLUCOSE SERPL-MCNC: 136 MG/DL (ref 70–99)
HCT VFR BLD AUTO: 37.9 % (ref 40–53)
HGB BLD-MCNC: 13 G/DL (ref 13.3–17.7)
IMM GRANULOCYTES # BLD: 0.2 10E9/L (ref 0–0.4)
IMM GRANULOCYTES NFR BLD: 1.8 %
LYMPHOCYTES # BLD AUTO: 0.6 10E9/L (ref 0.8–5.3)
LYMPHOCYTES NFR BLD AUTO: 5.1 %
MCH RBC QN AUTO: 28.4 PG (ref 26.5–33)
MCHC RBC AUTO-ENTMCNC: 34.3 G/DL (ref 31.5–36.5)
MCV RBC AUTO: 83 FL (ref 78–100)
MONOCYTES # BLD AUTO: 1.1 10E9/L (ref 0–1.3)
MONOCYTES NFR BLD AUTO: 9.3 %
NEUTROPHILS # BLD AUTO: 10.3 10E9/L (ref 1.6–8.3)
NEUTROPHILS NFR BLD AUTO: 83.6 %
NRBC # BLD AUTO: 0 10*3/UL
NRBC BLD AUTO-RTO: 0 /100
PLATELET # BLD AUTO: 229 10E9/L (ref 150–450)
POTASSIUM SERPL-SCNC: 3.7 MMOL/L (ref 3.4–5.3)
PROT SERPL-MCNC: 5.8 G/DL (ref 6.8–8.8)
RBC # BLD AUTO: 4.58 10E12/L (ref 4.4–5.9)
SODIUM SERPL-SCNC: 138 MMOL/L (ref 133–144)
WBC # BLD AUTO: 12.3 10E9/L (ref 4–11)

## 2019-09-13 PROCEDURE — 36415 COLL VENOUS BLD VENIPUNCTURE: CPT

## 2019-09-13 PROCEDURE — 85025 COMPLETE CBC W/AUTO DIFF WBC: CPT | Performed by: PSYCHIATRY & NEUROLOGY

## 2019-09-13 PROCEDURE — 99215 OFFICE O/P EST HI 40 MIN: CPT | Mod: ZP | Performed by: PSYCHIATRY & NEUROLOGY

## 2019-09-13 PROCEDURE — G0463 HOSPITAL OUTPT CLINIC VISIT: HCPCS | Mod: ZF

## 2019-09-13 PROCEDURE — 80053 COMPREHEN METABOLIC PANEL: CPT | Performed by: PSYCHIATRY & NEUROLOGY

## 2019-09-13 RX ORDER — FAMOTIDINE 20 MG/1
20 TABLET, FILM COATED ORAL 2 TIMES DAILY
COMMUNITY
End: 2019-09-13

## 2019-09-13 RX ORDER — FAMOTIDINE 20 MG/1
20 TABLET, FILM COATED ORAL 2 TIMES DAILY
Qty: 60 TABLET | Refills: 1 | Status: SHIPPED | OUTPATIENT
Start: 2019-09-13 | End: 2019-10-17

## 2019-09-13 RX ORDER — DEXAMETHASONE 4 MG/1
8 TABLET ORAL
Qty: 60 TABLET | Refills: 1 | Status: SHIPPED | OUTPATIENT
Start: 2019-09-13 | End: 2019-10-17

## 2019-09-13 RX ORDER — DEXAMETHASONE 4 MG/1
TABLET ORAL
Refills: 0 | COMMUNITY
Start: 2019-09-10 | End: 2019-09-13

## 2019-09-13 RX ORDER — LACOSAMIDE 100 MG/1
150 TABLET ORAL 2 TIMES DAILY
Status: ON HOLD | COMMUNITY
Start: 2019-09-10 | End: 2020-06-27

## 2019-09-13 ASSESSMENT — PAIN SCALES - GENERAL: PAINLEVEL: NO PAIN (0)

## 2019-09-13 NOTE — NURSING NOTE
"Oncology Rooming Note    September 13, 2019 10:21 AM   Jayden Lackey is a 64 year old male who presents for:    Chief Complaint   Patient presents with     Blood Draw     labs drawn via venipuncture by RN in lab     Oncology Clinic Visit     Glioblastoma , Labs      Initial Vitals: /80 (BP Location: Left arm, Patient Position: Chair, Cuff Size: Adult Regular)   Pulse 85   Temp 98.6  F (37  C)   Wt 102 kg (224 lb 14.4 oz)   SpO2 98%   BMI 29.02 kg/m   Estimated body mass index is 29.02 kg/m  as calculated from the following:    Height as of 6/12/19: 1.875 m (6' 1.82\").    Weight as of this encounter: 102 kg (224 lb 14.4 oz). Body surface area is 2.3 meters squared.  No Pain (0) Comment: Data Unavailable   No LMP for male patient.  Allergies reviewed: Yes  Medications reviewed: Yes    Medications: Medication refills not needed today.  Pharmacy name entered into [employer]:    [id number] - [place], [place] - [address] [place] [address] [facility] [place] [place] [facility] [address] [facility] [place], [place] [address]    Clinical concerns: Needs a perscription for Mary Olmos  was notified.      Rose Gomez MA              "

## 2019-09-13 NOTE — PATIENT INSTRUCTIONS
PET CT brain to be performed.     For hiccups;   -Pepcid (refilled)  -Weaning dexamethasone  -Possibly Baclofen (muscle relaxer)    Dexamethasone taper;   -Until 9/17: 8mg in AM and 4mg at noon.   -9/18 - 9/23: 8mg in AM  -9/24 - 9/29: 4mg in AM  -Call for continued instructions.   -Refilled.    Continue with home care + OT/ speech.     Return to clinic pending scan results.     Nasra Olmos MD  Neuro-oncology  9/13/2019

## 2019-09-13 NOTE — PROGRESS NOTES
NEURO-ONCOLOGY VISIT  Sep 13, 2019    CHIEF COMPLAINT: Mr. 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 He completed chemoradiotherapy on 6/11/2019. He is currently managed on adjuvant-dosed temozolomide. Quality of life has been compromised by recurrent seizure events requiring prolonged hospitalization.     Jayden is presenting in follow-up for continued evaluation accompanied by Oz (wife).      HISTORY OF PRESENT ILLNESS  -Jayden was admitted from 9/4 - 9/10 for seizurs; Dexamethasone and Keppra increased to 1000mg BID + Vimpat 100mg BID.   -Overall, did not tolerate chemotherapy well. While nausea fairly well controlled on supportive medications, he had reduced appetite, constipation, significant fatigue, and other neurological complaints (that may or may not be related to chemotherapy; seizure event, confusion, poor memory, etc.).   -Feels that QOL is poor and wanted to stop treatment.   -Endorsing hiccups.   -Mood is depressed; frustrated with memory issues, inability to read, and fatigue not allowing him to be active.   -Continues to struggle with word finding, now reading, and numbers  -Working with OT on visual loss and being more aware. Visual field cut stable.   -Working with speech therapy; mild short-term memory issues and poor attention.     REVIEW OF SYSTEMS  A comprehensive ROS negative except as in HPI.      MEDICATIONS   Current Outpatient Medications   Medication Sig Dispense Refill     aspirin 81 MG EC tablet Take 81 mg by mouth daily       atenolol (TENORMIN) 50 MG tablet Take 50 mg by mouth At Bedtime        dexamethasone (DECADRON) 4 MG tablet Take 2 tablets (8 mg) by mouth daily (with breakfast) 60 tablet 1     diltiazem ER COATED BEADS (CARDIZEM CD/CARTIA XT) 120 MG 24 hr capsule Take 180 mg by mouth daily        docusate sodium (COLACE)  100 MG capsule Take 100 mg by mouth       famotidine (PEPCID) 20 MG tablet Take 1 tablet (20 mg) by mouth 2 times daily 60 tablet 1     fish oil-omega-3 fatty acids 1000 MG capsule Take by mouth daily Unknown dose       folic acid (FOLVITE) 1 MG tablet Take 1 mg by mouth       Lacosamide (VIMPAT) 100 MG TABS tablet Take 100 mg by mouth       levETIRAcetam (KEPPRA) 750 MG tablet Take 1,000 tablets by mouth 2 times daily        Nutritional Supplements (JUICE PLUS FIBRE PO) Take 1 tablet by mouth At Bedtime       lisinopril-hydrochlorothiazide (PRINZIDE/ZESTORETIC) 20-25 MG tablet Take 1 tablet by mouth At Bedtime       methotrexate sodium, pres-free, 50 MG/2ML SOLN injection CHEMO TAKE 0.7ML (17.5MG) SUBCUTANEOUS WEEKLY ORAL.  0     naproxen (NAPROSYN DR) 500 MG EC tablet        ondansetron (ZOFRAN) 4 MG tablet Take 1 tablet (4 mg) by mouth At Bedtime ; 30 minutes prior to chemotherapy dosing and repeat every 8 hours as needed for nausea. (Patient not taking: Reported on 9/13/2019) 60 tablet 3     rosuvastatin (CRESTOR) 5 MG tablet Take 5 mg by mouth At Bedtime        DRUG ALLERGIES   Allergies   Allergen Reactions     Shellfish Allergy Difficulty breathing, Nausea and Vomiting, Swelling and Shortness Of Breath     No Clinical Screening - See Comments      Lupine bean doesn't remember reaction     Other (Do Not Use)      Lupine bean doesn't remember reaction       ONCOLOGIC HISTORY  -1/2019 PRESENTATION: Visual disturbance.   -3/6/2019 Evaluated by Dr. Mauricio, neuro-ophthalmologist, found to have a right sided homonymous hemianopsia. MRI ordered.   -3/6/2019 MR brain imaging revealed a contrast-enhancing lesion in the left occipital lobe suspicious for a high-grade primary brain tumor.  -3/22/2019 SURGERY: Craniotomy with a gross total resection by Dr. Irving Hayes.   PATHOLOGY: Glioblastoma; Wildtype status for IDH1/2 by next generation sequencing. MGMT promoter methylated. Wildtype PTEN, TP53.  -4/8/2019 NEURO-ONC:  "Recommending chemoradiotherapy.   -4/30 - 6/11/2019 CHEMORADS 6000cGy in 30 fractions with concurrent temozolomide 75mg/m2 (180mg).  -5/20/2019 NEURO-ONC: Not interested in Optune at this time.   -6/12/2019 NEURO-ONC: Clinically stable.   -6/21 - 6/28/2019 ADMISSION/ [facility]Z: Admitted to [facility] for first ever seizure event, provoked by hyponatremia. Urine studies consistent with SIADH ; etiology of SIADH unclear. Had 10mm gastric lesion biopsied on 6/26/2019 by way of an EGD.  PATHOLOGY of gastric lesion: Hyperplastic polyp with erosion and foci of atypia; favor reactive. No intestinal metaplasia, no helicobacter pylori, no high grade dysplasia or invasive carcinoma.   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   -7/12/2019 NEURO-ONC/ MRB/ CHEMO: Clinical stable. Imaging with stable to slightly increased enhancement of previously seen nodular lesions; associated with mild degree of elevated rCBV. Starting adjuvant-dosed temozolomide 150mg/m2 (360mg), cycle 1 (start date 7/12/2019). Monitor fluid intake, will be checking CMP/ Na+.   -8/12/2019 NEURO-ONC/ MRB/ CHEMO: Clinical decompensated, but improving since hospital discharge. MR brain scan from last week with concern for non-contrast enhancing >> enhancing disease progression. Ordering a PET brain scan. Holding adjuvant-dosed temozolomide, cycle 3.    SOCIAL HISTORY   Tobacco use: Former smoker, quit 40 years ago.   Alcohol use: Social use.  Drug use: Denies marijuana use.  Supplement, complimentary/ alternative medicine: None.    .   Employment: On disability.       PHYSICAL EXAMINATION  /80 (BP Location: Left arm, Patient Position: Chair, Cuff Size: Adult Regular)   Pulse 85   Temp 98.6  F (37  C)   Wt 102 kg (224 lb 14.4 oz)   SpO2 98%   BMI 29.02 kg/m     Wt Readings from Last 2 Encounters:   09/13/19 102 kg (224 lb 14.4 oz)   08/12/19 98.9 kg (218 lb)      Ht Readings from Last 2 Encounters:   06/12/19 1.875 m (6' 1.82\")   06/12/19 1.875 m " "(6' 1.82\")     KPS: 70    -Generally well appearing. Slightly fatigued.   -Respiratory: Normal breath sounds, no audible wheezing.   -Skin: No rashes. Healed head incision. Hair regrowing.   -Hematologic/ lymphatic: Bruising on arms from IV use. No leg swelling.  -Psychiatric: Flat mood and affect. Pleasant, talkative.  -Neurologic:   MENTAL STATUS:     Alert, easily confused.    Repeating questions.     Recall: Impaired.   Issues to math, numbers.     Slowed reading speed.    Speech nearly fluent, but with hesitation.    Comprehension intact to multi-step commands.   Near normal naming, repetition.   Good right-left orientation.     CRANIAL NERVES:     Pupils are equal, round, reactive to light.     Extraocular movements full, patient denies diplopia.     Homonymous hemianopsia, right-side.    Facial sensation intact to light touch.   Symmetric facial movements.   Hearing intact.   Palate moves symmetrically.     Tongue midline.  MOTOR:    Normal and symmetric tone.   Grossly 5/5 throughout.    No pronation or drift. No orbiting.   Able to rise from a chair without use of arms.  SENSATION:    Intact to light touch throughout.  COORDINATION:   Intact finger-nose with eyes open and closed bilaterally.   GAIT:  Walks without assistance.   Good speed. Normal stride length and heel strike. Normal turns. Normal arm swing.      MEDICAL RECORDS  Obtained and personally reviewed all available outside medical records in addition to reviewing any records available in our electronic system.     LABS  Personally reviewed all available lab results.     IMAGING  Personally reviewed outside MR brain imaging from last week and compared to prior imaging. To my eye, the nodules of contrast enhancement now have necrotic centers, but have joined into a larger contrast enhancing mass. T2 FLAIR is increased with extension into the medial temporal lobe, very distant from contrast enhancing disease in the occipital lobe.     Imaging was " shown to and results were reviewed with Jayden and Oz.      IMPRESSION  Clinic time was spent discussing in detail the nature of this tumor in light of concerning imaging from when he was hospitalized for seizures and how this effects his current treatment plan. This is in addition to providing emotional support, answering questions pertaining to my recommendations, and devising the treatment plan as outlined below.    Imaging is concerning for non-contrast enhancing >> contrast enhancing disease progression with extension into the medial temporal lobe. Involvement of the temporal lobe can certainly be the etiology of his recurrent seizure event with associated and prolonged confusion, memory issues, and aphasia. Will order a PET brain scan to further evaluate.     With regard to cancer-directed therapy, Jayden said that he is not tolerating temozolomide well and that his quality of life is grossly effected by fatigue + these recurrent hospitalizations for seizures. He is wanting to stop temozolomide. Pending the PET brain scan, if results are consistent with disease progression there can be a consideration for Avastin monotherapy and/ or Optune or hospice. If imaging is consistent with pseudoprogression will need to discuss better symptomatic support with adjuvant temozolomide and/ or Optune.     Jayden is following with neurology that is managing Keppra and Vimpat.     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 Will never look to dose increase to 200mg/m2 if restarted.   -Plans pending PET brain scan.     -STEROIDS-  -Dexamethasone taper;    -Until 9/17: 8mg in AM and 4mg at noon.    -9/18 - 9/23: 8mg in AM   -9/24 - 9/29: 4mg in AM  -Oz to call with new  symptoms related to dose decrease in steroids.     -SEIZURE MANAGEMENT-  -Given history of seizures, will continue Keppra + Vimpat.  -Epilepsy provoked by intracranial pathology, but also hyponatremia related to SIADH of unclear etiology.   -Followed by neurology at [facility].    -Quality of life/ MOOD/ FATIGUE-  -Continued mood issues; depression, poor coping.  -Previously discussed starting Zoloft.  -Continue to monitor mood as untreated/ undertreated depression can worsen fatigue, dysorexia, and quality of life.  -Added to Brain Tumor Support Group email list; [email or web address]    -HICCUPS-  -Pepcid (refilled).  -Weaning dexamethasone.  -Possibly Baclofen (muscle relaxer) if needed.    -HYPONATREMIA/ History of SIADH-  -Monitor amount of fluid intake.   -Continue to track sodium level on routine CMP.    -MEMORY COMPLAINTS-  -Consideration for referral to Dr. Aleksandr Garcia for neuro-psych testing.   -Continue speech therapy.    -VISUAL FIELD LOSS-  -Looking to follow-up with neuro-ophtho.   -Continue following with OT.     -OTHER SUPPORTIVE MEDICATIONS-  -Continue methotrexate for arthritis. No drug-drug interactions with temozolomide, Zofran, or pentamidine. Bactrim with drug drug interactions.     -ADDITIONAL SUPPORTIVE MANAGEMENT-  -Social work following.   -CT with lung nodules. [facility] with [facility].   -S/p EGD with benign pathology of the 10mm gastric antrum/pylorus posterior wall lesion.  -Breast biopsy showed necrotic issue, no malignancy.    Return to clinic pending PET brain results.       In the meantime, Aracelis know to call with questions or concerns or to report new complaints and can be seen sooner if needed. Urgent evaluation is needed in the setting of acute onset of severe headache, abrupt change in mental status, on-going seizures, new focal deficits, or new leg swelling/ pain.    Nasra Olmos MD  Neuro-oncology

## 2019-09-13 NOTE — LETTER
RE: Jayden Lackey  [address]  [facility] [address]     Dear Colleague,    Thank you for referring your patient, Jayden Lackey, to the [facility]. Please see a copy of my visit note below.    NEURO-ONCOLOGY VISIT  Sep 13, 2019    CHIEF COMPLAINT: Mr. 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 He completed chemoradiotherapy on 6/11/2019. He is currently managed on adjuvant-dosed temozolomide. Quality of life has been compromised by recurrent seizure events requiring prolonged hospitalization.     Jayden is presenting in follow-up for continued evaluation accompanied by Oz (wife).    HISTORY OF PRESENT ILLNESS  -Jayden was admitted from 9/4 - 9/10 for seizurs; Dexamethasone and Keppra increased to 1000mg BID + Vimpat 100mg BID.   -Overall, did not tolerate chemotherapy well. While nausea fairly well controlled on supportive medications, he had reduced appetite, constipation, significant fatigue, and other neurological complaints (that may or may not be related to chemotherapy; seizure event, confusion, poor memory, etc.).   -Feels that QOL is poor and wanted to stop treatment.   -Endorsing hiccups.   -Mood is depressed; frustrated with memory issues, inability to read, and fatigue not allowing him to be active.   -Continues to struggle with word finding, now reading, and numbers  -Working with OT on visual loss and being more aware. Visual field cut stable.   -Working with speech therapy; mild short-term memory issues and poor attention.     REVIEW OF SYSTEMS  A comprehensive ROS negative except as in HPI.    MEDICATIONS   Current Outpatient Medications   Medication Sig Dispense Refill     aspirin 81 MG EC tablet Take 81 mg by mouth daily       atenolol (TENORMIN) 50 MG tablet Take 50 mg by mouth At Bedtime         dexamethasone (DECADRON) 4 MG tablet Take 2 tablets (8 mg) by mouth daily (with breakfast) 60 tablet 1     diltiazem ER COATED BEADS (CARDIZEM CD/CARTIA XT) 120 MG 24 hr capsule Take 180 mg by mouth daily        docusate sodium (COLACE) 100 MG capsule Take 100 mg by mouth       famotidine (PEPCID) 20 MG tablet Take 1 tablet (20 mg) by mouth 2 times daily 60 tablet 1     fish oil-omega-3 fatty acids 1000 MG capsule Take by mouth daily Unknown dose       folic acid (FOLVITE) 1 MG tablet Take 1 mg by mouth       Lacosamide (VIMPAT) 100 MG TABS tablet Take 100 mg by mouth       levETIRAcetam (KEPPRA) 750 MG tablet Take 1,000 tablets by mouth 2 times daily        Nutritional Supplements (JUICE PLUS FIBRE PO) Take 1 tablet by mouth At Bedtime       lisinopril-hydrochlorothiazide (PRINZIDE/ZESTORETIC) 20-25 MG tablet Take 1 tablet by mouth At Bedtime       methotrexate sodium, pres-free, 50 MG/2ML SOLN injection CHEMO TAKE 0.7ML (17.5MG) SUBCUTANEOUS WEEKLY ORAL.  0     naproxen (NAPROSYN DR) 500 MG EC tablet        ondansetron (ZOFRAN) 4 MG tablet Take 1 tablet (4 mg) by mouth At Bedtime ; 30 minutes prior to chemotherapy dosing and repeat every 8 hours as needed for nausea. (Patient not taking: Reported on 9/13/2019) 60 tablet 3     rosuvastatin (CRESTOR) 5 MG tablet Take 5 mg by mouth At Bedtime        DRUG ALLERGIES   Allergies   Allergen Reactions     Shellfish Allergy Difficulty breathing, Nausea and Vomiting, Swelling and Shortness Of Breath     No Clinical Screening - See Comments      Lupine bean doesn't remember reaction     Other (Do Not Use)      Lupine bean doesn't remember reaction       ONCOLOGIC HISTORY  -1/2019 PRESENTATION: Visual disturbance.   -3/6/2019 Evaluated by Dr. Mauricio, neuro-ophthalmologist, found to have a right sided homonymous hemianopsia. MRI ordered.   -3/6/2019 MR brain imaging revealed a contrast-enhancing lesion in the left occipital lobe suspicious for a high-grade primary brain  tumor.  -3/22/2019 SURGERY: Craniotomy with a gross total resection by Dr. Irving Hayes.   PATHOLOGY: Glioblastoma; Wildtype status for IDH1/2 by next generation sequencing. MGMT promoter methylated. Wildtype PTEN, TP53.  -4/8/2019 NEURO-ONC: Recommending chemoradiotherapy.   -4/30 - 6/11/2019 CHEMORADS 6000cGy in 30 fractions with concurrent temozolomide 75mg/m2 (180mg).  -5/20/2019 NEURO-ONC: Not interested in Optune at this time.   -6/12/2019 NEURO-ONC: Clinically stable.   -6/21 - 6/28/2019 ADMISSION/ SZ: Admitted to [facility] for first ever seizure event, provoked by hyponatremia. Urine studies consistent with SIADH ; etiology of SIADH unclear. Had 10mm gastric lesion biopsied on 6/26/2019 by way of an EGD.  PATHOLOGY of gastric lesion: Hyperplastic polyp with erosion and foci of atypia; favor reactive. No intestinal metaplasia, no helicobacter pylori, no high grade dysplasia or invasive carcinoma.   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   -7/12/2019 NEURO-ONC/ MRB/ CHEMO: Clinical stable. Imaging with stable to slightly increased enhancement of previously seen nodular lesions; associated with mild degree of elevated rCBV. Starting adjuvant-dosed temozolomide 150mg/m2 (360mg), cycle 1 (start date 7/12/2019). Monitor fluid intake, will be checking CMP/ Na+.   -8/12/2019 NEURO-ONC/ MRB/ CHEMO: Clinical decompensated, but improving since hospital discharge. MR brain scan from last week with concern for non-contrast enhancing >> enhancing disease progression. Ordering a PET brain scan. Holding adjuvant-dosed temozolomide, cycle 3.    SOCIAL HISTORY   Tobacco use: Former smoker, quit 40 years ago.   Alcohol use: Social use.  Drug use: Denies marijuana use.  Supplement, complimentary/ alternative medicine: None.    .   Employment: On disability.       PHYSICAL EXAMINATION  /80 (BP Location: Left arm, Patient Position: Chair, Cuff Size: Adult Regular)   Pulse 85   Temp 98.6  F (37  C)   Wt 102  "kg (224 lb 14.4 oz)   SpO2 98%   BMI 29.02 kg/m      Wt Readings from Last 2 Encounters:   09/13/19 102 kg (224 lb 14.4 oz)   08/12/19 98.9 kg (218 lb)      Ht Readings from Last 2 Encounters:   06/12/19 1.875 m (6' 1.82\")   06/12/19 1.875 m (6' 1.82\")     KPS: 70    -Generally well appearing. Slightly fatigued.   -Respiratory: Normal breath sounds, no audible wheezing.   -Skin: No rashes. Healed head incision. Hair regrowing.   -Hematologic/ lymphatic: Bruising on arms from IV use. No leg swelling.  -Psychiatric: Flat mood and affect. Pleasant, talkative.  -Neurologic:   MENTAL STATUS:     Alert, easily confused.    Repeating questions.     Recall: Impaired.   Issues to math, numbers.     Slowed reading speed.    Speech nearly fluent, but with hesitation.    Comprehension intact to multi-step commands.   Near normal naming, repetition.   Good right-left orientation.     CRANIAL NERVES:     Pupils are equal, round, reactive to light.     Extraocular movements full, patient denies diplopia.     Homonymous hemianopsia, right-side.    Facial sensation intact to light touch.   Symmetric facial movements.   Hearing intact.   Palate moves symmetrically.     Tongue midline.  MOTOR:    Normal and symmetric tone.   Grossly 5/5 throughout.    No pronation or drift. No orbiting.   Able to rise from a chair without use of arms.  SENSATION:    Intact to light touch throughout.  COORDINATION:   Intact finger-nose with eyes open and closed bilaterally.   GAIT:  Walks without assistance.   Good speed. Normal stride length and heel strike. Normal turns. Normal arm swing.    MEDICAL RECORDS  Obtained and personally reviewed all available outside medical records in addition to reviewing any records available in our electronic system.     LABS  Personally reviewed all available lab results.     IMAGING  Personally reviewed outside MR brain imaging from last week and compared to prior imaging. To my eye, the nodules of contrast " enhancement now have necrotic centers, but have joined into a larger contrast enhancing mass. T2 FLAIR is increased with extension into the medial temporal lobe, very distant from contrast enhancing disease in the occipital lobe.     Imaging was shown to and results were reviewed with Jayden and Oz.    IMPRESSION  Clinic time was spent discussing in detail the nature of this tumor  in light of concerning imaging from when he was hospitalized for seizures and how this effects his current treatment plan. This is in addition to providing emotional support, answering questions pertaining to my recommendations, and devising the treatment plan as outlined below.    Imaging is concerning for non-contrast enhancing >> contrast enhancing disease progression with extension into the medial temporal lobe. Involvement of the temporal lobe can certainly be the etiology of his recurrent seizure event with associated and prolonged confusion, memory issues, and aphasia. Will order a PET brain scan to further evaluate.     With regard to cancer-directed therapy, Jayden said that he is not tolerating temozolomide well and that his quality of life is grossly effected by fatigue + these recurrent hospitalizations for seizures. He is wanting to stop temozolomide. Pending the PET brain scan, if results are consistent with disease progression there can be a consideration for Avastin monotherapy and/ or Optune or hospice. If imaging is consistent with pseudoprogression will need to discuss better symptomatic support with adjuvant temozolomide and/ or Optune.     Jayden is following with neurology that is managing Keppra and Vimpat.     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 Will  never look to dose increase to 200mg/m2 if restarted.   -Plans pending PET brain scan.     -STEROIDS-  -Dexamethasone taper;    -Until 9/17: 8mg in AM and 4mg at noon.    -9/18 - 9/23: 8mg in AM   -9/24 - 9/29: 4mg in AM  -Oz to call with new symptoms related to dose decrease in steroids.     -SEIZURE MANAGEMENT-  -Given history of seizures, will continue Keppra + Vimpat.  -Epilepsy provoked by intracranial pathology, but also hyponatremia related to SIADH of unclear etiology.   -Followed by neurology at [facility].    -Quality of life/ MOOD/ FATIGUE-  -Continued mood issues; depression, poor coping.  -Previously discussed starting Zoloft.  -Continue to monitor mood as untreated/ undertreated depression can worsen fatigue, dysorexia, and quality of life.  -Added to Brain Tumor Support Group email list; [email or web address]    -HICCUPS-  -Pepcid (refilled).  -Weaning dexamethasone.  -Possibly Baclofen (muscle relaxer) if needed.    -HYPONATREMIA/ History of SIADH-  -Monitor amount of fluid intake.   -Continue to track sodium level on routine CMP.    -MEMORY COMPLAINTS-  -Consideration for referral to Dr. Aleksandr Garcia for neuro-psych testing.   -Continue speech therapy.    -VISUAL FIELD LOSS-  -Looking to follow-up with neuro-ophtho.   -Continue following with OT.     -OTHER SUPPORTIVE MEDICATIONS-  -Continue methotrexate for arthritis. No drug-drug interactions with temozolomide, Zofran, or pentamidine. Bactrim with drug drug interactions.     -ADDITIONAL SUPPORTIVE MANAGEMENT-  -Social work following.   -CT with lung nodules. [facility].   -S/p EGD with benign pathology of the 10mm gastric antrum/pylorus posterior wall lesion.  -Breast biopsy showed necrotic issue, no malignancy.    Return to clinic pending PET brain results.       In the meantime, Jayden and Oz know to call with questions or concerns or to report new complaints and can be seen sooner if needed. Urgent  evaluation is needed in the setting of acute onset of severe headache, abrupt change in mental status, on-going seizures, new focal deficits, or new leg swelling/ pain.    Nasra Olmos MD  Neuro-oncology

## 2019-09-13 NOTE — NURSING NOTE
Chief Complaint   Patient presents with     Blood Draw     labs drawn via venipuncture by RN in lab     /80 (BP Location: Left arm, Patient Position: Chair, Cuff Size: Adult Regular)   Pulse 85   Temp 98.6  F (37  C)   Wt 102 kg (224 lb 14.4 oz)   SpO2 98%   BMI 29.02 kg/m      Labs collected and sent from left antecubital venipuncture in lab by RN. Pt tolerated well. Pt checked in for next appointment.    Rosanne Mtz RN

## 2019-09-20 ENCOUNTER — ANCILLARY PROCEDURE (OUTPATIENT)
Dept: PET IMAGING | Facility: CLINIC | Age: 64
End: 2019-09-20
Attending: PSYCHIATRY & NEUROLOGY
Payer: COMMERCIAL

## 2019-09-20 DIAGNOSIS — C71.9 GLIOBLASTOMA (H): ICD-10-CM

## 2019-09-20 LAB — GLUCOSE SERPL-MCNC: 88 MG/DL (ref 70–99)

## 2019-09-23 ENCOUNTER — TUMOR CONFERENCE (OUTPATIENT)
Dept: ONCOLOGY | Facility: CLINIC | Age: 64
End: 2019-09-23

## 2019-09-23 NOTE — TUMOR CONFERENCE
Tumor Conference Information  Tumor Conference:    Specialties Present:  Medical oncology, Surgery, Radiology, Pathology  Patient Status:  A current patient  Pathology:  Not Discussed  Treatment to Date:  Surgical intervention(s), Chemoradiation, Adjuvant chemotherapy  Clinical Trial Eligibility:  Not discussed  Recommended Plan:  Palliative chemotherapy, Surgery (Comment: discuss possible re-resection with HE surgeon, consider new chemotherapy treatment)  Did the review exceed 30 minutes?:  did not           Documentation / Disclaimer Cancer Tumor Board Note  Cancer tumor board recommendations do not override what is determined to be reasonable care and treatment, which is dependent on the circumstances of a patient's case; the patient's medical, social, and personal concerns; and the clinical judgment of the oncologist [physician].

## 2019-09-24 ENCOUNTER — TELEPHONE (OUTPATIENT)
Dept: ONCOLOGY | Facility: CLINIC | Age: 64
End: 2019-09-24

## 2019-09-24 ENCOUNTER — TELEPHONE (OUTPATIENT)
Dept: NEUROSURGERY | Facility: CLINIC | Age: 64
End: 2019-09-24

## 2019-09-24 NOTE — TELEPHONE ENCOUNTER
REASON FOR CALL: Per Dr. Hayes, OK to book pt either this Friday (09/27) or upcoming Monday 09/30 w/him to discuss plan of care. Likely repeat of resection.     Called pt's phone, but no answer. Was not able to LVM due to mailbox being full. Called EC/spouse, Oz, unable to reach her as well. LVM for a call back in regards to scheduling an appt w/Dr. Hayes.

## 2019-09-24 NOTE — TELEPHONE ENCOUNTER
Called and spoke with Jayden and Oz.     Discussed PET brain imaging results being consistent with disease progression.   Discussed the potential role of repeat resection with Dr. Hayes and he stated that this was an option. Jayden is interested in learning more about additional surgery. Dr. Hayes to arrange a clinic appt to answer questions about surgery.     Nasra Olmos MD  Neuro-oncology  9/24/2019

## 2019-09-25 ENCOUNTER — TELEPHONE (OUTPATIENT)
Dept: NEUROSURGERY | Facility: CLINIC | Age: 64
End: 2019-09-25

## 2019-09-25 DIAGNOSIS — C71.4 GLIOBLASTOMA MULTIFORME OF OCCIPITAL LOBE (H): Primary | ICD-10-CM

## 2019-09-25 NOTE — TELEPHONE ENCOUNTER
Per Dr. Hayes, if he prefers to see someone at [facility], he could see Dr. Packer. Patient's wife stated that they are still determining how they would like to move forward and will call back once they have made a decision.

## 2019-09-25 NOTE — TELEPHONE ENCOUNTER
Patients wife called in regards to VM left yesterday to set up appointment with Dr. Hayes. Wife is wondering if there is urgency to seeing Dr. Hayes and if surgery will need to be scheduled soon and if it is not urgent, can patient see someone closer to their home which is in [place]. Informed wife that I will route message to care team and get back to her.

## 2019-09-26 NOTE — TELEPHONE ENCOUNTER
Referral placed. Called and LVM with patients wife stating that they should be able to call and make appointment.

## 2019-09-26 NOTE — TELEPHONE ENCOUNTER
Message routed to Dr. Hayes to see if he can place referral for patient to see Dr. Packer. Called and updated patients wife. LVM.

## 2019-09-26 NOTE — TELEPHONE ENCOUNTER
FELIBERTO FOR CALL: Pt's EC called stating that they need a referral to be sent to Dr. Packer. She would like a call back once it is sent, and would like to know whether they will get a call to schedule an appt, or if they should be calling to make an appt.

## 2019-09-27 ENCOUNTER — PATIENT OUTREACH (OUTPATIENT)
Dept: ONCOLOGY | Facility: CLINIC | Age: 64
End: 2019-09-27

## 2019-09-30 ENCOUNTER — OFFICE VISIT - HEALTHEAST (OUTPATIENT)
Dept: NEUROSURGERY | Facility: CLINIC | Age: 64
End: 2019-09-30

## 2019-09-30 DIAGNOSIS — C71.4 GLIOBLASTOMA MULTIFORME OF OCCIPITAL LOBE (H): ICD-10-CM

## 2019-09-30 ASSESSMENT — MIFFLIN-ST. JEOR: SCORE: 1856.07

## 2019-09-30 NOTE — PROGRESS NOTES
RN Care Coordination Note  Incoming Call:   Patient's spouse calling for Dexamethasone taper instructions.   Provider Response:  Nasra Olmos MD Allard, Kathy, RN    4mg on odd numbered days and 2mg on even numbered days until 10/7 (alternating 4mg and 2mg, otherwise 3mg daily if that is easier), then 2mg daily.   Nasra Olmos MD   Neuro-oncology   9/27/2019   Outgoing Call:   Called patient's spouse, relayed entire message above. She verbalized understanding and will call with any questions or concerns.     Griselda Jones RN, BSN  Care Coordinator   [facility]

## 2019-10-01 ENCOUNTER — RECORDS - HEALTHEAST (OUTPATIENT)
Dept: LAB | Facility: HOSPITAL | Age: 64
End: 2019-10-01

## 2019-10-01 LAB — LEVETIRACETAM (KEPPRA): 21.4 UG/ML (ref 6–46)

## 2019-10-03 ENCOUNTER — TELEPHONE (OUTPATIENT)
Dept: ONCOLOGY | Facility: CLINIC | Age: 64
End: 2019-10-03

## 2019-10-03 NOTE — TELEPHONE ENCOUNTER
Called and spoke with Kailey Edmondson and Jayden.     Discussed the clinical trial option of [id number] with him and Kailey Edmondson. This trial involves use of a vaccine in combination with bevacizumab (Avastin). However, it is only enrolling patients with a specific HLA-subtype. As a result, if Jayden is interested, screening blood work for his HLA-subtype is the first step. It was made clear that this screening does not commit Jayden to the trial and if his HLA-subtype is favorable for the trial, more information would be given at that time. In addition, repeat resection would not exclude him from the trial, so this might be a treatment option for him regardless of their decision on surgery.    Jayden and Kailey Edmondson expressed understanding and interest in the screening for this clinic trial. They are looking forward to meeting the research RN/ staff tomorrow when they are in clinic seeing Dr. Packer.     Nasra Olmos MD  Neuro-oncology  10/3/2019

## 2019-10-04 ENCOUNTER — APPOINTMENT (OUTPATIENT)
Dept: ONCOLOGY | Facility: CLINIC | Age: 64
End: 2019-10-04
Attending: INTERNAL MEDICINE
Payer: COMMERCIAL

## 2019-10-04 ENCOUNTER — PATIENT OUTREACH (OUTPATIENT)
Dept: ONCOLOGY | Facility: CLINIC | Age: 64
End: 2019-10-04

## 2019-10-04 ENCOUNTER — RESEARCH ENCOUNTER (OUTPATIENT)
Dept: ONCOLOGY | Facility: CLINIC | Age: 64
End: 2019-10-04

## 2019-10-04 ENCOUNTER — OFFICE VISIT (OUTPATIENT)
Dept: NEUROSURGERY | Facility: CLINIC | Age: 64
End: 2019-10-04
Attending: NEUROLOGICAL SURGERY
Payer: COMMERCIAL

## 2019-10-04 VITALS
DIASTOLIC BLOOD PRESSURE: 88 MMHG | OXYGEN SATURATION: 98 % | TEMPERATURE: 97.4 F | RESPIRATION RATE: 18 BRPM | HEIGHT: 74 IN | SYSTOLIC BLOOD PRESSURE: 146 MMHG | WEIGHT: 225 LBS | HEART RATE: 55 BPM | BODY MASS INDEX: 28.88 KG/M2

## 2019-10-04 DIAGNOSIS — C71.4 GLIOBLASTOMA MULTIFORME OF OCCIPITAL LOBE (H): Primary | ICD-10-CM

## 2019-10-04 DIAGNOSIS — Z00.6 RESEARCH STUDY PATIENT: ICD-10-CM

## 2019-10-04 PROCEDURE — G0463 HOSPITAL OUTPT CLINIC VISIT: HCPCS | Mod: ZF

## 2019-10-04 RX ORDER — LEVETIRACETAM 1000 MG/1
1500 TABLET ORAL 2 TIMES DAILY
Refills: 1 | Status: ON HOLD | COMMUNITY
Start: 2019-09-30 | End: 2020-04-02

## 2019-10-04 ASSESSMENT — PAIN SCALES - GENERAL: PAINLEVEL: EXTREME PAIN (8)

## 2019-10-04 ASSESSMENT — MIFFLIN-ST. JEOR: SCORE: 1882.47

## 2019-10-04 NOTE — PROGRESS NOTES
Met with Farhad Edmondson after consultation appt with Dr. Packer.  Pt  verbalizes understanding of Dr. Packer's plan of care and denies questions or barriers to care today.    Introduced self and role of RN Care Coordinator at [facility].  Provided my contact information.    Answered question regarding cranial surgery    Verbal and writtien instructions provided re:     Pre-operative instructions/routine and requirements to include:  Surgical procedure, need for History and Physical Preoperative Assessment Center appointment, NPO prior to surgery, showering instructions, medications to avoid,  post-operative expectations, pain control, follow-up after surgery and contact information for questions or concerns prior to surgery.        Time was provided for patient to ask questions and they were answered to his/her stated satisfaction and understanding.      Griselda Jones, RN, BSN  Care Coordinator  [facility]

## 2019-10-04 NOTE — NURSING NOTE
[id number]: Informed Consent Note     The consent form, including purpose, risks and benefits, was reviewed with Jayden Lackey, and all questions were answered before he signed the consent form. The patient understands that the study involves an active treatment phase as well as a post-treatment follow up phase.     Present during the discussion were Kailey Edmondson and myself. A copy of the signed form was provided to the patient. No procedures specific to this study were performed prior to the patient signing the consent form.    Consent Version Date: 10/9/2018  Consent obtained by: Belia Cortes RN    Date: 10/4/2019  HIPAA authorization signed?: yes  HIPAA authorization version date: 07/18/2016  Belia Cortes RN  Clincial Research Coordinator  P: [phone]  Pager: [phone]      Form 503.03.01 (Version 2)     Effective date: 01AUG2018     Next Review Date: 01AUG2020

## 2019-10-04 NOTE — LETTER
10/4/2019       RE: Jayden Lackey  [address]  [place] [address]     Dear Colleague,    Thank you for referring your patient, Jayden Lackey, to the [facility]. Please see a copy of my visit note below.    Neurosurgery Clinic Note    Evaluation for surgical resection and clinical trial enrollment    64 RH M with glioblastoma and MR findings of disease progression. The patient presents for consideration of repeat resection and clinical trial enrollment. The left occipital glioblastoma was initially diagnosed in 3/2019 (IDH wild type, MGMT promoter methylated).  The patient completed chemoradiation on 6/2019. The patient's quality of life has been compromised by seizures, visual deficits (right homonymous hemianopsia and difficulty with facial recognition).  MRI on 7/2019 showed new CE+ lesion that is PET positive, concerning for cancer progression. The patient presents for consideration of tumor resection and for clinical trial enrollment.    Review of systems is notable for the homonymous hemianopsia as will as difficulty with facial recognition. The patient also complains of intermittent expressive aphasia (intact receptive understanding).    Past Medical History:   Diagnosis Date     Colon polyp      Dyslipidemia      Hypertension      Lumbar disc herniation     L4-5     Rheumatoid arthritis (H)        Current Outpatient Medications:      aspirin 81 MG EC tablet, Take 81 mg by mouth daily, Disp: , Rfl:      atenolol (TENORMIN) 50 MG tablet, Take 50 mg by mouth At Bedtime , Disp: , Rfl:      dexamethasone (DECADRON) 4 MG tablet, Take 2 tablets (8 mg) by mouth daily (with breakfast), Disp: 60 tablet, Rfl: 1     diltiazem ER COATED BEADS (CARDIZEM CD/CARTIA XT) 120 MG 24 hr capsule, Take 180 mg by mouth daily , Disp: , Rfl:      famotidine (PEPCID) 20 MG tablet, Take 1 tablet (20 mg) by mouth 2 times daily, Disp: 60 tablet, Rfl: 1     fish oil-omega-3 fatty acids 1000 MG  "capsule, Take by mouth daily Unknown dose, Disp: , Rfl:      folic acid (FOLVITE) 1 MG tablet, Take 1 mg by mouth, Disp: , Rfl:      Lacosamide (VIMPAT) 100 MG TABS tablet, Take 100 mg by mouth, Disp: , Rfl:      levETIRAcetam (KEPPRA) 1000 MG tablet, TK 1 T PO BID, Disp: , Rfl: 1     methotrexate sodium, pres-free, 50 MG/2ML SOLN injection CHEMO, TAKE 0.7ML (17.5MG) SUBCUTANEOUS WEEKLY ORAL., Disp: , Rfl: 0     naproxen (NAPROSYN DR) 500 MG EC tablet, , Disp: , Rfl:      Nutritional Supplements (JUICE PLUS FIBRE PO), Take 1 tablet by mouth At Bedtime, Disp: , Rfl:      docusate sodium (COLACE) 100 MG capsule, Take 100 mg by mouth, Disp: , Rfl:      lisinopril-hydrochlorothiazide (PRINZIDE/ZESTORETIC) 20-25 MG tablet, Take 1 tablet by mouth At Bedtime, Disp: , Rfl:      ondansetron (ZOFRAN) 4 MG tablet, Take 1 tablet (4 mg) by mouth At Bedtime ; 30 minutes prior to chemotherapy dosing and repeat every 8 hours as needed for nausea. (Patient not taking: Reported on 10/4/2019), Disp: 60 tablet, Rfl: 3     rosuvastatin (CRESTOR) 5 MG tablet, Take 5 mg by mouth At Bedtime , Disp: , Rfl:     Allergies   Allergen Reactions     Shellfish Allergy Difficulty breathing, Nausea and Vomiting, Swelling and Shortness Of Breath     No Clinical Screening - See Comments      Lupine bean doesn't remember reaction     Other (Do Not Use)      Lupine bean doesn't remember reaction     BP (!) 146/88 (BP Location: Left arm, Patient Position: Sitting, Cuff Size: Adult Large)   Pulse 55   Temp 97.4  F (36.3  C) (Oral)   Resp 18   Ht 1.875 m (6' 1.82\")   Wt 102.1 kg (225 lb)   SpO2 98%   BMI 29.03 kg/m       Examination notable for a dense right homonymous hemianopsia, slowed speech, decreased attention span.    MRI from 9/2019 reviewed and showed a 2.4 x 2.8 x 1.8 cm CE+ enhancing mass inferior to the previous resection cavity. I do not appreciate increased perfusion in this region, though the PET showed increased update corresponding " to this region.    AP: 64 M RH M with MR/PET findings suggestive of glioblastoma recurrence. I think that 5-ALA guided resection may be helpful to this patient as well as consideration for clinical trial enrollment. The patient and his wife is very concerned about the quality of life after the surgical resection, given that the patient was significantly compromised after the last surgery.  I explained that while there is no guarantee against new deficit after the resection, 5ALA guidance would minimize risk of injury to the normal brain. We also reviewed potential option for the Ziopharm study as well as the DSP study.  My  will follow-up with the patient on the matter in terms of trial slot availability and qualifications.    I have spent 45 minutes today, with the majority of the visit counseling the patient on the diagnosis. All questions were answered. Over 50% of my time on the unit was spent counseling the patient and/or coordinating care regarding the pertinent next steps.    Again, thank you for allowing me to participate in the care of your patient.      Sincerely,    Nicho Packer MD

## 2019-10-04 NOTE — NURSING NOTE
"Oncology Rooming Note    October 4, 2019 3:02 PM   Jayden Lackey is a 64 year old male who presents for:    Chief Complaint   Patient presents with     Oncology Clinic Visit     New patient visit related to Gliobastoma multiforme of occpital lobe     Initial Vitals: BP (!) 146/88 (BP Location: Left arm, Patient Position: Sitting, Cuff Size: Adult Large)   Pulse 55   Temp 97.4  F (36.3  C) (Oral)   Resp 18   Ht 1.875 m (6' 1.82\")   Wt 102.1 kg (225 lb)   SpO2 98%   BMI 29.03 kg/m   Estimated body mass index is 29.03 kg/m  as calculated from the following:    Height as of this encounter: 1.875 m (6' 1.82\").    Weight as of this encounter: 102.1 kg (225 lb). Body surface area is 2.31 meters squared.  Extreme Pain (8) Comment: Data Unavailable   No LMP for male patient.  Allergies reviewed: Yes  Medications reviewed: Yes    Medications: Medication refills not needed today.  Pharmacy name entered into [employer]:    [id number] - [place], [place] - [address] [facility] - [place], [place] - [address] [place] [address] [facility] [place] - [facility] [place] 2] [facility]  [id number] - [place], [place] - [address] [facility] [address]    Clinical concerns: No new concerns. Provider was notified.      Norma Urias LPN            "

## 2019-10-06 ENCOUNTER — PREP FOR PROCEDURE (OUTPATIENT)
Dept: NEUROSURGERY | Facility: CLINIC | Age: 64
End: 2019-10-06

## 2019-10-06 DIAGNOSIS — C71.1 MALIGNANT NEOPLASM OF FRONTAL LOBE OF BRAIN (H): Primary | ICD-10-CM

## 2019-10-07 ENCOUNTER — TUMOR CONFERENCE (OUTPATIENT)
Dept: ONCOLOGY | Facility: CLINIC | Age: 64
End: 2019-10-07

## 2019-10-07 NOTE — TUMOR CONFERENCE
Tumor Conference Information  Tumor Conference:    Specialties Present:  Medical oncology, Radiation oncology, Surgery, Radiology, Pathology  Patient Status:  A current patient  Pathology:  Not Discussed  Treatment to Date:  Surgical intervention(s), Chemoradiation, Adjuvant chemotherapy  Clinical Trial Eligibility:  Offered (Comment: DSP 7888)  Recommended Plan:  Other (see comment), Palliative chemotherapy, Surgery (Comment: patient considering surgical resection, was screened for clinic trial DSP 7888, plan to start avastin if not trial eligible)  Did the review exceed 30 minutes?:  did not           Documentation / Disclaimer Cancer Tumor Board Note  Cancer tumor board recommendations do not override what is determined to be reasonable care and treatment, which is dependent on the circumstances of a patient's case; the patient's medical, social, and personal concerns; and the clinical judgment of the oncologist [physician].

## 2019-10-07 NOTE — PROGRESS NOTES
Neurosurgery Clinic Note    Evaluation for surgical resection and clinical trial enrollment    64 RH M with glioblastoma and MR findings of disease progression. The patient presents for consideration of repeat resection and clinical trial enrollment. The left occipital glioblastoma was initially diagnosed in 3/2019 (IDH wild type, MGMT promoter methylated).  The patient completed chemoradiation on 6/2019. The patient's quality of life has been compromised by seizures, visual deficits (right homonymous hemianopsia and difficulty with facial recognition).  MRI on 7/2019 showed new CE+ lesion that is PET positive, concerning for cancer progression. The patient presents for consideration of tumor resection and for clinical trial enrollment.    Review of systems is notable for the homonymous hemianopsia as will as difficulty with facial recognition. The patient also complains of intermittent expressive aphasia (intact receptive understanding).    Past Medical History:   Diagnosis Date     Colon polyp      Dyslipidemia      Hypertension      Lumbar disc herniation     L4-5     Rheumatoid arthritis (H)        Current Outpatient Medications:      aspirin 81 MG EC tablet, Take 81 mg by mouth daily, Disp: , Rfl:      atenolol (TENORMIN) 50 MG tablet, Take 50 mg by mouth At Bedtime , Disp: , Rfl:      dexamethasone (DECADRON) 4 MG tablet, Take 2 tablets (8 mg) by mouth daily (with breakfast), Disp: 60 tablet, Rfl: 1     diltiazem ER COATED BEADS (CARDIZEM CD/CARTIA XT) 120 MG 24 hr capsule, Take 180 mg by mouth daily , Disp: , Rfl:      famotidine (PEPCID) 20 MG tablet, Take 1 tablet (20 mg) by mouth 2 times daily, Disp: 60 tablet, Rfl: 1     fish oil-omega-3 fatty acids 1000 MG capsule, Take by mouth daily Unknown dose, Disp: , Rfl:      folic acid (FOLVITE) 1 MG tablet, Take 1 mg by mouth, Disp: , Rfl:      Lacosamide (VIMPAT) 100 MG TABS tablet, Take 100 mg by mouth, Disp: , Rfl:      levETIRAcetam (KEPPRA) 1000 MG tablet,  "TK 1 T PO BID, Disp: , Rfl: 1     methotrexate sodium, pres-free, 50 MG/2ML SOLN injection CHEMO, TAKE 0.7ML (17.5MG) SUBCUTANEOUS WEEKLY ORAL., Disp: , Rfl: 0     naproxen (NAPROSYN DR) 500 MG EC tablet, , Disp: , Rfl:      Nutritional Supplements (JUICE PLUS FIBRE PO), Take 1 tablet by mouth At Bedtime, Disp: , Rfl:      docusate sodium (COLACE) 100 MG capsule, Take 100 mg by mouth, Disp: , Rfl:      lisinopril-hydrochlorothiazide (PRINZIDE/ZESTORETIC) 20-25 MG tablet, Take 1 tablet by mouth At Bedtime, Disp: , Rfl:      ondansetron (ZOFRAN) 4 MG tablet, Take 1 tablet (4 mg) by mouth At Bedtime ; 30 minutes prior to chemotherapy dosing and repeat every 8 hours as needed for nausea. (Patient not taking: Reported on 10/4/2019), Disp: 60 tablet, Rfl: 3     rosuvastatin (CRESTOR) 5 MG tablet, Take 5 mg by mouth At Bedtime , Disp: , Rfl:     Allergies   Allergen Reactions     Shellfish Allergy Difficulty breathing, Nausea and Vomiting, Swelling and Shortness Of Breath     No Clinical Screening - See Comments      Lupine bean doesn't remember reaction     Other (Do Not Use)      Lupine bean doesn't remember reaction     BP (!) 146/88 (BP Location: Left arm, Patient Position: Sitting, Cuff Size: Adult Large)   Pulse 55   Temp 97.4  F (36.3  C) (Oral)   Resp 18   Ht 1.875 m (6' 1.82\")   Wt 102.1 kg (225 lb)   SpO2 98%   BMI 29.03 kg/m      Examination notable for a dense right homonymous hemianopsia, slowed speech, decreased attention span.    MRI from 9/2019 reviewed and showed a 2.4 x 2.8 x 1.8 cm CE+ enhancing mass inferior to the previous resection cavity. I do not appreciate increased perfusion in this region, though the PET showed increased update corresponding to this region.    AP: 64 M RH M with MR/PET findings suggestive of glioblastoma recurrence. I think that 5-ALA guided resection may be helpful to this patient as well as consideration for clinical trial enrollment. The patient and his wife is very " concerned about the quality of life after the surgical resection, given that the patient was significantly compromised after the last surgery.  I explained that while there is no guarantee against new deficit after the resection, 5ALA guidance would minimize risk of injury to the normal brain. We also reviewed potential option for the Ziopharm study as well as the DSP study.  My  will follow-up with the patient on the matter in terms of trial slot availability and qualifications.    I have spent 45 minutes today, with the majority of the visit counseling the patient on the diagnosis. All questions were answered. Over 50% of my time on the unit was spent counseling the patient and/or coordinating care regarding the pertinent next steps.

## 2019-10-09 ENCOUNTER — TELEPHONE (OUTPATIENT)
Dept: NEUROSURGERY | Facility: CLINIC | Age: 64
End: 2019-10-09

## 2019-10-09 NOTE — TELEPHONE ENCOUNTER
Received a call from Jayden's wife stating that they were told that a surgery scheduler would call them on Tuesday to schedule with Dr. Packer.     I do not have the contact for neurosurgery. I explained that I schedule for GynOnc and was trying to find a number to transfer her to, but she said that she would call the nurse coordinator.    I did call the main line that she had called into and asked if my phone number had my specialty with it just to verify that there were no errors with my number.

## 2019-10-11 ENCOUNTER — PREP FOR PROCEDURE (OUTPATIENT)
Dept: NEUROSURGERY | Facility: CLINIC | Age: 64
End: 2019-10-11

## 2019-10-11 DIAGNOSIS — C71.4: Primary | ICD-10-CM

## 2019-10-11 PROBLEM — C71.1 MALIGNANT NEOPLASM OF FRONTAL LOBE OF BRAIN (H): Status: ACTIVE | Noted: 2019-10-11

## 2019-10-12 NOTE — TELEPHONE ENCOUNTER
FUTURE VISIT INFORMATION      SURGERY INFORMATION:    Date: 10/24/19    Location: [facility] OR    Surgeon: DR DAVIS    Anesthesia Type:GENERAL         RECORDS REQUESTED FROM:       Primary Care Provider: FARRUKH SOUZA     Pertinent Medical History:    Most recent EKG with tracings/strips:    Most recent ECHO:    Most recent Cardiac Stress Test:    Most recent Coronary Angiogram:

## 2019-10-14 PROBLEM — C71.4: Status: ACTIVE | Noted: 2019-10-11

## 2019-10-15 PROBLEM — M06.9 RA (RHEUMATOID ARTHRITIS) (H): Status: ACTIVE | Noted: 2019-10-15

## 2019-10-15 PROBLEM — J96.01 ACUTE RESPIRATORY FAILURE WITH HYPOXIA (H): Status: ACTIVE | Noted: 2019-10-15

## 2019-10-15 PROBLEM — I10 HYPERTENSION: Status: ACTIVE | Noted: 2019-10-15

## 2019-10-15 PROBLEM — K63.5 COLON POLYP: Status: ACTIVE | Noted: 2019-09-04

## 2019-10-15 PROBLEM — K31.89 GASTRIC MASS: Status: ACTIVE | Noted: 2019-06-21

## 2019-10-15 PROBLEM — C71.9 GBM (GLIOBLASTOMA MULTIFORME) (H): Status: ACTIVE | Noted: 2019-05-01

## 2019-10-15 PROBLEM — C71.9 MALIGNANT NEOPLASM OF BRAIN (H): Status: ACTIVE | Noted: 2019-01-01

## 2019-10-15 PROBLEM — E78.6 HDL DEFICIENCY: Status: ACTIVE | Noted: 2019-10-15

## 2019-10-15 PROBLEM — N17.9 ACUTE KIDNEY INJURY (H): Status: ACTIVE | Noted: 2019-10-15

## 2019-10-15 PROBLEM — G93.6 CEREBRAL EDEMA (H): Status: ACTIVE | Noted: 2019-09-04

## 2019-10-15 PROBLEM — I48.91 NEW ONSET A-FIB (H): Status: ACTIVE | Noted: 2019-10-15

## 2019-10-15 PROBLEM — R41.0 DELIRIUM: Status: ACTIVE | Noted: 2019-10-15

## 2019-10-15 PROBLEM — R73.01 IFG (IMPAIRED FASTING GLUCOSE): Status: ACTIVE | Noted: 2017-11-27

## 2019-10-16 RX ORDER — DEXAMETHASONE 2 MG/1
2 TABLET ORAL
Qty: 20 TABLET | Refills: 0 | Status: ON HOLD | OUTPATIENT
Start: 2019-10-16 | End: 2019-10-28

## 2019-10-17 ENCOUNTER — ANESTHESIA EVENT (OUTPATIENT)
Dept: SURGERY | Facility: CLINIC | Age: 64
End: 2019-10-17
Payer: COMMERCIAL

## 2019-10-17 ENCOUNTER — PRE VISIT (OUTPATIENT)
Dept: ORTHOPEDICS | Facility: CLINIC | Age: 64
End: 2019-10-17

## 2019-10-17 ENCOUNTER — OFFICE VISIT (OUTPATIENT)
Dept: SURGERY | Facility: CLINIC | Age: 64
End: 2019-10-17
Payer: COMMERCIAL

## 2019-10-17 VITALS
BODY MASS INDEX: 28.62 KG/M2 | RESPIRATION RATE: 16 BRPM | HEIGHT: 74 IN | TEMPERATURE: 98.2 F | DIASTOLIC BLOOD PRESSURE: 80 MMHG | WEIGHT: 223 LBS | HEART RATE: 65 BPM | OXYGEN SATURATION: 97 % | SYSTOLIC BLOOD PRESSURE: 126 MMHG

## 2019-10-17 DIAGNOSIS — Z01.818 PREOP EXAMINATION: Primary | ICD-10-CM

## 2019-10-17 DIAGNOSIS — Z01.818 PREOP EXAMINATION: ICD-10-CM

## 2019-10-17 DIAGNOSIS — C71.9 GLIOBLASTOMA (H): ICD-10-CM

## 2019-10-17 LAB
ANION GAP SERPL CALCULATED.3IONS-SCNC: 7 MMOL/L (ref 3–14)
APTT PPP: 33 SEC (ref 22–37)
BUN SERPL-MCNC: 17 MG/DL (ref 7–30)
CALCIUM SERPL-MCNC: 8.8 MG/DL (ref 8.5–10.1)
CHLORIDE SERPL-SCNC: 107 MMOL/L (ref 94–109)
CO2 SERPL-SCNC: 27 MMOL/L (ref 20–32)
CREAT SERPL-MCNC: 0.8 MG/DL (ref 0.66–1.25)
ERYTHROCYTE [DISTWIDTH] IN BLOOD BY AUTOMATED COUNT: 18.5 % (ref 10–15)
GFR SERPL CREATININE-BSD FRML MDRD: >90 ML/MIN/{1.73_M2}
GLUCOSE SERPL-MCNC: 134 MG/DL (ref 70–99)
HCT VFR BLD AUTO: 39.6 % (ref 40–53)
HGB BLD-MCNC: 12.8 G/DL (ref 13.3–17.7)
INR PPP: 1.06 (ref 0.86–1.14)
MCH RBC QN AUTO: 28.8 PG (ref 26.5–33)
MCHC RBC AUTO-ENTMCNC: 32.3 G/DL (ref 31.5–36.5)
MCV RBC AUTO: 89 FL (ref 78–100)
PLATELET # BLD AUTO: 101 10E9/L (ref 150–450)
POTASSIUM SERPL-SCNC: 4.2 MMOL/L (ref 3.4–5.3)
RBC # BLD AUTO: 4.44 10E12/L (ref 4.4–5.9)
SODIUM SERPL-SCNC: 140 MMOL/L (ref 133–144)
WBC # BLD AUTO: 4.9 10E9/L (ref 4–11)

## 2019-10-17 ASSESSMENT — LIFESTYLE VARIABLES: TOBACCO_USE: 1

## 2019-10-17 ASSESSMENT — MIFFLIN-ST. JEOR: SCORE: 1868.33

## 2019-10-17 ASSESSMENT — ENCOUNTER SYMPTOMS
DYSRHYTHMIAS: 1
SEIZURES: 1

## 2019-10-17 NOTE — PATIENT INSTRUCTIONS
Preparing for Your Surgery      Name:  Jayden Lackey   MRN:  [id number]   :  1955   Today's Date:  10/17/2019     Arriving for surgery:  Surgery date:  10/24/19  Arrival time:  10:00 am  Please come to:       [facility] [address]  [address] [place]  [address]    - ? parking is available in front of the hospital      -    Please proceed to [address] on the 3rd floor. [phone]?     - ?If you are in need of directions, wheelchair or escort please stop at the Information Desk in the lobby.  Inform the information person that you are here for surgery; a wheelchair and escort to [address] will be provided.?     What can I eat or drink?  -  You may have solid food or milk products until 8 hours prior to your surgery (4:00 am).  -  You may have water, apple juice or 7up/Sprite until 2 hours prior to your surgery (10:00 am).    Which medicines can I take?  Stop Aspirin, vitamins and supplements one week prior to surgery.  Hold Ibuprofen for 24 hours and/or Naproxen for 48 hours prior to surgery.   Hold Methotrexate this weekend.    -  Do NOT take these medications in the morning, the day of surgery:  Docusate Sodium (Colace)       -  Please take these medications the day of surgery:  Atenolol (Tenormin)   Lacosamide (Vimpat)  Dexamethasone (Decadron)  Levetiracetam (Keppra)  Diltiazem (Cardizem)        How do I prepare myself?  -  Take two showers: one the night before surgery; and one the morning of surgery.         Use Scrubcare or Hibiclens to wash from neck down, leave soap on your skin for up to one minute.  Do not get soap in your eyes or ears.  You may use your own shampoo and conditioner; no other hair products.   -  Do NOT use lotion, powder, deodorant, or antiperspirant the day of your surgery.  -  Do NOT wear any jewelry.  -  Do not bring your own medications to the hospital.  -  Bring your ID and insurance card.    Questions or Concerns:  -If you are  scheduled on the [facility] or [facility] campus and have questions or concerns regarding the day of surgery, please call Preadmission Nursing at [phone].     -For questions after surgery please call your surgeons office.     AFTER YOUR SURGERY  Breathing exercises   Breathing exercises help you recover faster. Take deep breaths and let the air out slowly. This will:     Help you wake up after surgery.    Help prevent complications like pneumonia.  Preventing complications will help you go home sooner.   We may give you a breathing device (incentive spirometer) to encourage you to breathe deeply.   Nausea and vomiting   You may feel sick to your stomach after surgery; if so, let your nurse know.    Pain control:  After surgery, you may have pain. Our goal is to help you manage your pain. Pain medicine will help you feel comfortable enough to do activities that will help you heal.  These activities may include breathing exercises, walking and physical therapy.   To help your health care team treat your pain we will ask: 1) If you have pain  2) where it is located 3) describe your pain in your words  Methods of pain control include medications given by mouth, vein or by nerve block for some surgeries.  Sequential Compression Device (SCD) or Pneumo Boots:  You may need to wear SCD S on your legs or feet. These are wraps connected to a machine that pumps in air and releases it. The repeated pumping helps prevent blood clots from forming.

## 2019-10-17 NOTE — RESULT ENCOUNTER NOTE
Federico Carpenter,    Your test results are attached. All of your labs are okay for surgery.  Your platelet count is a little low again, but not at a point where it is a problem for surgery.  I have ordered a recheck for the day of surgery.          Colleen Cheatham DNP, RN, ANP-C

## 2019-10-17 NOTE — ANESTHESIA PREPROCEDURE EVALUATION
Anesthesia Pre-Procedure Evaluation    Patient: Jayden Lackey   MRN:     [id number] Gender:   male   Age:    64 year old :      1955        Preoperative Diagnosis: Malignant neoplasm of frontal lobe of brain (H) [C71.1]   Procedure(s):  LEFT CRANIOTOMY, USING OPTICAL TRACKING SYSTEM, WITH NEOPLASM EXCISION     Past Medical History:   Diagnosis Date     Colon polyp      Dyslipidemia      Glioblastoma (H)      Hypertension      Lumbar disc herniation     L4-5     Rheumatoid arthritis (H)       Past Surgical History:   Procedure Laterality Date     ANAL SPHINCTEROTOMY       BACK SURGERY      L4-5     HERNIA REPAIR       OPTICAL TRACKING SYSTEM CRANIOTOMY, EXCISE TUMOR, COMBINED Left 3/22/2019    Procedure: Left Stealth Assisted Craniotomy And Tumor Resection;  Surgeon: Irving Hayes MD;  Location: [facility] OR     SEPTOPLASTY       VASECTOMY            Anesthesia Evaluation     . Pt has had prior anesthetic. Type: General    No history of anesthetic complications          ROS/MED HX    ENT/Pulmonary:     (+)NICOLÁS risk factors snores loudly, hypertension, other ENT- wears a hearing aid on the right, tobacco use, Past use 1 packs/day  , recent URI resolved treated with cefdinir 2 weeks ago for bronchitis.  wheezing has resolved, just has lingering cough: . .    Neurologic:     (+)neuropathy - bilateral toes, seizures last seizure: 2019 features: staring and grand mal, other neuro glioblastoma    Cardiovascular:     (+) Dyslipidemia, hypertension----. : . . . :. dysrhythmias a-fib and a-flutter, . Previous cardiac testing Echodate:2019results:Result Narrative    No previous study for comparison.    Left ventricle ejection fraction is normal. The calculated left   ventricular ejection fraction is 60%.    Normal left ventricular size and systolic function.    Normal right ventricular size and systolic function.    No hemodynamically significant valvular heart abnormalities.    date: results:ECG reviewed  date:10/17/2019 results:atrail flutter with variable AV block, nonspecific St abnormality date: results:          METS/Exercise Tolerance:  >4 METS   Hematologic:  - neg hematologic  ROS      (-) history of blood clots and History of Transfusion   Musculoskeletal:   (+)  other musculoskeletal- rheumatoid arthritis - multiple joint pain      GI/Hepatic:  - neg GI/hepatic ROS       Renal/Genitourinary:  - ROS Renal section negative       Endo:     (+) Chronic steroid usage for Other Date most recently used: daily,.      Psychiatric:     (+) psychiatric history anxiety      Infectious Disease:  - neg infectious disease ROS       Malignancy:   (+) Malignancy History of Other  Other CA Active status post Surgery, Chemo and Radiation         Other:    (+) no H/O Chronic Pain,                       PHYSICAL EXAM:   Mental Status/Neuro: A/A/O   Airway: Facies: Feasible  Mallampati: I  Mouth/Opening: Full  TM distance: > 6 cm  Neck ROM: Full   Respiratory: Auscultation: CTAB     Resp. Rate: Normal     Resp. Effort: Normal      CV: Rhythm: Irregular  Heart: Normal Sounds  Edema: RLE; LLE   Comments:      Dental: Normal Dentition                LABS:  CBC:   Lab Results   Component Value Date    WBC 12.3 (H) 09/13/2019    WBC 2.8 (L) 07/12/2019    HGB 13.0 (L) 09/13/2019    HGB 11.7 (L) 07/12/2019    HCT 37.9 (L) 09/13/2019    HCT 34.9 (L) 07/12/2019     09/13/2019     (L) 07/12/2019     BMP:   Lab Results   Component Value Date     09/13/2019     07/01/2019    POTASSIUM 3.7 09/13/2019    POTASSIUM 4.0 04/08/2019    CHLORIDE 105 09/13/2019    CHLORIDE 104 04/08/2019    CO2 24 09/13/2019    CO2 26 04/08/2019    BUN 24 09/13/2019    BUN 16 04/08/2019    CR 0.76 09/13/2019    CR 0.74 04/08/2019    GLC 88 09/20/2019     (H) 09/13/2019     COAGS:   Lab Results   Component Value Date    INR 1.10 03/13/2019     POC:   Lab Results   Component Value Date     (H) 03/22/2019     OTHER:   Lab  "Results   Component Value Date    PH 7.41 03/22/2019    BEATRIS 7.8 (L) 09/13/2019    ALBUMIN 3.0 (L) 09/13/2019    PROTTOTAL 5.8 (L) 09/13/2019    ALT 83 (H) 09/13/2019    AST 16 09/13/2019    ALKPHOS 53 09/13/2019    BILITOTAL 0.6 09/13/2019        Preop Vitals    BP Readings from Last 3 Encounters:   10/17/19 (!) 140/97   10/04/19 (!) 146/88   09/13/19 127/80    Pulse Readings from Last 3 Encounters:   10/17/19 65   10/04/19 55   09/13/19 85      Resp Readings from Last 3 Encounters:   10/17/19 16   10/04/19 18   08/12/19 16    SpO2 Readings from Last 3 Encounters:   10/17/19 97%   10/04/19 98%   09/13/19 98%      Temp Readings from Last 1 Encounters:   10/17/19 98.2  F (36.8  C) (Oral)    Ht Readings from Last 1 Encounters:   10/17/19 1.867 m (6' 1.5\")      Wt Readings from Last 1 Encounters:   10/17/19 101.2 kg (223 lb)    Estimated body mass index is 29.02 kg/m  as calculated from the following:    Height as of this encounter: 1.867 m (6' 1.5\").    Weight as of this encounter: 101.2 kg (223 lb).     LDA:  Peripheral IV 03/23/19 Left;Anterior Upper forearm (Active)   Number of days: 208        Assessment:   ASA SCORE: 3    H&P: History and physical reviewed and following examination; no interval change.   Smoking Status:  Non-Smoker/Unknown        Plan:   Anes. Type:  General   Pre-Medication: None   Induction:  IV (Standard)   Airway: ETT; Oral   Access/Monitoring: PIV; 2nd PIV; A-Line   Maintenance: Balanced     Drips/Meds: Phenylephrine     Postop Plan:   Postop Pain: Opioids  Postop Sedation/Airway: Not planned  Disposition: Inpatient/Admit     PONV Management:   Adult Risk Factors:, Non-Smoker, Postop Opioids   Prevention: Ondansetron, Dexamethasone     CONSENT: Direct conversation   Plan and risks discussed with: Patient   Blood Products: Consented (ALL Blood Products)                PAC Discussion and Assessment    ASA Classification: 3  Case is suitable for: [place]  Anesthetic techniques and relevant " risks discussed: GA  Invasive monitoring and risk discussed:   Types:   Possibility and Risk of blood transfusion discussed:   NPO instructions given:   Additional anesthetic preparation and risks discussed:   Needs early admission to pre-op area:   Other:     PAC Resident/NP Anesthesia Assessment:  Jayden Lackey is a 64 year old male that is scheduled for a LEFT CRANIOTOMY, USING OPTICAL TRACKING SYSTEM, WITH NEOPLASM EXCISION on 10/24/2019 by Dr. Packer in treatment of a glioblastoma.  PAC referral for risk assessment and optimization for anesthesia with comorbid conditions of: a-fib/flutter, hypertension, hyperlipidemia, peripheral neuropathy, situational anxiety, hearing impairment, anemia, RA and seizures.      Pre-operative considerations:  1.  Cardiac:  Functional status- METS >4. He has been active ice skating for ice hockey practice.  He was diagnosed with atrial fibrillation during a hospitalization for a seizure sometime this past spring, but his wife reports that it converted back to SR, but in September he was admitted for another seizure and he was again noted to be in a-fib/flutter.  This is being monitored and managed by his internal medicine provider and they note that anticoagulation was deferred due to the glioblastoma. ChadsVasc = 1.  He is rate controlled on atenolol and diltiazem.  His wife notes interest in seeing cardiology for evaluation and to discuss other management options.  Referral offered here, but they would prefer to see someone closer to home through [facility], so they plan to request a referral from his primary care provider.  Regardless, due to the current diagnosis and planned non-elective surgery we would not recommend postponing surgery next week for cardiology consult.  Risks of chronic atrial fib/flutter have been discussed.  He had an echocardiogram in June 2019 that showed an EF of 60%.  High risk surgery with 0.4% risk of major adverse cardiac event.   2.  Pulm:  Airway  feasible.  NICOLÁS risk: intermediate.  He quit smoking in 1977.    He reports that he was treated with antibiotics 2 weeks ago for bronchitis with wheezing. He notes that all of the symptoms have resolved other than a mild cough.  Denies fevers.  Respiratory exam is normal today.  Encouraged patient to call Dr. Packer's office between now and the surgery date if symptoms return.    3.  GI:  Risk of PONV score = 2.  If > 2, anti-emetic intervention recommended.  4. Neuro:  He is on vimpat for seizures.  His last seizure was on 9/4/2019.  Consider seizure precautions.  5. Musculoskeletal:  He is on methotrexate for RA.  He reports he was old by Dr. Packer to hold his upcoming weekly dose on 10/19/2019.  He recently had an RA flare that effected bilateral lower extremity joints, but he tends to get multiple joint pain in the upper extremities also.  He was recently started on decadron by Dr. Packer in prep for the above surgery, but finds that helps his RA too.  Consider cautious positioning.   6. Endo:  He is currently on 2mg of decadron BID.  Stress dose steroids as per anesthesia DOS.  7. Heme:  + chronic anemia.  Hgb today is stable at 12.8, but it is noted that his platelets have dropped to 101.  Will order recheck for DOS.   Hold aspirin 7 days prior to surgery.  VTE risk = 3%.  8. Access:  He reports that he is a very difficult stick.  Consider IV team for IV placement.   9. Other:  The wife requests that he not be given Ativan.  He apparently was given ativan after a previous seizure and had hallucinations and delirium like symptoms for a few days afterwards.  Ativan has been added to the allergy list today.   10. Eye:  Vision is impaired.  Consider fall risk precautions.          Patient is optimized and is acceptable candidate for the proposed procedure.  No further diagnostic evaluation is needed.     Patient also evaluated by Dr. Parr. See recommendations below.     **For further details of assessment, testing,  and physical exam please see H and P completed on same date.          Colleen Cheatham DNP, RN, APRN        Mid-Level Provider/Resident:   Date:   Time:     Attending Anesthesiologist Anesthesia Assessment:  Agree with above.  EKG  A fib/ flutter -Stable   Wife request no ativan!  Plan ga,      Reviewed and Signed by PAC Anesthesiologist  Anesthesiologist: LIYAH  Date:   Time:   Pass/Fail:   Disposition:     PAC Pharmacist Assessment:        Pharmacist:   Date:   Time:    Colleen Cheatham APRN CNP

## 2019-10-17 NOTE — H&P
Pre-Operative H & P     CC:  Preoperative exam to assess for increased cardiopulmonary risk while undergoing surgery and anesthesia.    Date of Encounter: 10/17/2019  Primary Care Physician:  Gideon Pineda  Associated diagnosis: glioblastoma    HPI  Jayden Lackey is a 64 year old male who presents for pre-operative H & P in preparation for a LEFT CRANIOTOMY, USING OPTICAL TRACKING SYSTEM, WITH NEOPLASM EXCISION on 10/24/2019 by Dr. Packer at [facility].     Jayden Lackey is a 64 year old male with a-fib/flutter, hypertension, hyperlipidemia, peripheral neuropathy, situational anxiety, hearing impairment, anemia, RA and seizures that has a glioblastoma.  He had a craniotomy for surgical resection on 3/22/2019 and has also completed radiation and chemotherapy.  He since has been having complications of seizures and visual disturbance.  Imaging results are concerning for disease progression.  He has consulted with Dr. Packer and the above listed procedure has now been recommended for further treatment.      History is obtained from the patient, his wife and the medical record.     Past Medical History  Past Medical History:   Diagnosis Date     Colon polyp      Dyslipidemia      Glioblastoma (H)      Hypertension      Lumbar disc herniation     L4-5     Rheumatoid arthritis (H)        Past Surgical History  Past Surgical History:   Procedure Laterality Date     ANAL SPHINCTEROTOMY       BACK SURGERY  2009    L4-5     HERNIA REPAIR  1974     OPTICAL TRACKING SYSTEM CRANIOTOMY, EXCISE TUMOR, COMBINED Left 3/22/2019    Procedure: Left Stealth Assisted Craniotomy And Tumor Resection;  Surgeon: Irving Hayes MD;  Location: [facility] OR     SEPTOPLASTY       VASECTOMY         Hx of Blood transfusions/reactions: none     Hx of abnormal bleeding or anti-platelet use: aspirin      Steroid use in the last year: decadron 2mg BID    Personal or FH with difficulty with Anesthesia:   none    Prior to Admission Medications  Current Outpatient Medications   Medication Sig Dispense Refill     aspirin 81 MG EC tablet Take 81 mg by mouth At Bedtime        atenolol (TENORMIN) 50 MG tablet Take 50 mg by mouth daily        dexamethasone (DECADRON) 2 MG tablet Take 1 tablet (2 mg) by mouth 2 times daily for 10 days 20 tablet 0     diltiazem ER COATED BEADS (CARDIZEM CD/CARTIA XT) 120 MG 24 hr capsule Take 180 mg by mouth daily        fish oil-omega-3 fatty acids 1000 MG capsule Take 1 g by mouth daily Unknown dose        folic acid (FOLVITE) 1 MG tablet Take 1 mg by mouth daily        Lacosamide (VIMPAT) 100 MG TABS tablet Take 100 mg by mouth 2 times daily        levETIRAcetam (KEPPRA) 1000 MG tablet TK 1 T PO BID  1     methotrexate sodium, pres-free, 50 MG/2ML SOLN injection CHEMO Inject 150 mg Subcutaneous every 7 days   0     naproxen (NAPROSYN DR) 500 MG EC tablet Take 1,000 mg by mouth as needed        Nutritional Supplements (JUICE PLUS FIBRE PO) Take 1 tablet by mouth every morning        docusate sodium (COLACE) 100 MG capsule Take 100 mg by mouth         Allergies  Allergies   Allergen Reactions     Shellfish Allergy Difficulty breathing, Nausea and Vomiting, Swelling and Shortness Of Breath     Ativan [Lorazepam] Other (See Comments)     Hallucinations, delirium     No Clinical Screening - See Comments      Lupine bean doesn't remember reaction     Other (Do Not Use)      Lupine bean doesn't remember reaction       Social History  Social History     Socioeconomic History     Marital status:      Spouse name: Not on file     Number of children: Not on file     Years of education: Not on file     Highest education level: Not on file   Occupational History     Occupation: retired, on disability   Social Needs     Financial resource strain: Not on file     Food insecurity:     Worry: Not on file     Inability: Not on file     Transportation needs:     Medical: Not on file     Non-medical:  Not on file   Tobacco Use     Smoking status: Former Smoker     Packs/day: 1.00     Years: 3.00     Pack years: 3.00     Types: Cigarettes     Start date: 1974     Last attempt to quit: 1977     Years since quittin.3     Smokeless tobacco: Never Used   Substance and Sexual Activity     Alcohol use: Not Currently     Drug use: Never     Sexual activity: Not on file   Lifestyle     Physical activity:     Days per week: Not on file     Minutes per session: Not on file     Stress: Not on file   Relationships     Social connections:     Talks on phone: Not on file     Gets together: Not on file     Attends Congregation service: Not on file     Active member of club or organization: Not on file     Attends meetings of clubs or organizations: Not on file     Relationship status: Not on file     Intimate partner violence:     Fear of current or ex partner: Not on file     Emotionally abused: Not on file     Physically abused: Not on file     Forced sexual activity: Not on file   Other Topics Concern     Not on file   Social History Narrative    Worked in [employer]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       Family History  Family History   Problem Relation Age of Onset     Macular Degeneration Mother      Diabetes Mother      Heart Failure Father      Alcoholism Father      Diabetes Sister      Heart Disease Maternal Grandfather      Pancreatic Cancer Maternal Grandmother      Rheumatoid Arthritis Sister      Other - See Comments Sister         glioma     Other - See Comments Sister         maculofacial digital syndrome     Kidney Disease Sister         s/p transplant     Glaucoma No family hx of              ROS/MED HX  The complete review of systems is negative other than noted in the HPI or here.   ENT/Pulmonary:     (+)NICOLÁS risk factors snores loudly, hypertension, other ENT- wears a hearing aid on the right, tobacco use, Past use 1 packs/day  , recent URI resolved  "treated with cefdinir 2 weeks ago for bronchitis.  wheezing has resolved, just has lingering cough: . .    Neurologic:     (+)neuropathy - bilateral toes, seizures last seizure: 9/4/2019 features: staring and grand mal, other neuro glioblastoma    Cardiovascular:     (+) Dyslipidemia, hypertension----. : . . . :. dysrhythmias a-fib and a-flutter, .  results:          METS/Exercise Tolerance:  >4 METS   Hematologic:  - neg hematologic  ROS      (-) history of blood clots and History of Transfusion   Musculoskeletal:   (+)  other musculoskeletal- rheumatoid arthritis - multiple joint pain      GI/Hepatic:  - neg GI/hepatic ROS       Renal/Genitourinary:  - ROS Renal section negative       Endo:  +chronic steroids     Psychiatric:     (+) psychiatric history anxiety      Infectious Disease:  - neg infectious disease ROS       Malignancy:   (+) Malignancy History of Other  Other CA Active status post Surgery, Chemo and Radiation         Other:  +visual disturbance  (+) no H/O Chronic Pain,                       PHYSICAL EXAM:   Mental Status/Neuro: A/A/O   Airway: Facies: Feasible  Mallampati: I  Mouth/Opening: Full  TM distance: > 6 cm  Neck ROM: Full   Respiratory: Auscultation: CTAB     Resp. Rate: Normal     Resp. Effort: Normal      CV: Rhythm: Irregular  Heart: Normal Sounds  Edema: RLE; LLE   Comments:      Dental: Normal Dentition                Temp: 98.2  F (36.8  C) Temp src: Oral BP: 126/80 Pulse: 65   Resp: 16 SpO2: 97 %         223 lbs 0 oz  6' 1.5\"   Body mass index is 29.02 kg/m .       Physical Exam  Constitutional: Awake, alert, cooperative, no apparent distress, and appears stated age.  Eyes: Pupils equal, round and reactive to light, extra ocular muscles intact, sclera clear, conjunctiva normal.  HENT: Normocephalic, oral pharynx with moist mucus membranes, good dentition. No goiter appreciated.   Respiratory: Clear to auscultation bilaterally, no crackles or wheezing.  Cardiovascular: Regular rate " and irregular rhythm, normal S1 and S2, and no murmur noted.  Carotids +2, no bruits. Trace edema. Palpable pulses to radial  DP and PT arteries.   GI: Normal bowel sounds, soft, non-distended, non-tender, no masses palpated, no hepatosplenomegaly.    Lymph/Hematologic: No cervical lymphadenopathy and no supraclavicular lymphadenopathy.  Genitourinary:  deferred  Skin: Warm and dry.  No rashes at anticipated surgical site.   Musculoskeletal: Full ROM of neck. There is no redness, warmth, or swelling of the exposed joints. Gross motor strength is normal.    Neurologic: Awake, alert, oriented to name, place and time. Cranial nerves II-XII are grossly intact. Gait is normal.   Neuropsychiatric: Calm, cooperative. Normal affect.     Labs: (personally reviewed)  Component      Latest Ref Rng & Units 10/17/2019   Sodium      133 - 144 mmol/L 140   Potassium      3.4 - 5.3 mmol/L 4.2   Chloride      94 - 109 mmol/L 107   Carbon Dioxide      20 - 32 mmol/L 27   Anion Gap      3 - 14 mmol/L 7   Glucose      70 - 99 mg/dL 134 (H)   Urea Nitrogen      7 - 30 mg/dL 17   Creatinine      0.66 - 1.25 mg/dL 0.80   GFR Estimate      >60 mL/min/1.73:m2 >90   GFR Estimate If Black      >60 mL/min/1.73:m2 >90   Calcium      8.5 - 10.1 mg/dL 8.8   WBC      4.0 - 11.0 10e9/L 4.9   RBC Count      4.4 - 5.9 10e12/L 4.44   Hemoglobin      13.3 - 17.7 g/dL 12.8 (L)   Hematocrit      40.0 - 53.0 % 39.6 (L)   MCV      78 - 100 fl 89   MCH      26.5 - 33.0 pg 28.8   MCHC      31.5 - 36.5 g/dL 32.3   RDW      10.0 - 15.0 % 18.5 (H)   Platelet Count      150 - 450 10e9/L 101 (L)   PTT      22 - 37 sec 33   INR      0.86 - 1.14 1.06           EKG: Personally reviewed but formal cardiology read pending: 10/17/2019  Atrial flutter with variable AV block, non-specific St abnormality      Cardiac echo:   2016  Result Narrative     No previous study for comparison.    Left ventricle ejection fraction is normal. The calculated left   ventricular  ejection fraction is 60%.    Normal left ventricular size and systolic function.    Normal right ventricular size and systolic function.    No hemodynamically significant valvular heart abnormalities.           Outside records reviewed from:   Care Everywhere    ASSESSMENT and PLAN  Jayden Lackey is a 64 year old male that is scheduled for a LEFT CRANIOTOMY, USING OPTICAL TRACKING SYSTEM, WITH NEOPLASM EXCISION on 10/24/2019 by Dr. Packer in treatment of a glioblastoma.  PAC referral for risk assessment and optimization for anesthesia with comorbid conditions of: a-fib/flutter, hypertension, hyperlipidemia, peripheral neuropathy, situational anxiety, hearing impairment, anemia, RA and seizures.      Pre-operative considerations:  1.  Cardiac:  Functional status- METS >4. He has been active ice skating for ice hockey practice.  He was diagnosed with atrial fibrillation during a hospitalization for a seizure sometime this past spring, but his wife reports that it converted back to SR, but in September he was admitted for another seizure and he was again noted to be in a-fib/flutter.  This is being monitored and managed by his internal medicine provider and they note that anticoagulation was deferred due to the glioblastoma. ChadsVasc = 1.  He is rate controlled on atenolol and diltiazem.  His wife notes interest in seeing cardiology for evaluation and to discuss other management options.  Referral offered here, but they would prefer to see someone closer to home through [facility], so they plan to request a referral from his primary care provider.  Regardless, due to the current diagnosis and planned non-elective surgery we would not recommend postponing surgery next week for cardiology consult.  Risks of chronic atrial fib/flutter have been discussed.  He had an echocardiogram in June 2019 that showed an EF of 60%.  High risk surgery with 0.4% risk of major adverse cardiac event.   2.  Pulm:  Airway feasible.  NICOLÁS risk:  intermediate.  He quit smoking in 1977.    He reports that he was treated with antibiotics 2 weeks ago for bronchitis with wheezing. He notes that all of the symptoms have resolved other than a mild cough.  Denies fevers.  Respiratory exam is normal today.  Encouraged patient to call Dr. Packer's office between now and the surgery date if symptoms return.    3.  GI:  Risk of PONV score = 2.  If > 2, anti-emetic intervention recommended.  4. Neuro:  He is on vimpat for seizures.  His last seizure was on 9/4/2019.  Consider seizure precautions.  5. Musculoskeletal:  He is on methotrexate for RA.  He reports he was old by Dr. Packer to hold his upcoming weekly dose on 10/19/2019.  He recently had an RA flare that effected bilateral lower extremity joints, but he tends to get multiple joint pain in the upper extremities also.  He was recently started on decadron by Dr. Packer in prep for the above surgery, but finds that helps his RA too.  Consider cautious positioning.   6. Endo:  He is currently on 2mg of decadron BID.  Stress dose steroids as per anesthesia DOS.  7. Heme:  + chronic anemia.  Hgb today is stable at 12.8, but it is noted that his platelets have dropped to 101.  Will order recheck for DOS.   Hold aspirin 7 days prior to surgery.  VTE risk = 3%.  8. Access:  He reports that he is a very difficult stick.  Consider IV team for IV placement.   9. Other:  The wife requests that he not be given Ativan.  He apparently was given ativan after a previous seizure and had hallucinations and delirium like symptoms for a few days afterwards.  Ativan has been added to the allergy list today.   10. Eye:  Vision is impaired.  Consider fall risk precautions.          Patient is optimized and is acceptable candidate for the proposed procedure.  No further diagnostic evaluation is needed.     Patient also evaluated by Dr. Parr.         Colleen Cheatham, DIGNA, RN, APRN  Preoperative Assessment Center  [facility]  [place]  [facility]  Phone: [phone]  Fax: [phone]

## 2019-10-24 ENCOUNTER — APPOINTMENT (OUTPATIENT)
Dept: CT IMAGING | Facility: CLINIC | Age: 64
End: 2019-10-24
Attending: STUDENT IN AN ORGANIZED HEALTH CARE EDUCATION/TRAINING PROGRAM
Payer: COMMERCIAL

## 2019-10-24 ENCOUNTER — ANESTHESIA (OUTPATIENT)
Dept: SURGERY | Facility: CLINIC | Age: 64
End: 2019-10-24
Payer: COMMERCIAL

## 2019-10-24 ENCOUNTER — HOSPITAL ENCOUNTER (INPATIENT)
Facility: CLINIC | Age: 64
LOS: 4 days | Discharge: HOME HEALTH | End: 2019-10-28
Attending: NEUROLOGICAL SURGERY | Admitting: NEUROLOGICAL SURGERY
Payer: COMMERCIAL

## 2019-10-24 ENCOUNTER — HOSPITAL ENCOUNTER (OUTPATIENT)
Dept: MRI IMAGING | Facility: CLINIC | Age: 64
End: 2019-10-24
Attending: NEUROLOGICAL SURGERY
Payer: COMMERCIAL

## 2019-10-24 DIAGNOSIS — Z98.890 S/P CRANIOTOMY: Primary | ICD-10-CM

## 2019-10-24 DIAGNOSIS — C71.1 MALIGNANT NEOPLASM OF FRONTAL LOBE OF BRAIN (H): ICD-10-CM

## 2019-10-24 DIAGNOSIS — C71.4 GLIOBLASTOMA MULTIFORME OF OCCIPITAL LOBE (H): ICD-10-CM

## 2019-10-24 DIAGNOSIS — C71.4: ICD-10-CM

## 2019-10-24 LAB
ABO + RH BLD: NORMAL
ANION GAP SERPL CALCULATED.3IONS-SCNC: 7 MMOL/L (ref 3–14)
ANION GAP SERPL CALCULATED.3IONS-SCNC: 7 MMOL/L (ref 3–14)
APTT PPP: 26 SEC (ref 22–37)
BASE EXCESS BLDA CALC-SCNC: 0.3 MMOL/L
BLD GP AB SCN SERPL QL: NORMAL
BLD GP AB SCN SERPL QL: NORMAL
BLD PROD TYP BPU: NORMAL
BLD PROD TYP BPU: NORMAL
BLOOD BANK CMNT PATIENT-IMP: NORMAL
BUN SERPL-MCNC: 22 MG/DL (ref 7–30)
BUN SERPL-MCNC: 24 MG/DL (ref 7–30)
CA-I BLD-MCNC: 4.8 MG/DL (ref 4.4–5.2)
CALCIUM SERPL-MCNC: 8.2 MG/DL (ref 8.5–10.1)
CALCIUM SERPL-MCNC: 9.1 MG/DL (ref 8.5–10.1)
CHLORIDE SERPL-SCNC: 103 MMOL/L (ref 94–109)
CHLORIDE SERPL-SCNC: 105 MMOL/L (ref 94–109)
CO2 SERPL-SCNC: 26 MMOL/L (ref 20–32)
CO2 SERPL-SCNC: 27 MMOL/L (ref 20–32)
CREAT SERPL-MCNC: 0.73 MG/DL (ref 0.66–1.25)
CREAT SERPL-MCNC: 0.8 MG/DL (ref 0.66–1.25)
ERYTHROCYTE [DISTWIDTH] IN BLOOD BY AUTOMATED COUNT: 18.6 % (ref 10–15)
ERYTHROCYTE [DISTWIDTH] IN BLOOD BY AUTOMATED COUNT: 19.3 % (ref 10–15)
GFR SERPL CREATININE-BSD FRML MDRD: >90 ML/MIN/{1.73_M2}
GFR SERPL CREATININE-BSD FRML MDRD: >90 ML/MIN/{1.73_M2}
GLUCOSE BLD-MCNC: 135 MG/DL (ref 70–99)
GLUCOSE BLDC GLUCOMTR-MCNC: 107 MG/DL (ref 70–99)
GLUCOSE BLDC GLUCOMTR-MCNC: 190 MG/DL (ref 70–99)
GLUCOSE SERPL-MCNC: 109 MG/DL (ref 70–99)
GLUCOSE SERPL-MCNC: 175 MG/DL (ref 70–99)
HCO3 BLD-SCNC: 27 MMOL/L (ref 21–28)
HCT VFR BLD AUTO: 30.9 % (ref 40–53)
HCT VFR BLD AUTO: 38.4 % (ref 40–53)
HGB BLD-MCNC: 10.9 G/DL (ref 13.3–17.7)
HGB BLD-MCNC: 12.5 G/DL (ref 13.3–17.7)
HGB BLD-MCNC: 9.9 G/DL (ref 13.3–17.7)
INR PPP: 1.08 (ref 0.86–1.14)
LACTATE BLD-SCNC: 1.3 MMOL/L (ref 0.7–2)
MAGNESIUM SERPL-MCNC: 2.2 MG/DL (ref 1.6–2.3)
MCH RBC QN AUTO: 28.4 PG (ref 26.5–33)
MCH RBC QN AUTO: 28.6 PG (ref 26.5–33)
MCHC RBC AUTO-ENTMCNC: 32 G/DL (ref 31.5–36.5)
MCHC RBC AUTO-ENTMCNC: 32.6 G/DL (ref 31.5–36.5)
MCV RBC AUTO: 88 FL (ref 78–100)
MCV RBC AUTO: 89 FL (ref 78–100)
NUM BPU REQUESTED: 1
NUM BPU REQUESTED: 1
O2/TOTAL GAS SETTING VFR VENT: 55 %
PCO2 BLD: 49 MM HG (ref 35–45)
PH BLD: 7.34 PH (ref 7.35–7.45)
PHOSPHATE SERPL-MCNC: 5.7 MG/DL (ref 2.5–4.5)
PLATELET # BLD AUTO: 146 10E9/L (ref 150–450)
PLATELET # BLD AUTO: 153 10E9/L (ref 150–450)
PO2 BLD: 93 MM HG (ref 80–105)
POTASSIUM BLD-SCNC: 4.4 MMOL/L (ref 3.4–5.3)
POTASSIUM SERPL-SCNC: 4 MMOL/L (ref 3.4–5.3)
POTASSIUM SERPL-SCNC: 4.3 MMOL/L (ref 3.4–5.3)
RADIOLOGIST FLAGS: ABNORMAL
RBC # BLD AUTO: 3.48 10E12/L (ref 4.4–5.9)
RBC # BLD AUTO: 4.37 10E12/L (ref 4.4–5.9)
SODIUM BLD-SCNC: 135 MMOL/L (ref 133–144)
SODIUM SERPL-SCNC: 137 MMOL/L (ref 133–144)
SODIUM SERPL-SCNC: 138 MMOL/L (ref 133–144)
SPECIMEN EXP DATE BLD: NORMAL
SPECIMEN EXP DATE BLD: NORMAL
WBC # BLD AUTO: 6.9 10E9/L (ref 4–11)
WBC # BLD AUTO: 9.1 10E9/L (ref 4–11)

## 2019-10-24 PROCEDURE — 00000146 ZZHCL STATISTIC GLUCOSE BY METER IP

## 2019-10-24 PROCEDURE — 00B70ZZ EXCISION OF CEREBRAL HEMISPHERE, OPEN APPROACH: ICD-10-PCS | Performed by: NEUROLOGICAL SURGERY

## 2019-10-24 PROCEDURE — 27210995 ZZH RX 272: Performed by: NEUROLOGICAL SURGERY

## 2019-10-24 PROCEDURE — 25000128 H RX IP 250 OP 636: Performed by: STUDENT IN AN ORGANIZED HEALTH CARE EDUCATION/TRAINING PROGRAM

## 2019-10-24 PROCEDURE — 25500064 ZZH RX 255 OP 636: Performed by: NEUROLOGICAL SURGERY

## 2019-10-24 PROCEDURE — 00B70ZX EXCISION OF CEREBRAL HEMISPHERE, OPEN APPROACH, DIAGNOSTIC: ICD-10-PCS | Performed by: NEUROLOGICAL SURGERY

## 2019-10-24 PROCEDURE — 86900 BLOOD TYPING SEROLOGIC ABO: CPT | Performed by: STUDENT IN AN ORGANIZED HEALTH CARE EDUCATION/TRAINING PROGRAM

## 2019-10-24 PROCEDURE — 25000566 ZZH SEVOFLURANE, EA 15 MIN: Performed by: NEUROLOGICAL SURGERY

## 2019-10-24 PROCEDURE — 25800030 ZZH RX IP 258 OP 636: Performed by: STUDENT IN AN ORGANIZED HEALTH CARE EDUCATION/TRAINING PROGRAM

## 2019-10-24 PROCEDURE — 86901 BLOOD TYPING SEROLOGIC RH(D): CPT | Performed by: STUDENT IN AN ORGANIZED HEALTH CARE EDUCATION/TRAINING PROGRAM

## 2019-10-24 PROCEDURE — 84295 ASSAY OF SERUM SODIUM: CPT | Performed by: NEUROLOGICAL SURGERY

## 2019-10-24 PROCEDURE — 37000009 ZZH ANESTHESIA TECHNICAL FEE, EACH ADDTL 15 MIN: Performed by: NEUROLOGICAL SURGERY

## 2019-10-24 PROCEDURE — 25000125 ZZHC RX 250: Performed by: NURSE ANESTHETIST, CERTIFIED REGISTERED

## 2019-10-24 PROCEDURE — 70450 CT HEAD/BRAIN W/O DYE: CPT

## 2019-10-24 PROCEDURE — 85027 COMPLETE CBC AUTOMATED: CPT | Performed by: STUDENT IN AN ORGANIZED HEALTH CARE EDUCATION/TRAINING PROGRAM

## 2019-10-24 PROCEDURE — 37000008 ZZH ANESTHESIA TECHNICAL FEE, 1ST 30 MIN: Performed by: NEUROLOGICAL SURGERY

## 2019-10-24 PROCEDURE — 25000125 ZZHC RX 250: Performed by: STUDENT IN AN ORGANIZED HEALTH CARE EDUCATION/TRAINING PROGRAM

## 2019-10-24 PROCEDURE — 82803 BLOOD GASES ANY COMBINATION: CPT | Performed by: NEUROLOGICAL SURGERY

## 2019-10-24 PROCEDURE — 80048 BASIC METABOLIC PNL TOTAL CA: CPT | Performed by: STUDENT IN AN ORGANIZED HEALTH CARE EDUCATION/TRAINING PROGRAM

## 2019-10-24 PROCEDURE — 86850 RBC ANTIBODY SCREEN: CPT | Performed by: STUDENT IN AN ORGANIZED HEALTH CARE EDUCATION/TRAINING PROGRAM

## 2019-10-24 PROCEDURE — 86923 COMPATIBILITY TEST ELECTRIC: CPT | Performed by: STUDENT IN AN ORGANIZED HEALTH CARE EDUCATION/TRAINING PROGRAM

## 2019-10-24 PROCEDURE — 83605 ASSAY OF LACTIC ACID: CPT | Performed by: NEUROLOGICAL SURGERY

## 2019-10-24 PROCEDURE — 40000170 ZZH STATISTIC PRE-PROCEDURE ASSESSMENT II: Performed by: NEUROLOGICAL SURGERY

## 2019-10-24 PROCEDURE — 88331 PATH CONSLTJ SURG 1 BLK 1SPC: CPT | Performed by: NEUROLOGICAL SURGERY

## 2019-10-24 PROCEDURE — C1713 ANCHOR/SCREW BN/BN,TIS/BN: HCPCS | Performed by: NEUROLOGICAL SURGERY

## 2019-10-24 PROCEDURE — 25000128 H RX IP 250 OP 636: Performed by: NEUROLOGICAL SURGERY

## 2019-10-24 PROCEDURE — 84132 ASSAY OF SERUM POTASSIUM: CPT | Performed by: NEUROLOGICAL SURGERY

## 2019-10-24 PROCEDURE — 25800030 ZZH RX IP 258 OP 636: Performed by: NEUROLOGICAL SURGERY

## 2019-10-24 PROCEDURE — C1763 CONN TISS, NON-HUMAN: HCPCS | Performed by: NEUROLOGICAL SURGERY

## 2019-10-24 PROCEDURE — 27810169 ZZH OR IMPLANT GENERAL: Performed by: NEUROLOGICAL SURGERY

## 2019-10-24 PROCEDURE — 25000128 H RX IP 250 OP 636: Performed by: NURSE ANESTHETIST, CERTIFIED REGISTERED

## 2019-10-24 PROCEDURE — 20000004 ZZH R&B ICU UMMC

## 2019-10-24 PROCEDURE — 82947 ASSAY GLUCOSE BLOOD QUANT: CPT | Performed by: NEUROLOGICAL SURGERY

## 2019-10-24 PROCEDURE — 70552 MRI BRAIN STEM W/DYE: CPT

## 2019-10-24 PROCEDURE — 87640 STAPH A DNA AMP PROBE: CPT | Performed by: INTERNAL MEDICINE

## 2019-10-24 PROCEDURE — 36415 COLL VENOUS BLD VENIPUNCTURE: CPT | Performed by: STUDENT IN AN ORGANIZED HEALTH CARE EDUCATION/TRAINING PROGRAM

## 2019-10-24 PROCEDURE — 25000128 H RX IP 250 OP 636

## 2019-10-24 PROCEDURE — 71000016 ZZH RECOVERY PHASE 1 LEVEL 3 FIRST HR: Performed by: NEUROLOGICAL SURGERY

## 2019-10-24 PROCEDURE — 83735 ASSAY OF MAGNESIUM: CPT | Performed by: STUDENT IN AN ORGANIZED HEALTH CARE EDUCATION/TRAINING PROGRAM

## 2019-10-24 PROCEDURE — 85025 COMPLETE CBC W/AUTO DIFF WBC: CPT | Performed by: NURSE PRACTITIONER

## 2019-10-24 PROCEDURE — A9585 GADOBUTROL INJECTION: HCPCS | Performed by: NEUROLOGICAL SURGERY

## 2019-10-24 PROCEDURE — 8E09XBH COMPUTER ASSISTED PROCEDURE OF HEAD AND NECK REGION, WITH MAGNETIC RESONANCE IMAGING: ICD-10-PCS | Performed by: NEUROLOGICAL SURGERY

## 2019-10-24 PROCEDURE — 82330 ASSAY OF CALCIUM: CPT | Performed by: NEUROLOGICAL SURGERY

## 2019-10-24 PROCEDURE — 40000275 ZZH STATISTIC RCP TIME EA 10 MIN

## 2019-10-24 PROCEDURE — 25000128 H RX IP 250 OP 636: Performed by: ANESTHESIOLOGY

## 2019-10-24 PROCEDURE — 87641 MR-STAPH DNA AMP PROBE: CPT | Performed by: INTERNAL MEDICINE

## 2019-10-24 PROCEDURE — 40000014 ZZH STATISTIC ARTERIAL MONITORING DAILY

## 2019-10-24 PROCEDURE — 85610 PROTHROMBIN TIME: CPT | Performed by: STUDENT IN AN ORGANIZED HEALTH CARE EDUCATION/TRAINING PROGRAM

## 2019-10-24 PROCEDURE — 71000017 ZZH RECOVERY PHASE 1 LEVEL 3 EA ADDTL HR: Performed by: NEUROLOGICAL SURGERY

## 2019-10-24 PROCEDURE — 88342 IMHCHEM/IMCYTCHM 1ST ANTB: CPT | Performed by: NEUROLOGICAL SURGERY

## 2019-10-24 PROCEDURE — 85730 THROMBOPLASTIN TIME PARTIAL: CPT | Performed by: STUDENT IN AN ORGANIZED HEALTH CARE EDUCATION/TRAINING PROGRAM

## 2019-10-24 PROCEDURE — 27210794 ZZH OR GENERAL SUPPLY STERILE: Performed by: NEUROLOGICAL SURGERY

## 2019-10-24 PROCEDURE — 36000076 ZZH SURGERY LEVEL 6 EA 15 ADDTL MIN - UMMC: Performed by: NEUROLOGICAL SURGERY

## 2019-10-24 PROCEDURE — 84100 ASSAY OF PHOSPHORUS: CPT | Performed by: STUDENT IN AN ORGANIZED HEALTH CARE EDUCATION/TRAINING PROGRAM

## 2019-10-24 PROCEDURE — 88332 PATH CONSLTJ SURG EA ADD BLK: CPT | Performed by: NEUROLOGICAL SURGERY

## 2019-10-24 PROCEDURE — 25000125 ZZHC RX 250: Performed by: NEUROLOGICAL SURGERY

## 2019-10-24 PROCEDURE — 36000074 ZZH SURGERY LEVEL 6 1ST 30 MIN - UMMC: Performed by: NEUROLOGICAL SURGERY

## 2019-10-24 PROCEDURE — 36415 COLL VENOUS BLD VENIPUNCTURE: CPT | Performed by: NURSE PRACTITIONER

## 2019-10-24 PROCEDURE — 88307 TISSUE EXAM BY PATHOLOGIST: CPT | Performed by: NEUROLOGICAL SURGERY

## 2019-10-24 DEVICE — IMP SCR SYN MATRIX LOW PRO 1.5X05MM SELF DRILL 04.503.105.01: Type: IMPLANTABLE DEVICE | Site: SKULL | Status: FUNCTIONAL

## 2019-10-24 DEVICE — GRAFT DURAGEN 3X3" ID330: Type: IMPLANTABLE DEVICE | Site: SKULL | Status: FUNCTIONAL

## 2019-10-24 DEVICE — IMP PLATE SYN BOX LOW PROFILE 04H TI 421.511: Type: IMPLANTABLE DEVICE | Site: SKULL | Status: FUNCTIONAL

## 2019-10-24 DEVICE — IMP BUR HOLE COVER 17MM LOW PROFILE TI 421.527: Type: IMPLANTABLE DEVICE | Site: SKULL | Status: FUNCTIONAL

## 2019-10-24 RX ORDER — ACETAMINOPHEN 325 MG/1
650 TABLET ORAL EVERY 4 HOURS PRN
Status: DISCONTINUED | OUTPATIENT
Start: 2019-10-27 | End: 2019-10-28 | Stop reason: HOSPADM

## 2019-10-24 RX ORDER — ACETAMINOPHEN 325 MG/1
975 TABLET ORAL EVERY 8 HOURS
Status: DISPENSED | OUTPATIENT
Start: 2019-10-24 | End: 2019-10-27

## 2019-10-24 RX ORDER — FENTANYL CITRATE 50 UG/ML
25-50 INJECTION, SOLUTION INTRAMUSCULAR; INTRAVENOUS
Status: DISCONTINUED | OUTPATIENT
Start: 2019-10-24 | End: 2019-10-24 | Stop reason: HOSPADM

## 2019-10-24 RX ORDER — LEVETIRACETAM 10 MG/ML
1000 INJECTION INTRAVASCULAR 2 TIMES DAILY
Status: DISCONTINUED | OUTPATIENT
Start: 2019-10-24 | End: 2019-10-27

## 2019-10-24 RX ORDER — DEXAMETHASONE SODIUM PHOSPHATE 4 MG/ML
4 INJECTION, SOLUTION INTRA-ARTICULAR; INTRALESIONAL; INTRAMUSCULAR; INTRAVENOUS; SOFT TISSUE EVERY 8 HOURS
Status: DISCONTINUED | OUTPATIENT
Start: 2019-10-24 | End: 2019-10-25

## 2019-10-24 RX ORDER — LABETALOL HYDROCHLORIDE 5 MG/ML
INJECTION, SOLUTION INTRAVENOUS PRN
Status: DISCONTINUED | OUTPATIENT
Start: 2019-10-24 | End: 2019-10-24

## 2019-10-24 RX ORDER — DEXAMETHASONE SODIUM PHOSPHATE 4 MG/ML
1 INJECTION, SOLUTION INTRA-ARTICULAR; INTRALESIONAL; INTRAMUSCULAR; INTRAVENOUS; SOFT TISSUE EVERY 8 HOURS
Status: DISCONTINUED | OUTPATIENT
Start: 2019-11-02 | End: 2019-10-25

## 2019-10-24 RX ORDER — ESMOLOL HYDROCHLORIDE 10 MG/ML
INJECTION INTRAVENOUS PRN
Status: DISCONTINUED | OUTPATIENT
Start: 2019-10-24 | End: 2019-10-24

## 2019-10-24 RX ORDER — ATENOLOL 25 MG/1
50 TABLET ORAL DAILY
Status: DISCONTINUED | OUTPATIENT
Start: 2019-10-25 | End: 2019-10-28 | Stop reason: HOSPADM

## 2019-10-24 RX ORDER — DOCUSATE SODIUM 100 MG/1
100 CAPSULE, LIQUID FILLED ORAL DAILY
Status: DISCONTINUED | OUTPATIENT
Start: 2019-10-25 | End: 2019-10-28 | Stop reason: HOSPADM

## 2019-10-24 RX ORDER — LACOSAMIDE 100 MG/1
100 TABLET ORAL 2 TIMES DAILY
Status: DISCONTINUED | OUTPATIENT
Start: 2019-10-24 | End: 2019-10-25

## 2019-10-24 RX ORDER — GADOBUTROL 604.72 MG/ML
10 INJECTION INTRAVENOUS ONCE
Status: COMPLETED | OUTPATIENT
Start: 2019-10-24 | End: 2019-10-24

## 2019-10-24 RX ORDER — SODIUM CHLORIDE, SODIUM LACTATE, POTASSIUM CHLORIDE, CALCIUM CHLORIDE 600; 310; 30; 20 MG/100ML; MG/100ML; MG/100ML; MG/100ML
INJECTION, SOLUTION INTRAVENOUS CONTINUOUS
Status: DISCONTINUED | OUTPATIENT
Start: 2019-10-24 | End: 2019-10-24 | Stop reason: HOSPADM

## 2019-10-24 RX ORDER — CEFAZOLIN SODIUM 1 G/3ML
1 INJECTION, POWDER, FOR SOLUTION INTRAMUSCULAR; INTRAVENOUS EVERY 8 HOURS
Status: DISCONTINUED | OUTPATIENT
Start: 2019-10-24 | End: 2019-10-28 | Stop reason: HOSPADM

## 2019-10-24 RX ORDER — HYDROMORPHONE HYDROCHLORIDE 1 MG/ML
.3-.5 INJECTION, SOLUTION INTRAMUSCULAR; INTRAVENOUS; SUBCUTANEOUS
Status: DISCONTINUED | OUTPATIENT
Start: 2019-10-24 | End: 2019-10-27

## 2019-10-24 RX ORDER — LABETALOL 20 MG/4 ML (5 MG/ML) INTRAVENOUS SYRINGE
Status: COMPLETED
Start: 2019-10-24 | End: 2019-10-24

## 2019-10-24 RX ORDER — NALOXONE HYDROCHLORIDE 0.4 MG/ML
.1-.4 INJECTION, SOLUTION INTRAMUSCULAR; INTRAVENOUS; SUBCUTANEOUS
Status: DISCONTINUED | OUTPATIENT
Start: 2019-10-24 | End: 2019-10-28 | Stop reason: HOSPADM

## 2019-10-24 RX ORDER — HYDROMORPHONE HYDROCHLORIDE 1 MG/ML
.3-.5 INJECTION, SOLUTION INTRAMUSCULAR; INTRAVENOUS; SUBCUTANEOUS EVERY 5 MIN PRN
Status: DISCONTINUED | OUTPATIENT
Start: 2019-10-24 | End: 2019-10-24 | Stop reason: HOSPADM

## 2019-10-24 RX ORDER — LIDOCAINE 40 MG/G
CREAM TOPICAL
Status: DISCONTINUED | OUTPATIENT
Start: 2019-10-24 | End: 2019-10-24 | Stop reason: HOSPADM

## 2019-10-24 RX ORDER — HYDRALAZINE HYDROCHLORIDE 20 MG/ML
10-20 INJECTION INTRAMUSCULAR; INTRAVENOUS EVERY 30 MIN PRN
Status: DISCONTINUED | OUTPATIENT
Start: 2019-10-24 | End: 2019-10-25

## 2019-10-24 RX ORDER — LIDOCAINE HYDROCHLORIDE 20 MG/ML
INJECTION, SOLUTION INFILTRATION; PERINEURAL PRN
Status: DISCONTINUED | OUTPATIENT
Start: 2019-10-24 | End: 2019-10-24

## 2019-10-24 RX ORDER — CEFAZOLIN SODIUM 2 G/100ML
2 INJECTION, SOLUTION INTRAVENOUS
Status: DISCONTINUED | OUTPATIENT
Start: 2019-10-24 | End: 2019-10-24

## 2019-10-24 RX ORDER — LIDOCAINE 40 MG/G
CREAM TOPICAL
Status: DISCONTINUED | OUTPATIENT
Start: 2019-10-24 | End: 2019-10-28 | Stop reason: HOSPADM

## 2019-10-24 RX ORDER — ONDANSETRON 2 MG/ML
INJECTION INTRAMUSCULAR; INTRAVENOUS PRN
Status: DISCONTINUED | OUTPATIENT
Start: 2019-10-24 | End: 2019-10-24

## 2019-10-24 RX ORDER — DILTIAZEM HYDROCHLORIDE 180 MG/1
180 CAPSULE, COATED, EXTENDED RELEASE ORAL DAILY
Status: DISCONTINUED | OUTPATIENT
Start: 2019-10-25 | End: 2019-10-28 | Stop reason: HOSPADM

## 2019-10-24 RX ORDER — NALOXONE HYDROCHLORIDE 0.4 MG/ML
.1-.4 INJECTION, SOLUTION INTRAMUSCULAR; INTRAVENOUS; SUBCUTANEOUS
Status: DISCONTINUED | OUTPATIENT
Start: 2019-10-24 | End: 2019-10-25

## 2019-10-24 RX ORDER — FOLIC ACID 1 MG/1
1 TABLET ORAL DAILY
Status: DISCONTINUED | OUTPATIENT
Start: 2019-10-25 | End: 2019-10-28 | Stop reason: HOSPADM

## 2019-10-24 RX ORDER — CEFAZOLIN SODIUM 1 G/3ML
1 INJECTION, POWDER, FOR SOLUTION INTRAMUSCULAR; INTRAVENOUS SEE ADMIN INSTRUCTIONS
Status: DISCONTINUED | OUTPATIENT
Start: 2019-10-24 | End: 2019-10-24

## 2019-10-24 RX ORDER — ONDANSETRON 4 MG/1
4 TABLET, ORALLY DISINTEGRATING ORAL EVERY 30 MIN PRN
Status: DISCONTINUED | OUTPATIENT
Start: 2019-10-24 | End: 2019-10-24 | Stop reason: HOSPADM

## 2019-10-24 RX ORDER — LEVETIRACETAM 500 MG/1
1000 TABLET ORAL 2 TIMES DAILY
Status: DISCONTINUED | OUTPATIENT
Start: 2019-10-24 | End: 2019-10-24

## 2019-10-24 RX ORDER — OXYCODONE HYDROCHLORIDE 5 MG/1
5-10 TABLET ORAL
Status: DISCONTINUED | OUTPATIENT
Start: 2019-10-24 | End: 2019-10-28 | Stop reason: HOSPADM

## 2019-10-24 RX ORDER — FAMOTIDINE 20 MG/1
20 TABLET, FILM COATED ORAL 2 TIMES DAILY
Status: DISCONTINUED | OUTPATIENT
Start: 2019-10-24 | End: 2019-10-28 | Stop reason: HOSPADM

## 2019-10-24 RX ORDER — VANCOMYCIN HYDROCHLORIDE 1 G/200ML
1000 INJECTION, SOLUTION INTRAVENOUS
Status: DISCONTINUED | OUTPATIENT
Start: 2019-10-24 | End: 2019-10-24 | Stop reason: DRUGHIGH

## 2019-10-24 RX ORDER — DEXAMETHASONE SODIUM PHOSPHATE 4 MG/ML
2 INJECTION, SOLUTION INTRA-ARTICULAR; INTRALESIONAL; INTRAMUSCULAR; INTRAVENOUS; SOFT TISSUE EVERY 8 HOURS
Status: DISCONTINUED | OUTPATIENT
Start: 2019-10-30 | End: 2019-10-25

## 2019-10-24 RX ORDER — LEVETIRACETAM 10 MG/ML
1000 INJECTION INTRAVASCULAR ONCE
Status: COMPLETED | OUTPATIENT
Start: 2019-10-24 | End: 2019-10-24

## 2019-10-24 RX ORDER — GENTAMICIN SULFATE 80 MG/100ML
80 INJECTION, SOLUTION INTRAVENOUS ONCE
Status: COMPLETED | OUTPATIENT
Start: 2019-10-24 | End: 2019-10-24

## 2019-10-24 RX ORDER — METOPROLOL TARTRATE 1 MG/ML
1-2 INJECTION, SOLUTION INTRAVENOUS EVERY 5 MIN PRN
Status: DISCONTINUED | OUTPATIENT
Start: 2019-10-24 | End: 2019-10-24 | Stop reason: HOSPADM

## 2019-10-24 RX ORDER — DEXAMETHASONE SODIUM PHOSPHATE 4 MG/ML
3 INJECTION, SOLUTION INTRA-ARTICULAR; INTRALESIONAL; INTRAMUSCULAR; INTRAVENOUS; SOFT TISSUE EVERY 8 HOURS
Status: DISCONTINUED | OUTPATIENT
Start: 2019-10-27 | End: 2019-10-25

## 2019-10-24 RX ORDER — SODIUM CHLORIDE 9 MG/ML
INJECTION, SOLUTION INTRAVENOUS CONTINUOUS
Status: ACTIVE | OUTPATIENT
Start: 2019-10-24 | End: 2019-10-25

## 2019-10-24 RX ORDER — SODIUM CHLORIDE, SODIUM LACTATE, POTASSIUM CHLORIDE, CALCIUM CHLORIDE 600; 310; 30; 20 MG/100ML; MG/100ML; MG/100ML; MG/100ML
INJECTION, SOLUTION INTRAVENOUS CONTINUOUS PRN
Status: DISCONTINUED | OUTPATIENT
Start: 2019-10-24 | End: 2019-10-24

## 2019-10-24 RX ORDER — DEXAMETHASONE SODIUM PHOSPHATE 4 MG/ML
INJECTION, SOLUTION INTRA-ARTICULAR; INTRALESIONAL; INTRAMUSCULAR; INTRAVENOUS; SOFT TISSUE PRN
Status: DISCONTINUED | OUTPATIENT
Start: 2019-10-24 | End: 2019-10-24

## 2019-10-24 RX ORDER — MANNITOL 20 G/100ML
101 INJECTION, SOLUTION INTRAVENOUS ONCE
Status: COMPLETED | OUTPATIENT
Start: 2019-10-24 | End: 2019-10-24

## 2019-10-24 RX ORDER — PROPOFOL 10 MG/ML
INJECTION, EMULSION INTRAVENOUS PRN
Status: DISCONTINUED | OUTPATIENT
Start: 2019-10-24 | End: 2019-10-24

## 2019-10-24 RX ORDER — ONDANSETRON 2 MG/ML
4 INJECTION INTRAMUSCULAR; INTRAVENOUS EVERY 30 MIN PRN
Status: DISCONTINUED | OUTPATIENT
Start: 2019-10-24 | End: 2019-10-24 | Stop reason: HOSPADM

## 2019-10-24 RX ORDER — FAMOTIDINE 20 MG/1
20 TABLET, FILM COATED ORAL 2 TIMES DAILY
COMMUNITY
End: 2019-11-07

## 2019-10-24 RX ORDER — FENTANYL CITRATE 50 UG/ML
INJECTION, SOLUTION INTRAMUSCULAR; INTRAVENOUS PRN
Status: DISCONTINUED | OUTPATIENT
Start: 2019-10-24 | End: 2019-10-24

## 2019-10-24 RX ORDER — HYDRALAZINE HYDROCHLORIDE 20 MG/ML
2.5-5 INJECTION INTRAMUSCULAR; INTRAVENOUS EVERY 10 MIN PRN
Status: DISCONTINUED | OUTPATIENT
Start: 2019-10-24 | End: 2019-10-24 | Stop reason: HOSPADM

## 2019-10-24 RX ORDER — LABETALOL 20 MG/4 ML (5 MG/ML) INTRAVENOUS SYRINGE
10-40 EVERY 10 MIN PRN
Status: DISCONTINUED | OUTPATIENT
Start: 2019-10-24 | End: 2019-10-25

## 2019-10-24 RX ADMIN — ESMOLOL HYDROCHLORIDE 20 MG: 10 INJECTION, SOLUTION INTRAVENOUS at 19:55

## 2019-10-24 RX ADMIN — GENTAMICIN SULFATE 80 MG: 80 INJECTION, SOLUTION INTRAVENOUS at 13:31

## 2019-10-24 RX ADMIN — ROCURONIUM BROMIDE 20 MG: 10 INJECTION INTRAVENOUS at 17:52

## 2019-10-24 RX ADMIN — LABETALOL 20 MG/4 ML (5 MG/ML) INTRAVENOUS SYRINGE 10 MG: at 22:08

## 2019-10-24 RX ADMIN — FENTANYL CITRATE 50 MCG: 50 INJECTION, SOLUTION INTRAMUSCULAR; INTRAVENOUS at 19:31

## 2019-10-24 RX ADMIN — SODIUM CHLORIDE, POTASSIUM CHLORIDE, SODIUM LACTATE AND CALCIUM CHLORIDE: 600; 310; 30; 20 INJECTION, SOLUTION INTRAVENOUS at 14:24

## 2019-10-24 RX ADMIN — LABETALOL HYDROCHLORIDE 5 MG: 5 INJECTION INTRAVENOUS at 20:12

## 2019-10-24 RX ADMIN — ROCURONIUM BROMIDE 10 MG: 10 INJECTION INTRAVENOUS at 19:05

## 2019-10-24 RX ADMIN — ROCURONIUM BROMIDE 70 MG: 10 INJECTION INTRAVENOUS at 14:31

## 2019-10-24 RX ADMIN — LABETALOL HYDROCHLORIDE 5 MG: 5 INJECTION INTRAVENOUS at 20:16

## 2019-10-24 RX ADMIN — HYDRALAZINE HYDROCHLORIDE 10 MG: 20 INJECTION INTRAMUSCULAR; INTRAVENOUS at 23:42

## 2019-10-24 RX ADMIN — SODIUM CHLORIDE: 9 INJECTION, SOLUTION INTRAVENOUS at 20:25

## 2019-10-24 RX ADMIN — PROPOFOL 40 MG: 10 INJECTION, EMULSION INTRAVENOUS at 15:54

## 2019-10-24 RX ADMIN — PROPOFOL 150 MG: 10 INJECTION, EMULSION INTRAVENOUS at 14:30

## 2019-10-24 RX ADMIN — FENTANYL CITRATE 50 MCG: 50 INJECTION, SOLUTION INTRAMUSCULAR; INTRAVENOUS at 15:07

## 2019-10-24 RX ADMIN — DEXAMETHASONE SODIUM PHOSPHATE 4 MG: 4 INJECTION, SOLUTION INTRA-ARTICULAR; INTRALESIONAL; INTRAMUSCULAR; INTRAVENOUS; SOFT TISSUE at 23:35

## 2019-10-24 RX ADMIN — ROCURONIUM BROMIDE 30 MG: 10 INJECTION INTRAVENOUS at 16:30

## 2019-10-24 RX ADMIN — PROPOFOL 20 MG: 10 INJECTION, EMULSION INTRAVENOUS at 15:15

## 2019-10-24 RX ADMIN — HYDRALAZINE HYDROCHLORIDE 5 MG: 20 INJECTION INTRAMUSCULAR; INTRAVENOUS at 20:32

## 2019-10-24 RX ADMIN — LABETALOL 20 MG/4 ML (5 MG/ML) INTRAVENOUS SYRINGE 20 MG: at 22:31

## 2019-10-24 RX ADMIN — PROPOFOL 40 MG: 10 INJECTION, EMULSION INTRAVENOUS at 15:21

## 2019-10-24 RX ADMIN — FENTANYL CITRATE 50 MCG: 50 INJECTION, SOLUTION INTRAMUSCULAR; INTRAVENOUS at 16:07

## 2019-10-24 RX ADMIN — SUGAMMADEX 200 MG: 100 INJECTION, SOLUTION INTRAVENOUS at 19:50

## 2019-10-24 RX ADMIN — DEXAMETHASONE SODIUM PHOSPHATE 10 MG: 4 INJECTION, SOLUTION INTRA-ARTICULAR; INTRALESIONAL; INTRAMUSCULAR; INTRAVENOUS; SOFT TISSUE at 14:31

## 2019-10-24 RX ADMIN — CEFAZOLIN 1 G: 1 INJECTION, POWDER, FOR SOLUTION INTRAMUSCULAR; INTRAVENOUS at 23:34

## 2019-10-24 RX ADMIN — FENTANYL CITRATE 100 MCG: 50 INJECTION, SOLUTION INTRAMUSCULAR; INTRAVENOUS at 14:30

## 2019-10-24 RX ADMIN — FENTANYL CITRATE 50 MCG: 50 INJECTION, SOLUTION INTRAMUSCULAR; INTRAVENOUS at 15:52

## 2019-10-24 RX ADMIN — GADOBUTROL 10 ML: 604.72 INJECTION INTRAVENOUS at 11:22

## 2019-10-24 RX ADMIN — VANCOMYCIN HYDROCHLORIDE 1500 MG: 10 INJECTION, POWDER, LYOPHILIZED, FOR SOLUTION INTRAVENOUS at 13:31

## 2019-10-24 RX ADMIN — HYDRALAZINE HYDROCHLORIDE 10 MG: 20 INJECTION INTRAMUSCULAR; INTRAVENOUS at 23:15

## 2019-10-24 RX ADMIN — SODIUM CHLORIDE, POTASSIUM CHLORIDE, SODIUM LACTATE AND CALCIUM CHLORIDE: 600; 310; 30; 20 INJECTION, SOLUTION INTRAVENOUS at 15:05

## 2019-10-24 RX ADMIN — ROCURONIUM BROMIDE 30 MG: 10 INJECTION INTRAVENOUS at 15:31

## 2019-10-24 RX ADMIN — ONDANSETRON 4 MG: 2 INJECTION INTRAMUSCULAR; INTRAVENOUS at 19:28

## 2019-10-24 RX ADMIN — ROCURONIUM BROMIDE 10 MG: 10 INJECTION INTRAVENOUS at 18:50

## 2019-10-24 RX ADMIN — LEVETIRACETAM 1000 MG: 10 INJECTION INTRAVENOUS at 14:53

## 2019-10-24 RX ADMIN — ESMOLOL HYDROCHLORIDE 20 MG: 10 INJECTION, SOLUTION INTRAVENOUS at 19:56

## 2019-10-24 RX ADMIN — MANNITOL 100 G: 20 INJECTION, SOLUTION INTRAVENOUS at 15:07

## 2019-10-24 RX ADMIN — AMINOLEVULINIC ACID HYDROCHLORIDE 2037 MG: 1500 POWDER, FOR SOLUTION ORAL at 12:52

## 2019-10-24 RX ADMIN — LABETALOL HYDROCHLORIDE 5 MG: 5 INJECTION INTRAVENOUS at 20:00

## 2019-10-24 RX ADMIN — FENTANYL CITRATE 50 MCG: 50 INJECTION, SOLUTION INTRAMUSCULAR; INTRAVENOUS at 18:56

## 2019-10-24 RX ADMIN — HYDRALAZINE HYDROCHLORIDE 5 MG: 20 INJECTION INTRAMUSCULAR; INTRAVENOUS at 20:21

## 2019-10-24 RX ADMIN — ROCURONIUM BROMIDE 10 MG: 10 INJECTION INTRAVENOUS at 18:25

## 2019-10-24 RX ADMIN — ROCURONIUM BROMIDE 20 MG: 10 INJECTION INTRAVENOUS at 17:16

## 2019-10-24 RX ADMIN — LIDOCAINE HYDROCHLORIDE 100 MG: 20 INJECTION, SOLUTION INFILTRATION; PERINEURAL at 14:30

## 2019-10-24 RX ADMIN — PROPOFOL 30 MG: 10 INJECTION, EMULSION INTRAVENOUS at 15:20

## 2019-10-24 RX ADMIN — PROPOFOL 50 MG: 10 INJECTION, EMULSION INTRAVENOUS at 15:12

## 2019-10-24 ASSESSMENT — ACTIVITIES OF DAILY LIVING (ADL)
BATHING: 0-->INDEPENDENT
TOILETING: 0-->INDEPENDENT
DRESS: 0-->INDEPENDENT
RETIRED_COMMUNICATION: 2-->DIFFICULTY SPEAKING (NOT RELATED TO LANGUAGE BARRIER)
TRANSFERRING: 0-->INDEPENDENT
FALL_HISTORY_WITHIN_LAST_SIX_MONTHS: NO
PRIOR_FUNCTIONAL_LEVEL_COMMENT: INDEPENDENT
SWALLOWING: 0-->SWALLOWS FOODS/LIQUIDS WITHOUT DIFFICULTY
COGNITION: 2 - DIFFICULTY WITH ORGANIZING THOUGHTS
RETIRED_EATING: 0-->INDEPENDENT
WHICH_OF_THE_ABOVE_FUNCTIONAL_RISKS_HAD_A_RECENT_ONSET_OR_CHANGE?: COGNITION;COMMUNICATION/SPEECH
AMBULATION: 0-->INDEPENDENT

## 2019-10-24 ASSESSMENT — MIFFLIN-ST. JEOR: SCORE: 1882.75

## 2019-10-24 NOTE — ANESTHESIA PROCEDURE NOTES
Arterial Line Procedure Note  Staff:     Anesthesiologist:  Mariano Peterson MD    Resident/CRNA:  Jayden Avelar DO    Arterial line performed by resident/CRNA in presence of a teaching physician    Location: In OR After Induction  Procedure Start/Stop Times:     patient identified, IV checked, site marked, risks and benefits discussed, informed consent, monitors and equipment checked, pre-op evaluation and at physician/surgeon's request      Correct Patient: Yes      Correct Position: Yes      Correct Site: Yes      Correct Procedure: Yes      Correct Laterality:  Yes    Site Marked:  Yes  Line Placement:     Procedure:  Arterial Line    Insertion Site:  Radial    Insertion laterality:  Right    Skin Prep: Chloraprep      Patient Prep: mask, sterile gloves and hat      Local skin infiltration:  None    Ultrasound Guided?: No      Catheter size:  20 gauge, Quick cath    Dressing:  Tegaderm    Complications:  None obvious    Arterial waveform: Yes      IBP within 10% of NIBP: Yes

## 2019-10-25 ENCOUNTER — APPOINTMENT (OUTPATIENT)
Dept: CT IMAGING | Facility: CLINIC | Age: 64
End: 2019-10-25
Attending: NURSE PRACTITIONER
Payer: COMMERCIAL

## 2019-10-25 ENCOUNTER — APPOINTMENT (OUTPATIENT)
Dept: SPEECH THERAPY | Facility: CLINIC | Age: 64
End: 2019-10-25
Attending: NURSE PRACTITIONER
Payer: COMMERCIAL

## 2019-10-25 ENCOUNTER — APPOINTMENT (OUTPATIENT)
Dept: CT IMAGING | Facility: CLINIC | Age: 64
End: 2019-10-25
Attending: NEUROLOGICAL SURGERY
Payer: COMMERCIAL

## 2019-10-25 LAB
ANION GAP SERPL CALCULATED.3IONS-SCNC: 9 MMOL/L (ref 3–14)
ANISOCYTOSIS BLD QL SMEAR: SLIGHT
BASOPHILS # BLD AUTO: 0 10E9/L (ref 0–0.2)
BASOPHILS NFR BLD AUTO: 0 %
BUN SERPL-MCNC: 22 MG/DL (ref 7–30)
BURR CELLS BLD QL SMEAR: SLIGHT
CALCIUM SERPL-MCNC: 8.4 MG/DL (ref 8.5–10.1)
CHLORIDE SERPL-SCNC: 106 MMOL/L (ref 94–109)
CO2 SERPL-SCNC: 24 MMOL/L (ref 20–32)
CREAT SERPL-MCNC: 0.65 MG/DL (ref 0.66–1.25)
DIFFERENTIAL METHOD BLD: ABNORMAL
EOSINOPHIL # BLD AUTO: 0 10E9/L (ref 0–0.7)
EOSINOPHIL NFR BLD AUTO: 0 %
ERYTHROCYTE [DISTWIDTH] IN BLOOD BY AUTOMATED COUNT: 19.2 % (ref 10–15)
ERYTHROCYTE [DISTWIDTH] IN BLOOD BY AUTOMATED COUNT: 19.7 % (ref 10–15)
GFR SERPL CREATININE-BSD FRML MDRD: >90 ML/MIN/{1.73_M2}
GLUCOSE BLDC GLUCOMTR-MCNC: 134 MG/DL (ref 70–99)
GLUCOSE SERPL-MCNC: 139 MG/DL (ref 70–99)
HCT VFR BLD AUTO: 32.1 % (ref 40–53)
HCT VFR BLD AUTO: 34.1 % (ref 40–53)
HGB BLD-MCNC: 10.3 G/DL (ref 13.3–17.7)
HGB BLD-MCNC: 10.9 G/DL (ref 13.3–17.7)
LYMPHOCYTES # BLD AUTO: 0.3 10E9/L (ref 0.8–5.3)
LYMPHOCYTES NFR BLD AUTO: 4.3 %
MCH RBC QN AUTO: 28.6 PG (ref 26.5–33)
MCH RBC QN AUTO: 28.7 PG (ref 26.5–33)
MCHC RBC AUTO-ENTMCNC: 32 G/DL (ref 31.5–36.5)
MCHC RBC AUTO-ENTMCNC: 32.1 G/DL (ref 31.5–36.5)
MCV RBC AUTO: 89 FL (ref 78–100)
MCV RBC AUTO: 90 FL (ref 78–100)
METAMYELOCYTES # BLD: 0.3 10E9/L
METAMYELOCYTES NFR BLD MANUAL: 4.3 %
MONOCYTES # BLD AUTO: 0.1 10E9/L (ref 0–1.3)
MONOCYTES NFR BLD AUTO: 1.7 %
MRSA DNA SPEC QL NAA+PROBE: NEGATIVE
MYELOCYTES # BLD: 0.3 10E9/L
MYELOCYTES NFR BLD MANUAL: 4.3 %
NEUTROPHILS # BLD AUTO: 6.7 10E9/L (ref 1.6–8.3)
NEUTROPHILS NFR BLD AUTO: 85.4 %
OVALOCYTES BLD QL SMEAR: SLIGHT
PLATELET # BLD AUTO: 121 10E9/L (ref 150–450)
PLATELET # BLD AUTO: 152 10E9/L (ref 150–450)
POIKILOCYTOSIS BLD QL SMEAR: ABNORMAL
POTASSIUM SERPL-SCNC: 3.8 MMOL/L (ref 3.4–5.3)
RBC # BLD AUTO: 3.59 10E12/L (ref 4.4–5.9)
RBC # BLD AUTO: 3.81 10E12/L (ref 4.4–5.9)
SODIUM SERPL-SCNC: 140 MMOL/L (ref 133–144)
SPECIMEN SOURCE: NORMAL
WBC # BLD AUTO: 11.2 10E9/L (ref 4–11)
WBC # BLD AUTO: 7.9 10E9/L (ref 4–11)

## 2019-10-25 PROCEDURE — 85027 COMPLETE CBC AUTOMATED: CPT | Performed by: STUDENT IN AN ORGANIZED HEALTH CARE EDUCATION/TRAINING PROGRAM

## 2019-10-25 PROCEDURE — 92526 ORAL FUNCTION THERAPY: CPT | Mod: GN

## 2019-10-25 PROCEDURE — 92610 EVALUATE SWALLOWING FUNCTION: CPT | Mod: GN

## 2019-10-25 PROCEDURE — 25000128 H RX IP 250 OP 636: Performed by: STUDENT IN AN ORGANIZED HEALTH CARE EDUCATION/TRAINING PROGRAM

## 2019-10-25 PROCEDURE — 20000004 ZZH R&B ICU UMMC

## 2019-10-25 PROCEDURE — 25000131 ZZH RX MED GY IP 250 OP 636 PS 637: Performed by: NURSE PRACTITIONER

## 2019-10-25 PROCEDURE — 70450 CT HEAD/BRAIN W/O DYE: CPT

## 2019-10-25 PROCEDURE — 25000125 ZZHC RX 250: Performed by: STUDENT IN AN ORGANIZED HEALTH CARE EDUCATION/TRAINING PROGRAM

## 2019-10-25 PROCEDURE — 80048 BASIC METABOLIC PNL TOTAL CA: CPT | Performed by: STUDENT IN AN ORGANIZED HEALTH CARE EDUCATION/TRAINING PROGRAM

## 2019-10-25 PROCEDURE — 40000556 ZZH STATISTIC PERIPHERAL IV START W US GUIDANCE

## 2019-10-25 PROCEDURE — 25000132 ZZH RX MED GY IP 250 OP 250 PS 637: Performed by: STUDENT IN AN ORGANIZED HEALTH CARE EDUCATION/TRAINING PROGRAM

## 2019-10-25 PROCEDURE — C9254 INJECTION, LACOSAMIDE: HCPCS | Performed by: STUDENT IN AN ORGANIZED HEALTH CARE EDUCATION/TRAINING PROGRAM

## 2019-10-25 PROCEDURE — 36415 COLL VENOUS BLD VENIPUNCTURE: CPT | Performed by: STUDENT IN AN ORGANIZED HEALTH CARE EDUCATION/TRAINING PROGRAM

## 2019-10-25 PROCEDURE — 40000014 ZZH STATISTIC ARTERIAL MONITORING DAILY

## 2019-10-25 PROCEDURE — 40000275 ZZH STATISTIC RCP TIME EA 10 MIN

## 2019-10-25 PROCEDURE — 70450 CT HEAD/BRAIN W/O DYE: CPT | Mod: 77

## 2019-10-25 PROCEDURE — 25800030 ZZH RX IP 258 OP 636: Performed by: STUDENT IN AN ORGANIZED HEALTH CARE EDUCATION/TRAINING PROGRAM

## 2019-10-25 PROCEDURE — 00000146 ZZHCL STATISTIC GLUCOSE BY METER IP

## 2019-10-25 RX ORDER — DEXAMETHASONE 1 MG
2 TABLET ORAL EVERY 8 HOURS SCHEDULED
Status: DISCONTINUED | OUTPATIENT
Start: 2019-10-30 | End: 2019-10-28 | Stop reason: HOSPADM

## 2019-10-25 RX ORDER — DEXAMETHASONE 4 MG/1
4 TABLET ORAL EVERY 8 HOURS SCHEDULED
Status: COMPLETED | OUTPATIENT
Start: 2019-10-25 | End: 2019-10-27

## 2019-10-25 RX ORDER — DEXAMETHASONE 1 MG
3 TABLET ORAL EVERY 8 HOURS SCHEDULED
Status: DISCONTINUED | OUTPATIENT
Start: 2019-10-27 | End: 2019-10-28 | Stop reason: HOSPADM

## 2019-10-25 RX ORDER — LABETALOL 20 MG/4 ML (5 MG/ML) INTRAVENOUS SYRINGE
10-40 EVERY 10 MIN PRN
Status: DISCONTINUED | OUTPATIENT
Start: 2019-10-25 | End: 2019-10-28 | Stop reason: HOSPADM

## 2019-10-25 RX ORDER — HYDRALAZINE HYDROCHLORIDE 20 MG/ML
10-20 INJECTION INTRAMUSCULAR; INTRAVENOUS EVERY 30 MIN PRN
Status: DISCONTINUED | OUTPATIENT
Start: 2019-10-25 | End: 2019-10-28 | Stop reason: HOSPADM

## 2019-10-25 RX ORDER — DILTIAZEM HYDROCHLORIDE 30 MG/1
90 TABLET, FILM COATED ORAL EVERY EVENING
Status: DISCONTINUED | OUTPATIENT
Start: 2019-10-25 | End: 2019-10-26

## 2019-10-25 RX ORDER — DEXAMETHASONE 1 MG
1 TABLET ORAL EVERY 8 HOURS SCHEDULED
Status: DISCONTINUED | OUTPATIENT
Start: 2019-11-02 | End: 2019-10-28 | Stop reason: HOSPADM

## 2019-10-25 RX ADMIN — LABETALOL 20 MG/4 ML (5 MG/ML) INTRAVENOUS SYRINGE 20 MG: at 16:31

## 2019-10-25 RX ADMIN — LEVETIRACETAM 1000 MG: 10 INJECTION INTRAVENOUS at 21:48

## 2019-10-25 RX ADMIN — SODIUM CHLORIDE 100 MG: 9 INJECTION, SOLUTION INTRAVENOUS at 20:26

## 2019-10-25 RX ADMIN — LABETALOL 20 MG/4 ML (5 MG/ML) INTRAVENOUS SYRINGE 20 MG: at 23:56

## 2019-10-25 RX ADMIN — FAMOTIDINE 20 MG: 20 TABLET ORAL at 21:53

## 2019-10-25 RX ADMIN — HYDRALAZINE HYDROCHLORIDE 10 MG: 20 INJECTION INTRAMUSCULAR; INTRAVENOUS at 13:51

## 2019-10-25 RX ADMIN — DEXAMETHASONE 4 MG: 4 TABLET ORAL at 21:52

## 2019-10-25 RX ADMIN — HYDRALAZINE HYDROCHLORIDE 20 MG: 20 INJECTION INTRAMUSCULAR; INTRAVENOUS at 20:32

## 2019-10-25 RX ADMIN — ATENOLOL 50 MG: 25 TABLET ORAL at 09:13

## 2019-10-25 RX ADMIN — CEFAZOLIN 1 G: 1 INJECTION, POWDER, FOR SOLUTION INTRAMUSCULAR; INTRAVENOUS at 05:56

## 2019-10-25 RX ADMIN — NICARDIPINE HYDROCHLORIDE 2.5 MG/HR: 0.2 INJECTION, SOLUTION INTRAVENOUS at 03:01

## 2019-10-25 RX ADMIN — HYDRALAZINE HYDROCHLORIDE 20 MG: 20 INJECTION INTRAMUSCULAR; INTRAVENOUS at 01:34

## 2019-10-25 RX ADMIN — LABETALOL 20 MG/4 ML (5 MG/ML) INTRAVENOUS SYRINGE 20 MG: at 21:44

## 2019-10-25 RX ADMIN — HYDRALAZINE HYDROCHLORIDE 20 MG: 20 INJECTION INTRAMUSCULAR; INTRAVENOUS at 18:06

## 2019-10-25 RX ADMIN — DILTIAZEM HYDROCHLORIDE 180 MG: 180 CAPSULE, COATED, EXTENDED RELEASE ORAL at 09:13

## 2019-10-25 RX ADMIN — CEFAZOLIN 1 G: 1 INJECTION, POWDER, FOR SOLUTION INTRAMUSCULAR; INTRAVENOUS at 21:52

## 2019-10-25 RX ADMIN — NICARDIPINE HYDROCHLORIDE 7.5 MG/HR: 0.2 INJECTION, SOLUTION INTRAVENOUS at 07:51

## 2019-10-25 RX ADMIN — DEXAMETHASONE SODIUM PHOSPHATE 4 MG: 4 INJECTION, SOLUTION INTRA-ARTICULAR; INTRALESIONAL; INTRAMUSCULAR; INTRAVENOUS; SOFT TISSUE at 13:52

## 2019-10-25 RX ADMIN — HYDRALAZINE HYDROCHLORIDE 10 MG: 20 INJECTION INTRAMUSCULAR; INTRAVENOUS at 15:39

## 2019-10-25 RX ADMIN — CEFAZOLIN 1 G: 1 INJECTION, POWDER, FOR SOLUTION INTRAMUSCULAR; INTRAVENOUS at 13:51

## 2019-10-25 RX ADMIN — DOCUSATE SODIUM 100 MG: 100 CAPSULE, LIQUID FILLED ORAL at 09:13

## 2019-10-25 RX ADMIN — DEXAMETHASONE SODIUM PHOSPHATE 4 MG: 4 INJECTION, SOLUTION INTRA-ARTICULAR; INTRALESIONAL; INTRAMUSCULAR; INTRAVENOUS; SOFT TISSUE at 05:56

## 2019-10-25 RX ADMIN — FOLIC ACID 1 MG: 1 TABLET ORAL at 09:13

## 2019-10-25 RX ADMIN — FAMOTIDINE 20 MG: 20 TABLET ORAL at 09:13

## 2019-10-25 RX ADMIN — HYDRALAZINE HYDROCHLORIDE 20 MG: 20 INJECTION INTRAMUSCULAR; INTRAVENOUS at 00:24

## 2019-10-25 RX ADMIN — LABETALOL 20 MG/4 ML (5 MG/ML) INTRAVENOUS SYRINGE 20 MG: at 14:52

## 2019-10-25 RX ADMIN — LEVETIRACETAM 1000 MG: 10 INJECTION INTRAVENOUS at 07:50

## 2019-10-25 RX ADMIN — ACETAMINOPHEN 975 MG: 325 TABLET, FILM COATED ORAL at 13:51

## 2019-10-25 RX ADMIN — LEVETIRACETAM 1000 MG: 10 INJECTION INTRAVENOUS at 00:21

## 2019-10-25 RX ADMIN — SODIUM CHLORIDE 100 MG: 9 INJECTION, SOLUTION INTRAVENOUS at 09:12

## 2019-10-25 RX ADMIN — ACETAMINOPHEN 975 MG: 325 TABLET, FILM COATED ORAL at 21:52

## 2019-10-25 ASSESSMENT — ACTIVITIES OF DAILY LIVING (ADL)
ADLS_ACUITY_SCORE: 18
ADLS_ACUITY_SCORE: 17
ADLS_ACUITY_SCORE: 18

## 2019-10-25 ASSESSMENT — PAIN DESCRIPTION - DESCRIPTORS
DESCRIPTORS: ACHING

## 2019-10-25 NOTE — PLAN OF CARE
PT: PT orders received.  Per RN team placed pt on bedrest, not appropriate for therapy today, will check back tomorrow.

## 2019-10-25 NOTE — PLAN OF CARE
Discharge Planner SLP   Patient plan for discharge: Unknown  Current status: Clinical swallow eval completed per MD order. Pt presents with functional oropharyngeal swallow mechanism. Oral mech exam unremarkable; speech soft but clear, no apparent word finding deficits. Assessed with thin liquids, puree, and higher texture solids. Oral phase WFL. Pharyngeal phase WFL, no overt s/sx of aspiration. Recommend regular diet/thin liquids. Ensure pt is fully alert and upright for all PO, taking small bites/sips at slow rate. No additional SLP services indicated.  Barriers to return to prior living situation: None from ST perspective  Recommendations for discharge: Defer to PT/OT  Rationale for recommendations: Functional oropharyngeal swallow mechanism       Entered by: Krystin Greenberg 10/25/2019 10:23 AM

## 2019-10-25 NOTE — BRIEF OP NOTE
[facility], [place]    Brief Operative Note    Pre-operative diagnosis: Glioblastoma multiforme  Post-operative diagnosis:  Glioblastoma multiforme    Procedure: Procedure(s):  LEFT CRANIOTOMY, USING OPTICAL TRACKING SYSTEM, WITH NEOPLASM EXCISION  Surgeon: Surgeon(s) and Role:     * Nicho Packer MD - Primary     * Gerardo Solares MD - Resident - Assisting     * Misael Best MD - Resident - Assisting  Anesthesia: General   Estimated blood loss: 250ml  Drains: None  Specimens:   ID Type Source Tests Collected by Time Destination   1 : Left Occipital Tumor Tissue Brain LABORATORY MISCELLANEOUS ORDER Nicho Packer MD 10/24/2019  5:18 PM    A : Left Occipital Tumor  Tissue Brain SURGICAL PATHOLOGY EXAM Nicho Packer MD 10/24/2019  5:16 PM    B : Left Occipital Tumor  Tissue Brain SURGICAL PATHOLOGY EXAM Nicho Packer MD 10/24/2019  5:48 PM      Findings:   read op note.  Complications: None.  Implants:   Implant Name Type Inv. Item Serial No.  Lot No. LRB No. Used   IMP SCR SYN MATRIX LOW PRO 1.5X04MM SELF DRILL [id number] Metallic Hardware/[place] IMP SCR SYN MATRIX LOW PRO 1.5X04MM SELF DRILL [id number]  SYNTHES-STRATEC NONE Left 8   IMP SCR SYN MATRIX LOW PRO 1.5X03MM SELF DRILL [phone] Metallic Hardware/[place] IMP SCR SYN MATRIX LOW PRO 1.5X03MM SELF DRILL [phone] NONE [employer]ATEC NONE Left 5   IMP BUR HOLE COVER 17MM LOW PROFILE .527 Metallic Hardware/[place] IMP BUR HOLE COVER 17MM LOW PROFILE .527  SYNTHES[employer]ATEC NONE Left 3   GRAFT DURAGEN 3X3&quot; ID-[id number] Bone/Tissue/Biologic GRAFT DURAGEN 3X3&quot; [id number] [employer] [id number] Left 1   IMP PLATE SYN BOX LOW PROFILE 04H .511 Metallic Hardware/[place] IMP PLATE SYN BOX LOW PROFILE 04H .511 NONE SYNTHES-[employer]ATEC NONE Left 1   IMP BUR HOLE COVER 17MM LOW PROFILE .527 Metallic Hardware/[place] IMP BUR HOLE COVER  17MM LOW PROFILE .527 NONE SYNTHES-[employer]ATEC NONE Left 3   IMP SCR SYN MATRIX LOW PRO 1.5X05MM SELF DRILL [phone] Metallic Hardware/[place] IMP SCR SYN MATRIX LOW PRO 1.5X05MM SELF DRILL [phone] NONE SY[employer]ES[employer]ATEC NONE Left 1

## 2019-10-25 NOTE — PLAN OF CARE
Patient on unit 4A Surgical/Neuro ICU s/p L crani and tumor resection on 10/24 c/b pneumocephalis.   Neuro- Pt consistently oriented to self and place but intermittently confused on time and situation. R visual field cut still present. PERRLA, eyes conjugate. FC, moves all extremities 5/5 strength. Awaiting results from CT to see if pneumo improved.   CV- SR 60-70s. Pt occasionally has bigeminal PACs. SBP goal < 140. Nicardipine off and using labetalol and hydralazine to maintain. Cuff pressure and art line pressures are not correlating, using art line. Moderate edema in BLE, palpable pulses. Per wife, pt takes 180 mg diltiazam in the AM and 90 in the evening. He is only on 180 at the hospital. Neurosurgery team notified of this.   Pulm- Non-rebreather mask at 15L for pneumocephalis. CTA, no c/o SOB.  GI-  Reg diet, tolerating well. LBM 10/23. Passing gas per pt but hypoactive sounds. Given bowel meds.   - Beal pulled at 0600 this AM, patient voiding without issue.   Skin- L crani incision. Steri strips and sutures, approximated and clean. Otherwise skin WDL  Gtts- Off nicardipine since 1230   Lines- 3 PIVs  Electrolytes- no replacement protocols ordered  Social-  Family at bedside and updated.  See flow sheets for further interventions and assessments.  Plan: Continue to monitor pt closely, Notify MD of changes/concerns. Plan for MRI 10/26. Awaiting CT results from today at 1700.

## 2019-10-25 NOTE — OP NOTE
Name:  Jayden Lackey  MRN:  [id number]  YOB: 1955  Date of Surgery:  October 24, 2019    Pre-operative Diagnosis: Recurrent left occipital glioblastoma  Post-operative Diagnosis: Recurrent left occipital glioblastoma  Procedure:  1) Left occipital craniotomy for tumor resection  2) Micro-dissection  3) Neuro-navigation  4) 5ALA guidance    Anesthesia: GETA    Surgeon: Nicho Drake MD, PhD  Assistant: Gerardo Solares MD; Tammy Best MD.    Description of Procedure: After pre-operative laboratory value and informed consent were verified, the patient was brought into the operating room. The patient underwent general anesthesia and intubation. Antibiotic was administered.    The patient was placed in a semi-lateralposition, with the head turned to the left, exposing the right occipital cranium.  The [employer] reference frame was placed. The patient's anatomy was registered relative to the pre-operative MRI using the [facility] system.    The area encompassing the previous surgical incision was prepped and draped in a sterile manner.     Time out was performed. The incision was opened with a 10 blade. Hemostasis was achieved using a combination of cautery and Mariangel clips. The incision was retracted using a cerebellar retractor.  The previous craniotomy flap was exposed and removed. The epidural space was packed wit hFloseal.     The previous duratomy was re-opened. Corticectomy was performed in the region immediate superficial to the lesion. The tumor specimen was immediately apparent. A biopsy was taken and sent to pathology.  [place]zen pathology findings are consistent with recurrent glioblastoma.    The microscope was brought in to allow micro-dissection. Using navigation and 5ALA guidance, the tumor bearing regions of the occipital lobe was removed. By the end of the resection, the tentorium and the posterior falx was visualized. There was no evidence of 5ALA florescence.  Meticulous  hemostasis was achieved after the tumor resection. The resection cavity was overlaid with surgicel.    After hemostasis, I turned my attention to the closure.  The craniotomy flap was secured using titanium plate/screw construct. The dura was closed with Duragen.  The wound was amply irrigated with bacitracin containing saline. The galea was closed with 2'0 vicryl. The skin was closed with 3'0 Monocryl. Exothin was applied.    I was present and performed the key portions of the procedure.    EBL: 200 cc's    Specimens: resected tumor specimens    Disposition: ICU

## 2019-10-25 NOTE — PROGRESS NOTES
0000 Neurosurgery notified about SBP blood pressure between 140-150 with labetalol prn given x2 and hydralazine prn given x2. Verbal order to give hydralazine prn.

## 2019-10-25 NOTE — PROVIDER NOTIFICATION
Paged neurosurgery at 1325 to inform that art line and BP cuff are not correlating (art line reading 30 points higher SBP than cuff). Plan to go off art line as wave form is good. Don't want nicardipine restarted, give hydralazine instead.   
No

## 2019-10-25 NOTE — ANESTHESIA POSTPROCEDURE EVALUATION
Anesthesia POST Procedure Evaluation    Patient: Jayden Lackey   MRN:     [id number] Gender:   male   Age:    64 year old :      1955        Preoperative Diagnosis: Malignant neoplasm of frontal lobe of brain (H) [C71.1]   Procedure(s):  LEFT CRANIOTOMY, USING OPTICAL TRACKING SYSTEM, WITH NEOPLASM EXCISION   Postop Comments: No value filed.       Anesthesia Type:  Not documented  General    Reportable Event: NO     PAIN: Uncomplicated   Sign Out status: Comfortable, Well controlled pain     PONV: No PONV   Sign Out status:  No Nausea or Vomiting     Neuro/Psych: Uneventful perioperative course   Sign Out Status: Preoperative baseline; Age appropriate mentation     Airway/Resp.: Uneventful perioperative course   Sign Out Status: Non labored breathing, age appropriate RR; Resp. Status within EXPECTED Parameters     CV: Uneventful perioperative course   Sign Out status: Appropriate BP and perfusion indices; Appropriate HR/Rhythm     Disposition:   Sign Out in:  PACU  Disposition:  Floor  Recovery Course: Uneventful  Follow-Up: Not required           Last Anesthesia Record Vitals:  CRNA VITALS  10/24/2019 1936 - 10/24/2019 2036      10/24/2019             EKG:  Sinus bradycardia          Last PACU Vitals:  Vitals Value Taken Time   /93 10/24/2019  9:55 PM   Temp 37  C (98.6  F) 10/24/2019  9:15 PM   Pulse 69 10/24/2019  9:55 PM   Resp 17 10/24/2019  9:15 PM   SpO2 99 % 10/24/2019 10:11 PM   Temp src     NIBP     Pulse     SpO2     Resp     Temp     Ht Rate     Temp 2     Vitals shown include unvalidated device data.      Electronically Signed By: Naga Rudolph MD, 2019, 10:12 PM

## 2019-10-25 NOTE — PROGRESS NOTES
S: Obtunded yesterday evening.  Postop CT demonstrated bilateral frontal pneumocephalus ? placed in Bonds position at 30 degrees and 100% oxygen via nonrebreather facemask.  Exam improved throughout night.    O:  Exam:  General: Arousable to voice; In No Acute Distress  Pulm: Breathing Comfortably on 100% high flow nonrebreather facemask  Mental status: AO x 1-2  Cranial Nerves: Right Homonymous Hemianopsia  Strength:      Del Tr Bi WE WF Gr  R 5 5 5 5 5 5  L 5 5 5 5 5 5     HF KE KF DF PF   R 5 5 5 5 5   L 5 5 5 5 5     Pronator Drift: Absent  Sensory: Intact to Light Touch  Reflexes: No Hyperreflexia, Fontenot s or Clonus Present; Toes Down-Going Bilaterally  INCISION: Clean, Dry and Intact with Surgical Dressing     Assessment: Mr. Lackey is a 64 year old male with recurrent Left Occipital Glioblastoma. Now S/P Left Craniotomy for Tumor Resection. Doing well post-operatively.      Plan:     Neuro:   Neuro Checks Q 1 HR  Anti-epileptics: Continue Keppra 1G Q 12 HR and Vimpat 100 mg Q 12 HR  Cerebral Edema: Decadron Taper  Sutures out: Dissolvable  Required imaging: Post-Operative MRI Brain with and without Contrast to Assess Resection Cavity     Cardiovascular:  Maintain SBP < 140 mmHg  Continue home Diltiazem 180 mg QD    Pulmonary:   Encourage us of Incentive Spirometry     Gastrointestinal:  Advance to Regular Diet  Bowel Regimen w/ Senna-Docusate and Miralax  Anti-Emetics PRN Nausea and Vomiting   Protonix 40 mg daily while on Decadron     Renal:   Remove Beal Catheter  Monitor I/O     Heme:   No issues   Maintain INR < 1.4; Platelet > 100K; Fibrinogen > 200, Hemoglobin > 8  SCDs to Bilateral Lower Extremities for DVT Prophylaxis     Endocrine:   No issues    Infectious:   Tonja-Operative Antibiotics Completed  Continue cefazolin until discharge, then switch to cephalexin  Monitor for Fever and Leukocytosis     PT/OT:   Evaluations Pending     DISPO: 4A    Barriers: Evaluation by therapies, ambulating,  BM/flatus, voiding without a Beal, tolerating PO diet, pain controlled on PO medications      Prashanth Calles M.D.  Neurosurgery Resident, PGY-2    Please contact neurosurgery resident on call with questions.    Dial * * [phone], enter [phone] when prompted.

## 2019-10-25 NOTE — PLAN OF CARE
OT: Eval orders received.  Per RN team placed pt on bedrest, not appropriate for therapy today.  Will reschedule eval for 10/26/19.

## 2019-10-25 NOTE — PLAN OF CARE
D:  63 y/o male on 4A for frequent neuros and monitoring.   I/A: Hemodynamically stable  Neuro: disorientated x4, intermittently follows commands, opens eyes spontaneously, somnolent, PERRLA 3x3, baseline diminished hearing in r ear (doesn't have hearing aid), strong throughout, able to move all extremities, denies numbness and tingling in extremities.   Pulm: 100% fiO2 non-rebreather mask for pneumocephalus, diminished lung sounds throughout, on end tidal Co2 monitor.   CV: Afebrile, Sinus rhythm, frequent bigeminal PACs, SBP goal under 140 met with nicardipine drip  PV: 2+ radial and DP pulses. +3 edema in BLE. PCDs in place.  GI: Flat, non-distended, non-tender abdomen. hypoactive bowel tones  : Beal removed at 0600, adequate urine output  Skin: L head incision  MS: Strong throughout.   Lines: 3 PIV, R radial art line  Drips: nicardipine @ 10mg/hr   Psych/Social: wife at bedside over night  Plan: Continue to monitor, notify neurosurgery of any changes

## 2019-10-25 NOTE — PROGRESS NOTES
2200 Patient arrived from PACU/CT  Neuro exam performed and findings included, disorientation x4, unclear speech, left eye deviation, right hand pronator drift, r upper and lower extremity weakness.  Neurosurgery notified and will assess at bedside.   2300 Verbal order for 100% fiO2 via non-rebreather mask and bed position to fowlers.

## 2019-10-25 NOTE — PROGRESS NOTES
10/25/19 0925   General Information   Onset Date 10/24/19   Start of Care Date 10/25/19   Referring Physician Maryanne Henderson APRN CNP   Patient Profile Review/OT: Additional Occupational Profile Info See Profile for full history and prior level of function   Patient/Family Goals Statement None stated   Swallowing Evaluation Bedside swallow evaluation   Behaviorial Observations Lethargic   Mode of current nutrition NPO   Respiratory Status O2 Supply   Type of O2 supply   (Rebreather/nasal cannula; removed for eval by RN)   Comments SLP: Pt is a 64 year old male with recurrent Left Occipital Glioblastoma. Now S/P Left Craniotomy for Tumor Resection. Doing well post-operatively. Found to have  bilateral frontal pneumocephalus; exam improved over night. RN completed bedside with water, pt swallowing without difficulty. Clinical swallow eval completed per MD order.   Clinical Swallow Evaluation   Oral Musculature generally intact   Structural Abnormalities none present   Dentition present and adequate   Mucosal Quality good   Mandibular Strength and Mobility intact   Oral Labial Strength and Mobility WFL   Lingual Strength and Mobility WFL   Buccal Strength and Mobility intact   Laryngeal Function Cough;Throat clear;Swallow;Voicing initiated   Oral Musculature Comments Grossly functional. Speech soft but clear, no apparent word finding deficits.   Swallow Eval   Feeding Assistance no assistance needed   Clinical Swallow Eval: Thin Liquid Texture Trial   Mode of Presentation, Thin Liquids cup;straw;self-fed   Volume of Liquid or Food Presented 6oz water and juice   Oral Phase of Swallow WFL   Pharyngeal Phase of Swallow intact   Diagnostic Statement WFL, no overt s/sx of aspiration   Clinical Swallow Eval: Puree Solid Texture Trial   Mode of Presentation, Puree spoon;self-fed   Volume of Puree Presented 4oz pudding   Oral Phase, Puree WFL   Pharyngeal Phase, Puree intact   Diagnostic Statement WFL, no  overt s/sx of aspiration   Clinical Swallow Eval: Solid Food Texture Trial   Mode of Presentation, Solid self-fed   Volume of Solid Food Presented 1 cracker   Oral Phase, Solid WFL   Pharyngeal Phase, Solid intact   Diagnostic Statement WFL, no overt s/sx of aspiration. Throat clearing noted after all PO trials but this did not appear to be d/t aspiration   Esophageal Phase of Swallow   Patient reports or presents with symptoms of esophageal dysphagia No   Swallow Eval: Clinical Impressions   Skilled Criteria for Therapy Intervention No problems identified which require skilled intervention   Functional Assessment Scale (FAS) 7   Treatment Diagnosis Functional oropharyngeal swallow mechanism   Diet texture recommendations Regular diet;Thin liquids   Recommended Feeding/Eating Techniques small sips/bites  (upright for all PO, slow rate)   Risks and Benefits of Treatment have been explained. Yes   Patient, family and/or staff in agreement with Plan of Care Yes   Clinical Impression Comments SLP: Clinical swallow eval completed per MD order. Pt presents with functional oropharyngeal swallow mechanism. Oral mech exam unremarkable; speech soft but clear, no apparent word finding deficits. Assessed with thin liquids, puree, and higher texture solids. Oral phase WFL. Pharyngeal phase WFL, no overt s/sx of aspiration. Recommend regular diet/thin liquids. Ensure pt is fully alert and upright for all PO, taking small bites/sips at slow rate. No additional SLP services indicated.   Total Evaluation Time   Total Evaluation Time (Minutes) 10

## 2019-10-25 NOTE — ANESTHESIA CARE TRANSFER NOTE
Patient: Jayden Lackey    Procedure(s):  LEFT CRANIOTOMY, USING OPTICAL TRACKING SYSTEM, WITH NEOPLASM EXCISION    Diagnosis: Malignant neoplasm of frontal lobe of brain (H) [C71.1]  Diagnosis Additional Information: No value filed.    Anesthesia Type:   General     Note:  Airway :Face Mask  Patient transferred to:PACU  Comments: Sleepy, awakens to verbal stimuli. Stable, report to RN. Handoff Report: Identifed the Patient, Identified the Reponsible Provider, Reviewed the pertinent medical history, Discussed the surgical course, Reviewed Intra-OP anesthesia mangement and issues during anesthesia, Set expectations for post-procedure period and Allowed opportunity for questions and acknowledgement of understanding      Vitals: (Last set prior to Anesthesia Care Transfer)    CRNA VITALS  10/24/2019 1936 - 10/24/2019 2015      10/24/2019             EKG:  Sinus bradycardia                Electronically Signed By: ALEJA Olson CRNA  October 24, 2019  8:15 PM

## 2019-10-26 ENCOUNTER — APPOINTMENT (OUTPATIENT)
Dept: OCCUPATIONAL THERAPY | Facility: CLINIC | Age: 64
End: 2019-10-26
Attending: NEUROLOGICAL SURGERY
Payer: COMMERCIAL

## 2019-10-26 ENCOUNTER — APPOINTMENT (OUTPATIENT)
Dept: MRI IMAGING | Facility: CLINIC | Age: 64
End: 2019-10-26
Attending: NURSE PRACTITIONER
Payer: COMMERCIAL

## 2019-10-26 ENCOUNTER — APPOINTMENT (OUTPATIENT)
Dept: CT IMAGING | Facility: CLINIC | Age: 64
End: 2019-10-26
Attending: NEUROLOGICAL SURGERY
Payer: COMMERCIAL

## 2019-10-26 LAB
ANION GAP SERPL CALCULATED.3IONS-SCNC: 5 MMOL/L (ref 3–14)
BUN SERPL-MCNC: 23 MG/DL (ref 7–30)
CALCIUM SERPL-MCNC: 7.8 MG/DL (ref 8.5–10.1)
CHLORIDE SERPL-SCNC: 110 MMOL/L (ref 94–109)
CO2 SERPL-SCNC: 28 MMOL/L (ref 20–32)
CREAT SERPL-MCNC: 0.7 MG/DL (ref 0.66–1.25)
ERYTHROCYTE [DISTWIDTH] IN BLOOD BY AUTOMATED COUNT: 19.2 % (ref 10–15)
GFR SERPL CREATININE-BSD FRML MDRD: >90 ML/MIN/{1.73_M2}
GLUCOSE SERPL-MCNC: 146 MG/DL (ref 70–99)
HCT VFR BLD AUTO: 28.5 % (ref 40–53)
HGB BLD-MCNC: 9.3 G/DL (ref 13.3–17.7)
MCH RBC QN AUTO: 28.8 PG (ref 26.5–33)
MCHC RBC AUTO-ENTMCNC: 32.6 G/DL (ref 31.5–36.5)
MCV RBC AUTO: 88 FL (ref 78–100)
PLATELET # BLD AUTO: 113 10E9/L (ref 150–450)
POTASSIUM SERPL-SCNC: 3.9 MMOL/L (ref 3.4–5.3)
RBC # BLD AUTO: 3.23 10E12/L (ref 4.4–5.9)
SODIUM SERPL-SCNC: 142 MMOL/L (ref 133–144)
WBC # BLD AUTO: 9.3 10E9/L (ref 4–11)

## 2019-10-26 PROCEDURE — 25500064 ZZH RX 255 OP 636: Performed by: NEUROLOGICAL SURGERY

## 2019-10-26 PROCEDURE — 40000014 ZZH STATISTIC ARTERIAL MONITORING DAILY

## 2019-10-26 PROCEDURE — C9254 INJECTION, LACOSAMIDE: HCPCS | Performed by: STUDENT IN AN ORGANIZED HEALTH CARE EDUCATION/TRAINING PROGRAM

## 2019-10-26 PROCEDURE — 80048 BASIC METABOLIC PNL TOTAL CA: CPT | Performed by: STUDENT IN AN ORGANIZED HEALTH CARE EDUCATION/TRAINING PROGRAM

## 2019-10-26 PROCEDURE — 97530 THERAPEUTIC ACTIVITIES: CPT | Mod: GO | Performed by: OCCUPATIONAL THERAPIST

## 2019-10-26 PROCEDURE — 20000004 ZZH R&B ICU UMMC

## 2019-10-26 PROCEDURE — 25000132 ZZH RX MED GY IP 250 OP 250 PS 637: Performed by: STUDENT IN AN ORGANIZED HEALTH CARE EDUCATION/TRAINING PROGRAM

## 2019-10-26 PROCEDURE — 25000128 H RX IP 250 OP 636: Performed by: STUDENT IN AN ORGANIZED HEALTH CARE EDUCATION/TRAINING PROGRAM

## 2019-10-26 PROCEDURE — 97535 SELF CARE MNGMENT TRAINING: CPT | Mod: GO | Performed by: OCCUPATIONAL THERAPIST

## 2019-10-26 PROCEDURE — 70450 CT HEAD/BRAIN W/O DYE: CPT

## 2019-10-26 PROCEDURE — 70553 MRI BRAIN STEM W/O & W/DYE: CPT

## 2019-10-26 PROCEDURE — 85027 COMPLETE CBC AUTOMATED: CPT | Performed by: STUDENT IN AN ORGANIZED HEALTH CARE EDUCATION/TRAINING PROGRAM

## 2019-10-26 PROCEDURE — 25000131 ZZH RX MED GY IP 250 OP 636 PS 637: Performed by: NURSE PRACTITIONER

## 2019-10-26 PROCEDURE — 25800030 ZZH RX IP 258 OP 636: Performed by: STUDENT IN AN ORGANIZED HEALTH CARE EDUCATION/TRAINING PROGRAM

## 2019-10-26 PROCEDURE — 40000893 ZZH STATISTIC PT IP EVAL DEFER: Performed by: PHYSICAL THERAPIST

## 2019-10-26 PROCEDURE — 97165 OT EVAL LOW COMPLEX 30 MIN: CPT | Mod: GO | Performed by: OCCUPATIONAL THERAPIST

## 2019-10-26 PROCEDURE — 97110 THERAPEUTIC EXERCISES: CPT | Mod: GO | Performed by: OCCUPATIONAL THERAPIST

## 2019-10-26 PROCEDURE — A9585 GADOBUTROL INJECTION: HCPCS | Performed by: NEUROLOGICAL SURGERY

## 2019-10-26 RX ORDER — NAPROXEN 500 MG/1
500 TABLET ORAL 2 TIMES DAILY PRN
Status: DISCONTINUED | OUTPATIENT
Start: 2019-10-26 | End: 2019-10-28 | Stop reason: HOSPADM

## 2019-10-26 RX ORDER — GADOBUTROL 604.72 MG/ML
10 INJECTION INTRAVENOUS ONCE
Status: COMPLETED | OUTPATIENT
Start: 2019-10-26 | End: 2019-10-26

## 2019-10-26 RX ORDER — POLYETHYLENE GLYCOL 3350 17 G/17G
17 POWDER, FOR SOLUTION ORAL 2 TIMES DAILY PRN
Status: DISCONTINUED | OUTPATIENT
Start: 2019-10-26 | End: 2019-10-27

## 2019-10-26 RX ADMIN — ATENOLOL 50 MG: 25 TABLET ORAL at 07:48

## 2019-10-26 RX ADMIN — CEFAZOLIN 1 G: 1 INJECTION, POWDER, FOR SOLUTION INTRAMUSCULAR; INTRAVENOUS at 05:53

## 2019-10-26 RX ADMIN — ACETAMINOPHEN 975 MG: 325 TABLET, FILM COATED ORAL at 05:54

## 2019-10-26 RX ADMIN — DEXAMETHASONE 4 MG: 4 TABLET ORAL at 14:43

## 2019-10-26 RX ADMIN — FOLIC ACID 1 MG: 1 TABLET ORAL at 07:49

## 2019-10-26 RX ADMIN — DILTIAZEM HYDROCHLORIDE 180 MG: 180 CAPSULE, COATED, EXTENDED RELEASE ORAL at 07:48

## 2019-10-26 RX ADMIN — ACETAMINOPHEN 975 MG: 325 TABLET, FILM COATED ORAL at 14:43

## 2019-10-26 RX ADMIN — SODIUM CHLORIDE 100 MG: 9 INJECTION, SOLUTION INTRAVENOUS at 20:59

## 2019-10-26 RX ADMIN — FAMOTIDINE 20 MG: 20 TABLET ORAL at 08:50

## 2019-10-26 RX ADMIN — OXYCODONE HYDROCHLORIDE 5 MG: 5 TABLET ORAL at 02:34

## 2019-10-26 RX ADMIN — SODIUM CHLORIDE 100 MG: 9 INJECTION, SOLUTION INTRAVENOUS at 08:50

## 2019-10-26 RX ADMIN — HYDRALAZINE HYDROCHLORIDE 20 MG: 20 INJECTION INTRAMUSCULAR; INTRAVENOUS at 23:05

## 2019-10-26 RX ADMIN — DOCUSATE SODIUM 100 MG: 100 CAPSULE, LIQUID FILLED ORAL at 07:48

## 2019-10-26 RX ADMIN — DEXAMETHASONE 4 MG: 4 TABLET ORAL at 05:55

## 2019-10-26 RX ADMIN — LEVETIRACETAM 1000 MG: 10 INJECTION INTRAVENOUS at 20:04

## 2019-10-26 RX ADMIN — DEXAMETHASONE 4 MG: 4 TABLET ORAL at 21:26

## 2019-10-26 RX ADMIN — HYDROMORPHONE HYDROCHLORIDE 0.3 MG: 1 INJECTION, SOLUTION INTRAMUSCULAR; INTRAVENOUS; SUBCUTANEOUS at 10:06

## 2019-10-26 RX ADMIN — OXYCODONE HYDROCHLORIDE 5 MG: 5 TABLET ORAL at 06:53

## 2019-10-26 RX ADMIN — LABETALOL 20 MG/4 ML (5 MG/ML) INTRAVENOUS SYRINGE 10 MG: at 21:30

## 2019-10-26 RX ADMIN — GADOBUTROL 10 ML: 604.72 INJECTION INTRAVENOUS at 11:25

## 2019-10-26 RX ADMIN — OXYCODONE HYDROCHLORIDE 5 MG: 5 TABLET ORAL at 07:49

## 2019-10-26 RX ADMIN — FAMOTIDINE 20 MG: 20 TABLET ORAL at 21:37

## 2019-10-26 RX ADMIN — CEFAZOLIN 1 G: 1 INJECTION, POWDER, FOR SOLUTION INTRAMUSCULAR; INTRAVENOUS at 21:43

## 2019-10-26 RX ADMIN — HYDRALAZINE HYDROCHLORIDE 20 MG: 20 INJECTION INTRAMUSCULAR; INTRAVENOUS at 07:00

## 2019-10-26 RX ADMIN — LEVETIRACETAM 1000 MG: 10 INJECTION INTRAVENOUS at 07:49

## 2019-10-26 RX ADMIN — POLYETHYLENE GLYCOL 3350 17 G: 17 POWDER, FOR SOLUTION ORAL at 20:10

## 2019-10-26 RX ADMIN — CEFAZOLIN 1 G: 1 INJECTION, POWDER, FOR SOLUTION INTRAMUSCULAR; INTRAVENOUS at 14:43

## 2019-10-26 RX ADMIN — ACETAMINOPHEN 975 MG: 325 TABLET, FILM COATED ORAL at 21:26

## 2019-10-26 ASSESSMENT — ACTIVITIES OF DAILY LIVING (ADL)
ADLS_ACUITY_SCORE: 18
ADLS_ACUITY_SCORE: 18
IADL_COMMENTS: OT: PT WAS IND, FAMILY CAN A PRN
ADLS_ACUITY_SCORE: 18

## 2019-10-26 ASSESSMENT — PAIN DESCRIPTION - DESCRIPTORS
DESCRIPTORS: ACHING
DESCRIPTORS: ACHING

## 2019-10-26 ASSESSMENT — VISUAL ACUITY
OU: GLASSES
OU: GLASSES

## 2019-10-26 NOTE — PROGRESS NOTES
"   10/26/19 1400   Quick Adds   Type of Visit Initial Occupational Therapy Evaluation   Living Environment   Lives With spouse   Living Arrangements house   Home Accessibility stairs to enter home;stairs within home   Number of Stairs, Main Entrance 3   Stair Railings, Main Entrance railings on both sides of stairs   Number of Stairs, Within Home, Primary   (one flight to basement)   Stair Railings, Within Home, Primary   (one side)   Transportation Anticipated family or friend will provide;car, drives self   Self-Care   Usual Activity Tolerance good   Current Activity Tolerance fair   Regular Exercise No   Equipment Currently Used at Home none   Functional Level   Ambulation 0-->independent   Transferring 0-->independent   Toileting 0-->independent   Bathing 0-->independent   Dressing 0-->independent   Eating 0-->independent   Swallowing 0-->swallows foods/liquids without difficulty   Fall history within last six months no   General Information   Onset of Illness/Injury or Date of Surgery - Date 10/24/19   Referring Physician Misael Best MD   Patient/Family Goals Statement to discharge home   Additional Occupational Profile Info/Pertinent History of Current Problem per chart \" Mr. Lackey is a 64 year old male with recurrent Left Occipital Glioblastoma. Now S/P Left Craniotomy for Tumor Resection. Doing well post-operatively.  \"   Precautions/Limitations   (crani)   Weight-Bearing Status - LUE   (10# lifting restriction)   Weight-Bearing Status - RUE   (10# lifting restriction)   Cognitive Status Examination   Cognitive Comment OT: Pt stating problems w/ word finding   Visual Perception   Visual Perception Comments field cut in bilateral peripherals   Sensory Examination   Sensory Comments OT: baseline nueropathy in feet   Range of Motion (ROM)   ROM Comment OT: BUE WFL   Strength   Strength Comments OT: NT formally 2/2 precautions   Muscle Tone Assessment   Muscle Tone Comments OT: Overall deconditioned " "  Mobility   Bed Mobility Comments OT: min A   Transfer Skills   Transfer Comments OT: CGA   Balance   Balance Comments OT: SBA   Instrumental Activities of Daily Living (IADL)   IADL Comments OT: Pt was ind, family can A prn   Activities of Daily Living Analysis   Impairments Contributing to Impaired Activities of Daily Living balance impaired;strength decreased;post surgical precautions   General Therapy Interventions   Planned Therapy Interventions ADL retraining;IADL retraining;balance training;bed mobility training;cognition;strengthening;transfer training;visual perception;home program guidelines;progressive activity/exercise   Clinical Impression   Criteria for Skilled Therapeutic Interventions Met yes, treatment indicated   OT Diagnosis decreased ind in ADLS/IADLS   Influenced by the following impairments cognition and vision and post surgical precautions   Assessment of Occupational Performance 1-3 Performance Deficits   Identified Performance Deficits decreased ind in ADLS/IADLS   Clinical Decision Making (Complexity) Low complexity   Therapy Frequency Daily   Predicted Duration of Therapy Intervention (days/wks) 11/2/19   Anticipated Discharge Disposition Home with Outpatient Therapy   Risks and Benefits of Treatment have been explained. Yes   Patient, Family & other staff in agreement with plan of care Yes   [facility] [employer] TM \"6 Clicks\"   2016, Trustees of [facility], under license to [employer].  All rights reserved.   6 Clicks Short Forms Daily Activity Inpatient Short Form   [facility] AM-PAC  \"6 Clicks\" Daily Activity Inpatient Short Form   1. Putting on and taking off regular lower body clothing? 3 - A Little   2. Bathing (including washing, rinsing, drying)? 3 - A Little   3. Toileting, which includes using toilet, bedpan or urinal? 3 - A Little   4. Putting on and taking off regular upper body clothing? 3 - A Little   5. Taking care of personal grooming such as brushing " teeth? 3 - A Little   6. Eating meals? 4 - None   Daily Activity Raw Score (Score out of 24.Lower scores equate to lower levels of function) 19   Total Evaluation Time   Total Evaluation Time (Minutes) 5

## 2019-10-26 NOTE — PROGRESS NOTES
"Neuroscience Intensive Care Progress Note           Assessment and Plan         Current hospital day: Hospital Day: 3    Jayden Lackey is a 64 year old-year-old gentleman with a left occipital glioblastoma multiforme, status post redo resection on 10/24, complicated by significant pneumocephalus.     Neurologic:  #Glioblastoma multiforme, status post resection  #Post-op pneumocephalus  - Repeat head CT today  - Can wean high flow if pneumocephalus improving  - Continue dexamethasone taper per neurosurgery (continue famotidine and glucose checks)    #Seizures  - Continue home Keppra and lacosamide     Cardiovascular:   #Hypertension  #Atrial fibrillation  - SBP goal < 140, hydralazine and labetalol as needed  - Continue home atenolol and diltiazem     Pulmonary:  No active issues.     Renal:  No active issues.     Endocrine:  # Mild hyperglycemia   - Monitor while on dexamethasone    Hematologic:  No active issues.   - Daily CBC while in ICU    Gastrointestinal:  No actives problems.     Continue home famotidine, especially while on steroids.     Nutrition: Regular adult diet.     Infectious diseases:   No active issues.     PPX:    DVT: SCDs     GI: Famotidine   Lines/Tubes: PIVs, arterial line (remove today)  Code Status: Full    Dispo: ICU    Cony Yeh   Neuro ICU Fellow   Pager: [phone]         Subjective          Jayden Lackey is a 64 year old-year-old gentleman with a left occipital glioblastoma multiforme, status post redo resection on 10/24, complicated by significant pneumocephalus.            24 Hour Vital Signs Summary        /71   Pulse 93   Temp 98.9  F (37.2  C) (Axillary)   Resp 11   Ht 1.88 m (6' 2\")   Wt 101.8 kg (224 lb 6.9 oz)   SpO2 98%   BMI 28.81 kg/m           Ventilator Settings        FiO2 (%): 100 %  Resp: 11           Physical Exam        General:  Patient lying in bed without any acute distress    HEENT:  Clean dressing over scalp   Cardio:  Irregular rate and regular rhythm "   Pulmonary:  No respiratory distress   Extremities:  No edema  Skin:  Warm/dry     Neurologic Examination:  Mental Status: Alert and oriented.   Language: Speaks in full sentences. Able to name and repeat. Follows commands.   Speech: No dysarthria.   Cranial Nerves: Right homonymous hemianopia. Gaze is conjugate. Extraocular movements are intact. Face is symmetric with normal eye closure and smile.   Motor: Strength normal and symmetrical in upper and lower extremities.  Sensory: Normal and symmetric to soft touch in the upper and lower extremities.   Cerebellar: No dysmetria on finger-to-nose and heel-knee-shin.          Intake and Output          Intake/Output Summary (Last 24 hours) at 10/26/2019 0747  Last data filed at 10/26/2019 0500  Gross per 24 hour   Intake 2400.42 ml   Output 3250 ml   Net -849.58 ml            Labs        CBC  Recent Labs   Lab 10/26/19  0343 10/25/19  0306 10/24/19  2253 10/24/19  2021   WBC 9.3 11.2* 7.9 6.9   HGB 9.3* 10.9* 10.3* 9.9*   * 152 121* 146*       COAGS  Recent Labs   Lab 10/24/19  1213   INR 1.08   PTT 26       BMP  Recent Labs   Lab 10/26/19  0343 10/25/19  0306 10/24/19  2021 10/24/19  1650 10/24/19  1213    140 137 135 138   POTASSIUM 3.9 3.8 4.3 4.4 4.0   CHLORIDE 110* 106 103  --  105   CO2 28 24 26  --  27   BUN 23 22 24  --  22   CR 0.70 0.65* 0.80  --  0.73   BEATRIS 7.8* 8.4* 8.2*  --  9.1       Liver panel:  Recent Labs   Lab Test 09/13/19  1049 04/08/19  1147   PROTTOTAL 5.8* 6.8   ALBUMIN 3.0* 3.6   BILITOTAL 0.6 0.4   ALKPHOS 53 55   AST 16 21   ALT 83* 35       ABG  Recent Labs   Lab 10/24/19  1650   PH 7.34*   PCO2 49*   PO2 93   HCO3 27       CSFNo results for input(s): CGLU, CTP in the last 168 hours.    Invalid input(s): CCSF    MICRONo results for input(s): CULT in the last 168 hours.    LIPID Profile: No results found for: CHOL  No results found for: HDL  No results found for: LDL  No results found for: TRIG  No results found for:  CHOLHDLRATIO    A1C: No lab results found.    Troponin I: No lab results found.         Imaging and Tests         Recent Results (from the past 24 hour(s))   CT Head w/o Contrast    Narrative    CT HEAD W/O CONTRAST 10/25/2019 4:54 PM    Provided History: s/p craniotomy    Comparison: CT 10/25/2019.    Technique: Using multidetector thin collimation helical acquisition  technique, axial, coronal and sagittal CT images from the skull base  to the vertex were obtained without intravenous contrast.     Findings:    Stable postoperative changes of left occipitoparietal craniotomy with  underlying mass resection. Stable appearance of hyperdense extra-axial  fluid collection underlying the craniotomy site. Stable postoperative  pneumocephalus along the bifrontal lobes, anterior horn left lateral  ventricle, with slightly increased pneumocephalus in the resection  cavity and the temporal horn of the lateral ventricle. Stable minimal  left-to-right midline shift measuring 3 mm at the level of the foramen  Monro. No new intracranial hemorrhage. Ventricles are proportionate to  the cerebral sulci. Remainder of the gray-white differentiation is  intact. The basal cisterns are patent.    Mucosal thickening of the maxillary sinuses and right stenosis locule.  Mastoid air cells are clear.       Impression    Impression: Stable postoperative changes from left parieto-occipital  tumor resection with grossly stable extra-axial blood products along  the craniotomy site. Multifocal pneumocephalus is relatively stable  with slightly increased focus in the resection bed. No new acute  findings.    I have personally reviewed the examination and initial interpretation  and I agree with the findings.    DAYSI MORSE MD            Current Medications           acetaminophen  975 mg Oral Q8H     atenolol  50 mg Oral Daily     ceFAZolin  1 g Intravenous Q8H     dexamethasone  4 mg Oral Q8H PATSY    Followed by     [START ON 10/27/2019]  dexamethasone  3 mg Oral Q8H PATSY    Followed by     [START ON 10/30/2019] dexamethasone  2 mg Oral Q8H PATSY    Followed by     [START ON 11/2/2019] dexamethasone  1 mg Oral Q8H PATSY     diltiazem  90 mg Oral QPM     diltiazem ER COATED BEADS  180 mg Oral Daily     docusate sodium  100 mg Oral Daily     famotidine  20 mg Oral BID     folic acid  1 mg Oral Daily     lacosamide (VIMPAT) intermittent infusion  100 mg Intravenous BID     levETIRAcetam  1,000 mg Intravenous BID     sodium chloride (PF)  3 mL Intracatheter Q8H       PRN Medications:  [START ON 10/27/2019] acetaminophen, hydrALAZINE, HYDROmorphone, labetalol, lidocaine 4%, lidocaine (buffered or not buffered), naloxone, oxyCODONE, sodium chloride (PF)    Infusions:    niCARdipine 40 mg in 200 mL 0.9% NaCl Stopped (10/25/19 1230)       Allergies   Allergen Reactions     Shellfish Allergy Difficulty breathing, Nausea and Vomiting, Swelling and Shortness Of Breath     Ativan [Lorazepam] Other (See Comments)     Hallucinations, delirium     No Clinical Screening - See Comments      Lupine bean doesn't remember reaction     Other (Do Not Use)      Lupine bean doesn't remember reaction

## 2019-10-26 NOTE — PLAN OF CARE
Discharge Planner OT   Patient plan for discharge: home  Current status: Activity orders updated and RN john OT tx session per MDs. OT educated pt on functional transfers and ADLS w/in precautions.  Pt ambulated 200ft w/ 1 turn and  w/ no rest breaks and SBA w/o use of AD not pushing IV pole throughout. BP stable 120s/80s throughout HR 60s throughout.   Barriers to return to prior living situation: medical status  Recommendations for discharge: Home w/ OP OT/SLP/PT pending pt progress  Rationale for recommendations: to increase ind in ADLS/IADLS       Entered by: Sugar Ayala 10/26/2019 3:28 PM

## 2019-10-26 NOTE — PLAN OF CARE
OT: Pt currently on bed rest, OT will check back as available/appropriate or reschedule for 10/27/19.

## 2019-10-26 NOTE — PROGRESS NOTES
Neurocritical care Attending Note    The patient was seen and examined by me with the resident. The case was discussed at length. I personally reviewed vital signs, medications, labs and imaging. Agree with the resident's note from today as it reflects our joint assessment and plan      Summary/Overnight events:  Jayden Lackey is a 64 year old male with left occipital GBM s/p left craniotomy for tumor resection complicated by post op pneumocephalus    Data  CTH : pending   Tm: 99.2, wbc 9.3  BL: -779 ml     A/P  1.Left occipital GBM s/p redo craniotomy   2.b/l pneumocephalus  3.h/o SZ  4.h/o Afib/Afluter  3.h/o HTN, RA,     Plan:  -Post op care with neurosurgery  -cont decadron taper and famotidine  -Repeat CTH today to assess pneumocaphalus and wean high flow as tolerated  -cont PTA cardizem and atenolol  -cont PTA lacosamide and keppra  -SBP goal <140, cont prn anti HTN      Mitch Kirby MD  Neurocritical Care  Pager: [phone]

## 2019-10-26 NOTE — PLAN OF CARE
D/I: Patient on unit 4A Surgical/Neuro ICU   A/O to self, place, and generally to time and situation, some word finding difficulty and slow to respond to questions. Follows commands, RAYMOND equally 5/5 strength, PERRLA, right visual field cut still present. NSR with occasional PACs, 40mg of labetalol and 20mg of hydralazine given overnight to maintain SBP < 140, t max overnight 99.1. patient on non-rebreather mask at 15L r/t pneumocephalus, LS clear / equal, no SOB. Abdomen soft, rounded, tolerating regular diet well. Voids spontaneously without difficulty. Skin intact with head incision RASHI. Right radial arterial line, x3 PIVs.   See flow sheets for further interventions and assessments.   A: Stable   P: Continue to monitor pt closely. Notify MD of significant changes.  Akhil Malhotra RN

## 2019-10-26 NOTE — PLAN OF CARE
Patient on unit 4A Surgical/Neuro ICU s/p L crani and tumor resection on 10/24 c/b pneumocephalis.   Neuro- Pt A/Ox4. R visual field cut still present. PERRLA, eyes conjugate. FC, moves all extremities 5/5 strength. CT showed improving pneumocephalus- activity orders and okay to come off oxygen. MRI done today. Up to walk and chair with SBA  CV- SR 60-70s with occ PACs. SBP goal < 140. No medications needed to maintain during this shift.  Moderate edema in BLE, palpable pulses.   Pulm- RA (okayed by NSG), CTA  GI-  Reg diet, tolerating well. LBM 10/23. Passing gas per pt but hypoactive sounds. Given bowel meds.   - Beal pulled at 0600 this AM, patient voiding without issue.   Skin- L crani incision. Steri strips and sutures, approximated and clean. Otherwise skin WDL  Gtts- None  Lines- 3 PIVs  Electrolytes- no replacement protocols ordered  Social-  Family at bedside and updated.  See flow sheets for further interventions and assessments.  Plan: Continue to monitor pt closely, Notify MD of changes/concerns.

## 2019-10-26 NOTE — PROGRESS NOTES
S:    O:  Exam:  General: Arousable to voice; In No Acute Distress  Pulm: Breathing Comfortably on 100% high flow nonrebreather facemask  Mental status: AO x 2-3  Cranial Nerves: Right Homonymous Hemianopsia  Strength:      Del Tr Bi WE WF Gr  R 5 5 5 5 5 5  L 5 5 5 5 5 5     HF KE KF DF PF   R 5 5 5 5 5   L 5 5 5 5 5     Pronator Drift: Absent  Sensory: Intact to Light Touch  Reflexes: No Hyperreflexia, Fontenot s or Clonus Present; Toes Down-Going Bilaterally  INCISION: Clean, Dry and Intact with Surgical Dressing     Assessment: Mr. Lackey is a 64 year old male with recurrent Left Occipital Glioblastoma. Now S/P Left Craniotomy for Tumor Resection. Doing well post-operatively.      Plan:     Neuro:   Neuro Checks per ICU protocol  Anti-epileptics: Continue Keppra 1G Q 12 HR and Vimpat 100 mg Q 12 HR  Cerebral Edema: Decadron Taper to Off Over 2 Weeks   Sutures out: Dissolvable  Required imaging: Post-Operative MRI Brain with and without Contrast to Assess Resection Cavity   Post-Operative Pneumocephalus: Treated w/ Bedrest and Non-Rebreather Mask     Cardiovascular:  Maintain SBP < 140 mmHg  Continue home Diltiazem 180 mg QD    Pulmonary:   Encourage us of Incentive Spirometry   Discontinue high-flow oxygen    Gastrointestinal:  Advance to Regular Diet  Bowel Regimen w/ Senna-Docusate and Miralax  Anti-Emetics PRN Nausea and Vomiting   Protonix 40 mg daily while on Decadron     Renal:   Monitor I/O     Heme:   No issues   Maintain INR < 1.4; Platelet > 100K; Fibrinogen > 200, Hemoglobin > 8  SCDs to Bilateral Lower Extremities for DVT Prophylaxis     Endocrine:   No issues    Infectious:   Tonja-Operative Antibiotics Completed  Continue cefazolin until discharge, then switch to cephalexin  Monitor for Fever and Leukocytosis     PT/OT:   Evaluations Pending     DISPO: Likely Transfer to Unit 6A after MRI Brain Completed and Reviewed     Barriers: Evaluation by therapies, ambulating, BM/flatus, voiding without a  Emigdio, tolerating PO diet, pain controlled on PO medications      Prashanth Calles M.D.  Neurosurgery Resident, PGY-2    Please contact neurosurgery resident on call with questions.    Dial * * [phone], enter [phone] when prompted.

## 2019-10-26 NOTE — PLAN OF CARE
PT evaluation cx and deferred.  OT completed evaluation, patient ambulating with SBA, OT to continue to follow for ADLs/mobility.   Anticipate patient will be at/near baseline functional mobility status without additional acute PT intervention.  Discharge recommendation in place. Will complete PT order.

## 2019-10-27 ENCOUNTER — APPOINTMENT (OUTPATIENT)
Dept: OCCUPATIONAL THERAPY | Facility: CLINIC | Age: 64
End: 2019-10-27
Attending: NEUROLOGICAL SURGERY
Payer: COMMERCIAL

## 2019-10-27 LAB
ABO + RH BLD: NORMAL
ABO + RH BLD: NORMAL
ANION GAP SERPL CALCULATED.3IONS-SCNC: 5 MMOL/L (ref 3–14)
ANISOCYTOSIS BLD QL SMEAR: SLIGHT
BASOPHILS # BLD AUTO: 0 10E9/L (ref 0–0.2)
BASOPHILS NFR BLD AUTO: 0 %
BLD GP AB SCN SERPL QL: NORMAL
BLOOD BANK CMNT PATIENT-IMP: NORMAL
BUN SERPL-MCNC: 20 MG/DL (ref 7–30)
CALCIUM SERPL-MCNC: 8.3 MG/DL (ref 8.5–10.1)
CHLORIDE SERPL-SCNC: 108 MMOL/L (ref 94–109)
CO2 SERPL-SCNC: 28 MMOL/L (ref 20–32)
CREAT SERPL-MCNC: 0.61 MG/DL (ref 0.66–1.25)
DIFFERENTIAL METHOD BLD: ABNORMAL
DIFFERENTIAL METHOD BLD: NORMAL
EOSINOPHIL # BLD AUTO: 0 10E9/L (ref 0–0.7)
EOSINOPHIL NFR BLD AUTO: 0 %
ERYTHROCYTE [DISTWIDTH] IN BLOOD BY AUTOMATED COUNT: 18.8 % (ref 10–15)
ERYTHROCYTE [DISTWIDTH] IN BLOOD BY AUTOMATED COUNT: NORMAL % (ref 10–15)
GFR SERPL CREATININE-BSD FRML MDRD: >90 ML/MIN/{1.73_M2}
GLUCOSE SERPL-MCNC: 138 MG/DL (ref 70–99)
HCT VFR BLD AUTO: 30 % (ref 40–53)
HCT VFR BLD AUTO: NORMAL % (ref 40–53)
HGB BLD-MCNC: 9.5 G/DL (ref 13.3–17.7)
HGB BLD-MCNC: NORMAL G/DL (ref 13.3–17.7)
LYMPHOCYTES # BLD AUTO: 0.1 10E9/L (ref 0.8–5.3)
LYMPHOCYTES NFR BLD AUTO: 1.7 %
MAGNESIUM SERPL-MCNC: 2.5 MG/DL (ref 1.6–2.3)
MCH RBC QN AUTO: 28.4 PG (ref 26.5–33)
MCH RBC QN AUTO: NORMAL PG (ref 26.5–33)
MCHC RBC AUTO-ENTMCNC: 31.7 G/DL (ref 31.5–36.5)
MCHC RBC AUTO-ENTMCNC: NORMAL G/DL (ref 31.5–36.5)
MCV RBC AUTO: 90 FL (ref 78–100)
MCV RBC AUTO: NORMAL FL (ref 78–100)
MONOCYTES # BLD AUTO: 0.2 10E9/L (ref 0–1.3)
MONOCYTES NFR BLD AUTO: 2.6 %
MYELOCYTES # BLD: 0.2 10E9/L
MYELOCYTES NFR BLD MANUAL: 2.6 %
NEUTROPHILS # BLD AUTO: 7.8 10E9/L (ref 1.6–8.3)
NEUTROPHILS NFR BLD AUTO: 93.1 %
NRBC # BLD AUTO: 0.1 10*3/UL
NRBC BLD AUTO-RTO: 1 /100
OVALOCYTES BLD QL SMEAR: SLIGHT
PHOSPHATE SERPL-MCNC: 2.7 MG/DL (ref 2.5–4.5)
PLATELET # BLD AUTO: 118 10E9/L (ref 150–450)
PLATELET # BLD AUTO: NORMAL 10E9/L (ref 150–450)
POIKILOCYTOSIS BLD QL SMEAR: SLIGHT
POTASSIUM SERPL-SCNC: 3.7 MMOL/L (ref 3.4–5.3)
RBC # BLD AUTO: 3.35 10E12/L (ref 4.4–5.9)
RBC # BLD AUTO: NORMAL 10E12/L (ref 4.4–5.9)
SODIUM SERPL-SCNC: 141 MMOL/L (ref 133–144)
SPECIMEN EXP DATE BLD: NORMAL
WBC # BLD AUTO: 8.4 10E9/L (ref 4–11)
WBC # BLD AUTO: NORMAL 10E9/L (ref 4–11)

## 2019-10-27 PROCEDURE — C9254 INJECTION, LACOSAMIDE: HCPCS | Performed by: STUDENT IN AN ORGANIZED HEALTH CARE EDUCATION/TRAINING PROGRAM

## 2019-10-27 PROCEDURE — 25000128 H RX IP 250 OP 636: Performed by: STUDENT IN AN ORGANIZED HEALTH CARE EDUCATION/TRAINING PROGRAM

## 2019-10-27 PROCEDURE — 80048 BASIC METABOLIC PNL TOTAL CA: CPT | Performed by: NEUROLOGICAL SURGERY

## 2019-10-27 PROCEDURE — 25000131 ZZH RX MED GY IP 250 OP 636 PS 637: Performed by: STUDENT IN AN ORGANIZED HEALTH CARE EDUCATION/TRAINING PROGRAM

## 2019-10-27 PROCEDURE — 25000131 ZZH RX MED GY IP 250 OP 636 PS 637: Performed by: NURSE PRACTITIONER

## 2019-10-27 PROCEDURE — 86850 RBC ANTIBODY SCREEN: CPT | Performed by: NEUROLOGICAL SURGERY

## 2019-10-27 PROCEDURE — 25000132 ZZH RX MED GY IP 250 OP 250 PS 637: Performed by: STUDENT IN AN ORGANIZED HEALTH CARE EDUCATION/TRAINING PROGRAM

## 2019-10-27 PROCEDURE — 86901 BLOOD TYPING SEROLOGIC RH(D): CPT | Performed by: NEUROLOGICAL SURGERY

## 2019-10-27 PROCEDURE — 36415 COLL VENOUS BLD VENIPUNCTURE: CPT | Performed by: NEUROLOGICAL SURGERY

## 2019-10-27 PROCEDURE — 86900 BLOOD TYPING SEROLOGIC ABO: CPT | Performed by: NEUROLOGICAL SURGERY

## 2019-10-27 PROCEDURE — 85025 COMPLETE CBC W/AUTO DIFF WBC: CPT | Performed by: NEUROLOGICAL SURGERY

## 2019-10-27 PROCEDURE — 97535 SELF CARE MNGMENT TRAINING: CPT | Mod: GO | Performed by: OCCUPATIONAL THERAPIST

## 2019-10-27 PROCEDURE — 83735 ASSAY OF MAGNESIUM: CPT | Performed by: NEUROLOGICAL SURGERY

## 2019-10-27 PROCEDURE — 97530 THERAPEUTIC ACTIVITIES: CPT | Mod: GO | Performed by: OCCUPATIONAL THERAPIST

## 2019-10-27 PROCEDURE — 12000001 ZZH R&B MED SURG/OB UMMC

## 2019-10-27 PROCEDURE — 84100 ASSAY OF PHOSPHORUS: CPT | Performed by: NEUROLOGICAL SURGERY

## 2019-10-27 PROCEDURE — 25800030 ZZH RX IP 258 OP 636: Performed by: STUDENT IN AN ORGANIZED HEALTH CARE EDUCATION/TRAINING PROGRAM

## 2019-10-27 RX ORDER — LEVETIRACETAM 500 MG/1
1000 TABLET ORAL 2 TIMES DAILY
Status: DISCONTINUED | OUTPATIENT
Start: 2019-10-27 | End: 2019-10-28 | Stop reason: HOSPADM

## 2019-10-27 RX ORDER — LACOSAMIDE 100 MG/1
100 TABLET ORAL 2 TIMES DAILY
Status: DISCONTINUED | OUTPATIENT
Start: 2019-10-27 | End: 2019-10-28 | Stop reason: HOSPADM

## 2019-10-27 RX ORDER — POLYETHYLENE GLYCOL 3350 17 G/17G
17 POWDER, FOR SOLUTION ORAL ONCE
Status: COMPLETED | OUTPATIENT
Start: 2019-10-27 | End: 2019-10-27

## 2019-10-27 RX ORDER — LISINOPRIL 5 MG/1
5 TABLET ORAL DAILY
Status: DISCONTINUED | OUTPATIENT
Start: 2019-10-27 | End: 2019-10-28 | Stop reason: HOSPADM

## 2019-10-27 RX ORDER — FLUMAZENIL 0.1 MG/ML
0.2 INJECTION, SOLUTION INTRAVENOUS
Status: DISCONTINUED | OUTPATIENT
Start: 2019-10-27 | End: 2019-10-28

## 2019-10-27 RX ADMIN — DEXAMETHASONE 4 MG: 4 TABLET ORAL at 13:49

## 2019-10-27 RX ADMIN — DILTIAZEM HYDROCHLORIDE 180 MG: 180 CAPSULE, COATED, EXTENDED RELEASE ORAL at 08:05

## 2019-10-27 RX ADMIN — ACETAMINOPHEN 975 MG: 325 TABLET, FILM COATED ORAL at 06:03

## 2019-10-27 RX ADMIN — LABETALOL 20 MG/4 ML (5 MG/ML) INTRAVENOUS SYRINGE 10 MG: at 22:12

## 2019-10-27 RX ADMIN — SODIUM CHLORIDE 100 MG: 9 INJECTION, SOLUTION INTRAVENOUS at 09:04

## 2019-10-27 RX ADMIN — LABETALOL 20 MG/4 ML (5 MG/ML) INTRAVENOUS SYRINGE 10 MG: at 21:56

## 2019-10-27 RX ADMIN — POLYETHYLENE GLYCOL 3350 17 G: 17 POWDER, FOR SOLUTION ORAL at 08:05

## 2019-10-27 RX ADMIN — DEXAMETHASONE 3 MG: 1 TABLET ORAL at 21:29

## 2019-10-27 RX ADMIN — ACETAMINOPHEN 975 MG: 325 TABLET, FILM COATED ORAL at 13:49

## 2019-10-27 RX ADMIN — LABETALOL 20 MG/4 ML (5 MG/ML) INTRAVENOUS SYRINGE 10 MG: at 21:42

## 2019-10-27 RX ADMIN — HYDRALAZINE HYDROCHLORIDE 20 MG: 20 INJECTION INTRAMUSCULAR; INTRAVENOUS at 20:01

## 2019-10-27 RX ADMIN — FOLIC ACID 1 MG: 1 TABLET ORAL at 08:05

## 2019-10-27 RX ADMIN — LEVETIRACETAM 1000 MG: 500 TABLET ORAL at 19:42

## 2019-10-27 RX ADMIN — DEXAMETHASONE 4 MG: 4 TABLET ORAL at 06:03

## 2019-10-27 RX ADMIN — FAMOTIDINE 20 MG: 20 TABLET ORAL at 19:42

## 2019-10-27 RX ADMIN — CEFAZOLIN 1 G: 1 INJECTION, POWDER, FOR SOLUTION INTRAMUSCULAR; INTRAVENOUS at 21:30

## 2019-10-27 RX ADMIN — LABETALOL 20 MG/4 ML (5 MG/ML) INTRAVENOUS SYRINGE 20 MG: at 04:28

## 2019-10-27 RX ADMIN — HYDRALAZINE HYDROCHLORIDE 10 MG: 20 INJECTION INTRAMUSCULAR; INTRAVENOUS at 18:05

## 2019-10-27 RX ADMIN — LABETALOL 20 MG/4 ML (5 MG/ML) INTRAVENOUS SYRINGE 30 MG: at 22:38

## 2019-10-27 RX ADMIN — CEFAZOLIN 1 G: 1 INJECTION, POWDER, FOR SOLUTION INTRAMUSCULAR; INTRAVENOUS at 06:02

## 2019-10-27 RX ADMIN — ATENOLOL 50 MG: 25 TABLET ORAL at 08:05

## 2019-10-27 RX ADMIN — LACOSAMIDE 100 MG: 100 TABLET, FILM COATED ORAL at 19:42

## 2019-10-27 RX ADMIN — CEFAZOLIN 1 G: 1 INJECTION, POWDER, FOR SOLUTION INTRAMUSCULAR; INTRAVENOUS at 13:48

## 2019-10-27 RX ADMIN — LABETALOL 20 MG/4 ML (5 MG/ML) INTRAVENOUS SYRINGE 10 MG: at 22:26

## 2019-10-27 RX ADMIN — FAMOTIDINE 20 MG: 20 TABLET ORAL at 08:05

## 2019-10-27 RX ADMIN — LEVETIRACETAM 1000 MG: 10 INJECTION INTRAVENOUS at 07:30

## 2019-10-27 RX ADMIN — DOCUSATE SODIUM 100 MG: 100 CAPSULE, LIQUID FILLED ORAL at 08:05

## 2019-10-27 RX ADMIN — LISINOPRIL 5 MG: 5 TABLET ORAL at 23:47

## 2019-10-27 RX ADMIN — HYDRALAZINE HYDROCHLORIDE 20 MG: 20 INJECTION INTRAMUSCULAR; INTRAVENOUS at 00:23

## 2019-10-27 ASSESSMENT — VISUAL ACUITY
OU: GLASSES

## 2019-10-27 ASSESSMENT — ACTIVITIES OF DAILY LIVING (ADL)
ADLS_ACUITY_SCORE: 18

## 2019-10-27 NOTE — PROGRESS NOTES
Care Coordinator - Discharge Planning    Admission Date/Time:  10/24/2019  Attending MD:  Nicho Packer, *     Data  Chart reviewed, discussed with interdisciplinary team.   Patient was admitted for:   1. S/P craniotomy    2. Malignant neoplasm of frontal lobe of brain (H)    3. Oligodendroglioma of occipital lobe (H)    4. Malignant neoplasm of frontal lobe of brain (H)             Coordination of Care and Referrals: Referral sent to [facility], family requesting previous provider and services. Recieved home care through [facility] previously. Per attending does not require SNV.      Plan  Anticipated Discharge Date:  10/28/19  Anticipated Discharge Plan:  Home with home care      Cris MARIONN RN CCM  RN Care Coordinator [address] [place] [id number]  [email or web address]  Office: [phone]  Pager: [phone]    To contact Weekend RNCC, dial * [phone] and enter job [phone] at prompt. This pager can not be contacted by text page or outside line.

## 2019-10-27 NOTE — PLAN OF CARE
Discharge Planner OT   4A  Patient plan for discharge: home with home services  Current status: Pt making good progress, able to ID compensatory strategies and goal after return home.  Facilitated ADLs in standing and functional mobility in hallway with visual scanning tasks SBA.  No AE needed, noted pt needed to stop fully to accurately read the room numbers during scanning, increased error with scanning during ambulation  Barriers to return to prior living situation: medical needs, field cut  Recommendations for discharge: home w A and home OT PT SLP  Rationale for recommendations: Pt would benefit from continued skilled rehab to improve compensatory strategies and improve functional balance for IADL and leisure activities.        Entered by: Cortney Ruiz 10/27/2019 10:16 AM

## 2019-10-27 NOTE — PLAN OF CARE
Pt with recurrent left occipital glioblastoma. Now S/P left craniotomy for tumor resection on 10/24/19. Left occipital incision is well approximated, RASHI with sutures in place. A+O x4. PERRL. Still has R visual field cut and mild to moderate expressive aphasia. Unchanged from previous shift. Able to follow commands. Moves all extremities spontaneously. Afebrile. NSR on cardiac monitor. Prn hydralazine and labetalol given to maintain SBP <140. Sating mid to upper 90's on RA. LS clear bilaterally. Tolerating regular diet well with no C/o nausea or vomiting. BS normoactive. No stool overnight. Prn Miralax given. Voiding large amounts of urine. Denies pain. Plan: see flow sheet for detailed assessments and interventions, possible transfer to floor today, continue to support POC.

## 2019-10-27 NOTE — PROGRESS NOTES
S: CTH with improved pneumocephalus, weaned off high-flow nasal cannula; MRI with gross total resection; started naproxen for arthritis pain, activity liberalized to out of bed    O:  Exam:  General: Arousable to voice; In No Acute Distress  Pulm: Breathing Comfortably on room air  Mental status: A&O x 2-3  Cranial Nerves: Right Homonymous Hemianopsia  Strength:      Del Tr Bi WE WF Gr  R 5 5 5 5 5 5  L 5 5 5 5 5 5     HF KE KF DF PF   R 5 5 5 5 5   L 5 5 5 5 5     Pronator Drift: Absent  Sensory: Intact to Light Touch  Reflexes: No Hyperreflexia, Fontenot s or Clonus Present; Toes Down-Going Bilaterally  INCISION: Clean, Dry and Intact with Surgical Dressing     Assessment: Mr. Lackey is a 64 year old male with recurrent Left Occipital Glioblastoma. Now S/P Left Craniotomy for Tumor Resection. Doing well post-operatively.      Plan:     Neuro:   Neuro Checks per ICU protocol  Anti-epileptics: Continue Keppra 1G Q 12 HR and Vimpat 100 mg Q 12 HR  Cerebral Edema: Decadron Taper to Off Over 2 Weeks   Sutures out: Dissolvable  Required imaging: Post-Operative MRI Brain with gross total resection  Post-Operative Pneumocephalus: Treated w/ Bedrest and Non-Rebreather Mask x24 hrs, now off high flow nasal cannula, out of bed    Cardiovascular:  Maintain SBP < 140 mmHg  Continue home Diltiazem 180 mg QD    Pulmonary:   Incentive Spirometry    Gastrointestinal:  Advance to Regular Diet  Bowel Regimen w/ Senna-Docusate and Miralax  Anti-Emetics PRN Nausea and Vomiting   Protonix 40 mg daily while on Decadron     Renal:   Monitor I/O     Heme:   No issues   Maintain INR < 1.4; Platelet > 100K; Fibrinogen > 200, Hemoglobin > 8  SCDs to Bilateral Lower Extremities for DVT Prophylaxis     Endocrine:   No issues    Infectious:   Tonja-Operative Antibiotics Completed  Continue cefazolin until discharge, then switch to cephalexin  Monitor for Fever and Leukocytosis     PT/OT: home with outpatient therapies    DISPO: Transfer to Unit  6A    Barriers: Evaluation by therapies, ambulating, BM/flatus, voiding without a Beal, tolerating PO diet, pain controlled on PO medications    Jhon Padgett MD  Neurosurgery Resident PGY2    Please contact neurosurgery resident on call with questions.    Dial * * [phone], enter [phone] when prompted.

## 2019-10-27 NOTE — PLAN OF CARE
D/I/A:  Neuro: A/Ox4, forgetful and mild word finding difficulty, denies pain  Pulm: RA, lungs clear  CV: SR, normotensive with SBP<140   : voids spontaneous  GI:  Regular diet with good appetite, had large BM  Skin: incision CDI, no drainage      P: continue to monitor and treat as ordered, notify team with changes/concerns. Transferred to  with belongings

## 2019-10-28 ENCOUNTER — HOME CARE/HOSPICE - HEALTHEAST (OUTPATIENT)
Dept: HOME HEALTH SERVICES | Facility: HOME HEALTH | Age: 64
End: 2019-10-28

## 2019-10-28 ENCOUNTER — APPOINTMENT (OUTPATIENT)
Dept: OCCUPATIONAL THERAPY | Facility: CLINIC | Age: 64
End: 2019-10-28
Attending: NEUROLOGICAL SURGERY
Payer: COMMERCIAL

## 2019-10-28 VITALS
TEMPERATURE: 96.6 F | OXYGEN SATURATION: 96 % | DIASTOLIC BLOOD PRESSURE: 86 MMHG | BODY MASS INDEX: 28.8 KG/M2 | SYSTOLIC BLOOD PRESSURE: 145 MMHG | WEIGHT: 224.43 LBS | RESPIRATION RATE: 16 BRPM | HEIGHT: 74 IN | HEART RATE: 64 BPM

## 2019-10-28 LAB
ANION GAP SERPL CALCULATED.3IONS-SCNC: 6 MMOL/L (ref 3–14)
ANISOCYTOSIS BLD QL SMEAR: SLIGHT
BASOPHILS # BLD AUTO: 0 10E9/L (ref 0–0.2)
BASOPHILS NFR BLD AUTO: 0 %
BLD PROD TYP BPU: NORMAL
BLD UNIT ID BPU: 0
BLOOD PRODUCT CODE: NORMAL
BPU ID: NORMAL
BUN SERPL-MCNC: 22 MG/DL (ref 7–30)
CALCIUM SERPL-MCNC: 8.3 MG/DL (ref 8.5–10.1)
CHLORIDE SERPL-SCNC: 108 MMOL/L (ref 94–109)
CO2 SERPL-SCNC: 26 MMOL/L (ref 20–32)
CREAT SERPL-MCNC: 0.54 MG/DL (ref 0.66–1.25)
DIFFERENTIAL METHOD BLD: ABNORMAL
EOSINOPHIL # BLD AUTO: 0 10E9/L (ref 0–0.7)
EOSINOPHIL NFR BLD AUTO: 0 %
ERYTHROCYTE [DISTWIDTH] IN BLOOD BY AUTOMATED COUNT: 18.7 % (ref 10–15)
GFR SERPL CREATININE-BSD FRML MDRD: >90 ML/MIN/{1.73_M2}
GLUCOSE SERPL-MCNC: 132 MG/DL (ref 70–99)
HCT VFR BLD AUTO: 29.2 % (ref 40–53)
HGB BLD-MCNC: 9.6 G/DL (ref 13.3–17.7)
LYMPHOCYTES # BLD AUTO: 0.1 10E9/L (ref 0.8–5.3)
LYMPHOCYTES NFR BLD AUTO: 1.7 %
MAGNESIUM SERPL-MCNC: 2.5 MG/DL (ref 1.6–2.3)
MCH RBC QN AUTO: 29.1 PG (ref 26.5–33)
MCHC RBC AUTO-ENTMCNC: 32.9 G/DL (ref 31.5–36.5)
MCV RBC AUTO: 89 FL (ref 78–100)
MONOCYTES # BLD AUTO: 0.4 10E9/L (ref 0–1.3)
MONOCYTES NFR BLD AUTO: 5.2 %
MYELOCYTES # BLD: 0.1 10E9/L
MYELOCYTES NFR BLD MANUAL: 1.7 %
NEUTROPHILS # BLD AUTO: 6.7 10E9/L (ref 1.6–8.3)
NEUTROPHILS NFR BLD AUTO: 90.5 %
NRBC # BLD AUTO: 0.1 10*3/UL
NRBC BLD AUTO-RTO: 1 /100
OVALOCYTES BLD QL SMEAR: SLIGHT
PHOSPHATE SERPL-MCNC: 3 MG/DL (ref 2.5–4.5)
PLATELET # BLD AUTO: 116 10E9/L (ref 150–450)
PLATELET # BLD EST: ABNORMAL 10*3/UL
POIKILOCYTOSIS BLD QL SMEAR: SLIGHT
POTASSIUM SERPL-SCNC: 3.9 MMOL/L (ref 3.4–5.3)
PROMYELOCYTES # BLD MANUAL: 0.1 10E9/L
PROMYELOCYTES NFR BLD MANUAL: 0.9 %
RBC # BLD AUTO: 3.3 10E12/L (ref 4.4–5.9)
SODIUM SERPL-SCNC: 139 MMOL/L (ref 133–144)
TRANSFUSION STATUS PATIENT QL: NORMAL
TRANSFUSION STATUS PATIENT QL: NORMAL
WBC # BLD AUTO: 7.4 10E9/L (ref 4–11)

## 2019-10-28 PROCEDURE — 84100 ASSAY OF PHOSPHORUS: CPT | Performed by: STUDENT IN AN ORGANIZED HEALTH CARE EDUCATION/TRAINING PROGRAM

## 2019-10-28 PROCEDURE — 97535 SELF CARE MNGMENT TRAINING: CPT | Mod: GO

## 2019-10-28 PROCEDURE — 25000132 ZZH RX MED GY IP 250 OP 250 PS 637: Performed by: STUDENT IN AN ORGANIZED HEALTH CARE EDUCATION/TRAINING PROGRAM

## 2019-10-28 PROCEDURE — 83735 ASSAY OF MAGNESIUM: CPT | Performed by: STUDENT IN AN ORGANIZED HEALTH CARE EDUCATION/TRAINING PROGRAM

## 2019-10-28 PROCEDURE — 97530 THERAPEUTIC ACTIVITIES: CPT | Mod: GO

## 2019-10-28 PROCEDURE — 25000128 H RX IP 250 OP 636: Performed by: STUDENT IN AN ORGANIZED HEALTH CARE EDUCATION/TRAINING PROGRAM

## 2019-10-28 PROCEDURE — 25000131 ZZH RX MED GY IP 250 OP 636 PS 637: Performed by: STUDENT IN AN ORGANIZED HEALTH CARE EDUCATION/TRAINING PROGRAM

## 2019-10-28 PROCEDURE — 36415 COLL VENOUS BLD VENIPUNCTURE: CPT | Performed by: STUDENT IN AN ORGANIZED HEALTH CARE EDUCATION/TRAINING PROGRAM

## 2019-10-28 PROCEDURE — 80048 BASIC METABOLIC PNL TOTAL CA: CPT | Performed by: STUDENT IN AN ORGANIZED HEALTH CARE EDUCATION/TRAINING PROGRAM

## 2019-10-28 PROCEDURE — 85025 COMPLETE CBC W/AUTO DIFF WBC: CPT | Performed by: STUDENT IN AN ORGANIZED HEALTH CARE EDUCATION/TRAINING PROGRAM

## 2019-10-28 RX ORDER — CEPHALEXIN 500 MG/1
500 CAPSULE ORAL 2 TIMES DAILY
Qty: 14 CAPSULE | Refills: 0 | Status: SHIPPED | OUTPATIENT
Start: 2019-10-28 | End: 2019-11-07

## 2019-10-28 RX ORDER — METHOTREXATE 25 MG/ML
150 INJECTION INTRA-ARTERIAL; INTRAMUSCULAR; INTRATHECAL; INTRAVENOUS
Refills: 0 | COMMUNITY
Start: 2019-10-28 | End: 2019-11-17

## 2019-10-28 RX ORDER — DEXAMETHASONE 1.5 MG/1
3 TABLET ORAL EVERY 8 HOURS
Qty: 7 TABLET | Refills: 0 | Status: SHIPPED | OUTPATIENT
Start: 2019-10-28 | End: 2019-11-07

## 2019-10-28 RX ORDER — DEXAMETHASONE 1 MG
1 TABLET ORAL EVERY 8 HOURS
Qty: 9 TABLET | Refills: 0 | Status: SHIPPED | OUTPATIENT
Start: 2019-11-02 | End: 2019-11-07

## 2019-10-28 RX ORDER — OXYCODONE HYDROCHLORIDE 5 MG/1
5-10 TABLET ORAL EVERY 4 HOURS PRN
Qty: 28 TABLET | Refills: 0 | Status: SHIPPED | OUTPATIENT
Start: 2019-10-28 | End: 2019-11-17

## 2019-10-28 RX ORDER — LISINOPRIL 5 MG/1
5 TABLET ORAL DAILY
Qty: 28 TABLET | Refills: 0 | Status: SHIPPED | OUTPATIENT
Start: 2019-10-29

## 2019-10-28 RX ORDER — ACETAMINOPHEN 325 MG/1
650 TABLET ORAL EVERY 4 HOURS PRN
Qty: 28 TABLET | Refills: 3 | Status: SHIPPED | OUTPATIENT
Start: 2019-10-28 | End: 2020-08-23

## 2019-10-28 RX ORDER — DEXAMETHASONE 2 MG/1
2 TABLET ORAL EVERY 8 HOURS
Qty: 9 TABLET | Refills: 0 | Status: SHIPPED | OUTPATIENT
Start: 2019-10-30 | End: 2019-11-07

## 2019-10-28 RX ADMIN — FAMOTIDINE 20 MG: 20 TABLET ORAL at 08:21

## 2019-10-28 RX ADMIN — DOCUSATE SODIUM 100 MG: 100 CAPSULE, LIQUID FILLED ORAL at 08:21

## 2019-10-28 RX ADMIN — ATENOLOL 50 MG: 25 TABLET ORAL at 08:21

## 2019-10-28 RX ADMIN — DEXAMETHASONE 3 MG: 1 TABLET ORAL at 06:55

## 2019-10-28 RX ADMIN — FOLIC ACID 1 MG: 1 TABLET ORAL at 08:21

## 2019-10-28 RX ADMIN — DILTIAZEM HYDROCHLORIDE 180 MG: 180 CAPSULE, COATED, EXTENDED RELEASE ORAL at 08:22

## 2019-10-28 RX ADMIN — LACOSAMIDE 100 MG: 100 TABLET, FILM COATED ORAL at 08:21

## 2019-10-28 RX ADMIN — LISINOPRIL 5 MG: 5 TABLET ORAL at 08:21

## 2019-10-28 RX ADMIN — LEVETIRACETAM 1000 MG: 500 TABLET ORAL at 08:22

## 2019-10-28 RX ADMIN — LABETALOL 20 MG/4 ML (5 MG/ML) INTRAVENOUS SYRINGE 10 MG: at 04:45

## 2019-10-28 RX ADMIN — CEFAZOLIN 1 G: 1 INJECTION, POWDER, FOR SOLUTION INTRAMUSCULAR; INTRAVENOUS at 06:55

## 2019-10-28 ASSESSMENT — ACTIVITIES OF DAILY LIVING (ADL)
ADLS_ACUITY_SCORE: 18

## 2019-10-28 NOTE — DISCHARGE SUMMARY
[facility] Discharge Summary and Instructions    Jayden Lackey MRN# [id number]   Age: 64 year old YOB: 1955     Date of Admission:  10/24/2019  Date of Discharge::  10/28/2019  Admitting Physician:  Nicho Packer MD  Discharge Physician:  Nicho Packer MD          Admission Diagnoses:   Malignant neoplasm of frontal lobe of brain (H) [C71.1]  Oligodendroglioma of occipital lobe (H) [C71.4]          Discharge Diagnosis:   Malignant neoplasm of frontal lobe of brain (H) [C71.1]  Oligodendroglioma of occipital lobe (H) [C71.4]          Procedures:   10/24/2019  1) Left occipital craniotomy for tumor resection  2) Micro-dissection  3) Neuro-navigation  4) 5ALA guidance           Brief History of Illness:   64 RH M with glioblastoma and MR findings of disease progression. The patient presents for consideration of repeat resection and clinical trial enrollment. The left occipital glioblastoma was initially diagnosed in 3/2019 (IDH wild type, MGMT promoter methylated).  The patient completed chemoradiation on 6/2019. The patient's quality of life has been compromised by seizures, visual deficits (right homonymous hemianopsia and difficulty with facial recognition).  MRI on 7/2019 showed new CE+ lesion that is PET positive, concerning for cancer progression. The patient presents for consideration of tumor resection and for clinical trial enrollment.  Decision was made to proceed with surgical resection.            Hospital Course:   Postoperatively the patient was transferred to the neurosurgical ICU.  Prior to transfer it was noted that the patient was disoriented x4 and also displayed a right pronator drift therefore a postoperative head CT was obtained and revealed pneumocephalus.  The patient was subsequently started on 100%  percent FiO2.  Initially postoperatively the patient was also hypertensive  requiring a nicardipine drip, however this was weaned to off quite quickly.  On  postoperative day #1 the patient remained on 100% FiO2 via nonrebreather mask and bedrest.  Patient was evaluated by speech therapy who recommended a regular diet with thin liquids.  On postoperative day #2 a repeat head CT was obtained which revealed improvement in regards to pneumocephalus.  Patient was weaned off high flow oxygen.  MRI of the brain was also obtained to assess degree of surgical resection, it appeared that gross total resection had been achieved.  Once bedrest was discontinued patient was evaluated by physical and occupational therapies who recommended home with home health PT and OT.  Patient has a history of arthritis and typically takes methotrexate once weekly.  Patient may restart medication on 10/31/2019.   Patient will follow-up with Dr. Nasra Olmos in Neuro Oncology following discharge to discuss next steps in treatment and consideration for clinical trial.  Patient was placed on  Decadron taper postoperatively for postsurgical cerebral edema, this will be tapered over 2 weeks.  The patient will continue on GI prophylaxis until the medication has been discontinued.  Prior to discharge the patient was neurologically and medically stable, tolerating diet, passing bowel movements, voiding, and ambulating independently.  Discharge instructions were discussed with the patient who stated understanding.            Discharge Medications:     Current Discharge Medication List      CONTINUE these medications which have NOT CHANGED    Details   atenolol (TENORMIN) 50 MG tablet Take 50 mg by mouth daily       dexamethasone (DECADRON) 2 MG tablet Take 1 tablet (2 mg) by mouth 2 times daily for 10 days  Qty: 20 tablet, Refills: 0    Associated Diagnoses: Glioblastoma multiforme of occipital lobe (H)      diltiazem ER COATED BEADS (CARDIZEM CD/CARTIA XT) 120 MG 24 hr capsule Take 180 mg by mouth daily       famotidine (PEPCID) 20 MG tablet Take 20 mg by mouth 2 times daily      Lacosamide (VIMPAT) 100  "MG TABS tablet Take 100 mg by mouth 2 times daily       levETIRAcetam (KEPPRA) 1000 MG tablet TK 1 T PO BID  Refills: 1      Nutritional Supplements (JUICE PLUS FIBRE PO) Take 1 tablet by mouth every morning       aspirin 81 MG EC tablet Take 81 mg by mouth At Bedtime       docusate sodium (COLACE) 100 MG capsule Take 100 mg by mouth      fish oil-omega-3 fatty acids 1000 MG capsule Take 1 g by mouth daily Unknown dose       folic acid (FOLVITE) 1 MG tablet Take 1 mg by mouth daily       methotrexate sodium, pres-free, 50 MG/2ML SOLN injection CHEMO Inject 150 mg Subcutaneous every 7 days   Refills: 0      naproxen (NAPROSYN DR) 500 MG EC tablet Take 1,000 mg by mouth as needed             O:  BP (!) 145/86 (BP Location: Right arm)   Pulse 64   Temp 96.6  F (35.9  C) (Oral)   Resp 16   Ht 1.88 m (6' 2\")   Wt 101.8 kg (224 lb 6.9 oz)   SpO2 96%   BMI 28.81 kg/m    Exam:  General: Awake and alert; In No Acute Distress  Pulm: Breathing Comfortably on room air  Mental status: A&O x 4  Cranial Nerves: Right Homonymous Hemianopsia  Strength:               Del       Tr         Bi         WE      WF       Gr  R          5          5          5          5          5          5  L          5          5          5          5          5          5              HF       KE       KF        DF       PF          R          5          5          5          5          5            L          5          5          5          5          5               Pronator Drift: Absent  Sensory: Intact to Light Touch  Reflexes: No Hyperreflexia, Fontenot s or Clonus Present; Toes Down-Going Bilaterally  INCISION: Clean, Dry and Intact with Surgical Dressing          Discharge Instructions and Follow-Up:   Discharge diet: Regular   Discharge activity: You may advance activity as tolerated. No strenuous exercise or heay lifting greater than 10 lbs for 4 weeks or until seen and cleared in clinic.   Discharge follow-up: Follow-up with Dolly " COLETTE Ramirez on 11/7/2019 @ 2184    Follow-up with Dr Nasra Olmos within 2-3 weeks    Wound care: Ok to shower,however no scrubbing of the wound and no soaking of the wound, meaning no bathtubs or swimming pools. Pat dry only. Leave wound open to air.  Sutures are not absorbable and need to be removed in 2 weeks. If patient still at rehab by this time, the sutures may be removed by the rehab physician if he or she considers that the wound has healed completely.     Please call if you have:  1. increased pain, redness, drainage, swelling at your incision  2. fevers > 101.5 F degrees  3. with any questions or concerns.  You may reach the Neurosurgery clinic at [phone] during regular work hours. ER at [phone].    and ask for the Neurosurgery Resident on call at [phone], during off hours or weekends.         Discharge Disposition:   Discharged to home        ALEJA Waite, CNP  Department of Neurosurgery  Pager: [phone]

## 2019-10-28 NOTE — PLAN OF CARE
Status: recurrent L Occipital Glioblastoma, POD #3 L craniotomy for tumor resection.   Vitals: HTN overnight at 0400, 10 mg of Labetolol given. Keep BP under 140. All other VSS.   Neuros: AO x 4, intermittently disorientated to place. Allow time to respond. Forgetful. 5/5 BUE, BLE.   IV: PIV   Resp/trach: LS clear    Diet: Regular   Bowel status: No BM this shift, passing flatus, BS present.   : Voiding spont via toilet.   Skin: Posterior L head incision, RASHI, sutures, no drainage.   Pain: No statements of pain overnight.   Activity: Up with A1, GB.     Social: No visitors overnight. Pt. was awake most of night worried about future d/t change in condition, sat with pt for hr and allowed him time to vocalize fears, offered encouragement.   Plan: Plan to d/c to home w/ outpatient therapies. Continue to monitor and follow POC.

## 2019-10-28 NOTE — PLAN OF CARE
"BP (!) 158/97 (BP Location: Left arm)   Pulse 57   Temp 96.7  F (35.9  C) (Oral)   Resp 16   Ht 1.88 m (6' 2\")   Wt 101.8 kg (224 lb 6.9 oz)   SpO2 97%   BMI 28.81 kg/m      Pt transfers from ICU this evening. S/P Left Craniotomy for Tumor Resection on 10/24.   /97, HR 57, gave prn hydralazine. Has right visual field cut. Family at bedside   "

## 2019-10-28 NOTE — PLAN OF CARE
OT 6A  Discharge Planner OT   Patient plan for discharge: Home  Current status: Independent supine<>EOB. SBA sit<>Stand. SBA for toileting and g/h while standing at the sink. Pt completed ~600ft of functional mobility with SBA. Pt ambulated up and down 3 stairs with one hand railing and SBA/CGA  Barriers to return to prior living situation: fatigue, post surgical precautions, acute medical needs  Recommendations for discharge: Home with assist and HH OT/PT/speech  Rationale for recommendations: Pt is primarily independent with ADL completion, however would benefit from continued skilled therapy to increase activity tolerance and independence with IADLs       Entered by: Estefanía Shaikh 10/28/2019 10:01 AM

## 2019-10-28 NOTE — PROGRESS NOTES
[id number]  Pt POD 3 left craniotomy. Incision RASHI WDL. AOx4, but forgetful, R visual cut. Denies pain. Attempting to get BP SBP<40. After 20mg Hydralazine and 20mg lobatolol 70mg given /72 Will continue to monitor and follow POC.

## 2019-10-28 NOTE — DISCHARGE SUMMARY
Pt discharged home, wife provided ride. All belongings sent with patient. Picked up medications at campus pharmacy and transported down via wheelchair. AVS reviewed, questions answered.

## 2019-10-29 ENCOUNTER — PATIENT OUTREACH (OUTPATIENT)
Dept: CARE COORDINATION | Facility: CLINIC | Age: 64
End: 2019-10-29

## 2019-10-29 NOTE — PLAN OF CARE
Occupational Therapy Discharge Summary    Reason for therapy discharge:    Discharged to home with home therapy.    Progress towards therapy goal(s). See goals on Care Plan in [facility] electronic health record for goal details.  Goals partially met.  Barriers to achieving goals:   discharge from facility.    Therapy recommendation(s):    Continued therapy is recommended.  Rationale/Recommendations:  to increase activity tolerance and independence and safety in ADL/IADL completion.

## 2019-10-30 LAB — COPATH REPORT: NORMAL

## 2019-11-03 NOTE — PROGRESS NOTES
ORAL CHEMOTHERAPY DISCONTINUATION       Primary Oncologist:  Dr. Olmos  Primary Oncology Clinic: [facility]  Cancer Diagnosis:  Glioblastoma  Therapy History:  Adj Temodar  C1D1= 7/12/19  C2D1= 8/13/19   Therapy Ended On:  9/13/2019  Reason For Discontinuation: disease progression    Additional Notes:  Thank you for the opportunity to be a part in the care of this patient's oral chemotherapy. The oncology pharmacy will no longer be following this patient for oral chemotherapy. If there are any questions or the plan changes, feel free to contact us.    Binta Torre   Pharmacy Intern  [facility]   [phone]

## 2019-11-04 ENCOUNTER — TELEPHONE (OUTPATIENT)
Dept: NEUROLOGY | Facility: CLINIC | Age: 64
End: 2019-11-04

## 2019-11-04 NOTE — TELEPHONE ENCOUNTER
Mr Ziyad's wife called in because Jayden Lackey is currently on his decadron taper and is now on 1mg Q8hrs with his last dose on Wednesday morning. He has an appointment in clinic on Thursday. We discussed that he can finish his taper as scheduled. Mr Ziyad's wife was concerned if it would be a too abrupt discontinuation of the steroids. We discussed that the taper can be finished as scheduled and that it is an appropriate taper.  She also reports that her  is doing well overall and that he is eating well. He, however, still has some disturbances in his vision.    Manuel Roth MD  Neurosurgery Resident PGY-1    Please contact neurosurgery resident on call with questions.    Dial * * [phone], enter [phone] when prompted.

## 2019-11-07 ENCOUNTER — ONCOLOGY VISIT (OUTPATIENT)
Dept: ONCOLOGY | Facility: CLINIC | Age: 64
End: 2019-11-07
Attending: PHYSICIAN ASSISTANT
Payer: COMMERCIAL

## 2019-11-07 VITALS
BODY MASS INDEX: 28.03 KG/M2 | TEMPERATURE: 97.8 F | WEIGHT: 218.4 LBS | OXYGEN SATURATION: 99 % | HEIGHT: 74 IN | SYSTOLIC BLOOD PRESSURE: 125 MMHG | HEART RATE: 52 BPM | DIASTOLIC BLOOD PRESSURE: 89 MMHG

## 2019-11-07 DIAGNOSIS — C71.9 GLIOBLASTOMA (H): Primary | ICD-10-CM

## 2019-11-07 PROCEDURE — 99215 OFFICE O/P EST HI 40 MIN: CPT | Mod: ZP | Performed by: PHYSICIAN ASSISTANT

## 2019-11-07 PROCEDURE — G0463 HOSPITAL OUTPT CLINIC VISIT: HCPCS | Mod: ZF

## 2019-11-07 RX ORDER — SERTRALINE HYDROCHLORIDE 25 MG/1
25 TABLET, FILM COATED ORAL DAILY
Qty: 30 TABLET | Refills: 3 | Status: SHIPPED | OUTPATIENT
Start: 2019-11-07 | End: 2019-11-25

## 2019-11-07 ASSESSMENT — PAIN SCALES - GENERAL: PAINLEVEL: NO PAIN (0)

## 2019-11-07 ASSESSMENT — MIFFLIN-ST. JEOR: SCORE: 1855.41

## 2019-11-07 NOTE — NURSING NOTE
"Oncology Rooming Note    November 7, 2019 3:01 PM   Jayden Lackey is a 64 year old male who presents for:    Chief Complaint   Patient presents with     Oncology Clinic Visit     Return patient.Malignant neoplasm of frontal lobe of brain.      Initial Vitals: /89   Pulse 52   Temp 97.8  F (36.6  C) (Oral)   Ht 1.88 m (6' 2\")   Wt 99.1 kg (218 lb 6.4 oz)   SpO2 99%   BMI 28.04 kg/m   Estimated body mass index is 28.04 kg/m  as calculated from the following:    Height as of this encounter: 1.88 m (6' 2\").    Weight as of this encounter: 99.1 kg (218 lb 6.4 oz). Body surface area is 2.27 meters squared.  No Pain (0) Comment: Data Unavailable   No LMP for male patient.  Allergies reviewed: Yes  Medications reviewed: Yes    Medications: Medication refills not needed today.  Pharmacy name entered into [employer]:    [facility] - [place], [place] - [address] [id number] - [place], [place] - [address] [place] [address] [address]    Clinical concerns: Discuss steroids, would like to know if doing more?   Dolly was notified.      Jessica Kidd, [facility]              "

## 2019-11-07 NOTE — PROGRESS NOTES
"Neurosurgery Post-Op Followup  Nov 7, 2019    Date of Surgery: 10/24/2019  Performed by: Dr. Nicho Packer  PROCEDURES:   1) Left occipital craniotomy for tumor resection  2) Micro-dissection  3) Neuro-navigation  4) 5ALA guidance    Discharged on: POD #4    HPI:  Jayden Lackey is a 64 RH M with glioblastoma and MR findings of disease progression. The patient presents for consideration of repeat resection and clinical trial enrollment. The left occipital glioblastoma was initially diagnosed in 3/2019 (IDH wild type, MGMT promoter methylated).  The patient completed chemoradiation on 6/2019. The patient's quality of life has been compromised by seizures, visual deficits (right homonymous hemianopsia and difficulty with facial recognition).  MRI on 7/2019 showed new CE+ lesion that is PET positive, concerning for cancer progression. The patient presents for consideration of tumor resection and for clinical trial enrollment.  Decision was made to proceed with surgical resection.     He underwent the above procedure and is here today for surgery followup.     Interim History:  Jayden presents today with his wife Oz.    Main complaints today are fatigue, feeling down because of unknowns and progressed disease, weakness and memory/comprehension difficulties. None of these complaints are new, just worse since surgery. Slowly improving though not very rapidly. They were told he would feel better in 3-4 weeks after surgery and he is 2 weeks in and doesn't feel like he is going to recovery. Wife is worried about him. No difficulties with the wound    They have a lot of questions.     Pathology:  FINAL DIAGNOSIS:   A. Brain, left occipital tumor, excision:        - Recurrent glioblastoma (WHO grade IV)     B. Brain, left occipital tumor, excision:        - Recurrent glioblastoma (WHO grade IV)    Exam:  /89   Pulse 52   Temp 97.8  F (36.6  C) (Oral)   Ht 1.88 m (6' 2\")   Wt 99.1 kg (218 lb 6.4 oz)   SpO2 99%   BMI " 28.04 kg/m    General: Alert, Oriented  HEENT: PERRLA, no sclera icterus, mucus membranes moist, no lesions or thrush  CV: RRR, no m/r/g  Pulm: CTA-B, no wheezes or rales  Ext: No pitting edema  Skin: No rashes or lesions seen on exposed skin, besides wound below  Neuro: Homonymous hemianopsia, right-side, no pronation, drift or orbiting, finger to nose intact, strength 4+/5 throughout, reflexes 2+ b/l, gait normal, slow, oriented to date (day, month, year), floor, though struggled with location and more difficulty with each question     Wound: No redness, drainage or swelling. Well healed, intermittent scabbing.     Assessment and Plan:     Dx:  1. Recurrent GBM   2. S/p Craniotomy    3. Post operative State    He is having a very difficult time with the unknowns and prognosis of his disease. He was expecting to be able to recover as quickly as his previous surgery. Explained typical recovery after a second surgery and generally what to expect. Discussed his mood and how hard this has been for him. Discussed continuing to practice reading, doing puzzles and physical exercises in order to improve.     There is confusion about whether he will be enrolled in a clinical trial or not and what his next treatment is going to be. Reached out to Miguelina Lamb and Dr. Olmos. Because of his RA flairs, he is not eligible for the trial. Likely will start Avastin alone.     Wound  -keep wound open to air  -can swim/submerge wound when all scabbing disappears  -completed course of Keflex since this was his second surgery     Depression  -worsening mood, especially since surgery. Initially refused antidepressants and counseling, though eventually agreed to starting a medication. Will start Zoloft 25 mg daily with breakfast to help with mood and energy. In 2-3 weeks can increase to 50 mg if needed (max 200 mg). Discussed that these medication can take a while to work and should be patient     Steroids  -currently tapered off of  steroids. No need for further steroids at this time. Discussed s/s to monitor for indicating increased intracranial edema     Seizures  -Continue Keppra 1000 mg BID and Vimpat 100 mg BID. No recent seizures     Pain  -minimal head pain. Can use APAP prn    RA  -restarted methotrexate on 10/31. Did have a flair in his knee recently since he was off MTX. Ok to use Naproxen 500 mg BID and APAP up to 4 G each day  -will be meeting with rheumatologist in December     Activity   -not able to drive with vision loss  -activity restriction for 1 month postop, no lifting over 10 lbs   -able to resume normal activities, such as running/walking, cleaning, bending  -should continue PT/OT    Followup: In 2 weeks with Dr. Olmos with new MRI prior.     Over 50% of >60 min visit was spent counseling and coordinating care    Dolly Ramirez PA-C  [facility]  [address] [place] [address] [phone]

## 2019-11-07 NOTE — LETTER
RE: Jayden Lackey  [address]  [place] [address]       Neurosurgery Post-Op Followup  Nov 7, 2019    Date of Surgery: 10/24/2019  Performed by: Dr. Nicho Packer  PROCEDURES:   1) Left occipital craniotomy for tumor resection  2) Micro-dissection  3) Neuro-navigation  4) 5ALA guidance    Discharged on: POD #4    HPI:  Jayden Lackey is a 64 RH M with glioblastoma and MR findings of disease progression. The patient presents for consideration of repeat resection and clinical trial enrollment. The left occipital glioblastoma was initially diagnosed in 3/2019 (IDH wild type, MGMT promoter methylated).  The patient completed chemoradiation on 6/2019. The patient's quality of life has been compromised by seizures, visual deficits (right homonymous hemianopsia and difficulty with facial recognition).  MRI on 7/2019 showed new CE+ lesion that is PET positive, concerning for cancer progression. The patient presents for consideration of tumor resection and for clinical trial enrollment.  Decision was made to proceed with surgical resection.     He underwent the above procedure and is here today for surgery followup.     Interim History:  Jayden presents today with his wife Oz.    Main complaints today are fatigue, feeling down because of unknowns and progressed disease, weakness and memory/comprehension difficulties. None of these complaints are new, just worse since surgery. Slowly improving though not very rapidly. They were told he would feel better in 3-4 weeks after surgery and he is 2 weeks in and doesn't feel like he is going to recovery. Wife is worried about him. No difficulties with the wound    They have a lot of questions.     Pathology:  FINAL DIAGNOSIS:   A. Brain, left occipital tumor, excision:        - Recurrent glioblastoma (WHO grade IV)     B. Brain, left occipital tumor, excision:        - Recurrent glioblastoma (WHO grade IV)    Exam:  /89   Pulse 52   Temp 97.8  F (36.6  C)  "(Oral)   Ht 1.88 m (6' 2\")   Wt 99.1 kg (218 lb 6.4 oz)   SpO2 99%   BMI 28.04 kg/m     General: Alert, Oriented  HEENT: PERRLA, no sclera icterus, mucus membranes moist, no lesions or thrush  CV: RRR, no m/r/g  Pulm: CTA-B, no wheezes or rales  Ext: No pitting edema  Skin: No rashes or lesions seen on exposed skin, besides wound below  Neuro: Homonymous hemianopsia, right-side, no pronation, drift or orbiting, finger to nose intact, strength 4+/5 throughout, reflexes 2+ b/l, gait normal, slow, oriented to date (day, month, year), floor, though struggled with location and more difficulty with each question     Wound: No redness, drainage or swelling. Well healed, intermittent scabbing.     Assessment and Plan:     Dx:  1. Recurrent GBM   2. S/p Craniotomy    3. Post operative State    He is having a very difficult time with the unknowns and prognosis of his disease. He was expecting to be able to recover as quickly as his previous surgery. Explained typical recovery after a second surgery and generally what to expect. Discussed his mood and how hard this has been for him. Discussed continuing to practice reading, doing puzzles and physical exercises in order to improve.     There is confusion about whether he will be enrolled in a clinical trial or not and what his next treatment is going to be. Reached out to Miguelina Lamb and Dr. Olmos. Because of his RA flairs, he is not eligible for the trial. Likely will start Avastin alone.     Wound  -keep wound open to air  -can swim/submerge wound when all scabbing disappears  -completed course of Keflex since this was his second surgery     Depression  -worsening mood, especially since surgery. Initially refused antidepressants and counseling, though eventually agreed to starting a medication. Will start Zoloft 25 mg daily with breakfast to help with mood and energy. In 2-3 weeks can increase to 50 mg if needed (max 200 mg). Discussed that these medication can take a " while to work and should be patient     Steroids  -currently tapered off of steroids. No need for further steroids at this time. Discussed s/s to monitor for indicating increased intracranial edema     Seizures  -Continue Keppra 1000 mg BID and Vimpat 100 mg BID. No recent seizures     Pain  -minimal head pain. Can use APAP prn    RA  -restarted methotrexate on 10/31. Did have a flair in his knee recently since he was off MTX. Ok to use Naproxen 500 mg BID and APAP up to 4 G each day  -will be meeting with rheumatologist in December     Activity   -not able to drive with vision loss  -activity restriction for 1 month postop, no lifting over 10 lbs   -able to resume normal activities, such as running/walking, cleaning, bending  -should continue PT/OT    Followup: In 2 weeks with Dr. Olmos with new MRI prior.     Over 50% of >60 min visit was spent counseling and coordinating care    Dolly Ramirez PA-C  [facility]  [address] [place] [address] [phone]

## 2019-11-17 ENCOUNTER — HOSPITAL ENCOUNTER (INPATIENT)
Facility: CLINIC | Age: 64
LOS: 3 days | Discharge: HOME HEALTH | End: 2019-11-20
Attending: EMERGENCY MEDICINE | Admitting: PSYCHIATRY & NEUROLOGY
Payer: COMMERCIAL

## 2019-11-17 ENCOUNTER — APPOINTMENT (OUTPATIENT)
Dept: CT IMAGING | Facility: CLINIC | Age: 64
End: 2019-11-17
Attending: EMERGENCY MEDICINE
Payer: COMMERCIAL

## 2019-11-17 ENCOUNTER — RECORDS - HEALTHEAST (OUTPATIENT)
Dept: LAB | Facility: CLINIC | Age: 64
End: 2019-11-17

## 2019-11-17 DIAGNOSIS — I26.99 ACUTE PULMONARY EMBOLISM, UNSPECIFIED PULMONARY EMBOLISM TYPE, UNSPECIFIED WHETHER ACUTE COR PULMONALE PRESENT (H): ICD-10-CM

## 2019-11-17 DIAGNOSIS — C71.9 GBM (GLIOBLASTOMA MULTIFORME) (H): Primary | ICD-10-CM

## 2019-11-17 DIAGNOSIS — Z98.890 S/P CRANIOTOMY: ICD-10-CM

## 2019-11-17 DIAGNOSIS — C71.9 GLIOBLASTOMA (H): ICD-10-CM

## 2019-11-17 DIAGNOSIS — I26.99 PULMONARY EMBOLISM (H): ICD-10-CM

## 2019-11-17 DIAGNOSIS — I26.99 ACUTE PULMONARY EMBOLISM WITHOUT ACUTE COR PULMONALE, UNSPECIFIED PULMONARY EMBOLISM TYPE (H): ICD-10-CM

## 2019-11-17 LAB
ABO + RH BLD: NORMAL
ABO + RH BLD: NORMAL
ALBUMIN SERPL-MCNC: 2.8 G/DL (ref 3.4–5)
ALBUMIN SERPL-MCNC: 2.8 G/DL (ref 3.5–5)
ALBUMIN UR-MCNC: NEGATIVE MG/DL
ALP SERPL-CCNC: 78 U/L (ref 45–120)
ALP SERPL-CCNC: 79 U/L (ref 40–150)
ALT SERPL W P-5'-P-CCNC: 35 U/L (ref 0–45)
ALT SERPL W P-5'-P-CCNC: 45 U/L (ref 0–70)
ANION GAP SERPL CALCULATED.3IONS-SCNC: 14 MMOL/L (ref 5–18)
ANION GAP SERPL CALCULATED.3IONS-SCNC: 6 MMOL/L (ref 3–14)
APPEARANCE UR: CLEAR
APTT PPP: 21 SEC (ref 22–37)
AST SERPL W P-5'-P-CCNC: 15 U/L (ref 0–40)
AST SERPL W P-5'-P-CCNC: 18 U/L (ref 0–45)
BASOPHILS # BLD AUTO: 0 10E9/L (ref 0–0.2)
BASOPHILS NFR BLD AUTO: 0.3 %
BILIRUB SERPL-MCNC: 0.5 MG/DL (ref 0.2–1.3)
BILIRUB SERPL-MCNC: 0.6 MG/DL (ref 0–1)
BILIRUB UR QL STRIP: NEGATIVE
BLD GP AB SCN SERPL QL: NORMAL
BLOOD BANK CMNT PATIENT-IMP: NORMAL
BUN SERPL-MCNC: 7 MG/DL (ref 7–30)
BUN SERPL-MCNC: 8 MG/DL (ref 8–22)
CALCIUM SERPL-MCNC: 8.2 MG/DL (ref 8.5–10.5)
CALCIUM SERPL-MCNC: 8.6 MG/DL (ref 8.5–10.1)
CHLORIDE BLD-SCNC: 106 MMOL/L (ref 98–107)
CHLORIDE SERPL-SCNC: 104 MMOL/L (ref 94–109)
CO2 BLDCOV-SCNC: 29 MMOL/L (ref 21–28)
CO2 SERPL-SCNC: 20 MMOL/L (ref 22–31)
CO2 SERPL-SCNC: 28 MMOL/L (ref 20–32)
COLOR UR AUTO: NORMAL
CREAT SERPL-MCNC: 0.8 MG/DL (ref 0.7–1.3)
CREAT SERPL-MCNC: 0.81 MG/DL (ref 0.66–1.25)
DIFFERENTIAL METHOD BLD: ABNORMAL
EOSINOPHIL # BLD AUTO: 0.1 10E9/L (ref 0–0.7)
EOSINOPHIL NFR BLD AUTO: 1.6 %
ERYTHROCYTE [DISTWIDTH] IN BLOOD BY AUTOMATED COUNT: 18.1 % (ref 10–15)
ERYTHROCYTE [DISTWIDTH] IN BLOOD BY AUTOMATED COUNT: 18.3 % (ref 10–15)
GFR SERPL CREATININE-BSD FRML MDRD: >60 ML/MIN/1.73M2
GFR SERPL CREATININE-BSD FRML MDRD: >90 ML/MIN/{1.73_M2}
GLUCOSE BLD-MCNC: 121 MG/DL (ref 70–125)
GLUCOSE BLDC GLUCOMTR-MCNC: 119 MG/DL (ref 70–99)
GLUCOSE SERPL-MCNC: 94 MG/DL (ref 70–99)
GLUCOSE UR STRIP-MCNC: NEGATIVE MG/DL
HCT VFR BLD AUTO: 30 % (ref 40–53)
HCT VFR BLD AUTO: 30.8 % (ref 40–53)
HGB BLD-MCNC: 9.7 G/DL (ref 13.3–17.7)
HGB BLD-MCNC: 9.8 G/DL (ref 13.3–17.7)
HGB UR QL STRIP: NEGATIVE
IMM GRANULOCYTES # BLD: 0 10E9/L (ref 0–0.4)
IMM GRANULOCYTES NFR BLD: 1.1 %
INR PPP: 1.11 (ref 0.86–1.14)
KETONES UR STRIP-MCNC: NEGATIVE MG/DL
LACTATE BLD-SCNC: 2.3 MMOL/L (ref 0.7–2.1)
LEUKOCYTE ESTERASE UR QL STRIP: NEGATIVE
LYMPHOCYTES # BLD AUTO: 0.6 10E9/L (ref 0.8–5.3)
LYMPHOCYTES NFR BLD AUTO: 17 %
MCH RBC QN AUTO: 28.7 PG (ref 26.5–33)
MCH RBC QN AUTO: 28.7 PG (ref 26.5–33)
MCHC RBC AUTO-ENTMCNC: 31.8 G/DL (ref 31.5–36.5)
MCHC RBC AUTO-ENTMCNC: 32.3 G/DL (ref 31.5–36.5)
MCV RBC AUTO: 89 FL (ref 78–100)
MCV RBC AUTO: 90 FL (ref 78–100)
MONOCYTES # BLD AUTO: 0.3 10E9/L (ref 0–1.3)
MONOCYTES NFR BLD AUTO: 7 %
MRSA DNA SPEC QL NAA+PROBE: NEGATIVE
NEUTROPHILS # BLD AUTO: 2.7 10E9/L (ref 1.6–8.3)
NEUTROPHILS NFR BLD AUTO: 73 %
NITRATE UR QL: NEGATIVE
NRBC # BLD AUTO: 0 10*3/UL
NRBC BLD AUTO-RTO: 0 /100
PCO2 BLDV: 48 MM HG (ref 40–50)
PH BLDV: 7.39 PH (ref 7.32–7.43)
PH UR STRIP: 7 PH (ref 5–7)
PLATELET # BLD AUTO: 101 10E9/L (ref 150–450)
PLATELET # BLD AUTO: 106 10E9/L (ref 150–450)
PO2 BLDV: 16 MM HG (ref 25–47)
POTASSIUM BLD-SCNC: 3.2 MMOL/L (ref 3.5–5)
POTASSIUM SERPL-SCNC: 3.2 MMOL/L (ref 3.4–5.3)
PROCALCITONIN SERPL-MCNC: 0.26 NG/ML
PROT SERPL-MCNC: 5.8 G/DL (ref 6–8)
PROT SERPL-MCNC: 6.6 G/DL (ref 6.8–8.8)
RADIOLOGIST FLAGS: ABNORMAL
RADIOLOGIST FLAGS: ABNORMAL
RBC # BLD AUTO: 3.38 10E12/L (ref 4.4–5.9)
RBC # BLD AUTO: 3.42 10E12/L (ref 4.4–5.9)
SAO2 % BLDV FROM PO2: 21 %
SODIUM SERPL-SCNC: 138 MMOL/L (ref 133–144)
SODIUM SERPL-SCNC: 140 MMOL/L (ref 136–145)
SOURCE: NORMAL
SP GR UR STRIP: 1 (ref 1–1.03)
SPECIMEN EXP DATE BLD: NORMAL
SPECIMEN SOURCE: NORMAL
TROPONIN I BLD-MCNC: 0 UG/L (ref 0–0.08)
TSH SERPL DL<=0.005 MIU/L-ACNC: 1.18 UIU/ML (ref 0.3–5)
UROBILINOGEN UR STRIP-MCNC: NORMAL MG/DL (ref 0–2)
WBC # BLD AUTO: 3.7 10E9/L (ref 4–11)
WBC # BLD AUTO: 3.7 10E9/L (ref 4–11)

## 2019-11-17 PROCEDURE — 81003 URINALYSIS AUTO W/O SCOPE: CPT | Performed by: STUDENT IN AN ORGANIZED HEALTH CARE EDUCATION/TRAINING PROGRAM

## 2019-11-17 PROCEDURE — 36415 COLL VENOUS BLD VENIPUNCTURE: CPT | Performed by: EMERGENCY MEDICINE

## 2019-11-17 PROCEDURE — 85610 PROTHROMBIN TIME: CPT | Performed by: EMERGENCY MEDICINE

## 2019-11-17 PROCEDURE — 96365 THER/PROPH/DIAG IV INF INIT: CPT

## 2019-11-17 PROCEDURE — 93005 ELECTROCARDIOGRAM TRACING: CPT

## 2019-11-17 PROCEDURE — 85027 COMPLETE CBC AUTOMATED: CPT | Performed by: EMERGENCY MEDICINE

## 2019-11-17 PROCEDURE — 99291 CRITICAL CARE FIRST HOUR: CPT | Mod: 25 | Performed by: EMERGENCY MEDICINE

## 2019-11-17 PROCEDURE — 87640 STAPH A DNA AMP PROBE: CPT | Performed by: STUDENT IN AN ORGANIZED HEALTH CARE EDUCATION/TRAINING PROGRAM

## 2019-11-17 PROCEDURE — 00000146 ZZHCL STATISTIC GLUCOSE BY METER IP

## 2019-11-17 PROCEDURE — 25000132 ZZH RX MED GY IP 250 OP 250 PS 637: Performed by: STUDENT IN AN ORGANIZED HEALTH CARE EDUCATION/TRAINING PROGRAM

## 2019-11-17 PROCEDURE — 86900 BLOOD TYPING SEROLOGIC ABO: CPT | Performed by: EMERGENCY MEDICINE

## 2019-11-17 PROCEDURE — 25000132 ZZH RX MED GY IP 250 OP 250 PS 637: Performed by: PSYCHIATRY & NEUROLOGY

## 2019-11-17 PROCEDURE — 93010 ELECTROCARDIOGRAM REPORT: CPT | Performed by: INTERNAL MEDICINE

## 2019-11-17 PROCEDURE — 82803 BLOOD GASES ANY COMBINATION: CPT

## 2019-11-17 PROCEDURE — 85730 THROMBOPLASTIN TIME PARTIAL: CPT | Performed by: EMERGENCY MEDICINE

## 2019-11-17 PROCEDURE — 96361 HYDRATE IV INFUSION ADD-ON: CPT

## 2019-11-17 PROCEDURE — 70450 CT HEAD/BRAIN W/O DYE: CPT

## 2019-11-17 PROCEDURE — 20000004 ZZH R&B ICU UMMC

## 2019-11-17 PROCEDURE — 25000128 H RX IP 250 OP 636: Performed by: STUDENT IN AN ORGANIZED HEALTH CARE EDUCATION/TRAINING PROGRAM

## 2019-11-17 PROCEDURE — 87641 MR-STAPH DNA AMP PROBE: CPT | Performed by: STUDENT IN AN ORGANIZED HEALTH CARE EDUCATION/TRAINING PROGRAM

## 2019-11-17 PROCEDURE — 80053 COMPREHEN METABOLIC PANEL: CPT | Performed by: EMERGENCY MEDICINE

## 2019-11-17 PROCEDURE — 84145 PROCALCITONIN (PCT): CPT | Performed by: EMERGENCY MEDICINE

## 2019-11-17 PROCEDURE — 86901 BLOOD TYPING SEROLOGIC RH(D): CPT | Performed by: EMERGENCY MEDICINE

## 2019-11-17 PROCEDURE — 71275 CT ANGIOGRAPHY CHEST: CPT

## 2019-11-17 PROCEDURE — 40000141 ZZH STATISTIC PERIPHERAL IV START W/O US GUIDANCE

## 2019-11-17 PROCEDURE — 86850 RBC ANTIBODY SCREEN: CPT | Performed by: EMERGENCY MEDICINE

## 2019-11-17 PROCEDURE — 83605 ASSAY OF LACTIC ACID: CPT

## 2019-11-17 PROCEDURE — 99285 EMERGENCY DEPT VISIT HI MDM: CPT | Mod: 25

## 2019-11-17 PROCEDURE — 84484 ASSAY OF TROPONIN QUANT: CPT

## 2019-11-17 PROCEDURE — 85025 COMPLETE CBC W/AUTO DIFF WBC: CPT | Performed by: EMERGENCY MEDICINE

## 2019-11-17 PROCEDURE — 87040 BLOOD CULTURE FOR BACTERIA: CPT | Performed by: EMERGENCY MEDICINE

## 2019-11-17 PROCEDURE — 93010 ELECTROCARDIOGRAM REPORT: CPT | Mod: Z6 | Performed by: EMERGENCY MEDICINE

## 2019-11-17 PROCEDURE — 25000128 H RX IP 250 OP 636: Performed by: EMERGENCY MEDICINE

## 2019-11-17 PROCEDURE — 96366 THER/PROPH/DIAG IV INF ADDON: CPT

## 2019-11-17 RX ORDER — LABETALOL 20 MG/4 ML (5 MG/ML) INTRAVENOUS SYRINGE
10 EVERY 10 MIN PRN
Status: DISCONTINUED | OUTPATIENT
Start: 2019-11-17 | End: 2019-11-20 | Stop reason: HOSPADM

## 2019-11-17 RX ORDER — LISINOPRIL 5 MG/1
5 TABLET ORAL DAILY
Status: DISCONTINUED | OUTPATIENT
Start: 2019-11-18 | End: 2019-11-20 | Stop reason: HOSPADM

## 2019-11-17 RX ORDER — LEVETIRACETAM 500 MG/1
1000 TABLET ORAL 2 TIMES DAILY
Status: DISCONTINUED | OUTPATIENT
Start: 2019-11-17 | End: 2019-11-20 | Stop reason: HOSPADM

## 2019-11-17 RX ORDER — LACOSAMIDE 100 MG/1
100 TABLET ORAL 2 TIMES DAILY
Status: DISCONTINUED | OUTPATIENT
Start: 2019-11-17 | End: 2019-11-17

## 2019-11-17 RX ORDER — HEPARIN SODIUM 10000 [USP'U]/100ML
0-3500 INJECTION, SOLUTION INTRAVENOUS CONTINUOUS
Status: DISCONTINUED | OUTPATIENT
Start: 2019-11-17 | End: 2019-11-17

## 2019-11-17 RX ORDER — FOLIC ACID 1 MG/1
1 TABLET ORAL DAILY
Status: DISCONTINUED | OUTPATIENT
Start: 2019-11-18 | End: 2019-11-20 | Stop reason: HOSPADM

## 2019-11-17 RX ORDER — ATENOLOL 50 MG/1
50 TABLET ORAL DAILY
Status: DISCONTINUED | OUTPATIENT
Start: 2019-11-18 | End: 2019-11-17

## 2019-11-17 RX ORDER — DILTIAZEM HYDROCHLORIDE 180 MG/1
180 CAPSULE, EXTENDED RELEASE ORAL DAILY
COMMUNITY

## 2019-11-17 RX ORDER — SERTRALINE HYDROCHLORIDE 25 MG/1
25 TABLET, FILM COATED ORAL DAILY
Status: DISCONTINUED | OUTPATIENT
Start: 2019-11-18 | End: 2019-11-20 | Stop reason: HOSPADM

## 2019-11-17 RX ORDER — ACETAMINOPHEN 325 MG/1
650 TABLET ORAL EVERY 4 HOURS PRN
Status: DISCONTINUED | OUTPATIENT
Start: 2019-11-17 | End: 2019-11-20 | Stop reason: HOSPADM

## 2019-11-17 RX ORDER — IOPAMIDOL 755 MG/ML
71 INJECTION, SOLUTION INTRAVASCULAR ONCE
Status: COMPLETED | OUTPATIENT
Start: 2019-11-17 | End: 2019-11-17

## 2019-11-17 RX ORDER — LEVETIRACETAM 500 MG/1
1000 TABLET ORAL 2 TIMES DAILY
Status: DISCONTINUED | OUTPATIENT
Start: 2019-11-17 | End: 2019-11-17

## 2019-11-17 RX ORDER — POTASSIUM CHLORIDE 7.45 MG/ML
10 INJECTION INTRAVENOUS
Status: DISCONTINUED | OUTPATIENT
Start: 2019-11-17 | End: 2019-11-18

## 2019-11-17 RX ORDER — METHOTREXATE 25 MG/ML
INJECTION, SOLUTION INTRA-ARTERIAL; INTRAMUSCULAR; INTRAVENOUS
COMMUNITY
End: 2020-01-31

## 2019-11-17 RX ORDER — FOLIC ACID 1 MG/1
1 TABLET ORAL DAILY
Status: DISCONTINUED | OUTPATIENT
Start: 2019-11-18 | End: 2019-11-17

## 2019-11-17 RX ORDER — MAGNESIUM SULFATE HEPTAHYDRATE 40 MG/ML
4 INJECTION, SOLUTION INTRAVENOUS EVERY 4 HOURS PRN
Status: DISCONTINUED | OUTPATIENT
Start: 2019-11-17 | End: 2019-11-20 | Stop reason: HOSPADM

## 2019-11-17 RX ORDER — POTASSIUM CHLORIDE 29.8 MG/ML
20 INJECTION INTRAVENOUS
Status: DISCONTINUED | OUTPATIENT
Start: 2019-11-17 | End: 2019-11-18

## 2019-11-17 RX ORDER — HYDRALAZINE HYDROCHLORIDE 20 MG/ML
10-20 INJECTION INTRAMUSCULAR; INTRAVENOUS
Status: DISCONTINUED | OUTPATIENT
Start: 2019-11-17 | End: 2019-11-20 | Stop reason: HOSPADM

## 2019-11-17 RX ORDER — HEPARIN SODIUM 10000 [USP'U]/100ML
0-3500 INJECTION, SOLUTION INTRAVENOUS CONTINUOUS
Status: DISCONTINUED | OUTPATIENT
Start: 2019-11-17 | End: 2019-11-18

## 2019-11-17 RX ORDER — ATENOLOL 50 MG/1
50 TABLET ORAL DAILY
Status: DISCONTINUED | OUTPATIENT
Start: 2019-11-18 | End: 2019-11-20 | Stop reason: HOSPADM

## 2019-11-17 RX ORDER — POTASSIUM CL/LIDO/0.9 % NACL 10MEQ/0.1L
10 INTRAVENOUS SOLUTION, PIGGYBACK (ML) INTRAVENOUS
Status: DISCONTINUED | OUTPATIENT
Start: 2019-11-17 | End: 2019-11-18

## 2019-11-17 RX ORDER — LACOSAMIDE 100 MG/1
100 TABLET ORAL 2 TIMES DAILY
Status: DISCONTINUED | OUTPATIENT
Start: 2019-11-17 | End: 2019-11-20 | Stop reason: HOSPADM

## 2019-11-17 RX ORDER — POTASSIUM CHLORIDE 1.5 G/1.58G
20-40 POWDER, FOR SOLUTION ORAL
Status: DISCONTINUED | OUTPATIENT
Start: 2019-11-17 | End: 2019-11-18

## 2019-11-17 RX ORDER — POTASSIUM CHLORIDE 750 MG/1
20-40 TABLET, EXTENDED RELEASE ORAL
Status: DISCONTINUED | OUTPATIENT
Start: 2019-11-17 | End: 2019-11-18

## 2019-11-17 RX ORDER — DILTIAZEM HYDROCHLORIDE 180 MG/1
180 CAPSULE, EXTENDED RELEASE ORAL DAILY
Status: DISCONTINUED | OUTPATIENT
Start: 2019-11-18 | End: 2019-11-18

## 2019-11-17 RX ORDER — ATENOLOL 100 MG/1
50 TABLET ORAL DAILY
COMMUNITY

## 2019-11-17 RX ORDER — NALOXONE HYDROCHLORIDE 0.4 MG/ML
.1-.4 INJECTION, SOLUTION INTRAMUSCULAR; INTRAVENOUS; SUBCUTANEOUS
Status: DISCONTINUED | OUTPATIENT
Start: 2019-11-17 | End: 2019-11-20 | Stop reason: HOSPADM

## 2019-11-17 RX ORDER — LISINOPRIL 5 MG/1
5 TABLET ORAL DAILY
Status: DISCONTINUED | OUTPATIENT
Start: 2019-11-18 | End: 2019-11-17

## 2019-11-17 RX ADMIN — LEVETIRACETAM 1000 MG: 500 TABLET ORAL at 20:15

## 2019-11-17 RX ADMIN — LACOSAMIDE 100 MG: 100 TABLET, FILM COATED ORAL at 20:15

## 2019-11-17 RX ADMIN — IOPAMIDOL 71 ML: 755 INJECTION, SOLUTION INTRAVENOUS at 15:16

## 2019-11-17 RX ADMIN — HEPARIN SODIUM 1750 UNITS/HR: 10000 INJECTION, SOLUTION INTRAVENOUS at 17:21

## 2019-11-17 RX ADMIN — POTASSIUM CHLORIDE 20 MEQ: 750 TABLET, EXTENDED RELEASE ORAL at 22:12

## 2019-11-17 RX ADMIN — POTASSIUM CHLORIDE 40 MEQ: 750 TABLET, EXTENDED RELEASE ORAL at 20:20

## 2019-11-17 ASSESSMENT — ACTIVITIES OF DAILY LIVING (ADL)
DRESS: 0-->INDEPENDENT
WHICH_OF_THE_ABOVE_FUNCTIONAL_RISKS_HAD_A_RECENT_ONSET_OR_CHANGE?: FALL HISTORY
TOILETING: 0-->INDEPENDENT
NUMBER_OF_TIMES_PATIENT_HAS_FALLEN_WITHIN_LAST_SIX_MONTHS: 1
RETIRED_EATING: 0-->INDEPENDENT
RETIRED_COMMUNICATION: 0-->UNDERSTANDS/COMMUNICATES WITHOUT DIFFICULTY
FALL_HISTORY_WITHIN_LAST_SIX_MONTHS: YES
COGNITION: 1 - ATTENTION OR MEMORY DEFICITS
ADLS_ACUITY_SCORE: 14
BATHING: 0-->INDEPENDENT
SWALLOWING: 0-->SWALLOWS FOODS/LIQUIDS WITHOUT DIFFICULTY

## 2019-11-17 ASSESSMENT — ENCOUNTER SYMPTOMS
DIARRHEA: 0
FATIGUE: 1
CHILLS: 1
NAUSEA: 0
APPETITE CHANGE: 1
ACTIVITY CHANGE: 1
COUGH: 0
SHORTNESS OF BREATH: 1
WEAKNESS: 1
FEVER: 0
VOMITING: 0

## 2019-11-17 ASSESSMENT — MIFFLIN-ST. JEOR: SCORE: 1849.96

## 2019-11-17 NOTE — ED NOTES
[facility], [place]   ED Nurse to Floor Handoff     Jayden Lackey is a 64 year old male who speaks English and lives with a spouse,  in a home  They arrived in the ED by car from home    ED Chief Complaint: X-ray Results    ED Dx;   Final diagnoses:   Acute pulmonary embolism, unspecified pulmonary embolism type, unspecified whether acute cor pulmonale present (H)         Needed?: No    Allergies:   Allergies   Allergen Reactions     Shellfish Allergy Difficulty breathing, Nausea and Vomiting, Swelling and Shortness Of Breath     Ativan [Lorazepam] Other (See Comments)     Hallucinations, delirium     No Clinical Screening - See Comments      Lupine bean doesn't remember reaction     Other (Do Not Use)      Lupine bean doesn't remember reaction   .  Past Medical Hx:   Past Medical History:   Diagnosis Date     Colon polyp      Dyslipidemia      Glioblastoma (H)      Hypertension      Lumbar disc herniation     L4-5     Rheumatoid arthritis (H)       Baseline Mental status: WDL  Current Mental Status changes: at basesline, fatigued.    Infection present or suspected this encounter: no  Sepsis suspected: No  Isolation type: No active isolations     Activity level - Baseline/Home:  Independent  Activity Level - Current:   Stand with Assist    Bariatric equipment needed?: No    In the ED these meds were given:   Medications   iopamidol (ISOVUE-370) solution 71 mL (71 mLs Intravenous Given 11/17/19 1516)   sodium chloride (PF) 0.9% PF flush 99 mL (99 mLs Intravenous Given 11/17/19 1517)       Drips running?  No    Home pump  No    Current LDAs  Peripheral IV 11/17/19 Right;Anterior Upper forearm (Active)   Number of days: 0       Incision/Surgical Site 03/22/19 Left Head (Active)   Number of days: 240       Labs results:   Labs Ordered and Resulted from Time of ED Arrival Up to the Time of Departure from the ED   CBC WITH PLATELETS DIFFERENTIAL - Abnormal; Notable for the  "following components:       Result Value    WBC 3.7 (*)     RBC Count 3.42 (*)     Hemoglobin 9.8 (*)     Hematocrit 30.8 (*)     RDW 18.3 (*)     Platelet Count 101 (*)     Absolute Lymphocytes 0.6 (*)     All other components within normal limits   PARTIAL THROMBOPLASTIN TIME - Abnormal; Notable for the following components:    PTT 21 (*)     All other components within normal limits   COMPREHENSIVE METABOLIC PANEL - Abnormal; Notable for the following components:    Potassium 3.2 (*)     Albumin 2.8 (*)     Protein Total 6.6 (*)     All other components within normal limits   ISTAT  GASES LACTATE DOTTIE POCT - Abnormal; Notable for the following components:    PO2 Venous 16 (*)     Bicarbonate Venous 29 (*)     Lactic Acid 2.3 (*)     All other components within normal limits   INR   PROCALCITONIN   UA MACROSCOPIC WITH REFLEX TO MICRO AND CULTURE   PERIPHERAL IV CATHETER   ISTAT CG4 GASES LACTATE DOTTIE NURSING POCT   ISTAT TROPONIN NURSING POCT   TROPONIN POCT   ABO/RH TYPE AND SCREEN   BLOOD CULTURE   BLOOD CULTURE       Imaging Studies:   Recent Results (from the past 24 hour(s))   CT Chest Pulmonary Embolism w Contrast    Impression    IMPRESSION:   Bilateral lobar and segmental pulmonary emboli, with greatest clot  burden in the left upper and lower lobes. Small left pleural effusion,  left lower lobe groundglass opacities, and groundglass consolidation  in the left lower lobe and left upper lobe may represent changes of  pulmonary infarction.       Recent vital signs:   BP (!) 138/97   Pulse 91   Temp 98  F (36.7  C) (Oral)   Resp 16   Ht 1.88 m (6' 2\")   Wt 98.5 kg (217 lb 3.2 oz)   SpO2 98%   BMI 27.89 kg/m      Kaitlynn Coma Scale Score: 15 (11/17/19 1450)       Cardiac Rhythm: Normal Sinus Afib hx, not currently in Afib  Pt needs tele? Yes  Skin/wound Issues: cranial incision from surgery in October.     Code Status: Full Code    Pain control: good    Nausea control: good    Abnormal labs/tests/findings " requiring intervention: See results    Family present during ED course? Yes   Family Comments/Social Situation comments: Pt very fatigued.     Tasks needing completion: None    Audra Giraldo, RN  [phone] [facility]

## 2019-11-17 NOTE — ED NOTES
Patient received in signout from Dr. Rojas.  Please see his note for additional information.  Patient is admitted to the medicine service with bilateral pulmonary embolism.  Of note, patient is status post recent brain surgery for removal of glioblastoma.  Given this recent brain surgery, consulted neurosurgery to question whether or not patient is safe for heparin.  They recommended obtaining a head CT.    CT shows a left-sided subdural with some mild associated midline shift, which seems to be worse than previous imaging.  This is discussed with the neurosurgical resident and staff.  Despite these findings, they are still comfortable starting the patient on heparin.  We will start the infusion without any bolus.  Will need a repeat CT scan tomorrow morning or when he becomes therapeutic, whichever is sooner.  This change in management was discussed with the admitting hospitalist team.  Given these new changes, will now admit to the neuro ICU so he can receive more frequent neuro checks.  Patient continues to be stable in the emergency department.     Isiah Martinez, DO  11/17/19 1734       Isiah Martinez, DO  11/17/19 1820

## 2019-11-17 NOTE — ED TRIAGE NOTES
Patient presents to triage with c/o abnormal xray results. Patient's wife states patient was seen at after hours clinic for c/o fatigue and SOB, chest xray showed fluid or infection on left side. Patient also reports left-sided chest wall pain. Patient is s/p brain surgery on 10/24/19.

## 2019-11-17 NOTE — LETTER
Transition Communication Hand-off for Care Transitions to Next Level of Care Provider    Name: Jayden Lackey  : 1955  MRN #: [id number]  Primary Care Provider: FARRUKH SOUZA     Primary Clinic: [address] [address]     Reason for Hospitalization:  Acute pulmonary embolism, unspecified pulmonary embolism type, unspecified whether acute cor pulmonale present (H) [I26.99]  Admit Date/Time: 2019 12:48 PM  Discharge Date: 19  Payor Source: Payor: BCBS / Plan: [employer] [place] / Product Type: Indemnity /     Jayden Lackey is a 64 year old with history of afib, glioblastoma of the left occipital lobe (diagnosed 3/2019, status post resection 3/2019 and again on 10/24/19 which was complicated by hygroma, IDH wild-type, MGMT methylated), secondary seizure disorder, admitted to the neuro ICU on 2019 for bilateral PEs, heparin drip and close neurological monitoring.  Head CT on on 19 was stable.  He was trassferred out of neuro ICU to  on 19.    Discharge Plan: Home with resumption of home care services and clinic follow-up as recommended.        Discharge Needs Assessment:  Needs      Most Recent [facility]   Home Care  [facility] [phone], Fax: [phone]          Follow-up plan:    Future Appointments   Date Time Provider Department Center   2019  6:00 AM [facility] PT OVERFLOW [facility] O   2019  7:00 AM [facility] LAB DRAW ONL [facility]   [id number]   2019  9:00 AM Nasra Olmos MD ONA [facility]   2019  2:40 PM [id number] CCT [facility]   2019  3:15 PM Sylvia Tay MD [facility]       Any outstanding tests or procedures:        Referrals     Future Labs/Procedures    Home care nursing referral     Comments:    [facility]  [phone]    Resume previous service.    RN to assess vital signs and weight, respiratory and cardiac status, pain level  and activity tolerance, hydration, nutrition and bowel status and home safety.  RN to teach medication management and lab draws when ordered.                                          Your provider has ordered home care nursing services. If you have not been contacted within 2 days of your discharge please call the inpatient department phone number at [phone] .    Home Care PT Referral for Hospital Discharge     Comments:    PT to eval and treat    Your provider has ordered home care - physical therapy. If you have not been contacted within 2 days of your discharge please call the department phone number listed on the top of this document.    Home Care SLP Referral for Hospital Discharge     Comments:    SLP to eval and treat    Your provider has ordered home care - speech therapy. If you have not been contacted within 2 days of your discharge please call the department phone number listed on the top of this document.          Marjorie Harris RN, BSN, PHN  Care Coordinator   P: [phone], [facility]     AVS/Discharge Summary is the source of truth; this is a helpful guide for improved communication of patient story

## 2019-11-17 NOTE — ED PROVIDER NOTES
[facility]ERG[facility] DE[facility]MENT ([facility])  11/17/19    History     Chief Complaint   Patient presents with     X-ray Results     The history is provided by the patient, the spouse and medical records.     Jayden Lackey is a 64 year old male with a past medical history significant for glioblastoma (diagnosed 3/2019) s/p chemoradiation (completed 6/2019) and s/p craniotomy with tumor resection (10/24/2019) c/b seizures and visual deficits, RA, depression, atrial fibrillation, HTN, HLD, peripheral neuropathy, and anemia who presents here to the Emergency Department due to generalized weakness, fatigue and an X-ray that showed fluid/infection in left lung. Patients wife reports that over the past few weeks the patient has been experiencing generalized weakness and fatigue. He notes that over the past x1 week he has been having left lower rib pain that is worse with different positions. Reports that on occasional patients pain will radiate to his left shoulder. States that deep breaths exacerbates this pain. Patients wife notes he has had more labored breathing recently but she believes that this is 2/2 his generalized weakness and fatigue. Wife does report that the patient has recently stopped dexamethasone a week and a half ago, where he was talking it x3 daily. Patient was also recently started on Zoloft. Wife reports that patient was seen by his PCP a week ago, and that everything checked out at that time. Patient has had problems with word finding that has been oscillating for quite sometime, however, it has increased recently. Patient has had ankle and leg swelling, however, wife reports that this is unchanged. Patient was reported to have bronchitis a few weeks ago that has since resolved. Denies fevers but does endorse chills and a lack of appetite the past few days. Patient is not anticoagulated. Denies nausea, vomiting, diarrhea or leg pain. Patient did recently have a fall where he had lost his  footing and fell backwards onto a door but denies hitting his head. He states he was seen by his PCP at that point.    Patient was seen at [facility] earlier today due to his symptoms. Wife notes that he was unable to make it to [facility] this morning whic made her concerned. Patient had labs and a chest x-ray completed at that time. Wife reports that he had a Hgb of 10.5. Wife also reports that the chest x-ray done showed fluid/infection in his left lung and was told to present here to the ED to be evaluated. Was also found to have a lower BP than normal.    I have reviewed the Medications, Allergies, Past Medical and Surgical History, and Social History in the [employer] system.    Past Medical History:   Diagnosis Date     Colon polyp      Dyslipidemia      Glioblastoma (H)      Hypertension      Lumbar disc herniation     L4-5     Rheumatoid arthritis (H)        Past Surgical History:   Procedure Laterality Date     ANAL SPHINCTEROTOMY       BACK SURGERY  2009    L4-5     HERNIA REPAIR  1974     OPTICAL TRACKING SYSTEM CRANIOTOMY, EXCISE TUMOR, COMBINED Left 3/22/2019    Procedure: Left Stealth Assisted Craniotomy And Tumor Resection;  Surgeon: Irving Hayes MD;  Location: [facility] OR     OPTICAL TRACKING SYSTEM CRANIOTOMY, EXCISE TUMOR, COMBINED Left 10/24/2019    Procedure: LEFT CRANIOTOMY, USING OPTICAL TRACKING SYSTEM, WITH NEOPLASM EXCISION;  Surgeon: Nicho Packer MD;  Location: [facility] OR     SEPTOPLASTY       VASECTOMY         Family History   Problem Relation Age of Onset     Macular Degeneration Mother      Diabetes Mother      Heart Failure Father      Alcoholism Father      Diabetes Sister      Heart Disease Maternal Grandfather      Pancreatic Cancer Maternal Grandmother      Rheumatoid Arthritis Sister      Other - See Comments Sister         glioma     Other - See Comments Sister         maculofacial digital syndrome     Kidney Disease Sister         s/p transplant     Glaucoma No family hx of         Social History     Tobacco Use     Smoking status: Former Smoker     Packs/day: 1.00     Years: 3.00     Pack years: 3.00     Types: Cigarettes     Start date: 1974     Last attempt to quit: 1977     Years since quittin.4     Smokeless tobacco: Never Used   Substance Use Topics     Alcohol use: Not Currently       No current facility-administered medications for this encounter.      Current Outpatient Medications   Medication     atenolol (TENORMIN) 50 MG tablet     diltiazem ER COATED BEADS (CARDIZEM CD/CARTIA XT) 120 MG 24 hr capsule     folic acid (FOLVITE) 1 MG tablet     levETIRAcetam (KEPPRA) 1000 MG tablet     lisinopril (PRINIVIL/ZESTRIL) 5 MG tablet     methotrexate sodium, pres-free, 50 MG/2ML SOLN injection     sertraline (ZOLOFT) 25 MG tablet     acetaminophen (TYLENOL) 325 MG tablet     docusate sodium (COLACE) 100 MG capsule     fish oil-omega-3 fatty acids 1000 MG capsule     Lacosamide (VIMPAT) 100 MG TABS tablet     naproxen (NAPROSYN DR) 500 MG EC tablet     Nutritional Supplements (JUICE PLUS FIBRE PO)     oxyCODONE (ROXICODONE) 5 MG tablet        Allergies   Allergen Reactions     Shellfish Allergy Difficulty breathing, Nausea and Vomiting, Swelling and Shortness Of Breath     Ativan [Lorazepam] Other (See Comments)     Hallucinations, delirium     No Clinical Screening - See Comments      Lupine bean doesn't remember reaction     Other (Do Not Use)      Lupine bean doesn't remember reaction         Review of Systems   Constitutional: Positive for activity change, appetite change, chills and fatigue. Negative for fever.   Respiratory: Positive for shortness of breath. Negative for cough.    Cardiovascular: Positive for chest pain (Left sided) and leg swelling (Unchanged).   Gastrointestinal: Negative for diarrhea, nausea and vomiting.   Musculoskeletal:        Negative for leg pain   Neurological: Positive for weakness (Generalized).       Physical Exam   BP: 131/82  Pulse:  "87  Temp: 98  F (36.7  C)  Resp: 16  Height: 188 cm (6' 2\")  Weight: 98.5 kg (217 lb 3.2 oz)(scale)  SpO2: 98 %      Physical Exam  Vitals signs and nursing note reviewed.   Constitutional:       General: He is not in acute distress.     Appearance: Normal appearance. He is not diaphoretic.   HENT:      Head: Atraumatic.   Eyes:      General: No scleral icterus.     Pupils: Pupils are equal, round, and reactive to light.   Cardiovascular:      Heart sounds: Normal heart sounds.   Pulmonary:      Effort: No respiratory distress.      Breath sounds: Normal breath sounds.   Abdominal:      General: Bowel sounds are normal.      Palpations: Abdomen is soft.      Tenderness: There is no abdominal tenderness.   Musculoskeletal:         General: No tenderness.   Skin:     General: Skin is warm.      Findings: No rash.   Neurological:      General: No focal deficit present.      Mental Status: He is alert and oriented to person, place, and time.      Cranial Nerves: No cranial nerve deficit.      Sensory: No sensory deficit.      Motor: No weakness.         ED Course   12:56 PM  The patient was seen and examined by Bea Rojas MD in Room [facility].        Procedures             EKG Interpretation:      Interpreted by Bea Rojas MD  Time reviewed: perEpic  Symptoms at time of EKG: None   Rhythm: atrial fibrillation - controlled  Rate: Normal  Axis: Normal  Ectopy: none  Conduction: normal  ST Segments/ T Waves: Non-specific ST-T wave changes  Q Waves: none  Comparison to prior: Unchanged    Clinical Impression: atrial fibrillation (chronic)                Critical Care time:  was 40 minutes for this patient excluding procedures.             Labs Ordered and Resulted from Time of ED Arrival Up to the Time of Departure from the ED - No data to display         Assessments & Plan (with Medical Decision Making)     64 year old male with a past medical history significant for glioblastoma (diagnosed 3/2019) s/p chemoradiation " (completed 6/2019) and s/p craniotomy with tumor resection (10/24/2019) c/b seizures and visual deficits, RA, depression, atrial fibrillation, HTN, HLD, peripheral neuropathy, and anemia who presents here to the Emergency Department due to generalized weakness, fatigue and an X-ray that showed LLL opacity vs effusion.  IV established, labs drawn sent reviewed document in epic remarkable for a potassium 3.2, lactic acid 2.3, but WBC of 3.7.  Patient sent to CT for imaging of the chest which revealed bilateral PEs and lobar and segmental distribution with largest clot burden in the left upper lobe, no evidence of right heart strain per CT.  Findings discussed with neurosurgery consult who evaluated patient at the bedside, will hold anticoagulation pending CT head and completion of neurosurgical consult, admission to neurosurgery deferred at this time.  Bed request ordered for inpatient hospitalization under medicine service.    I have reviewed the nursing notes.    I have reviewed the findings, diagnosis, plan and need for follow up with the patient.    New Prescriptions    No medications on file       Final diagnoses:   Acute pulmonary embolism, unspecified pulmonary embolism type, unspecified whether acute cor pulmonale present (H)     IThang, am serving as a trained medical scribe to document services personally performed by Bea Rojas MD, based on the provider's statements to me.   Bea GONZALEZ MD, was physically present and have reviewed and verified the accuracy of this note documented by Thang Chahal.    11/17/2019   [facility], EMERGENCY DEPARTMENT     Bea Rojas MD  11/20/19 [id number]

## 2019-11-17 NOTE — CONSULTS
[facility]       NEUROSURGERY CONSULTATION.    This consultation was requested by Dr. Rojas from the ED service.    Reason for Consultation: Enrolled in study for GBM, increasing fatigue    HPI:  64-year-old male with a history of hyperlipidemia, hypertension, atrial fibrillation, depression, rheumatoid arthritis and status post GBM resection on October 24, 2019 complicated by seizures and visual deficit presents to the emergency department for generalized fatigue.  The patient's wife reports, that since he has been off the Decadron he has become more fatigued.  He also had a recent fall last Wednesday, however, he did not hit his head or lose his consciousness.  The patient reports, that the fall was due to his legs giving in.  He did not have any dizziness, loss of consciousness, or shaking or jerking movements of his extremities or his body.  He has a clinic appointment scheduled next week in neurosurgery clinic.  He denies any new change in his vision, weakness, jerking movements of his extremities, however, the patient reports chills and loss of appetite.  He was seen at a outside hospital earlier today, which reportedly saw potential fluid accumulation in his pleural pleural space, with concerns for pleural effusion.    No recent fevers, nausea, vomiting, denies headaches, LOC, numbness/weakness/paresthesias in extremities, changes in sensation, taste, smell, nor trouble speaking or other neurologic symptoms.    PAST MEDICAL HISTORY:   Past Medical History:   Diagnosis Date     Colon polyp      Dyslipidemia      Glioblastoma (H)      Hypertension      Lumbar disc herniation     L4-5     Rheumatoid arthritis (H)        PAST SURGICAL HISTORY:   Past Surgical History:   Procedure Laterality Date     ANAL SPHINCTEROTOMY       BACK SURGERY  2009    L4-5     HERNIA REPAIR  1974     OPTICAL TRACKING SYSTEM CRANIOTOMY, EXCISE TUMOR, COMBINED Left 3/22/2019    Procedure:  Left Stealth Assisted Craniotomy And Tumor Resection;  Surgeon: Irving Hayes MD;  Location: [facility] OR     OPTICAL TRACKING SYSTEM CRANIOTOMY, EXCISE TUMOR, COMBINED Left 10/24/2019    Procedure: LEFT CRANIOTOMY, USING OPTICAL TRACKING SYSTEM, WITH NEOPLASM EXCISION;  Surgeon: Nicho Packer MD;  Location: [facility] OR     SEPTOPLASTY       VASECTOMY         FAMILY HISTORY:   Family History   Problem Relation Age of Onset     Macular Degeneration Mother      Diabetes Mother      Heart Failure Father      Alcoholism Father      Diabetes Sister      Heart Disease Maternal Grandfather      Pancreatic Cancer Maternal Grandmother      Rheumatoid Arthritis Sister      Other - See Comments Sister         glioma     Other - See Comments Sister         maculofacial digital syndrome     Kidney Disease Sister         s/p transplant     Glaucoma No family hx of        SOCIAL HISTORY:   Social History     Tobacco Use     Smoking status: Former Smoker     Packs/day: 1.00     Years: 3.00     Pack years: 3.00     Types: Cigarettes     Start date: 1974     Last attempt to quit: 1977     Years since quittin.4     Smokeless tobacco: Never Used   Substance Use Topics     Alcohol use: Not Currently       MEDICATIONS:  Current Outpatient Medications   Medication Sig Dispense Refill     atenolol (TENORMIN) 50 MG tablet Take 50 mg by mouth daily        diltiazem ER COATED BEADS (CARDIZEM CD/CARTIA XT) 120 MG 24 hr capsule Take 180 mg by mouth daily        folic acid (FOLVITE) 1 MG tablet Take 1 mg by mouth daily        levETIRAcetam (KEPPRA) 1000 MG tablet TK 1 T PO BID  1     lisinopril (PRINIVIL/ZESTRIL) 5 MG tablet Take 1 tablet (5 mg) by mouth daily 28 tablet 0     methotrexate sodium, pres-free, 50 MG/2ML SOLN injection Inject 6 mLs (150 mg) Subcutaneous every 7 days  0     sertraline (ZOLOFT) 25 MG tablet Take 1 tablet (25 mg) by mouth daily 30 tablet 3     acetaminophen (TYLENOL) 325 MG tablet Take 2 tablets (650  "mg) by mouth every 4 hours as needed for other (multimodal surgical pain management along with NSAIDS and opioid medication as indicated based on pain control and physical function.) (Patient not taking: Reported on 11/7/2019) 28 tablet 3     docusate sodium (COLACE) 100 MG capsule Take 100 mg by mouth       fish oil-omega-3 fatty acids 1000 MG capsule Take 1 g by mouth daily Unknown dose        Lacosamide (VIMPAT) 100 MG TABS tablet Take 100 mg by mouth 2 times daily        naproxen (NAPROSYN DR) 500 MG EC tablet Take 1,000 mg by mouth as needed        Nutritional Supplements (JUICE PLUS FIBRE PO) Take 1 tablet by mouth every morning        oxyCODONE (ROXICODONE) 5 MG tablet Take 1-2 tablets (5-10 mg) by mouth every 4 hours as needed (Patient not taking: Reported on 11/7/2019) 28 tablet 0       Allergies:  Allergies   Allergen Reactions     Shellfish Allergy Difficulty breathing, Nausea and Vomiting, Swelling and Shortness Of Breath     Ativan [Lorazepam] Other (See Comments)     Hallucinations, delirium     No Clinical Screening - See Comments      Lupine bean doesn't remember reaction     Other (Do Not Use)      Lupine bean doesn't remember reaction       ROS: 10 point ROS of systems including Constitutional, Eyes, Respiratory, Cardiovascular, Gastroenterology, Genitourinary, Integumentary, Muscularskeletal, Psychiatric were all negative except for pertinent positives noted in my HPI.    Physical exam:   Blood pressure 131/82, pulse 87, temperature 98  F (36.7  C), temperature source Oral, resp. rate 16, height 1.88 m (6' 2\"), weight 98.5 kg (217 lb 3.2 oz), SpO2 98 %.  General: awake and alert  HEENT: normocephalic, atraumatic  PULM: breathing comfortably on room air*  NEUROLOGIC:  -- Awake; Alert; oriented x 3  -- Follows commands briskly  -- +repetition, calculation, and naming  -- Speech fluent, spontaneous. No aphasia or dysarthria.  -- no gaze preference. No apparent hemineglect.  Cranial Nerves:  -- visual " fields with right homonymous hemianopsia (known), PERRL 3-2mm bilat and brisk, extraocular movements intact  -- face symmetrical, tongue midline  -- sensory V1-V3 intact bilaterally  -- palate elevates symmetrically, uvula midline  -- hearing grossly intact bilat  -- Trapezii 5/5 strength bilat symmetric  -- Cerebellar: Finger nose finger without dysmetria  INCISION: Dry, intact, no erythema, no discharge    Motor:  Normal bulk / tone; no tremor, rigidity, or bradykinesia.  No muscle wasting or fasciculations  No Pronator Drift     Delt Bi Tri Hand Flexion/  Extension Iliopsoas Quadriceps Hamstrings Tibialis Anterior Gastroc    C5 C6 C7 C8/T1 L2 L3 L4-S1 L4 S1   R 5 5 5 5 5 5 5 5 5   L 5 5 5 5 5 5 5 5 5   Sensory:  intact to LT x 4 extremities       Reflexes:       Bi Tri BR Mariusz Pat Ach     C5-6 C7-8 C6 UMN L2-4 S1   R 2+ 2+ 2+ Norm 2+ 2+   L 2+ 2+ 2+ Norm 2+ 2+      Gait: deferred      IMAGING:  CT pulmonary 11/17/2019:   Bilateral lobar and segmental pulmonary emboli, with greatest clot  burden in the left upper and lower lobes. Small left pleural effusion,  left lower lobe groundglass opacities, and groundglass consolidation  in the left lower lobe and left upper lobe may represent changes of  pulmonary infarction.      LABS:   Lab Results   Component Value Date    WBC 3.7 11/17/2019     Lab Results   Component Value Date    RBC 3.42 11/17/2019     Lab Results   Component Value Date    HGB 9.8 11/17/2019     Lab Results   Component Value Date    HCT 30.8 11/17/2019     Lab Results   Component Value Date    MCV 90 11/17/2019     Lab Results   Component Value Date    MCH 28.7 11/17/2019     Lab Results   Component Value Date    MCHC 31.8 11/17/2019     Lab Results   Component Value Date    RDW 18.3 11/17/2019     Lab Results   Component Value Date     11/17/2019     Last Comprehensive Metabolic Panel:  Sodium   Date Value Ref Range Status   11/17/2019 138 133 - 144 mmol/L Final     Potassium   Date Value  Ref Range Status   11/17/2019 3.2 (L) 3.4 - 5.3 mmol/L Final     Chloride   Date Value Ref Range Status   11/17/2019 104 94 - 109 mmol/L Final     Carbon Dioxide   Date Value Ref Range Status   11/17/2019 28 20 - 32 mmol/L Final     Anion Gap   Date Value Ref Range Status   11/17/2019 6 3 - 14 mmol/L Final     Glucose   Date Value Ref Range Status   11/17/2019 94 70 - 99 mg/dL Final     Urea Nitrogen   Date Value Ref Range Status   11/17/2019 7 7 - 30 mg/dL Final     Creatinine   Date Value Ref Range Status   11/17/2019 0.81 0.66 - 1.25 mg/dL Final     GFR Estimate   Date Value Ref Range Status   11/17/2019 >90 >60 mL/min/[1.73_m2] Final     Comment:     Non  GFR Calc  Starting 12/18/2018, serum creatinine based estimated GFR (eGFR) will be   calculated using the Chronic Kidney Disease Epidemiology Collaboration   (CKD-EPI) equation.       Calcium   Date Value Ref Range Status   11/17/2019 8.6 8.5 - 10.1 mg/dL Final         ASSESSMENT:  64 years old male status post GBM resection on October 24, 2019 presents neurologically intact with increased fatigue and bilateral pulmonary embolism on CT scan.      RECOMMENDATIONS:  - No neurosurgical intervention indicated at this time   - Obtain head CT w/o contrast before starting therapeutic anticoagulation; if head CT stable and without signs of bleeding, ok to start anticoagulation without loading dose  - Admit to medicine for pulmonary embolism treatment  - Repeat head CT w/o contrast when therapeutic anticoagulation level  - Platelets > 100,000  - INR < 1.5  - Hemoglobin > 8      Manuel Roth MD  Neurosurgery Resident, PGY-1    The patient was discussed with Dr. Solares, neurosurgery chief resident, and Dr. Weston, neurosurgery staff.

## 2019-11-18 ENCOUNTER — APPOINTMENT (OUTPATIENT)
Dept: CARDIOLOGY | Facility: CLINIC | Age: 64
End: 2019-11-18
Attending: STUDENT IN AN ORGANIZED HEALTH CARE EDUCATION/TRAINING PROGRAM
Payer: COMMERCIAL

## 2019-11-18 ENCOUNTER — APPOINTMENT (OUTPATIENT)
Dept: PHYSICAL THERAPY | Facility: CLINIC | Age: 64
End: 2019-11-18
Attending: STUDENT IN AN ORGANIZED HEALTH CARE EDUCATION/TRAINING PROGRAM
Payer: COMMERCIAL

## 2019-11-18 LAB
ANION GAP SERPL CALCULATED.3IONS-SCNC: 8 MMOL/L (ref 3–14)
BUN SERPL-MCNC: 6 MG/DL (ref 7–30)
CALCIUM SERPL-MCNC: 8.4 MG/DL (ref 8.5–10.1)
CHLORIDE SERPL-SCNC: 111 MMOL/L (ref 94–109)
CO2 SERPL-SCNC: 24 MMOL/L (ref 20–32)
CREAT SERPL-MCNC: 0.66 MG/DL (ref 0.66–1.25)
ERYTHROCYTE [DISTWIDTH] IN BLOOD BY AUTOMATED COUNT: 18 % (ref 10–15)
GFR SERPL CREATININE-BSD FRML MDRD: >90 ML/MIN/{1.73_M2}
GLUCOSE BLDC GLUCOMTR-MCNC: 93 MG/DL (ref 70–99)
GLUCOSE SERPL-MCNC: 105 MG/DL (ref 70–99)
HCT VFR BLD AUTO: 27 % (ref 40–53)
HGB BLD-MCNC: 8.9 G/DL (ref 13.3–17.7)
INTERPRETATION ECG - MUSE: NORMAL
LMWH PPP CHRO-ACNC: 0.14 IU/ML
LMWH PPP CHRO-ACNC: 0.19 IU/ML
LMWH PPP CHRO-ACNC: 0.24 IU/ML
LMWH PPP CHRO-ACNC: <0.1 IU/ML
MAGNESIUM SERPL-MCNC: 2.1 MG/DL (ref 1.6–2.3)
MCH RBC QN AUTO: 28.7 PG (ref 26.5–33)
MCHC RBC AUTO-ENTMCNC: 33 G/DL (ref 31.5–36.5)
MCV RBC AUTO: 87 FL (ref 78–100)
PLATELET # BLD AUTO: 103 10E9/L (ref 150–450)
POTASSIUM SERPL-SCNC: 3.6 MMOL/L (ref 3.4–5.3)
RBC # BLD AUTO: 3.1 10E12/L (ref 4.4–5.9)
SODIUM SERPL-SCNC: 142 MMOL/L (ref 133–144)
WBC # BLD AUTO: 3.2 10E9/L (ref 4–11)

## 2019-11-18 PROCEDURE — 00000146 ZZHCL STATISTIC GLUCOSE BY METER IP

## 2019-11-18 PROCEDURE — 85520 HEPARIN ASSAY: CPT | Performed by: PSYCHIATRY & NEUROLOGY

## 2019-11-18 PROCEDURE — 25000128 H RX IP 250 OP 636: Performed by: STUDENT IN AN ORGANIZED HEALTH CARE EDUCATION/TRAINING PROGRAM

## 2019-11-18 PROCEDURE — 40000894 ZZH STATISTIC OT IP EVAL DEFER: Performed by: OCCUPATIONAL THERAPIST

## 2019-11-18 PROCEDURE — 25000132 ZZH RX MED GY IP 250 OP 250 PS 637: Performed by: STUDENT IN AN ORGANIZED HEALTH CARE EDUCATION/TRAINING PROGRAM

## 2019-11-18 PROCEDURE — 85520 HEPARIN ASSAY: CPT | Performed by: STUDENT IN AN ORGANIZED HEALTH CARE EDUCATION/TRAINING PROGRAM

## 2019-11-18 PROCEDURE — 85027 COMPLETE CBC AUTOMATED: CPT | Performed by: PSYCHIATRY & NEUROLOGY

## 2019-11-18 PROCEDURE — 93306 TTE W/DOPPLER COMPLETE: CPT | Mod: 26 | Performed by: INTERNAL MEDICINE

## 2019-11-18 PROCEDURE — 97116 GAIT TRAINING THERAPY: CPT | Mod: GP

## 2019-11-18 PROCEDURE — 97161 PT EVAL LOW COMPLEX 20 MIN: CPT | Mod: GP

## 2019-11-18 PROCEDURE — 80048 BASIC METABOLIC PNL TOTAL CA: CPT | Performed by: PSYCHIATRY & NEUROLOGY

## 2019-11-18 PROCEDURE — 25500064 ZZH RX 255 OP 636: Performed by: PSYCHIATRY & NEUROLOGY

## 2019-11-18 PROCEDURE — 20000004 ZZH R&B ICU UMMC

## 2019-11-18 PROCEDURE — 83735 ASSAY OF MAGNESIUM: CPT | Performed by: PSYCHIATRY & NEUROLOGY

## 2019-11-18 PROCEDURE — 97530 THERAPEUTIC ACTIVITIES: CPT | Mod: GP

## 2019-11-18 PROCEDURE — 36415 COLL VENOUS BLD VENIPUNCTURE: CPT | Performed by: PSYCHIATRY & NEUROLOGY

## 2019-11-18 PROCEDURE — 25000132 ZZH RX MED GY IP 250 OP 250 PS 637: Performed by: PSYCHIATRY & NEUROLOGY

## 2019-11-18 PROCEDURE — 36415 COLL VENOUS BLD VENIPUNCTURE: CPT | Performed by: STUDENT IN AN ORGANIZED HEALTH CARE EDUCATION/TRAINING PROGRAM

## 2019-11-18 PROCEDURE — 40000264 ECHOCARDIOGRAM COMPLETE

## 2019-11-18 RX ORDER — POTASSIUM CHLORIDE 1.5 G/1.58G
20-40 POWDER, FOR SOLUTION ORAL
Status: DISCONTINUED | OUTPATIENT
Start: 2019-11-18 | End: 2019-11-20 | Stop reason: HOSPADM

## 2019-11-18 RX ORDER — POTASSIUM CL/LIDO/0.9 % NACL 10MEQ/0.1L
10 INTRAVENOUS SOLUTION, PIGGYBACK (ML) INTRAVENOUS
Status: DISCONTINUED | OUTPATIENT
Start: 2019-11-18 | End: 2019-11-20 | Stop reason: HOSPADM

## 2019-11-18 RX ORDER — DILTIAZEM HYDROCHLORIDE 180 MG/1
180 CAPSULE, COATED, EXTENDED RELEASE ORAL DAILY
Status: DISCONTINUED | OUTPATIENT
Start: 2019-11-19 | End: 2019-11-20 | Stop reason: HOSPADM

## 2019-11-18 RX ORDER — POTASSIUM CHLORIDE 7.45 MG/ML
10 INJECTION INTRAVENOUS
Status: DISCONTINUED | OUTPATIENT
Start: 2019-11-18 | End: 2019-11-20 | Stop reason: HOSPADM

## 2019-11-18 RX ORDER — AMOXICILLIN 250 MG
1 CAPSULE ORAL 2 TIMES DAILY PRN
Status: DISCONTINUED | OUTPATIENT
Start: 2019-11-18 | End: 2019-11-20 | Stop reason: HOSPADM

## 2019-11-18 RX ORDER — BISACODYL 10 MG
10 SUPPOSITORY, RECTAL RECTAL DAILY PRN
Status: DISCONTINUED | OUTPATIENT
Start: 2019-11-18 | End: 2019-11-20 | Stop reason: HOSPADM

## 2019-11-18 RX ORDER — DILTIAZEM HYDROCHLORIDE 180 MG/1
180 CAPSULE, COATED, EXTENDED RELEASE ORAL ONCE
Status: COMPLETED | OUTPATIENT
Start: 2019-11-18 | End: 2019-11-18

## 2019-11-18 RX ORDER — MAGNESIUM SULFATE HEPTAHYDRATE 40 MG/ML
4 INJECTION, SOLUTION INTRAVENOUS EVERY 4 HOURS PRN
Status: DISCONTINUED | OUTPATIENT
Start: 2019-11-18 | End: 2019-11-20 | Stop reason: HOSPADM

## 2019-11-18 RX ORDER — HEPARIN SODIUM 10000 [USP'U]/100ML
0-3500 INJECTION, SOLUTION INTRAVENOUS CONTINUOUS
Status: DISCONTINUED | OUTPATIENT
Start: 2019-11-18 | End: 2019-11-20

## 2019-11-18 RX ORDER — POTASSIUM CHLORIDE 750 MG/1
20-40 TABLET, EXTENDED RELEASE ORAL
Status: DISCONTINUED | OUTPATIENT
Start: 2019-11-18 | End: 2019-11-20 | Stop reason: HOSPADM

## 2019-11-18 RX ORDER — POTASSIUM CHLORIDE 29.8 MG/ML
20 INJECTION INTRAVENOUS
Status: DISCONTINUED | OUTPATIENT
Start: 2019-11-18 | End: 2019-11-20 | Stop reason: HOSPADM

## 2019-11-18 RX ORDER — METOPROLOL TARTRATE 1 MG/ML
5 INJECTION, SOLUTION INTRAVENOUS
Status: DISCONTINUED | OUTPATIENT
Start: 2019-11-18 | End: 2019-11-20 | Stop reason: HOSPADM

## 2019-11-18 RX ORDER — POLYETHYLENE GLYCOL 3350 17 G/17G
17 POWDER, FOR SOLUTION ORAL DAILY PRN
Status: DISCONTINUED | OUTPATIENT
Start: 2019-11-18 | End: 2019-11-20 | Stop reason: HOSPADM

## 2019-11-18 RX ORDER — AMOXICILLIN 250 MG
2 CAPSULE ORAL 2 TIMES DAILY PRN
Status: DISCONTINUED | OUTPATIENT
Start: 2019-11-18 | End: 2019-11-20 | Stop reason: HOSPADM

## 2019-11-18 RX ADMIN — LISINOPRIL 5 MG: 5 TABLET ORAL at 07:57

## 2019-11-18 RX ADMIN — LEVETIRACETAM 1000 MG: 500 TABLET ORAL at 20:16

## 2019-11-18 RX ADMIN — DILTIAZEM HYDROCHLORIDE 180 MG: 180 CAPSULE, COATED, EXTENDED RELEASE ORAL at 05:36

## 2019-11-18 RX ADMIN — HUMAN ALBUMIN MICROSPHERES AND PERFLUTREN 5 ML: 10; .22 INJECTION, SOLUTION INTRAVENOUS at 07:30

## 2019-11-18 RX ADMIN — FOLIC ACID 1 MG: 1 TABLET ORAL at 07:57

## 2019-11-18 RX ADMIN — LACOSAMIDE 100 MG: 100 TABLET, FILM COATED ORAL at 20:16

## 2019-11-18 RX ADMIN — ATENOLOL 50 MG: 50 TABLET ORAL at 07:57

## 2019-11-18 RX ADMIN — POLYETHYLENE GLYCOL 3350 17 G: 17 POWDER, FOR SOLUTION ORAL at 19:01

## 2019-11-18 RX ADMIN — HEPARIN SODIUM 1650 UNITS/HR: 10000 INJECTION, SOLUTION INTRAVENOUS at 09:52

## 2019-11-18 RX ADMIN — LACOSAMIDE 100 MG: 100 TABLET, FILM COATED ORAL at 07:57

## 2019-11-18 RX ADMIN — LEVETIRACETAM 1000 MG: 500 TABLET ORAL at 07:57

## 2019-11-18 RX ADMIN — SERTRALINE HYDROCHLORIDE 25 MG: 25 TABLET ORAL at 07:57

## 2019-11-18 ASSESSMENT — ACTIVITIES OF DAILY LIVING (ADL)
ADLS_ACUITY_SCORE: 15
ADLS_ACUITY_SCORE: 14
ADLS_ACUITY_SCORE: 16
ADLS_ACUITY_SCORE: 14
ADLS_ACUITY_SCORE: 14
ADLS_ACUITY_SCORE: 16

## 2019-11-18 NOTE — PLAN OF CARE
4A - PT evaluation completed and treatment initiated.   Discharge Planner PT   Patient plan for discharge: home with family, home PT  Current status: Pt ambulated >500' progressing from CGA > SBA. Pt ambulated with stiff wide gait and path deviation towards left(due to visual deficit). Completed balance challenges without increased LOB including head turns, change in speed, start/stop, stepping over obstacles. Demonstrates higher level balance deficits as pt fatigues and with distraction.  Barriers to return to prior living situation: medical status  Recommendations for discharge: home with home PT  Rationale for recommendations: Pt would benefit from continued focus on strength, balance and balance challenges with gait to improve functional mobility and safety.        Entered by: Sheryl Bustillos 11/18/2019 2:42 PM

## 2019-11-18 NOTE — PLAN OF CARE
Shift: [id number]     Neuro: A&Ox4. Patient with occasional word finding difficulty. PERRLA. R field cut- baseline   Cardiac: Continues in A-fib. Occasionally w/ RVR  Respiratory: Lung sounds clear. Patient breathing on room air  GI/: Voiding spontaneously in urinal-adequate urine output. Reporting constipation; PRN miralax given.   Diet/Appetite: Regular diet. Good appetite eating majority of meal trays  Skin: Craniotomy incision CDI with scabbing  LDA: Infusing heparin @ 1750 units/hr. Recheck 10A scheduled for 2000.  Activity: Up w/ assist x1. Ambulating in halls x2 w/ SBA  Pain: Denies     See flowsheets for detailed assessment information.  Will continue to monitor and follow POC

## 2019-11-18 NOTE — PROGRESS NOTES
"SPIRITUAL HEALTH SERVICES  SPIRITUAL ASSESSMENT Progress Note  [facility] ([place] 4A     PRIMARY FOCUS:     Goals of care    Emotional/spiritual/Congregational distress    Support for coping    REFERRAL SOURCE: Hospital  request    ILLNESS CIRCUMSTANCES:   Reviewed documentation. Reflective conversation shared with Jayden which integrated elements of illness and family narratives.     Context of Serious Illness/Symptom(s) - Brain tumor    Resources for Support - Older sister and wife Oz    DISTRESS:     Emotional/Spiritual/Existential Distress - Oz mentioned that Jayden is on medications for depression now.    Protestant Distress - Pt Jayden shared that at times he might say,. \"Why me?\"    Social/Cultural/Economic Distress - no apparent distress    SPIRITUAL/Druze COPING:     Pentecostal/Nupur - Adventist    Spiritual Practice(s) - Mormonism/Prayer    Emotional/Relational/Existential Connections - Pt Jayden says that, \"If it was not for my nupur, I don't know how I would get through this\".     GOALS OF CARE:    Goals of Care - Restoration/healing. Jayden also shared, \"I do not want a prolonged, lengthy ending\".    Meaning/Sense-Making - Jayden shared that these hospitalizations have brought so many people around him in his support/care.    PLAN: I will follow up with Jayden in the coming days. Spiritual Health is available to Jayden per request.     Anup Contreras  Chaplain Resident  Pager [phone]    "

## 2019-11-18 NOTE — PROGRESS NOTES
11/18/19 1100   Quick Adds   Type of Visit Initial PT Evaluation   Living Environment   Lives With spouse   Living Arrangements house   Home Accessibility stairs to enter home;stairs within home   Number of Stairs, Main Entrance 3   Stair Railings, Main Entrance   (grab bar in garage, entry currently using)   Number of Stairs, Within Home, Primary other (see comments)  (8-9 with 2 landings)   Stair Railings, Within Home, Primary railing on left side (ascending)   Transportation Anticipated family or friend will provide   Living Environment Comment Pt lives with spouse, has a lower level that he uses but does not have to access for needs. Has a walk-in shower with grab bars, wife is planning to get a shower chair.    Self-Care   Usual Activity Tolerance moderate   Current Activity Tolerance moderate   Regular Exercise No   Activity/Exercise/Self-Care Comment Has started home PT, planning to continue coming out to the house   Functional Level Prior   Ambulation 0-->independent   Transferring 0-->independent   Toileting 0-->independent   Bathing 1-->assistive equipment  (grab bars and shower chair coming)   Communication 0-->understands/communicates without difficulty   Swallowing 0-->swallows foods/liquids without difficulty   Cognition 1 - attention or memory deficits   Fall history within last six months yes   Number of times patient has fallen within last six months 1   Which of the above functional risks had a recent onset or change? ambulation;transferring;fall history   Prior Functional Level Comment Pt was previously IND, has been doing home therapy for strength/balance since his craniotomy   General Information   Onset of Illness/Injury or Date of Surgery - Date 11/17/19   Referring Physician Juanita Guzmán MD   Patient/Family Goals Statement to get home, have home PT   Pertinent History of Current Problem (include personal factors and/or comorbidities that impact the POC) 64-year-old man with  history significant for glioblastoma status post resection, complicated by seizure disorder as well as hygroma who presents with shortness of breath and bilateral pulmonary emboli.  There is a risk of increasing size of hygroma versus subdural hematoma while on the heparin drip, for this reason he will be admitted to the neuro ICU for frequent neuro checks. From a respiratory status he appears very stable and does not require any supplemental oxygen while at rest. The plan will be to continue this heparin drip to prevent the enlargement of pulmonary emboli and maintain frequent monitoring   Precautions/Limitations fall precautions   Heart Disease Risk Factors High blood pressure;Dislipidemia;Overweight;Gender;Age   Cognitive Status Examination   Orientation orientation to person, place and time  (not oriented to exact hospital or date)   Level of Consciousness alert   Follows Commands and Answers Questions 100% of the time   Personal Safety and Judgment intact   Cognitive Comment Is oriented to year, month, and general location   Integumentary/Edema   Integumentary/Edema Comments BLE edema noted   Posture    Posture Forward head position   Range of Motion (ROM)   ROM Comment BUE and BLE grossly WFL   Strength   Strength Comments Not formally assessed but appears grossly at least 3/5   Bed Mobility   Bed Mobility Comments Supine > sit with SBA   Transfer Skills   Transfer Comments Sit <>stand with CGA   Gait   Gait Comments Pt ambulated 10' with CGA, WBOS with decreased step length   Balance   Balance Comments WBOS with static standing and ambulation, good static sitting balance, no overt LOB with gait   General Therapy Interventions   Planned Therapy Interventions balance training;gait training;neuromuscular re-education;risk factor education;home program guidelines;progressive activity/exercise   Clinical Impression   Criteria for Skilled Therapeutic Intervention yes, treatment indicated   PT Diagnosis Impaired  "balance, decreased strength   Influenced by the following impairments balance, cognition, activity tolerance, fatigue, strength   Functional limitations due to impairments gait, stairs, impaired functional mobility   Clinical Presentation Stable/Uncomplicated   Clinical Presentation Rationale Clinical reasoning, medical history   Clinical Decision Making (Complexity) Low complexity   Therapy Frequency 4x/week   Predicted Duration of Therapy Intervention (days/wks) 4 days   Anticipated Discharge Disposition Home with Home Therapy   Risk & Benefits of therapy have been explained Yes   Patient, Family & other staff in agreement with plan of care Yes   [facility] TM \"6 Clicks\"   2016, Trustees of [facility], under license to [employer].  All rights reserved.   6 Clicks Short Forms Basic Mobility Inpatient Short Form   [facility]  \"6 Clicks\" V.2 Basic Mobility Inpatient Short Form   1. Turning from your back to your side while in a flat bed without using bedrails? 4 - None   2. Moving from lying on your back to sitting on the side of a flat bed without using bedrails? 4 - None   3. Moving to and from a bed to a chair (including a wheelchair)? 4 - None   4. Standing up from a chair using your arms (e.g., wheelchair, or bedside chair)? 4 - None   5. To walk in hospital room? 3 - A Little   6. Climbing 3-5 steps with a railing? 3 - A Little   Basic Mobility Raw Score (Score out of 24.Lower scores equate to lower levels of function) 22   Total Evaluation Time   Total Evaluation Time (Minutes) 10     "

## 2019-11-18 NOTE — PROGRESS NOTES
[facility], [place]   Neurosurgery Progress Note:    Assessment: Jayden Lackey is a 64 year old male with left parietal GBM s/p resection on 10/24/19 presenting with pulmonary embolism. Admitted for heparin drip.    Plan:  - Serial neuro exams Q4 hrs  - Obtain head CT when therapeutic on heparin  - Ok to transfer to floor from neurosurgery standpoint    -----------------------------------  Misael Best MD, MS  Neurosurgery PGY-3    Subjective: No acute events overnight.    Objective:   Temp:  [98  F (36.7  C)-99  F (37.2  C)] 99  F (37.2  C)  Pulse:  [87-99] 97  Heart Rate:  [] 115  Resp:  [14-26] 16  BP: (120-147)/() 123/70  SpO2:  [93 %-98 %] 95 %  I/O last 3 completed shifts:  In: 75.42 [I.V.:75.42]  Out: 900 [Urine:900]    Awake, alert, oriented x3  Cranial nerves: right homonymous hemianopsia (baseline), face symmetric, tongue midline  Strength: 5/5 in upper and lower extremities  No sensory abnormalities    LABS  Recent Labs   Lab Test 11/17/19  1656 11/17/19  1345 10/28/19  0626   WBC 3.7* 3.7* 7.4   HGB 9.7* 9.8* 9.6*   MCV 89 90 89   * 101* 116*       Recent Labs   Lab Test 11/17/19  1345 10/28/19  0626 10/27/19  0507    139 141   POTASSIUM 3.2* 3.9 3.7   CHLORIDE 104 108 108   CO2 28 26 28   BUN 7 22 20   CR 0.81 0.54* 0.61*   ANIONGAP 6 6 5   BEATRIS 8.6 8.3* 8.3*   GLC 94 132* 138*

## 2019-11-18 NOTE — PLAN OF CARE
Shift: 2923-9370    Neuro: PERRLA, occasionally disoriented to time/situation but otherwise oriented, moves all extremities,  R field cut- reports baseline  Cardiac: ECG completed which showed a- fib at beginning of shift, pts HR continues to be irregular with rates .  MD aware and requested to give morning diltiazem @ 0500  Respiratory: lungs clear/diminished 94% on RA, pt reports no shortness of breath  GI/: voiding spontaneously in urinal- AUOP  Diet/Appetite: regular diet  Skin: past craniotomy incision CDI with scabbing  LDA: infusing heparin @ 1500 units/hr  Activity: repositions independently  Pain: denies    Potassium replaced due to K of 3.2.  See flowsheets for detailed assessment information.  Will continue to monitor and follow POC

## 2019-11-18 NOTE — PROGRESS NOTES
Care Coordinator Progress Note    Admission Date/Time:  11/17/2019  Attending MD:  April Tariq MD    Data  Chart reviewed, discussed with interdisciplinary team.   Patient was admitted for: Acute pulmonary embolism,     Concerns with insurance coverage for discharge needs: None.  Current Living Situation: Patient lives with spouse.  Support System: Supportive and Involved  Services Involved: Home Care- [facility]( [facility], RN, PT and speech).  Transportation at Discharge: Family or friend will provide  Transportation to Medical Appointments:   - Not applicable  Barriers to Discharge: Medical stability.     Assessment  Pt is with left parietal GBM s/p resection on 10/24/19 presenting with pulmonary embolism. Admitted for heparin drip.    Coordination of Care and Referrals: Provided patient/family with options for Home Care.    RNCC met with pt and spouse to introduce self and assess discharge needs.  RNCC role discussed and contact info provided.  Pt was sleeping on/off at time of my visit and visited mostly with pt spouse.  Pt spouse stated the team have been updating them well about the plan of care.  Per chart review and discussion with pt spouse, pt was discharged to home recently with home care service, provided by [facility].  Pt spouse stated pt was receiving RN, PT and speech.  Pt spouse stated they would like to resume the same service from the same home care agency.  Pt spouse stated they are not sure if pt is going to be discharge with new meds at this time and declined for any new discharge assistance need at this time.  RNCC agreed to put resumption home care service on the discharge form.  RNCC informed pt and spouse that RNCC will cont to follow with the plan of care and assist with any RNCC assistance need.  Pt spouse agreed.       Plan  Anticipated Discharge Date:  TBD.  Anticipated Discharge Plan:   Home with resumption of previous home care service from [facility].  Family  will provide transportation.  RNCC will cont to follow plan of care.       Janine Garrett RN, PHN, BSN  4A and 4E/ ICU  Care Coordinator  Phone: [phone]  Pager: [phone]

## 2019-11-18 NOTE — H&P
[facility]: [place]  NeuroCritical Care History and Physical    Patient Name:  Jayden Lackey  MRN:  [id number]    :  1955  Date of Admission:  2019  Date of Service:  2019  Primary care provider:  Gideon Pineda    Summary: 64 M hx GBM s/p resection 10/24/19 c/b visual deficit, seizures, and hygroma, presenting for fatigue, FTH bilateral lobar PEs, requiring heparin gtt, requiring neuro-ICU monitoring for this due to increased size of hygroma.    Chief Complaint:     HPI:  64 year old male h/o significant for atrial fibrillation not on anticoagulation, glioblastoma of the left occipital lobe (diagnosed 3/2019, status post resection 3/2019, IDH wild-type, MGMT methylated), and secondary seizure disorder who presents with shortness of breath who was found to have bilateral pulmonary emboli.  Patient and wife reports that he has had shortness of breath and fatigue for at least the past 3 days, though this has been present since weaning from oral prednisone weeks ago.  He has had associated pleuritic type left chest wall pain that is worse with deep inhalations, including radiation to the left shoulder.  He had a recent fall when he was stepping out of the shower in which he slumped backwards into a door and slowly slid to the floor.  He denies slipping, any loss of consciousness or lightheaded feeling.  Rather he describes that his legs seem to give out on him.  He did not hit his head at this time.    He denies any focal weakness in an arm or leg, any change in his baseline level of language function (he continues to work with speech therapy for Alexia and mildly aphasia), no change in sensation, no worsening of his vision (baseline right homonymous hemianopsia), fevers, chills, nausea, vomiting or any other new symptoms.    Past interventions for glioblastoma:  Symptom onset 2019 with right sided hemianopsia  Diagnosed and resected  3/2019  Chemoradiation 6/2019  Repeated resection for recurrent glioblastoma with ALA guidance 10/24/2019      ROS: A 10-point ROS was performed as per HPI. Pertinent positives and negatives are included above.     Past Medical History:  Past Medical History:   Diagnosis Date     Colon polyp      Dyslipidemia      Glioblastoma (H)      Hypertension      Lumbar disc herniation     L4-5     Rheumatoid arthritis (H)      Past Surgical History:   Procedure Laterality Date     ANAL SPHINCTEROTOMY       BACK SURGERY  2009    L4-5     HERNIA REPAIR  1974     OPTICAL TRACKING SYSTEM CRANIOTOMY, EXCISE TUMOR, COMBINED Left 3/22/2019    Procedure: Left Stealth Assisted Craniotomy And Tumor Resection;  Surgeon: Irving Hayes MD;  Location: [facility] OR     OPTICAL TRACKING SYSTEM CRANIOTOMY, EXCISE TUMOR, COMBINED Left 10/24/2019    Procedure: LEFT CRANIOTOMY, USING OPTICAL TRACKING SYSTEM, WITH NEOPLASM EXCISION;  Surgeon: Nicho Packer MD;  Location: [facility] OR     SEPTOPLASTY       VASECTOMY         Medications:    Current Facility-Administered Medications:      heparin  drip 25,000 units in 0.45% NaCl 250 mL (see additional administration details for dose), 0-3,500 Units/hr, Intravenous, Continuous, Bea Rojas MD, Last Rate: 17.5 mL/hr at 11/17/19 1721, 1,750 Units/hr at 11/17/19 1721     heparin bolus from infusion pump, , Intravenous, Q6H PRN, Bea Rojas MD    Current Outpatient Medications:      atenolol (TENORMIN) 100 MG tablet, Take 50 mg by mouth daily, Disp: , Rfl:      diltiazem ER (DILT-XR) 180 MG 24 hr capsule, Take 180 mg by mouth daily, Disp: , Rfl:      folic acid (FOLVITE) 1 MG tablet, Take 1 mg by mouth daily , Disp: , Rfl:      Lacosamide (VIMPAT) 100 MG TABS tablet, Take 100 mg by mouth 2 times daily , Disp: , Rfl:      levETIRAcetam (KEPPRA) 1000 MG tablet, Take 1,000 mg by mouth 2 times daily , Disp: , Rfl: 1     lisinopril (PRINIVIL/ZESTRIL) 5 MG tablet, Take 1 tablet (5 mg) by mouth  daily, Disp: 28 tablet, Rfl: 0     sertraline (ZOLOFT) 25 MG tablet, Take 1 tablet (25 mg) by mouth daily, Disp: 30 tablet, Rfl: 3     acetaminophen (TYLENOL) 325 MG tablet, Take 2 tablets (650 mg) by mouth every 4 hours as needed for other (multimodal surgical pain management along with NSAIDS and opioid medication as indicated based on pain control and physical function.), Disp: 28 tablet, Rfl: 3     naproxen (NAPROSYN DR) 500 MG EC tablet, Take 1,000 mg by mouth as needed , Disp: , Rfl:      Nutritional Supplements (JUICE PLUS FIBRE PO), Take 1 tablet by mouth every morning , Disp: , Rfl:      oxyCODONE (ROXICODONE) 5 MG tablet, Take 1-2 tablets (5-10 mg) by mouth every 4 hours as needed (Patient not taking: Reported on 2019), Disp: 28 tablet, Rfl: 0    Allergies:   Allergies   Allergen Reactions     Shellfish Allergy Difficulty breathing, Nausea and Vomiting, Swelling and Shortness Of Breath     Ativan [Lorazepam] Other (See Comments)     Hallucinations, delirium     No Clinical Screening - See Comments      Lupine bean doesn't remember reaction     Other (Do Not Use)      Lupine bean doesn't remember reaction       Family History:  Family History   Problem Relation Age of Onset     Macular Degeneration Mother      Diabetes Mother      Heart Failure Father      Alcoholism Father      Diabetes Sister      Heart Disease Maternal Grandfather      Pancreatic Cancer Maternal Grandmother      Rheumatoid Arthritis Sister      Other - See Comments Sister         glioma     Other - See Comments Sister         maculofacial digital syndrome     Kidney Disease Sister         s/p transplant     Glaucoma No family hx of        Social History:  Social History     Tobacco Use     Smoking status: Former Smoker     Packs/day: 1.00     Years: 3.00     Pack years: 3.00     Types: Cigarettes     Start date: 1974     Last attempt to quit: 1977     Years since quittin.4     Smokeless tobacco: Never Used   Substance  "Use Topics     Alcohol use: Not Currently           24 Hour Vital Signs Summary:  Temperatures:  Current - Temp: 98  F (36.7  C); Max - Temp  Av  F (36.7  C)  Min: 98  F (36.7  C)  Max: 98  F (36.7  C)  Respiration range: Resp  Av.3  Min: 15  Max: 26  Pulse range: Pulse  Av.3  Min: 87  Max: 99  Blood pressure range: Systolic (24hrs), Av , Min:124 , Max:147   ; Diastolic (24hrs), Av, Min:82, Max:111    Pulse oximetry range: SpO2  Av.7 %  Min: 93 %  Max: 98 %    Ventilator Settings  Resp: 18      No intake or output data in the 24 hours ending 19    BP - Mean:  [108-124] 108    Current Medications:  Atenolol 50 daily  Diltiazem  daily  Folic acid  Vimpat 100 twice daily  Keppra 1000 twice daily  Lisinopril 5 daily  Sertraline 25 daily    PRN Medications:  Acetaminophen,  Naproxen    Infusions:    HEParin 1,200 Units/hr (19)       Allergies   Allergen Reactions     Shellfish Allergy Difficulty breathing, Nausea and Vomiting, Swelling and Shortness Of Breath     Ativan [Lorazepam] Other (See Comments)     Hallucinations, delirium     No Clinical Screening - See Comments      Lupine bean doesn't remember reaction     Other (Do Not Use)      Lupine bean doesn't remember reaction       Physical Examination:  BP (!) 132/100   Pulse 97   Temp 98  F (36.7  C) (Oral)   Resp 18   Ht 1.88 m (6' 2\")   Wt 98.5 kg (217 lb 3.2 oz)   SpO2 97%   BMI 27.89 kg/m    General: NAD, lying in bed   HEENT: Sutures present over posterior approach craniotomy  CV: Irregularly irregular  Resp: Symmetric respirations. No use of accessory muscles.CTAB, no wheezes or rhonchi.   Abd: Soft, nontender, nondistended   Skin: No rashes or lesions.  Extremities: Mild pitting edema bilaterally, 1+    Neuro:  Mental status: Awake, alert, attentive, oriented to self, time, place, and circumstance, though unable to describe anything of detail going on in news/ current events.  Alexia without " agraphia, able to name objects with increasing specificity, able to repeat two different phrases.  Cranial nerves: Right homonymous hemianopsia, PERRL, conjugate gaze, EOMI, facial sensation intact, face symmetric, shoulder shrug strong, tongue/uvula midline, no dysarthria.   Motor: Normal bulk and tone.  No pronator drift.  No abnormal movements. 5/5 strength in 4/4 extremities.   Reflexes: Normo-reflexic and symmetric biceps, brachioradialis, patellae, and achilles. Negative Fontenot, no clonus, toes down-going.  Sensory: Intact to light touch, pinprick throughout  Coordination: FNF without ataxia or dysmetria. Rapid alternating movements intact.   Gait: Deferred given patient's acuity        Labs/Studies:  Recent Labs   Lab Test 11/17/19  1656 11/17/19  1345 10/28/19  0626  10/27/19  0507   NA  --  138 139  --  141   POTASSIUM  --  3.2* 3.9  --  3.7   CHLORIDE  --  104 108  --  108   CO2  --  28 26  --  28   ANIONGAP  --  6 6  --  5   GLC  --  94 132*  --  138*   BUN  --  7 22  --  20   CR  --  0.81 0.54*  --  0.61*   BEATRIS  --  8.6 8.3*  --  8.3*   WBC 3.7* 3.7* 7.4   < > Specimen not received   RBC 3.38* 3.42* 3.30*   < > Specimen not received   HGB 9.7* 9.8* 9.6*   < > Specimen not received   * 101* 116*   < > Specimen not received    < > = values in this interval not displayed.       Recent Labs   Lab Test 11/17/19  1345 10/24/19  1213 10/17/19  1600   INR 1.11 1.08 1.06   PTT 21* 26 33       No results found for: CHOL  No results found for: HDL  No results found for: LDL  No results found for: TRIG  No results found for: CHOLHDLRATIO    No lab results found in last 7 days.    Recent Labs   Lab 11/17/19  1345   GLC 94       Cultures:    Imaging:   CT head w/o contrast 11/17/19  Findings:    Stable postsurgical changes of left occipital and parietal  cranioplasty with underlying mass resection cavity in the left  occipital lobe. Decreased hyperdense fluid within the resection  cavity. Resolution of the  previously seen pneumocephalus. Increased  size of a hypodense, chronic appearing left cerebral convexity  subdural hematoma/hygroma measuring 1.1 cm resulting in new 6 mm  rightward midline shift. No intracranial hemorrhage. Minimal right  frontal convexity subdural hygroma. The ventricles are proportionate  to the cerebral sulci. The basal cisterns are patent. The gray to  white matter differentiation of the cerebral hemispheres is preserved.  The basal cisterns are patent.     The visualized paranasal sinuses are clear. The mastoid air cells are  clear.                                                                      Impression:  Postsurgical changes of left occipital cranioplasty with underlying  mass resection. Increased left cerebral convexity subdural  hematoma/hygroma with associated 6 mm of rightward midline shift. The  basal cisterns are patent.    CT chest angio 11/17/2019  IMPRESSION:   1.  Bilateral lobar and segmental pulmonary emboli, with greatest clot  burden in the left upper and lower lobes.  No CT evidence of right  heart strain.  Left lower lobe groundglass opacities, and groundglass  consolidation in the left lower lobe, lingula and left upper lobe may  represent changes of pulmonary infarction.  2. Small left pleural effusion and bibasilar atelectasis.   3. Redemonstration of the numerous sub-6 mm solid pulmonary nodules,  none of which are enlarged since comparison examination 6/12/2019.     TTE 11/18/19   Interpretation Summary  Technically difficult study. Patient in a-fib RVR during the evaluation.  Left ventricular function, chamber size, wall motion, and wall thickness are  normal.The EF is 60-65%.  Right ventricular function, chamber size, wall motion, and thickness are  normal.  No significant valvular abnormalities.  The inferior vena cava cannot be assessed. No pericardial effusion is present.  There is no prior study for direct comparison.    Assessment/Plan:  This is a  64-year-old man with history significant for glioblastoma status post resection, complicated by seizure disorder as well as hygroma who presents with shortness of breath and bilateral pulmonary emboli.  There is a risk of increasing size of hygroma versus subdural hematoma while on the heparin drip, for this reason he will be admitted to the neuro ICU for frequent neuro checks. From a respiratory status he appears very stable and does not require any supplemental oxygen while at rest. The plan will be to continue this heparin drip to prevent the enlargement of pulmonary emboli and maintain frequent monitoring.    Neuro:  #hx of L occipital lobe GBM s/p resection c/b 1.1 cm SDH/hygroma with 6 mm midline shift:   -Q2-hour neurochecks to assess for unilateral weakness, cranial nerve findings or mental status change -> extend to Q4 hr checks  - Repeat Noncon head CT once therapeutic on heparin  - SBP goal less than 180  - Hydralazine and labetalol as needed for the SBP goal     #Localization-related epilepsy  - Continue PTA levetiracetam 1000 twice daily  - Continue PTA lacosamide 100 twice daily   - Seizure precautions    Resp:   #Bilateral lobar and subsegmental pulmonary emboli  Patient did receive initial heparin rate in the high intensity range while in the emergency department.  However he did not receive a bolus, and this rate was lowered once he was admitted to the neuro ICU floor per the orders of low intensity heparin drip.  - low intensity heparin gtt without bolus  - will discuss heparin ggt intensity with neurosurgery, consider warfarin with lovenox bridge -> neurosurgery ok with high intensity ggt without bolus  - TTE to assess for Right sided heart strain   - continuous pulse ox monitoring   - maintain O2 saturations > 92%     CV:  #Atrial fibrillation  #Tachycardia vs AFIB with RVR   #Hypertension  -Continue PTA diltiazem 180 XR daily  - Consider increasing diltiazem to 240 mg daily to control  tachycardia  - TTE as above to assess for right heart strain as possible etiology of worsened tachycardia -> EF 60 -65%, no evidence of R heart strain  - SBP goal: 180     Renal/FEN:   - strict monitoring of I/O  - Electrolyte replacement protocol    Endo:   No active issues  -Hypoglycemia protocol    Heme:  #Pancytopenia, likely secondary to chemotherapy  -Platelets borderline low on admission, have been in the low 100 range for roughly 1 month.  - Given leukopenia, low threshold to treat with antibiotics if patient becomes febrile  - Hg > 7.0   - Plt > 100k, change to > 80k  - INR <1.5     GI:   -No active issues  - GI prophylaxis not indicated  ID:   -Monitor for signs of infection; pan culture if >100.4    FEN:   - Regular adult diet  - Electrolyte replacement protocol    PPx:   - Heparin drip is active, no further DVT prophylaxis necessary   - SCDs   - No PPI indicated   Code: Full - Discussed with patient and wife   The patient was discussed with the fellow, Dr. Yeh. The attending is Dr. Tariq.    Chris Ashraf PGY3  Neurology Resident       __________________________    Patient was seen and discussed with Dr. Tariq.  Addendum by Saundra Pereyra MD PGY 3 in italics

## 2019-11-18 NOTE — PLAN OF CARE
Admitted/transferred from: ED  Reason for admission/transfer: bilateral PEs  2 RN skin assessment: completed by Marleny Marie and Camille Niño  Result of skin assessment and interventions/actions: red raised circular lesion on medial R thigh, small scab/open area on R calf, craniotomy incision on head healing  Height, weight, drug calc weight: in process  Patient belongings (see Flowsheet)

## 2019-11-18 NOTE — ED NOTES
Pt. continues to not have the need to void. Wife who is at bedside is aware as is pt. urine sample is still needed.

## 2019-11-18 NOTE — PLAN OF CARE
Discharge Planner OT   4A DEFER  Patient plan for discharge: home with family  Current status: Per wife who is an OT, pt is near baseline for OT intervention. Has strength and balance loss although minor from baseline.  Acknowledge order placed for OT eval and treat.  Upon chart review and discussion with PT, no acute needs identified requiring skilled OT intervention.   Barriers to return to prior living situation: defer to PT  Recommendations for discharge: defer to PT  Rationale for recommendations: defer to PT       Entered by: Cortney Ruiz 11/18/2019 4:03 PM

## 2019-11-18 NOTE — CONSULTS
Social Work: Assessment with Discharge Plan    Patient Name:  Jayden Lackey  :  1955  Age:  64 year old  MRN:  [id number]  Risk/Complexity Score:  Filed Complexity Screen Score: 9  Completed assessment with:  Pt and pt's wife (Oz) at bedside     Presenting Information   Reason for Referral:  Family request for resources   Date of Intake:  2019  Referral Source:  Chart Review  Decision Maker:  Pt when able   Alternate Decision Maker:  NOK - Wife (Oz)   Health Care Directive:  Will bring in copy - Pt and pt's wife indicated that they will being a copy of HCD from home   Living Situation:  House  Previous Functional Status:  Independent  Patient and family understanding of hospitalization:  Pt and family report an understanding of current hospitalization   Cultural/Language/Spiritual Considerations:  Pt identifies as Adventist and their primary language is English. No other cultural considerations were identified   Adjustment to Illness:  Pt reports a fair adjustment to current hospitalization     Physical Health  Reason for Admission:    1. Acute pulmonary embolism, unspecified pulmonary embolism type, unspecified whether acute cor pulmonale present (H)      Services Needed/Recommended:  Home     Mental Health/Chemical Dependency  Diagnosis:  None reported/documented   Support/Services in Place:  N/A   Services Needed/Recommended:  N/A     Support System  Significant relationship at present time:  Pt's wifeOz is available for emotional and physical support   Family of origin is available for support:  Pt's older sisterGriselda   Other support available:  Pt and pt's wife identified several people in their community that are available for support   Gaps in support system:  Pt/pt's wife would like to become involved with a support group, preferably during the day for individuals with brain tumors.   Patient is caregiver to:  None     Provider Information   Primary Care Physician:   Deloresmauricio Gideon   [phone]   Clinic:  [place] [address] / [facility] *      :  None     Financial   Income Source:  Did not discuss   Financial Concerns:  Pt's wife discussed financial strain of ambulance rides to the hospital. She reported that their insurance does not cover ambulance rides to the hospital and inquired if there were additional resources to get people to the hospital that were free/lower cost than taking an ambulance.     Insurance:    Payor/Plan Subscriber Name Rel Member # Group #   [employer] - [employer] [facility] [place] [id number]       [address] [id number]       Discharge Plan   Patient and family discharge goal:  Return home   Provided education on discharge plan:  YES  Patient agreeable to discharge plan:  YES  A list of Medicare Certified Facilities was provided to the patient and/or family to encourage patient choice. Patient's choices for facility are:   N/A  Will NH provide Skilled rehabilitation or complex medical:  N/A   General information regarding anticipated insurance coverage and possible out of pocket cost was discussed. Patient and patient's family are aware patient may incur the cost of transportation to the facility, pending insurance payment: N/A   Barriers to discharge:  Medical stability     Discharge Recommendations   Anticipated Disposition:  Home with services  Transportation Needs:  Family   Name of Transportation Company and Phone:  N/A     Additional comments    Pt it a 65 y/o male who admitted to [facility] on 11/17/2019 with a history of GBM s/p resection 10/24/19 c/b visual deficit, seizures, and hygroma, presenting for fatigue, FTH bilateral lobar PEs, requiring heparin gtt, requiring neuro-ICU monitoring for this due to increased size of hygroma.    SW met with pt and pt's wife at bedside per request for resources. Pt and pt's wife inquired if anyone comes through the hospital to provide education and relate to pt's with  a brain tumor. Family also discussed difficulty to attend evening support groups and inquired about day time support groups for brain tumor. Pt's wife also inquired about resources for ambulance rides due to insurance not covering cost. SW followed up with pt and pt's wife and provided information regarding [facility] and [facility]'s support group. SW provided information regarding transportation options and lack of resources to cover the cost of ambulance.   SW also indicated [facility] does not have the resources for peer support and appreciated families suggestions. Family appreciated follow-up and did not have any additional questions.     SW to follow up for additional needs.     BECK Sauer, LGSW  [facility] Acute [facility]   [facility]  Pager: [phone]  Phone: [phone]

## 2019-11-18 NOTE — PHARMACY-ADMISSION MEDICATION HISTORY
Admission medication history interview status for the 11/17/2019 admission is complete. See Epic admission navigator for allergy information, pharmacy, prior to admission medications and immunization status.     Medication history interview sources:  Patient's spouse, Surescripts, Care Everywhere      Changes made to PTA medication list (reason)    Added:   -vitamin D    Deleted:   -docusate  -fish oil   -oxycodone     Changed:   -methotrexate dose changed from 150 mg to 17.5 mg every 7 days; route changed from subQ to PO      Additional medication history information (including reliability of information, actions taken by pharmacist):  -Patient's spouse helps manage pt's medications and knows his med list well.   -Pt is taking methotrexate for RA, with dose of 0.7 mL (17.5 mg) of the 25 mg/ml solution that he mixes in juice. Takes methotrexate on Fridays.  -Patient takes Keppra and lacosamide twice daily at 0830 and 2030.         Prior to Admission medications    Medication Sig Last Dose Taking? Auth Provider   atenolol (TENORMIN) 100 MG tablet Take 50 mg by mouth daily 11/17/2019 at AM Yes Unknown, Entered By History   cholecalciferol (VITAMIN D3) 5000 units (125 mcg) capsule Take 5,000 Units by mouth daily 11/17/2019 at AM Yes Unknown, Entered By History   diltiazem ER (DILT-XR) 180 MG 24 hr capsule Take 180 mg by mouth daily 11/17/2019 at AM Yes Unknown, Entered By History   folic acid (FOLVITE) 1 MG tablet Take 1 mg by mouth daily  11/17/2019 at Unknown time Yes Reported, Patient   Lacosamide (VIMPAT) 100 MG TABS tablet Take 100 mg by mouth 2 times daily  11/17/2019 at 0830 Yes Reported, Patient   levETIRAcetam (KEPPRA) 1000 MG tablet Take 1,000 mg by mouth 2 times daily  11/17/2019 at 0830 Yes Reported, Patient   lisinopril (PRINIVIL/ZESTRIL) 5 MG tablet Take 1 tablet (5 mg) by mouth daily 11/17/2019 at AM Yes Manuel Roth MD   methotrexate 50 MG/2ML injection Take 0.7 mL (17.5 mg) by mouth every 7 days  on Fridays. 11/15/2019 Yes Unknown, Entered By History   naproxen (NAPROSYN DR) 500 MG EC tablet Take 500 mg by mouth 2 times daily as needed  Past Week at Unknown time Yes Reported, Patient   Nutritional Supplements (JUICE PLUS FIBRE PO) Take 1 tablet by mouth every morning  Past Week at Unknown time Yes Reported, Patient   sertraline (ZOLOFT) 25 MG tablet Take 1 tablet (25 mg) by mouth daily 11/17/2019 at AM Yes Dolly Ramirez PA-C   acetaminophen (TYLENOL) 325 MG tablet Take 2 tablets (650 mg) by mouth every 4 hours as needed for other (multimodal surgical pain management along with NSAIDS and opioid medication as indicated based on pain control and physical function.) More than a month at Unknown time  Manuel Roth MD         Medication history completed by: Sharon Short, PharmD IV Student

## 2019-11-19 ENCOUNTER — APPOINTMENT (OUTPATIENT)
Dept: CT IMAGING | Facility: CLINIC | Age: 64
End: 2019-11-19
Attending: STUDENT IN AN ORGANIZED HEALTH CARE EDUCATION/TRAINING PROGRAM
Payer: COMMERCIAL

## 2019-11-19 ENCOUNTER — APPOINTMENT (OUTPATIENT)
Dept: PHYSICAL THERAPY | Facility: CLINIC | Age: 64
End: 2019-11-19
Payer: COMMERCIAL

## 2019-11-19 LAB
ANION GAP SERPL CALCULATED.3IONS-SCNC: 6 MMOL/L (ref 3–14)
BUN SERPL-MCNC: 7 MG/DL (ref 7–30)
CALCIUM SERPL-MCNC: 8.4 MG/DL (ref 8.5–10.1)
CHLORIDE SERPL-SCNC: 111 MMOL/L (ref 94–109)
CO2 SERPL-SCNC: 25 MMOL/L (ref 20–32)
CREAT SERPL-MCNC: 0.67 MG/DL (ref 0.66–1.25)
ERYTHROCYTE [DISTWIDTH] IN BLOOD BY AUTOMATED COUNT: 18.4 % (ref 10–15)
GFR SERPL CREATININE-BSD FRML MDRD: >90 ML/MIN/{1.73_M2}
GLUCOSE SERPL-MCNC: 110 MG/DL (ref 70–99)
HCT VFR BLD AUTO: 28.3 % (ref 40–53)
HGB BLD-MCNC: 9.3 G/DL (ref 13.3–17.7)
INTERPRETATION ECG - MUSE: NORMAL
LACTATE BLD-SCNC: 1.5 MMOL/L (ref 0.7–2)
LMWH PPP CHRO-ACNC: 0.23 IU/ML
LMWH PPP CHRO-ACNC: 0.4 IU/ML
MCH RBC QN AUTO: 28.4 PG (ref 26.5–33)
MCHC RBC AUTO-ENTMCNC: 32.9 G/DL (ref 31.5–36.5)
MCV RBC AUTO: 87 FL (ref 78–100)
PLATELET # BLD AUTO: 110 10E9/L (ref 150–450)
POTASSIUM SERPL-SCNC: 3.5 MMOL/L (ref 3.4–5.3)
RBC # BLD AUTO: 3.27 10E12/L (ref 4.4–5.9)
SODIUM SERPL-SCNC: 142 MMOL/L (ref 133–144)
WBC # BLD AUTO: 3.4 10E9/L (ref 4–11)

## 2019-11-19 PROCEDURE — 25000132 ZZH RX MED GY IP 250 OP 250 PS 637: Performed by: STUDENT IN AN ORGANIZED HEALTH CARE EDUCATION/TRAINING PROGRAM

## 2019-11-19 PROCEDURE — 97530 THERAPEUTIC ACTIVITIES: CPT | Mod: GP

## 2019-11-19 PROCEDURE — 80048 BASIC METABOLIC PNL TOTAL CA: CPT | Performed by: STUDENT IN AN ORGANIZED HEALTH CARE EDUCATION/TRAINING PROGRAM

## 2019-11-19 PROCEDURE — 36415 COLL VENOUS BLD VENIPUNCTURE: CPT | Performed by: PSYCHIATRY & NEUROLOGY

## 2019-11-19 PROCEDURE — 36415 COLL VENOUS BLD VENIPUNCTURE: CPT | Performed by: STUDENT IN AN ORGANIZED HEALTH CARE EDUCATION/TRAINING PROGRAM

## 2019-11-19 PROCEDURE — 85520 HEPARIN ASSAY: CPT | Performed by: PSYCHIATRY & NEUROLOGY

## 2019-11-19 PROCEDURE — 83605 ASSAY OF LACTIC ACID: CPT | Performed by: PSYCHIATRY & NEUROLOGY

## 2019-11-19 PROCEDURE — 85520 HEPARIN ASSAY: CPT | Performed by: STUDENT IN AN ORGANIZED HEALTH CARE EDUCATION/TRAINING PROGRAM

## 2019-11-19 PROCEDURE — 25000132 ZZH RX MED GY IP 250 OP 250 PS 637: Performed by: PSYCHIATRY & NEUROLOGY

## 2019-11-19 PROCEDURE — 40000141 ZZH STATISTIC PERIPHERAL IV START W/O US GUIDANCE

## 2019-11-19 PROCEDURE — 25000128 H RX IP 250 OP 636: Performed by: STUDENT IN AN ORGANIZED HEALTH CARE EDUCATION/TRAINING PROGRAM

## 2019-11-19 PROCEDURE — 12000001 ZZH R&B MED SURG/OB UMMC

## 2019-11-19 PROCEDURE — 85027 COMPLETE CBC AUTOMATED: CPT | Performed by: STUDENT IN AN ORGANIZED HEALTH CARE EDUCATION/TRAINING PROGRAM

## 2019-11-19 PROCEDURE — 70450 CT HEAD/BRAIN W/O DYE: CPT

## 2019-11-19 PROCEDURE — 97116 GAIT TRAINING THERAPY: CPT | Mod: GP

## 2019-11-19 RX ADMIN — LACOSAMIDE 100 MG: 100 TABLET, FILM COATED ORAL at 07:51

## 2019-11-19 RX ADMIN — LISINOPRIL 5 MG: 5 TABLET ORAL at 07:52

## 2019-11-19 RX ADMIN — DILTIAZEM HYDROCHLORIDE 180 MG: 180 CAPSULE, COATED, EXTENDED RELEASE ORAL at 07:51

## 2019-11-19 RX ADMIN — SERTRALINE HYDROCHLORIDE 25 MG: 25 TABLET ORAL at 07:52

## 2019-11-19 RX ADMIN — ATENOLOL 50 MG: 50 TABLET ORAL at 07:51

## 2019-11-19 RX ADMIN — POTASSIUM CHLORIDE 20 MEQ: 750 TABLET, EXTENDED RELEASE ORAL at 06:38

## 2019-11-19 RX ADMIN — LEVETIRACETAM 1000 MG: 500 TABLET ORAL at 21:40

## 2019-11-19 RX ADMIN — LACOSAMIDE 100 MG: 100 TABLET, FILM COATED ORAL at 22:06

## 2019-11-19 RX ADMIN — FOLIC ACID 1 MG: 1 TABLET ORAL at 07:52

## 2019-11-19 RX ADMIN — LEVETIRACETAM 1000 MG: 500 TABLET ORAL at 07:52

## 2019-11-19 RX ADMIN — HEPARIN SODIUM 2050 UNITS/HR: 10000 INJECTION, SOLUTION INTRAVENOUS at 11:58

## 2019-11-19 ASSESSMENT — ACTIVITIES OF DAILY LIVING (ADL)
ADLS_ACUITY_SCORE: 17
ADLS_ACUITY_SCORE: 15
ADLS_ACUITY_SCORE: 16
ADLS_ACUITY_SCORE: 15

## 2019-11-19 NOTE — PLAN OF CARE
4A / Discharge Planner PT   Patient plan for discharge: home with family, home PT  Current status: Pt ambulated 450ft x2 with CGA, patient reports balance is worse today, noted increased path deviations to the left, decreased step length and wide YANELY, self corrected LOB x2. VSS throughout, Afib 100-140s.  Barriers to return to prior living situation: medical status  Recommendations for discharge: home with home PT progressing to OP PT  Rationale for recommendations: Pt would benefit from continued skilled therapy to increase strength, balance and balance challenges with gait to improve functional mobility and safety.

## 2019-11-19 NOTE — PROGRESS NOTES
Neuroscience Intensive Care Progress Note  2019    REASON FOR CRITICAL CARE ADMISSION: Pulmonary embolism      ADMITTING PHYSICIAN: April Tariq MD     Date of Service (when I saw the patient): 2019    ASSESSMENT: Jayden Lackey is a 64 year old year old male admitted on 2019 with a PMH of a GBM s/p resection on 10/24/19 c/b visual deficit, seizures, and hygroma, who now presents for fatigue, FTH bilateral lobar PEs, requiring heparin gtt, and neuro-ICU monitoring.    Problem List  1. Pulmonary embolism  2. Hygroma  3. GBM    24 hour events:  No acute events overnight    24 Hour Vital Signs Summary:  Temperatures:  Current - Temp: 98.6  F (37  C); Max - Temp  Av.5  F (36.9  C)  Min: 97.9  F (36.6  C)  Max: 98.8  F (37.1  C)  Respiration range: Resp  Av.1  Min: 8  Max: 46  Pulse range: No data recorded  Blood pressure range: Systolic (24hrs), Av , Min:95 , Max:155   ; Diastolic (24hrs), Av, Min:68, Max:113    Pulse oximetry range: SpO2  Av.6 %  Min: 93 %  Max: 98 %    Ventilator Settings  Resp: 18    Intake/Output Summary (Last 24 hours) at 2019 0745  Last data filed at 2019 0600  Gross per 24 hour   Intake 1409.63 ml   Output 2500 ml   Net -1090.37 ml       BP - Mean:  [] 118    Current Medications:    atenolol  50 mg Oral or Feeding Tube Daily     diltiazem ER COATED BEADS  180 mg Oral Daily     folic acid  1 mg Oral or Feeding Tube Daily     Lacosamide  100 mg Oral or Feeding Tube BID     levETIRAcetam  1,000 mg Oral or Feeding Tube BID     lisinopril  5 mg Oral or Feeding Tube Daily     sertraline  25 mg Oral or Feeding Tube Daily       PRN Medications:  acetaminophen, bisacodyl, hydrALAZINE, labetalol, magnesium sulfate, magnesium sulfate, magnesium sulfate, metoprolol, naloxone, polyethylene glycol, potassium chloride, potassium chloride with lidocaine, potassium chloride, potassium chloride, potassium chloride, senna-docusate **OR**  "senna-docusate    Infusions:    HEParin 2,050 Units/hr (11/19/19 0637)       Allergies   Allergen Reactions     Shellfish Allergy Difficulty breathing, Nausea and Vomiting, Swelling and Shortness Of Breath     Ativan [Lorazepam] Other (See Comments)     Hallucinations, delirium     No Clinical Screening - See Comments      Lupine bean doesn't remember reaction     Other (Do Not Use)      Lupine bean doesn't remember reaction       Physical Examination:  Vitals: BP (!) 125/113   Pulse 97   Temp 98.6  F (37  C) (Oral)   Resp 18   Ht 1.88 m (6' 2\")   Wt 98.5 kg (217 lb 3.2 oz)   SpO2 98%   BMI 27.89 kg/m    General: Adult male patient, lying in bed, NAD  HEENT: Normocephalic/Atraumatic, no icterus, Oral cavity/Oropharynx pink and moist  Cardiac: RRR  Chest: No SOB, pattern regular, unlabored, expansion symmetric, no retractions or use of accessory muscles  Abdomen: Soft, bowel sounds present  Extremities: Warm, no edema, tingling reported in bilateral extremities  Skin: No rash or lesion   Psych: Mood pleasant, affect congruent  Neuro:  Mental status: Awake, alert, attentive, oriented to self, time, place, and circumstance. Language is fluent and coherent with intact comprehension of complex commands, naming and repetition.  Cranial nerves: VFF, PERRL, conjugate gaze, Right field cut at baseline, facial sensation intact, face symmetric, shoulder shrug strong, tongue/uvula midline, no dysarthria.   Motor: Normal bulk and tone. No abnormal movements. 5/5 strength in 4/4 extremities.   Sensory: Intact to light touch x 4 extremities  Coordination: FNF and HS without ataxia or dysmetria. Rapid alternating movements intact.   Gait: Station normal.       Labs/Studies:  Recent Labs   Lab Test 11/19/19  0538 11/18/19  0703 11/17/19  1656 11/17/19  1345    142  --  138   POTASSIUM 3.5 3.6  --  3.2*   CHLORIDE 111* 111*  --  104   CO2 25 24  --  28   ANIONGAP 6 8  --  6   * 105*  --  94   BUN 7 6*  --  7   CR " 0.67 0.66  --  0.81   BEATRIS 8.4* 8.4*  --  8.6   WBC 3.4* 3.2* 3.7* 3.7*   RBC 3.27* 3.10* 3.38* 3.42*   HGB 9.3* 8.9* 9.7* 9.8*   * 103* 106* 101*       Recent Labs   Lab Test 11/17/19  1345 10/24/19  1213 10/17/19  1600   INR 1.11 1.08 1.06   PTT 21* 26 33     Imaging:  CT head w/o contrast 11/17/19  Findings:    Stable postsurgical changes of left occipital and parietal  cranioplasty with underlying mass resection cavity in the left  occipital lobe. Decreased hyperdense fluid within the resection  cavity. Resolution of the previously seen pneumocephalus. Increased  size of a hypodense, chronic appearing left cerebral convexity  subdural hematoma/hygroma measuring 1.1 cm resulting in new 6 mm  rightward midline shift. No intracranial hemorrhage. Minimal right  frontal convexity subdural hygroma. The ventricles are proportionate  to the cerebral sulci. The basal cisterns are patent. The gray to  white matter differentiation of the cerebral hemispheres is preserved.  The basal cisterns are patent.     The visualized paranasal sinuses are clear. The mastoid air cells are  clear.        Impression:  Postsurgical changes of left occipital cranioplasty with underlying  mass resection. Increased left cerebral convexity subdural  hematoma/hygroma with associated 6 mm of rightward midline shift. The  basal cisterns are patent.     CT chest angio 11/17/2019  IMPRESSION:   1.  Bilateral lobar and segmental pulmonary emboli, with greatest clot  burden in the left upper and lower lobes.  No CT evidence of right  heart strain.  Left lower lobe groundglass opacities, and groundglass  consolidation in the left lower lobe, lingula and left upper lobe may  represent changes of pulmonary infarction.  2. Small left pleural effusion and bibasilar atelectasis.   3. Redemonstration of the numerous sub-6 mm solid pulmonary nodules,  none of which are enlarged since comparison examination 6/12/2019.      TTE 11/18/19   Interpretation  Summary  Technically difficult study. Patient in a-fib RVR during the evaluation.  Left ventricular function, chamber size, wall motion, and wall thickness are  normal.The EF is 60-65%.  Right ventricular function, chamber size, wall motion, and thickness are  normal.  No significant valvular abnormalities.  The inferior vena cava cannot be assessed. No pericardial effusion is present.  There is no prior study for direct comparison.    Assessment/Plan  Jayden Lackey is a 64 year old admitted on 11/17/2019 for fatigue, who was FTH bilateral lobar PEs.       Plan  Neuro:  #L occipital lobe GBM s/p resection   #SDH/hygroma with 6 mm midline shift  - Neurochecks q4  - Repeat Noncon head CT once therapeutic on Heparin  - SBP < 180    #Localization-related epilepsy  - Levetiracetam 1000 mg BID  - Lacosamide 100 mg BID   - Seizure precautions    CV:  #Atrial fibrillation  #Tachycardia vs AFIB with RVR   #Hypertension  -SBP < 180   - Atenolol 50 mg daily  - Diltiazem  daily  - Lisinopril 5 mg daily  - Hydralazine and Labetalol PRN    Pulm:  - Continuous pulse ox monitoring  - Maintain O2 saturations > 92%    GI/Nutrition:  - Diet: Regular  - Last BM 11/18  Bowel regimen: PRNs ordered     Renal:  - IV Fluids: Heparin Gtt  - Strict I/O monitoring   - Electrolyte replacement protocol in place    Endo:  - No active issues     Heme:  #Pancytopenia, likely secondary to chemotherapy  - Platelets borderline low on admission, have been in the low 100 range for roughly 1 month.  - Given leukopenia, low threshold to treat with antibiotics if patient becomes febrile  - Hg > 7.0   - Plt > 100k, change to > 80k  - INR <1.5    ID:  - No active issues  - Pan culture if febrile        PPX:  DVT Prophylaxis  - SCDs in place, ambulatory  - Heparin Gtt  GI Prophylaxis  - Not indicated    Lines/Tubes/Drains:  - PIV x 1    Code Status: Full    Dispo: ICU    The patient was seen and discussed with the attending, Dr. Tariq.    Nata  Sandra MASTERSON, LARON  NeuroCritical Care Nurse Practitioner  [phone]

## 2019-11-19 NOTE — PROGRESS NOTES
"D/I: Patient admitted to unit 4A Surgical/Neuro ICU   /84   Pulse 97   Temp 98.6  F (37  C) (Oral)   Resp 23   Ht 1.88 m (6' 2\")   Wt 98.5 kg (217 lb 3.2 oz)   SpO2 96%   BMI 27.89 kg/m    Neuro- A&O. Difficulty with word finding. Right field cut at baseline. Pt reporting tingling in bilateral lower extremities, states this is baseline.   CV- A-fib with RVR occasionally. Afebrile.   Respiratory- Room air. Lungs clear.  GI- Formed BM overnight. Active bowel sounds.   - Voids in urinal, adequate urine output.  Skin- Scalp incision CDI.  Gtts- Heparin @ 1900 units/hour.  Pain- Denies pain.  See flowsheets for further interventions and assessments.   A: Stable  P: Possible transfer to floor? Continue to monitor pt closely. Notify MD of significant changes.    "

## 2019-11-19 NOTE — PROGRESS NOTES
[facility], [place]   Neurosurgery Progress Note:    Assessment: Jayden Lackey is a 64 year old male with left parietal GBM s/p resection on 10/24/19 presenting with pulmonary embolism. Admitted for heparin drip. Switched to high intensity dose on 11/18/19.    Plan:  - Serial neuro exams Q4 hrs  - Head CT when therapeutic  - Ok to transfer to floor from neurosurgery standpoint    -----------------------------------  Misael Best MD, MS  Neurosurgery PGY-3    Subjective: No acute events overnight.    Objective:   Temp:  [97.9  F (36.6  C)-98.8  F (37.1  C)] 98.8  F (37.1  C)  Heart Rate:  [] 118  Resp:  [13-46] 21  BP: ()/() 108/73  SpO2:  [93 %-98 %] 96 %  I/O last 3 completed shifts:  In: 1815.38 [P.O.:1440; I.V.:375.38]  Out: 2150 [Urine:2150]    Awake, alert, oriented x3  Cranial nerves: right homonymous hemianopsia (baseline), face symmetric, tongue midline  Strength: 5/5 in upper and lower extremities  No sensory abnormalities    LABS  Recent Labs   Lab Test 11/18/19  0703 11/17/19  1656 11/17/19  1345   WBC 3.2* 3.7* 3.7*   HGB 8.9* 9.7* 9.8*   MCV 87 89 90   * 106* 101*       Recent Labs   Lab Test 11/18/19  0703 11/17/19  1345 10/28/19  0626    138 139   POTASSIUM 3.6 3.2* 3.9   CHLORIDE 111* 104 108   CO2 24 28 26   BUN 6* 7 22   CR 0.66 0.81 0.54*   ANIONGAP 8 6 6   BEATRIS 8.4* 8.6 8.3*   * 94 132*

## 2019-11-20 ENCOUNTER — APPOINTMENT (OUTPATIENT)
Dept: PHYSICAL THERAPY | Facility: CLINIC | Age: 64
End: 2019-11-20
Payer: COMMERCIAL

## 2019-11-20 ENCOUNTER — APPOINTMENT (OUTPATIENT)
Dept: ULTRASOUND IMAGING | Facility: CLINIC | Age: 64
End: 2019-11-20
Payer: COMMERCIAL

## 2019-11-20 VITALS
SYSTOLIC BLOOD PRESSURE: 109 MMHG | DIASTOLIC BLOOD PRESSURE: 73 MMHG | HEIGHT: 74 IN | TEMPERATURE: 96.7 F | HEART RATE: 98 BPM | WEIGHT: 212.4 LBS | OXYGEN SATURATION: 97 % | RESPIRATION RATE: 18 BRPM | BODY MASS INDEX: 27.26 KG/M2

## 2019-11-20 PROBLEM — I82.409 DVT (DEEP VENOUS THROMBOSIS) (H): Status: ACTIVE | Noted: 2019-11-20

## 2019-11-20 LAB
ANION GAP SERPL CALCULATED.3IONS-SCNC: 8 MMOL/L (ref 3–14)
BUN SERPL-MCNC: 7 MG/DL (ref 7–30)
CALCIUM SERPL-MCNC: 8.8 MG/DL (ref 8.5–10.1)
CHLORIDE SERPL-SCNC: 109 MMOL/L (ref 94–109)
CO2 SERPL-SCNC: 23 MMOL/L (ref 20–32)
CREAT SERPL-MCNC: 0.7 MG/DL (ref 0.66–1.25)
ERYTHROCYTE [DISTWIDTH] IN BLOOD BY AUTOMATED COUNT: 18.7 % (ref 10–15)
GFR SERPL CREATININE-BSD FRML MDRD: >90 ML/MIN/{1.73_M2}
GLUCOSE BLDC GLUCOMTR-MCNC: 110 MG/DL (ref 70–99)
GLUCOSE SERPL-MCNC: 109 MG/DL (ref 70–99)
HCT VFR BLD AUTO: 28.9 % (ref 40–53)
HGB BLD-MCNC: 9.5 G/DL (ref 13.3–17.7)
LMWH PPP CHRO-ACNC: 0.46 IU/ML
MCH RBC QN AUTO: 28.6 PG (ref 26.5–33)
MCHC RBC AUTO-ENTMCNC: 32.9 G/DL (ref 31.5–36.5)
MCV RBC AUTO: 87 FL (ref 78–100)
PLATELET # BLD AUTO: 126 10E9/L (ref 150–450)
POTASSIUM SERPL-SCNC: 3.6 MMOL/L (ref 3.4–5.3)
RADIOLOGIST FLAGS: ABNORMAL
RBC # BLD AUTO: 3.32 10E12/L (ref 4.4–5.9)
SODIUM SERPL-SCNC: 140 MMOL/L (ref 133–144)
WBC # BLD AUTO: 4.2 10E9/L (ref 4–11)

## 2019-11-20 PROCEDURE — 25000132 ZZH RX MED GY IP 250 OP 250 PS 637: Performed by: PSYCHIATRY & NEUROLOGY

## 2019-11-20 PROCEDURE — 97750 PHYSICAL PERFORMANCE TEST: CPT | Mod: GP

## 2019-11-20 PROCEDURE — 25000131 ZZH RX MED GY IP 250 OP 636 PS 637: Performed by: PHYSICIAN ASSISTANT

## 2019-11-20 PROCEDURE — 85520 HEPARIN ASSAY: CPT | Performed by: PSYCHIATRY & NEUROLOGY

## 2019-11-20 PROCEDURE — 00000146 ZZHCL STATISTIC GLUCOSE BY METER IP

## 2019-11-20 PROCEDURE — 85520 HEPARIN ASSAY: CPT | Performed by: STUDENT IN AN ORGANIZED HEALTH CARE EDUCATION/TRAINING PROGRAM

## 2019-11-20 PROCEDURE — 25000128 H RX IP 250 OP 636: Performed by: PHYSICIAN ASSISTANT

## 2019-11-20 PROCEDURE — 93970 EXTREMITY STUDY: CPT

## 2019-11-20 PROCEDURE — 25000132 ZZH RX MED GY IP 250 OP 250 PS 637: Performed by: STUDENT IN AN ORGANIZED HEALTH CARE EDUCATION/TRAINING PROGRAM

## 2019-11-20 PROCEDURE — 97116 GAIT TRAINING THERAPY: CPT | Mod: GP

## 2019-11-20 PROCEDURE — 99239 HOSP IP/OBS DSCHRG MGMT >30: CPT | Performed by: INTERNAL MEDICINE

## 2019-11-20 PROCEDURE — 80177 DRUG SCRN QUAN LEVETIRACETAM: CPT | Performed by: PSYCHIATRY & NEUROLOGY

## 2019-11-20 PROCEDURE — 80048 BASIC METABOLIC PNL TOTAL CA: CPT | Performed by: PSYCHIATRY & NEUROLOGY

## 2019-11-20 PROCEDURE — 25000128 H RX IP 250 OP 636: Performed by: STUDENT IN AN ORGANIZED HEALTH CARE EDUCATION/TRAINING PROGRAM

## 2019-11-20 PROCEDURE — 85027 COMPLETE CBC AUTOMATED: CPT | Performed by: PSYCHIATRY & NEUROLOGY

## 2019-11-20 RX ORDER — DEXAMETHASONE 4 MG/1
4 TABLET ORAL DAILY
Qty: 1 TABLET | Refills: 0 | Status: SHIPPED | OUTPATIENT
Start: 2019-11-21 | End: 2019-11-25

## 2019-11-20 RX ORDER — DEXAMETHASONE 2 MG/1
2 TABLET ORAL DAILY
Status: DISCONTINUED | OUTPATIENT
Start: 2019-11-22 | End: 2019-11-20 | Stop reason: HOSPADM

## 2019-11-20 RX ORDER — DEXAMETHASONE 4 MG/1
4 TABLET ORAL DAILY
Status: DISCONTINUED | OUTPATIENT
Start: 2019-11-20 | End: 2019-11-20 | Stop reason: HOSPADM

## 2019-11-20 RX ORDER — DEXAMETHASONE 2 MG/1
2 TABLET ORAL DAILY
Qty: 2 TABLET | Refills: 0 | Status: SHIPPED | OUTPATIENT
Start: 2019-11-22 | End: 2019-11-25

## 2019-11-20 RX ADMIN — DEXAMETHASONE 4 MG: 4 TABLET ORAL at 10:28

## 2019-11-20 RX ADMIN — SERTRALINE HYDROCHLORIDE 25 MG: 25 TABLET ORAL at 08:06

## 2019-11-20 RX ADMIN — LEVETIRACETAM 1000 MG: 500 TABLET ORAL at 08:07

## 2019-11-20 RX ADMIN — ATENOLOL 50 MG: 50 TABLET ORAL at 08:05

## 2019-11-20 RX ADMIN — FOLIC ACID 1 MG: 1 TABLET ORAL at 08:07

## 2019-11-20 RX ADMIN — DILTIAZEM HYDROCHLORIDE 180 MG: 180 CAPSULE, COATED, EXTENDED RELEASE ORAL at 08:06

## 2019-11-20 RX ADMIN — LACOSAMIDE 100 MG: 100 TABLET, FILM COATED ORAL at 08:10

## 2019-11-20 RX ADMIN — LISINOPRIL 5 MG: 5 TABLET ORAL at 08:07

## 2019-11-20 RX ADMIN — HEPARIN SODIUM 2050 UNITS/HR: 10000 INJECTION, SOLUTION INTRAVENOUS at 01:40

## 2019-11-20 RX ADMIN — ENOXAPARIN SODIUM 100 MG: 100 INJECTION SUBCUTANEOUS at 13:43

## 2019-11-20 ASSESSMENT — ACTIVITIES OF DAILY LIVING (ADL)
ADLS_ACUITY_SCORE: 18
ADLS_ACUITY_SCORE: 17
ADLS_ACUITY_SCORE: 17
ADLS_ACUITY_SCORE: 18
ADLS_ACUITY_SCORE: 17

## 2019-11-20 ASSESSMENT — MIFFLIN-ST. JEOR: SCORE: 1828.19

## 2019-11-20 NOTE — PLAN OF CARE
Afebrile, VSS on RA.  Alert and oriented x3, disoriented to time.  Occasional word finding difficulty.  Perrla, right field cut - baseline.  Denies pain and nausea.  Craniotomy incision CDI with scabbing.  Tolerating regular diet, appetite good.  Voiding spontaneously.  Lovenox teaching done with Pt. And spouse.  Discharge instructions and medications reviewed with Pt. And spouse. Pt. Discharged to home.

## 2019-11-20 NOTE — PLAN OF CARE
[id number]        NEURO: Alert and oriented x3, disoriented to time.      RESPIRATORY: Room Air, denies dizziness, and SOB.    CARDIO: VSS, denies dizziness, and extremity pain.      GI/:  Denies N/V, BS active, void urine in urinal.     SKIN: Intact.      ACTIVITY: Assist x1     PAIN: Denies pain.    DLA:  PIV, heparin gtt, at 2050 Units/hour.     BG: No     PLAN: Continue monitoring.     Per Mobility Level Guideline;  Bed/chair alarm Yes.  Patient may ambulate stand by assist  Patient requires the following assistive equipment: None   PT/OT consult orders in place No

## 2019-11-20 NOTE — PLAN OF CARE
7D / Discharge Planner PT   Patient plan for discharge: Home with family, resumption of home PT  Current status: DGI performed and discussed, 19/24 indicating fall risk in community. Discussed pt okay to ambulate without SEC in community with spouse supervision. Ambulates 800' SBA without AD, initial L path deviation that improves with distance, WBOS. SBA for stairs with R rail.  Barriers to return to prior living situation: medical status  Recommendations for discharge: home with home PT progressing to OP PT  Rationale for recommendations: Pt would benefit from continued skilled therapy to increase strength, balance and balance challenges with gait to improve functional mobility and safety.     Physical Therapy Discharge Summary    Reason for therapy discharge:    Discharged to home with home therapy.    Progress towards therapy goal(s). See goals on [facility] electronic health record for goal details.  Goals partially met.  Barriers to achieving goals:   discharge from facility.    Therapy recommendation(s):    Continued therapy is recommended.  Rationale/Recommendations:  Continued PT to progress balance and IND with functional mobiltiy.

## 2019-11-20 NOTE — PLAN OF CARE
Pt transferred to unit from  at 1930. Afebrile, VSS. Q4hr neuros intact. Pt triggered sepsis, lactic acid 1.5. Denies pain/nausea. Pt on telemetry. Heparin drip infusing at 2050u/hr, next Hep10a draw with AM labs. Good appetite this evening. Good UOP. Friend at bedside for most of the evening. Bed alarm on overnight for pt's safety. Continue to monitor.       Per Mobility Level Guideline;  Bed/chair alarm Yes.  Patient may ambulate assist of 1  Patient requires the following assistive equipment: gait belt   PT/OT consult orders in place Yes

## 2019-11-20 NOTE — PROGRESS NOTES
11/20/19 1200   Signing Clinician's Name / Credentials   Signing clinician's name / credentials Eugene Ruiz DPT   Dynamic Gait Index (Hua and Garcia Cottle, 1995)   Gait Level Surface 2   Change in Gait Speed 2   Gait and Horizontal Head Turns 2   Gait with Vertical Head Turns 2   Gait and Pivot Turns 3   Step Over Obstacle 3   Step Around Obstacles 3   Steps 2   Total Dynamic Gait Index Score  (A score of 19 or less has been correlated to an increased risk of falls in community dwelling older adults, patients with vestibular disorders, and patients with MS.)   Total Score (out of 24) 19

## 2019-11-20 NOTE — DISCHARGE SUMMARY
[facility], [place]    Hematology / Oncology Discharge Summary    Admission date: 11/17/19  Discharge date: 11/20/19     Assessment & Plan    Jayden Lackey is a 64 year old with history of afib, glioblastoma of the left occipital lobe (diagnosed 3/2019, status post resection 3/2019 and again on 10/24/19 which was complicated by hygroma, IDH wild-type, MGMT methylated), secondary seizure disorder, admitted to the neuro ICU on 11/17/2019 for bilateral PEs, heparin drip and close neurological monitoring.  Head CT on on 11/19/19 was stable.  He was transferred out of neuro ICU to  on 11/20/19.    Past interventions for glioblastoma:  Symptom onset 1/11/2019 with right sided hemianopsia  Diagnosed and resected 3/2019  Chemoradiation 6/2019  Repeated resection for recurrent glioblastoma with ALA guidance 10/24/2019      The following problems were addressed during this hospital stay:     Neuro:  #L occipital lobe GBM s/p resection   #SDH/hygroma with 6 mm midline shift  - keep platelets >80k  - SBP < 180     #Localization-related epilepsy  - Levetiracetam 1000 mg BID  - Lacosamide 100 mg BID   - Seizure precautions    Follow-up with Dr. Olmos as scheduled on 11/25, MRI brain 11/25     CV:  #Atrial fibrillation  #Tachycardia vs AFIB with RVR   #Hypertension  -SBP < 180   Continue PTA antihypertensives at home:  - Atenolol 50 mg daily  - Diltiazem  daily  - Lisinopril 5 mg daily     Pulm:  - Continuous pulse ox monitoring  - Maintain O2 saturations > 92%  - lovenox 1mg/kg bid until follow-up with Dr. Olmos, at that time she will decide whether or not to continue lovenox or switch to DOAC, RN performed teaching with wife for this     GI/Nutrition:  Start dex today for appetite boost 4mg x 2 d, 2mg x 2 days    Code: FULL  Disposition: Discharge today, follow-up with Dr. Olmos as scheduled on Monday, discussed with NSG no need to follow-up with them unless changes on brain MRI (scheduled for  11/21 as well).    The plan was staffed with Dr. Stokes.    Linda Navarro PA-C    Interval History   His only complaint is decreased appetite and mild constipation..  He denies headache, chest pain, sob, abdominal pain, vomiting, diarrhea.      Physical Exam   Temp: 98.2  F (36.8  C) Temp src: Oral BP: 135/75 Pulse: 71 Heart Rate: 122 Resp: 18 SpO2: 95 % O2 Device: None (Room air)    Vitals:    11/17/19 1238   Weight: 98.5 kg (217 lb 3.2 oz)     Vital Signs with Ranges  Temp:  [96.3  F (35.7  C)-100  F (37.8  C)] 98.2  F (36.8  C)  Pulse:  [] 71  Heart Rate:  [] 122  Resp:  [16-24] 18  BP: (121-144)/() 135/75  SpO2:  [93 %-100 %] 95 %  I/O last 3 completed shifts:  In: 1444.08 [P.O.:1240; I.V.:204.08]  Out: 2550 [Urine:2550]      Constitutional: very pleasant, in NAD  ENT: mmm, neck is supple  Respiratory: CTAB, normal effort  Cardiovascular: nl s1/s2 no MRGs  GI: abdomen is soft, NT, +BS  Skin: warm, dry  Musculoskeletal: mild edema bilat lower extremities  Neurologic: awake/alert, speech is clear, answers questions appropriately        Medications       atenolol  50 mg Oral or Feeding Tube Daily     [START ON 11/22/2019] dexamethasone  2 mg Oral Daily     dexamethasone  4 mg Oral Daily     diltiazem ER COATED BEADS  180 mg Oral Daily     folic acid  1 mg Oral or Feeding Tube Daily     Lacosamide  100 mg Oral or Feeding Tube BID     levETIRAcetam  1,000 mg Oral or Feeding Tube BID     lisinopril  5 mg Oral or Feeding Tube Daily     sertraline  25 mg Oral or Feeding Tube Daily       Data   Results for orders placed or performed during the hospital encounter of 11/17/19 (from the past 24 hour(s))   Lactic acid level STAT   Result Value Ref Range    Lactate for Sepsis Protocol 1.5 0.7 - 2.0 mmol/L   Glucose by meter   Result Value Ref Range    Glucose 110 (H) 70 - 99 mg/dL   Heparin Xa (10a) Level   Result Value Ref Range    Heparin 10A Level 0.46 IU/mL   Basic metabolic panel   Result  Value Ref Range    Sodium 140 133 - 144 mmol/L    Potassium 3.6 3.4 - 5.3 mmol/L    Chloride 109 94 - 109 mmol/L    Carbon Dioxide 23 20 - 32 mmol/L    Anion Gap 8 3 - 14 mmol/L    Glucose 109 (H) 70 - 99 mg/dL    Urea Nitrogen 7 7 - 30 mg/dL    Creatinine 0.70 0.66 - 1.25 mg/dL    GFR Estimate >90 >60 mL/min/[1.73_m2]    GFR Estimate If Black >90 >60 mL/min/[1.73_m2]    Calcium 8.8 8.5 - 10.1 mg/dL   CBC with platelets   Result Value Ref Range    WBC 4.2 4.0 - 11.0 10e9/L    RBC Count 3.32 (L) 4.4 - 5.9 10e12/L    Hemoglobin 9.5 (L) 13.3 - 17.7 g/dL    Hematocrit 28.9 (L) 40.0 - 53.0 %    MCV 87 78 - 100 fl    MCH 28.6 26.5 - 33.0 pg    MCHC 32.9 31.5 - 36.5 g/dL    RDW 18.7 (H) 10.0 - 15.0 %    Platelet Count 126 (L) 150 - 450 10e9/L   US Lower Extremity Venous Duplex Bilateral   Result Value Ref Range    Radiologist flags DVT (Urgent)     Narrative    EXAMINATION: US LOWER EXTREMITY VENOUS DUPLEX BILATERAL, 11/20/2019  10:57 AM     COMPARISON: None.    HISTORY: New PE. Evaluate DVT.    TECHNIQUE:  Gray-scale evaluation with compression, spectral flow and  color Doppler assessment of the deep venous system of both legs from  groin to knee, and then at the ankles.    FINDINGS:  In the right lower extremity, the common femoral, femoral, popliteal  and posterior tibial veins demonstrate normal compressibility and  blood flow.    In the left lower extremity the popliteal vein is partially  compressible, containing hypoechoic thrombus. The paired left  posterior tibial veins at the mid calf are noncompressible. The left  common femoral vein and femoral vein are fully compressible. The left  peroneal vein could not be visualized.        Impression    IMPRESSION:    1. Nonocclusive deep venous thrombosis in the left popliteal vein and  occlusive thrombus in the paired left posterior tibial veins at the  mid calf.  2. No deep venous thrombosis in the right lower extremity.      [Access Center: DVT]    This report will be  copied to the [facility] to ensure a  provider acknowledges the finding. [facility] is available Monday  through Friday 8am-3:30 pm.     UMA FATIMA, DO

## 2019-11-20 NOTE — PLAN OF CARE
Neuro: PERRL; Equal strength in all extremities; word finding difficulty; right field vision cut; seizure pads in place due to history of seizures. Head CT done to evaluate SDH, stable per team. Per request of wife, recheck Keppra levels in the AM due to missing a previous appointment.  Cardiac: Atrial fibrillation; rate 130's-140's at start of shift and settled to 80's-100's after receiving scheduled morning medications, did not need PRN medications; Heparin Gtt therapeutic at 1230 (0.40) team notified; Systolic goal of less than 180, maintained throughout the day; Afebrile.  Resp: Sating % on room air; lung sounds clear bilaterally.   GI/: Tolerating regular diet, no signs of nausea; passing gas and stool adequately; producing clear adequate urine.  Musc/Skel: Standby assist when ambulating; leans more to the left side but is aware and often corrects himself. Ambulated around the unit 4 times  Skin: Incision on posterior head is intact, open to air.     Plan: Monitor Heparin gtt; transition to PO anticoagulant management 11/20; Transfer to  at 1830

## 2019-11-20 NOTE — PROGRESS NOTES
[facility], [place]   Neurosurgery Progress Note:    Assessment: Jayden Laceky is a 64 year old male with left parietal GBM s/p resection on 10/24/19 presenting with pulmonary embolism. Admitted for heparin drip. Switched to high intensity dose on 11/18/19.    Plan:  - Serial neuro exams Q4 hrs  - Ok to transfer oral anti-coagulation, preferably warfarin since it can be reversed  - Please obtain head CT when INR therapeutic and contact neurosurgery team for review    -----------------------------------  Misael Best MD, MS  Neurosurgery PGY-3    Subjective: No acute events overnight.    Objective:   Temp:  [96.3  F (35.7  C)-100  F (37.8  C)] 98.2  F (36.8  C)  Pulse:  [] 71  Heart Rate:  [] 122  Resp:  [16-24] 18  BP: (121-144)/() 135/75  SpO2:  [93 %-99 %] 95 %  I/O last 3 completed shifts:  In: 1444.08 [P.O.:1240; I.V.:204.08]  Out: 2550 [Urine:2550]    Awake, alert, oriented x3  Cranial nerves: right homonymous hemianopsia (baseline), face symmetric, tongue midline  Strength: 5/5 in upper and lower extremities  No sensory abnormalities    LABS  Recent Labs   Lab Test 11/20/19  0626 11/19/19  0538 11/18/19  0703   WBC 4.2 3.4* 3.2*   HGB 9.5* 9.3* 8.9*   MCV 87 87 87   * 110* 103*       Recent Labs   Lab Test 11/20/19  0626 11/19/19  0538 11/18/19  0703    142 142   POTASSIUM 3.6 3.5 3.6   CHLORIDE 109 111* 111*   CO2 23 25 24   BUN 7 7 6*   CR 0.70 0.67 0.66   ANIONGAP 8 6 8   BEATRIS 8.8 8.4* 8.4*   * 110* 105*

## 2019-11-21 LAB — LEVETIRACETAM SERPL-MCNC: 18 UG/ML (ref 12–46)

## 2019-11-23 LAB
BACTERIA SPEC CULT: NO GROWTH
BACTERIA SPEC CULT: NO GROWTH
Lab: NORMAL
Lab: NORMAL
SPECIMEN SOURCE: NORMAL
SPECIMEN SOURCE: NORMAL

## 2019-11-23 NOTE — PROGRESS NOTES
NEURO-ONCOLOGY VISIT  Nov 25, 2019    CHIEF COMPLAINT: Mr. 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 He completed chemoradiotherapy on 6/11/2019. Imaging in 9/2019 was consistent with recurrent disease and adjuvant-dosed temozolomide was stop. Pathology from repeat resection on 10/24/2019 was consistent with recurrent tumor.     Quality of life has been compromised by recurrent seizure events, recent PE with requiring prolonged hospitalization, and chemotherapy related toxicities.      Jayden is presenting in follow-up for continued evaluation accompanied by Oz (wife).      HISTORY OF PRESENT ILLNESS  -Jayden was admitted last week for management of PE. Started on Lovenox. Injections going well, limited bruising.   -No recurrent seizures.   -Otherwise recovered from surgery.  -Feels that QOL is fair, has a GI illness today (GI discomfort and looser stools).   -With weaning steroids, worsening knee pain. Wanting to take a daily low dose.   -Mood is down, but improved with depression medications.   -Continues to struggle with word finding, much more difficulty reading, and difficulty with numbers.  -Working with OT on visual loss and being more aware. Visual field cut stable.   -Working with speech therapy; mild short-term memory issues and poor attention.     REVIEW OF SYSTEMS  A comprehensive ROS negative except as in HPI.      MEDICATIONS   Current Outpatient Medications   Medication Sig Dispense Refill     acetaminophen (TYLENOL) 325 MG tablet Take 2 tablets (650 mg) by mouth every 4 hours as needed for other (multimodal surgical pain management along with NSAIDS and opioid medication as indicated based on pain control and physical function.) 28 tablet 3     atenolol (TENORMIN) 100 MG tablet Take 50 mg by mouth daily       cholecalciferol (VITAMIN D3) 5000  units (125 mcg) capsule Take 5,000 Units by mouth daily       dexamethasone (DECADRON) 2 MG tablet Take 1 tablet (2 mg) by mouth daily (with breakfast) 30 tablet 3     diltiazem ER (DILT-XR) 180 MG 24 hr capsule Take 180 mg by mouth daily       enoxaparin ANTICOAGULANT (LOVENOX) 100 MG/ML syringe Inject 0.98 mLs (98 mg) Subcutaneous every 12 hours 28 Syringe 0     folic acid (FOLVITE) 1 MG tablet Take 1 mg by mouth daily        Lacosamide (VIMPAT) 100 MG TABS tablet Take 100 mg by mouth 2 times daily        levETIRAcetam (KEPPRA) 1000 MG tablet Take 1,000 mg by mouth 2 times daily   1     lisinopril (PRINIVIL/ZESTRIL) 5 MG tablet Take 1 tablet (5 mg) by mouth daily 28 tablet 0     methotrexate 50 MG/2ML injection Take 0.7 mL (17.5 mg) by mouth every 7 days on Fridays.       Nutritional Supplements (JUICE PLUS FIBRE PO) Take 1 tablet by mouth every morning        sertraline (ZOLOFT) 100 MG tablet Take 1 tablet (100 mg) by mouth daily 30 tablet 3     DRUG ALLERGIES   Allergies   Allergen Reactions     Shellfish Allergy Difficulty breathing, Nausea and Vomiting, Swelling and Shortness Of Breath     Ativan [Lorazepam] Other (See Comments)     Hallucinations, delirium     No Clinical Screening - See Comments      Lupine bean doesn't remember reaction     Other (Do Not Use)      Lupine bean doesn't remember reaction       ONCOLOGIC HISTORY  -1/2019 PRESENTATION: Visual disturbance.   -3/6/2019 Evaluated by Dr. Mauricio, neuro-ophthalmologist, found to have a right sided homonymous hemianopsia. MRI ordered.   -3/6/2019 MR brain imaging revealed a contrast-enhancing lesion in the left occipital lobe suspicious for a high-grade primary brain tumor.  -3/22/2019 SURGERY: Craniotomy with a gross total resection by Dr. Irving Hayes.   PATHOLOGY: Glioblastoma; Wildtype status for IDH1/2 by next generation sequencing. MGMT promoter methylated. Wildtype PTEN, TP53.  -4/8/2019 NEURO-ONC: Recommending chemoradiotherapy.   -4/30 -  6/11/2019 CHEMORADS 6000cGy in 30 fractions with concurrent temozolomide 75mg/m2 (180mg).  -5/20/2019 NEURO-ONC: Not interested in Optune at this time.   -6/12/2019 NEURO-ONC: Clinically stable.   -6/21 - 6/28/2019 ADMISSION/ SZ: Admitted to [facility] for first ever seizure event, provoked by hyponatremia. Urine studies consistent with SIADH ; etiology of SIADH unclear. Had 10mm gastric lesion biopsied on 6/26/2019 by way of an EGD.  PATHOLOGY of gastric lesion: Hyperplastic polyp with erosion and foci of atypia; favor reactive. No intestinal metaplasia, no helicobacter pylori, no high grade dysplasia or invasive carcinoma.   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   -7/12/2019 NEURO-ONC/ MRB/ CHEMO: Clinical stable. Imaging with stable to slightly increased enhancement of previously seen nodular lesions; associated with mild degree of elevated rCBV. Starting adjuvant-dosed temozolomide 150mg/m2 (360mg), cycle 1 (start date 7/12/2019). Monitor fluid intake, will be checking CMP/ Na+.   -8/12/2019 NEURO-ONC/ MRB/ CHEMO: Clinical decompensated, but improving since hospital discharge. MR brain scan from last week with concern for non-contrast enhancing >> enhancing disease progression. Ordering a PET brain scan. Holding adjuvant-dosed temozolomide, cycle 3.  -10/24/2019 SURGERY: Repeat resection by Dr. Packer.  PATHOLOGY: Recurrent glioblastoma.   -11/25/2019 NEURO-ONC/ MRB/ CHEMO: Clinically doing well. Imaging with positive resection. Starting Lomustine (start date of 12/2). Next generation sequencing.     SOCIAL HISTORY   Tobacco use: Former smoker, quit 40 years ago.   Alcohol use: Social use.  Drug use: Denies marijuana use.  Supplement, complimentary/ alternative medicine: None.    .   Employment: On disability.       PHYSICAL EXAMINATION  /69   Pulse 74   Temp 97.5  F (36.4  C)   Resp 16   Wt 97.5 kg (215 lb)   SpO2 100%   BMI 27.60 kg/m     Wt Readings from Last 2 Encounters:   11/25/19  "97.5 kg (215 lb)   11/20/19 96.3 kg (212 lb 6.4 oz)      Ht Readings from Last 2 Encounters:   11/17/19 1.88 m (6' 2\")   11/07/19 1.88 m (6' 2\")     KPS: 70    -Generally well appearing. Slightly fatigued and depressed.   -Respiratory: Normal breath sounds, no audible wheezing.   -Skin: No rashes. Healed head incision.  -Hematologic/ lymphatic: Bruising on waist. No leg swelling.  -Psychiatric: Flat mood and affect. Pleasant, talkative.  -Neurologic:   MENTAL STATUS:     Alert, easily confused.    Repeating questions.     Recall: Impaired.   Speech nearly fluent, but with hesitation.    Comprehension intact to multi-step commands, but confused/ poor attention.   Good right-left orientation.     CRANIAL NERVES:     Pupils are equal, round, reactive to light.     Extraocular movements full, patient denies diplopia.     Homonymous hemianopsia, right-side.    Facial sensation intact to light touch.   Symmetric facial movements.   Hearing intact.   Palate moves symmetrically.     Tongue midline.  MOTOR:    Normal and symmetric tone.   No pronation or drift. No orbiting.  SENSATION:    Intact to light touch throughout.  COORDINATION:   Intact finger-nose with eyes open and closed bilaterally.   GAIT:  Sitting in wheelchair. Did not walk. Walks with a cane per report and leans to the left.      MEDICAL RECORDS  Obtained and personally reviewed all available outside medical records in addition to reviewing any records available in our electronic system.     LABS  Personally reviewed all available lab results.     IMAGING  Personally reviewed MR brain imaging from today and compared to immediate post-operative imaging. To my eye, the nodules of contrast enhancement were gross totally resected. The remaining contrast enhancement is not worrisome as perfusion is not elevated. T2 FLAIR is overall decreased throughout the temporal and occipital lobe.     Imaging was shown to and results were reviewed with Jayden and " Oz.    Imaging discussed at Brain Tumor Conference.       IMPRESSION  Clinic time was spent discussing in detail the nature of this tumor in light of post-operative imaging. This is in addition to providing emotional support, answering questions pertaining to my recommendations, and devising the treatment plan as outlined below.    Imaging is not worrisome for overt residual disease. Clinically doing well. However, with next line of therapy, Jayden is worried about side effects negatively impacting quality of life.     With regard to cancer-directed therapy, Jayden did not tolerate temozolomide well and progressed while on this treatment. Will start Lomustine and monitor quality of life closely. Will hold on starting Avastin as patient is doing well and is largely asymptomatic. Optune remains a poor/ inadequate option for him.     Consented Jayden for next generation sequencing to look for targeted therapy, but Jayden did not sign paperwork. Will mail to home for signature.     Jayden is following with neurology that is managing Keppra and Vimpat.     Continue anticoagulation.     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   Imaging reviewed; Positive results from surgery.   Chemotherapy: Lomustine 200mg.  Take Zofran 30 minutes prior to chemotherapy at bedtime.   Pharmacy to do teaching.   Start date: 12/2.   Labs weekly starting the week of 12/16 at [facility].   Next generation sequencing consented, will send testing.    -STEROIDS-  -Dexamethasone 2mg daily for now.     -SEIZURE MANAGEMENT-  -Given history of seizures, will continue Keppra + Vimpat.  -Epilepsy provoked by intracranial pathology, but also hyponatremia related to SIADH of unclear etiology.   -Followed by neurology at  [facility].    -Quality of life/ MOOD/ FATIGUE-  -Continued mood issues; depression, poor coping.  -Zoloft 50mg (1/2 tab) for 1 week and then, take 1 tab (100mg) daily.  -Continue to monitor mood as untreated/ undertreated depression can worsen fatigue, dysorexia, and quality of life.  -Added to Brain Tumor Support Group email list; [email or web address]  -Continue physical therapies.     -HICCUPS-  -Pepcid (refilled).  -? Steroid related.  -Possibly Baclofen (muscle relaxer) if needed.    -HYPONATREMIA/ History of SIADH-  -Monitor amount of fluid intake.   -Continue to track sodium level on routine CMP.    -MEMORY COMPLAINTS-  -Consideration for referral to Dr. Aleksandr Garcia for neuro-psych testing.   -Continue speech therapy.    -VISUAL FIELD LOSS-  -Looking to follow-up with neuro-ophtho.   -Continue following with OT.     -DVT/ PE-  -Need for lifelong anticoagulation.  -Continue Lovenox.   -Can consider an oral 10a inhibitor in the future if not tolerating injections.     -OTHER SUPPORTIVE MEDICATIONS-  -Continue methotrexate for arthritis. No drug-drug interactions with temozolomide, Zofran, or pentamidine. Bactrim with drug drug interactions.   -Awaiting rheumatology appt for evaluation of RA.    -ADDITIONAL SUPPORTIVE MANAGEMENT-  -Social work following.   -CT with lung nodules. [facility].   -S/p EGD with benign pathology of the 10mm gastric antrum/pylorus posterior wall lesion.  -Breast biopsy showed necrotic issue, no malignancy.    Return to clinic in 2 months + repeat imaging.       In the meantime, Jayden dorys Oz know to call with questions or concerns or to report new complaints and can be seen sooner if needed. Urgent evaluation is needed in the setting of acute onset of severe headache, abrupt change in mental status, on-going seizures, new focal deficits, or new leg swelling/ pain.    Nasra Olmos MD  Neuro-oncology

## 2019-11-23 NOTE — PATIENT INSTRUCTIONS
Imaging reviewed; Positive results from surgery.   Chemotherapy: Lomustine 200mg.  Take Zofran 30 minutes prior to chemotherapy at bedtime.   Pharmacy to do teaching.   Start date: 12/2.   Labs weekly starting the week of 12/16 at [facility].     Continue physical therapies.   Dexamethasone 2mg daily (prescription sent).    Zoloft 50mg (1/2 tab) for 1 week and then, take 1 tab (100mg) daily (prescription sent).   Continue Lovenox for now (1mL twice a day; prescription sent).     Next generation sequencing consented, will send testing.    Return to clinic in 2 months + repeat imaging.      Nasra Olmos MD  Neuro-oncology  11/25/2019

## 2019-11-23 NOTE — PROGRESS NOTES
NEURO-ONCOLOGY VISIT  Nov 25, 2019    CHIEF COMPLAINT: Mr. 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 He completed chemoradiotherapy on 6/11/2019. Imaging in 9/2019 was consistent with recurrent disease and adjuvant-dosed temozolomide was stop. Pathology from repeat resection on 10/24/2019 was consistent with recurrent tumor.     Quality of life has been compromised by recurrent seizure events, recent PE with requiring prolonged hospitalization, and chemotherapy related toxicities.      Jayden is presenting in follow-up for continued evaluation accompanied by Oz (wife).      HISTORY OF PRESENT ILLNESS  -Jayden was admitted from last week for management of PE. Started on Lovenox. Injections going well.   -No recurrent seizures.   -Otherwise recovering from surgery.  -Feels that QOL remains poor.   -Endorsing hiccups.   -Mood is depressed, but improved with depression medications.   -Continues to struggle with word finding, now reading, and numbers  -Working with OT on visual loss and being more aware. Visual field cut stable.   -Working with speech therapy; mild short-term memory issues and poor attention.     REVIEW OF SYSTEMS  A comprehensive ROS negative except as in HPI.      MEDICATIONS   Current Outpatient Medications   Medication Sig Dispense Refill     acetaminophen (TYLENOL) 325 MG tablet Take 2 tablets (650 mg) by mouth every 4 hours as needed for other (multimodal surgical pain management along with NSAIDS and opioid medication as indicated based on pain control and physical function.) 28 tablet 3     atenolol (TENORMIN) 100 MG tablet Take 50 mg by mouth daily       cholecalciferol (VITAMIN D3) 5000 units (125 mcg) capsule Take 5,000 Units by mouth daily       dexamethasone (DECADRON) 2 MG tablet Take 1 tablet (2 mg) by mouth daily 2 tablet 0      dexamethasone (DECADRON) 4 MG tablet Take 1 tablet (4 mg) by mouth daily 1 tablet 0     diltiazem ER (DILT-XR) 180 MG 24 hr capsule Take 180 mg by mouth daily       enoxaparin ANTICOAGULANT (LOVENOX) 100 MG/ML syringe Inject 0.96 mLs (96 mg) Subcutaneous every 12 hours 28 Syringe 0     folic acid (FOLVITE) 1 MG tablet Take 1 mg by mouth daily        Lacosamide (VIMPAT) 100 MG TABS tablet Take 100 mg by mouth 2 times daily        levETIRAcetam (KEPPRA) 1000 MG tablet Take 1,000 mg by mouth 2 times daily   1     lisinopril (PRINIVIL/ZESTRIL) 5 MG tablet Take 1 tablet (5 mg) by mouth daily 28 tablet 0     methotrexate 50 MG/2ML injection Take 0.7 mL (17.5 mg) by mouth every 7 days on Fridays.       Nutritional Supplements (JUICE PLUS FIBRE PO) Take 1 tablet by mouth every morning        sertraline (ZOLOFT) 25 MG tablet Take 1 tablet (25 mg) by mouth daily 30 tablet 3     DRUG ALLERGIES   Allergies   Allergen Reactions     Shellfish Allergy Difficulty breathing, Nausea and Vomiting, Swelling and Shortness Of Breath     Ativan [Lorazepam] Other (See Comments)     Hallucinations, delirium     No Clinical Screening - See Comments      Lupine bean doesn't remember reaction     Other (Do Not Use)      Lupine bean doesn't remember reaction       ONCOLOGIC HISTORY  -1/2019 PRESENTATION: Visual disturbance.   -3/6/2019 Evaluated by Dr. Mauricio, neuro-ophthalmologist, found to have a right sided homonymous hemianopsia. MRI ordered.   -3/6/2019 MR brain imaging revealed a contrast-enhancing lesion in the left occipital lobe suspicious for a high-grade primary brain tumor.  -3/22/2019 SURGERY: Craniotomy with a gross total resection by Dr. Irving Hayes.   PATHOLOGY: Glioblastoma; Wildtype status for IDH1/2 by next generation sequencing. MGMT promoter methylated. Wildtype PTEN, TP53.  -4/8/2019 NEURO-ONC: Recommending chemoradiotherapy.   -4/30 - 6/11/2019 CHEMORADS 6000cGy in 30 fractions with concurrent temozolomide 75mg/m2  "(180mg).  -5/20/2019 NEURO-ONC: Not interested in Optune at this time.   -6/12/2019 NEURO-ONC: Clinically stable.   -6/21 - 6/28/2019 ADMISSION/ SZ: Admitted to [facility] for first ever seizure event, provoked by hyponatremia. Urine studies consistent with SIADH ; etiology of SIADH unclear. Had 10mm gastric lesion biopsied on 6/26/2019 by way of an EGD.  PATHOLOGY of gastric lesion: Hyperplastic polyp with erosion and foci of atypia; favor reactive. No intestinal metaplasia, no helicobacter pylori, no high grade dysplasia or invasive carcinoma.   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   -7/12/2019 NEURO-ONC/ MRB/ CHEMO: Clinical stable. Imaging with stable to slightly increased enhancement of previously seen nodular lesions; associated with mild degree of elevated rCBV. Starting adjuvant-dosed temozolomide 150mg/m2 (360mg), cycle 1 (start date 7/12/2019). Monitor fluid intake, will be checking CMP/ Na+.   -8/12/2019 NEURO-ONC/ MRB/ CHEMO: Clinical decompensated, but improving since hospital discharge. MR brain scan from last week with concern for non-contrast enhancing >> enhancing disease progression. Ordering a PET brain scan. Holding adjuvant-dosed temozolomide, cycle 3.  -10/24/2019 SURGERY: Repeat resection by Dr. Packer.  PATHOLOGY:    SOCIAL HISTORY   Tobacco use: Former smoker, quit 40 years ago.   Alcohol use: Social use.  Drug use: Denies marijuana use.  Supplement, complimentary/ alternative medicine: None.    .   Employment: On disability.       PHYSICAL EXAMINATION  There were no vitals taken for this visit.   Wt Readings from Last 2 Encounters:   11/20/19 96.3 kg (212 lb 6.4 oz)   11/07/19 99.1 kg (218 lb 6.4 oz)      Ht Readings from Last 2 Encounters:   11/17/19 1.88 m (6' 2\")   11/07/19 1.88 m (6' 2\")     KPS: 70    -Generally well appearing. Slightly fatigued.   -Respiratory: Normal breath sounds, no audible wheezing.   -Skin: No rashes. Healed head incision. Hair regrowing. "   -Hematologic/ lymphatic: Bruising on arms from IV use. No leg swelling.  -Psychiatric: Flat mood and affect. Pleasant, talkative.  -Neurologic:   MENTAL STATUS:     Alert, easily confused.    Repeating questions.     Recall: Impaired.   Issues to math, numbers.     Slowed reading speed.    Speech nearly fluent, but with hesitation.    Comprehension intact to multi-step commands.   Near normal naming, repetition.   Good right-left orientation.     CRANIAL NERVES:     Pupils are equal, round, reactive to light.     Extraocular movements full, patient denies diplopia.     Homonymous hemianopsia, right-side.    Facial sensation intact to light touch.   Symmetric facial movements.   Hearing intact.   Palate moves symmetrically.     Tongue midline.  MOTOR:    Normal and symmetric tone.   Grossly 5/5 throughout.    No pronation or drift. No orbiting.   Able to rise from a chair without use of arms.  SENSATION:    Intact to light touch throughout.  COORDINATION:   Intact finger-nose with eyes open and closed bilaterally.   GAIT:  Walks without assistance.   Good speed. Normal stride length and heel strike. Normal turns. Normal arm swing.      MEDICAL RECORDS  Obtained and personally reviewed all available outside medical records in addition to reviewing any records available in our electronic system.     LABS  Personally reviewed all available lab results.     IMAGING  Personally reviewed outside MR brain imaging from last week and compared to prior imaging. To my eye, the nodules of contrast enhancement now have necrotic centers, but have joined into a larger contrast enhancing mass. T2 FLAIR is increased with extension into the medial temporal lobe, very distant from contrast enhancing disease in the occipital lobe.     Imaging was shown to and results were reviewed with Aracelis.      IMPRESSION  Clinic time was spent discussing in detail the nature of this tumor in light of concerning imaging from when he was  hospitalized for seizures and how this effects his current treatment plan. This is in addition to providing emotional support, answering questions pertaining to my recommendations, and devising the treatment plan as outlined below.    Imaging is concerning for non-contrast enhancing >> contrast enhancing disease progression with extension into the medial temporal lobe. Involvement of the temporal lobe can certainly be the etiology of his recurrent seizure event with associated and prolonged confusion, memory issues, and aphasia. Will order a PET brain scan to further evaluate.     With regard to cancer-directed therapy, Jayden said that he is not tolerating temozolomide well and that his quality of life is grossly effected by fatigue + these recurrent hospitalizations for seizures. He is wanting to stop temozolomide. Pending the PET brain scan, if results are consistent with disease progression there can be a consideration for Avastin monotherapy and/ or Optune or hospice. If imaging is consistent with pseudoprogression will need to discuss better symptomatic support with adjuvant temozolomide and/ or Optune.     Jayden is following with neurology that is managing Keppra and Vimpat.     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 Will never look to dose increase to 200mg/m2 if restarted.   -Plans pending PET brain scan.     -STEROIDS-  -Dexamethasone taper;    -Until 9/17: 8mg in AM and 4mg at noon.    -9/18 - 9/23: 8mg in AM   -9/24 - 9/29: 4mg in AM  -Oz to call with new symptoms related to dose decrease in steroids.     -SEIZURE MANAGEMENT-  -Given history of seizures, will continue Keppra + Vimpat.  -Epilepsy provoked by intracranial pathology, but also  hyponatremia related to SIADH of unclear etiology.   -Followed by neurology at [facility].    -Quality of life/ MOOD/ FATIGUE-  -Continued mood issues; depression, poor coping.  -Previously discussed starting Zoloft.  -Continue to monitor mood as untreated/ undertreated depression can worsen fatigue, dysorexia, and quality of life.  -Added to Brain Tumor Support Group email list; [email or web address]    -HICCUPS-  -Pepcid (refilled).  -Weaning dexamethasone.  -Possibly Baclofen (muscle relaxer) if needed.    -HYPONATREMIA/ History of SIADH-  -Monitor amount of fluid intake.   -Continue to track sodium level on routine CMP.    -MEMORY COMPLAINTS-  -Consideration for referral to Dr. Aleksandr Garcia for neuro-psych testing.   -Continue speech therapy.    -VISUAL FIELD LOSS-  -Looking to follow-up with neuro-ophtho.   -Continue following with OT.     -OTHER SUPPORTIVE MEDICATIONS-  -Continue methotrexate for arthritis. No drug-drug interactions with temozolomide, Zofran, or pentamidine. Bactrim with drug drug interactions.     -ADDITIONAL SUPPORTIVE MANAGEMENT-  -Social work following.   -CT with lung nodules. Following with [facility].   -S/p EGD with benign pathology of the 10mm gastric antrum/pylorus posterior wall lesion.  -Breast biopsy showed necrotic issue, no malignancy.    Return to clinic ***.       In the meantime, Aracelis know to call with questions or concerns or to report new complaints and can be seen sooner if needed. Urgent evaluation is needed in the setting of acute onset of severe headache, abrupt change in mental status, on-going seizures, new focal deficits, or new leg swelling/ pain.    Nasra Olmos MD  Neuro-oncology

## 2019-11-25 ENCOUNTER — TUMOR CONFERENCE (OUTPATIENT)
Dept: ONCOLOGY | Facility: CLINIC | Age: 64
End: 2019-11-25

## 2019-11-25 ENCOUNTER — ANCILLARY PROCEDURE (OUTPATIENT)
Dept: MRI IMAGING | Facility: CLINIC | Age: 64
End: 2019-11-25
Attending: PSYCHIATRY & NEUROLOGY
Payer: COMMERCIAL

## 2019-11-25 ENCOUNTER — RECORDS - HEALTHEAST (OUTPATIENT)
Dept: ADMINISTRATIVE | Facility: OTHER | Age: 64
End: 2019-11-25

## 2019-11-25 ENCOUNTER — APPOINTMENT (OUTPATIENT)
Dept: LAB | Facility: CLINIC | Age: 64
End: 2019-11-25
Attending: PSYCHIATRY & NEUROLOGY
Payer: COMMERCIAL

## 2019-11-25 ENCOUNTER — ONCOLOGY VISIT (OUTPATIENT)
Dept: ONCOLOGY | Facility: CLINIC | Age: 64
End: 2019-11-25

## 2019-11-25 ENCOUNTER — ONCOLOGY VISIT (OUTPATIENT)
Dept: ONCOLOGY | Facility: CLINIC | Age: 64
End: 2019-11-25
Attending: PSYCHIATRY & NEUROLOGY
Payer: COMMERCIAL

## 2019-11-25 VITALS
WEIGHT: 215 LBS | OXYGEN SATURATION: 100 % | RESPIRATION RATE: 16 BRPM | DIASTOLIC BLOOD PRESSURE: 69 MMHG | TEMPERATURE: 97.5 F | SYSTOLIC BLOOD PRESSURE: 101 MMHG | BODY MASS INDEX: 27.6 KG/M2 | HEART RATE: 74 BPM

## 2019-11-25 DIAGNOSIS — Z79.899 ENCOUNTER FOR LONG-TERM (CURRENT) USE OF MEDICATIONS: ICD-10-CM

## 2019-11-25 DIAGNOSIS — I26.99 ACUTE PULMONARY EMBOLISM WITHOUT ACUTE COR PULMONALE, UNSPECIFIED PULMONARY EMBOLISM TYPE (H): ICD-10-CM

## 2019-11-25 DIAGNOSIS — C71.9 GLIOBLASTOMA (H): Primary | ICD-10-CM

## 2019-11-25 DIAGNOSIS — C71.9 GLIOBLASTOMA (H): ICD-10-CM

## 2019-11-25 LAB
ALBUMIN SERPL-MCNC: 2.7 G/DL (ref 3.4–5)
ALP SERPL-CCNC: 74 U/L (ref 40–150)
ALT SERPL W P-5'-P-CCNC: 40 U/L (ref 0–70)
ANION GAP SERPL CALCULATED.3IONS-SCNC: 6 MMOL/L (ref 3–14)
AST SERPL W P-5'-P-CCNC: 21 U/L (ref 0–45)
BILIRUB SERPL-MCNC: 0.5 MG/DL (ref 0.2–1.3)
BUN SERPL-MCNC: 13 MG/DL (ref 7–30)
CALCIUM SERPL-MCNC: 8.5 MG/DL (ref 8.5–10.1)
CHLORIDE SERPL-SCNC: 108 MMOL/L (ref 94–109)
CO2 SERPL-SCNC: 23 MMOL/L (ref 20–32)
CREAT SERPL-MCNC: 0.83 MG/DL (ref 0.66–1.25)
ERYTHROCYTE [DISTWIDTH] IN BLOOD BY AUTOMATED COUNT: 18 % (ref 10–15)
GFR SERPL CREATININE-BSD FRML MDRD: >90 ML/MIN/{1.73_M2}
GLUCOSE SERPL-MCNC: 104 MG/DL (ref 70–99)
HCT VFR BLD AUTO: 32.2 % (ref 40–53)
HGB BLD-MCNC: 10.5 G/DL (ref 13.3–17.7)
MCH RBC QN AUTO: 29.1 PG (ref 26.5–33)
MCHC RBC AUTO-ENTMCNC: 32.6 G/DL (ref 31.5–36.5)
MCV RBC AUTO: 89 FL (ref 78–100)
PLATELET # BLD AUTO: 87 10E9/L (ref 150–450)
POTASSIUM SERPL-SCNC: 4 MMOL/L (ref 3.4–5.3)
PROT SERPL-MCNC: 6.4 G/DL (ref 6.8–8.8)
RBC # BLD AUTO: 3.61 10E12/L (ref 4.4–5.9)
SODIUM SERPL-SCNC: 138 MMOL/L (ref 133–144)
WBC # BLD AUTO: 7.1 10E9/L (ref 4–11)

## 2019-11-25 PROCEDURE — 36415 COLL VENOUS BLD VENIPUNCTURE: CPT

## 2019-11-25 PROCEDURE — G0463 HOSPITAL OUTPT CLINIC VISIT: HCPCS | Mod: ZF

## 2019-11-25 PROCEDURE — 99215 OFFICE O/P EST HI 40 MIN: CPT | Mod: ZP | Performed by: PSYCHIATRY & NEUROLOGY

## 2019-11-25 PROCEDURE — 80053 COMPREHEN METABOLIC PANEL: CPT | Performed by: PHYSICIAN ASSISTANT

## 2019-11-25 PROCEDURE — 85027 COMPLETE CBC AUTOMATED: CPT | Performed by: PHYSICIAN ASSISTANT

## 2019-11-25 RX ORDER — GADOBUTROL 604.72 MG/ML
10 INJECTION INTRAVENOUS ONCE
Status: COMPLETED | OUTPATIENT
Start: 2019-11-25 | End: 2019-11-25

## 2019-11-25 RX ORDER — DEXAMETHASONE 2 MG/1
2 TABLET ORAL
Qty: 30 TABLET | Refills: 3 | Status: SHIPPED | OUTPATIENT
Start: 2019-11-25 | End: 2020-01-06

## 2019-11-25 RX ORDER — SERTRALINE HYDROCHLORIDE 100 MG/1
100 TABLET, FILM COATED ORAL DAILY
Qty: 30 TABLET | Refills: 3 | Status: SHIPPED | OUTPATIENT
Start: 2019-11-25 | End: 2020-01-06

## 2019-11-25 RX ADMIN — GADOBUTROL 10 ML: 604.72 INJECTION INTRAVENOUS at 08:43

## 2019-11-25 ASSESSMENT — PAIN SCALES - GENERAL: PAINLEVEL: NO PAIN (0)

## 2019-11-25 NOTE — NURSING NOTE
Chief Complaint   Patient presents with     Lab Only     venipuncture, vitals checked     Ligia Brito RN on 11/25/2019 at 7:20 AM

## 2019-11-25 NOTE — LETTER
11/25/2019       RE: Jayden Lackey  [address]  [place] [address]     Dear Colleague,    Thank you for referring your patient, Jayden Lackey, to the [facility]. Please see a copy of my visit note below.    Oral Chemotherapy Monitoring Program    Primary Oncologist: Dr. Olmos  Primary Oncology Clinic: [facility]   Cancer Diagnosis: Glioblastoma    Drug: lomustine 90 mg/m2 (200 mg) daily on day 1 of every 6 week cycle  Start Date: 12/2/2019  Dose is appropriate for patients: 2.29 BSA, Renal and Hepatic Function   Expected duration of therapy: Until disease progression or unacceptable toxicity    Drug Interaction Assessment: no significant drug interactions were identified.  Medication list checked (11/25): -Lomustine -DexAMETHasone (Systemic) -Enoxaparin -Sertraline -Lisinopril -Acetaminophen -LevETIRAcetam -Lacosamide -Folic Acid -Atenolol -Cholecalciferol -DilTIAZem    Lab Monitoring Plan  CBC weekly starting on 12/16.  Labs drawn outside of [place]: [facility] ([facility] Fax: [phone]    Subjective/Objective:  Jayden Lackey is a 64 year old male seen in clinic for an initial visit for oral chemotherapy education. I spoke to Jayden and his wife, Oz, following an office visit with Dr. Olmos.     ORAL CHEMOTHERAPY 4/8/2019 5/7/2019 6/6/2019 7/15/2019 8/26/2019 11/25/2019   Drug Name Temodar (Temozolomide) Temodar (Temozolomide) Temodar (Temozolomide) Temodar (Temozolomide) Temodar (Temozolomide) (No Data)   Current Dosage 180mg 180mg 180mg 360mg 360mg 200mg   Current Schedule QHS QHS QHS Daily Daily Daily   Cycle Details Continuous for 42 days during XRT Continuous for 42 days during XRT Continuous for 42 days during XRT 5 days on, then 23 days off 5 days on, then 23 days off Other   Start Date of Last Cycle - 4/29/2019 4/29/2019 7/12/2019 8/13/2019 -   Planned next cycle start date - - - 8/9/2019 9/10/2019 12/2/2019   Doses missed in last 2 weeks - 0 0 0 0  "-   Adherence Assessment - Adherent Adherent Adherent Adherent -   Adverse Effects - No AE identified during assessment Constipation;Fatigue No AE identified during assessment No AE identified during assessment -   Constipation - - Resolved due to intervention - - -   Fatigue - - Grade 1 - - -   Pharmacist Intervention(fatigue) - - Yes - - -   Intervention(s) - - Patient education - - -   Home BPs - not needed - - - -   Any new drug interactions? - No No - - No   Is the dose as ordered appropriate for the patient? - Yes Yes - Yes Yes       Last PHQ-2 Score on record:   PHQ-2 ( 1999 Pfizer) 3/6/2019   Q1: Little interest or pleasure in doing things 0   Q2: Feeling down, depressed or hopeless 0   PHQ-2 Score 0   Q1: Little interest or pleasure in doing things Not at all   Q2: Feeling down, depressed or hopeless Not at all   PHQ-2 Score 0       Vitals:  BP:   BP Readings from Last 1 Encounters:   11/25/19 101/69     Wt Readings from Last 1 Encounters:   11/25/19 97.5 kg (215 lb)     Estimated body surface area is 2.26 meters squared as calculated from the following:    Height as of 11/17/19: 1.88 m (6' 2\").    Weight as of an earlier encounter on 11/25/19: 97.5 kg (215 lb).      Labs:  _  Result Component Current Result Ref Range   Sodium 138 (11/25/2019) 133 - 144 mmol/L     _  Result Component Current Result Ref Range   Potassium 4.0 (11/25/2019) 3.4 - 5.3 mmol/L     _  Result Component Current Result Ref Range   Calcium 8.5 (11/25/2019) 8.5 - 10.1 mg/dL     _  Result Component Current Result Ref Range   Magnesium 2.1 (11/18/2019) 1.6 - 2.3 mg/dL     _  Result Component Current Result Ref Range   Phosphorus 3.0 (10/28/2019) 2.5 - 4.5 mg/dL     _  Result Component Current Result Ref Range   Albumin 2.7 (L) (11/25/2019) 3.4 - 5.0 g/dL     _  Result Component Current Result Ref Range   Urea Nitrogen 13 (11/25/2019) 7 - 30 mg/dL     _  Result Component Current Result Ref Range   Creatinine 0.83 (11/25/2019) 0.66 - 1.25 " mg/dL     _  Result Component Current Result Ref Range   AST 21 (11/25/2019) 0 - 45 U/L     _  Result Component Current Result Ref Range   ALT 40 (11/25/2019) 0 - 70 U/L     _  Result Component Current Result Ref Range   Bilirubin Total 0.5 (11/25/2019) 0.2 - 1.3 mg/dL     _  Result Component Current Result Ref Range   WBC 7.1 (11/25/2019) 4.0 - 11.0 10e9/L     _  Result Component Current Result Ref Range   Hemoglobin 10.5 (L) (11/25/2019) 13.3 - 17.7 g/dL     _  Result Component Current Result Ref Range   Platelet Count 87 (L) (11/25/2019) 150 - 450 10e9/L     _  Result Component Current Result Ref Range   Absolute Neutrophil 2.7 (11/17/2019) 1.6 - 8.3 10e9/L       Assessment:  Patient is appropriate to start therapy.    Plan:  Basic chemotherapy teaching was reviewed with the patient and the patient's family including indication, start date of therapy, dose, administration, adverse effects, missed doses, food and drug interactions, monitoring, side effect management, office contact information, and safe handling. Written materials were provided and all questions answered.    Follow-Up:  1 week following start of lomustine therapy     Luz Maria Mijares PharmD  Oral Chemotherapy Monitoring Program  [facility]  [phone]

## 2019-11-25 NOTE — LETTER
11/25/2019       RE: Jayden Lackey  [address]  [place] [address]     Dear Colleague,    Thank you for referring your patient, Jayden Lackey, to the [facility]. Please see a copy of my visit note below.    NEURO-ONCOLOGY VISIT  Nov 25, 2019    CHIEF COMPLAINT: Mr. 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 He completed chemoradiotherapy on 6/11/2019. Imaging in 9/2019 was consistent with recurrent disease and adjuvant-dosed temozolomide was stop. Pathology from repeat resection on 10/24/2019 was consistent with recurrent tumor.     Quality of life has been compromised by recurrent seizure events, recent PE with requiring prolonged hospitalization, and chemotherapy related toxicities.      Jayden is presenting in follow-up for continued evaluation accompanied by Oz (wife).      HISTORY OF PRESENT ILLNESS  -Jayden was admitted last week for management of PE. Started on Lovenox. Injections going well, limited bruising.   -No recurrent seizures.   -Otherwise recovered from surgery.  -Feels that QOL is fair, has a GI illness today (GI discomfort and looser stools).   -With weaning steroids, worsening knee pain. Wanting to take a daily low dose.   -Mood is down, but improved with depression medications.   -Continues to struggle with word finding, much more difficulty reading, and difficulty with numbers.  -Working with OT on visual loss and being more aware. Visual field cut stable.   -Working with speech therapy; mild short-term memory issues and poor attention.     REVIEW OF SYSTEMS  A comprehensive ROS negative except as in HPI.      MEDICATIONS   Current Outpatient Medications   Medication Sig Dispense Refill     acetaminophen (TYLENOL) 325 MG tablet Take 2 tablets (650 mg) by mouth every 4 hours as needed for other  (multimodal surgical pain management along with NSAIDS and opioid medication as indicated based on pain control and physical function.) 28 tablet 3     atenolol (TENORMIN) 100 MG tablet Take 50 mg by mouth daily       cholecalciferol (VITAMIN D3) 5000 units (125 mcg) capsule Take 5,000 Units by mouth daily       dexamethasone (DECADRON) 2 MG tablet Take 1 tablet (2 mg) by mouth daily (with breakfast) 30 tablet 3     diltiazem ER (DILT-XR) 180 MG 24 hr capsule Take 180 mg by mouth daily       enoxaparin ANTICOAGULANT (LOVENOX) 100 MG/ML syringe Inject 0.98 mLs (98 mg) Subcutaneous every 12 hours 28 Syringe 0     folic acid (FOLVITE) 1 MG tablet Take 1 mg by mouth daily        Lacosamide (VIMPAT) 100 MG TABS tablet Take 100 mg by mouth 2 times daily        levETIRAcetam (KEPPRA) 1000 MG tablet Take 1,000 mg by mouth 2 times daily   1     lisinopril (PRINIVIL/ZESTRIL) 5 MG tablet Take 1 tablet (5 mg) by mouth daily 28 tablet 0     methotrexate 50 MG/2ML injection Take 0.7 mL (17.5 mg) by mouth every 7 days on Fridays.       Nutritional Supplements (JUICE PLUS FIBRE PO) Take 1 tablet by mouth every morning        sertraline (ZOLOFT) 100 MG tablet Take 1 tablet (100 mg) by mouth daily 30 tablet 3     DRUG ALLERGIES   Allergies   Allergen Reactions     Shellfish Allergy Difficulty breathing, Nausea and Vomiting, Swelling and Shortness Of Breath     Ativan [Lorazepam] Other (See Comments)     Hallucinations, delirium     No Clinical Screening - See Comments      Lupine bean doesn't remember reaction     Other (Do Not Use)      Lupine bean doesn't remember reaction       ONCOLOGIC HISTORY  -1/2019 PRESENTATION: Visual disturbance.   -3/6/2019 Evaluated by Dr. Mauricio, neuro-ophthalmologist, found to have a right sided homonymous hemianopsia. MRI ordered.   -3/6/2019 MR brain imaging revealed a contrast-enhancing lesion in the left occipital lobe suspicious for a high-grade primary brain tumor.  -3/22/2019 SURGERY: Craniotomy  with a gross total resection by Dr. Irving Hayes.   PATHOLOGY: Glioblastoma; Wildtype status for IDH1/2 by next generation sequencing. MGMT promoter methylated. Wildtype PTEN, TP53.  -4/8/2019 NEURO-ONC: Recommending chemoradiotherapy.   -4/30 - 6/11/2019 CHEMORADS 6000cGy in 30 fractions with concurrent temozolomide 75mg/m2 (180mg).  -5/20/2019 NEURO-ONC: Not interested in Optune at this time.   -6/12/2019 NEURO-ONC: Clinically stable.   -6/21 - 6/28/2019 ADMISSION/ [facility]: Admitted to [facility] for first ever seizure event, provoked by hyponatremia. Urine studies consistent with SIADH ; etiology of SIADH unclear. Had 10mm gastric lesion biopsied on 6/26/2019 by way of an EGD.  PATHOLOGY of gastric lesion: Hyperplastic polyp with erosion and foci of atypia; favor reactive. No intestinal metaplasia, no helicobacter pylori, no high grade dysplasia or invasive carcinoma.   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   -7/12/2019 NEURO-ONC/ MRB/ CHEMO: Clinical stable. Imaging with stable to slightly increased enhancement of previously seen nodular lesions; associated with mild degree of elevated rCBV. Starting adjuvant-dosed temozolomide 150mg/m2 (360mg), cycle 1 (start date 7/12/2019). Monitor fluid intake, will be checking CMP/ Na+.   -8/12/2019 NEURO-ONC/ MRB/ CHEMO: Clinical decompensated, but improving since hospital discharge. MR brain scan from last week with concern for non-contrast enhancing >> enhancing disease progression. Ordering a PET brain scan. Holding adjuvant-dosed temozolomide, cycle 3.  -10/24/2019 SURGERY: Repeat resection by Dr. Packer.  PATHOLOGY: Recurrent glioblastoma.   -11/25/2019 NEURO-ONC/ MRB/ CHEMO: Clinically doing well. Imaging with positive resection. Starting Lomustine (start date of 12/2). Next generation sequencing.     SOCIAL HISTORY   Tobacco use: Former smoker, quit 40 years ago.   Alcohol use: Social use.  Drug use: Denies marijuana use.  Supplement, complimentary/ alternative  "medicine: None.    .   Employment: On disability.       PHYSICAL EXAMINATION  /69   Pulse 74   Temp 97.5  F (36.4  C)   Resp 16   Wt 97.5 kg (215 lb)   SpO2 100%   BMI 27.60 kg/m      Wt Readings from Last 2 Encounters:   11/25/19 97.5 kg (215 lb)   11/20/19 96.3 kg (212 lb 6.4 oz)      Ht Readings from Last 2 Encounters:   11/17/19 1.88 m (6' 2\")   11/07/19 1.88 m (6' 2\")     KPS: 70    -Generally well appearing. Slightly fatigued and depressed.   -Respiratory: Normal breath sounds, no audible wheezing.   -Skin: No rashes. Healed head incision.  -Hematologic/ lymphatic: Bruising on waist. No leg swelling.  -Psychiatric: Flat mood and affect. Pleasant, talkative.  -Neurologic:   MENTAL STATUS:     Alert, easily confused.    Repeating questions.     Recall: Impaired.   Speech nearly fluent, but with hesitation.    Comprehension intact to multi-step commands, but confused/ poor attention.   Good right-left orientation.     CRANIAL NERVES:     Pupils are equal, round, reactive to light.     Extraocular movements full, patient denies diplopia.     Homonymous hemianopsia, right-side.    Facial sensation intact to light touch.   Symmetric facial movements.   Hearing intact.   Palate moves symmetrically.     Tongue midline.  MOTOR:    Normal and symmetric tone.   No pronation or drift. No orbiting.  SENSATION:    Intact to light touch throughout.  COORDINATION:   Intact finger-nose with eyes open and closed bilaterally.   GAIT:  Sitting in wheelchair. Did not walk. Walks with a cane per report and leans to the left.      MEDICAL RECORDS  Obtained and personally reviewed all available outside medical records in addition to reviewing any records available in our electronic system.     LABS  Personally reviewed all available lab results.     IMAGING  Personally reviewed MR brain imaging from today and compared to immediate post-operative imaging. To my eye, the nodules of contrast enhancement were gross " totally resected. The remaining contrast enhancement is not worrisome as perfusion is not elevated. T2 FLAIR is overall decreased throughout the temporal and occipital lobe.     Imaging was shown to and results were reviewed with Jayden and Oz.    Imaging discussed at Brain Tumor Conference.       IMPRESSION  Clinic time was spent discussing in detail the nature of this tumor in light of post-operative imaging. This is in addition to providing emotional support, answering questions pertaining to my recommendations, and devising the treatment plan as outlined below.    Imaging is not worrisome for overt residual disease. Clinically doing well. However, with next line of therapy, Jayden is worried about side effects negatively impacting quality of life.     With regard to cancer-directed therapy, Jayden did not tolerate temozolomide well and progressed while on this treatment. Will start Lomustine and monitor quality of life closely. Will hold on starting Avastin as patient is doing well and is largely asymptomatic. Optune remains a poor/ inadequate option for him.     Consented Jayden for next generation sequencing to look for targeted therapy, but Jayden did not sign paperwork. Will mail to home for signature.     Jayden is following with neurology that is managing Keppra and Vimpat.     Continue anticoagulation.     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   Imaging reviewed; Positive results from surgery.   Chemotherapy: Lomustine 200mg.  Take Zofran 30 minutes prior to chemotherapy at bedtime.   Pharmacy to do teaching.   Start date: 12/2.   Labs weekly starting the week of 12/16 at [facility].   Next generation sequencing consented, will send testing.    -STEROIDS-  -Dexamethasone 2mg daily for  now.     -SEIZURE MANAGEMENT-  -Given history of seizures, will continue Keppra + Vimpat.  -Epilepsy provoked by intracranial pathology, but also hyponatremia related to SIADH of unclear etiology.   -Followed by neurology at [facility].    -Quality of life/ MOOD/ FATIGUE-  -Continued mood issues; depression, poor coping.  -Zoloft 50mg (1/2 tab) for 1 week and then, take 1 tab (100mg) daily.  -Continue to monitor mood as untreated/ undertreated depression can worsen fatigue, dysorexia, and quality of life.  -Added to Brain Tumor Support Group email list; [email or web address]  -Continue physical therapies.     -HICCUPS-  -Pepcid (refilled).  -? Steroid related.  -Possibly Baclofen (muscle relaxer) if needed.    -HYPONATREMIA/ History of SIADH-  -Monitor amount of fluid intake.   -Continue to track sodium level on routine CMP.    -MEMORY COMPLAINTS-  -Consideration for referral to Dr. Aleksandr Garcia for neuro-psych testing.   -Continue speech therapy.    -VISUAL FIELD LOSS-  -Looking to follow-up with neuro-ophtho.   -Continue following with OT.     -DVT/ PE-  -Need for lifelong anticoagulation.  -Continue Lovenox.   -Can consider an oral 10a inhibitor in the future if not tolerating injections.     -OTHER SUPPORTIVE MEDICATIONS-  -Continue methotrexate for arthritis. No drug-drug interactions with temozolomide, Zofran, or pentamidine. Bactrim with drug drug interactions.   -Awaiting rheumatology appt for evaluation of RA.    -ADDITIONAL SUPPORTIVE MANAGEMENT-  -Social work following.   -CT with lung nodules. Following with [facility].   -S/p EGD with benign pathology of the 10mm gastric antrum/pylorus posterior wall lesion.  -Breast biopsy showed necrotic issue, no malignancy.    Return to clinic in 2 months + repeat imaging.       In the meantime, Jayden and Oz know to call with questions or concerns or to report new complaints and can be seen sooner if needed. Urgent evaluation is needed in the  setting of acute onset of severe headache, abrupt change in mental status, on-going seizures, new focal deficits, or new leg swelling/ pain.    Nasra Olmos MD  Neuro-oncology

## 2019-11-25 NOTE — PROGRESS NOTES
Oral Chemotherapy Monitoring Program    Primary Oncologist: Dr. Olmos  Primary Oncology Clinic: [facility]   Cancer Diagnosis: Glioblastoma    Drug: lomustine 90 mg/m2 (200 mg) daily on day 1 of every 6 week cycle  Start Date: 12/2/2019  Dose is appropriate for patients: 2.29 BSA, Renal and Hepatic Function   Expected duration of therapy: Until disease progression or unacceptable toxicity    Drug Interaction Assessment: no significant drug interactions were identified.  Medication list checked (11/25): -Lomustine -DexAMETHasone (Systemic) -Enoxaparin -Sertraline -Lisinopril -Acetaminophen -LevETIRAcetam -Lacosamide -Folic Acid -Atenolol -Cholecalciferol -DilTIAZem    Lab Monitoring Plan  CBC weekly starting on 12/16.  Labs drawn outside of [place]: [facility] ([facility] Fax: [phone]    Subjective/Objective:  Jayden Lackey is a 64 year old male seen in clinic for an initial visit for oral chemotherapy education. I spoke to Jayden and his wife, Oz, following an office visit with Dr. Oloms.     ORAL CHEMOTHERAPY 4/8/2019 5/7/2019 6/6/2019 7/15/2019 8/26/2019 11/25/2019   Drug Name Temodar (Temozolomide) Temodar (Temozolomide) Temodar (Temozolomide) Temodar (Temozolomide) Temodar (Temozolomide) (No Data)   Current Dosage 180mg 180mg 180mg 360mg 360mg 200mg   Current Schedule QHS QHS QHS Daily Daily Daily   Cycle Details Continuous for 42 days during XRT Continuous for 42 days during XRT Continuous for 42 days during XRT 5 days on, then 23 days off 5 days on, then 23 days off Other   Start Date of Last Cycle - 4/29/2019 4/29/2019 7/12/2019 8/13/2019 -   Planned next cycle start date - - - 8/9/2019 9/10/2019 12/2/2019   Doses missed in last 2 weeks - 0 0 0 0 -   Adherence Assessment - Adherent Adherent Adherent Adherent -   Adverse Effects - No AE identified during assessment Constipation;Fatigue No AE identified during assessment No AE identified during assessment -   Constipation - - Resolved due  "to intervention - - -   Fatigue - - Grade 1 - - -   Pharmacist Intervention(fatigue) - - Yes - - -   Intervention(s) - - Patient education - - -   Home BPs - not needed - - - -   Any new drug interactions? - No No - - No   Is the dose as ordered appropriate for the patient? - Yes Yes - Yes Yes       Last PHQ-2 Score on record:   PHQ-2 ( 1999 Pfizer) 3/6/2019   Q1: Little interest or pleasure in doing things 0   Q2: Feeling down, depressed or hopeless 0   PHQ-2 Score 0   Q1: Little interest or pleasure in doing things Not at all   Q2: Feeling down, depressed or hopeless Not at all   PHQ-2 Score 0       Vitals:  BP:   BP Readings from Last 1 Encounters:   11/25/19 101/69     Wt Readings from Last 1 Encounters:   11/25/19 97.5 kg (215 lb)     Estimated body surface area is 2.26 meters squared as calculated from the following:    Height as of 11/17/19: 1.88 m (6' 2\").    Weight as of an earlier encounter on 11/25/19: 97.5 kg (215 lb).      Labs:  _  Result Component Current Result Ref Range   Sodium 138 (11/25/2019) 133 - 144 mmol/L     _  Result Component Current Result Ref Range   Potassium 4.0 (11/25/2019) 3.4 - 5.3 mmol/L     _  Result Component Current Result Ref Range   Calcium 8.5 (11/25/2019) 8.5 - 10.1 mg/dL     _  Result Component Current Result Ref Range   Magnesium 2.1 (11/18/2019) 1.6 - 2.3 mg/dL     _  Result Component Current Result Ref Range   Phosphorus 3.0 (10/28/2019) 2.5 - 4.5 mg/dL     _  Result Component Current Result Ref Range   Albumin 2.7 (L) (11/25/2019) 3.4 - 5.0 g/dL     _  Result Component Current Result Ref Range   Urea Nitrogen 13 (11/25/2019) 7 - 30 mg/dL     _  Result Component Current Result Ref Range   Creatinine 0.83 (11/25/2019) 0.66 - 1.25 mg/dL     _  Result Component Current Result Ref Range   AST 21 (11/25/2019) 0 - 45 U/L     _  Result Component Current Result Ref Range   ALT 40 (11/25/2019) 0 - 70 U/L     _  Result Component Current Result Ref Range   Bilirubin Total 0.5 " (11/25/2019) 0.2 - 1.3 mg/dL     _  Result Component Current Result Ref Range   WBC 7.1 (11/25/2019) 4.0 - 11.0 10e9/L     _  Result Component Current Result Ref Range   Hemoglobin 10.5 (L) (11/25/2019) 13.3 - 17.7 g/dL     _  Result Component Current Result Ref Range   Platelet Count 87 (L) (11/25/2019) 150 - 450 10e9/L     _  Result Component Current Result Ref Range   Absolute Neutrophil 2.7 (11/17/2019) 1.6 - 8.3 10e9/L       Assessment:  Patient is appropriate to start therapy.    Plan:  Basic chemotherapy teaching was reviewed with the patient and the patient's family including indication, start date of therapy, dose, administration, adverse effects, missed doses, food and drug interactions, monitoring, side effect management, office contact information, and safe handling. Written materials were provided and all questions answered.    Follow-Up:  1 week following start of lomustine therapy       Luz Maria Mijares PharmD  Oral Chemotherapy Monitoring Program  [facility]  [phone]

## 2019-11-25 NOTE — TUMOR CONFERENCE
Tumor Conference Information  Tumor Conference:  Brain  Specialties Present:  Medical oncology, Pathology, Radiology, Surgery  Patient Status:  A current patient  Pathology:  Not Discussed  Treatment to Date:  Surgical intervention(s), Chemoradiation, Adjuvant chemotherapy  Clinical Trial Eligibility:  Not discussed  Recommended Plan:  Palliative chemotherapy (Comment: start palliative chemotherapy; follow up in 2 months with MRI)  Did the review exceed 30 minutes?:  did not           Documentation / Disclaimer Cancer Tumor Board Note  Cancer tumor board recommendations do not override what is determined to be reasonable care and treatment, which is dependent on the circumstances of a patient's case; the patient's medical, social, and personal concerns; and the clinical judgment of the oncologist [physician].

## 2019-11-26 ENCOUNTER — TELEPHONE (OUTPATIENT)
Dept: ONCOLOGY | Facility: CLINIC | Age: 64
End: 2019-11-26

## 2019-11-26 DIAGNOSIS — C71.9 GLIOBLASTOMA (H): Primary | ICD-10-CM

## 2019-11-26 NOTE — ORAL ONC MGMT
Oral Chemotherapy Monitoring Program     Placed call to patient in follow up of Lomustine therapy. Dr. Olmos would like to recheck PLT on Monday 12/2/2019 prior to start of Lomustine. Cristiana expressed understanding and agreement with this plan. She said will go in for labs on Monday to [facility]. She asked about and we reviewed precautions for thrombocytopenia/anticoagulants. Jayden will not take the Lomustine until he hears from us upon review of Monday's labs. She expressed understanding and thanked me for the call and care.     Jose Vargas PharmD  [facility]  [phone]  November 26, 2019

## 2019-12-02 ENCOUNTER — TELEPHONE (OUTPATIENT)
Dept: ONCOLOGY | Facility: CLINIC | Age: 64
End: 2019-12-02

## 2019-12-02 ENCOUNTER — AMBULATORY - HEALTHEAST (OUTPATIENT)
Dept: LAB | Facility: HOSPITAL | Age: 64
End: 2019-12-02

## 2019-12-02 DIAGNOSIS — Z51.11 CHEMOTHERAPY MANAGEMENT, ENCOUNTER FOR: ICD-10-CM

## 2019-12-02 DIAGNOSIS — C71.9 GLIOBLASTOMA (H): ICD-10-CM

## 2019-12-02 DIAGNOSIS — Z79.899 ENCOUNTER FOR LONG-TERM (CURRENT) USE OF MEDICATIONS: ICD-10-CM

## 2019-12-02 DIAGNOSIS — R11.2 CHEMOTHERAPY-INDUCED NAUSEA AND VOMITING: ICD-10-CM

## 2019-12-02 DIAGNOSIS — E87.1 HYPONATREMIA: ICD-10-CM

## 2019-12-02 DIAGNOSIS — T45.1X5A CHEMOTHERAPY-INDUCED NAUSEA AND VOMITING: ICD-10-CM

## 2019-12-02 LAB
ALBUMIN SERPL-MCNC: 3.2 G/DL (ref 3.5–5)
ALP SERPL-CCNC: 63 U/L (ref 45–120)
ALT SERPL W P-5'-P-CCNC: 91 U/L (ref 0–45)
ANION GAP SERPL CALCULATED.3IONS-SCNC: 12 MMOL/L (ref 5–18)
AST SERPL W P-5'-P-CCNC: 66 U/L (ref 0–40)
BASOPHILS # BLD AUTO: 0.1 THOU/UL (ref 0–0.2)
BASOPHILS NFR BLD AUTO: 1 % (ref 0–2)
BILIRUB SERPL-MCNC: 0.4 MG/DL (ref 0–1)
BUN SERPL-MCNC: 15 MG/DL (ref 8–22)
CALCIUM SERPL-MCNC: 9.6 MG/DL (ref 8.5–10.5)
CHLORIDE BLD-SCNC: 107 MMOL/L (ref 98–107)
CO2 SERPL-SCNC: 23 MMOL/L (ref 22–31)
CREAT SERPL-MCNC: 0.97 MG/DL (ref 0.7–1.3)
EOSINOPHIL # BLD AUTO: 0 THOU/UL (ref 0–0.4)
EOSINOPHIL NFR BLD AUTO: 0 % (ref 0–6)
ERYTHROCYTE [DISTWIDTH] IN BLOOD BY AUTOMATED COUNT: 17.9 % (ref 11–14.5)
GFR SERPL CREATININE-BSD FRML MDRD: >60 ML/MIN/1.73M2
GLUCOSE BLD-MCNC: 146 MG/DL (ref 70–125)
HCT VFR BLD AUTO: 34.6 % (ref 40–54)
HGB BLD-MCNC: 11.3 G/DL (ref 14–18)
LYMPHOCYTES # BLD AUTO: 1.2 THOU/UL (ref 0.8–4.4)
LYMPHOCYTES NFR BLD AUTO: 13 % (ref 20–40)
MCH RBC QN AUTO: 29.2 PG (ref 27–34)
MCHC RBC AUTO-ENTMCNC: 32.7 G/DL (ref 32–36)
MCV RBC AUTO: 89 FL (ref 80–100)
MONOCYTES # BLD AUTO: 0.4 THOU/UL (ref 0–0.9)
MONOCYTES NFR BLD AUTO: 4 % (ref 2–10)
NEUTROPHILS # BLD AUTO: 7.4 THOU/UL (ref 2–7.7)
NEUTROPHILS NFR BLD AUTO: 80 % (ref 50–70)
PLATELET # BLD AUTO: 258 THOU/UL (ref 140–440)
PMV BLD AUTO: 9.1 FL (ref 8.5–12.5)
POTASSIUM BLD-SCNC: 3.3 MMOL/L (ref 3.5–5)
PROT SERPL-MCNC: 6.7 G/DL (ref 6–8)
RBC # BLD AUTO: 3.87 MILL/UL (ref 4.4–6.2)
SODIUM SERPL-SCNC: 142 MMOL/L (ref 136–145)
WBC: 9.2 THOU/UL (ref 4–11)

## 2019-12-02 NOTE — ORAL ONC MGMT
Oral Chemotherapy Monitoring Program     Placed call to patient in follow up of Lomustine therapy. Labs returned to normal today and Jayden can start next cycle tomorrow when the medication arrives. Cristiana expressed understanding and agreement with this plan. She thanked me for the call.     Jose NguyễnD  [facility]  [phone]  December 2, 2019

## 2019-12-03 ENCOUNTER — PATIENT OUTREACH (OUTPATIENT)
Dept: ONCOLOGY | Facility: CLINIC | Age: 64
End: 2019-12-03

## 2019-12-03 NOTE — PROGRESS NOTES
RN Care Coordination Note  Outgoing Fax:   Appeal faxed to [phone] [facility] for Enoxaparin injections. Gabriela called back to notify us a decision will be made in the next 72 hours. Call [phone] with questions. Claim number is [id number].     Griselda Jones RN, BSN  Care Coordinator   [facility]

## 2019-12-04 DIAGNOSIS — I26.99 ACUTE PULMONARY EMBOLISM WITHOUT ACUTE COR PULMONALE, UNSPECIFIED PULMONARY EMBOLISM TYPE (H): ICD-10-CM

## 2019-12-09 ENCOUNTER — OFFICE VISIT - HEALTHEAST (OUTPATIENT)
Dept: RHEUMATOLOGY | Facility: CLINIC | Age: 64
End: 2019-12-09

## 2019-12-09 ENCOUNTER — COMMUNICATION - HEALTHEAST (OUTPATIENT)
Dept: LAB | Facility: CLINIC | Age: 64
End: 2019-12-09

## 2019-12-09 DIAGNOSIS — Z79.899 HIGH RISK MEDICATION USE: ICD-10-CM

## 2019-12-09 DIAGNOSIS — R74.8 ABNORMAL LIVER ENZYMES: ICD-10-CM

## 2019-12-09 DIAGNOSIS — M06.9 RHEUMATOID ARTHRITIS INVOLVING MULTIPLE JOINTS (H): ICD-10-CM

## 2019-12-09 ASSESSMENT — MIFFLIN-ST. JEOR: SCORE: 1837.92

## 2019-12-10 ENCOUNTER — TELEPHONE (OUTPATIENT)
Dept: ONCOLOGY | Facility: CLINIC | Age: 64
End: 2019-12-10

## 2019-12-10 ENCOUNTER — COMMUNICATION - HEALTHEAST (OUTPATIENT)
Dept: ADMINISTRATIVE | Facility: CLINIC | Age: 64
End: 2019-12-10

## 2019-12-10 NOTE — TELEPHONE ENCOUNTER
Spouse Cristiana called in to triage to update care team that pt saw Rheumatology at [facility] yesterday (Dr. Green) and was switched from methotrexate to prednisone 7.5 mg daily. He is already taking dexa 2 mg daily and pharmacist questioned whether he should be on both steroids. Would like clarification from Dr. Olmos if ok to be on both, has not taken anything yet today. Paged Dr. Olmos.    Per Dr. Olmos: check with rheumatology if they knew pt was already on dexa, Dr. Olmos unsure if prednisone treats rheumatology diagnosis better then would defer to them to manage steroids entirely. Above information relayed to spouse who verbalized understanding and state she will call Dr. Green's office and see what he says and update us with the answer.

## 2019-12-11 ENCOUNTER — OFFICE VISIT (OUTPATIENT)
Dept: PULMONOLOGY | Facility: CLINIC | Age: 64
End: 2019-12-11
Attending: INTERNAL MEDICINE
Payer: COMMERCIAL

## 2019-12-11 ENCOUNTER — ANCILLARY PROCEDURE (OUTPATIENT)
Dept: CT IMAGING | Facility: CLINIC | Age: 64
End: 2019-12-11
Attending: INTERNAL MEDICINE
Payer: COMMERCIAL

## 2019-12-11 VITALS
TEMPERATURE: 98.1 F | DIASTOLIC BLOOD PRESSURE: 72 MMHG | HEART RATE: 65 BPM | OXYGEN SATURATION: 97 % | HEIGHT: 74 IN | WEIGHT: 214.6 LBS | SYSTOLIC BLOOD PRESSURE: 119 MMHG | RESPIRATION RATE: 16 BRPM | BODY MASS INDEX: 27.54 KG/M2

## 2019-12-11 DIAGNOSIS — R91.8 PULMONARY NODULES: ICD-10-CM

## 2019-12-11 DIAGNOSIS — R91.8 PULMONARY NODULES: Primary | ICD-10-CM

## 2019-12-11 DIAGNOSIS — C71.9 GLIOBLASTOMA (H): ICD-10-CM

## 2019-12-11 PROCEDURE — G0463 HOSPITAL OUTPT CLINIC VISIT: HCPCS | Mod: ZF

## 2019-12-11 RX ORDER — PREDNISONE 2.5 MG/1
7.5 TABLET ORAL DAILY
COMMUNITY
Start: 2019-12-09 | End: 2020-04-13

## 2019-12-11 RX ORDER — ONDANSETRON 4 MG/1
4 TABLET, FILM COATED ORAL EVERY 4 HOURS PRN
COMMUNITY
Start: 2019-07-12 | End: 2020-10-08

## 2019-12-11 ASSESSMENT — MIFFLIN-ST. JEOR: SCORE: 1838.42

## 2019-12-11 ASSESSMENT — PAIN SCALES - GENERAL: PAINLEVEL: NO PAIN (0)

## 2019-12-11 NOTE — LETTER
12/11/2019       RE: Jayden Lackey  [address]  [facility] [address]     Dear Colleague,    Thank you for referring your patient, Jayden Lackey, to the [facility] at [facility]. Please see a copy of my visit note below.    [facility] Cancer Care Nodule Clinic Follow Up Visit    Reason for Visit  Jayden Lackey is a 64 year old male who is followed for lung nodules  Pulmonary HPI    Since last visit, he had bilateral pulmonary emboli, started on lovenox. Also confused about two different steroids (had to stop methotrexate because of liver function elevation). His wife has concerns about how to administer enoxaparin and if he needs to stay on it long term.     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  The patient was seen and examined by Sylvia Tay MD   Current Outpatient Medications   Medication     acetaminophen (TYLENOL) 325 MG tablet     atenolol (TENORMIN) 100 MG tablet     cholecalciferol (VITAMIN D3) 5000 units (125 mcg) capsule     dexamethasone (DECADRON) 2 MG tablet     diltiazem ER (DILT-XR) 180 MG 24 hr capsule     enoxaparin ANTICOAGULANT (LOVENOX) 100 MG/ML syringe     folic acid (FOLVITE) 1 MG tablet     Lacosamide (VIMPAT) 100 MG TABS tablet     levETIRAcetam (KEPPRA) 1000 MG tablet     lisinopril (PRINIVIL/ZESTRIL) 5 MG tablet     ondansetron (ZOFRAN) 4 MG tablet     predniSONE  (DELTASONE) 2.5 MG tablet     sertraline (ZOLOFT) 100 MG tablet     lomustine (GLEOSTINE) 100 MG capsule     methotrexate 50 MG/2ML injection     Nutritional Supplements (JUICE PLUS FIBRE PO)     No current facility-administered medications for this visit.      Allergies   Allergen Reactions     Shellfish Allergy Difficulty breathing, Nausea and Vomiting, Swelling and Shortness Of Breath     Ativan [Lorazepam] Other (See Comments)     Hallucinations, delirium     No Clinical Screening - See Comments      Lupine bean doesn't remember reaction     Other (Do Not Use)      Lupine bean doesn't remember reaction     Social History     Socioeconomic History     Marital status:      Spouse name: Not on file     Number of children: Not on file     Years of education: Not on file     Highest education level: Not on file   Occupational History     Occupation: retired, on disability   Social Needs     Financial resource strain: Not on file     Food insecurity:     Worry: Not on file     Inability: Not on file     Transportation needs:     Medical: Not on file     Non-medical: Not on file   Tobacco Use     Smoking status: Former Smoker     Packs/day: 1.00     Years: 3.00     Pack years: 3.00     Types: Cigarettes     Start date: 1974     Last attempt to quit: 1977     Years since quittin.5     Smokeless tobacco: Never Used   Substance and Sexual Activity     Alcohol use: Not Currently     Drug use: Never     Sexual activity: Not on file   Lifestyle     Physical activity:     Days per week: Not on file     Minutes per session: Not on file     Stress: Not on file   Relationships     Social connections:     Talks on phone: Not on file     Gets together: Not on file     Attends Buddhist service: Not on file     Active member of club or organization: Not on file     Attends meetings of clubs or organizations: Not on file     Relationship status: Not on file     Intimate partner violence:     Fear of current or  "ex partner: Not on file     Emotionally abused: Not on file     Physically abused: Not on file     Forced sexual activity: Not on file   Other Topics Concern     Not on file   Social History Narrative    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     Past Medical History:   Diagnosis Date     Colon polyp      Dyslipidemia      Glioblastoma (H)      Hypertension      Lumbar disc herniation     L4-5     Rheumatoid arthritis (H)      Past Surgical History:   Procedure Laterality Date     ANAL SPHINCTEROTOMY       BACK SURGERY  2009    L4-5     HERNIA REPAIR  1974     OPTICAL TRACKING SYSTEM CRANIOTOMY, EXCISE TUMOR, COMBINED Left 3/22/2019    Procedure: Left Stealth Assisted Craniotomy And Tumor Resection;  Surgeon: Irving Hayes MD;  Location: [facility] OR     OPTICAL TRACKING SYSTEM CRANIOTOMY, EXCISE TUMOR, COMBINED Left 10/24/2019    Procedure: LEFT CRANIOTOMY, USING OPTICAL TRACKING SYSTEM, WITH NEOPLASM EXCISION;  Surgeon: Nicho Packer MD;  Location: [facility] OR     SEPTOPLASTY       VASECTOMY       Family History   Problem Relation Age of Onset     Macular Degeneration Mother      Diabetes Mother      Heart Failure Father      Alcoholism Father      Diabetes Sister      Heart Disease Maternal Grandfather      Pancreatic Cancer Maternal Grandmother      Rheumatoid Arthritis Sister      Other - See Comments Sister         glioma     Other - See Comments Sister         maculofacial digital syndrome     Kidney Disease Sister         s/p transplant     Glaucoma No family hx of        Exam:   /72 (BP Location: Right arm, Patient Position: Chair, Cuff Size: Adult Regular)   Pulse 65   Temp 98.1  F (36.7  C)   Resp 16   Ht 1.88 m (6' 2.02\")   Wt 97.3 kg (214 lb 9.6 oz)   SpO2 97%   BMI 27.54 kg/m     GENERAL APPEARANCE: Well developed, well nourished, alert, and in no apparent distress.  EYES: PERRL, EOMI  HENT: Nasal mucosa with no edema and no " hyperemia. No nasal polyps.  EARS: Canals clear, TMs normal  MOUTH: Oral mucosa is moist, without any lesions, no tonsillar enlargement, no oropharyngeal exudate.  NECK: supple, no masses, no thyromegaly.  LYMPHATICS: No significant axillary, cervical, or supraclavicular nodes.  RESP: normal percussion, good air flow throughout.  No crackles. No rhonchi. No wheezes.  CV: Normal S1, S2, regular rhythm, normal rate. No murmur.  No rub. No gallop. No LE edema.   ABDOMEN:  Bowel sounds normal, soft, nontender, no HSM or masses.   MS: extremities normal. No clubbing. No cyanosis.  SKIN: no rash on limited exam  NEURO: Mentation intact, speech normal, normal strength and tone, normal gait and stance  PSYCH: mentation appears normal. and affect normal/bright  Results:  - My interpretation of the images relevant for this visit includes: CT chest  3/7/19 images reviewed scattered tiny lung nodules compared to repeat CT ordered today stable      Assessment and plan: Jayden is a 65 yo male with recently diagnosed glioblastoma and lung nodules  Lung nodule - seen on chest/abdomen CT, done to evaluate for lung primary source of brain lesion. Risk of lung cancer is low but rheumatoid nodules vs gliobastoma mets in the differential diagnosis.  Repeat CT ordered and performed today (after clinic visit). Patient returned to clinic to review images with me, which showed stable lung nodules.   Pulmonary embolism - on lovenox, his wife is concerned about injections long term - I discussed with Dolly Ramirez, Dr Olmos's assistant and she expects Dr Olmos will manage with Lovenox then transition to oral anticoagulant in a few months.     RTC in 6 months with CT      Again, thank you for allowing me to participate in the care of your patient.      Sincerely,    Sylvia Tay MD

## 2019-12-11 NOTE — PATIENT INSTRUCTIONS
I recommend we continue to monitor the lung nodules with a repeat CT in 6 months.     I will talk with Dr Olmos about the blood thinner

## 2019-12-11 NOTE — NURSING NOTE
"Oncology Rooming Note    December 11, 2019 3:15 PM   Jayden Lackey is a 64 year old male who presents for:    Chief Complaint   Patient presents with     Oncology Clinic Visit     [facility] RETURN- PULMONARY ARTERIES      Initial Vitals: /72 (BP Location: Right arm, Patient Position: Chair, Cuff Size: Adult Regular)   Pulse 65   Temp 98.1  F (36.7  C)   Resp 16   Ht 1.88 m (6' 2.02\")   Wt 97.3 kg (214 lb 9.6 oz)   SpO2 97%   BMI 27.54 kg/m   Estimated body mass index is 27.54 kg/m  as calculated from the following:    Height as of this encounter: 1.88 m (6' 2.02\").    Weight as of this encounter: 97.3 kg (214 lb 9.6 oz). Body surface area is 2.25 meters squared.  No Pain (0) Comment: Data Unavailable   No LMP for male patient.  Allergies reviewed: Yes  Medications reviewed: Yes    Medications: Medication refills not needed today.  Pharmacy name entered into [facility]:    [facility] - [place], [place] - [address] [id number] - [place], [place] - [facility] [facility] [address] [facility] [address] [facility] - [place], [place] - [address]    Clinical concerns: No new concerns. Maggi was notified.      Emeterio Valdez LPN            "

## 2019-12-11 NOTE — PROGRESS NOTES
[facility] Cancer Care Nodule Clinic Follow Up Visit    Reason for Visit  Jayden Lackey is a 64 year old male who is followed for lung nodules  Pulmonary HPI    Since last visit, he had bilateral pulmonary emboli, started on lovenox. Also confused about two different steroids (had to stop methotrexate because of liver function elevation). His wife has concerns about how to administer enoxaparin and if he needs to stay on it long term.     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  The patient was seen and examined by Sylvia Tay MD   Current Outpatient Medications   Medication     acetaminophen (TYLENOL) 325 MG tablet     atenolol (TENORMIN) 100 MG tablet     cholecalciferol (VITAMIN D3) 5000 units (125 mcg) capsule     dexamethasone (DECADRON) 2 MG tablet     diltiazem ER (DILT-XR) 180 MG 24 hr capsule     enoxaparin ANTICOAGULANT (LOVENOX) 100 MG/ML syringe     folic acid (FOLVITE) 1 MG tablet     Lacosamide (VIMPAT) 100 MG TABS tablet     levETIRAcetam (KEPPRA) 1000 MG tablet     lisinopril (PRINIVIL/ZESTRIL) 5 MG tablet     ondansetron (ZOFRAN) 4 MG tablet     predniSONE (DELTASONE) 2.5 MG tablet     sertraline (ZOLOFT) 100 MG tablet     lomustine (GLEOSTINE) 100 MG capsule     methotrexate 50 MG/2ML injection     Nutritional Supplements (JUICE PLUS FIBRE PO)     No current facility-administered medications for this visit.      Allergies   Allergen Reactions      Shellfish Allergy Difficulty breathing, Nausea and Vomiting, Swelling and Shortness Of Breath     Ativan [Lorazepam] Other (See Comments)     Hallucinations, delirium     No Clinical Screening - See Comments      Lupine bean doesn't remember reaction     Other (Do Not Use)      Lupine bean doesn't remember reaction     Social History     Socioeconomic History     Marital status:      Spouse name: Not on file     Number of children: Not on file     Years of education: Not on file     Highest education level: Not on file   Occupational History     Occupation: retired, on disability   Social Needs     Financial resource strain: Not on file     Food insecurity:     Worry: Not on file     Inability: Not on file     Transportation needs:     Medical: Not on file     Non-medical: Not on file   Tobacco Use     Smoking status: Former Smoker     Packs/day: 1.00     Years: 3.00     Pack years: 3.00     Types: Cigarettes     Start date: 1974     Last attempt to quit: 1977     Years since quittin.5     Smokeless tobacco: Never Used   Substance and Sexual Activity     Alcohol use: Not Currently     Drug use: Never     Sexual activity: Not on file   Lifestyle     Physical activity:     Days per week: Not on file     Minutes per session: Not on file     Stress: Not on file   Relationships     Social connections:     Talks on phone: Not on file     Gets together: Not on file     Attends Oriental orthodox service: Not on file     Active member of club or organization: Not on file     Attends meetings of clubs or organizations: Not on file     Relationship status: Not on file     Intimate partner violence:     Fear of current or ex partner: Not on file     Emotionally abused: Not on file     Physically abused: Not on file     Forced sexual activity: Not on file   Other Topics Concern     Not on file   Social History Narrative    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     Past Medical History:   Diagnosis Date     Colon polyp      Dyslipidemia      Glioblastoma (H)      Hypertension      Lumbar disc herniation     L4-5     Rheumatoid arthritis (H)      Past Surgical History:   Procedure Laterality Date     ANAL SPHINCTEROTOMY       BACK SURGERY  2009    L4-5     HERNIA REPAIR  1974     OPTICAL TRACKING SYSTEM CRANIOTOMY, EXCISE TUMOR, COMBINED Left 3/22/2019    Procedure: Left Stealth Assisted Craniotomy And Tumor Resection;  Surgeon: Irving Hayes MD;  Location: [facility] OR     OPTICAL TRACKING SYSTEM CRANIOTOMY, EXCISE TUMOR, COMBINED Left 10/24/2019    Procedure: LEFT CRANIOTOMY, USING OPTICAL TRACKING SYSTEM, WITH NEOPLASM EXCISION;  Surgeon: Nicho Packer MD;  Location: [facility] OR     SEPTOPLASTY       VASECTOMY       Family History   Problem Relation Age of Onset     Macular Degeneration Mother      Diabetes Mother      Heart Failure Father      Alcoholism Father      Diabetes Sister      Heart Disease Maternal Grandfather      Pancreatic Cancer Maternal Grandmother      Rheumatoid Arthritis Sister      Other - See Comments Sister         glioma     Other - See Comments Sister         maculofacial digital syndrome     Kidney Disease Sister         s/p transplant     Glaucoma No family hx of        Exam:   /72 (BP Location: Right arm, Patient Position: Chair, Cuff Size: Adult Regular)   Pulse 65   Temp 98.1  F (36.7  C)   Resp 16   Ht 1.88 m (6' 2.02\")   Wt 97.3 kg (214 lb 9.6 oz)   SpO2 97%   BMI 27.54 kg/m    GENERAL APPEARANCE: Well developed, well nourished, alert, and in no apparent distress.  EYES: PERRL, EOMI  HENT: Nasal mucosa with no edema and no hyperemia. No nasal polyps.  EARS: Canals clear, TMs normal  MOUTH: Oral mucosa is moist, without any lesions, no tonsillar enlargement, no oropharyngeal exudate.  NECK: supple, no masses, no thyromegaly.  LYMPHATICS: No significant axillary, cervical, or supraclavicular nodes.  RESP: normal " percussion, good air flow throughout.  No crackles. No rhonchi. No wheezes.  CV: Normal S1, S2, regular rhythm, normal rate. No murmur.  No rub. No gallop. No LE edema.   ABDOMEN:  Bowel sounds normal, soft, nontender, no HSM or masses.   MS: extremities normal. No clubbing. No cyanosis.  SKIN: no rash on limited exam  NEURO: Mentation intact, speech normal, normal strength and tone, normal gait and stance  PSYCH: mentation appears normal. and affect normal/bright  Results:  - My interpretation of the images relevant for this visit includes: CT chest  3/7/19 images reviewed scattered tiny lung nodules compared to repeat CT ordered today stable      Assessment and plan: Jayden is a 63 yo male with recently diagnosed glioblastoma and lung nodules  Lung nodule - seen on chest/abdomen CT, done to evaluate for lung primary source of brain lesion. Risk of lung cancer is low but rheumatoid nodules vs gliobastoma mets in the differential diagnosis.  Repeat CT ordered and performed today (after clinic visit). Patient returned to clinic to review images with me, which showed stable lung nodules.   Pulmonary embolism - on lovenox, his wife is concerned about injections long term - I discussed with Dolly Ramirez, Dr Olmos's assistant and she expects Dr Olmos will manage with Lovenox then transition to oral anticoagulant in a few months.     RTC in 6 months with CT

## 2019-12-13 LAB
ALBUMIN SERPL-MCNC: 2.9 G/DL (ref 3.5–5)
ALT SERPL W P-5'-P-CCNC: 34 U/L (ref 0–45)
HCV AB SERPL QL IA: NEGATIVE
RHEUMATOID FACT SERPL-ACNC: <15 IU/ML (ref 0–30)
URATE SERPL-MCNC: 2.8 MG/DL (ref 3–8)

## 2019-12-15 ENCOUNTER — HOME CARE/HOSPICE - HEALTHEAST (OUTPATIENT)
Dept: HOME HEALTH SERVICES | Facility: HOME HEALTH | Age: 64
End: 2019-12-15

## 2019-12-15 ENCOUNTER — NURSE TRIAGE (OUTPATIENT)
Dept: NURSING | Facility: CLINIC | Age: 64
End: 2019-12-15

## 2019-12-16 NOTE — TELEPHONE ENCOUNTER
"Discharged today from [facility]. Wife states pt was only sent home w/ 6 tablets of Augmentin which will last only 3 days, not long enough to complete ordered course of med. PCP Dr. Farooq at [facility]. Advised wife/pt call Dr Dillard's office tomorrow when they are open for assistance w/ this problem.     Reason for Disposition    Caller has medication question about med not prescribed by PCP and triager unable to answer question (e.g., compatibility with other med, storage)    Additional Information    Negative: Drug overdose and nurse unable to answer question    Negative: Caller requesting information not related to medicine    Negative: Caller requesting a prescription for Strep throat and has a positive culture result    Negative: Rash while taking a medication or within 3 days of stopping it    Negative: Immunization reaction suspected    Negative: [1] Asthma and [2] having symptoms of asthma (cough, wheezing, etc)    Negative: MORE THAN A DOUBLE DOSE of a prescription or over-the-counter (OTC) drug    Negative: [1] DOUBLE DOSE (an extra dose or lesser amount) of over-the-counter (OTC) drug AND [2] any symptoms (e.g., dizziness, nausea, pain, sleepiness)    Negative: [1] DOUBLE DOSE (an extra dose or lesser amount) of prescription drug AND [2] any symptoms (e.g., dizziness, nausea, pain, sleepiness)    Negative: Took another person's prescription drug    Negative: [1] DOUBLE DOSE (an extra dose or lesser amount) of prescription drug AND [2] NO symptoms (Exception: a double dose of antibiotics)    Negative: Diabetes drug error or overdose (e.g., insulin or extra dose)    Negative: [1] Request for URGENT new prescription or refill of \"essential\" medication (i.e., likelihood of harm to patient if not taken) AND [2] triager unable to fill per unit policy    Negative: [1] Prescription not at pharmacy AND [2] was prescribed today by PCP    Negative: Pharmacy calling with prescription questions and triager unable " to answer question    Negative: Caller has URGENT medication question about med that PCP prescribed and triager unable to answer question    Negative: Caller has NON-URGENT medication question about med that PCP prescribed and triager unable to answer question    Negative: Caller requesting a NON-URGENT new prescription or refill and triager unable to refill per unit policy    Protocols used: MEDICATION QUESTION CALL-A-AH

## 2019-12-17 LAB — CCP AB SER IA-ACNC: <0.5 U/ML

## 2019-12-18 ENCOUNTER — TELEPHONE (OUTPATIENT)
Dept: ONCOLOGY | Facility: CLINIC | Age: 64
End: 2019-12-18

## 2019-12-18 NOTE — TELEPHONE ENCOUNTER
Oral Chemotherapy Monitoring Program.    Placed call to patient's wife  in follow-up of Temodar therapy.    Per Dr Olmos: CBC without concerns on 12/13.   Can skip this week and recheck on 12/27 if they want.     Left message to return call if questions.    Erika Whiteside, PharmD, BCOP, BCPS  Clinical Pharmacy Specialist  [facility]  Pager [phone]  Phone [phone]

## 2019-12-19 ENCOUNTER — COMMUNICATION - HEALTHEAST (OUTPATIENT)
Dept: ONCOLOGY | Facility: HOSPITAL | Age: 64
End: 2019-12-19

## 2019-12-19 DIAGNOSIS — I26.99 ACUTE PULMONARY EMBOLISM WITHOUT ACUTE COR PULMONALE, UNSPECIFIED PULMONARY EMBOLISM TYPE (H): ICD-10-CM

## 2019-12-19 NOTE — TELEPHONE ENCOUNTER
Per Dr. Olmos: ok with stopping lovenox and starting DOAC if pt prefers. Called pt/spouse back and they stated that was fine. Stated they miscounted and pt only has 4 syringes left so he will finish last syring Saturday morning. When should he start the DOAC? Also wanted to make sure Dr. Olmos knew about pt's admission to [facility] and that he is now on Vimpat 150 mg BID and Keppra 1500 mg BID, doing well on both doses. Send to [facility].

## 2019-12-19 NOTE — TELEPHONE ENCOUNTER
Received call from spouse Oz requesting refill of enoxaparin 98 mg every 12 hours, state pt has 5 syringes left. Chart showed 2 syringes ordered on 12/4 and ended on 12/11. Spouse was under the impression pt would be on enoxaparin for a few months before transitioning to oral anticoagulation. Routing to [employer] for approval.

## 2019-12-23 ENCOUNTER — AMBULATORY - HEALTHEAST (OUTPATIENT)
Dept: LAB | Facility: HOSPITAL | Age: 64
End: 2019-12-23

## 2019-12-23 ENCOUNTER — TELEPHONE (OUTPATIENT)
Dept: ONCOLOGY | Facility: CLINIC | Age: 64
End: 2019-12-23

## 2019-12-23 DIAGNOSIS — C71.9 BRAIN NEOPLASM MALIGNANT (H): ICD-10-CM

## 2019-12-23 LAB
ALBUMIN SERPL-MCNC: 3.7 G/DL (ref 3.5–5)
ALP SERPL-CCNC: 48 U/L (ref 45–120)
ALT SERPL W P-5'-P-CCNC: 35 U/L (ref 0–45)
ANION GAP SERPL CALCULATED.3IONS-SCNC: 10 MMOL/L (ref 5–18)
AST SERPL W P-5'-P-CCNC: 16 U/L (ref 0–40)
BASOPHILS # BLD AUTO: 0 THOU/UL (ref 0–0.2)
BASOPHILS NFR BLD AUTO: 1 % (ref 0–2)
BILIRUB SERPL-MCNC: 0.3 MG/DL (ref 0–1)
BUN SERPL-MCNC: 12 MG/DL (ref 8–22)
CALCIUM SERPL-MCNC: 9.7 MG/DL (ref 8.5–10.5)
CHLORIDE BLD-SCNC: 109 MMOL/L (ref 98–107)
CO2 SERPL-SCNC: 26 MMOL/L (ref 22–31)
CREAT SERPL-MCNC: 0.79 MG/DL (ref 0.7–1.3)
EOSINOPHIL # BLD AUTO: 0 THOU/UL (ref 0–0.4)
EOSINOPHIL NFR BLD AUTO: 1 % (ref 0–6)
ERYTHROCYTE [DISTWIDTH] IN BLOOD BY AUTOMATED COUNT: 16.8 % (ref 11–14.5)
GFR SERPL CREATININE-BSD FRML MDRD: >60 ML/MIN/1.73M2
GLUCOSE BLD-MCNC: 116 MG/DL (ref 70–125)
HCT VFR BLD AUTO: 34 % (ref 40–54)
HGB BLD-MCNC: 11.3 G/DL (ref 14–18)
LYMPHOCYTES # BLD AUTO: 0.7 THOU/UL (ref 0.8–4.4)
LYMPHOCYTES NFR BLD AUTO: 11 % (ref 20–40)
MCH RBC QN AUTO: 30.4 PG (ref 27–34)
MCHC RBC AUTO-ENTMCNC: 33.2 G/DL (ref 32–36)
MCV RBC AUTO: 91 FL (ref 80–100)
MONOCYTES # BLD AUTO: 0.4 THOU/UL (ref 0–0.9)
MONOCYTES NFR BLD AUTO: 6 % (ref 2–10)
NEUTROPHILS # BLD AUTO: 5.3 THOU/UL (ref 2–7.7)
NEUTROPHILS NFR BLD AUTO: 81 % (ref 50–70)
PLATELET # BLD AUTO: 147 THOU/UL (ref 140–440)
PMV BLD AUTO: 9.7 FL (ref 8.5–12.5)
POTASSIUM BLD-SCNC: 3.8 MMOL/L (ref 3.5–5)
PROT SERPL-MCNC: 6.5 G/DL (ref 6–8)
RBC # BLD AUTO: 3.72 MILL/UL (ref 4.4–6.2)
SODIUM SERPL-SCNC: 145 MMOL/L (ref 136–145)
WBC: 6.5 THOU/UL (ref 4–11)

## 2019-12-23 NOTE — ORAL ONC MGMT
Oral Chemotherapy Monitoring Program  Lab Follow Up    Patient currently on lomustine therapy for glioblastoma.    Reviewed lab results from 12/23.    Labs:  _  Result Component Current Result Ref Range   Sodium 138 (11/25/2019) 133 - 144 mmol/L     _  Result Component Current Result Ref Range   Potassium 4.0 (11/25/2019) 3.4 - 5.3 mmol/L     _  Result Component Current Result Ref Range   Calcium 8.5 (11/25/2019) 8.5 - 10.1 mg/dL     No results found for Mag within last 30 days.     No results found for Phos within last 30 days.     _  Result Component Current Result Ref Range   Albumin 2.7 (L) (11/25/2019) 3.4 - 5.0 g/dL     _  Result Component Current Result Ref Range   Urea Nitrogen 13 (11/25/2019) 7 - 30 mg/dL     _  Result Component Current Result Ref Range   Creatinine 0.83 (11/25/2019) 0.66 - 1.25 mg/dL       _  Result Component Current Result Ref Range   AST 21 (11/25/2019) 0 - 45 U/L     _  Result Component Current Result Ref Range   ALT 40 (11/25/2019) 0 - 70 U/L     _  Result Component Current Result Ref Range   Bilirubin Total 0.5 (11/25/2019) 0.2 - 1.3 mg/dL       _  Result Component Current Result Ref Range   WBC 7.1 (11/25/2019) 4.0 - 11.0 10e9/L     _  Result Component Current Result Ref Range   Hemoglobin 10.5 (L) (11/25/2019) 13.3 - 17.7 g/dL     _  Result Component Current Result Ref Range   Platelet Count 87 (L) (11/25/2019) 150 - 450 10e9/L     No results found for ANC within last 30 days.       Assessment & Plan:  No concerning abnormalities.  Left message for patient's spouse with this information.    Follow-Up:  12/30: weekly labs at [facility]    Sima Booker, PharmD, BCOP  Hematology/Oncology Clinical Pharmacist  [facility]  [facility]  [phone]

## 2019-12-30 ENCOUNTER — TELEPHONE (OUTPATIENT)
Dept: ONCOLOGY | Facility: CLINIC | Age: 64
End: 2019-12-30

## 2019-12-30 LAB
BASOPHILS # BLD AUTO: 0 10E9/L (ref 0–0.2)
BASOPHILS NFR BLD AUTO: 0.4 %
DIFFERENTIAL METHOD BLD: ABNORMAL
EOSINOPHIL # BLD AUTO: 0 10E9/L (ref 0–0.7)
EOSINOPHIL NFR BLD AUTO: 0.3 %
ERYTHROCYTE [DISTWIDTH] IN BLOOD BY AUTOMATED COUNT: 15.8 % (ref 10–15)
HCT VFR BLD AUTO: 34 % (ref 40–53)
HGB BLD-MCNC: 11 G/DL (ref 13.3–17.7)
IMM GRANULOCYTES # BLD: 0 10E9/L (ref 0–0.4)
IMM GRANULOCYTES NFR BLD: 0.3 %
LYMPHOCYTES # BLD AUTO: 0.9 10E9/L (ref 0.8–5.3)
LYMPHOCYTES NFR BLD AUTO: 12.2 %
MCH RBC QN AUTO: 29.7 PG (ref 26.5–33)
MCHC RBC AUTO-ENTMCNC: 32.4 G/DL (ref 31.5–36.5)
MCV RBC AUTO: 92 FL (ref 78–100)
MONOCYTES # BLD AUTO: 0.3 10E9/L (ref 0–1.3)
MONOCYTES NFR BLD AUTO: 3.8 %
NEUTROPHILS # BLD AUTO: 5.9 10E9/L (ref 1.6–8.3)
NEUTROPHILS NFR BLD AUTO: 83 %
NRBC # BLD AUTO: 0 10*3/UL
NRBC BLD AUTO-RTO: 0 /100
PLATELET # BLD AUTO: 78 10E9/L (ref 150–450)
RBC # BLD AUTO: 3.7 10E12/L (ref 4.4–5.9)
WBC # BLD AUTO: 7.1 10E9/L (ref 4–11)

## 2019-12-30 PROCEDURE — 85025 COMPLETE CBC W/AUTO DIFF WBC: CPT | Performed by: PSYCHIATRY & NEUROLOGY

## 2019-12-30 PROCEDURE — 80299 QUANTITATIVE ASSAY DRUG: CPT | Performed by: PSYCHIATRY & NEUROLOGY

## 2019-12-30 PROCEDURE — 80177 DRUG SCRN QUAN LEVETIRACETAM: CPT | Performed by: PSYCHIATRY & NEUROLOGY

## 2019-12-30 NOTE — ORAL ONC MGMT
Oral Chemotherapy Monitoring Program    Placed call to patient in follow up of Lomustine therapy. Patient received labs today. Dr. Olmos was notified that platelets were 78. Left message to please call back in follow up of therapy. No patient or drug names were mentioned.    Lab Results   Component Value Date    WBC 7.1 12/30/2019     Lab Results   Component Value Date    RBC 3.70 12/30/2019     Lab Results   Component Value Date    HGB 11.0 12/30/2019     Lab Results   Component Value Date    HCT 34.0 12/30/2019     No components found for: MCT  Lab Results   Component Value Date    MCV 92 12/30/2019     Lab Results   Component Value Date    MCH 29.7 12/30/2019     Lab Results   Component Value Date    MCHC 32.4 12/30/2019     Lab Results   Component Value Date    RDW 15.8 12/30/2019     Lab Results   Component Value Date    PLT 78 12/30/2019       Kamila Freitas  Student Pharmacist  Oral Chemotherapy Monitoring Program  [facility]  [phone]

## 2019-12-31 LAB
LACOSAMIDE SERPL-MCNC: 11.1 UG/ML (ref 5–10)
LEVETIRACETAM SERPL-MCNC: 49 UG/ML (ref 12–46)

## 2020-01-01 ENCOUNTER — VIRTUAL VISIT (OUTPATIENT)
Dept: NEUROLOGY | Facility: CLINIC | Age: 65
End: 2020-01-01
Payer: COMMERCIAL

## 2020-01-01 ENCOUNTER — ANCILLARY PROCEDURE (OUTPATIENT)
Dept: MRI IMAGING | Facility: CLINIC | Age: 65
End: 2020-01-01
Attending: PSYCHIATRY & NEUROLOGY
Payer: COMMERCIAL

## 2020-01-01 ENCOUNTER — VIRTUAL VISIT (OUTPATIENT)
Dept: ONCOLOGY | Facility: CLINIC | Age: 65
End: 2020-01-01
Attending: PSYCHIATRY & NEUROLOGY
Payer: COMMERCIAL

## 2020-01-01 ENCOUNTER — TUMOR CONFERENCE (OUTPATIENT)
Dept: ONCOLOGY | Facility: CLINIC | Age: 65
End: 2020-01-01

## 2020-01-01 ENCOUNTER — TELEPHONE (OUTPATIENT)
Dept: NEUROLOGY | Facility: CLINIC | Age: 65
End: 2020-01-01

## 2020-01-01 DIAGNOSIS — C71.9 GLIOBLASTOMA (H): ICD-10-CM

## 2020-01-01 DIAGNOSIS — G40.219 LOCALIZATION-RELATED EPILEPSY WITH COMPLEX PARTIAL SEIZURES WITH INTRACTABLE EPILEPSY (H): Primary | ICD-10-CM

## 2020-01-01 DIAGNOSIS — G93.6 BRAIN SWELLING (H): ICD-10-CM

## 2020-01-01 DIAGNOSIS — G40.909 SEIZURE DISORDER (H): ICD-10-CM

## 2020-01-01 DIAGNOSIS — C71.9 GLIOBLASTOMA (H): Primary | ICD-10-CM

## 2020-01-01 PROCEDURE — 99215 OFFICE O/P EST HI 40 MIN: CPT | Mod: 95 | Performed by: PSYCHIATRY & NEUROLOGY

## 2020-01-01 PROCEDURE — 70553 MRI BRAIN STEM W/O & W/DYE: CPT | Mod: GC | Performed by: RADIOLOGY

## 2020-01-01 PROCEDURE — 999N001193 HC VIDEO/TELEPHONE VISIT; NO CHARGE

## 2020-01-01 PROCEDURE — A9585 GADOBUTROL INJECTION: HCPCS | Performed by: RADIOLOGY

## 2020-01-01 RX ORDER — DIVALPROEX SODIUM 250 MG/1
250 TABLET, DELAYED RELEASE ORAL 2 TIMES DAILY
Qty: 180 TABLET | Refills: 3 | Status: SHIPPED | OUTPATIENT
Start: 2020-01-01 | End: 2021-01-01

## 2020-01-01 RX ORDER — LACOSAMIDE 200 MG/1
TABLET ORAL
Qty: 60 TABLET | OUTPATIENT
Start: 2020-01-01

## 2020-01-01 RX ORDER — SERTRALINE HYDROCHLORIDE 100 MG/1
200 TABLET, FILM COATED ORAL DAILY
Qty: 60 TABLET | Refills: 3 | Status: SHIPPED | OUTPATIENT
Start: 2020-01-01 | End: 2020-01-01

## 2020-01-01 RX ORDER — GADOBUTROL 604.72 MG/ML
10 INJECTION INTRAVENOUS ONCE
Status: COMPLETED | OUTPATIENT
Start: 2020-01-01 | End: 2020-01-01

## 2020-01-01 RX ORDER — SERTRALINE HYDROCHLORIDE 100 MG/1
200 TABLET, FILM COATED ORAL DAILY
Qty: 180 TABLET | Refills: 3 | Status: SHIPPED | OUTPATIENT
Start: 2020-01-01

## 2020-01-01 RX ORDER — DEXAMETHASONE 1 MG
1 TABLET ORAL
Qty: 90 TABLET | Refills: 3 | Status: SHIPPED | OUTPATIENT
Start: 2020-01-01

## 2020-01-01 RX ADMIN — GADOBUTROL 10 ML: 604.72 INJECTION INTRAVENOUS at 11:41

## 2020-01-06 ENCOUNTER — ONCOLOGY VISIT (OUTPATIENT)
Dept: ONCOLOGY | Facility: CLINIC | Age: 65
End: 2020-01-06
Attending: PHYSICIAN ASSISTANT
Payer: COMMERCIAL

## 2020-01-06 ENCOUNTER — APPOINTMENT (OUTPATIENT)
Dept: LAB | Facility: CLINIC | Age: 65
End: 2020-01-06
Attending: PSYCHIATRY & NEUROLOGY
Payer: COMMERCIAL

## 2020-01-06 VITALS
RESPIRATION RATE: 20 BRPM | WEIGHT: 229.5 LBS | TEMPERATURE: 97.5 F | OXYGEN SATURATION: 98 % | SYSTOLIC BLOOD PRESSURE: 154 MMHG | HEART RATE: 60 BPM | DIASTOLIC BLOOD PRESSURE: 91 MMHG | BODY MASS INDEX: 29.45 KG/M2

## 2020-01-06 DIAGNOSIS — C71.9 GLIOBLASTOMA (H): Primary | ICD-10-CM

## 2020-01-06 LAB
ALBUMIN SERPL-MCNC: 3.8 G/DL (ref 3.4–5)
ALP SERPL-CCNC: 52 U/L (ref 40–150)
ALT SERPL W P-5'-P-CCNC: 75 U/L (ref 0–70)
ANION GAP SERPL CALCULATED.3IONS-SCNC: 7 MMOL/L (ref 3–14)
AST SERPL W P-5'-P-CCNC: 23 U/L (ref 0–45)
BASOPHILS # BLD AUTO: 0 10E9/L (ref 0–0.2)
BASOPHILS NFR BLD AUTO: 0.4 %
BILIRUB SERPL-MCNC: 0.3 MG/DL (ref 0.2–1.3)
BUN SERPL-MCNC: 14 MG/DL (ref 7–30)
CALCIUM SERPL-MCNC: 9.2 MG/DL (ref 8.5–10.1)
CHLORIDE SERPL-SCNC: 112 MMOL/L (ref 94–109)
CO2 SERPL-SCNC: 24 MMOL/L (ref 20–32)
CREAT SERPL-MCNC: 0.74 MG/DL (ref 0.66–1.25)
DIFFERENTIAL METHOD BLD: ABNORMAL
EOSINOPHIL # BLD AUTO: 0 10E9/L (ref 0–0.7)
EOSINOPHIL NFR BLD AUTO: 1.5 %
ERYTHROCYTE [DISTWIDTH] IN BLOOD BY AUTOMATED COUNT: 15.9 % (ref 10–15)
GFR SERPL CREATININE-BSD FRML MDRD: >90 ML/MIN/{1.73_M2}
GLUCOSE SERPL-MCNC: 171 MG/DL (ref 70–99)
HCT VFR BLD AUTO: 33.7 % (ref 40–53)
HGB BLD-MCNC: 11.1 G/DL (ref 13.3–17.7)
IMM GRANULOCYTES # BLD: 0 10E9/L (ref 0–0.4)
IMM GRANULOCYTES NFR BLD: 1.1 %
LAB SCANNED RESULT: NORMAL
LYMPHOCYTES # BLD AUTO: 0.5 10E9/L (ref 0.8–5.3)
LYMPHOCYTES NFR BLD AUTO: 17.4 %
MCH RBC QN AUTO: 29.8 PG (ref 26.5–33)
MCHC RBC AUTO-ENTMCNC: 32.9 G/DL (ref 31.5–36.5)
MCV RBC AUTO: 90 FL (ref 78–100)
MONOCYTES # BLD AUTO: 0.1 10E9/L (ref 0–1.3)
MONOCYTES NFR BLD AUTO: 5.3 %
NEUTROPHILS # BLD AUTO: 2 10E9/L (ref 1.6–8.3)
NEUTROPHILS NFR BLD AUTO: 74.3 %
NRBC # BLD AUTO: 0 10*3/UL
NRBC BLD AUTO-RTO: 0 /100
PLATELET # BLD AUTO: 65 10E9/L (ref 150–450)
POTASSIUM SERPL-SCNC: 3.6 MMOL/L (ref 3.4–5.3)
PROT SERPL-MCNC: 7.2 G/DL (ref 6.8–8.8)
RBC # BLD AUTO: 3.73 10E12/L (ref 4.4–5.9)
SODIUM SERPL-SCNC: 143 MMOL/L (ref 133–144)
WBC # BLD AUTO: 2.6 10E9/L (ref 4–11)

## 2020-01-06 PROCEDURE — G0463 HOSPITAL OUTPT CLINIC VISIT: HCPCS | Mod: ZF

## 2020-01-06 PROCEDURE — 99214 OFFICE O/P EST MOD 30 MIN: CPT | Mod: ZP | Performed by: PHYSICIAN ASSISTANT

## 2020-01-06 PROCEDURE — 85025 COMPLETE CBC W/AUTO DIFF WBC: CPT | Performed by: PSYCHIATRY & NEUROLOGY

## 2020-01-06 PROCEDURE — 80053 COMPREHEN METABOLIC PANEL: CPT | Performed by: PSYCHIATRY & NEUROLOGY

## 2020-01-06 PROCEDURE — 36415 COLL VENOUS BLD VENIPUNCTURE: CPT

## 2020-01-06 RX ORDER — DEXAMETHASONE 2 MG/1
2 TABLET ORAL
Qty: 30 TABLET | Refills: 3 | Status: SHIPPED | OUTPATIENT
Start: 2020-01-06 | End: 2020-01-31

## 2020-01-06 RX ORDER — SERTRALINE HYDROCHLORIDE 100 MG/1
100 TABLET, FILM COATED ORAL DAILY
Qty: 30 TABLET | Refills: 3 | Status: SHIPPED | OUTPATIENT
Start: 2020-01-06 | End: 2020-01-31

## 2020-01-06 ASSESSMENT — PAIN SCALES - GENERAL: PAINLEVEL: NO PAIN (0)

## 2020-01-06 NOTE — LETTER
1/6/2020      RE: Jayden Lackey  [address]  [facility] [address]       NEURO-ONCOLOGY VISIT  Jan 6, 2020    CHIEF COMPLAINT: Mr. 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 He completed chemoradiotherapy on 6/11/2019. Imaging in 9/2019 was consistent with recurrent disease and adjuvant-dosed temozolomide was stop. Pathology from repeat resection on 10/24/2019 was consistent with recurrent tumor.     Quality of life has been compromised by recurrent seizure events, recent PE with requiring prolonged hospitalization, and chemotherapy related toxicities.      Jayden is presenting in follow-up for continued evaluation accompanied by Oz (wife).      HISTORY OF PRESENT ILLNESS  -Physically improving, though still having difficulties with cognition. Reading is harder and foggier   -Still veers to the left when walking   -Has restarted OT/PT  -No N/V, no constipation or diarrhea  -Having more hematomas and bruising on abdomen from Lovenox, wants to be able to switch to a pill  -Had a seizure on 12/12. This has set him back some and he is still recovering   -Still having difficulties with coping and mood. More flat. Did not increase Zoloft  -No arthritis pain lately. Has been switched from methotrexate to prednisone.     REVIEW OF SYSTEMS  A comprehensive ROS negative except as in HPI.      MEDICATIONS   Current Outpatient Medications   Medication Sig Dispense Refill     acetaminophen (TYLENOL) 325 MG tablet Take 2 tablets (650 mg) by mouth every 4 hours as needed for other (multimodal surgical pain management along with NSAIDS and opioid medication as indicated based on pain control and physical function.) 28 tablet 3     atenolol (TENORMIN) 100 MG tablet Take 50 mg by mouth daily       cholecalciferol (VITAMIN D3) 5000 units (125 mcg) capsule Take 5,000  Units by mouth daily       dexamethasone (DECADRON) 2 MG tablet Take 1 tablet (2 mg) by mouth daily (with breakfast) 30 tablet 3     diltiazem ER (DILT-XR) 180 MG 24 hr capsule Take 180 mg by mouth daily       Lacosamide (VIMPAT) 100 MG TABS tablet Take 150 mg by mouth 2 times daily        levETIRAcetam (KEPPRA) 1000 MG tablet Take 1,500 mg by mouth 2 times daily   1     lisinopril (PRINIVIL/ZESTRIL) 5 MG tablet Take 1 tablet (5 mg) by mouth daily 28 tablet 0     Nutritional Supplements (JUICE PLUS FIBRE PO) Take 1 tablet by mouth every morning        ondansetron (ZOFRAN) 4 MG tablet 4 mg every 4 hours as needed       predniSONE (DELTASONE) 2.5 MG tablet 7.5 mg daily       rivaroxaban ANTICOAGULANT (XARELTO ANTICOAGULANT) 20 MG TABS tablet Take 1 tablet (20 mg) by mouth daily (with dinner) 30 tablet 3     sertraline (ZOLOFT) 100 MG tablet Take 1 tablet (100 mg) by mouth daily 30 tablet 3     folic acid (FOLVITE) 1 MG tablet Take 1 mg by mouth daily        methotrexate 50 MG/2ML injection Take 0.7 mL (17.5 mg) by mouth every 7 days on Fridays.       DRUG ALLERGIES   Allergies   Allergen Reactions     Shellfish Allergy Difficulty breathing, Nausea and Vomiting, Swelling and Shortness Of Breath     Ativan [Lorazepam] Other (See Comments)     Hallucinations, delirium     No Clinical Screening - See Comments      Lupine bean doesn't remember reaction     Other (Do Not Use)      Lupine bean doesn't remember reaction       ONCOLOGIC HISTORY  -1/2019 PRESENTATION: Visual disturbance.   -3/6/2019 Evaluated by Dr. Mauricio, neuro-ophthalmologist, found to have a right sided homonymous hemianopsia. MRI ordered.   -3/6/2019 MR brain imaging revealed a contrast-enhancing lesion in the left occipital lobe suspicious for a high-grade primary brain tumor.  -3/22/2019 SURGERY: Craniotomy with a gross total resection by Dr. Irving Hayes.   PATHOLOGY: Glioblastoma; Wildtype status for IDH1/2 by next generation sequencing. MGMT promoter  methylated. Wildtype PTEN, TP53.  -4/8/2019 NEURO-ONC: Recommending chemoradiotherapy.   -4/30 - 6/11/2019 CHEMORADS 6000cGy in 30 fractions with concurrent temozolomide 75mg/m2 (180mg).  -5/20/2019 NEURO-ONC: Not interested in Optune at this time.   -6/12/2019 NEURO-ONC: Clinically stable.   -6/21 - 6/28/2019 ADMISSION/ SZ: Admitted to [facility] for first ever seizure event, provoked by hyponatremia. Urine studies consistent with SIADH ; etiology of SIADH unclear. Had 10mm gastric lesion biopsied on 6/26/2019 by way of an EGD.  PATHOLOGY of gastric lesion: Hyperplastic polyp with erosion and foci of atypia; favor reactive. No intestinal metaplasia, no helicobacter pylori, no high grade dysplasia or invasive carcinoma.   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   -7/12/2019 NEURO-ONC/ MRB/ CHEMO: Clinical stable. Imaging with stable to slightly increased enhancement of previously seen nodular lesions; associated with mild degree of elevated rCBV. Starting adjuvant-dosed temozolomide 150mg/m2 (360mg), cycle 1 (start date 7/12/2019). Monitor fluid intake, will be checking CMP/ Na+.   -8/12/2019 NEURO-ONC/ MRB/ CHEMO: Clinical decompensated, but improving since hospital discharge. MR brain scan from last week with concern for non-contrast enhancing >> enhancing disease progression. Ordering a PET brain scan. Holding adjuvant-dosed temozolomide, cycle 3.  -10/24/2019 SURGERY: Repeat resection by Dr. Packer.  PATHOLOGY: Recurrent glioblastoma.   -11/25/2019 NEURO-ONC/ MRB/ CHEMO: Clinically doing well. Imaging with positive resection. Starting Lomustine (start date of 12/2). Next generation sequencing.   -1/6/2020 NEURO-ONC: Clinically stable. Has TCP with Lomustine. Will hold off on starting next week until he sees Dr. Olmos with new imaging     SOCIAL HISTORY   Tobacco use: Former smoker, quit 40 years ago.   Alcohol use: Social use.  Drug use: Denies marijuana use.  Supplement, complimentary/ alternative medicine:  "None.    .   Employment: On disability.       PHYSICAL EXAMINATION  BP (!) 154/91 (BP Location: Left arm, Patient Position: Sitting)   Pulse 60   Temp 97.5  F (36.4  C) (Oral)   Resp 20   Wt 104.1 kg (229 lb 8 oz)   SpO2 98%   BMI 29.45 kg/m      Wt Readings from Last 2 Encounters:   01/06/20 104.1 kg (229 lb 8 oz)   12/11/19 97.3 kg (214 lb 9.6 oz)      Ht Readings from Last 2 Encounters:   12/11/19 1.88 m (6' 2.02\")   11/17/19 1.88 m (6' 2\")     KPS: 70    -Generally well appearing. Slightly fatigued and depressed.   -Respiratory: Normal breath sounds, no audible wheezing.   -Skin: No rashes. Healed head incision.  -Hematologic/ lymphatic: Bruising on waist. No leg swelling.  -Psychiatric: Flat mood and affect. Pleasant, talkative.  -Neurologic:   MENTAL STATUS:     Alert, easily confused.    Repeating questions.     Recall: Impaired.   Speech nearly fluent, but with hesitation.    Comprehension intact to multi-step commands, but confused/ poor attention.   Good right-left orientation.     CRANIAL NERVES:     Pupils are equal, round, reactive to light.     Extraocular movements full, patient denies diplopia.     Homonymous hemianopsia, right-side.    Facial sensation intact to light touch.   Symmetric facial movements.   Hearing intact.   Palate moves symmetrically.     Tongue midline.  MOTOR:    Normal and symmetric tone.   No pronation or drift. No orbiting.  SENSATION:    Intact to light touch throughout.  COORDINATION:   Intact finger-nose with eyes open and closed bilaterally.   GAIT:  Sitting in wheelchair. Did not walk. Walks with a cane per report and leans to the left.      MEDICAL RECORDS  Obtained and personally reviewed all available outside medical records in addition to reviewing any records available in our electronic system.     LABS  Personally reviewed all available lab results.     IMAGING  No new imaging     IMPRESSION  He started Lomustine on 12/2 which he tolerated overall very " well. We are holding off on starting Avastin as patient is doing well and is largely asymptomatic. Optune remains a poor/ inadequate option for him. He does have TCP today which is still worsening and likely he is still not at his berta. He is due to start his next cycle on 1/13 though likely will not have high enough platelets. Per Dr. Olmos, will hold off on starting next cycle until he sees her with new imaging. If his platelet were to recover to >100, could consider seeing him earlier.     Jayden is following with neurology that is managing Keppra and Vimpat. Did just have seizures on 12/12. This could be due to increase inflammation to Lomustine, though also could be incidental. Would continue with the Lomustine despite the seizure.     Continue anticoagulation. Switching from Lovenox to a DOAC now. He still has several doses left. Will have him finish his supply of Lovenox and then start Xarelto 20 mg daily (no need for loading dose). Explained how to switch anticoagulation, should start the pills when his next shot is due.     PROBLEM LIST  Glioblastoma, MGMT methylated and IDH1 wildtype by NGS  Visual field cut; homonymous hemianopsia, right-side  Memory issues  Drug-induced constipation  Hyperplastic gastric polyps, benign  H/o SIADH    PLAN  -CANCER DIRECTED THERAPY-  -Started Lomustine 200mg on 12/2. Due for C2 on 1/13. Delaying as above.     -continue weekly labs    -follow-up in 3 weeks with new imaging    -STEROIDS-  -Continue on dexamethasone 2mg daily for now. Would not decrease dose further at this point     -SEIZURE MANAGEMENT-  -Followed by neurology at [facility].   -recent seizure on 12/12 and was admitted. Also had aspiration PNA and treated with augmentin   -Given history of seizures, will continue Keppra + Vimpat. Vimpat increased to 150 mg after admit  -Epilepsy provoked by intracranial pathology, but also hyponatremia related to SIADH of unclear etiology.     -Quality of life/ MOOD/  FATIGUE-  -Continued mood issues; depression, poor coping.  -Still on Zoloft 50 mg daily. Did not increase to 100 mg daily. Should increase as still having some mood changes (sent script for 100 mg pills)  -Continue to monitor mood as untreated/ undertreated depression can worsen fatigue, dysorexia, and quality of life.  -Added to Brain Tumor Support Group email list; [email or web address]  -Continue physical therapies.     -HICCUPS-  -Pepcid (refilled). Not bothersome at this point   -Likely Steroid related.  -Possibly Baclofen (muscle relaxer) if needed.    -HYPONATREMIA/ History of SIADH-  -Monitor amount of fluid intake.   -Continue to track sodium level on routine CMP. Has been normal since June 2019     -MEMORY COMPLAINTS-  -Consideration for referral to Dr. Aleksandr Garcia for neuro-psych testing.   -Continue speech therapy.    -VISUAL FIELD LOSS-  -Looking to follow-up with neuro-ophtho.   -Continue following with OT.     -DVT/ PE-  -Need for lifelong anticoagulation.  -Currently on Lovenox and wants to switch to a DOAC. Will have him switch to Xarelto when done with Lovenox supply. Will start pills when next shot is due. No need for loading dose. Start 20 mg daily   -if plt drop <50, will need to hold anticoagulation     -RA-  -Holding methotrexate for arthritis due to elevated LFTs. Now on prednisone 7.5 mg daily (in addition to dex, rheum wanted him on both). LFTs improved now    -ADDITIONAL SUPPORTIVE MANAGEMENT-  -Social work following.   -CT with lung nodules. [facility]. [place] risk of lung cancer is low but rheumatoid nodules vs gliobastoma mets in the differential diagnosis. Will follow-up with them in 6 months with new CT  -S/p EGD with benign pathology of the 10mm gastric antrum/pylorus posterior wall lesion.  -Breast biopsy showed necrotic issue, no malignancy.    Return to clinic on 1/31 with Dr. Olmos and MRI.      In the meantime, Jayden and Oz know to call with  questions or concerns or to report new complaints and can be seen sooner if needed. Urgent evaluation is needed in the setting of acute onset of severe headache, abrupt change in mental status, on-going seizures, new focal deficits, or new leg swelling/ pain.    Dolly Ramirez PA-C  Neuro-oncology      Dolly Ramirez PA-C

## 2020-01-06 NOTE — NURSING NOTE
"Oncology Rooming Note    January 6, 2020 2:04 PM   Jayden Lackey is a 65 year old male who presents for:    Chief Complaint   Patient presents with     Oncology Clinic Visit     Return - Glioblastoma     Blood Draw     Venipuncture labs collected by RN.      Initial Vitals: BP (!) 154/91 (BP Location: Left arm, Patient Position: Sitting)   Pulse 60   Temp 97.5  F (36.4  C) (Oral)   Resp 20   Wt 104.1 kg (229 lb 8 oz)   SpO2 98%   BMI 29.45 kg/m   Estimated body mass index is 29.45 kg/m  as calculated from the following:    Height as of 12/11/19: 1.88 m (6' 2.02\").    Weight as of this encounter: 104.1 kg (229 lb 8 oz). Body surface area is 2.33 meters squared.  No Pain (0) Comment: Data Unavailable   No LMP for male patient.  Allergies reviewed: Yes  Medications reviewed: Yes    Medications: Medication refills not needed today.  Pharmacy name entered into [facility]:    [facility] - [place], [place] - [address] [id number] - [place], [place] - [facility] [facility] [address] [place] [address] & [facility] - [place], [place] - [address]    Clinical concerns: Lab Results and would like to transition off of the Enoxaparin injections    Refill: Dexamethasone, Enoxaparin and Zoloft       Lai Shannon, EMT                "

## 2020-01-06 NOTE — PROGRESS NOTES
NEURO-ONCOLOGY VISIT  Jan 6, 2020    CHIEF COMPLAINT: Mr. 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 He completed chemoradiotherapy on 6/11/2019. Imaging in 9/2019 was consistent with recurrent disease and adjuvant-dosed temozolomide was stop. Pathology from repeat resection on 10/24/2019 was consistent with recurrent tumor.     Quality of life has been compromised by recurrent seizure events, recent PE with requiring prolonged hospitalization, and chemotherapy related toxicities.      Jayden is presenting in follow-up for continued evaluation accompanied by Oz (wife).      HISTORY OF PRESENT ILLNESS  -Physically improving, though still having difficulties with cognition. Reading is harder and foggier   -Still veers to the left when walking   -Has restarted OT/PT  -No N/V, no constipation or diarrhea  -Having more hematomas and bruising on abdomen from Lovenox, wants to be able to switch to a pill  -Had a seizure on 12/12. This has set him back some and he is still recovering   -Still having difficulties with coping and mood. More flat. Did not increase Zoloft  -No arthritis pain lately. Has been switched from methotrexate to prednisone.     REVIEW OF SYSTEMS  A comprehensive ROS negative except as in HPI.      MEDICATIONS   Current Outpatient Medications   Medication Sig Dispense Refill     acetaminophen (TYLENOL) 325 MG tablet Take 2 tablets (650 mg) by mouth every 4 hours as needed for other (multimodal surgical pain management along with NSAIDS and opioid medication as indicated based on pain control and physical function.) 28 tablet 3     atenolol (TENORMIN) 100 MG tablet Take 50 mg by mouth daily       cholecalciferol (VITAMIN D3) 5000 units (125 mcg) capsule Take 5,000 Units by mouth daily       dexamethasone (DECADRON) 2 MG tablet Take 1 tablet (2 mg) by  mouth daily (with breakfast) 30 tablet 3     diltiazem ER (DILT-XR) 180 MG 24 hr capsule Take 180 mg by mouth daily       Lacosamide (VIMPAT) 100 MG TABS tablet Take 150 mg by mouth 2 times daily        levETIRAcetam (KEPPRA) 1000 MG tablet Take 1,500 mg by mouth 2 times daily   1     lisinopril (PRINIVIL/ZESTRIL) 5 MG tablet Take 1 tablet (5 mg) by mouth daily 28 tablet 0     Nutritional Supplements (JUICE PLUS FIBRE PO) Take 1 tablet by mouth every morning        ondansetron (ZOFRAN) 4 MG tablet 4 mg every 4 hours as needed       predniSONE (DELTASONE) 2.5 MG tablet 7.5 mg daily       rivaroxaban ANTICOAGULANT (XARELTO ANTICOAGULANT) 20 MG TABS tablet Take 1 tablet (20 mg) by mouth daily (with dinner) 30 tablet 3     sertraline (ZOLOFT) 100 MG tablet Take 1 tablet (100 mg) by mouth daily 30 tablet 3     folic acid (FOLVITE) 1 MG tablet Take 1 mg by mouth daily        methotrexate 50 MG/2ML injection Take 0.7 mL (17.5 mg) by mouth every 7 days on Fridays.       DRUG ALLERGIES   Allergies   Allergen Reactions     Shellfish Allergy Difficulty breathing, Nausea and Vomiting, Swelling and Shortness Of Breath     Ativan [Lorazepam] Other (See Comments)     Hallucinations, delirium     No Clinical Screening - See Comments      Lupine bean doesn't remember reaction     Other (Do Not Use)      Lupine bean doesn't remember reaction       ONCOLOGIC HISTORY  -1/2019 PRESENTATION: Visual disturbance.   -3/6/2019 Evaluated by Dr. Mauricio, neuro-ophthalmologist, found to have a right sided homonymous hemianopsia. MRI ordered.   -3/6/2019 MR brain imaging revealed a contrast-enhancing lesion in the left occipital lobe suspicious for a high-grade primary brain tumor.  -3/22/2019 SURGERY: Craniotomy with a gross total resection by Dr. Irving Hayes.   PATHOLOGY: Glioblastoma; Wildtype status for IDH1/2 by next generation sequencing. MGMT promoter methylated. Wildtype PTEN, TP53.  -4/8/2019 NEURO-ONC: Recommending chemoradiotherapy.    -4/30 - 6/11/2019 CHEMORADS 6000cGy in 30 fractions with concurrent temozolomide 75mg/m2 (180mg).  -5/20/2019 NEURO-ONC: Not interested in Optune at this time.   -6/12/2019 NEURO-ONC: Clinically stable.   -6/21 - 6/28/2019 ADMISSION/ [facility]: Admitted to [facility] for first ever seizure event, provoked by hyponatremia. Urine studies consistent with SIADH ; etiology of SIADH unclear. Had 10mm gastric lesion biopsied on 6/26/2019 by way of an EGD.  PATHOLOGY of gastric lesion: Hyperplastic polyp with erosion and foci of atypia; favor reactive. No intestinal metaplasia, no helicobacter pylori, no high grade dysplasia or invasive carcinoma.   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   -7/12/2019 NEURO-ONC/ MRB/ CHEMO: Clinical stable. Imaging with stable to slightly increased enhancement of previously seen nodular lesions; associated with mild degree of elevated rCBV. Starting adjuvant-dosed temozolomide 150mg/m2 (360mg), cycle 1 (start date 7/12/2019). Monitor fluid intake, will be checking CMP/ Na+.   -8/12/2019 NEURO-ONC/ MRB/ CHEMO: Clinical decompensated, but improving since hospital discharge. MR brain scan from last week with concern for non-contrast enhancing >> enhancing disease progression. Ordering a PET brain scan. Holding adjuvant-dosed temozolomide, cycle 3.  -10/24/2019 SURGERY: Repeat resection by Dr. Packer.  PATHOLOGY: Recurrent glioblastoma.   -11/25/2019 NEURO-ONC/ MRB/ CHEMO: Clinically doing well. Imaging with positive resection. Starting Lomustine (start date of 12/2). Next generation sequencing.   -1/6/2020 NEURO-ONC: Clinically stable. Has TCP with Lomustine. Will hold off on starting next week until he sees Dr. Olmos with new imaging     SOCIAL HISTORY   Tobacco use: Former smoker, quit 40 years ago.   Alcohol use: Social use.  Drug use: Denies marijuana use.  Supplement, complimentary/ alternative medicine: None.    .   Employment: On disability.       PHYSICAL EXAMINATION  BP (!)  "154/91 (BP Location: Left arm, Patient Position: Sitting)   Pulse 60   Temp 97.5  F (36.4  C) (Oral)   Resp 20   Wt 104.1 kg (229 lb 8 oz)   SpO2 98%   BMI 29.45 kg/m     Wt Readings from Last 2 Encounters:   01/06/20 104.1 kg (229 lb 8 oz)   12/11/19 97.3 kg (214 lb 9.6 oz)      Ht Readings from Last 2 Encounters:   12/11/19 1.88 m (6' 2.02\")   11/17/19 1.88 m (6' 2\")     KPS: 70    -Generally well appearing. Slightly fatigued and depressed.   -Respiratory: Normal breath sounds, no audible wheezing.   -Skin: No rashes. Healed head incision.  -Hematologic/ lymphatic: Bruising on waist. No leg swelling.  -Psychiatric: Flat mood and affect. Pleasant, talkative.  -Neurologic:   MENTAL STATUS:     Alert, easily confused.    Repeating questions.     Recall: Impaired.   Speech nearly fluent, but with hesitation.    Comprehension intact to multi-step commands, but confused/ poor attention.   Good right-left orientation.     CRANIAL NERVES:     Pupils are equal, round, reactive to light.     Extraocular movements full, patient denies diplopia.     Homonymous hemianopsia, right-side.    Facial sensation intact to light touch.   Symmetric facial movements.   Hearing intact.   Palate moves symmetrically.     Tongue midline.  MOTOR:    Normal and symmetric tone.   No pronation or drift. No orbiting.  SENSATION:    Intact to light touch throughout.  COORDINATION:   Intact finger-nose with eyes open and closed bilaterally.   GAIT:  Sitting in wheelchair. Did not walk. Walks with a cane per report and leans to the left.      MEDICAL RECORDS  Obtained and personally reviewed all available outside medical records in addition to reviewing any records available in our electronic system.     LABS  Personally reviewed all available lab results.     IMAGING  No new imaging     IMPRESSION  He started Lomustine on 12/2 which he tolerated overall very well. We are holding off on starting Avastin as patient is doing well and is largely " asymptomatic. Optune remains a poor/ inadequate option for him. He does have TCP today which is still worsening and likely he is still not at his berta. He is due to start his next cycle on 1/13 though likely will not have high enough platelets. Per Dr. Olmos, will hold off on starting next cycle until he sees her with new imaging. If his platelet were to recover to >100, could consider seeing him earlier.     Jayden is following with neurology that is managing Keppra and Vimpat. Did just have seizures on 12/12. This could be due to increase inflammation to Lomustine, though also could be incidental. Would continue with the Lomustine despite the seizure.     Continue anticoagulation. Switching from Lovenox to a DOAC now. He still has several doses left. Will have him finish his supply of Lovenox and then start Xarelto 20 mg daily (no need for loading dose). Explained how to switch anticoagulation, should start the pills when his next shot is due.     PROBLEM LIST  Glioblastoma, MGMT methylated and IDH1 wildtype by NGS  Visual field cut; homonymous hemianopsia, right-side  Memory issues  Drug-induced constipation  Hyperplastic gastric polyps, benign  H/o SIADH    PLAN  -CANCER DIRECTED THERAPY-  -Started Lomustine 200mg on 12/2. Due for C2 on 1/13. Delaying as above.     -continue weekly labs    -follow-up in 3 weeks with new imaging    -STEROIDS-  -Continue on dexamethasone 2mg daily for now. Would not decrease dose further at this point     -SEIZURE MANAGEMENT-  -Followed by neurology at [facility].   -recent seizure on 12/12 and was admitted. Also had aspiration PNA and treated with augmentin   -Given history of seizures, will continue Keppra + Vimpat. Vimpat increased to 150 mg after admit  -Epilepsy provoked by intracranial pathology, but also hyponatremia related to SIADH of unclear etiology.     -Quality of life/ MOOD/ FATIGUE-  -Continued mood issues; depression, poor coping.  -Still on Zoloft 50 mg daily. Did  not increase to 100 mg daily. Should increase as still having some mood changes (sent script for 100 mg pills)  -Continue to monitor mood as untreated/ undertreated depression can worsen fatigue, dysorexia, and quality of life.  -Added to Brain Tumor Support Group email list; [email or web address]  -Continue physical therapies.     -HICCUPS-  -Pepcid (refilled). Not bothersome at this point   -Likely Steroid related.  -Possibly Baclofen (muscle relaxer) if needed.    -HYPONATREMIA/ History of SIADH-  -Monitor amount of fluid intake.   -Continue to track sodium level on routine CMP. Has been normal since June 2019     -MEMORY COMPLAINTS-  -Consideration for referral to Dr. Aleksandr Garcia for neuro-psych testing.   -Continue speech therapy.    -VISUAL FIELD LOSS-  -Looking to follow-up with neuro-ophtho.   -Continue following with OT.     -DVT/ PE-  -Need for lifelong anticoagulation.  -Currently on Lovenox and wants to switch to a DOAC. Will have him switch to Xarelto when done with Lovenox supply. Will start pills when next shot is due. No need for loading dose. Start 20 mg daily   -if plt drop <50, will need to hold anticoagulation     -RA-  -Holding methotrexate for arthritis due to elevated LFTs. Now on prednisone 7.5 mg daily (in addition to dex, rheum wanted him on both). LFTs improved now    -ADDITIONAL SUPPORTIVE MANAGEMENT-  -Social work following.   -CT with lung nodules. [facility]. [place] risk of lung cancer is low but rheumatoid nodules vs gliobastoma mets in the differential diagnosis. Will follow-up with them in 6 months with new CT  -S/p EGD with benign pathology of the 10mm gastric antrum/pylorus posterior wall lesion.  -Breast biopsy showed necrotic issue, no malignancy.    Return to clinic on 1/31 with Dr. Olmos and MRI.      In the meantime, Jayden and Oz know to call with questions or concerns or to report new complaints and can be seen sooner if needed. Urgent  evaluation is needed in the setting of acute onset of severe headache, abrupt change in mental status, on-going seizures, new focal deficits, or new leg swelling/ pain.    Dolly Ramirez PA-C  Neuro-oncology

## 2020-01-06 NOTE — NURSING NOTE
Chief Complaint   Patient presents with     Oncology Clinic Visit     Return - Glioblastoma     Blood Draw     Venipuncture labs collected by RN.

## 2020-01-07 PROCEDURE — 80235 DRUG ASSAY LACOSAMIDE: CPT | Performed by: PSYCHIATRY & NEUROLOGY

## 2020-01-07 PROCEDURE — 80177 DRUG SCRN QUAN LEVETIRACETAM: CPT | Performed by: PSYCHIATRY & NEUROLOGY

## 2020-01-09 LAB
LACOSAMIDE SERPL-MCNC: 5.4 UG/ML (ref 5–10)
LEVETIRACETAM SERPL-MCNC: 13 UG/ML (ref 12–46)

## 2020-01-13 LAB
BASOPHILS # BLD AUTO: 0 10E9/L (ref 0–0.2)
BASOPHILS NFR BLD AUTO: 0.3 %
DIFFERENTIAL METHOD BLD: ABNORMAL
EOSINOPHIL # BLD AUTO: 0.1 10E9/L (ref 0–0.7)
EOSINOPHIL NFR BLD AUTO: 1.9 %
ERYTHROCYTE [DISTWIDTH] IN BLOOD BY AUTOMATED COUNT: 15.9 % (ref 10–15)
HCT VFR BLD AUTO: 34.6 % (ref 40–53)
HGB BLD-MCNC: 11.1 G/DL (ref 13.3–17.7)
IMM GRANULOCYTES # BLD: 0 10E9/L (ref 0–0.4)
IMM GRANULOCYTES NFR BLD: 0.6 %
LYMPHOCYTES # BLD AUTO: 1.2 10E9/L (ref 0.8–5.3)
LYMPHOCYTES NFR BLD AUTO: 37.9 %
MCH RBC QN AUTO: 29.6 PG (ref 26.5–33)
MCHC RBC AUTO-ENTMCNC: 32.1 G/DL (ref 31.5–36.5)
MCV RBC AUTO: 92 FL (ref 78–100)
MONOCYTES # BLD AUTO: 0.2 10E9/L (ref 0–1.3)
MONOCYTES NFR BLD AUTO: 6.5 %
NEUTROPHILS # BLD AUTO: 1.6 10E9/L (ref 1.6–8.3)
NEUTROPHILS NFR BLD AUTO: 52.8 %
NRBC # BLD AUTO: 0 10*3/UL
NRBC BLD AUTO-RTO: 0 /100
PLATELET # BLD AUTO: 145 10E9/L (ref 150–450)
RBC # BLD AUTO: 3.75 10E12/L (ref 4.4–5.9)
WBC # BLD AUTO: 3.1 10E9/L (ref 4–11)

## 2020-01-13 PROCEDURE — 85025 COMPLETE CBC W/AUTO DIFF WBC: CPT | Performed by: PSYCHIATRY & NEUROLOGY

## 2020-01-21 ENCOUNTER — OFFICE VISIT - HEALTHEAST (OUTPATIENT)
Dept: RHEUMATOLOGY | Facility: CLINIC | Age: 65
End: 2020-01-21

## 2020-01-21 DIAGNOSIS — R56.9 SEIZURES (H): ICD-10-CM

## 2020-01-21 DIAGNOSIS — G93.6 CEREBRAL EDEMA (H): ICD-10-CM

## 2020-01-21 DIAGNOSIS — M06.00 SERONEGATIVE RHEUMATOID ARTHRITIS (H): ICD-10-CM

## 2020-01-21 DIAGNOSIS — C71.9 GBM (GLIOBLASTOMA MULTIFORME) (H): ICD-10-CM

## 2020-01-21 LAB
ALBUMIN SERPL-MCNC: 3.2 G/DL (ref 3.4–5)
ALP SERPL-CCNC: 65 U/L (ref 40–150)
ALT SERPL W P-5'-P-CCNC: 42 U/L (ref 0–70)
ANION GAP SERPL CALCULATED.3IONS-SCNC: 4 MMOL/L (ref 3–14)
ANISOCYTOSIS BLD QL SMEAR: SLIGHT
AST SERPL W P-5'-P-CCNC: 16 U/L (ref 0–45)
BASOPHILS # BLD AUTO: 0.1 10E9/L (ref 0–0.2)
BASOPHILS NFR BLD AUTO: 2 %
BILIRUB SERPL-MCNC: 0.2 MG/DL (ref 0.2–1.3)
BUN SERPL-MCNC: 17 MG/DL (ref 7–30)
CALCIUM SERPL-MCNC: 9.5 MG/DL (ref 8.5–10.1)
CHLORIDE SERPL-SCNC: 109 MMOL/L (ref 94–109)
CO2 SERPL-SCNC: 28 MMOL/L (ref 20–32)
CREAT SERPL-MCNC: 0.76 MG/DL (ref 0.66–1.25)
DIFFERENTIAL METHOD BLD: ABNORMAL
EOSINOPHIL # BLD AUTO: 0.1 10E9/L (ref 0–0.7)
EOSINOPHIL NFR BLD AUTO: 1 %
ERYTHROCYTE [DISTWIDTH] IN BLOOD BY AUTOMATED COUNT: 15.7 % (ref 10–15)
GFR SERPL CREATININE-BSD FRML MDRD: >90 ML/MIN/{1.73_M2}
GLUCOSE SERPL-MCNC: 123 MG/DL (ref 70–99)
HCT VFR BLD AUTO: 35.8 % (ref 40–53)
HGB BLD-MCNC: 11.3 G/DL (ref 13.3–17.7)
LYMPHOCYTES # BLD AUTO: 1.5 10E9/L (ref 0.8–5.3)
LYMPHOCYTES NFR BLD AUTO: 29 %
MCH RBC QN AUTO: 29.3 PG (ref 26.5–33)
MCHC RBC AUTO-ENTMCNC: 31.6 G/DL (ref 31.5–36.5)
MCV RBC AUTO: 93 FL (ref 78–100)
MONOCYTES # BLD AUTO: 0.5 10E9/L (ref 0–1.3)
MONOCYTES NFR BLD AUTO: 9 %
MYELOCYTES # BLD: 0.2 10E9/L
MYELOCYTES NFR BLD MANUAL: 4 %
NEUTROPHILS # BLD AUTO: 2.8 10E9/L (ref 1.6–8.3)
NEUTROPHILS NFR BLD AUTO: 55 %
PLATELET # BLD AUTO: 277 10E9/L (ref 150–450)
PLATELET # BLD EST: ABNORMAL 10*3/UL
POLYCHROMASIA BLD QL SMEAR: SLIGHT
POTASSIUM SERPL-SCNC: 3.5 MMOL/L (ref 3.4–5.3)
PROT SERPL-MCNC: 7.4 G/DL (ref 6.8–8.8)
RBC # BLD AUTO: 3.86 10E12/L (ref 4.4–5.9)
SODIUM SERPL-SCNC: 141 MMOL/L (ref 133–144)
WBC # BLD AUTO: 5.1 10E9/L (ref 4–11)

## 2020-01-21 PROCEDURE — 80053 COMPREHEN METABOLIC PANEL: CPT | Performed by: PSYCHIATRY & NEUROLOGY

## 2020-01-21 PROCEDURE — 85025 COMPLETE CBC W/AUTO DIFF WBC: CPT | Performed by: PSYCHIATRY & NEUROLOGY

## 2020-01-21 ASSESSMENT — MIFFLIN-ST. JEOR: SCORE: 1837.92

## 2020-01-27 LAB
ALBUMIN SERPL-MCNC: 3.3 G/DL (ref 3.4–5)
ALP SERPL-CCNC: 57 U/L (ref 40–150)
ALT SERPL W P-5'-P-CCNC: 43 U/L (ref 0–70)
ANION GAP SERPL CALCULATED.3IONS-SCNC: 7 MMOL/L (ref 3–14)
AST SERPL W P-5'-P-CCNC: 16 U/L (ref 0–45)
BASOPHILS # BLD AUTO: 0 10E9/L (ref 0–0.2)
BASOPHILS NFR BLD AUTO: 0.6 %
BILIRUB SERPL-MCNC: 0.2 MG/DL (ref 0.2–1.3)
BUN SERPL-MCNC: 14 MG/DL (ref 7–30)
CALCIUM SERPL-MCNC: 9.3 MG/DL (ref 8.5–10.1)
CHLORIDE SERPL-SCNC: 110 MMOL/L (ref 94–109)
CO2 SERPL-SCNC: 26 MMOL/L (ref 20–32)
CREAT SERPL-MCNC: 0.84 MG/DL (ref 0.66–1.25)
DIFFERENTIAL METHOD BLD: ABNORMAL
EOSINOPHIL # BLD AUTO: 0 10E9/L (ref 0–0.7)
EOSINOPHIL NFR BLD AUTO: 0.1 %
ERYTHROCYTE [DISTWIDTH] IN BLOOD BY AUTOMATED COUNT: 15.3 % (ref 10–15)
GFR SERPL CREATININE-BSD FRML MDRD: >90 ML/MIN/{1.73_M2}
GLUCOSE SERPL-MCNC: 113 MG/DL (ref 70–99)
HCT VFR BLD AUTO: 33 % (ref 40–53)
HGB BLD-MCNC: 10.6 G/DL (ref 13.3–17.7)
IMM GRANULOCYTES # BLD: 0.2 10E9/L (ref 0–0.4)
IMM GRANULOCYTES NFR BLD: 3.1 %
LYMPHOCYTES # BLD AUTO: 0.9 10E9/L (ref 0.8–5.3)
LYMPHOCYTES NFR BLD AUTO: 13.1 %
MCH RBC QN AUTO: 29.4 PG (ref 26.5–33)
MCHC RBC AUTO-ENTMCNC: 32.1 G/DL (ref 31.5–36.5)
MCV RBC AUTO: 92 FL (ref 78–100)
MONOCYTES # BLD AUTO: 0.5 10E9/L (ref 0–1.3)
MONOCYTES NFR BLD AUTO: 7.2 %
NEUTROPHILS # BLD AUTO: 5.4 10E9/L (ref 1.6–8.3)
NEUTROPHILS NFR BLD AUTO: 75.9 %
NRBC # BLD AUTO: 0 10*3/UL
NRBC BLD AUTO-RTO: 0 /100
PLATELET # BLD AUTO: 236 10E9/L (ref 150–450)
POTASSIUM SERPL-SCNC: 3.8 MMOL/L (ref 3.4–5.3)
PROT SERPL-MCNC: 6.9 G/DL (ref 6.8–8.8)
RBC # BLD AUTO: 3.6 10E12/L (ref 4.4–5.9)
SODIUM SERPL-SCNC: 143 MMOL/L (ref 133–144)
WBC # BLD AUTO: 7.1 10E9/L (ref 4–11)

## 2020-01-27 PROCEDURE — 80053 COMPREHEN METABOLIC PANEL: CPT | Performed by: PSYCHIATRY & NEUROLOGY

## 2020-01-27 PROCEDURE — 85025 COMPLETE CBC W/AUTO DIFF WBC: CPT | Performed by: PSYCHIATRY & NEUROLOGY

## 2020-01-28 ENCOUNTER — COMMUNICATION - HEALTHEAST (OUTPATIENT)
Dept: ONCOLOGY | Facility: HOSPITAL | Age: 65
End: 2020-01-28

## 2020-01-28 NOTE — PROGRESS NOTES
NEURO-ONCOLOGY VISIT  Jan 31, 2020    CHIEF COMPLAINT: Mr. 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 He completed chemoradiotherapy on 6/11/2019. Imaging in 9/2019 was consistent with recurrent disease and adjuvant-dosed temozolomide was stop. Pathology from repeat resection on 10/24/2019 was consistent with recurrent tumor.     Quality of life has been compromised by recurrent seizure events, recent PE with requiring prolonged hospitalization, and chemotherapy related toxicities.    He is currently managed on lomustine.       Jayden is presenting in follow-up for continued evaluation accompanied by Oz (wife).      HISTORY OF PRESENT ILLNESS  -Physically improving. Veers to the left when walking; using a cane. Wanting to do more physical activity.   -Main complaint is depression and marital problems. Still having difficulties with coping. More flat. Did not increase Zoloft.  -Continued difficulties with cognition and memory.  -Doing OT/PT.  -Able to write, but cannot read.   -No N/V, no constipation or diarrhea.  -Tolerated switch to oral anticoagulation.   -No recurrent seizure events.   -No arthritis pain lately with dexamethasone. Off prednisone.      REVIEW OF SYSTEMS  A comprehensive ROS negative except as in HPI.      MEDICATIONS   Current Outpatient Medications   Medication Sig Dispense Refill     acetaminophen (TYLENOL) 325 MG tablet Take 2 tablets (650 mg) by mouth every 4 hours as needed for other (multimodal surgical pain management along with NSAIDS and opioid medication as indicated based on pain control and physical function.) 28 tablet 3     atenolol (TENORMIN) 100 MG tablet Take 50 mg by mouth daily       cholecalciferol (VITAMIN D3) 5000 units (125 mcg) capsule Take 5,000 Units by mouth daily       dexamethasone (DECADRON) 2 MG tablet Take 1  tablet (2 mg) by mouth daily (with breakfast) 30 tablet 3     diltiazem ER (DILT-XR) 180 MG 24 hr capsule Take 180 mg by mouth daily       docusate sodium (COLACE) 50 MG capsule Take 50 mg by mouth 2 times daily as needed for constipation       Lacosamide (VIMPAT) 100 MG TABS tablet Take 150 mg by mouth 2 times daily        levETIRAcetam (KEPPRA) 1000 MG tablet Take 1,500 mg by mouth 2 times daily   1     lisinopril (PRINIVIL/ZESTRIL) 5 MG tablet Take 1 tablet (5 mg) by mouth daily 28 tablet 0     ondansetron (ZOFRAN) 4 MG tablet 4 mg every 4 hours as needed       rivaroxaban ANTICOAGULANT (XARELTO ANTICOAGULANT) 20 MG TABS tablet Take 1 tablet (20 mg) by mouth daily (with dinner) 30 tablet 3     sertraline (ZOLOFT) 100 MG tablet Take 1 tablet (100 mg) by mouth daily 30 tablet 3     predniSONE (DELTASONE) 2.5 MG tablet 7.5 mg daily       DRUG ALLERGIES   Allergies   Allergen Reactions     Shellfish Allergy Difficulty breathing, Nausea and Vomiting, Swelling and Shortness Of Breath     Ativan [Lorazepam]      Hallucinations, delirium     No Clinical Screening - See Comments      Tyrone ferrell doesn't remember reaction       ONCOLOGIC HISTORY  -1/2019 PRESENTATION: Visual disturbance.   -3/6/2019 Evaluated by Dr. Mauricio, neuro-ophthalmologist, found to have a right sided homonymous hemianopsia. MRI ordered.   -3/6/2019 MR brain imaging revealed a contrast-enhancing lesion in the left occipital lobe suspicious for a high-grade primary brain tumor.  -3/22/2019 SURGERY: Craniotomy with a gross total resection by Dr. Irving Hayes.   PATHOLOGY: Glioblastoma; Wildtype status for IDH1/2 by next generation sequencing. MGMT promoter methylated. Wildtype PTEN, TP53.  -4/8/2019 NEURO-ONC: Recommending chemoradiotherapy.   -4/30 - 6/11/2019 CHEMORADS 6000cGy in 30 fractions with concurrent temozolomide 75mg/m2 (180mg).  -5/20/2019 NEURO-ONC: Not interested in Optune at this time.   -6/12/2019 NEURO-ONC: Clinically stable.   -6/21 -  6/28/2019 ADMISSION/ [facility]Z: Admitted to [facility] for first ever seizure event, provoked by hyponatremia. Urine studies consistent with SIADH ; etiology of SIADH unclear. Had 10mm gastric lesion biopsied on 6/26/2019 by way of an EGD.  PATHOLOGY of gastric lesion: Hyperplastic polyp with erosion and foci of atypia; favor reactive. No intestinal metaplasia, no helicobacter pylori, no high grade dysplasia or invasive carcinoma.   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   -7/12/2019 NEURO-ONC/ MRB/ CHEMO: Clinical stable. Imaging with stable to slightly increased enhancement of previously seen nodular lesions; associated with mild degree of elevated rCBV. Starting adjuvant-dosed temozolomide 150mg/m2 (360mg), cycle 1 (start date 7/12/2019). Monitor fluid intake, will be checking CMP/ Na+.   -8/12/2019 NEURO-ONC/ MRB/ CHEMO: Clinical decompensated, but improving since hospital discharge. MR brain scan from last week with concern for non-contrast enhancing >> enhancing disease progression. Ordering a PET brain scan. Holding adjuvant-dosed temozolomide, cycle 3.  -10/24/2019 SURGERY: Repeat resection by Dr. Packer.  PATHOLOGY: Recurrent glioblastoma.   Next generation sequencing by [facility]; Microsatellite instability; stable. Tumor mutational burden; 4 (low). Mutations in ARID1A, ASXL1, ATRX, FANCE, MED12, MEF2B, NF1, RUNX1, TERT.   -11/25/2019 NEURO-ONC/ MRB/ CHEMO: Clinically doing well. Imaging with positive resection. Starting Lomustine (start date of 12/2). Next generation sequencing.   -12/4/2019 CHEMO: Lomustine, cycle 1.   -1/6/2020 NEURO-ONC: Clinically stable. Has TCP with Lomustine. Will hold off on starting next week until he sees Dr. Olmos with new imaging.  -1/31/2020 NEURO-ONC/ MRB/ CHEMO: Clinically doing well. Imaging with no evidence of disease recurrence. Holding Lomustine in the setting of a poor emotional state. Referrals to Dr. Aleksandr Garcia for neuro-psych testing, Dr. Eugene Vargas for counseling/  "coping, and palliative care. Next generation sequencing results discussed. Increase Zoloft. Continue dexamethasone 2mg daily.    SOCIAL HISTORY   Tobacco use: Former smoker, quit 40 years ago.   Alcohol use: Social use.  Drug use: Denies marijuana use.  Supplement, complimentary/ alternative medicine: None.    .   Employment: On disability.       PHYSICAL EXAMINATION  BP (!) 146/85   Pulse 60   Temp 98.7  F (37.1  C) (Oral)   Resp 16   Wt 105.5 kg (232 lb 9.6 oz)   SpO2 97%   BMI 29.85 kg/m     Wt Readings from Last 2 Encounters:   01/31/20 105.5 kg (232 lb 9.6 oz)   01/06/20 104.1 kg (229 lb 8 oz)      Ht Readings from Last 2 Encounters:   12/11/19 1.88 m (6' 2.02\")   11/17/19 1.88 m (6' 2\")     KPS: 70    -Generally well appearing. Slightly fatigued and depressed.   -Respiratory: Normal breath sounds, no audible wheezing.   -Skin: No rashes. Healed head incision.  -Hematologic/ lymphatic: Bruising on waist. No leg swelling.  -Psychiatric: Flat mood and affect. Pleasant, talkative.  -Neurologic:   MENTAL STATUS:     Alert, easily confused.    Repeating questions.     Recall: Impaired.   Speech nearly fluent, but with hesitation.    Comprehension intact to multi-step commands, but confused/ poor attention.   Good right-left orientation.     CRANIAL NERVES:     Pupils are equal, round, reactive to light.     Extraocular movements full, patient denies diplopia.     Homonymous hemianopsia, right-side.    Facial sensation intact to light touch.   Symmetric facial movements.   Hearing intact.   Palate moves symmetrically.     Tongue midline.  MOTOR:    Normal and symmetric tone.   No pronation or drift. No orbiting.   Able to raise from a sitting position without use of arms.    Able to toe walk.   SENSATION:    Intact to light touch throughout.  COORDINATION:   Intact finger-nose with eyes open and closed bilaterally.   GAIT:  Able to walk without assistance. Largely steady.       MEDICAL RECORDS  Obtained " and personally reviewed all available outside medical records in addition to reviewing any records available in our electronic system.     LABS  Personally reviewed all available lab results.     IMAGING  Personally reviewed MR brain imaging from today and compared to prior imaging. To my eye, there is an overall stable degree of T2 FLAIR hyperintensity and contrast enhancement about the left occipital resection cavity. On axial imaging, the later nodular lesion looks a little larger, but this is not see on sagittal imaging.     Imaging was shown to and results were reviewed with Jayden and Kailey Edmondson.     Imaging and case reviewed and discussed at Brain Tumor Conference.        IMPRESSION  Clinic time was spent discussing in detail the nature of this tumor in light of repeat imaging. This was in addition to providing emotional support, answering questions pertaining to my recommendations and devising the treatment plan as outlined below.      Clinically, from a physical standpoint, Jayden is doing well. However, from an emotional (depression, anxiety, poor coping) and a cognitive standpoint, Jayden is not doing well. In the setting of grossly stable imaging results, will hold on the next cycle of Lomustine. Imaging to be discussed at Brain Tumor Conference on 2/3/2020. Will refer to Dr. Aleksandr Garcia for neuro-psych testing, to Dr. Eugene Vargas for assistance with counseling/ coping, and to palliative care for a goals of care discussion. Increasing Zoloft. In the setting of an incurable cancer, Aracelis are clear about wanting high quality of life over quantity of life.      Next generation sequencing results discussed and there are no targetable mutations. Continue Lomustine; labs at goal to start cycle 2. Continue to hold on starting Avastin as patient is doing well and is largely asymptomatic.     PROBLEM LIST  Glioblastoma, MGMT methylated and IDH1 wildtype by NGS  Visual field cut; homonymous hemianopsia,  right-side  Memory issues  Drug-induced constipation  Hyperplastic gastric polyps, benign  H/o SIADH    PLAN  -CANCER DIRECTED THERAPY-  -As above; Holding Lomustine for now. Imaging to be discussed at Brain Tumor Conference.   -Next generation sequencing with no targetable mutations.   -Repeat imaging in 2 months.     -STEROIDS-  -Continue on dexamethasone 2mg daily for now for management of arthritis.     -SEIZURE MANAGEMENT-  -Followed by neurology at [facility].   -Given history of seizures, will continue Keppra + Vimpat.  -Epilepsy provoked by intracranial pathology, but also hyponatremia related to SIADH of unclear etiology.     -Quality of life/ MOOD/ FATIGUE-  -Continued mood issues; depression, poor coping.  -Increase Zoloft to 100 mg daily x 2 weeks, then increase to 150mg daily (max daily dose of 200mg).  -Continue to monitor mood as untreated/ undertreated depression can worsen fatigue, dysorexia, and quality of life.  -Added to Brain Tumor Support Group email list; [email or web address]  -Continue physical therapies.   -Referral to Dr. Eugene Vargas for counseling/ coping.   -Referral to palliative care.     -HICCUPS-  -Pepcid.  -Likely Steroid related.  -Possibly Baclofen (muscle relaxer) if needed.    -HYPONATREMIA/ History of SIADH-  -Monitor amount of fluid intake.   -Continue to track sodium level on routine CMP. Has been normal since June 2019     -MEMORY COMPLAINTS-  -Referral to Dr. Aleksandr Garcia for neuro-psych testing.   -Continue speech therapy.    -VISUAL FIELD LOSS-  -Looking to follow-up with neuro-ophtho.   -Continue following with OT.     -DVT/ PE-  -Need for lifelong anticoagulation.  -Continue Xarelto.  -If platelets drop <50, will need to hold anticoagulation     -RA-  -Holding methotrexate for arthritis due to elevated LFTs. Off prednisone. Continue dexamethasone.     -ADDITIONAL SUPPORTIVE MANAGEMENT-  -Social work following.   -CT with lung nodules. Following with the [facility].  -S/p EGD with benign pathology of the 10mm gastric antrum/pylorus posterior wall lesion.  -Breast biopsy showed necrotic issue, no malignancy.    Return to clinic in 2 months + labs + imaging.     In the meantime, Jayden and Oz know to call with questions or concerns or to report new complaints and can be seen sooner if needed. Urgent evaluation is needed in the setting of acute onset of severe headache, abrupt change in mental status, on-going seizures, new focal deficits, or new leg swelling/ pain.    Nasra Olmos MD  Neuro-oncology

## 2020-01-28 NOTE — PATIENT INSTRUCTIONS
Imaging reviewed; Positive treatment response.   Imaging to be discussed at Brain Tumor Conf.   Myself or Griselda will call with an update.     Holding chemotherapy (Lomustine 200mg) for this second.     Continue physical therapies.     Increase Zoloft to 100mg daily.   Then in 2 weeks, increase again to 150mg daily.     Referral to Dr. Aleksandr Garcia for neuro-psych testing.   Referral to Dr. Eugene Vargas for counseling/ coping.   Referral to palliative care.     Continue dexamethasone 2mg daily.  Continue Xarelto.    Next generation sequencing when no excessive amount of mutations, but no targetable mutations.     Return to clinic in 2 months + repeat imaging; but can be changed based on chemo plan.      Nasra Olmos MD  Neuro-oncology  1/31/2020

## 2020-01-31 ENCOUNTER — HOSPITAL ENCOUNTER (OUTPATIENT)
Dept: MRI IMAGING | Facility: CLINIC | Age: 65
Discharge: HOME | End: 2020-01-31
Attending: PSYCHIATRY & NEUROLOGY | Admitting: PSYCHIATRY & NEUROLOGY
Payer: COMMERCIAL

## 2020-01-31 ENCOUNTER — APPOINTMENT (OUTPATIENT)
Dept: LAB | Facility: CLINIC | Age: 65
End: 2020-01-31
Attending: NEUROLOGICAL SURGERY
Payer: COMMERCIAL

## 2020-01-31 ENCOUNTER — PATIENT OUTREACH (OUTPATIENT)
Dept: CARE COORDINATION | Facility: CLINIC | Age: 65
End: 2020-01-31

## 2020-01-31 ENCOUNTER — ONCOLOGY VISIT (OUTPATIENT)
Dept: ONCOLOGY | Facility: CLINIC | Age: 65
End: 2020-01-31
Attending: PSYCHIATRY & NEUROLOGY
Payer: COMMERCIAL

## 2020-01-31 VITALS
BODY MASS INDEX: 29.85 KG/M2 | SYSTOLIC BLOOD PRESSURE: 146 MMHG | TEMPERATURE: 98.7 F | HEART RATE: 60 BPM | OXYGEN SATURATION: 97 % | WEIGHT: 232.6 LBS | DIASTOLIC BLOOD PRESSURE: 85 MMHG | RESPIRATION RATE: 16 BRPM

## 2020-01-31 DIAGNOSIS — Z79.899 ENCOUNTER FOR LONG-TERM (CURRENT) USE OF MEDICATIONS: ICD-10-CM

## 2020-01-31 DIAGNOSIS — R45.89 DIFFICULTY COPING: ICD-10-CM

## 2020-01-31 DIAGNOSIS — C71.9 GLIOBLASTOMA (H): ICD-10-CM

## 2020-01-31 DIAGNOSIS — Z63.0 MARITAL RELATIONSHIP PROBLEM: ICD-10-CM

## 2020-01-31 DIAGNOSIS — R41.89 COGNITIVE CHANGES: ICD-10-CM

## 2020-01-31 DIAGNOSIS — R41.89 SUBJECTIVE MEMORY COMPLAINTS: ICD-10-CM

## 2020-01-31 DIAGNOSIS — C71.9 GLIOBLASTOMA (H): Primary | ICD-10-CM

## 2020-01-31 PROBLEM — S06.5XAA SUBDURAL HEMATOMA (H): Status: ACTIVE | Noted: 2019-12-12

## 2020-01-31 LAB
ALBUMIN SERPL-MCNC: 3.3 G/DL (ref 3.4–5)
ALP SERPL-CCNC: 54 U/L (ref 40–150)
ALT SERPL W P-5'-P-CCNC: 44 U/L (ref 0–70)
ANION GAP SERPL CALCULATED.3IONS-SCNC: 6 MMOL/L (ref 3–14)
AST SERPL W P-5'-P-CCNC: 18 U/L (ref 0–45)
BASOPHILS # BLD AUTO: 0 10E9/L (ref 0–0.2)
BASOPHILS NFR BLD AUTO: 0.6 %
BILIRUB SERPL-MCNC: 0.3 MG/DL (ref 0.2–1.3)
BUN SERPL-MCNC: 17 MG/DL (ref 7–30)
CALCIUM SERPL-MCNC: 8.9 MG/DL (ref 8.5–10.1)
CHLORIDE SERPL-SCNC: 110 MMOL/L (ref 94–109)
CO2 SERPL-SCNC: 27 MMOL/L (ref 20–32)
CREAT SERPL-MCNC: 0.85 MG/DL (ref 0.66–1.25)
DIFFERENTIAL METHOD BLD: ABNORMAL
EOSINOPHIL # BLD AUTO: 0 10E9/L (ref 0–0.7)
EOSINOPHIL NFR BLD AUTO: 0.5 %
ERYTHROCYTE [DISTWIDTH] IN BLOOD BY AUTOMATED COUNT: 15.1 % (ref 10–15)
GFR SERPL CREATININE-BSD FRML MDRD: >90 ML/MIN/{1.73_M2}
GLUCOSE SERPL-MCNC: 112 MG/DL (ref 70–99)
HCT VFR BLD AUTO: 31.5 % (ref 40–53)
HGB BLD-MCNC: 10.4 G/DL (ref 13.3–17.7)
IMM GRANULOCYTES # BLD: 0.1 10E9/L (ref 0–0.4)
IMM GRANULOCYTES NFR BLD: 1.7 %
LYMPHOCYTES # BLD AUTO: 1.6 10E9/L (ref 0.8–5.3)
LYMPHOCYTES NFR BLD AUTO: 24.3 %
MCH RBC QN AUTO: 29.3 PG (ref 26.5–33)
MCHC RBC AUTO-ENTMCNC: 33 G/DL (ref 31.5–36.5)
MCV RBC AUTO: 89 FL (ref 78–100)
MONOCYTES # BLD AUTO: 0.7 10E9/L (ref 0–1.3)
MONOCYTES NFR BLD AUTO: 9.9 %
NEUTROPHILS # BLD AUTO: 4.1 10E9/L (ref 1.6–8.3)
NEUTROPHILS NFR BLD AUTO: 63 %
NRBC # BLD AUTO: 0 10*3/UL
NRBC BLD AUTO-RTO: 0 /100
PLATELET # BLD AUTO: 234 10E9/L (ref 150–450)
POTASSIUM SERPL-SCNC: 3.4 MMOL/L (ref 3.4–5.3)
PROT SERPL-MCNC: 6.4 G/DL (ref 6.8–8.8)
RBC # BLD AUTO: 3.55 10E12/L (ref 4.4–5.9)
SODIUM SERPL-SCNC: 143 MMOL/L (ref 133–144)
WBC # BLD AUTO: 6.5 10E9/L (ref 4–11)

## 2020-01-31 PROCEDURE — 80053 COMPREHEN METABOLIC PANEL: CPT | Performed by: PSYCHIATRY & NEUROLOGY

## 2020-01-31 PROCEDURE — A9585 GADOBUTROL INJECTION: HCPCS | Performed by: PSYCHIATRY & NEUROLOGY

## 2020-01-31 PROCEDURE — G0463 HOSPITAL OUTPT CLINIC VISIT: HCPCS | Mod: 25

## 2020-01-31 PROCEDURE — 70553 MRI BRAIN STEM W/O & W/DYE: CPT

## 2020-01-31 PROCEDURE — 99215 OFFICE O/P EST HI 40 MIN: CPT | Mod: ZP | Performed by: PSYCHIATRY & NEUROLOGY

## 2020-01-31 PROCEDURE — 85025 COMPLETE CBC W/AUTO DIFF WBC: CPT | Performed by: PSYCHIATRY & NEUROLOGY

## 2020-01-31 PROCEDURE — 36592 COLLECT BLOOD FROM PICC: CPT

## 2020-01-31 PROCEDURE — 25500064 ZZH RX 255 OP 636: Performed by: PSYCHIATRY & NEUROLOGY

## 2020-01-31 PROCEDURE — 40000141 ZZH STATISTIC PERIPHERAL IV START W/O US GUIDANCE

## 2020-01-31 RX ORDER — SERTRALINE HYDROCHLORIDE 100 MG/1
100 TABLET, FILM COATED ORAL DAILY
Qty: 30 TABLET | Refills: 3 | Status: SHIPPED | OUTPATIENT
Start: 2020-01-31 | End: 2020-04-20

## 2020-01-31 RX ORDER — GADOBUTROL 604.72 MG/ML
10 INJECTION INTRAVENOUS ONCE
Status: COMPLETED | OUTPATIENT
Start: 2020-01-31 | End: 2020-01-31

## 2020-01-31 RX ORDER — DEXAMETHASONE 2 MG/1
2 TABLET ORAL
Qty: 30 TABLET | Refills: 3 | Status: ON HOLD | OUTPATIENT
Start: 2020-01-31 | End: 2020-06-27

## 2020-01-31 RX ADMIN — GADOBUTROL 10 ML: 604.72 INJECTION INTRAVENOUS at 09:22

## 2020-01-31 SDOH — SOCIAL STABILITY - SOCIAL INSECURITY: PROBLEMS IN RELATIONSHIP WITH SPOUSE OR PARTNER: Z63.0

## 2020-01-31 ASSESSMENT — PAIN SCALES - GENERAL: PAINLEVEL: NO PAIN (0)

## 2020-01-31 NOTE — NURSING NOTE
Chief Complaint   Patient presents with     Blood Draw     Weight and VS Checked.  Blood drawn without difficulty from PIV already in place from MRI.  PIV Saline Locked.     Left PIV in place for MD appointment per patient preference until his labs come back.  Instructed patient and his spouse to have on of the clinic staff discontinue patient's PIV if he does not need it for any infusions today.  Patient and spouse verbalized understanding.    ROBERTO CORMIER RN on 1/31/2020 at 10:37 AM

## 2020-01-31 NOTE — NURSING NOTE
"Oncology Rooming Note    January 31, 2020 10:47 AM   Jayden Lackey is a 65 year old male who presents for:    Chief Complaint   Patient presents with     Blood Draw     Weight and VS Checked.  Blood drawn without difficulty from PIV already in place from MRI.  PIV Saline Locked.     Oncology Clinic Visit     Return Glioblastoma     Initial Vitals: BP (!) 146/85   Pulse 60   Temp 98.7  F (37.1  C) (Oral)   Resp 16   Wt 105.5 kg (232 lb 9.6 oz)   SpO2 97%   BMI 29.85 kg/m   Estimated body mass index is 29.85 kg/m  as calculated from the following:    Height as of 12/11/19: 1.88 m (6' 2.02\").    Weight as of this encounter: 105.5 kg (232 lb 9.6 oz). Body surface area is 2.35 meters squared.  No Pain (0) Comment: Data Unavailable   No LMP for male patient.  Allergies reviewed: Yes  Medications reviewed: Yes    Medications: MEDICATION REFILLS NEEDED TODAY. Provider was notified.  Pharmacy name entered into [facility]:    [facility] - [place], [place] - [address] [id number] - [place], [place] - [facility] [facility] [address] [place] [address] & [facility] - [place], [place] - [address]    Clinical concerns: Refill on Xarelto, Dexamethasone and Zoloft; MRI results and treatment planning; Perspective for game plan and realistic communication; Palliative Care;       Griselda Polanco CMA              "

## 2020-01-31 NOTE — NURSING NOTE
IV removed per Dr. Olmos. Patient tolerated well. See flowsheet for details. Patient discharged.     Jessica Yu CMA.

## 2020-01-31 NOTE — PROGRESS NOTES
Social Work Intervention  [facility]    Data/Intervention:    Patient Name:  Jayden Lackey  /Age:  1955 (65 year old)    Visit Type: in person  [facility]  Reason for Referral:  Patient and caregiver are overwhelmed, difficulty coping    Collaborated With:    -Patient  -Spouse, Cristiana  -[facility]    Patient Concerns/Issues:    met with Patient and spouse, Cristiana, today in exam room. Cristiana is Patient's primary caregiver and is feeling caregiver burnout. Cristiana reported they have a support network of friends and family who have been helpful in the past, but Cristiana worries that they are burning out their support network. Cristiana would like to be able to have respite and be able to go out of town for 4-5 days.    Intervention/Education/Resources Provided:   provided supportive listening and validation throughout. Cristiana believes that private duty nursing services are too high level for what Patient would need if she goes out of town. Cristiana is looking for more of  care-type resources. Social Wroker explained the availability of the [facility] Linkage Line who can help Patient and Cristiana locate the care that works for them. Cristiana and Patient were open to  placing a referral to the Senior Linkage Like, confirmation #[id number].      also mentioned peer mentoring programs for both Patient and Cristiana as they have previously indicated that support groups are inconvenient for them.  provided Cristiana and Patient with the brochure for Shreya [employer] to inquire about a mentor for peer support. Cristiana was very happy for this information.     also mentioned the [employer] enoc to help organize requesting help from friends and family. Cristiana said she had heard about the enoc, and will look into it more.    Cristiana prefers phone calls in the afternoon as she tries to  schedule yoga in the mornings.    Assessment/Plan:  Patient and Cristiana are understandably experiencing burnout from their cancer journey. Cristiana is working to rally their personal support system, but may need additional help.     Sen[facility] [employer] Line will contact Cristiana next week to follow up on referral. Patient and Cristiana will utilize resources at their leisure.  will remain available in the future as needed.    Provided patient/family with contact information and availability.    JAGRUTI Howard  [facility] Clin[facility]  Direct Phone: [phone]  Pager:  [phone]

## 2020-01-31 NOTE — TELEPHONE ENCOUNTER
ONCOLOGY INTAKE: Records Information      APPT INFORMATION:  Referring provider:  Dr. Nasra Olmos MD  Referring provider s clinic:  [facility]   Reason for visit/diagnosis:  Glioblastoma (H) [C71.9];Difficulty coping [R45.89];Marital relationship problem [Z63.0]  Has patient been notified of appointment date and time?: Yes    RECORDS INFORMATION:  Were the records received with the referral (via [employer])? No,Internal Referral      Has patient been seen for any external appt for this diagnosis? No    If yes, where? NA    ADDITIONAL INFORMATION:  None

## 2020-01-31 NOTE — LETTER
RE: Jayden Lackey  [address]  [facility] [address]     Dear Colleague,    Thank you for referring your patient, Jayden Lackey, to the [facility]. Please see a copy of my visit note below.    NEURO-ONCOLOGY VISIT  Jan 31, 2020    CHIEF COMPLAINT: Mr. 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 He completed chemoradiotherapy on 6/11/2019. Imaging in 9/2019 was consistent with recurrent disease and adjuvant-dosed temozolomide was stop. Pathology from repeat resection on 10/24/2019 was consistent with recurrent tumor.     Quality of life has been compromised by recurrent seizure events, recent PE with requiring prolonged hospitalization, and chemotherapy related toxicities.    He is currently managed on lomustine.       Jayden is presenting in follow-up for continued evaluation accompanied by Oz (wife).    HISTORY OF PRESENT ILLNESS  -Physically improving. Veers to the left when walking; using a cane. Wanting to do more physical activity.   -Main complaint is depression and marital problems. Still having difficulties with coping. More flat. Did not increase Zoloft.  -Continued difficulties with cognition and memory.  -Doing OT/PT.  -Able to write, but cannot read.   -No N/V, no constipation or diarrhea.  -Tolerated switch to oral anticoagulation.   -No recurrent seizure events.   -No arthritis pain lately with dexamethasone. Off prednisone.      REVIEW OF SYSTEMS  A comprehensive ROS negative except as in HPI.    MEDICATIONS   Current Outpatient Medications   Medication Sig Dispense Refill     acetaminophen (TYLENOL) 325 MG tablet Take 2 tablets (650 mg) by mouth every 4 hours as needed for other (multimodal surgical pain management along with NSAIDS and opioid medication as indicated based on pain control and physical  function.) 28 tablet 3     atenolol (TENORMIN) 100 MG tablet Take 50 mg by mouth daily       cholecalciferol (VITAMIN D3) 5000 units (125 mcg) capsule Take 5,000 Units by mouth daily       dexamethasone (DECADRON) 2 MG tablet Take 1 tablet (2 mg) by mouth daily (with breakfast) 30 tablet 3     diltiazem ER (DILT-XR) 180 MG 24 hr capsule Take 180 mg by mouth daily       docusate sodium (COLACE) 50 MG capsule Take 50 mg by mouth 2 times daily as needed for constipation       Lacosamide (VIMPAT) 100 MG TABS tablet Take 150 mg by mouth 2 times daily        levETIRAcetam (KEPPRA) 1000 MG tablet Take 1,500 mg by mouth 2 times daily   1     lisinopril (PRINIVIL/ZESTRIL) 5 MG tablet Take 1 tablet (5 mg) by mouth daily 28 tablet 0     ondansetron (ZOFRAN) 4 MG tablet 4 mg every 4 hours as needed       rivaroxaban ANTICOAGULANT (XARELTO ANTICOAGULANT) 20 MG TABS tablet Take 1 tablet (20 mg) by mouth daily (with dinner) 30 tablet 3     sertraline (ZOLOFT) 100 MG tablet Take 1 tablet (100 mg) by mouth daily 30 tablet 3     predniSONE (DELTASONE) 2.5 MG tablet 7.5 mg daily       DRUG ALLERGIES   Allergies   Allergen Reactions     Shellfish Allergy Difficulty breathing, Nausea and Vomiting, Swelling and Shortness Of Breath     Ativan [Lorazepam]      Hallucinations, delirium     No Clinical Screening - See Comments      Tyrone ferrell doesn't remember reaction       ONCOLOGIC HISTORY  -1/2019 PRESENTATION: Visual disturbance.   -3/6/2019 Evaluated by Dr. Mauricio, neuro-ophthalmologist, found to have a right sided homonymous hemianopsia. MRI ordered.   -3/6/2019 MR brain imaging revealed a contrast-enhancing lesion in the left occipital lobe suspicious for a high-grade primary brain tumor.  -3/22/2019 SURGERY: Craniotomy with a gross total resection by Dr. Irving Hayes.   PATHOLOGY: Glioblastoma; Wildtype status for IDH1/2 by next generation sequencing. MGMT promoter methylated. Wildtype PTEN, TP53.  -4/8/2019 NEURO-ONC: Recommending  chemoradiotherapy.   -4/30 - 6/11/2019 CHEMORADS 6000cGy in 30 fractions with concurrent temozolomide 75mg/m2 (180mg).  -5/20/2019 NEURO-ONC: Not interested in Optune at this time.   -6/12/2019 NEURO-ONC: Clinically stable.   -6/21 - 6/28/2019 ADMISSION/ [facility]: Admitted to [facility] for first ever seizure event, provoked by hyponatremia. Urine studies consistent with SIADH ; etiology of SIADH unclear. Had 10mm gastric lesion biopsied on 6/26/2019 by way of an EGD.  PATHOLOGY of gastric lesion: Hyperplastic polyp with erosion and foci of atypia; favor reactive. No intestinal metaplasia, no helicobacter pylori, no high grade dysplasia or invasive carcinoma.   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   -7/12/2019 NEURO-ONC/ MRB/ CHEMO: Clinical stable. Imaging with stable to slightly increased enhancement of previously seen nodular lesions; associated with mild degree of elevated rCBV. Starting adjuvant-dosed temozolomide 150mg/m2 (360mg), cycle 1 (start date 7/12/2019). Monitor fluid intake, will be checking CMP/ Na+.   -8/12/2019 NEURO-ONC/ MRB/ CHEMO: Clinical decompensated, but improving since hospital discharge. MR brain scan from last week with concern for non-contrast enhancing >> enhancing disease progression. Ordering a PET brain scan. Holding adjuvant-dosed temozolomide, cycle 3.  -10/24/2019 SURGERY: Repeat resection by Dr. Packer.  PATHOLOGY: Recurrent glioblastoma.   Next generation sequencing by [facility]; Microsatellite instability; stable. Tumor mutational burden; 4 (low). Mutations in ARID1A, ASXL1, ATRX, FANCE, MED12, MEF2B, NF1, RUNX1, TERT.   -11/25/2019 NEURO-ONC/ MRB/ CHEMO: Clinically doing well. Imaging with positive resection. Starting Lomustine (start date of 12/2). Next generation sequencing.   -12/4/2019 CHEMO: Lomustine, cycle 1.   -1/6/2020 NEURO-ONC: Clinically stable. Has TCP with Lomustine. Will hold off on starting next week until he sees Dr. Olmos with new imaging.  -1/31/2020 NEURO-ONC/  "MRB/ CHEMO: Clinically doing well. Imaging with no evidence of disease recurrence. Holding Lomustine in the setting of a poor emotional state. Referrals to Dr. Aleksandr Garcia for neuro-psych testing, Dr. Eugene Vargas for counseling/ coping, and palliative care. Next generation sequencing results discussed. Increase Zoloft. Continue dexamethasone 2mg daily.    SOCIAL HISTORY   Tobacco use: Former smoker, quit 40 years ago.   Alcohol use: Social use.  Drug use: Denies marijuana use.  Supplement, complimentary/ alternative medicine: None.    .   Employment: On disability.     PHYSICAL EXAMINATION  BP (!) 146/85   Pulse 60   Temp 98.7  F (37.1  C) (Oral)   Resp 16   Wt 105.5 kg (232 lb 9.6 oz)   SpO2 97%   BMI 29.85 kg/m      Wt Readings from Last 2 Encounters:   01/31/20 105.5 kg (232 lb 9.6 oz)   01/06/20 104.1 kg (229 lb 8 oz)      Ht Readings from Last 2 Encounters:   12/11/19 1.88 m (6' 2.02\")   11/17/19 1.88 m (6' 2\")     KPS: 70    -Generally well appearing. Slightly fatigued and depressed.   -Respiratory: Normal breath sounds, no audible wheezing.   -Skin: No rashes. Healed head incision.  -Hematologic/ lymphatic: Bruising on waist. No leg swelling.  -Psychiatric: Flat mood and affect. Pleasant, talkative.  -Neurologic:   MENTAL STATUS:     Alert, easily confused.    Repeating questions.     Recall: Impaired.   Speech nearly fluent, but with hesitation.    Comprehension intact to multi-step commands, but confused/ poor attention.   Good right-left orientation.     CRANIAL NERVES:     Pupils are equal, round, reactive to light.     Extraocular movements full, patient denies diplopia.     Homonymous hemianopsia, right-side.    Facial sensation intact to light touch.   Symmetric facial movements.   Hearing intact.   Palate moves symmetrically.     Tongue midline.  MOTOR:    Normal and symmetric tone.   No pronation or drift. No orbiting.   Able to raise from a sitting position without use of arms. "    Able to toe walk.   SENSATION:    Intact to light touch throughout.  COORDINATION:   Intact finger-nose with eyes open and closed bilaterally.   GAIT:  Able to walk without assistance. Largely steady.     MEDICAL RECORDS  Obtained and personally reviewed all available outside medical records in addition to reviewing any records available in our electronic system.     LABS  Personally reviewed all available lab results.     IMAGING  Personally reviewed MR brain imaging from today and compared to prior imaging. To my eye, there is an overall stable degree of T2 FLAIR hyperintensity and contrast enhancement about the left occipital resection cavity. On axial imaging, the later nodular lesion looks a little larger, but this is not see on sagittal imaging.     Imaging was shown to and results were reviewed with Jayden and Kailey Edmondson.     Imaging and case reviewed and discussed at Brain Tumor Conference.        IMPRESSION  Clinic time was spent discussing in detail the nature of this tumor in light of repeat imaging. This was in addition to providing emotional support, answering questions pertaining to my recommendations and devising the treatment plan as outlined below.      Clinically, from a physical standpoint, Jayden is doing well. However, from an emotional (depression, anxiety, poor coping) and a cognitive standpoint, aJyden is not doing well. In the setting of grossly stable imaging results, will hold on the next cycle of Lomustine. Imaging to be discussed at Brain Tumor Conference on 2/3/2020. Will refer to Dr. Aleksandr Garcia for neuro-psych testing, to Dr. Eugene Vargas for assistance with counseling/ coping, and to palliative care for a goals of care discussion. Increasing Zoloft. In the setting of an incurable cancer, Jayden and Oz are clear about wanting high quality of life over quantity of life.      Next generation sequencing results discussed and there are no targetable mutations. Continue Lomustine; labs  at goal to start cycle 2. Continue to hold on starting Avastin as patient is doing well and is largely asymptomatic.     PROBLEM LIST  Glioblastoma, MGMT methylated and IDH1 wildtype by NGS  Visual field cut; homonymous hemianopsia, right-side  Memory issues  Drug-induced constipation  Hyperplastic gastric polyps, benign  H/o SIADH    PLAN  -CANCER DIRECTED THERAPY-  -As above; Holding Lomustine for now. Imaging to be discussed at Brain Tumor Conference.   -Next generation sequencing with no targetable mutations.   -Repeat imaging in 2 months.     -STEROIDS-  -Continue on dexamethasone 2mg daily for now for management of arthritis.     -SEIZURE MANAGEMENT-  -Followed by neurology at [facility].   -Given history of seizures, will continue Keppra + Vimpat.  -Epilepsy provoked by intracranial pathology, but also hyponatremia related to SIADH of unclear etiology.     -Quality of life/ MOOD/ FATIGUE-  -Continued mood issues; depression, poor coping.  -Increase Zoloft to 100 mg daily x 2 weeks, then increase to 150mg daily (max daily dose of 200mg).  -Continue to monitor mood as untreated/ undertreated depression can worsen fatigue, dysorexia, and quality of life.  -Added to Brain Tumor Support Group email list; [email or web address]  -Continue physical therapies.   -Referral to Dr. Eugene Vargas for counseling/ coping.   -Referral to palliative care.     -HICCUPS-  -Pepcid.  -Likely Steroid related.  -Possibly Baclofen (muscle relaxer) if needed.    -HYPONATREMIA/ History of SIADH-  -Monitor amount of fluid intake.   -Continue to track sodium level on routine CMP. Has been normal since June 2019     -MEMORY COMPLAINTS-  -Referral to Dr. Aleksandr Garcia for neuro-psych testing.   -Continue speech therapy.    -VISUAL FIELD LOSS-  -Looking to follow-up with neuro-ophtho.   -Continue following with OT.     -DVT/ PE-  -Need for lifelong anticoagulation.  -Continue Xarelto.  -If platelets drop <50, will need to hold  anticoagulation     -RA-  -Holding methotrexate for arthritis due to elevated LFTs. Off prednisone. Continue dexamethasone.     -ADDITIONAL SUPPORTIVE MANAGEMENT-  -Social work following.   -CT with lung nodules. Following with [facility].  -S/p EGD with benign pathology of the 10mm gastric antrum/pylorus posterior wall lesion.  -Breast biopsy showed necrotic issue, no malignancy.    Return to clinic in 2 months + labs + imaging.     In the meantime, Jayden dorys Oz know to call with questions or concerns or to report new complaints and can be seen sooner if needed. Urgent evaluation is needed in the setting of acute onset of severe headache, abrupt change in mental status, on-going seizures, new focal deficits, or new leg swelling/ pain.    Nasra Olmos MD  Neuro-oncology

## 2020-02-03 ENCOUNTER — TUMOR CONFERENCE (OUTPATIENT)
Dept: ONCOLOGY | Facility: CLINIC | Age: 65
End: 2020-02-03

## 2020-02-03 NOTE — TUMOR CONFERENCE
Tumor Conference Information  Tumor Conference:  Brain  Specialties Present:  Medical oncology, Pathology, Radiology, Radiation oncology, Surgery  Patient Status:  A current patient  Pathology:  Not Discussed  Treatment to Date:  Surgical intervention(s), Chemoradiation, Adjuvant chemotherapy, Palliative chemotherapy  Clinical Trial Eligibility:  Not eligible  Recommended Plan:  Referral to (see comment), Observation (see comment) (Comment: grossly stable on recent MRI; plan to stop chemotherapy and refer to palliative care and Dr. Vargas; follow up and repeat imaging in 1 month)  Did the review exceed 30 minutes?:  did not           Documentation / Disclaimer Cancer Tumor Board Note  Cancer tumor board recommendations do not override what is determined to be reasonable care and treatment, which is dependent on the circumstances of a patient's case; the patient's medical, social, and personal concerns; and the clinical judgment of the oncologist [physician].

## 2020-02-06 ENCOUNTER — OFFICE VISIT - HEALTHEAST (OUTPATIENT)
Dept: ONCOLOGY | Facility: HOSPITAL | Age: 65
End: 2020-02-06

## 2020-02-06 DIAGNOSIS — F43.23 ADJUSTMENT DISORDER WITH MIXED ANXIETY AND DEPRESSED MOOD: ICD-10-CM

## 2020-02-06 DIAGNOSIS — C71.9 GLIOBLASTOMA (H): ICD-10-CM

## 2020-02-11 ENCOUNTER — OFFICE VISIT (OUTPATIENT)
Dept: ONCOLOGY | Facility: CLINIC | Age: 65
End: 2020-02-11
Attending: PSYCHIATRY & NEUROLOGY
Payer: COMMERCIAL

## 2020-02-11 DIAGNOSIS — R45.89 DIFFICULTY COPING: ICD-10-CM

## 2020-02-11 DIAGNOSIS — Z63.0 MARITAL RELATIONSHIP PROBLEM: ICD-10-CM

## 2020-02-11 DIAGNOSIS — C71.9 GLIOBLASTOMA (H): ICD-10-CM

## 2020-02-11 PROCEDURE — 40000114 ZZH STATISTIC NO CHARGE CLINIC VISIT

## 2020-02-11 PROCEDURE — 90791 PSYCH DIAGNOSTIC EVALUATION: CPT | Mod: ZP | Performed by: PSYCHOLOGIST

## 2020-02-11 SDOH — SOCIAL STABILITY - SOCIAL INSECURITY: PROBLEMS IN RELATIONSHIP WITH SPOUSE OR PARTNER: Z63.0

## 2020-02-17 NOTE — PROGRESS NOTES
Confidential Summary of Oncology Psychology Initial Visit    Jayden Lackey is a 65 year old male who was referred by Dr. Olmos for depressed mood and difficulty coping with the sudden functional changes his cancer has produced. The patient was seen for an initial 45 minute evaluation on 2/11/2020. His wife attended the appointment too.     Presenting Concerns: Memory changes, sadness, changes in speech, changes in comprehension, lack of attention to right side, grief, sense of loss, worry about his prognosis, feelings of uncertainty, irritability, and lower frustration tolerance.     Mental Status/Interview:   Orientation: Jayden Lackey was alert, attentive, and oriented to time, place, and person  Affect: Affect was appropriate to the situation and showed normal range and stability.  Speech: Speech was clear with normal fluency, rate, tone, and volume.  Memory: Although not formally assessed, immediate, and remote memory appeared to be intact. Recent memory demonstrated deficits.    Insight and Judgement: Jayden Lackey demonstrates adequate awareness of the issues discussed and was able to come to reasonable conclusions.  Thought: Thought patterns were coherent and logical. Hallucinations were denied and delusions were not evident.   Lethality: Jayden Lackey denied suicidal or homicidal ideation or intention.        Impression: He and his wife are dealing with the emotional, physical, and social changes brought on by his brain cancer and its treatment. She spoke often during the appointment and appeared to need the emotional support as much as he. We discussed practical issues such as management of memory changes and ways to improve mood. They provided personal examples of how they would use these tools thus demonstrating understanding.     Diagnosis:   Encounter Diagnoses   Name Primary?     Glioblastoma (H)      Difficulty coping      Marital relationship problem      Recommendations/Plan:  1. Repeat all  activities and directions to help with memory retention  2. Replace previous activities with new activities that he is capable of accomplishing  3. Return for follow-up as needed.     Thank you for this opportunity to participate in your care of this patient.    Jhoan Vargas Psy.D., L.P.  Director, Oncology Supportive Care

## 2020-02-18 ENCOUNTER — PRE VISIT (OUTPATIENT)
Dept: ONCOLOGY | Facility: CLINIC | Age: 65
End: 2020-02-18

## 2020-03-04 ENCOUNTER — COMMUNICATION - HEALTHEAST (OUTPATIENT)
Dept: ADMINISTRATIVE | Facility: HOSPITAL | Age: 65
End: 2020-03-04

## 2020-03-05 ENCOUNTER — OFFICE VISIT - HEALTHEAST (OUTPATIENT)
Dept: ONCOLOGY | Facility: HOSPITAL | Age: 65
End: 2020-03-05

## 2020-03-05 DIAGNOSIS — F43.25 ADJUSTMENT REACTION WITH MIXED DISTURBANCE OF EMOTIONS AND CONDUCT: ICD-10-CM

## 2020-03-11 ENCOUNTER — HEALTH MAINTENANCE LETTER (OUTPATIENT)
Age: 65
End: 2020-03-11

## 2020-03-27 ENCOUNTER — COMMUNICATION - HEALTHEAST (OUTPATIENT)
Dept: ADMINISTRATIVE | Facility: HOSPITAL | Age: 65
End: 2020-03-27

## 2020-03-31 ENCOUNTER — HOSPITAL ENCOUNTER (INPATIENT)
Facility: CLINIC | Age: 65
LOS: 2 days | Discharge: HOME | End: 2020-04-02
Attending: EMERGENCY MEDICINE | Admitting: PSYCHIATRY & NEUROLOGY
Payer: COMMERCIAL

## 2020-03-31 ENCOUNTER — APPOINTMENT (OUTPATIENT)
Dept: CT IMAGING | Facility: CLINIC | Age: 65
End: 2020-03-31
Attending: EMERGENCY MEDICINE
Payer: COMMERCIAL

## 2020-03-31 DIAGNOSIS — C71.9 GLIOBLASTOMA (H): ICD-10-CM

## 2020-03-31 DIAGNOSIS — G40.909 NONINTRACTABLE EPILEPSY WITHOUT STATUS EPILEPTICUS, UNSPECIFIED EPILEPSY TYPE (H): Primary | ICD-10-CM

## 2020-03-31 DIAGNOSIS — R56.9 SEIZURE (H): ICD-10-CM

## 2020-03-31 LAB
ALBUMIN SERPL-MCNC: 4.1 G/DL (ref 3.4–5)
ALP SERPL-CCNC: 55 U/L (ref 40–150)
ALT SERPL W P-5'-P-CCNC: 50 U/L (ref 0–70)
ANION GAP SERPL CALCULATED.3IONS-SCNC: 7 MMOL/L (ref 3–14)
APTT PPP: 27 SEC (ref 22–37)
AST SERPL W P-5'-P-CCNC: 19 U/L (ref 0–45)
B-HCG FREE SERPL-ACNC: 1.1 [IU]/L (ref 0.86–1.14)
BASOPHILS # BLD AUTO: 0 10E9/L (ref 0–0.2)
BASOPHILS NFR BLD AUTO: 0.3 %
BILIRUB SERPL-MCNC: 0.2 MG/DL (ref 0.2–1.3)
BUN SERPL-MCNC: 23 MG/DL (ref 7–30)
CA-I BLD-SCNC: 5.2 MG/DL (ref 4.4–5.2)
CALCIUM SERPL-MCNC: 9.1 MG/DL (ref 8.5–10.1)
CHLORIDE SERPL-SCNC: 107 MMOL/L (ref 94–109)
CO2 BLDCOV-SCNC: 29 MMOL/L (ref 21–28)
CO2 SERPL-SCNC: 26 MMOL/L (ref 20–32)
CREAT BLD-MCNC: 1 MG/DL (ref 0.66–1.25)
CREAT SERPL-MCNC: 0.92 MG/DL (ref 0.66–1.25)
DIFFERENTIAL METHOD BLD: ABNORMAL
EOSINOPHIL # BLD AUTO: 0.1 10E9/L (ref 0–0.7)
EOSINOPHIL NFR BLD AUTO: 0.7 %
ERYTHROCYTE [DISTWIDTH] IN BLOOD BY AUTOMATED COUNT: 15.2 % (ref 10–15)
GFR SERPL CREATININE-BSD FRML MDRD: 75 ML/MIN/{1.73_M2}
GFR SERPL CREATININE-BSD FRML MDRD: 88 ML/MIN/{1.73_M2}
GLUCOSE BLD-MCNC: 139 MG/DL (ref 70–99)
GLUCOSE SERPL-MCNC: 140 MG/DL (ref 70–99)
HCT VFR BLD AUTO: 39.3 % (ref 40–53)
HCT VFR BLD CALC: 36 %PCV (ref 40–53)
HGB BLD CALC-MCNC: 12.2 G/DL (ref 13.3–17.7)
HGB BLD-MCNC: 11.9 G/DL (ref 13.3–17.7)
IMM GRANULOCYTES # BLD: 0.2 10E9/L (ref 0–0.4)
IMM GRANULOCYTES NFR BLD: 1.8 %
INR PPP: 1.15 (ref 0.86–1.14)
LYMPHOCYTES # BLD AUTO: 1.9 10E9/L (ref 0.8–5.3)
LYMPHOCYTES NFR BLD AUTO: 21.6 %
MCH RBC QN AUTO: 26.2 PG (ref 26.5–33)
MCHC RBC AUTO-ENTMCNC: 30.3 G/DL (ref 31.5–36.5)
MCV RBC AUTO: 87 FL (ref 78–100)
MONOCYTES # BLD AUTO: 0.8 10E9/L (ref 0–1.3)
MONOCYTES NFR BLD AUTO: 9.4 %
NEUTROPHILS # BLD AUTO: 5.8 10E9/L (ref 1.6–8.3)
NEUTROPHILS NFR BLD AUTO: 66.2 %
NRBC # BLD AUTO: 0 10*3/UL
NRBC BLD AUTO-RTO: 0 /100
PCO2 BLDV: 61 MM HG (ref 40–50)
PH BLDV: 7.28 PH (ref 7.32–7.43)
PLATELET # BLD AUTO: 247 10E9/L (ref 150–450)
PO2 BLDV: 60 MM HG (ref 25–47)
POTASSIUM BLD-SCNC: 4.2 MMOL/L (ref 3.4–5.3)
POTASSIUM SERPL-SCNC: 4 MMOL/L (ref 3.4–5.3)
PROT SERPL-MCNC: 7.8 G/DL (ref 6.8–8.8)
RBC # BLD AUTO: 4.54 10E12/L (ref 4.4–5.9)
SAO2 % BLDV FROM PO2: 86 %
SODIUM BLD-SCNC: 142 MMOL/L (ref 133–144)
SODIUM SERPL-SCNC: 141 MMOL/L (ref 133–144)
TROPONIN I SERPL-MCNC: <0.015 UG/L (ref 0–0.04)
WBC # BLD AUTO: 8.7 10E9/L (ref 4–11)

## 2020-03-31 PROCEDURE — 85610 PROTHROMBIN TIME: CPT

## 2020-03-31 PROCEDURE — 96366 THER/PROPH/DIAG IV INF ADDON: CPT | Performed by: EMERGENCY MEDICINE

## 2020-03-31 PROCEDURE — 96361 HYDRATE IV INFUSION ADD-ON: CPT | Performed by: EMERGENCY MEDICINE

## 2020-03-31 PROCEDURE — 82803 BLOOD GASES ANY COMBINATION: CPT

## 2020-03-31 PROCEDURE — 40000739 ZZH STATISTIC STROKE CODE W/O ACCESS

## 2020-03-31 PROCEDURE — 84484 ASSAY OF TROPONIN QUANT: CPT | Performed by: EMERGENCY MEDICINE

## 2020-03-31 PROCEDURE — 40000501 ZZHCL STATISTIC HEMATOCRIT ED POCT

## 2020-03-31 PROCEDURE — 99291 CRITICAL CARE FIRST HOUR: CPT | Mod: 25 | Performed by: EMERGENCY MEDICINE

## 2020-03-31 PROCEDURE — 82565 ASSAY OF CREATININE: CPT

## 2020-03-31 PROCEDURE — 85610 PROTHROMBIN TIME: CPT | Performed by: EMERGENCY MEDICINE

## 2020-03-31 PROCEDURE — 25800030 ZZH RX IP 258 OP 636: Performed by: EMERGENCY MEDICINE

## 2020-03-31 PROCEDURE — 99285 EMERGENCY DEPT VISIT HI MDM: CPT | Mod: 25 | Performed by: EMERGENCY MEDICINE

## 2020-03-31 PROCEDURE — 85730 THROMBOPLASTIN TIME PARTIAL: CPT | Performed by: EMERGENCY MEDICINE

## 2020-03-31 PROCEDURE — 25000128 H RX IP 250 OP 636: Performed by: EMERGENCY MEDICINE

## 2020-03-31 PROCEDURE — 70496 CT ANGIOGRAPHY HEAD: CPT

## 2020-03-31 PROCEDURE — 93010 ELECTROCARDIOGRAM REPORT: CPT | Mod: Z6 | Performed by: EMERGENCY MEDICINE

## 2020-03-31 PROCEDURE — 40000497 ZZHCL STATISTIC SODIUM ED POCT

## 2020-03-31 PROCEDURE — 82330 ASSAY OF CALCIUM: CPT

## 2020-03-31 PROCEDURE — 12000001 ZZH R&B MED SURG/OB UMMC

## 2020-03-31 PROCEDURE — 96365 THER/PROPH/DIAG IV INF INIT: CPT | Mod: 59 | Performed by: EMERGENCY MEDICINE

## 2020-03-31 PROCEDURE — 80053 COMPREHEN METABOLIC PANEL: CPT | Performed by: EMERGENCY MEDICINE

## 2020-03-31 PROCEDURE — 85025 COMPLETE CBC W/AUTO DIFF WBC: CPT | Performed by: EMERGENCY MEDICINE

## 2020-03-31 PROCEDURE — 70450 CT HEAD/BRAIN W/O DYE: CPT

## 2020-03-31 PROCEDURE — 93005 ELECTROCARDIOGRAM TRACING: CPT | Performed by: EMERGENCY MEDICINE

## 2020-03-31 PROCEDURE — 40000502 ZZHCL STATISTIC GLUCOSE ED POCT

## 2020-03-31 PROCEDURE — 40000498 ZZHCL STATISTIC POTASSIUM ED POCT

## 2020-03-31 RX ORDER — IOPAMIDOL 755 MG/ML
75 INJECTION, SOLUTION INTRAVASCULAR ONCE
Status: COMPLETED | OUTPATIENT
Start: 2020-03-31 | End: 2020-03-31

## 2020-03-31 RX ADMIN — SODIUM CHLORIDE 500 ML: 9 INJECTION, SOLUTION INTRAVENOUS at 22:38

## 2020-03-31 RX ADMIN — IOPAMIDOL 75 ML: 755 INJECTION, SOLUTION INTRAVENOUS at 22:14

## 2020-03-31 RX ADMIN — LEVETIRACETAM 2000 MG: 100 INJECTION, SOLUTION INTRAVENOUS at 23:51

## 2020-03-31 ASSESSMENT — VISUAL ACUITY
OU: NOT TESTABLE

## 2020-04-01 ENCOUNTER — APPOINTMENT (OUTPATIENT)
Dept: SPEECH THERAPY | Facility: CLINIC | Age: 65
End: 2020-04-01
Attending: PSYCHIATRY & NEUROLOGY
Payer: COMMERCIAL

## 2020-04-01 ENCOUNTER — APPOINTMENT (OUTPATIENT)
Dept: GENERAL RADIOLOGY | Facility: CLINIC | Age: 65
End: 2020-04-01
Attending: PSYCHIATRY & NEUROLOGY
Payer: COMMERCIAL

## 2020-04-01 ENCOUNTER — ALLIED HEALTH/NURSE VISIT (OUTPATIENT)
Dept: NEUROLOGY | Facility: CLINIC | Age: 65
End: 2020-04-01
Attending: PSYCHIATRY & NEUROLOGY
Payer: COMMERCIAL

## 2020-04-01 ENCOUNTER — APPOINTMENT (OUTPATIENT)
Dept: MRI IMAGING | Facility: CLINIC | Age: 65
End: 2020-04-01
Attending: EMERGENCY MEDICINE
Payer: COMMERCIAL

## 2020-04-01 DIAGNOSIS — R41.0 DELIRIUM: ICD-10-CM

## 2020-04-01 DIAGNOSIS — S06.5XAA SUBDURAL HEMATOMA (H): Primary | ICD-10-CM

## 2020-04-01 PROBLEM — R56.9 SEIZURE (H): Status: ACTIVE | Noted: 2019-06-21

## 2020-04-01 LAB
ALBUMIN SERPL-MCNC: 3.1 G/DL (ref 3.4–5)
ALBUMIN UR-MCNC: NEGATIVE MG/DL
ALP SERPL-CCNC: 41 U/L (ref 40–150)
ALT SERPL W P-5'-P-CCNC: 40 U/L (ref 0–70)
ANION GAP SERPL CALCULATED.3IONS-SCNC: 6 MMOL/L (ref 3–14)
APPEARANCE UR: CLEAR
AST SERPL W P-5'-P-CCNC: 11 U/L (ref 0–45)
BASE EXCESS BLDV CALC-SCNC: 2.2 MMOL/L
BILIRUB SERPL-MCNC: 0.2 MG/DL (ref 0.2–1.3)
BILIRUB UR QL STRIP: NEGATIVE
BUN SERPL-MCNC: 17 MG/DL (ref 7–30)
CALCIUM SERPL-MCNC: 8.7 MG/DL (ref 8.5–10.1)
CHLORIDE SERPL-SCNC: 109 MMOL/L (ref 94–109)
CO2 SERPL-SCNC: 26 MMOL/L (ref 20–32)
COLOR UR AUTO: ABNORMAL
CREAT SERPL-MCNC: 0.7 MG/DL (ref 0.66–1.25)
ERYTHROCYTE [DISTWIDTH] IN BLOOD BY AUTOMATED COUNT: 15 % (ref 10–15)
GFR SERPL CREATININE-BSD FRML MDRD: >90 ML/MIN/{1.73_M2}
GLUCOSE SERPL-MCNC: 104 MG/DL (ref 70–99)
GLUCOSE UR STRIP-MCNC: NEGATIVE MG/DL
HCO3 BLDV-SCNC: 29 MMOL/L (ref 21–28)
HCT VFR BLD AUTO: 33.6 % (ref 40–53)
HGB BLD-MCNC: 10.2 G/DL (ref 13.3–17.7)
HGB UR QL STRIP: NEGATIVE
INTERPRETATION ECG - MUSE: NORMAL
INTERPRETATION ECG - MUSE: NORMAL
KETONES UR STRIP-MCNC: NEGATIVE MG/DL
LEUKOCYTE ESTERASE UR QL STRIP: NEGATIVE
MCH RBC QN AUTO: 26 PG (ref 26.5–33)
MCHC RBC AUTO-ENTMCNC: 30.4 G/DL (ref 31.5–36.5)
MCV RBC AUTO: 86 FL (ref 78–100)
MUCOUS THREADS #/AREA URNS LPF: PRESENT /LPF
NITRATE UR QL: NEGATIVE
O2/TOTAL GAS SETTING VFR VENT: 21 %
PCO2 BLDV: 51 MM HG (ref 40–50)
PH BLDV: 7.36 PH (ref 7.32–7.43)
PH UR STRIP: 6 PH (ref 5–7)
PLATELET # BLD AUTO: 172 10E9/L (ref 150–450)
PO2 BLDV: 34 MM HG (ref 25–47)
POTASSIUM SERPL-SCNC: 3.4 MMOL/L (ref 3.4–5.3)
PROT SERPL-MCNC: 6.2 G/DL (ref 6.8–8.8)
RBC # BLD AUTO: 3.93 10E12/L (ref 4.4–5.9)
RBC #/AREA URNS AUTO: 1 /HPF (ref 0–2)
SODIUM SERPL-SCNC: 140 MMOL/L (ref 133–144)
SOURCE: ABNORMAL
SP GR UR STRIP: 1.02 (ref 1–1.03)
UROBILINOGEN UR STRIP-MCNC: NORMAL MG/DL (ref 0–2)
WBC # BLD AUTO: 7.7 10E9/L (ref 4–11)
WBC #/AREA URNS AUTO: <1 /HPF (ref 0–5)

## 2020-04-01 PROCEDURE — 71045 X-RAY EXAM CHEST 1 VIEW: CPT

## 2020-04-01 PROCEDURE — 25000128 H RX IP 250 OP 636: Performed by: PSYCHIATRY & NEUROLOGY

## 2020-04-01 PROCEDURE — 80053 COMPREHEN METABOLIC PANEL: CPT | Performed by: PSYCHIATRY & NEUROLOGY

## 2020-04-01 PROCEDURE — 92610 EVALUATE SWALLOWING FUNCTION: CPT | Mod: GN

## 2020-04-01 PROCEDURE — 96361 HYDRATE IV INFUSION ADD-ON: CPT | Performed by: EMERGENCY MEDICINE

## 2020-04-01 PROCEDURE — 25000132 ZZH RX MED GY IP 250 OP 250 PS 637: Performed by: PSYCHIATRY & NEUROLOGY

## 2020-04-01 PROCEDURE — 95715 VEEG EA 12-26HR INTMT MNTR: CPT | Mod: ZF

## 2020-04-01 PROCEDURE — 36415 COLL VENOUS BLD VENIPUNCTURE: CPT | Performed by: PSYCHIATRY & NEUROLOGY

## 2020-04-01 PROCEDURE — A9585 GADOBUTROL INJECTION: HCPCS | Performed by: EMERGENCY MEDICINE

## 2020-04-01 PROCEDURE — 25000132 ZZH RX MED GY IP 250 OP 250 PS 637: Performed by: STUDENT IN AN ORGANIZED HEALTH CARE EDUCATION/TRAINING PROGRAM

## 2020-04-01 PROCEDURE — C9254 INJECTION, LACOSAMIDE: HCPCS | Performed by: PSYCHIATRY & NEUROLOGY

## 2020-04-01 PROCEDURE — 25800030 ZZH RX IP 258 OP 636: Performed by: PSYCHIATRY & NEUROLOGY

## 2020-04-01 PROCEDURE — 82803 BLOOD GASES ANY COMBINATION: CPT | Performed by: PSYCHIATRY & NEUROLOGY

## 2020-04-01 PROCEDURE — 87040 BLOOD CULTURE FOR BACTERIA: CPT | Performed by: PSYCHIATRY & NEUROLOGY

## 2020-04-01 PROCEDURE — 81001 URINALYSIS AUTO W/SCOPE: CPT | Performed by: PSYCHIATRY & NEUROLOGY

## 2020-04-01 PROCEDURE — 95700 EEG CONT REC W/VID EEG TECH: CPT | Mod: ZF

## 2020-04-01 PROCEDURE — 25500064 ZZH RX 255 OP 636: Performed by: EMERGENCY MEDICINE

## 2020-04-01 PROCEDURE — 70553 MRI BRAIN STEM W/O & W/DYE: CPT

## 2020-04-01 PROCEDURE — 85027 COMPLETE CBC AUTOMATED: CPT | Performed by: PSYCHIATRY & NEUROLOGY

## 2020-04-01 PROCEDURE — 96367 TX/PROPH/DG ADDL SEQ IV INF: CPT | Performed by: EMERGENCY MEDICINE

## 2020-04-01 PROCEDURE — 12000001 ZZH R&B MED SURG/OB UMMC

## 2020-04-01 PROCEDURE — 87040 BLOOD CULTURE FOR BACTERIA: CPT | Performed by: EMERGENCY MEDICINE

## 2020-04-01 PROCEDURE — 92526 ORAL FUNCTION THERAPY: CPT | Mod: GN

## 2020-04-01 RX ORDER — POTASSIUM CHLORIDE 7.45 MG/ML
10 INJECTION INTRAVENOUS
Status: DISCONTINUED | OUTPATIENT
Start: 2020-04-01 | End: 2020-04-02 | Stop reason: HOSPADM

## 2020-04-01 RX ORDER — ONDANSETRON 4 MG/1
4 TABLET, ORALLY DISINTEGRATING ORAL EVERY 6 HOURS PRN
Status: DISCONTINUED | OUTPATIENT
Start: 2020-04-01 | End: 2020-04-02 | Stop reason: HOSPADM

## 2020-04-01 RX ORDER — MAGNESIUM SULFATE HEPTAHYDRATE 40 MG/ML
2 INJECTION, SOLUTION INTRAVENOUS DAILY PRN
Status: DISCONTINUED | OUTPATIENT
Start: 2020-04-01 | End: 2020-04-02 | Stop reason: HOSPADM

## 2020-04-01 RX ORDER — AMOXICILLIN 250 MG
2 CAPSULE ORAL 2 TIMES DAILY
Status: DISCONTINUED | OUTPATIENT
Start: 2020-04-01 | End: 2020-04-02 | Stop reason: HOSPADM

## 2020-04-01 RX ORDER — LISINOPRIL 5 MG/1
5 TABLET ORAL DAILY
Status: DISCONTINUED | OUTPATIENT
Start: 2020-04-01 | End: 2020-04-02 | Stop reason: HOSPADM

## 2020-04-01 RX ORDER — ATENOLOL 25 MG/1
50 TABLET ORAL DAILY
Status: DISCONTINUED | OUTPATIENT
Start: 2020-04-01 | End: 2020-04-02 | Stop reason: HOSPADM

## 2020-04-01 RX ORDER — POTASSIUM CL/LIDO/0.9 % NACL 10MEQ/0.1L
10 INTRAVENOUS SOLUTION, PIGGYBACK (ML) INTRAVENOUS
Status: DISCONTINUED | OUTPATIENT
Start: 2020-04-01 | End: 2020-04-02 | Stop reason: HOSPADM

## 2020-04-01 RX ORDER — POTASSIUM CHLORIDE 1.5 G/1.58G
20-40 POWDER, FOR SOLUTION ORAL
Status: DISCONTINUED | OUTPATIENT
Start: 2020-04-01 | End: 2020-04-02 | Stop reason: HOSPADM

## 2020-04-01 RX ORDER — LIDOCAINE 40 MG/G
CREAM TOPICAL
Status: DISCONTINUED | OUTPATIENT
Start: 2020-04-01 | End: 2020-04-02 | Stop reason: HOSPADM

## 2020-04-01 RX ORDER — AMOXICILLIN 250 MG
1 CAPSULE ORAL 2 TIMES DAILY
Status: DISCONTINUED | OUTPATIENT
Start: 2020-04-01 | End: 2020-04-02 | Stop reason: HOSPADM

## 2020-04-01 RX ORDER — LEVETIRACETAM 750 MG/1
1500 TABLET ORAL 2 TIMES DAILY
Status: DISCONTINUED | OUTPATIENT
Start: 2020-04-01 | End: 2020-04-02 | Stop reason: HOSPADM

## 2020-04-01 RX ORDER — SERTRALINE HYDROCHLORIDE 100 MG/1
100 TABLET, FILM COATED ORAL DAILY
Status: DISCONTINUED | OUTPATIENT
Start: 2020-04-01 | End: 2020-04-02 | Stop reason: HOSPADM

## 2020-04-01 RX ORDER — SODIUM CHLORIDE 9 MG/ML
INJECTION, SOLUTION INTRAVENOUS CONTINUOUS
Status: DISCONTINUED | OUTPATIENT
Start: 2020-04-01 | End: 2020-04-02

## 2020-04-01 RX ORDER — DEXAMETHASONE SODIUM PHOSPHATE 4 MG/ML
2 INJECTION, SOLUTION INTRA-ARTICULAR; INTRALESIONAL; INTRAMUSCULAR; INTRAVENOUS; SOFT TISSUE EVERY 24 HOURS
Status: DISCONTINUED | OUTPATIENT
Start: 2020-04-01 | End: 2020-04-01

## 2020-04-01 RX ORDER — GADOBUTROL 604.72 MG/ML
10 INJECTION INTRAVENOUS ONCE
Status: COMPLETED | OUTPATIENT
Start: 2020-04-01 | End: 2020-04-01

## 2020-04-01 RX ORDER — MAGNESIUM SULFATE HEPTAHYDRATE 40 MG/ML
4 INJECTION, SOLUTION INTRAVENOUS EVERY 4 HOURS PRN
Status: DISCONTINUED | OUTPATIENT
Start: 2020-04-01 | End: 2020-04-02 | Stop reason: HOSPADM

## 2020-04-01 RX ORDER — ONDANSETRON 2 MG/ML
4 INJECTION INTRAMUSCULAR; INTRAVENOUS EVERY 6 HOURS PRN
Status: DISCONTINUED | OUTPATIENT
Start: 2020-04-01 | End: 2020-04-02 | Stop reason: HOSPADM

## 2020-04-01 RX ORDER — POTASSIUM CHLORIDE 750 MG/1
20-40 TABLET, EXTENDED RELEASE ORAL
Status: DISCONTINUED | OUTPATIENT
Start: 2020-04-01 | End: 2020-04-02 | Stop reason: HOSPADM

## 2020-04-01 RX ORDER — NALOXONE HYDROCHLORIDE 0.4 MG/ML
.1-.4 INJECTION, SOLUTION INTRAMUSCULAR; INTRAVENOUS; SUBCUTANEOUS
Status: DISCONTINUED | OUTPATIENT
Start: 2020-04-01 | End: 2020-04-02 | Stop reason: HOSPADM

## 2020-04-01 RX ORDER — DEXAMETHASONE 2 MG/1
2 TABLET ORAL
Status: DISCONTINUED | OUTPATIENT
Start: 2020-04-02 | End: 2020-04-02 | Stop reason: HOSPADM

## 2020-04-01 RX ORDER — LEVETIRACETAM 15 MG/ML
1500 INJECTION INTRAVASCULAR EVERY 12 HOURS
Status: DISCONTINUED | OUTPATIENT
Start: 2020-04-01 | End: 2020-04-01

## 2020-04-01 RX ORDER — POTASSIUM CHLORIDE 29.8 MG/ML
20 INJECTION INTRAVENOUS
Status: DISCONTINUED | OUTPATIENT
Start: 2020-04-01 | End: 2020-04-02 | Stop reason: HOSPADM

## 2020-04-01 RX ORDER — DILTIAZEM HYDROCHLORIDE 180 MG/1
180 CAPSULE, EXTENDED RELEASE ORAL DAILY
Status: DISCONTINUED | OUTPATIENT
Start: 2020-04-01 | End: 2020-04-02 | Stop reason: HOSPADM

## 2020-04-01 RX ADMIN — ATENOLOL 50 MG: 25 TABLET ORAL at 14:15

## 2020-04-01 RX ADMIN — LEVETIRACETAM 1500 MG: 15 INJECTION INTRAVENOUS at 07:34

## 2020-04-01 RX ADMIN — SODIUM CHLORIDE 150 MG: 9 INJECTION, SOLUTION INTRAVENOUS at 09:07

## 2020-04-01 RX ADMIN — LACOSAMIDE 150 MG: 100 TABLET, FILM COATED ORAL at 20:14

## 2020-04-01 RX ADMIN — SERTRALINE HYDROCHLORIDE 100 MG: 100 TABLET ORAL at 14:15

## 2020-04-01 RX ADMIN — LEVETIRACETAM 1500 MG: 750 TABLET, FILM COATED ORAL at 18:00

## 2020-04-01 RX ADMIN — GADOBUTROL 10 ML: 604.72 INJECTION INTRAVENOUS at 01:33

## 2020-04-01 RX ADMIN — DILTIAZEM HYDROCHLORIDE 180 MG: 180 CAPSULE, COATED, EXTENDED RELEASE ORAL at 16:34

## 2020-04-01 RX ADMIN — SENNOSIDES AND DOCUSATE SODIUM 2 TABLET: 8.6; 5 TABLET ORAL at 20:14

## 2020-04-01 RX ADMIN — SODIUM CHLORIDE: 9 INJECTION, SOLUTION INTRAVENOUS at 07:00

## 2020-04-01 RX ADMIN — RIVAROXABAN 20 MG: 20 TABLET, FILM COATED ORAL at 16:34

## 2020-04-01 RX ADMIN — POTASSIUM CHLORIDE 20 MEQ: 750 TABLET, EXTENDED RELEASE ORAL at 18:00

## 2020-04-01 RX ADMIN — LISINOPRIL 5 MG: 5 TABLET ORAL at 14:15

## 2020-04-01 RX ADMIN — DEXAMETHASONE SODIUM PHOSPHATE 2 MG: 4 INJECTION, SOLUTION INTRAMUSCULAR; INTRAVENOUS at 09:07

## 2020-04-01 ASSESSMENT — ACTIVITIES OF DAILY LIVING (ADL)
ADLS_ACUITY_SCORE: 18

## 2020-04-01 NOTE — ED NOTES
Pt found sitting at the edge of the bed, asking where he is going and he doesn't answer pt breathing puffs of breaths in through the nose appearing irritation from NPA which was removed. Pt was able to blow and wipe his nose on his own. Shook his head yes to whether he needed to void.  Pt unable to stand with RN assist d/t weakness and given urinal to void.  Pt voided a few drops on floor and RN cleaned up and pt pointed to other area of drops out to RN. Pt was able to remove his underwear on his own and lift his feet to be placed in nonskid socks. Pt unable to stand again with RN assist to get back into bed but was able to shift his weight in the bed backwards to lay down.

## 2020-04-01 NOTE — PHARMACY
Pharmacy Stroke Code Response  Pharmacist responded as part of the Stroke Code Team activation to patient care area ED 02.  The Stroke Team determined that the patient was not a candidate for IV alteplase therapy and the pharmacy team was dismissed at [id number].

## 2020-04-01 NOTE — ED PROVIDER NOTES
ED Provider Note  [facility]      History     Chief Complaint   Patient presents with     Cerebrovascular Accident     Seizures     HPI  Jayden Lackey is a 65 year old male with a history notable for left occipital sanjiv glioblastoma, diagnosed following gross total resection on 3/22/2019, s/p chemoradiotherapy (06/2019), s/p repeat resection (10/24/2019), along with DVT/PE and recurrent seizure events who presents by ambulance for evaluation of a seizure.  History is provided by patient's wife.  She states that he was in his normal state of health throughout the day today when suddenly at 2045 he had what appeared to be a seizure.  He is also not moving his right side.  Just prior to this he stated he felt nauseous.  She states he has not had any recent medication or health changes.  He has been compliant with all of his medications.  Per EMS he had a seizure in the ambulance and was given 5 mg of IM Versed.  Upon arrival patient is drowsy and having difficulty following commands.  No other symptoms noted.      Past Medical History  Past Medical History:   Diagnosis Date     Colon polyp      Dyslipidemia      Glioblastoma (H)      Hypertension      Lumbar disc herniation     L4-5     Rheumatoid arthritis (H)      Past Surgical History:   Procedure Laterality Date     ANAL SPHINCTEROTOMY       BACK SURGERY  2009    L4-5     HERNIA REPAIR  1974     OPTICAL TRACKING SYSTEM CRANIOTOMY, EXCISE TUMOR, COMBINED Left 3/22/2019    Procedure: Left Stealth Assisted Craniotomy And Tumor Resection;  Surgeon: Irving Hayes MD;  Location: [facility] OR     OPTICAL TRACKING SYSTEM CRANIOTOMY, EXCISE TUMOR, COMBINED Left 10/24/2019    Procedure: LEFT CRANIOTOMY, USING OPTICAL TRACKING SYSTEM, WITH NEOPLASM EXCISION;  Surgeon: Nicho Packer MD;  Location: [facility] OR     SEPTOPLASTY       VASECTOMY       acetaminophen (TYLENOL) 325 MG tablet  atenolol (TENORMIN) 100 MG tablet  cholecalciferol (VITAMIN  D3) 5000 units (125 mcg) capsule  dexamethasone (DECADRON) 2 MG tablet  diltiazem ER (DILT-XR) 180 MG 24 hr capsule  docusate sodium (COLACE) 50 MG capsule  Lacosamide (VIMPAT) 100 MG TABS tablet  levETIRAcetam (KEPPRA) 1000 MG tablet  lisinopril (PRINIVIL/ZESTRIL) 5 MG tablet  ondansetron (ZOFRAN) 4 MG tablet  predniSONE (DELTASONE) 2.5 MG tablet  rivaroxaban ANTICOAGULANT (XARELTO ANTICOAGULANT) 20 MG TABS tablet  sertraline (ZOLOFT) 100 MG tablet      Allergies   Allergen Reactions     Shellfish Allergy Difficulty breathing, Nausea and Vomiting, Swelling and Shortness Of Breath     Ativan [Lorazepam]      Hallucinations, delirium     No Clinical Screening - See Comments      Tyrone ferrell doesn't remember reaction     Past medical history, past surgical history, medications, and allergies were reviewed with the patient. Additional pertinent items: None    Family History  Family History   Problem Relation Age of Onset     Macular Degeneration Mother      Diabetes Mother      Heart Failure Father      Alcoholism Father      Diabetes Sister      Heart Disease Maternal Grandfather      Pancreatic Cancer Maternal Grandmother      Rheumatoid Arthritis Sister      Other - See Comments Sister         glioma     Other - See Comments Sister         maculofacial digital syndrome     Kidney Disease Sister         s/p transplant     Glaucoma No family hx of      Family history was reviewed with the patient. Additional pertinent items: None    Social History  Social History     Tobacco Use     Smoking status: Former Smoker     Packs/day: 1.00     Years: 3.00     Pack years: 3.00     Types: Cigarettes     Start date: 1974     Last attempt to quit: 1977     Years since quittin.8     Smokeless tobacco: Never Used   Substance Use Topics     Alcohol use: Not Currently     Drug use: Never      Social history was reviewed with the patient. Additional pertinent items: None    Review of Systems   Unable to perform ROS:  Acuity of condition         Physical Exam      Physical Exam  Constitutional:       General: He is not in acute distress.     Appearance: He is well-developed. He is ill-appearing. He is not diaphoretic.   HENT:      Head: Normocephalic and atraumatic.      Mouth/Throat:      Pharynx: No oropharyngeal exudate.   Eyes:      General: No scleral icterus.        Right eye: No discharge.         Left eye: No discharge.      Pupils: Pupils are equal, round, and reactive to light.   Neck:      Musculoskeletal: Normal range of motion and neck supple.   Cardiovascular:      Rate and Rhythm: Normal rate and regular rhythm.      Heart sounds: Normal heart sounds. No murmur. No friction rub. No gallop.    Pulmonary:      Effort: Pulmonary effort is normal. No respiratory distress.      Breath sounds: Normal breath sounds. No wheezing.   Chest:      Chest wall: No tenderness.   Abdominal:      General: Bowel sounds are normal. There is no distension.      Palpations: Abdomen is soft.      Tenderness: There is no abdominal tenderness.   Musculoskeletal:         General: No tenderness or deformity.   Skin:     General: Skin is warm and dry.      Coloration: Skin is not pale.      Findings: No erythema or rash.   Neurological:      Mental Status: He is lethargic.      Comments: Leaning to the right.  Unable to follow commands.         ED Course      Procedures             EKG Interpretation:      Interpreted by Fabio Trejo DO  Time reviewed: 2223  Symptoms at time of EKG: AMS   Rhythm: normal sinus   Rate: 84  Axis: normal  Ectopy: none  Conduction: normal  ST Segments/ T Waves: No ST-T wave changes  Q Waves: none  Comparison to prior: 11/17/2019.  Previous was atrial fibrillation with a rate of 96.    Clinical Impression: normal EKG                The patient has stroke symptoms:           ED Stroke specific documentation           NIHSS PDF          Protocol PDF     Patient last known well time: 2045  ED Provider first to  bedside at: 2150  CT Results received at: 2248    tPA:   On likely CVA, likely seizure.    If treating with tPA: Ensure SBP<185 and DBP<105 prior to treatment with IV tPA.  Administering IV tPA after treatment with IV labetalol, hydralazine, or nicardipine is reasonable once BP control is established.    Endovascular Retrieval:  Not initiated due to absence of proximal vessel occlusion    National Institutes of Health Stroke Scale (Baseline)  Time Performed: 2152      Score    Level of consciousness: (2)   Not alert; repeat stim to attend strong stim/pain to move    LOC questions: (2)   Answers neither question correctly    LOC commands: (2)   Performs neither task correctly    Best gaze: (0)   Normal    Visual: (0)   No visual loss    Facial palsy: (0)   Normal symmetrical movements    Motor arm (left): (0)   No drift    Motor arm (right): (0)   No drift    Motor leg (left): (0)   No drift    Motor leg (right): (0)   No drift    Limb ataxia: (0)   Absent    Sensory: (0)   Normal- no sensory loss    Best language: (0)   Normal- no aphasia    Dysarthria: (0)   Normal    Extinction and inattention: (0)   No abnormality        Total Score:  6   Unable to test most NIH stroke scale due to current status.    Stroke Mimics were considered (including migraine headache, seizure disorder, hypoglycemia (or hyperglycemia), head or spinal trauma, CNS infection, Toxin ingestion and shock state (e.g. sepsis) .           Critical care time (excluding teaching time and procedures): 30 minutes.          No results found for any visits on 03/31/20.  Medications - No data to display     Assessments & Plan (with Medical Decision Making)   This 65-year-old male with a history of glioblastoma status post resection, seizure disorder who presents with seizure and altered mental status.  This occurred suddenly at 2245 and was witnessed by patient's wife.  Patient did have 2 seizures including one for EMS which ceased after he was given  Versed.  Per patient's wife he has been compliant with all his medications and was at his normal state of health prior to this.  Initially patient was unable to cooperate but was moving all extremities.  He was initially hypertensive but this improved during his course in the emergency department.  Head CT shows no likely CVA or bleed, does show postsurgical changes.  CTA head shows no acute abnormalities.  I discussed all results with radiology.  Lab work shows no acute abnormalities.  ECG shows no acute abnormalities.  Stroke alert was called upon arrival and patient was seen by neurology who does not believe this to be a CVA.  They recommend giving 2 g of levetiracetam.  They also recommend MRI and monitoring in the emergency department.  Patient states he became more responsive and did respond to commands moving hands and feet equally. Patient was signed out to incoming physician pending MRI and re-assessment.     I have reviewed the nursing notes. I have reviewed the findings, diagnosis, plan and need for follow up with the patient.    New Prescriptions    No medications on file       Final diagnoses:   None       --  Dr. Fabio Trejo  Emergency Medicine   [facility], [place], EMERGENCY DEPARTMENT  3/31/2020     Fabio Trejo,   04/01/20 1074

## 2020-04-01 NOTE — PROGRESS NOTES
"   04/01/20 1226   General Information   Onset Date 03/31/20   Start of Care Date 04/01/20   Referring Physician Aravind Greenberg MD    Patient Profile Review/OT: Additional Occupational Profile Info See Profile for full history and prior level of function   Patient/Family Goals Statement None stated   Swallowing Evaluation Bedside swallow evaluation   Behaviorial Observations Alert  (Expressive aphasia)   Mode of current nutrition NPO   Respiratory Status Room air   Comments SLP: Pt is a 65 year old right-handed man with a past medical history of  RA, a fib, PE in 11/2019 on rivaroxaban, cognitive decline and memory issues, hyponatremia, left occipital lobe glioblastoma (IDH wildtype by NGS, MGMT promoter methylated) complicated with a secondary seizure disorder (on keppra 1500 bid and lacosamide 150 mg BID) presented today with stroke like symptoms and seizure like activity that started at 2045. Hence stroke code activated. He had 3 admissions for seizure in the past, last one was in 11/2019 when he was found to have PEs. Pt has had hx of long post-ictal states. Pt seen previously by this service, reg diet/thin liquids recommended. Pt now with profound word finding difficulties, different from previous eval by this service in Oct 2019. Clinical swallow eval completed per MD order.    Clinical Swallow Evaluation   Oral Musculature unable to assess due to poor participation/comprehension   Dentition present and adequate   Mucosal Quality adequate   Laryngeal Function Cough;Swallow;Voicing initiated   Oral Musculature Comments Pt having significant difficulty expressing himself. Pt wanted to wash his hands after using the urinal, but could only hold his hands in front of him and and state, \"I want\" multiple times. SLP finally able to idenifty that wanted to use hand  before eating/drinking. Pt inconsistently following commands. Pt appears aware of these deficits and is frustrated by them.    Clinical " Swallow Eval: Thin Liquid Texture Trial   Mode of Presentation, Thin Liquids cup;self-fed   Volume of Liquid or Food Presented 4oz thin water   Oral Phase of Swallow WFL   Pharyngeal Phase of Swallow intact   Diagnostic Statement WFL, no overt s/sx of aspiration   Clinical Swallow Eval: Puree Solid Texture Trial   Mode of Presentation, Puree spoon;self-fed   Volume of Puree Presented 3oz pudding   Oral Phase, Puree WFL   Pharyngeal Phase, Puree intact   Diagnostic Statement WFL, no overt s/sx of aspiration   Clinical Swallow Eval: Solid Food Texture Trial   Mode of Presentation, Solid self-fed   Volume of Solid Food Presented 1 cracker   Oral Phase, Solid WFL   Pharyngeal Phase, Solid intact   Diagnostic Statement WFL, no overt s/sx of aspiration   Esophageal Phase of Swallow   Patient reports or presents with symptoms of esophageal dysphagia No   General Therapy Interventions   Planned Therapy Interventions Language   Language   (evaluation pending progress)   Swallow Eval: Clinical Impressions   Skilled Criteria for Therapy Intervention No problems identified which require skilled intervention   Functional Assessment Scale (FAS) 7   Treatment Diagnosis Functional oropharyngeal swallow mechanism, expressive/receptive language deficits   Diet texture recommendations Regular diet;Thin liquids   Recommended Feeding/Eating Techniques small sips/bites  (slow rate, upright for all PO)   Demonstrates Need for Referral to Another Service physical therapy;occupational therapy   Therapy Frequency 5x/week   Predicted Duration of Therapy Intervention (days/wks) 1 week   Anticipated Discharge Disposition   (pending progress)   Risks and Benefits of Treatment have been explained. Yes   Patient, family and/or staff in agreement with Plan of Care Yes   Clinical Impression Comments SLP: Clinical swallow eval completed per MD order. Pt presents with functional oropharyngeal swallow mechanism. Apparent expressive and receptive  language deficits. Formal oral [facility] exam not completed d/t pt's inability to consistently follow commands 2/2 language deficits. Assessed with thin liquids, puree, and higher texture solids. Oral phase WFL. Pharyngeal phase WFL, no overt s/sx of aspiration. Recommend regular diet/thin liquids. Ensure pt is fully alert and upright for all PO, taking small bites/sips at slow rate. SLP will follow for diet tolerance and language evaluation if indicated.   Total Evaluation Time   Total Evaluation Time (Minutes) 14

## 2020-04-01 NOTE — PLAN OF CARE
Discharge Planner SLP   Patient plan for discharge: Unknown  Current status: Clinical swallow eval completed per MD order. Pt presents with functional oropharyngeal swallow mechanism. Apparent expressive and receptive language deficits. Formal oral [facility] exam not completed d/t pt's inability to consistently follow commands 2/2 language deficits. Assessed with thin liquids, puree, and higher texture solids. Oral phase WFL. Pharyngeal phase WFL, no overt s/sx of aspiration. Recommend regular diet/thin liquids. Ensure pt is fully alert and upright for all PO, taking small bites/sips at slow rate. SLP will follow for diet tolerance and language evaluation if indicated.  Barriers to return to prior living situation: Medical condition, communication deficits  Recommendations for discharge: Ongoing ST pending progress  Rationale for recommendations: Pt will benefit from ongoing ST targeting eval/tx of communication pending progress       Entered by: Krystin Greenberg 04/01/2020 12:34 PM

## 2020-04-01 NOTE — ED TRIAGE NOTES
BIBA from home, history of brain tumors, wife reports normal up until 2030. Wife reports pt began to act unusual and had right sided weakness/shaking, pt 2 seizures, one at home and one in rig.

## 2020-04-01 NOTE — ED NOTES
"Pt still sleeping aroused when touched from sleep then to voice, answering mm hm or mm mmm to questions, and spoke \"I don't know\" when asked what he needs.  Pt still able to follow commands.   "

## 2020-04-01 NOTE — ED NOTES
[facility], [place]   ED Nurse to Floor Handoff     Jayden Lackey is a 65 year old male who speaks English and lives alone,  in a home  They arrived in the ED by ambulance from home.  left occipital sanjiv glioblastoma, diagnosed following gross total resection on 3/22/2019, s/p chemoradiotherapy (06/2019), s/p repeat resection (10/24/2019), along with DVT/PE and recurrent seizure events who presents by ambulance for evaluation of a seizure.  History is provided by patient's wife.  She states that he was in his normal state of health throughout the day today when suddenly at 2045 he had what appeared to be a seizure.  He is also not moving his right side.  Just prior to this he stated he felt nauseous.  She states he has not had any recent medication or health changes.  He has been compliant with all of his medications.  Per EMS he had a seizure in the ambulance and was given 5 mg of IM Versed.  Upon arrival patient is drowsy and having difficulty following commands.  No other symptoms noted.    ED Chief Complaint: Cerebrovascular Accident and Seizures    ED Dx;   Final diagnoses:   Seizure (H)         Needed?: No    Allergies:   Allergies   Allergen Reactions     Shellfish Allergy Difficulty breathing, Nausea and Vomiting, Swelling and Shortness Of Breath     Ativan [Lorazepam]      Hallucinations, delirium     No Clinical Screening - See Comments      Lupcasper bean doesn't remember reaction   .  Past Medical Hx:   Past Medical History:   Diagnosis Date     Colon polyp      Dyslipidemia      Glioblastoma (H)      Hypertension      Lumbar disc herniation     L4-5     Rheumatoid arthritis (H)       Baseline Mental status: WDL  Current Mental Status changes: other confused    Infection present or suspected this encounter: no  Sepsis suspected: No  Isolation type: No active isolations     Activity level - Baseline/Home:  Independent  Activity Level - Current:   Stand with assist  x2    Bariatric equipment needed?: No    In the ED these meds were given:   Medications   dexamethasone (DECADRON) injection 2 mg (has no administration in time range)   lacosamide (VIMPAT) 150 mg in sodium chloride 0.9 % 100 mL intermittent infusion (has no administration in time range)   levETIRAcetam (KEPPRA) intermittent infusion 1,500 mg (has no administration in time range)   lidocaine 1 % 0.1-1 mL (has no administration in time range)   lidocaine (LMX4) cream (has no administration in time range)   sodium chloride (PF) 0.9% PF flush 3 mL (has no administration in time range)   sodium chloride (PF) 0.9% PF flush 3 mL (has no administration in time range)   naloxone (NARCAN) injection 0.1-0.4 mg (has no administration in time range)   melatonin tablet 1 mg (has no administration in time range)   sodium chloride 0.9% infusion (has no administration in time range)   magnesium sulfate 2 g in water intermittent infusion (has no administration in time range)   magnesium sulfate 4 g in 100 mL sterile water (premade) (has no administration in time range)   potassium chloride ER (KLOR-CON M) CR tablet 20-40 mEq (has no administration in time range)   potassium chloride (KLOR-CON) Packet 20-40 mEq (has no administration in time range)   potassium chloride 10 mEq in 100 mL sterile water intermittent infusion (premix) (has no administration in time range)   potassium chloride 10 mEq in 100 mL intermittent infusion with 10 mg lidocaine (has no administration in time range)   potassium chloride 20 mEq in 50 mL intermittent infusion (has no administration in time range)   senna-docusate (SENOKOT-S/PERICOLACE) 8.6-50 MG per tablet 1 tablet (has no administration in time range)     Or   senna-docusate (SENOKOT-S/PERICOLACE) 8.6-50 MG per tablet 2 tablet (has no administration in time range)   ondansetron (ZOFRAN-ODT) ODT tab 4 mg (has no administration in time range)     Or   ondansetron (ZOFRAN) injection 4 mg (has no  administration in time range)   0.9% sodium chloride BOLUS (0 mLs Intravenous Stopped 4/1/20 0001)   iopamidol (ISOVUE-370) solution 75 mL (75 mLs Intravenous Given 3/31/20 2214)   sodium chloride (PF) 0.9% PF flush 90 mL (90 mLs Intravenous Given 3/31/20 2214)   levETIRAcetam (KEPPRA) 2,000 mg in sodium chloride 0.9 % 250 mL intermittent infusion (2,000 mg Intravenous Given 3/31/20 5811)   gadobutrol (GADAVIST) injection 10 mL (10 mLs Intravenous Given 4/1/20 0133)       Drips running?  No    Home pump  No    Current LDAs  Peripheral IV 11/19/19 Left;Anterior Upper forearm (Active)   Number of days: 134       Peripheral IV 03/31/20 Left Upper forearm (Active)   Number of days: 1       Peripheral IV 03/31/20 Left Wrist (Active)   Number of days: 1       Airway - Adult/Peds 32 nasopharyngeal (Active)   Number of days: 1       Incision/Surgical Site 03/22/19 Left Head (Active)   Number of days: 376       Labs results:   Labs Ordered and Resulted from Time of ED Arrival Up to the Time of Departure from the ED   CBC WITH PLATELETS DIFFERENTIAL - Abnormal; Notable for the following components:       Result Value    Hemoglobin 11.9 (*)     Hematocrit 39.3 (*)     MCH 26.2 (*)     MCHC 30.3 (*)     RDW 15.2 (*)     All other components within normal limits   COMPREHENSIVE METABOLIC PANEL - Abnormal; Notable for the following components:    Glucose 140 (*)     All other components within normal limits   INR - Abnormal; Notable for the following components:    INR 1.15 (*)     All other components within normal limits   ISTAT GASES ELEC ICA GLUC DOTTIE POCT - Abnormal; Notable for the following components:    Ph Venous 7.28 (*)     PCO2 Venous 61 (*)     PO2 Venous 60 (*)     Bicarbonate Venous 29 (*)     Glucose 139 (*)     Hemoglobin 12.2 (*)     Hematocrit - POCT 36 (*)     All other components within normal limits   PARTIAL THROMBOPLASTIN TIME   CREATININE POCT   TROPONIN I   GLUCOSE MONITOR NURSING POCT   CBC WITH  PLATELETS   ROUTINE UA WITH MICROSCOPIC REFLEX TO CULTURE   ISTAT INR NURSING POCT   ISTAT TROPONIN NURSING POCT   VITAL SIGNS AND NEURO CHECKS   ACTIVITY   PULSE OXIMETRY NURSING   ASSESSMENT   NOTIFY   IP ASSIGN PROVIDER TEAM TO TREATMENT TEAM   VITAL SIGNS   PERIPHERAL IV CATHETER   ACTIVITY   APPLY PNEUMATIC COMPRESSION DEVICE (PCD)   NEURO CHECKS   ISTAT INR POCT   BLOOD CULTURE   BLOOD CULTURE       Imaging Studies:   Recent Results (from the past 24 hour(s))   CTA Head Neck with Contrast    Narrative    EXAM: CTA  HEAD NECK WITH CONTRAST  LOCATION: [facility]  DATE/TIME: 3/31/2020 10:05 PM    INDICATION: Seizure. Right-sided weakness.  COMPARISON: None.  CONTRAST: iopamidol (ISOVUE-370) solution 75 mL  TECHNIQUE: Head and neck CT angiogram with IV contrast. Axial helical CT images of the head and neck vessels obtained during the arterial phase of intravenous contrast administration. Multiplanar 2D reconstructed images of the head and neck vessels were   performed by the technologist. Dose reduction techniques were used. All stenosis measurements made according to NASCET criteria unless otherwise specified.    NOTE: Missing brain MIP. After multiple requests through QC to obtain missing CTA brain MIP reformats, it was later conveyed by the CT tech that these are not performed at this particularly location. This resulted in a delay in communication of results   of the CTA.    FINDINGS:   HEAD CTA:  ANTERIOR CIRCULATION: No significant stenosis/occlusion, aneurysm, or high flow vascular malformation.     Aplastic left A1 segment.    POSTERIOR CIRCULATION: No significant stenosis/occlusion, aneurysm, or high flow vascular malformation.     Dominant right and smaller left vertebral artery contribute to a normal basilar artery.     DURAL VENOUS SINUSES: Expected enhancement of the major dural venous sinuses.      NECK CTA:  RIGHT CAROTID:  Right distal cervical ICA is tortuous.     No significant  stenosis/occlusion.    LEFT CAROTID: No significant stenosis/occlusion.    VERTEBRAL ARTERIES: No significant stenosis/occlusion. Dominant right and smaller left vertebral arteries.    AORTIC ARCH: Classic aortic arch anatomy with no significant stenosis at the origin of the great vessels.    ARTERIAL PLAQUE: Calcified/noncalcified plaque within the right carotid bifurcation/bulb, bilateral carotid siphons, right V4.    No cervical artery dissection    NONVASCULAR STRUCTURES:   Right lung apex small postinflammatory calcified granuloma.    Emphysema.    Degenerative changes noted within the spine.    Dental amalgam resulting in streak artifact.        Impression    IMPRESSION:   HEAD CTA:   1.  No intracranial arterial large vessel occlusion.  2.  Proximal intracranial arteries demonstrate no significant stenosis or occlusion.     NECK CTA:  1.  Cervical arteries demonstrate no significant stenosis or occlusion.  2.  Additional findings above.      Dr.Andrew Trejo was notified by Dr Mariano Ramirez at 11:30 PM 03/31/2020.           CT Head w/o Contrast    Narrative    EXAM: CT HEAD W/O CONTRAST  LOCATION: [facility]  DATE/TIME: 3/31/2020 10:05 PM    INDICATION: Right-sided weakness. Seizure.  COMPARISON: MRI brain 01/31/2020. CT head 12/13/2019.  TECHNIQUE: Routine without IV contrast. Multiplanar reformats. Dose reduction techniques were used.    FINDINGS:  INTRACRANIAL CONTENTS:  Left posterior craniotomy.    Beneath the left posterior craniotomy is thin isoattenuation measuring 1 mm likely due to duraplasty changes. Thin subdural possible. This is decreased compared to prior exams.    Left posterior parietal lobe and left occipital lobe demonstrate a surgical resection cavity redemonstrated from prior examinations. The degree of hypoattenuation surrounding the surgical resection cavity extending into the left posterior frontal lobe   and splenium corpus callosum is not significantly changed compared to  MRI brain 01/31/2020.    Remaining portions of the brain parenchyma demonstrate mild patchy white matter changes, nonspecific.    Mild global brain volume loss.    No acute intracranial hemorrhage. No new or progressive acute extra-axial hematomas.    Previous seen left cerebral convexity subdural collection has resolved.    No hydrocephalus.      VISUALIZED ORBITS/SINUSES/MASTOIDS: No intraorbital abnormality. No paranasal sinus mucosal disease. No significant mastoid effusion.    BONES/SOFT TISSUES: No acute abnormality.    Vascular consultations.      Impression    IMPRESSION:  1.  No acute intracranial hemorrhage identified.  2.  Beneath the left posterior craniotomy is thin isoattenuation measuring 1 mm likely due to duraplasty changes. Thin subdural possible. This is decreased compared to prior exams.  3.  Post surgical changes and chronic intracranial changes described above. Please above for discussion.  4.  Additional findings above.          CT head became available to radiologist at 10:30 pm 03/31/2020.    Dr. Fabio Trejo was notified by Dr Mariano Ramirez at 10:40 PM 03/31/2020.   MR Brain w/o & w Contrast    Narrative    EXAM: MR BRAIN W/O and W CONTRAST  LOCATION: [facility]  DATE/TIME: 4/1/2020 1:10 AM    INDICATION: Seizure, history of glioblastoma with resection.    COMPARISON: Prior brain MRIs from 1/31/2020 and 11/25/2019.    CONTRAST: 10 mL Gadavist.    TECHNIQUE: Routine multiplanar multisequence head MRI without and with intravenous contrast.    FINDINGS:  INTRACRANIAL CONTENTS: Left parieto-occipital craniotomy for tumor resection. Cystic operative cavity in the left occipital lobe stable. Mild scattered patchy linear and nodular enhancement about the periphery of the operative cavity, particularly   anteriorly and inferiorly, is stable to perhaps subtly increased in a couple locations. However, some of this apparent subtle increase may be related to technique. No definite new  "nodular or mass-like enhancement identified.  Mild extra-axial fluid deep   to the craniotomy has improved with only minimal residual. The degree of nonenhancing T2/FLAIR hyperintensity about the operative cavity is unchanged. Previously seen left convexity and falcine subdural hematomas have essentially resolved with only mild   dural thickening over the frontal convexity. No acute or subacute infarct. Stable mild age-related change. Normal position of the cerebellar tonsils.    SELLA: No abnormality accounting for technique.    OSSEOUS STRUCTURES/SOFT TISSUES: Normal marrow signal. The major intracranial vascular flow voids are maintained.     ORBITS: No abnormality accounting for technique.     SINUSES/MASTOIDS: Mild mucosal thickening paranasal sinuses. No middle ear or mastoid effusion.       Impression    IMPRESSION:  1.  Essentially stable appearance of the left occipital resection cavity. Minimal linear and slightly nodular enhancement about the cavity is stable to perhaps subtly more prominent in a couple locations, though some of this may be related to technique.   Adjacent nonenhancing T2/FLAIR hyperintensity is stable.    2.  Essential resolution of the previously seen left convexity subdural hematoma. Minimal dural thickening is seen over the left frontal convexity and minimal fluid is seen deep to the craniotomy site.    3.  No acute infarct or space-occupying hemorrhage.         Recent vital signs:   BP (!) 160/104   Pulse 80   Temp 98.3  F (36.8  C) (Axillary)   Resp 13   Ht 1.905 m (6' 3\")   Wt 107.3 kg (236 lb 8 oz)   SpO2 100%   BMI 29.56 kg/m      Clyde Coma Scale Score: 10 (04/01/20 0156)       Cardiac Rhythm: Normal Sinus  Pt needs tele? Yes  Skin/wound Issues: None    Code Status: Full Code    Pain control: pt had none    Nausea control: pt had none    Abnormal labs/tests/findings requiring intervention: see epic    Family present during ED course? No  Family Comments/Social Situation " comments: NA    Tasks needing completion: None    Eli Peña, RN    [phone] [facility]

## 2020-04-01 NOTE — CODE/RAPID RESPONSE
Stroke Code Nurse-Responder Note    Arrival Time to Stroke Code: 2150    Stroke Code Team interventions:   - De-escalated at 2225 by Fabio Trejo MD    ED/Bedside Nurse providing handoff: Nae Armstrong    Time left for CT: 2205    Time arrived to next location (ED/Unit/IR): 2215    ED/Bedside Nurse given handoff (name/time): Nae Armstrong/2215    Saundra Shaffer RN

## 2020-04-01 NOTE — PROGRESS NOTES
Preliminary Video EEG impression on April 1, 2020 1st hour    Full report to follow. Please look in inpatient chart, under procedure section.     This video EEG is abnormal.  There is continuous delta and theta slowing in the left hemisphere.  The left hemisphere does have a parieto-occipital rhythm of 7 to 8 Hz.  The right hemisphere has faster parieto-occipital rhythm up to 8 to 9 Hz.  The right hemisphere has more alpha activity and appears to have no focal slowing.  EEG findings are consistent with cortical lesion in the left hemisphere which is consistent with patient's history of GBM in this region.  No electrographic seizures or epileptiform discharges are seen.  Specifically patient is not in status epilepticus at this time.      If events of interest are noted, please press the event button and complete behavioral testing (ROAR testing) if possible. Clinical correlation is advised.     Chasity Mendez MD  Staff Epilepsy Neurologist   [phone]

## 2020-04-01 NOTE — PROGRESS NOTES
Brief update    vEEG placed to r/o non convulsive status d/t ongoing confusion, however he does have hx known prolonged post-ictal period and some impaired cognition at baseline so concern is relatively low at this time. No additional clinical events.    Neuro exam significantly improved. Still disoriented, speech fluency is impaired, and follows only 1 step commands. Known hemifacial droop and R hemianopia are stable. Now moving all limbs w/ full strength and endorses light touch intact.    -Will inform Dr. Olmos of patient's status for any possible recs. Tumor/resection bed looked stable on imaging.  -Update wife  -Will restart home meds as able pending swallow study.      Jacque Valdez M.D.  PGY-2 Neurology  Pager #[phone]

## 2020-04-01 NOTE — PLAN OF CARE
Status: Pt is here for seizure monitoring  Vitals: HTN w/in parameters, vitals otherwise stable  Neuros: Difficult to assess orientation, able to pick name, hospital, and reason for admission w/ options, per wife, the pt needs a few days to recover ability to speak effectively after a seizure, able to give yes/no answers appropriately, strengths 4/5 through out, denies N/T  IV: PIV is infusing NS @ 75 mL/hr, other PIV is SL  Resp/trach: Lung sounds are clear  Diet: Reg  Bowel status: Bowel sounds are active, no BM this shift  : Voiding spontaneously in bedside urinal  Skin: Blanchable redness on bottom  Pain: Denied  Activity: Assist x2  Social: Wife has called multiple times this shift for update, RN updated her  Plan: Will continue to monitor and follow POC

## 2020-04-01 NOTE — H&P
[facility], [place]      Neurology General Neurology Admission    Patient Name: Jayden Lackey  : 1955 MRN#: [id number]    STROKE DATA    Stroke Code:  Time called:  2020  Time patient seen:  2020  Onset of symptoms:  2020  Last known normal (pt's baseline):  2020  Head CT read by Rao and Dr. Tariq at:  2020  Stroke Code de-escalated at 2020 after discussion with Dr. Yeh and Dr. Tariq due to symptoms not likely caused by stroke.      TPA treatment:  Not given due to stroke mimic: seizure.      Stroke Scales      National Institutes of Health Stroke Scale (at presentation)   NIHSS done at:  time patient seen      Score    Level of consciousness:  (2)   Not alert; repeat stim to attend strong stim/pain to move    LOC questions:  (2)   Answers neither question correctly    LOC commands:  (2)   Performs neither task correctly    Best gaze:  (0)   Normal    Visual:  (2)   Complete hemianopia    Facial palsy:  (0)   Normal symmetrical movements    Motor arm (left):  (3)   No effort against gravity    Motor arm (right):  (4)   No movement    Motor leg (left):  (3)   No effort against gravity    Motor leg (right):  (4)   No movement    Limb ataxia:  (0)   Absent    Sensory:  (1)   Mild to moderate sensory loss    Best language:  (3)   Mute- global aphasia    Dysarthria:  (0)   Normal    Extinction and inattention:  (0)   No abnormality        NIHSS Total Score:  26          Dysphagia Screen  Time of screenin2020 2300  Screening results: Failed screening - Strict NPO pending SLP evaluation  2020     ASSESSMENT & PLAN BY PROBLEM   Mr. 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 Hence stroke code activated. He had 3 admissions for seizure in the past, last one was in 11/2019 when he was found to have PEs.     #Break through seizure  Patient's wife endorsed that he is complaint on his medications and no recent illness. It is unclear at this point if there is brain tumor recurrence causing seizure. We will admit him to neurology service and give him his medications IV, do blood levels and possibly do EEG in the am.  1. keppra and lacosamide levels  2. Load with keppra 2 gm then maintenance 1500 BID   3. Continue vimpate 150 BID IV  4. Seizure precautions  5. EEG in the am   6. CXR and UA, CBC, CMP to r/o infection or metabolic derangement     # Left occipital lobe glioblastoma (IDH wildtype by NGS, MGMT promoter methylated)   He was diagnosed following a gross total resection on 3/22/2019. He completed chemoradiotherapy on 6/11/2019. Imaging in 9/2019 was consistent with recurrent disease and adjuvant-dosed temozolomide was stop. Pathology from repeat resection on 10/24/2019 was consistent with recurrent tumor. Last time he was seen by Dr. Olmos on 1/31/2020 and brain MRI was done that thought to be stable at that time. CT/A today with no acute findings. There was thin stable/resolving left SDH previously described.   1. Brain MRI wow contrast   2. Touch base with Dr. Olmos tomorrow   3. Home dose decadron 2 mg daily IV     #A fib and PE  1. Will resume rivaruxaban in the am once back to baseline  2. We will resume atenolol and diltiazem in the am    #Cognitive decline  -Follow up outpatient     # Rheumatoid arthritis-stable- not on methotrexate    DVT PPX: SCDs  IVF: 100 NS / hr  Code: Full  dispo pending work up     During initial physical assessment, the plan of care was discussed and developed with spouse.  Plan of care includes:  to admit to general neurology. will do brain MRI and EEG .    Patient was admitted via direct admit.    The patient will be admitted to the General Neurology team..     Nutrition: Orders Placed This Encounter      NPO for Medical/Clinical Reasons Except for: No Exceptions      Prophylaxis            For VTE Prevention:  - pneumatic compression device    He is on Xarelto    For Acid Suppression:  - GI prophylaxis is not indicated    Code Status  Full Code    HPI  Mr. 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 Hence stroke code activated. He had 3 admissions for seizure in the past, last one was in 11/2019 when he was found to have PEs.     At baseline he is functional and independent lives with his wife. Today he was normal, walked outside with his wife then he was playing cards until 2045 when he started to feel nausea, then he took his seizure pills, then he had up rolling eyes and was unable to speak then started to develop RUE shakiness that lasted for about 1 minute or less. Wife called 911. In the ambulance about 20-30 minutes later, he staretd to have GTCs for un documented time period in the ambulance but EMS reported that he was not able to move the RUE & RLE. He was given 5 mg of versed. On arrival to ED he was very drowsy and stroked code called.     Wife reported that he was very compliant on his AEDs. He has not been sick lately. No lack of sleep. He has been watching TV persistently lately and she is not sure if this provoked his stress level especially with the COVID-19 pandemic news.       Regarding his GBM, he was  diagnosed following a gross total resection on 3/22/2019. He completed chemoradiotherapy on 6/11/2019. Imaging in 9/2019 was consistent with recurrent disease and adjuvant-dosed temozolomide was stop. Pathology from repeat resection on 10/24/2019 was consistent with recurrent tumor. Last time he was seen by Dr. Olmos on 1/31/2020 and brain MRI was done that thought to be stable at that time. He was supposed to see Dr. Olmos on 3/30 but the appointment was postponed into 2 weeks later because of the COVID-19 pandemic. He is scheduled for brain MRI on 4/13.     Past interventions for glioblastoma to summarize :  Symptom onset 1/11/2019 with right sided hemianopsia  Diagnosed and resected 3/2019  Chemoradiation 6/2019  Repeated resection for recurrent glioblastoma with ALA guidance 10/24/2019    Pertinent Past Medical/Surgical History  Past Medical History:   Diagnosis Date     Colon polyp      Dyslipidemia      Glioblastoma (H)      Hypertension      Lumbar disc herniation     L4-5     Rheumatoid arthritis (H)        Past Surgical History:   Procedure Laterality Date     ANAL SPHINCTEROTOMY       BACK SURGERY  2009    L4-5     HERNIA REPAIR  1974     OPTICAL TRACKING SYSTEM CRANIOTOMY, EXCISE TUMOR, COMBINED Left 3/22/2019    Procedure: Left Stealth Assisted Craniotomy And Tumor Resection;  Surgeon: Irving Hayes MD;  Location: [facility] OR     OPTICAL TRACKING SYSTEM CRANIOTOMY, EXCISE TUMOR, COMBINED Left 10/24/2019    Procedure: LEFT CRANIOTOMY, USING OPTICAL TRACKING SYSTEM, WITH NEOPLASM EXCISION;  Surgeon: Nicho Packer MD;  Location: [facility] OR     SEPTOPLASTY       VASECTOMY         Medications: I have reviewed this patient's current medications.    Allergies: All allergies reviewed and addressed.    Family History: This patient has no significant family history.    Social History: I have reviewed this patient's social history.    ROS:  The 10 point Review of Systems is negative other than noted in the HPI  or here.    PHYSICAL EXAMINATION  Vital Signs:  B/P: 193/111,  T: 97.3,  P: 80,  R: 19    General:  patient lying in bed without any acute distress    HEENT:  normocephalic/atraumatic  Cardio:  RRR  Pulmonary:  no respiratory distress  Abdomen:  soft  Extremities:  no edema  Skin:  intact     Neurologic  Mental Status:  Patient was non verbal. not following commands  Cranial Nerves:  R homonymus hemianopia at baseline. PERRLA. face symmetric. VOR intact  Motor &sensory:  was able to withdraw to noxious stimuli on the left. no response on the right side  Coordination:  unable to perform  Station/Gait:  unable to test    Labs  Labs and Imaging reviewed and used in developing the plan; pertinent results included.     Lab Results   Component Value Date     (H) 03/31/2020       The patient was discussed with the fellow, Dr. Yeh.  The patient will be staffed with Dr. Kerr in the      Aravind Greenberg MD  Pager: [phone]

## 2020-04-02 ENCOUNTER — APPOINTMENT (OUTPATIENT)
Dept: SPEECH THERAPY | Facility: CLINIC | Age: 65
End: 2020-04-02
Attending: PSYCHIATRY & NEUROLOGY
Payer: COMMERCIAL

## 2020-04-02 ENCOUNTER — PATIENT OUTREACH (OUTPATIENT)
Dept: CARE COORDINATION | Facility: CLINIC | Age: 65
End: 2020-04-02

## 2020-04-02 ENCOUNTER — OFFICE VISIT - HEALTHEAST (OUTPATIENT)
Dept: ONCOLOGY | Facility: HOSPITAL | Age: 65
End: 2020-04-02

## 2020-04-02 ENCOUNTER — ALLIED HEALTH/NURSE VISIT (OUTPATIENT)
Dept: NEUROLOGY | Facility: CLINIC | Age: 65
End: 2020-04-02
Attending: PSYCHIATRY & NEUROLOGY
Payer: COMMERCIAL

## 2020-04-02 ENCOUNTER — APPOINTMENT (OUTPATIENT)
Dept: PHYSICAL THERAPY | Facility: CLINIC | Age: 65
End: 2020-04-02
Attending: STUDENT IN AN ORGANIZED HEALTH CARE EDUCATION/TRAINING PROGRAM
Payer: COMMERCIAL

## 2020-04-02 VITALS
HEART RATE: 64 BPM | RESPIRATION RATE: 16 BRPM | OXYGEN SATURATION: 98 % | HEIGHT: 75 IN | WEIGHT: 236.5 LBS | TEMPERATURE: 98 F | BODY MASS INDEX: 29.4 KG/M2 | SYSTOLIC BLOOD PRESSURE: 116 MMHG | DIASTOLIC BLOOD PRESSURE: 70 MMHG

## 2020-04-02 DIAGNOSIS — F43.25 ADJUSTMENT REACTION WITH MIXED DISTURBANCE OF EMOTIONS AND CONDUCT: ICD-10-CM

## 2020-04-02 DIAGNOSIS — G40.909 SEIZURE DISORDER (H): Primary | ICD-10-CM

## 2020-04-02 LAB
ANION GAP SERPL CALCULATED.3IONS-SCNC: 4 MMOL/L (ref 3–14)
BUN SERPL-MCNC: 13 MG/DL (ref 7–30)
CALCIUM SERPL-MCNC: 8.6 MG/DL (ref 8.5–10.1)
CHLORIDE SERPL-SCNC: 112 MMOL/L (ref 94–109)
CO2 SERPL-SCNC: 26 MMOL/L (ref 20–32)
CREAT SERPL-MCNC: 0.76 MG/DL (ref 0.66–1.25)
GFR SERPL CREATININE-BSD FRML MDRD: >90 ML/MIN/{1.73_M2}
GLUCOSE SERPL-MCNC: 96 MG/DL (ref 70–99)
POTASSIUM SERPL-SCNC: 3.4 MMOL/L (ref 3.4–5.3)
SODIUM SERPL-SCNC: 143 MMOL/L (ref 133–144)

## 2020-04-02 PROCEDURE — 92507 TX SP LANG VOICE COMM INDIV: CPT | Mod: GN

## 2020-04-02 PROCEDURE — 80235 DRUG ASSAY LACOSAMIDE: CPT | Performed by: PSYCHIATRY & NEUROLOGY

## 2020-04-02 PROCEDURE — 25000132 ZZH RX MED GY IP 250 OP 250 PS 637: Performed by: PSYCHIATRY & NEUROLOGY

## 2020-04-02 PROCEDURE — 95712 VEEG 2-12 HR INTMT MNTR: CPT | Mod: ZF

## 2020-04-02 PROCEDURE — 97116 GAIT TRAINING THERAPY: CPT | Mod: GP

## 2020-04-02 PROCEDURE — 80048 BASIC METABOLIC PNL TOTAL CA: CPT | Performed by: PSYCHIATRY & NEUROLOGY

## 2020-04-02 PROCEDURE — 25000132 ZZH RX MED GY IP 250 OP 250 PS 637: Performed by: STUDENT IN AN ORGANIZED HEALTH CARE EDUCATION/TRAINING PROGRAM

## 2020-04-02 PROCEDURE — 25000131 ZZH RX MED GY IP 250 OP 636 PS 637: Performed by: STUDENT IN AN ORGANIZED HEALTH CARE EDUCATION/TRAINING PROGRAM

## 2020-04-02 PROCEDURE — 92523 SPEECH SOUND LANG COMPREHEN: CPT | Mod: GN

## 2020-04-02 PROCEDURE — 97530 THERAPEUTIC ACTIVITIES: CPT | Mod: GP

## 2020-04-02 PROCEDURE — 92526 ORAL FUNCTION THERAPY: CPT | Mod: GN

## 2020-04-02 PROCEDURE — 36415 COLL VENOUS BLD VENIPUNCTURE: CPT | Performed by: PSYCHIATRY & NEUROLOGY

## 2020-04-02 PROCEDURE — 97161 PT EVAL LOW COMPLEX 20 MIN: CPT | Mod: GP

## 2020-04-02 PROCEDURE — 80177 DRUG SCRN QUAN LEVETIRACETAM: CPT | Performed by: PSYCHIATRY & NEUROLOGY

## 2020-04-02 RX ORDER — LEVETIRACETAM 1000 MG/1
1500 TABLET ORAL 2 TIMES DAILY
Refills: 1 | Status: ON HOLD | COMMUNITY
Start: 2020-04-02 | End: 2020-06-27

## 2020-04-02 RX ORDER — LEVETIRACETAM 250 MG/1
250 TABLET ORAL 2 TIMES DAILY
Qty: 60 TABLET | Refills: 0 | Status: ON HOLD | OUTPATIENT
Start: 2020-04-02 | End: 2020-06-27

## 2020-04-02 RX ORDER — DIAZEPAM ORAL SOLUTION (CONCENTRATE) 5 MG/ML
5 SOLUTION ORAL
Qty: 30 ML | Refills: 0 | Status: ON HOLD | OUTPATIENT
Start: 2020-04-02 | End: 2020-06-27

## 2020-04-02 RX ADMIN — LISINOPRIL 5 MG: 5 TABLET ORAL at 09:10

## 2020-04-02 RX ADMIN — DILTIAZEM HYDROCHLORIDE 180 MG: 180 CAPSULE, COATED, EXTENDED RELEASE ORAL at 09:10

## 2020-04-02 RX ADMIN — SERTRALINE HYDROCHLORIDE 100 MG: 100 TABLET ORAL at 09:10

## 2020-04-02 RX ADMIN — SENNOSIDES AND DOCUSATE SODIUM 2 TABLET: 8.6; 5 TABLET ORAL at 09:09

## 2020-04-02 RX ADMIN — LACOSAMIDE 150 MG: 100 TABLET, FILM COATED ORAL at 09:12

## 2020-04-02 RX ADMIN — DEXAMETHASONE 2 MG: 2 TABLET ORAL at 09:10

## 2020-04-02 RX ADMIN — RIVAROXABAN 20 MG: 20 TABLET, FILM COATED ORAL at 16:12

## 2020-04-02 RX ADMIN — ATENOLOL 50 MG: 25 TABLET ORAL at 09:10

## 2020-04-02 RX ADMIN — LEVETIRACETAM 1500 MG: 750 TABLET, FILM COATED ORAL at 09:10

## 2020-04-02 ASSESSMENT — ACTIVITIES OF DAILY LIVING (ADL)
ADLS_ACUITY_SCORE: 18

## 2020-04-02 NOTE — DISCHARGE SUMMARY
"Discharge Summary    Jayden Lackey MRN# [id number]   YOB: 1955 Age: 65 year old     Date of Admission:  3/31/2020  Date of Discharge:  4/2/2020  Admitting Physician:  Hever Kerr MD  Discharge Physician:  Hever Kerr*   Discharging Service:  Neurology     Home clinic: [facility]  Primary Provider: Jhoan Farooq          Admission Diagnoses:   Seizure (H) [R56.9]          Discharge Diagnosis:   Seizure  Dayron's paralysis  Epilepsy  Prolonged post-ictal state  Hx GBM             Discharge Disposition:   Discharged to home           Condition on Discharge:   Discharge condition: Stable   Discharge vitals: Blood pressure (!) 153/95, pulse 79, temperature 99.1  F (37.3  C), temperature source Oral, resp. rate 16, height 1.905 m (6' 3\"), weight 107.3 kg (236 lb 8 oz), SpO2 94 %.     Code status on discharge: Full Code          Procedures:   VEEG: \"This video EEG is abnormal.  There is continuous delta and theta slowing in the left hemisphere.  The left hemisphere does have a parieto-occipital rhythm of 7 to 8 Hz.  The right hemisphere has faster parieto-occipital rhythm up to 8 to 9 Hz.  The right hemisphere has more alpha activity and appears to have no focal slowing.  EEG findings are consistent with cortical lesion in the left hemisphere which is consistent with patient's history of GBM in this region.  No electrographic seizures or epileptiform discharges are seen.  Specifically patient is not in status epilepticus at this time.\"          Medications Prior to Admission:     Medications Prior to Admission   Medication Sig Dispense Refill Last Dose     acetaminophen (TYLENOL) 325 MG tablet Take 2 tablets (650 mg) by mouth every 4 hours as needed for other (multimodal surgical pain management along with NSAIDS and opioid medication as indicated based on pain control and physical function.) 28 tablet 3      atenolol (TENORMIN) 100 MG tablet Take 50 mg by mouth " daily   3/31/2020 at Unknown time     dexamethasone (DECADRON) 2 MG tablet Take 1 tablet (2 mg) by mouth daily (with breakfast) 30 tablet 3 3/31/2020 at Unknown time     diltiazem ER (DILT-XR) 180 MG 24 hr capsule Take 180 mg by mouth daily   3/31/2020 at Unknown time     Lacosamide (VIMPAT) 100 MG TABS tablet Take 150 mg by mouth 2 times daily    3/31/2020 at 2030     lisinopril (PRINIVIL/ZESTRIL) 5 MG tablet Take 1 tablet (5 mg) by mouth daily 28 tablet 0 3/31/2020 at Unknown time     rivaroxaban ANTICOAGULANT (XARELTO ANTICOAGULANT) 20 MG TABS tablet Take 1 tablet (20 mg) by mouth daily (with dinner) 30 tablet 3 3/31/2020 at Unknown time     sertraline (ZOLOFT) 100 MG tablet Take 1 tablet (100 mg) by mouth daily 30 tablet 3 3/31/2020 at Unknown time     cholecalciferol (VITAMIN D3) 5000 units (125 mcg) capsule Take 5,000 Units by mouth daily        docusate sodium (COLACE) 50 MG capsule Take 50 mg by mouth 2 times daily as needed for constipation        ondansetron (ZOFRAN) 4 MG tablet 4 mg every 4 hours as needed   Unknown at Unknown time     predniSONE (DELTASONE) 2.5 MG tablet 7.5 mg daily   Unknown at Unknown time     [DISCONTINUED] levETIRAcetam (KEPPRA) 1000 MG tablet Take 1,500 mg by mouth 2 times daily   1 3/31/2020 at 2030             Discharge Medications:     Current Discharge Medication List      START taking these medications    Details   diazepam (VALIUM) 5 MG/ML (HIGH CONC) solution Take 1 mL (5 mg) by mouth every 3 minutes as needed for seizures (lasting greater than 5 minutes.) If seizure continues, repeat with 0.5mL by mouth every 2-3 minutes up to a max of 7.5mL in 1 day. Do not give if patient has very shallow or slow breathing.  Qty: 30 mL, Refills: 0    Associated Diagnoses: Seizure (H); Nonintractable epilepsy without status epilepticus, unspecified epilepsy type (H); Glioblastoma (H)         CONTINUE these medications which have CHANGED    Details   !! levETIRAcetam (KEPPRA) 1000 MG tablet  Take 1.5 tablets (1,500 mg) by mouth 2 times daily In addition to 1 tab of 250mg Keppra for a total of 1,750mg Keppra twice/day.  Refills: 1      !! levETIRAcetam (KEPPRA) 250 MG tablet Take 1 tablet (250 mg) by mouth 2 times daily In addition to the 1,500mg dose you were taking previously for a total of 1,750mg keppra twice/day  Qty: 60 tablet, Refills: 0    Associated Diagnoses: Seizure (H); Nonintractable epilepsy without status epilepticus, unspecified epilepsy type (H); Glioblastoma (H)       !! - Potential duplicate medications found. Please discuss with provider.      CONTINUE these medications which have NOT CHANGED    Details   acetaminophen (TYLENOL) 325 MG tablet Take 2 tablets (650 mg) by mouth every 4 hours as needed for other (multimodal surgical pain management along with NSAIDS and opioid medication as indicated based on pain control and physical function.)  Qty: 28 tablet, Refills: 3    Associated Diagnoses: S/P craniotomy      atenolol (TENORMIN) 100 MG tablet Take 50 mg by mouth daily      dexamethasone (DECADRON) 2 MG tablet Take 1 tablet (2 mg) by mouth daily (with breakfast)  Qty: 30 tablet, Refills: 3    Associated Diagnoses: Glioblastoma (H)      diltiazem ER (DILT-XR) 180 MG 24 hr capsule Take 180 mg by mouth daily      Lacosamide (VIMPAT) 100 MG TABS tablet Take 150 mg by mouth 2 times daily       lisinopril (PRINIVIL/ZESTRIL) 5 MG tablet Take 1 tablet (5 mg) by mouth daily  Qty: 28 tablet, Refills: 0    Associated Diagnoses: S/P craniotomy      rivaroxaban ANTICOAGULANT (XARELTO ANTICOAGULANT) 20 MG TABS tablet Take 1 tablet (20 mg) by mouth daily (with dinner)  Qty: 30 tablet, Refills: 3    Associated Diagnoses: Glioblastoma (H)      sertraline (ZOLOFT) 100 MG tablet Take 1 tablet (100 mg) by mouth daily  Qty: 30 tablet, Refills: 3    Associated Diagnoses: Glioblastoma (H)      cholecalciferol (VITAMIN D3) 5000 units (125 mcg) capsule Take 5,000 Units by mouth daily      docusate sodium  "(COLACE) 50 MG capsule Take 50 mg by mouth 2 times daily as needed for constipation      ondansetron (ZOFRAN) 4 MG tablet 4 mg every 4 hours as needed      predniSONE (DELTASONE) 2.5 MG tablet 7.5 mg daily                   Consultations:   No consultations were requested during this admission             Brief History of Illness:   Per H&P: \"Mr. 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 he was playing cards until 2045 when he started to feel nausea, then he took his seizure pills, then he had up rolling eyes and was unable to speak then started to develop RUE shakiness that lasted for about 1 minute or less. Wife called 911. In the ambulance about 20-30 minutes later, he staretd to have GTCs for un documented time period in the ambulance but EMS reported that he was not able to move the RUE & RLE. He was given 5 mg of versed. On arrival to ED he was very drowsy and stroked code called.      Wife reported that he was very compliant on his AEDs. He has not been sick lately. No lack of sleep. He has been watching TV persistently lately and she is not sure if this provoked his stress level especially with the COVID-19 pandemic news. \"          Hospital Course:   Mr. Lackey was placed on vEEG monitoring given his prolonged AMS. Stroke evaluation was negative so he was admitted to general neurology. His hemiplegia resolved by morning but he remained slowed and confused. Could not speak more than indicating yes/no and could follow only basic commands. Passed a swallow study and home meds were restarted. He continued to gradually " "improved over the course of the day and on 4/2 was functioning near his physical baseline except persistent word finding difficulties (which he also has normally).             Final Day of Progress before Discharge:       Assessment and Plan:  #Seizures  No apparent trigger for breakthrough seizure, seems to not be controlled on current medication. Will increase Keppra to 1750mg BID and provide diazepam PO as a rescue medication. No change in Vimpat.          Interval History:  No event overnight, more alert this AM          Physical Exam:  Vitals were reviewed  Temp: 99.1  F (37.3  C) Temp src: Oral BP: (!) 153/95   Heart Rate: 67 Resp: 16 SpO2: 94 % O2 Device: None (Room air)    Neurologic:   Awake, alert, oriented to name, place and time.  Cranial nerves II-XII are grossly intact.  Motor is 5 out of 5 bilaterally.  Cerebellar finger to nose, heel to shin intact.  Sensory is intact.  Babinski down going, Romberg negative, and gait is normal.  Mental Status Exam:  Level of Alertness:   awake  Orientation:   Not able to state d/t aphasia but able to pick correctly out of list of options  Language:  abnormal - moderate expressive aphasia  Cranial Nerves:  II: Visual fields:  abnormal - R hemianopia  III: Pupils:  equal, round, reactive to light  III,IV,VI: Extra Ocular Movements: intact  VII: Facial strength: intact  VIII: Hearing:  Intact to conversation  XI: Shoulder shrug:  intact  Motor Exam:  moves all extremities well and symmetrically  Sensory:  Sensory intact  Gait: slightly broad based, slow but with adequate balance            Significant Results:   Negative infectious workup    MRI brain w/wo 4/1:   \"1.  Essentially stable appearance of the left occipital resection cavity. Minimal linear and slightly nodular enhancement about the cavity is stable to perhaps subtly more prominent in a couple locations, though some of this may be related to technique.   Adjacent nonenhancing T2/FLAIR hyperintensity is " "stable.  2.  Essential resolution of the previously seen left convexity subdural hematoma. Minimal dural thickening is seen over the left frontal convexity and minimal fluid is seen deep to the craniotomy site.  3.  No acute infarct or space-occupying hemorrhage.\"      CT brain 3/31:  \"Beneath the left posterior craniotomy is thin isoattenuation measuring 1 mm likely due to duraplasty changes. Thin subdural possible. This is decreased compared to prior exams.  Left posterior parietal lobe and left occipital lobe demonstrate a surgical resection cavity redemonstrated from prior examinations. The degree of hypoattenuation surrounding the surgical resection cavity extending into the left posterior frontal lobe   and splenium corpus callosum is not significantly changed compared to MRI brain 01/31/2020.  Remaining portions of the brain parenchyma demonstrate mild patchy white matter changes, nonspecific.  Mild global brain volume loss.  No acute intracranial hemorrhage. No new or progressive acute extra-axial hematomas.  Previous seen left cerebral convexity subdural collection has resolved.  No hydrocephalus\"             Pending Results:   Vimpat and Keppra levels           Discharge Instructions and Follow-Up:   Discharge diet: Regular   Discharge activity: Activity as tolerated   Discharge follow-up: Follow up with Dr. Olmos as scheduled and with your primary neurologist in 7 days   Outpatient therapy: None    Home Care agency: None    Supplies and equipment: None   Lines and drains: None    Wound care: None   Other instructions: Stand by assist, instructions for diazepam rescue medication given        "

## 2020-04-02 NOTE — PROCEDURES
Procedure Date: 04/02/2020      EEG #:  - (Video EEG day 2)      DATE OF RECORDING/SERVICE DATE:  04/02/2020.      SOURCE FILE DURATION:  9 hours and 23 minutes.      PATIENT INFORMATION:  A 65-year-old male with a history of left GBM.  The patient presents with increased seizures.  EEG is being done to evaluate for seizures.      MEDICATIONS:  Vimpat, Keppra, Zoloft.      TECHNICAL SUMMARY: This video EEG monitoring procedure was performed with 23 scalp electrodes in 10-20 system placements, and additional scalp, precordial and other surface electrodes used for electrical referencing and artifact detection. Video was reviewed intermittently by EEG technologist and physician for electroclinical seizures    EEG FINDINGS:  The patient has continuous slowing in the left hemisphere with maximum involvement in the left temporal parieto-occipital region.  Maximum negativity is noted in T3 and P3 electrodes.  The right hemisphere does have a parietooccipital rhythm up to 8-9 Hz and electrocerebral potentials in the alpha range.  The left posterior quadrant has persistent slowing in a well-formed parietooccipital rhythm is rarely seen up to 6-7 Hz.  In sleep, patient has asymmetric sleep architecture.      ACTIVATION PROCEDURES:  Photic stimulation and hyperventilation was not done.  The patient is able to follow simple commands.      EPILEPTIFORM DISCHARGES:  None.      ICTAL:  None.      IMPRESSION:  Video EEG day 2 is abnormal due to the presence of continuous slowing in the left hemisphere with maximum slowing in the temporal parietooccipital region.  These findings are consistent with a maximum cortical dysfunction in the left temporal parietooccipital region.  The patient does have a known history of glioblastoma multiforme in this region and had a recent seizure (post ictal slowing), which may account for this finding on the EEG.  No electrographic seizures or epileptiform discharges were seen.  Clinical  correlation is advised.      SUMMARY OF 2 DAYS OF VIDEO EEG RECORDING:  EEG is abnormal due to the presences of persistent slowing in the left temporal parieto-occipital region consistent with maximum cortical dysfunction in this region.  This is consistent with patient's history of GBM in this area and may also be due to postictal slowing.  No electrographic seizures or epileptiform discharges were seen during these 2 days of video EEG recording.         KAITY BISWAS MD             D: 2020   T: 2020   MT:       Name:     ISAÍAS MALDONADO   MRN:      [id number]        Account:        [id number]   :      1955           Procedure Date: 2020      Document: [id number]

## 2020-04-02 NOTE — PLAN OF CARE
Discharge Planner SLP   Patient plan for discharge: Unknown  Current status: Language evaluation completed per MD order. Administered the Bedside Western Aphasia Battery - Revised (WAB-R); pt scored 47/100, indicating severe Broca's aphasia. Expressive language characterized by frequent paraphasias and perseveration, severe word finding deficits. Pt's relative strength is auditory comprehension for basic/simple information; break down occurs with higher level/multi-step tasks. Pt benefits from closed yes/no questions vs open-ended. Pt also seen for follow-up of dysphagia. Recommend continuation of regular diet/thin liquids. Pt to be fully alert and upright for all PO, taking small bites/sips at slow rate.     Recommend ongoing ST targeting basic expressive language and moderately-complex receptive language. SLP will sign off on swallow goals, will follow for language tx.    Barriers to return to prior living situation: Communication deficits  Recommendations for discharge: Ongoing ST at next level of care pending progress; pt has historically demonstrated spontaneous recovery   Rationale for recommendations: Pt currently with severe communication deficits, will benefit from ongoing ST to return to baseline functional communication       Entered by: Krystin Greenberg 04/02/2020 9:43 AM

## 2020-04-02 NOTE — PLAN OF CARE
Status: Pt on 6a for EEG monitoring. GBM hx s/p resection x2.   Vitals: VSS on RA   Neuros: At 0000 A&Ox2 with choices, d/o to time (knew year) and place. At 0400 A&Ox3 with choices, d/o to time (knew year). Slow to respond at times, word finding difficulty, R. field cut, slight R. droop.  IV: L. PIV x2 with one infusing NS @ 75mL/hr and other SL.   Resp/trach: On RA.   Diet: Regular   Bowel status: No BM this shift  : Voiding via urinal and bathroom.   Skin: Blanchable redness to buttock.  Pain: No c/o pain  Activity: A1/GB  Plan: Continue EEG. Continue to monitor and follow POC.

## 2020-04-02 NOTE — PLAN OF CARE
Discharge Planner PT   6A - PT evaluation completed, treatment initiated.  Patient plan for discharge: Home with assist.  Current status: Pt transfers sit <> stand with SBA. Pt ambulates ~250ft 2x with no AD and close SBA, slow speed, no LOB. Pt ascends/descends 3 steps 5x with single HR and close SBA. Pt INDly manages briefs and toileting cares, SBA provided for standing balance in order to ensure safety.  Barriers to return to prior living situation: Medical needs.  Recommendations for discharge: Home with assist.  Rationale for recommendations: Pt appropriate to discharge home with assist once medically cleared for discharge.

## 2020-04-02 NOTE — PROCEDURES
Procedure Date: 2020      EEG #-1      Video EEG day 1      DATE OF RECORDING/SERVICE DATE:  2020.      SOURCE FILE DURATION:  14 hours and 53 minutes.      PATIENT INFORMATION:  A 65-year-old male with a history of left GBM.  The patient presents with increased seizures.  EEG is being done to evaluate for seizures.      MEDICATIONS:  Vimpat, Keppra, Zoloft.     TECHNICAL SUMMARY: This video EEG monitoring procedure was performed with 23 scalp electrodes in 10-20 system placements, and additional scalp, precordial and other surface electrodes used for electrical referencing and artifact detection. Video was reviewed intermittently by EEG technologist and physician for electroclinical seizures     BACKGROUND:  In the fully awake state, the patient has an 8 Hz parietooccipital rhythm in the right posterior quadrant.  There are mixed frequencies in the right hemisphere consisting of alpha activity and the left hemisphere has continuous slowing with maximum slowing noted in the temporal and parietal region with maximum negativity at T3, P3.  In rare instances, we do see parietooccipital rhythm up to 7 Hz in the left posterior quadrant.      ACTIVATION PROCEDURES:  None.      EPILEPTIFORM DISCHARGES:  None.      ICTAL:  None.      IMPRESSION:  Video EEG day 1 is abnormal due to the presence of continuous slowing in the left hemisphere consistent with maximum cortical dysfunction in the cuwuckr-ahrbfns-cfjflsgag region.  This is consistent with patient's history of GBM in this region and may also be postictal slowing.  No electrographic seizures or epileptiform discharges were seen.  Clinical correlation is advised.         KAITY BISWAS MD             D: 2020   T: 2020   MT: ALICE      Name:     ISAÍAS MALDONADO   MRN:      [id number]        Account:        [id number]   :      1955           Procedure Date: 2020      Document: [id number]

## 2020-04-02 NOTE — PLAN OF CARE
Status: GBM hx s/p resection x2. Admitted for seizure monitoring   Vitals: VSS on RA   Neuros: A+O x4 with choices. Slow to respond with word finding difficulty and moderate expressive aphasia. R. field cut, no right sided droop noticed this shift   IV: L. PIV x2, both SL  Resp/trach: Clear   Diet: Regular, good intake this shift   Bowel status: No BM this shift. Said LBM was yesterday   : Voiding via urinal and bathroom.   Skin: Blanchable redness to buttock, turns well in bed.   Pain: Denied   Activity: A1 + GB  Social: Updated wife this shift.   Plan: EEG discontinued,discharging home this evening at 1630 Continue to monitor and follow POC.

## 2020-04-02 NOTE — PLAN OF CARE
"Status: Pt on 6a for EEG monitoring. GBM hx s/p resection x2.   Vitals: /69 (BP Location: Left arm)   Pulse 79   Temp 96.7  F (35.9  C) (Oral)   Resp 16   Ht 1.905 m (6' 3\")   Wt 107.3 kg (236 lb 8 oz)   SpO2 95%   BMI 29.56 kg/m    Neuros: Difficult to assess. Able to identify name, place, and year with choices. R field cut. Sl R droop.   IV: PIV infusing NS @ 75.   Resp/trach: On RA.   Diet: Reg diet, cleared by SLP today.   Bowel status: BM x1 this shift.   : Voiding via urinal and bathroom.   Skin: Blanchable redness to buttock.  Pain: Denies.   Activity: Up w. 1 GB.   Social: Wife updated by phone.   Plan: Continue EEG. Continue to monitor and follow POC.     "

## 2020-04-02 NOTE — PROGRESS NOTES
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 Hence stroke code activated. He had 3 admissions for seizure in the past, last one was in 11/2019 when he was found to have PEs. Pt has had hx of long post-ictal states. Pt recommended for regular diet/thin liquids yesterday, has expressive and receptive aphasia. Language evaluation completed per MD order.    General Observations Alert, engaged, inconsistently aware of deficits   Oral Motor Sensory Function   Completed on Swallow Evaluation Completed Clinical Bedside Swallow Evaluation   Western Aphasia Battery- Revised Bedside Record From   Spontaneous Speech Content Score (out of 10) 2   Spontaneous Speech Fluency Score (out of 10) 2   Auditory Verbal Comprehension Score (out of 10) 8   Sequential Commands Score (out of 10) 9   Repetition Score (out of 10) 7   Object Naming Score (out of 10) 0   Bedside Aphasia Sum 28   WAB-R Bedside Aphasia Score 46.67   Bedside Aphasia Classification Broca's Aphasia   Aphasia Severity Level Severe Aphasia   Comments SLP: Pt's expressive language is worse than his receptive language. In confrontation naming task, pt with perseveration on previous responses, 0/10 correct responses. Pt often noted to state, \"I can think it, can't say it.\" " When asked to describe a picture, pt unable to produce any meaningful response. Pt's expressive language also characterized by frequent phonemic and logopenic paraphasias. Pt unable to state address or identify why he is hospitalized. Receptive language for basic information is intact.    Cognitive Status Examination   Cognitive Status Exam Comments Adequate   General Therapy Interventions   Planned Therapy Interventions Language   Language Verbal expression;Auditory comprehension   Clinical Impression, SLP Eval   Criteria for Skilled Therapeutic Interventions Met Yes;Treatment indicated - Of note, pt has historically demonstrated spontaneous recovery in the days following his seizures. Will cont to follow to determine appropriateness for ST intervention.   SLP Diagnosis Severe Broca's aphasia   Functional limitations due to impairments Functional communication   Rehab Potential Good, to achieve stated therapy goals - pt has historically demonstrated spontaneous recovery after seizures   Therapy Frequency 4x/week   Predicted Duration of Therapy Intervention (days/wks) 1 week   Anticipated Discharge Disposition Home with Outpatient Therapy  (pending progress)   Risks and Benefits of Treatment have been explained. Yes   Patient, Family & other staff in agreement with plan of care Yes   Clinical Impression Comments SLP: Language evaluation completed per MD order. Administered the Bedside Western Aphasia Battery - Revised (WAB-R); pt scored 47/100, indicating severe Broca's aphasia. Expressive language characterized by frequent paraphasias and perseveration, severe word finding deficits. Pt 0% accurate in confrontation naming task. Pt's relative strength is auditory comprehension for basic/simple information; break down occurs with higher level/multi-step tasks. No dysarthria appreciated. Pt benefits from closed yes/no questions vs open-ended. Recommend ongoing ST targeting basic expressive language and  moderately-complex receptive language.    Total Evaluation Time      Total Evaluation Time (Minutes) 28

## 2020-04-02 NOTE — PROGRESS NOTES
"   04/02/20 1327   Quick Adds   Type of Visit Initial PT Evaluation   Living Environment   Lives With spouse   Living Arrangements house   Home Accessibility stairs to enter home;stairs within home   Number of Stairs, Main Entrance 3   Stair Railings, Main Entrance   (GB in garage.)   Number of Stairs, Within Home, Primary   (9 with 2 landings.)   Stair Railings, Within Home, Primary   (1 HR.)   Transportation Anticipated family or friend will provide   Living Environment Comment Pt has walk-in shower with GB and shower chair.   Self-Care   Usual Activity Tolerance good   Current Activity Tolerance moderate   Equipment Currently Used at Home grab bar, tub/shower;shower chair   Functional Level Prior   Ambulation 0-->independent   Transferring 0-->independent   Toileting 0-->independent   Bathing 1-->assistive equipment   Fall history within last six months yes   Number of times patient has fallen within last six months 2   Which of the above functional risks had a recent onset or change? ambulation;transferring   General Information   Onset of Illness/Injury or Date of Surgery - Date 03/31/20   Referring Physician Jacque Valdez MD    Patient/Family Goals Statement Pt would like to return home.   Pertinent History of Current Problem (include personal factors and/or comorbidities that impact the POC) Per chart \"Mr. 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 Hence stroke code activated. He had 3 admissions for seizure in the past, last one was in 11/2019 when he was found to have PEs.\"   Precautions/Limitations fall " precautions;seizure precautions   General Observations Upon arrival, pt seated in chair and agreeable to PT session.   General Info Comments Activity - up with assist.   Cognitive Status Examination   Orientation orientation to person, place and time  (When provided choices.)   Level of Consciousness alert   Follows Commands and Answers Questions 100% of the time   Personal Safety and Judgment intact   Cognitive Comment Slow to respond with word finding difficulty and expressive aphasia. Per chart pt with R field cut   Pain Assessment   Patient Currently in Pain No   Posture    Posture Forward head position;Protracted shoulders   Range of Motion (ROM)   ROM Comment B LE ROM WFL.   Strength   Strength Comments B LE strength at least >3+/5.   Bed Mobility   Bed Mobility Comments Not assessed.   Transfer Skills   Transfer Comments Pt transfers sit <> stand with SBA.   Gait   Gait Comments Pt ambulates ~250ft 2x with no AD and close SBA, slow speed, no LOB. Pt ascends/descends 3 steps 5x with single HR and close SBA.   Balance   Balance Comments Pt with high level dynamic standing balance deficits during functional mobility assessment.   General Therapy Interventions   Planned Therapy Interventions balance training;bed mobility training;gait training;strengthening;transfer training;home program guidelines;progressive activity/exercise   Clinical Impression   Criteria for Skilled Therapeutic Intervention yes, treatment indicated   PT Diagnosis Impaired functional mobility   Influenced by the following impairments LE weakness, impaired dynamic standing balance, decreased activity tolerance   Functional limitations due to impairments Bed mobility, transfers, gait, stairs, functional endurance   Clinical Presentation Evolving/Changing   Clinical Presentation Rationale Clinical judgement   Clinical Decision Making (Complexity) Low complexity   Therapy Frequency 6x/week   Predicted Duration of Therapy Intervention (days/wks)  "1 week   Anticipated Equipment Needs at Discharge   (TBD.)   Anticipated Discharge Disposition Home with Assist   Risk & Benefits of therapy have been explained Yes   Patient, Family & other staff in agreement with plan of care Yes   [facility] TM \"6 Clicks\"   2016, Trustees of [facility], under license to [employer].  All rights reserved.   6 Clicks Short Forms Basic Mobility Inpatient Short Form   [facility]  \"6 Clicks\" V.2 Basic Mobility Inpatient Short Form   1. Turning from your back to your side while in a flat bed without using bedrails? 4 - None   2. Moving from lying on your back to sitting on the side of a flat bed without using bedrails? 4 - None   3. Moving to and from a bed to a chair (including a wheelchair)? 4 - None   4. Standing up from a chair using your arms (e.g., wheelchair, or bedside chair)? 4 - None   5. To walk in hospital room? 3 - A Little   6. Climbing 3-5 steps with a railing? 3 - A Little   Basic Mobility Raw Score (Score out of 24.Lower scores equate to lower levels of function) 22   Total Evaluation Time   Total Evaluation Time (Minutes) 8     "

## 2020-04-02 NOTE — PROGRESS NOTES
Pt discharged to home via wheelchair with all personal belongings at approx 1630. AVS sent with patient and discussed with wife over the phone prior to discharge. Wife verbalized understanding.

## 2020-04-03 LAB
LACOSAMIDE SERPL-MCNC: 6.7 UG/ML (ref 5–10)
LEVETIRACETAM SERPL-MCNC: 16 UG/ML (ref 12–46)

## 2020-04-03 NOTE — PLAN OF CARE
Speech Language Therapy Discharge Summary    Reason for therapy discharge:    Discharged to home.    Progress towards therapy goal(s). See goals on Care Plan in [facility] electronic health record for goal details.  Goals not met.  Barriers to achieving goals:   discharge from facility.    Therapy recommendation(s):    Continued therapy is recommended.  Rationale/Recommendations:  As of language eval yesterday, recommend ongoing ST targeting basic expressive language and moderately-complex receptive language. Historically, pt has demonstrated spontaneous recovery after seizure. Should expressive language deficits persist, recommend continued ST as an OP.

## 2020-04-03 NOTE — PLAN OF CARE
Physical Therapy Discharge Summary    Reason for therapy discharge:    Discharged to home.    Progress towards therapy goal(s). See goals on Care Plan in [facility] electronic health record for goal details.  Goals not met.  Barriers to achieving goals:   discharge on same date as initial evaluation.    Therapy recommendation(s):    Continued therapy is recommended.  Rationale/Recommendations:  OP PT for strengthening, balance, and endurance.

## 2020-04-07 LAB
BACTERIA SPEC CULT: NO GROWTH
BACTERIA SPEC CULT: NO GROWTH
SPECIMEN SOURCE: NORMAL
SPECIMEN SOURCE: NORMAL

## 2020-04-13 ENCOUNTER — TELEPHONE (OUTPATIENT)
Dept: NEUROPSYCHOLOGY | Facility: CLINIC | Age: 65
End: 2020-04-13

## 2020-04-13 ENCOUNTER — VIRTUAL VISIT (OUTPATIENT)
Dept: ONCOLOGY | Facility: CLINIC | Age: 65
End: 2020-04-13
Attending: PSYCHIATRY & NEUROLOGY
Payer: COMMERCIAL

## 2020-04-13 DIAGNOSIS — C71.9 GLIOBLASTOMA (H): Primary | ICD-10-CM

## 2020-04-13 PROCEDURE — 99214 OFFICE O/P EST MOD 30 MIN: CPT | Mod: 95 | Performed by: PSYCHIATRY & NEUROLOGY

## 2020-04-13 PROCEDURE — 40000114 ZZH STATISTIC NO CHARGE CLINIC VISIT

## 2020-04-13 NOTE — TELEPHONE ENCOUNTER
Spoke with Jayden's wife, Kailey Edmondson, concerning his scheduled appointment on 4/17/2020.  They are not interested in a video or telephone appointment for this service.  They would like to reschedule when a clinic based appointment is available.

## 2020-04-13 NOTE — PROGRESS NOTES
"Jayden Lackey is a 65 year old male who is being evaluated via a billable video visit.      The patient has been notified of following:     \"This video visit will be conducted via a call between you and your physician/provider. We have found that certain health care needs can be provided without the need for an in-person physical exam.  This service lets us provide the care you need with a video conversation.  If a prescription is necessary we can send it directly to your pharmacy.  If lab work is needed we can place an order for that and you can then stop by our lab to have the test done at a later time.    Video visits are billed at different rates depending on your insurance coverage.  Please reach out to your insurance provider with any questions.    If during the course of the call the physician/provider feels a video visit is not appropriate, you will not be charged for this service.\"    Patient has given verbal consent for Video visit? Yes    How would you like to obtain your AVS? Radames    Patient would like the video invitation sent by: Send to e-mail at: [email or web address]    Griselda Polanco CMA      Video Start Time: 3:39 PM    Additional provider notes: See below.       _____________________________________________________________    NEURO-ONCOLOGY VISIT  Apr 13, 2020    CHIEF COMPLAINT: Mr. 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 He completed chemoradiotherapy on 6/11/2019. Imaging in 9/2019 was consistent with recurrent disease and adjuvant-dosed temozolomide was stop. Pathology from repeat resection on 10/24/2019 was consistent with recurrent tumor. He was started on lomustine, but stopped after 1 cycle due to significant chemotherapy-induced fatigue.     Quality of life has been compromised by recurrent seizure events, PE requiring " prolonged hospitalization, and chemotherapy-related toxicities.    I met with Jayden and Oz (wife) for this follow-up visit today.      HISTORY OF PRESENT ILLNESS  -Jayden was recently admitted for a recurrent seizure. Keppra was increased to 1750mg BID + Vimpat 150mg BID. Added PRN diazepam. He is more irritable with the increase in Keppra. Feeling more anxious. This is particularly heightened with having to stay at home during the COVID-19 pandemic.   -Physically improving and doing well. Partaking in more physical activity, gait is stable, walking the stairs. Not using a cane. Balance can be off from time to time.   -Continued depression and irritability. Feeling more anxious. This is particularly heightened with having to stay at home during the COVID-19 pandemic. On Zoloft.   -Word finding issues present, improved since hospital discharge. Worsening memory.   -No headaches.   -Visual field cut is stable. Running into things at times.   -More fatigued, naps during the day.    -No arthritis pain lately with dexamethasone. Off prednisone.      REVIEW OF SYSTEMS  A comprehensive ROS negative except as in HPI.      MEDICATIONS   Current Outpatient Medications   Medication Sig Dispense Refill     acetaminophen (TYLENOL) 325 MG tablet Take 2 tablets (650 mg) by mouth every 4 hours as needed for other (multimodal surgical pain management along with NSAIDS and opioid medication as indicated based on pain control and physical function.) 28 tablet 3     atenolol (TENORMIN) 100 MG tablet Take 50 mg by mouth daily       cholecalciferol (VITAMIN D3) 5000 units (125 mcg) capsule Take 5,000 Units by mouth daily       dexamethasone (DECADRON) 2 MG tablet Take 1 tablet (2 mg) by mouth daily (with breakfast) 30 tablet 3     diazepam (VALIUM) 5 MG/ML (HIGH CONC) solution Take 1 mL (5 mg) by mouth every 3 minutes as needed for seizures (lasting greater than 5 minutes.) If seizure continues, repeat with 0.5mL by mouth every 2-3  minutes up to a max of 7.5mL in 1 day. Do not give if patient has very shallow or slow breathing. 30 mL 0     diltiazem ER (DILT-XR) 180 MG 24 hr capsule Take 180 mg by mouth daily       docusate sodium (COLACE) 50 MG capsule Take 50 mg by mouth 2 times daily as needed for constipation       Lacosamide (VIMPAT) 100 MG TABS tablet Take 150 mg by mouth 2 times daily        levETIRAcetam (KEPPRA) 1000 MG tablet Take 1.5 tablets (1,500 mg) by mouth 2 times daily In addition to 1 tab of 250mg Keppra for a total of 1,750mg Keppra twice/day.  1     levETIRAcetam (KEPPRA) 250 MG tablet Take 1 tablet (250 mg) by mouth 2 times daily In addition to the 1,500mg dose you were taking previously for a total of 1,750mg keppra twice/day 60 tablet 0     lisinopril (PRINIVIL/ZESTRIL) 5 MG tablet Take 1 tablet (5 mg) by mouth daily 28 tablet 0     ondansetron (ZOFRAN) 4 MG tablet 4 mg every 4 hours as needed       rivaroxaban ANTICOAGULANT (XARELTO ANTICOAGULANT) 20 MG TABS tablet Take 1 tablet (20 mg) by mouth daily (with dinner) 30 tablet 3     sertraline (ZOLOFT) 100 MG tablet Take 1 tablet (100 mg) by mouth daily 30 tablet 3     DRUG ALLERGIES   Allergies   Allergen Reactions     Shellfish Allergy Difficulty breathing, Nausea and Vomiting, Swelling and Shortness Of Breath     No Clinical Screening - See Comments      Tyrone ferrell doesn't remember reaction       ONCOLOGIC HISTORY  -1/2019 PRESENTATION: Visual disturbance.   -3/6/2019 Evaluated by Dr. Mauricio, neuro-ophthalmologist, found to have a right sided homonymous hemianopsia. MRI ordered.   -3/6/2019 MR brain imaging revealed a contrast-enhancing lesion in the left occipital lobe suspicious for a high-grade primary brain tumor.  -3/22/2019 SURGERY: Craniotomy with a gross total resection by Dr. Irving Hayes.   PATHOLOGY: Glioblastoma; Wildtype status for IDH1/2 by next generation sequencing. MGMT promoter methylated. Wildtype PTEN, TP53.  -4/8/2019 NEURO-ONC: Recommending  chemoradiotherapy.   -4/30 - 6/11/2019 CHEMORADS 6000cGy in 30 fractions with concurrent temozolomide 75mg/m2 (180mg).  -5/20/2019 NEURO-ONC: Not interested in Optune at this time.   -6/12/2019 NEURO-ONC: Clinically stable.   -6/21 - 6/28/2019 ADMISSION/ [facility]: Admitted to [facility] for first ever seizure event, provoked by hyponatremia. Urine studies consistent with SIADH ; etiology of SIADH unclear. Had 10mm gastric lesion biopsied on 6/26/2019 by way of an EGD.  PATHOLOGY of gastric lesion: Hyperplastic polyp with erosion and foci of atypia; favor reactive. No intestinal metaplasia, no helicobacter pylori, no high grade dysplasia or invasive carcinoma.   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   -7/12/2019 NEURO-ONC/ MRB/ CHEMO: Clinical stable. Imaging with stable to slightly increased enhancement of previously seen nodular lesions; associated with mild degree of elevated rCBV. Starting adjuvant-dosed temozolomide 150mg/m2 (360mg), cycle 1 (start date 7/12/2019). Monitor fluid intake, will be checking CMP/ Na+.   -8/12/2019 NEURO-ONC/ MRB/ CHEMO: Clinical decompensated, but improving since hospital discharge. MR brain scan from last week with concern for non-contrast enhancing >> enhancing disease progression. Ordering a PET brain scan. Holding adjuvant-dosed temozolomide, cycle 3.  -10/24/2019 SURGERY: Repeat resection by Dr. Packer.  PATHOLOGY: Recurrent glioblastoma.   Next generation sequencing by [facility]; Microsatellite instability; stable. Tumor mutational burden; 4 (low). Mutations in ARID1A, ASXL1, ATRX, FANCE, MED12, MEF2B, NF1, RUNX1, TERT.   -11/25/2019 NEURO-ONC/ MRB/ CHEMO: Clinically doing well. Imaging with positive resection. Starting Lomustine (start date of 12/2). Next generation sequencing.   -12/4/2019 CHEMO: Lomustine, cycle 1.   -1/6/2020 NEURO-ONC: Clinically stable. Has TCP with Lomustine. Will hold off on starting next week until he sees Dr. Olmos with new imaging.  -1/31/2020 NEURO-ONC/  MRB/ CHEMO: Clinically doing well. Imaging with no evidence of disease recurrence. Holding Lomustine in the setting of a poor emotional state. Referrals to Dr. Aleksandr Garcia for neuro-psych testing, Dr. Eugene Vargas for counseling/ coping, and palliative care. Next generation sequencing results discussed. Increase Zoloft. Continue dexamethasone 2mg daily.  -4/1/2020 ADMISSION/ [facility]- Keppra increased, added diazepam PRN, continue Vimpat.   -4/1/2020 MRB with no significant change.   -4/13/2020 NEURO-ONC: Clinically improved since hospitalization. Continuing imaging surveillance. Increase Zoloft.     SOCIAL HISTORY   Tobacco use: Former smoker, quit 40 years ago.   Alcohol use: Social use.  Drug use: Denies marijuana use.  Supplement, complimentary/ alternative medicine: None.    .   Employment: On disability.       PHYSICAL EXAMINATION NOT PERFORMED      MEDICAL RECORDS  Obtained and personally reviewed all available outside medical records in addition to reviewing any records available in our electronic system.     LABS  Personally reviewed all available lab results.     IMAGING  Personally reviewed MR brain imaging from earlier this month, which was essentially stable.    Imaging results were reviewed with Farhad Edmondson.     Imaging and case was previously reviewed and discussed at Brain Tumor Conference.        IMPRESSION  Clinic time was spent discussing in detail the nature of this tumor in light of repeat imaging. This was in addition to providing emotional support, answering questions pertaining to my recommendations and devising the treatment plan as outlined below.      Clinically, from a physical standpoint, Jayden is doing well and is recovering toward his baseline since his hospitalization earlier this month. In the setting of grossly stable imaging results from 4/1/2020, will hold on further cancer-directed therapy. In the setting of an incurable cancer, Aracelis are clear about wanting  high quality of life over quantity of life.      Continue to hold on starting Avastin as patient is doing well and is largely asymptomatic and there is no contrast enhancement seen on imaging.    Continue to aggressively manage seizures; increased Keppra and prescribed diazepam PRN as an abortive therapy.     PROBLEM LIST  Glioblastoma, MGMT methylated and IDH1 wildtype by NGS  Visual field cut; homonymous hemianopsia, right-side  Memory issues  Drug-induced constipation  Hyperplastic gastric polyps, benign  H/o SIADH  Epilepsy    PLAN  -CANCER DIRECTED THERAPY-  -As above; Holding cancer-directed therapy for now.   -Next generation sequencing with no targetable mutations.   -Repeat imaging in 2 months.     -STEROIDS-  -Continue on dexamethasone 2mg daily for now for management of arthritis.     -SEIZURE MANAGEMENT-  -Given history of seizures, will continue Keppra + Vimpat.  -Diazepam PRN for abortive management: Give 1 ml (5mg) for seizures greater than 5 minutes or at time of aura. May repeat 0.5 ml (2.5 mg). Do not exceed 7.5 mg per day.     -Quality of life/ MOOD/ FATIGUE-  -Continued mood issues; depression, anxiety, irritability, poor coping.  -Recommended increasing Zoloft to 150mg daily (max daily dose of 200mg).  -Added to Brain Tumor Support Group email list; [email or web address]  -Physical therapy/ daily exercises.   -Referred to Dr. Eugene Vargas for counseling/ coping.   -Referred to palliative care.     -HICCUPS-  -Pepcid.  -Likely Steroid related.  -Possibly Baclofen (muscle relaxer) if needed.    -HYPONATREMIA/ History of SIADH-  -Monitor amount of fluid intake.   -Continue to track sodium level on routine CMP.     -MEMORY COMPLAINTS-  -Referred to Dr. Aleksandr Garcia for neuro-psych testing.   -Speech therapy.    -VISUAL FIELD LOSS-  -Looking to follow-up with neuro-ophtho.   -Occupational therapy.     -DVT/ PE-  -Need for lifelong anticoagulation.  -Continue Xarelto.  -If platelets drop <50, will  need to hold anticoagulation    -ADDITIONAL SUPPORTIVE MANAGEMENT-  -Social work following.   -CT with lung nodules. Following [facility].  -S/p EGD with benign pathology of the 10mm gastric antrum/pylorus posterior wall lesion.  -Breast biopsy showed necrotic issue, no malignancy.    Return to clinic in 6/2020 + imaging.     In the meantime, Jayden and Oz know to call with questions or concerns or to report new complaints and can be seen sooner if needed.     Nasra Olmos MD  Neuro-oncology      Video-Visit Details    Type of service:  Video Visit    Video End Time (time video stopped): 4:19 PM    Originating Location (pt. Location): Home    Distant Location (provider location):  [facility]     Mode of Communication:  Video Conference via [employer]      Nasra Olmos MD

## 2020-04-13 NOTE — PATIENT INSTRUCTIONS
Increasing Zoloft to 150mg daily.   Using diazepam as needed.     Repeat MR brain imaging in 2 months.     Return to clinic in 6/2020.     Nasra Olmos MD  Neuro-oncology  4/13/2020

## 2020-04-20 ENCOUNTER — RECORDS - HEALTHEAST (OUTPATIENT)
Dept: ADMINISTRATIVE | Facility: OTHER | Age: 65
End: 2020-04-20

## 2020-04-20 ENCOUNTER — OFFICE VISIT - HEALTHEAST (OUTPATIENT)
Dept: RHEUMATOLOGY | Facility: CLINIC | Age: 65
End: 2020-04-20

## 2020-04-20 DIAGNOSIS — C71.9 GLIOBLASTOMA (H): ICD-10-CM

## 2020-04-20 RX ORDER — SERTRALINE HYDROCHLORIDE 100 MG/1
150 TABLET, FILM COATED ORAL DAILY
Qty: 30 TABLET | Refills: 3 | Status: SHIPPED | OUTPATIENT
Start: 2020-04-20 | End: 2020-07-22

## 2020-05-19 DIAGNOSIS — C71.9 GLIOBLASTOMA (H): ICD-10-CM

## 2020-05-19 NOTE — TELEPHONE ENCOUNTER
"Per Dr Olmos's 4/13 visit note \"-DVT/ PE-  -Need for lifelong anticoagulation.  -Continue Xarelto.  -If platelets drop <50, will need to hold anticoagulation\"  Refilling.  "

## 2020-05-25 ENCOUNTER — DOCUMENTATION ONLY (OUTPATIENT)
Dept: PHARMACY | Facility: CLINIC | Age: 65
End: 2020-05-25

## 2020-05-25 NOTE — PROGRESS NOTES
"ORAL CHEMOTHERAPY DISCONTINUATION       Primary Oncologist:  Dr. Olmos  Primary Oncology Clinic: [facility]  Cancer Diagnosis:  GBM  Therapy History:  Lomustine 90 mg/m2 (200 mg) daily on day 1 of every 6 week cycle  Start Date: 12/2/2019  Therapy Ended On:  1/31/2020  Reason For Discontinuation: other/unknown (see additional notes)    Additional Notes:  Per 1/31/20 Nickolas note: \"Holding Lomustine in the setting of a poor emotional state\"    Thanks you for the opportunity to be a part of this patient's oral chemotherapy. The oncology pharmacy will no longer be following this patient for oral chemotherapy. If there are any questions or the plan changes, feel free to contact us.    Herb Velázquez Pharmacy Intern  Oral Chemotherapy Monitoring Program  [phone]          "

## 2020-05-26 ENCOUNTER — OFFICE VISIT - HEALTHEAST (OUTPATIENT)
Dept: ONCOLOGY | Facility: HOSPITAL | Age: 65
End: 2020-05-26

## 2020-05-26 DIAGNOSIS — F43.25 ADJUSTMENT REACTION WITH MIXED DISTURBANCE OF EMOTIONS AND CONDUCT: ICD-10-CM

## 2020-05-28 ENCOUNTER — OFFICE VISIT - HEALTHEAST (OUTPATIENT)
Dept: RHEUMATOLOGY | Facility: CLINIC | Age: 65
End: 2020-05-28

## 2020-05-28 DIAGNOSIS — R56.9 SEIZURE (H): ICD-10-CM

## 2020-05-28 DIAGNOSIS — Z98.890 S/P CRANIOTOMY: ICD-10-CM

## 2020-05-28 DIAGNOSIS — M51.26 HERNIATED LUMBAR INTERVERTEBRAL DISC: ICD-10-CM

## 2020-05-28 DIAGNOSIS — M06.00 SERONEGATIVE RHEUMATOID ARTHRITIS (H): ICD-10-CM

## 2020-05-28 DIAGNOSIS — C71.9 GBM (GLIOBLASTOMA MULTIFORME) (H): ICD-10-CM

## 2020-06-25 NOTE — PROGRESS NOTES
"Jayden Lackey is a 65 year old male who is being evaluated via a billable video visit.      The patient has been notified of following:     \"This video visit will be conducted via a call between you and your physician/provider. We have found that certain health care needs can be provided without the need for an in-person physical exam.  This service lets us provide the care you need with a video conversation.  If a prescription is necessary we can send it directly to your pharmacy.  If lab work is needed we can place an order for that and you can then stop by our lab to have the test done at a later time.    Video visits are billed at different rates depending on your insurance coverage.  Please reach out to your insurance provider with any questions.    If during the course of the call the physician/provider feels a video visit is not appropriate, you will not be charged for this service.\"    Patient has given verbal consent for Video visit? Yes    Will anyone else be joining your video visit? No     Discuss MRI results.    send invite to [email or web address]    Emeterio Valdez LPN    _____________________________________________________________    NEURO-ONCOLOGY VISIT  Jun 29, 2020    CHIEF COMPLAINT: Mr. 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 He completed chemoradiotherapy on 6/11/2019. Imaging in 9/2019 was consistent with recurrent disease and adjuvant-dosed temozolomide was stop. Pathology from repeat resection on 10/24/2019 was consistent with recurrent tumor. He was started on lomustine, but stopped after 1 cycle due to significant chemotherapy-induced fatigue.     Quality of life has been compromised by recurrent seizure events, PE requiring prolonged hospitalization, and chemotherapy-related toxicities.    Currently, he is not on any " cancer-directed therapy.     I met with Jayden and Oz (wife) for this follow-up visit today.      HISTORY OF PRESENT ILLNESS  -Jayden is doing fairly well.   -Unfortunately, he experienced a breakthrough seizure and was admitted a few days ago due to prolonged post ictal state. He rapidly cleared by next day. Keppra was increased to 2000mg BID and Vimpat was increased to 200mg BID. Dexamethasone was increased to 4mg daily. Using Valium as needed.   -Feeling anxious and depressed. More of a better quality of life in the past few months.   -Physically, had been walking for 30 minutes at a time.   -Worsening memory. Cognition is more slow. Issues with word-finding, improved since discharge.   -No headaches.   -Visual field cut is stable.  -More fatigued, naps during the day.    -No arthritis pain lately.    REVIEW OF SYSTEMS  A comprehensive ROS negative except as in HPI.      MEDICATIONS   Current Outpatient Medications   Medication Sig Dispense Refill     acetaminophen (TYLENOL) 325 MG tablet Take 2 tablets (650 mg) by mouth every 4 hours as needed for other (multimodal surgical pain management along with NSAIDS and opioid medication as indicated based on pain control and physical function.) 28 tablet 3     atenolol (TENORMIN) 100 MG tablet Take 50 mg by mouth daily       cholecalciferol (VITAMIN D3) 5000 units (125 mcg) capsule Take 5,000 Units by mouth daily       dexamethasone (DECADRON) 2 MG tablet Take 1 tablet (2 mg) by mouth daily (with breakfast) 30 tablet 3     diazepam (VALIUM) 5 MG/ML (HIGH CONC) solution Take 1 mL (5 mg) by mouth every 3 minutes as needed for seizures (lasting greater than 5 minutes.) If seizure continues, repeat with 0.5mL by mouth every 2-3 minutes up to a max of 7.5mL in 1 day. Do not give if patient has very shallow or slow breathing. 30 mL 0     diltiazem ER (DILT-XR) 180 MG 24 hr capsule Take 180 mg by mouth daily       docusate sodium (COLACE) 50 MG capsule Take 50 mg by mouth 2  times daily as needed for constipation       Lacosamide (VIMPAT) 100 MG TABS tablet Take 150 mg by mouth 2 times daily        levETIRAcetam (KEPPRA) 1000 MG tablet Take 1.5 tablets (1,500 mg) by mouth 2 times daily In addition to 1 tab of 250mg Keppra for a total of 1,750mg Keppra twice/day.  1     levETIRAcetam (KEPPRA) 250 MG tablet Take 1 tablet (250 mg) by mouth 2 times daily In addition to the 1,500mg dose you were taking previously for a total of 1,750mg keppra twice/day 60 tablet 0     lisinopril (PRINIVIL/ZESTRIL) 5 MG tablet Take 1 tablet (5 mg) by mouth daily 28 tablet 0     ondansetron (ZOFRAN) 4 MG tablet 4 mg every 4 hours as needed       rivaroxaban ANTICOAGULANT (XARELTO ANTICOAGULANT) 20 MG TABS tablet Take 1 tablet (20 mg) by mouth daily (with dinner) 30 tablet 3     sertraline (ZOLOFT) 100 MG tablet Take 1.5 tablets (150 mg) by mouth daily 30 tablet 3     DRUG ALLERGIES   Allergies   Allergen Reactions     Shellfish Allergy Difficulty breathing, Nausea and Vomiting, Swelling and Shortness Of Breath     No Clinical Screening - See Comments      Tyrone ferrell doesn't remember reaction       ONCOLOGIC HISTORY  -1/2019 PRESENTATION: Visual disturbance.   -3/6/2019 Evaluated by Dr. Mauricio, neuro-ophthalmologist, found to have a right sided homonymous hemianopsia. MRI ordered.   -3/6/2019 MR brain imaging revealed a contrast-enhancing lesion in the left occipital lobe suspicious for a high-grade primary brain tumor.  -3/22/2019 SURGERY: Craniotomy with a gross total resection by Dr. Irving Hayes.   PATHOLOGY: Glioblastoma; Wildtype status for IDH1/2 by next generation sequencing. MGMT promoter methylated. Wildtype PTEN, TP53.  -4/8/2019 NEURO-ONC: Recommending chemoradiotherapy.   -4/30 - 6/11/2019 CHEMORADS 6000cGy in 30 fractions with concurrent temozolomide 75mg/m2 (180mg).  -5/20/2019 NEURO-ONC: Not interested in Optune at this time.   -6/12/2019 NEURO-ONC: Clinically stable.   -6/21 - 6/28/2019  ADMISSION/ SZ: Admitted to [facility] for first ever seizure event, provoked by hyponatremia. Urine studies consistent with SIADH ; etiology of SIADH unclear. Had 10mm gastric lesion biopsied on 6/26/2019 by way of an EGD.  PATHOLOGY of gastric lesion: Hyperplastic polyp with erosion and foci of atypia; favor reactive. No intestinal metaplasia, no helicobacter pylori, no high grade dysplasia or invasive carcinoma.   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   -7/12/2019 NEURO-ONC/ MRB/ CHEMO: Clinical stable. Imaging with stable to slightly increased enhancement of previously seen nodular lesions; associated with mild degree of elevated rCBV. Starting adjuvant-dosed temozolomide 150mg/m2 (360mg), cycle 1 (start date 7/12/2019). Monitor fluid intake, will be checking CMP/ Na+.   -8/12/2019 NEURO-ONC/ MRB/ CHEMO: Clinical decompensated, but improving since hospital discharge. MR brain scan from last week with concern for non-contrast enhancing >> enhancing disease progression. Ordering a PET brain scan. Holding adjuvant-dosed temozolomide, cycle 3.  -10/24/2019 SURGERY: Repeat resection by Dr. Packer.  PATHOLOGY: Recurrent glioblastoma.   Next generation sequencing by [facility]; Microsatellite instability; stable. Tumor mutational burden; 4 (low). Mutations in ARID1A, ASXL1, ATRX, FANCE, MED12, MEF2B, NF1, RUNX1, TERT.   -11/25/2019 NEURO-ONC/ MRB/ CHEMO: Clinically doing well. Imaging with positive resection. Starting Lomustine (start date of 12/2). Next generation sequencing.   -12/4/2019 CHEMO: Lomustine, cycle 1.   -1/6/2020 NEURO-ONC: Clinically stable. Has TCP with Lomustine. Will hold off on starting next week until he sees Dr. Olmos with new imaging.  -1/31/2020 NEURO-ONC/ MRB/ CHEMO: Clinically doing well. Imaging with no evidence of disease recurrence. Holding Lomustine in the setting of a poor emotional state. Referrals to Dr. Aleksandr Garcia for neuro-psych testing, Dr. Eugene Vargas for counseling/ coping, and  palliative care. Next generation sequencing results discussed. Increase Zoloft. Continue dexamethasone 2mg daily.  -4/1/2020 ADMISSION/ SZ- Keppra increased, added diazepam PRN, continue Vimpat.   -4/1/2020 MRB with no significant change.   -4/13/2020 NEURO-ONC: Clinically improved since hospitalization. Continuing imaging surveillance. Increase Zoloft.   -6/26/2020 ADMISSION/ SZ- Keppra and Vimpat increased, continued diazepam PRN.   -6/29/2020 NEURO-ONC/ MRB: Clinically improved since hospitalization. Imaging largely stable. Continuing imaging surveillance. Trial of ritalin.     SOCIAL HISTORY   Tobacco use: Former smoker, quit 40 years ago.   Alcohol use: Social use.  Drug use: Denies marijuana use.  Supplement, complimentary/ alternative medicine: None.    .   Employment: On disability.       PHYSICAL EXAMINATION  -Generally well appearing. Weight gain, moon-face.   -Respiratory: No audible wheezing.   -Skin: No facial rashes.  -Psychiatric: Flat mood and affect. Pleasant, talkative.  -Neurologic:   MENTAL STATUS:     Alert.   Confused at times, slowed cognition and processing.     Speech hesitant, word-finding issues.    Comprehension impaired to complex information.     CRANIAL NERVES:     Pupils are equal, round, reactive to light.     Extraocular movements full, patient denies diplopia.     Homonymous hemianopsia, right-side   Symmetric facial movements.   Hearing intact.  MOTOR: Antigravity in arms. Walking is slow, stable.   SENSATION: Intact to light touch throughout.     The rest of a comprehensive physical examination is deferred due to PHE (public health emergency) video visit restrictions.        MEDICAL RECORDS  Obtained and personally reviewed all available outside medical records in addition to reviewing any records available in our electronic system.     LABS  Personally reviewed all available lab results.     IMAGING  Personally reviewed MR brain imaging from last week and compared to  prior imaging. To me eye, the scan is essentially stable, although there is increased conspicuity of marginal enhancement, the overall extent is unchanged.    Imaging results were reviewed with Jayden and Kailey Edmondson.     Imaging and case to be reviewed and discussed at Brain Tumor Conference.        IMPRESSION  Clinic time was spent discussing in detail the nature of this tumor in light of repeat imaging. This was in addition to providing emotional support, answering questions pertaining to my recommendations and devising the treatment plan as outlined below.      Clinically, from a physical standpoint, Jayden is doing well and nearing his baseline since his most recent breakthrough seizure event. He is tolerating the dose increase in Keppra and Vimpat + dexamethasone well. Will wean down on dexamethasone and continue to aggressively manage seizures with Keppra and Vimpat plus prescribed diazepam PRN as an abortive therapy. Trial of ritalin for cognition, low energy, and poor motivation.     In the setting of grossly stable imaging results from today, will hold on further cancer-directed therapy. In the setting of an incurable cancer, Jayden and Oz are clear about wanting high quality of life over quantity of life.      Continue to hold on starting Avastin as patient is doing well and is largely asymptomatic and there is no significant contrast enhancement seen on imaging    PROBLEM LIST  Glioblastoma, MGMT methylated and IDH1 wildtype by NGS  Visual field cut; homonymous hemianopsia, right-side  Memory issues  Drug-induced constipation  Hyperplastic gastric polyps, benign  H/o SIADH  Epilepsy    PLAN  -CANCER DIRECTED THERAPY-  -As above; Holding cancer-directed therapy for now.   -Next generation sequencing with no targetable mutations.   -Repeat imaging in 2 months.     -STEROIDS-  -Wean dexamethasone;   4mg daily for 1 week   4mg daily alternating with 2mg daily for 1 week   2mg daily     -SEIZURE  MANAGEMENT-  -Given history of seizures, will continue Keppra + Vimpat.  -Diazepam PRN for abortive management: Give 1 ml (5mg) for seizures greater than 5 minutes or at time of aura. May repeat 0.5 ml (2.5 mg). Do not exceed 7.5 mg per day.     -Quality of life/ MOOD/ FATIGUE-  -Continued mood issues; depression, anxiety, irritability, poor coping.  -Trial of Ritalin; 5mg in AM or 5mg in AM and 5mg before 2pm or 10mg in AM.    -Continue Zoloft; consideration for dose increase to 200mg daily.  -Added to Brain Tumor Support Group email list; [email or web address]  -Physical therapy/ daily exercises.   -Referred to Dr. Eugene Vargas for counseling/ coping.   -Referred to palliative care.     -HICCUPS-  -Pepcid.  -Likely Steroid related.  -Possibly Baclofen (muscle relaxer) if needed.    -HYPONATREMIA/ History of SIADH-  -Monitor amount of fluid intake.   -Continue to track sodium level on routine CMP.     -MEMORY COMPLAINTS-  -Previously referred to Dr. Aleksandr Garcia for neuro-psych testing, not scheduled.   -Speech therapy completed.    -DVT/ PE-  -Need for lifelong anticoagulation.  -Continue Xarelto.  -If platelets drop <50, will need to hold anticoagulation    -ADDITIONAL SUPPORTIVE MANAGEMENT-  -Social work following.   -CT with lung nodules. [facility].  -S/p EGD with benign pathology of the 10mm gastric antrum/pylorus posterior wall lesion.  -Breast biopsy showed necrotic issue, no malignancy.    Return to clinic in 1 month with BRIONNA Vazquez and then, with me + imaging in 8/2020.     In the meantime, Jayden and Oz know to call with questions or concerns or to report new complaints and can be seen sooner if needed.     Nasra Olmos MD  Neuro-oncology      Video-Visit Details  Type of service:  Video Visit    Video Start Time: 10:24 AM  Video End Time: 11:02 AM    Originating Location (pt. Location): Home    Distant Location (provider location):  [facility]  CLINIC     Platform used for Video Visit: Isaías Olmos MD

## 2020-06-25 NOTE — PATIENT INSTRUCTIONS
Trial of Ritalin; 5mg in AM or 5mg in AM and 5mg before 2pm or 10mg in AM.      Continue Zoloft.    Using diazepam as needed.   Continue Keppra and Vimpat at increased dose.     Wean dexamethasone;   4mg daily for 1 week   4mg daily alternating with 2mg daily for 1 week   2mg daily     Repeat MR brain imaging in 2 months.     Return to clinic in 1 month with BRIONNA Vazquez and then, with me + imaging in 8/2020.     Nasra Olmos MD  Neuro-oncology  06/29/20

## 2020-06-26 ENCOUNTER — APPOINTMENT (OUTPATIENT)
Dept: MRI IMAGING | Facility: CLINIC | Age: 65
End: 2020-06-26
Attending: EMERGENCY MEDICINE
Payer: COMMERCIAL

## 2020-06-26 ENCOUNTER — HOSPITAL ENCOUNTER (OUTPATIENT)
Facility: CLINIC | Age: 65
Setting detail: OBSERVATION
Discharge: HOME | End: 2020-06-27
Attending: EMERGENCY MEDICINE | Admitting: PSYCHIATRY & NEUROLOGY
Payer: COMMERCIAL

## 2020-06-26 ENCOUNTER — APPOINTMENT (OUTPATIENT)
Dept: CT IMAGING | Facility: CLINIC | Age: 65
End: 2020-06-26
Attending: EMERGENCY MEDICINE
Payer: COMMERCIAL

## 2020-06-26 DIAGNOSIS — Z03.818 ENCNTR FOR OBS FOR SUSP EXPSR TO OTH BIOLG AGENTS RULED OUT: ICD-10-CM

## 2020-06-26 DIAGNOSIS — C71.9 MALIGNANT NEOPLASM OF BRAIN, UNSPECIFIED LOCATION (H): ICD-10-CM

## 2020-06-26 DIAGNOSIS — R56.9 SEIZURE (H): ICD-10-CM

## 2020-06-26 DIAGNOSIS — G40.909 NONINTRACTABLE EPILEPSY WITHOUT STATUS EPILEPTICUS, UNSPECIFIED EPILEPSY TYPE (H): ICD-10-CM

## 2020-06-26 DIAGNOSIS — G40.909 SEIZURE DISORDER (H): ICD-10-CM

## 2020-06-26 DIAGNOSIS — C71.9 GLIOBLASTOMA (H): ICD-10-CM

## 2020-06-26 LAB
ANION GAP SERPL CALCULATED.3IONS-SCNC: 6 MMOL/L (ref 3–14)
APTT PPP: 27 SEC (ref 22–37)
B-HCG FREE SERPL-ACNC: 1.1 [IU]/L (ref 0.86–1.14)
BASOPHILS # BLD AUTO: 0 10E9/L (ref 0–0.2)
BASOPHILS NFR BLD AUTO: 0.3 %
BUN SERPL-MCNC: 19 MG/DL (ref 7–30)
CALCIUM SERPL-MCNC: 8.5 MG/DL (ref 8.5–10.1)
CHLORIDE SERPL-SCNC: 108 MMOL/L (ref 94–109)
CO2 SERPL-SCNC: 25 MMOL/L (ref 20–32)
CREAT BLD-MCNC: 0.8 MG/DL (ref 0.66–1.25)
CREAT SERPL-MCNC: 0.79 MG/DL (ref 0.66–1.25)
DIFFERENTIAL METHOD BLD: ABNORMAL
EOSINOPHIL # BLD AUTO: 0.1 10E9/L (ref 0–0.7)
EOSINOPHIL NFR BLD AUTO: 1.5 %
ERYTHROCYTE [DISTWIDTH] IN BLOOD BY AUTOMATED COUNT: 16.4 % (ref 10–15)
GFR SERPL CREATININE-BSD FRML MDRD: >90 ML/MIN/{1.73_M2}
GFR SERPL CREATININE-BSD FRML MDRD: >90 ML/MIN/{1.73_M2}
GLUCOSE BLDC GLUCOMTR-MCNC: 137 MG/DL (ref 70–99)
GLUCOSE SERPL-MCNC: 145 MG/DL (ref 70–99)
HCT VFR BLD AUTO: 35.4 % (ref 40–53)
HGB BLD-MCNC: 10.7 G/DL (ref 13.3–17.7)
IMM GRANULOCYTES # BLD: 0.1 10E9/L (ref 0–0.4)
IMM GRANULOCYTES NFR BLD: 1.1 %
INR PPP: 1.1 (ref 0.86–1.14)
INTERPRETATION ECG - MUSE: NORMAL
LYMPHOCYTES # BLD AUTO: 1.3 10E9/L (ref 0.8–5.3)
LYMPHOCYTES NFR BLD AUTO: 20.8 %
MAGNESIUM SERPL-MCNC: 2.4 MG/DL (ref 1.6–2.3)
MCH RBC QN AUTO: 24.1 PG (ref 26.5–33)
MCHC RBC AUTO-ENTMCNC: 30.2 G/DL (ref 31.5–36.5)
MCV RBC AUTO: 80 FL (ref 78–100)
MONOCYTES # BLD AUTO: 0.7 10E9/L (ref 0–1.3)
MONOCYTES NFR BLD AUTO: 11.9 %
NEUTROPHILS # BLD AUTO: 4 10E9/L (ref 1.6–8.3)
NEUTROPHILS NFR BLD AUTO: 64.4 %
NRBC # BLD AUTO: 0 10*3/UL
NRBC BLD AUTO-RTO: 0 /100
PHOSPHATE SERPL-MCNC: 3.4 MG/DL (ref 2.5–4.5)
PLATELET # BLD AUTO: 161 10E9/L (ref 150–450)
POTASSIUM SERPL-SCNC: 3.4 MMOL/L (ref 3.4–5.3)
RBC # BLD AUTO: 4.44 10E12/L (ref 4.4–5.9)
SARS-COV-2 RNA SPEC QL NAA+PROBE: NORMAL
SODIUM SERPL-SCNC: 140 MMOL/L (ref 133–144)
SPECIMEN SOURCE: NORMAL
TROPONIN I BLD-MCNC: 0.01 UG/L (ref 0–0.08)
TROPONIN I SERPL-MCNC: <0.015 UG/L (ref 0–0.04)
WBC # BLD AUTO: 6.1 10E9/L (ref 4–11)

## 2020-06-26 PROCEDURE — 85610 PROTHROMBIN TIME: CPT

## 2020-06-26 PROCEDURE — 99285 EMERGENCY DEPT VISIT HI MDM: CPT | Mod: 25 | Performed by: EMERGENCY MEDICINE

## 2020-06-26 PROCEDURE — 83735 ASSAY OF MAGNESIUM: CPT | Performed by: EMERGENCY MEDICINE

## 2020-06-26 PROCEDURE — 00000146 ZZHCL STATISTIC GLUCOSE BY METER IP

## 2020-06-26 PROCEDURE — 96366 THER/PROPH/DIAG IV INF ADDON: CPT | Performed by: EMERGENCY MEDICINE

## 2020-06-26 PROCEDURE — C9803 HOPD COVID-19 SPEC COLLECT: HCPCS | Performed by: EMERGENCY MEDICINE

## 2020-06-26 PROCEDURE — 80235 DRUG ASSAY LACOSAMIDE: CPT | Performed by: EMERGENCY MEDICINE

## 2020-06-26 PROCEDURE — 70496 CT ANGIOGRAPHY HEAD: CPT

## 2020-06-26 PROCEDURE — G0378 HOSPITAL OBSERVATION PER HR: HCPCS

## 2020-06-26 PROCEDURE — 25800030 ZZH RX IP 258 OP 636: Performed by: SURGERY

## 2020-06-26 PROCEDURE — 84100 ASSAY OF PHOSPHORUS: CPT | Performed by: EMERGENCY MEDICINE

## 2020-06-26 PROCEDURE — 25000128 H RX IP 250 OP 636: Performed by: STUDENT IN AN ORGANIZED HEALTH CARE EDUCATION/TRAINING PROGRAM

## 2020-06-26 PROCEDURE — 25000132 ZZH RX MED GY IP 250 OP 250 PS 637: Performed by: PSYCHIATRY & NEUROLOGY

## 2020-06-26 PROCEDURE — 93010 ELECTROCARDIOGRAM REPORT: CPT | Mod: Z6 | Performed by: EMERGENCY MEDICINE

## 2020-06-26 PROCEDURE — 96375 TX/PRO/DX INJ NEW DRUG ADDON: CPT | Mod: 59 | Performed by: EMERGENCY MEDICINE

## 2020-06-26 PROCEDURE — 25000128 H RX IP 250 OP 636: Performed by: EMERGENCY MEDICINE

## 2020-06-26 PROCEDURE — 85610 PROTHROMBIN TIME: CPT | Performed by: EMERGENCY MEDICINE

## 2020-06-26 PROCEDURE — 40000739 ZZH STATISTIC STROKE CODE W/O ACCESS

## 2020-06-26 PROCEDURE — 70553 MRI BRAIN STEM W/O & W/DYE: CPT

## 2020-06-26 PROCEDURE — 96365 THER/PROPH/DIAG IV INF INIT: CPT | Mod: 59 | Performed by: EMERGENCY MEDICINE

## 2020-06-26 PROCEDURE — 25000128 H RX IP 250 OP 636: Performed by: SURGERY

## 2020-06-26 PROCEDURE — 93005 ELECTROCARDIOGRAM TRACING: CPT | Performed by: EMERGENCY MEDICINE

## 2020-06-26 PROCEDURE — C9254 INJECTION, LACOSAMIDE: HCPCS | Performed by: SURGERY

## 2020-06-26 PROCEDURE — 80048 BASIC METABOLIC PNL TOTAL CA: CPT | Performed by: EMERGENCY MEDICINE

## 2020-06-26 PROCEDURE — A9585 GADOBUTROL INJECTION: HCPCS | Performed by: STUDENT IN AN ORGANIZED HEALTH CARE EDUCATION/TRAINING PROGRAM

## 2020-06-26 PROCEDURE — 84484 ASSAY OF TROPONIN QUANT: CPT | Mod: 91 | Performed by: EMERGENCY MEDICINE

## 2020-06-26 PROCEDURE — 25800030 ZZH RX IP 258 OP 636: Performed by: EMERGENCY MEDICINE

## 2020-06-26 PROCEDURE — U0003 INFECTIOUS AGENT DETECTION BY NUCLEIC ACID (DNA OR RNA); SEVERE ACUTE RESPIRATORY SYNDROME CORONAVIRUS 2 (SARS-COV-2) (CORONAVIRUS DISEASE [COVID-19]), AMPLIFIED PROBE TECHNIQUE, MAKING USE OF HIGH THROUGHPUT TECHNOLOGIES AS DESCRIBED BY CMS-2020-01-R: HCPCS | Performed by: STUDENT IN AN ORGANIZED HEALTH CARE EDUCATION/TRAINING PROGRAM

## 2020-06-26 PROCEDURE — 70450 CT HEAD/BRAIN W/O DYE: CPT | Mod: 59

## 2020-06-26 PROCEDURE — 82565 ASSAY OF CREATININE: CPT

## 2020-06-26 PROCEDURE — 85025 COMPLETE CBC W/AUTO DIFF WBC: CPT | Performed by: EMERGENCY MEDICINE

## 2020-06-26 PROCEDURE — 80177 DRUG SCRN QUAN LEVETIRACETAM: CPT | Performed by: EMERGENCY MEDICINE

## 2020-06-26 PROCEDURE — 85730 THROMBOPLASTIN TIME PARTIAL: CPT | Performed by: EMERGENCY MEDICINE

## 2020-06-26 PROCEDURE — 84484 ASSAY OF TROPONIN QUANT: CPT

## 2020-06-26 PROCEDURE — 25500064 ZZH RX 255 OP 636: Performed by: STUDENT IN AN ORGANIZED HEALTH CARE EDUCATION/TRAINING PROGRAM

## 2020-06-26 RX ORDER — ONDANSETRON 4 MG/1
4 TABLET, ORALLY DISINTEGRATING ORAL EVERY 6 HOURS PRN
Status: DISCONTINUED | OUTPATIENT
Start: 2020-06-26 | End: 2020-06-27 | Stop reason: HOSPADM

## 2020-06-26 RX ORDER — LISINOPRIL 5 MG/1
5 TABLET ORAL DAILY
Status: DISCONTINUED | OUTPATIENT
Start: 2020-06-26 | End: 2020-06-27 | Stop reason: HOSPADM

## 2020-06-26 RX ORDER — NALOXONE HYDROCHLORIDE 0.4 MG/ML
.1-.4 INJECTION, SOLUTION INTRAMUSCULAR; INTRAVENOUS; SUBCUTANEOUS
Status: DISCONTINUED | OUTPATIENT
Start: 2020-06-26 | End: 2020-06-27 | Stop reason: HOSPADM

## 2020-06-26 RX ORDER — DEXAMETHASONE SODIUM PHOSPHATE 10 MG/ML
2 INJECTION, SOLUTION INTRAMUSCULAR; INTRAVENOUS ONCE
Status: COMPLETED | OUTPATIENT
Start: 2020-06-26 | End: 2020-06-26

## 2020-06-26 RX ORDER — ACETAMINOPHEN 650 MG/1
650 SUPPOSITORY RECTAL EVERY 4 HOURS PRN
Status: DISCONTINUED | OUTPATIENT
Start: 2020-06-26 | End: 2020-06-27 | Stop reason: HOSPADM

## 2020-06-26 RX ORDER — LACOSAMIDE 100 MG/1
200 TABLET ORAL 2 TIMES DAILY
Status: DISCONTINUED | OUTPATIENT
Start: 2020-06-26 | End: 2020-06-27 | Stop reason: HOSPADM

## 2020-06-26 RX ORDER — LEVETIRACETAM 500 MG/1
2000 TABLET ORAL 2 TIMES DAILY
Status: DISCONTINUED | OUTPATIENT
Start: 2020-06-26 | End: 2020-06-27 | Stop reason: HOSPADM

## 2020-06-26 RX ORDER — DEXAMETHASONE 4 MG/1
4 TABLET ORAL
Status: DISCONTINUED | OUTPATIENT
Start: 2020-06-27 | End: 2020-06-27 | Stop reason: HOSPADM

## 2020-06-26 RX ORDER — ACETAMINOPHEN 325 MG/1
650 TABLET ORAL EVERY 4 HOURS PRN
Status: DISCONTINUED | OUTPATIENT
Start: 2020-06-26 | End: 2020-06-27 | Stop reason: HOSPADM

## 2020-06-26 RX ORDER — ATENOLOL 25 MG/1
50 TABLET ORAL DAILY
Status: DISCONTINUED | OUTPATIENT
Start: 2020-06-26 | End: 2020-06-27 | Stop reason: HOSPADM

## 2020-06-26 RX ORDER — GADOBUTROL 604.72 MG/ML
10 INJECTION INTRAVENOUS ONCE
Status: COMPLETED | OUTPATIENT
Start: 2020-06-26 | End: 2020-06-26

## 2020-06-26 RX ORDER — ONDANSETRON 2 MG/ML
4 INJECTION INTRAMUSCULAR; INTRAVENOUS EVERY 6 HOURS PRN
Status: DISCONTINUED | OUTPATIENT
Start: 2020-06-26 | End: 2020-06-27 | Stop reason: HOSPADM

## 2020-06-26 RX ORDER — IOPAMIDOL 755 MG/ML
75 INJECTION, SOLUTION INTRAVASCULAR ONCE
Status: COMPLETED | OUTPATIENT
Start: 2020-06-26 | End: 2020-06-26

## 2020-06-26 RX ORDER — ACETAMINOPHEN 325 MG/1
650 TABLET ORAL EVERY 4 HOURS PRN
Status: DISCONTINUED | OUTPATIENT
Start: 2020-06-26 | End: 2020-06-26

## 2020-06-26 RX ORDER — DILTIAZEM HYDROCHLORIDE 180 MG/1
180 CAPSULE, COATED, EXTENDED RELEASE ORAL DAILY
Status: DISCONTINUED | OUTPATIENT
Start: 2020-06-26 | End: 2020-06-27 | Stop reason: HOSPADM

## 2020-06-26 RX ADMIN — ATENOLOL 50 MG: 25 TABLET ORAL at 20:37

## 2020-06-26 RX ADMIN — IOPAMIDOL 75 ML: 755 INJECTION, SOLUTION INTRAVENOUS at 09:48

## 2020-06-26 RX ADMIN — LISINOPRIL 5 MG: 5 TABLET ORAL at 20:37

## 2020-06-26 RX ADMIN — SERTRALINE HYDROCHLORIDE 150 MG: 100 TABLET ORAL at 20:37

## 2020-06-26 RX ADMIN — SODIUM CHLORIDE 250 MG: 9 INJECTION, SOLUTION INTRAVENOUS at 11:38

## 2020-06-26 RX ADMIN — GADOBUTROL 10 ML: 604.72 INJECTION INTRAVENOUS at 13:48

## 2020-06-26 RX ADMIN — SODIUM CHLORIDE 500 ML: 9 INJECTION, SOLUTION INTRAVENOUS at 09:56

## 2020-06-26 RX ADMIN — LACOSAMIDE 200 MG: 100 TABLET, FILM COATED ORAL at 20:36

## 2020-06-26 RX ADMIN — LEVETIRACETAM 2000 MG: 100 INJECTION, SOLUTION INTRAVENOUS at 11:04

## 2020-06-26 RX ADMIN — DEXAMETHASONE SODIUM PHOSPHATE 2 MG: 10 INJECTION, SOLUTION INTRAMUSCULAR; INTRAVENOUS at 12:20

## 2020-06-26 RX ADMIN — LEVETIRACETAM 2000 MG: 500 TABLET, FILM COATED ORAL at 20:36

## 2020-06-26 RX ADMIN — DILTIAZEM HYDROCHLORIDE 180 MG: 180 CAPSULE, COATED, EXTENDED RELEASE ORAL at 20:37

## 2020-06-26 RX ADMIN — RIVAROXABAN 20 MG: 20 TABLET, FILM COATED ORAL at 21:09

## 2020-06-26 NOTE — CONSULTS
General Neurology Consult     Jayden Lackey MRN# [id number]   YOB: 1955 Age: 65 year old      Date of Admission:  6/26/2020    Primary care provider: Jhoan Farooq          Assessment and Plan:   Active relevant problems:  Seizures  GBM      #partial onset seizure without secondary generalization   #hx of grand mal seizures  Unclear trigger for current seizure. Hx of 3 interval seizures between today's and last presentation requiring hospitalization 4/1 may suggest some degree of inadequate outpatient AED regimen +/- occult infection, stress, or lack of adequate/normal sleep schedule. Given persistent breakthrough seizures requiring PRN diazepam and today's breakthrough despite PRN, we recommend increasing his anti-epileptics to a new regimen of below:   -Keppra 2000mg every 12 hours starting tonight at 21:00  -Vimpat 200mg every 12 hours starting tonight at 21:00  -recommend admit to obs; no need for EEG unless change in clinical status makes such appropriate     Dispo: recommend observation in ER until recovery of baseline level of functioning (word finding difficulty, mildly decreased balance occasionally, anxious, and homonymous hemianopsia, right roughly equal to left sided strength. If he does not return to baseline after observation, he should be admitted for further observation.    Discussed and seen with Dr. Bhakta, attending neurologist    Fidel Eisenberg PGY3  Internal Medicine Resident  Neurology pager [phone]  My personal pager [phone]         Chief Complaint:   seizure       History is obtained from the patient's wife      65M hx GBM s/p resections 3/22/19 and 10/24/19 complicated by partial onset  With secondary generalization seizures and requiring multiple admissions last 4/1.  5/13, 6/7, and  6/14 he experienced partial seizures with retention of consciousness  At which point he would notice smells, subjective feeling of feeling 'off' and overall be  able to articulate  what is occurring so that wife can begin directed treatment which was given at prior discharge of administering 2.5mg of diazepam in 5 minute intervals until seizure breaks. Unfortunately, today wife walked in on  after the  had been alone for 20 minutes at which point wife noticed  at 9:21 to be in state of consciousness however inability to speak with classic right going vision and inability to respond to commands at which point at 9:23 she began dosing his diazepam in 5 minute intervals  For total of 7.5mg, however his seizure failed to break.    At this point, she called 911, and he presented to ER. Found to have right sided weakness at this time consistent with past 4 prior presentations following/during seizure episodes however given deficits underwent CT/CTA of head and neck to evaluate for stroke. Imaging unremarkable, and as such general neurology was asked to evaluate patient for recurrent seizures. He was loaded with keppra 2 grams and lacosamide 250mg (home dose of each is keppra 1750mg BID at 9AM/9PM and vimpat BID 150mg 9A/9PM. I do note that he was unable to receive his anti-epileptics this morning due to seizures, and as such went 2 hours without antiepileptic dosing and by extension his seizure occurred after 12 hours since last anti-epileptic dosing.    Regarding triggers, wife can not identify any classic trigger such as illness, and she notes they have been self isolating due to COVID. Additionally, she reports no medication noncompliance and that they are militant about maintaining AED regimen regularity. She notes he has been sleeping mostly adequately however is not sure regarding how much he slept last night, noting that he has an inconsistent sleep schedule, sleeping sometimes 1AM-7AM other times 11P-7A, however he has recently had no episodes of missed sleeping.     Regarding his oncologic history, please see patient visit with his neuro-oncologist Dr. Olmos from  4/13/20. I do note he is due for interval MRI for monitoring of GBM originally scheduled to coincide with his next clinic visit 6/29/20.             Past Medical History:   I have reviewed this patient's past medical history          Past Surgical History:   I have reviewed this patient's past surgical history          Social History:   I have reviewed this patient's social history          Family History:   I have reviewed this patient's family history          Immunizations:     Immunization History   Administered Date(s) Administered     DTaP, Unspecified 10/25/2011, 07/29/2014     FLU 6-35 months 10/11/2011     Flu, Unspecified 10/14/2019     [id number] Novel Flu 12/14/2009     HepA-Adult 12/14/2009     Influenza (H1N1) 12/14/2009     Influenza (IIV3) PF 12/06/2005, 10/11/2011, 09/01/2013, 10/08/2014     Influenza Vaccine IM 18-49 Yrs, RIV3 10/20/2015     Influenza Vaccine IM > 6 months Valent IIV4 10/22/2016, 11/27/2017, 09/05/2018     Td (Adult), Adsorbed 1955, 05/26/2000, 01/25/2005, 03/07/2005     Tdap (Adult) Unspecified Formulation 1955     Typhoid IM 12/14/2009     Zoster vaccine recombinant adjuvanted (SHINGRIX) 09/05/2018, 11/13/2018            Allergies:   All allergies reviewed and addressed          Medications:   I have reviewed this patient's current medications          Review of Systems:   The Review of Systems is negative other than noted in the HPI         Neurology Focused Physical Exam:   The following assessments were done and were normal unless otherwise specified:  General Comments:   Lethargic but following 1 step commands     Handedness:   Right handed   Mental Status:   Level of consciousness: decreased  Orientation: unable to be assessed due to level of consciousness  Language: unable to be assessed due level of consciousness  Memory - normal  Attention / concentration - normal  Fund of knowledge - normal   Cranial Nerves:   Cranial nerves II through XII are intact   Dysphagia  Screening tool:   Swallowing of water not evaluated due to observed risks of aspiration (decreased level of consciousness or inability to sit upright)   Motor:   Muscle bulk - normal  Muscle tone - normal  Muscle strength: 5/5 on left sided extremities; 4/5 right sided extremites except right  strength -4/5  Arm drift - none  Speed and dexterity - normal   Sensory perception:   Pain normal  Light touch - normal   Reflexes:   Deep tendon reflexes- normal  Babinski - normal   Cerebellar Coordination:   unable to be assessed due to level of consciousness   Gait / Stance:   Not assessed due to level of consciousness               Data:   All laboratory data reviewed reviewed  All cardiac studies reviewed by me.  All imaging studies reviewed by me.     MR BRAIN W/O & W CONTRAST 6/26/2020 1:47 PM     Provided History: Neuro deficit(s), subacute; Seizure, new, abn neuro  exam, nontraumatic; History of glioblastoma (left sided) s/p most  recent resection 10/2019. Presenting with seizure and residual  right-sided weakness..     Comparison: 1/1/2020, CT head 6/26/2020, 1/31/2020.     Technique: Multiplanar T1-weighted, axial FLAIR, and susceptibility  images were obtained without intravenous contrast. Following  intravenous gadolinium-based contrast administration, axial  T2-weighted, diffusion, and T1-weighted images (in multiple planes)  were obtained.     Contrast: 10 mL Gadavist     Findings:  Postsurgical changes of left parietal occipital craniotomy and mass  resection. Cystic encephalomalacia in the resection cavity with  surrounding hemosiderin deposition. Unchanged FLAIR hyperintense  relative to normal white matter encephalomalacia in the left posterior  temporal, occipital and parietal lobe.     There is no mass effect, midline shift, or evidence of intracranial  hemorrhage. The ventricles are proportionate to the cerebral sulci.  Normal major vascular intracranial flow-voids.     Multiple nodular foci of  enhancement surrounding the resection cavity  for example a 5 x 3 mm of enhancement along the central inferior and  anterior margin of the posterior aspect of the left lateral ventricle  (axial series 24, image 88; coronal series 25, image 147). The former  has increased in conspicuity since 1/31/2020. Other areas include  focal areas nodular enhancement along the resection cavity superiorly  adjacent to the superior sagittal sinus and inferior laterally  posterior to the occipital horn of the left lateral ventricle. Stable  left greater than right patchy meningeal enhancement. There are no new  areas of intracranial enhancement.     Regarding the perfusion images there is no focal area of abnormal  increased cerebral blood flow surrounding the resection cavity.     No abnormality of the skull marrow signal. Left maxillary sinus  mucosal retention cyst, otherwise the visualized portions of paranasal  sinuses, and mastoid air cells are relatively clear. The orbits are  grossly unremarkable.                                                       Impression:  1. No acute intracranial pathology.  2. Focus of enhancement along the central interior and anterior margin  of the resection cavity along the left lateral ventricle has increased  in conspicuity compared to MRI 1/31/2020. Close follow-up is  recommended. Otherwise unchanged cystic encephalomalacia in the left  occipital lobe resection cavity from glioblastoma resection with  otherwise unchanged focused areas of nodular enhancement.

## 2020-06-26 NOTE — ED TRIAGE NOTES
BIBA from home for seizure at 0820. Wife witnessed seizure activity and gave hime 2.5mg valium. Called for right sided weakness. Stroke code activated prior to arrival.

## 2020-06-26 NOTE — ED PROVIDER NOTES
[place] EMERGENCY DEPARTMENT ([facility])  June 26, 2020    ED Provider Note  [facility]      History     Chief Complaint   Patient presents with     Seizures     One-sided Weakness     HPI  Jayden Lackey is a 65 year old male with a past medical history significant for glioblastoma s/p resection x2, most recently October 2019, not currently on chemotherapy who presents here to the Emergency Department via EMS due to a seizure and right-sided weakness. Patient's wife witnessed his seizure this morning at 8:20 am and gave him 2.5mg of valium. Symptoms subsided but patient remained quite altered with noticeable right-sided weakness. Patient was admitted for a similar episode in April 2020. Wife denies any recent illness, fever, cough.     Past Medical History  Past Medical History:   Diagnosis Date     Colon polyp      Dyslipidemia      Glioblastoma (H)      Hypertension      Lumbar disc herniation     L4-5     Rheumatoid arthritis (H)      Past Surgical History:   Procedure Laterality Date     ANAL SPHINCTEROTOMY       BACK SURGERY  2009    L4-5     HERNIA REPAIR  1974     OPTICAL TRACKING SYSTEM CRANIOTOMY, EXCISE TUMOR, COMBINED Left 3/22/2019    Procedure: Left Stealth Assisted Craniotomy And Tumor Resection;  Surgeon: Irving Hayes MD;  Location: [facility] OR     OPTICAL TRACKING SYSTEM CRANIOTOMY, EXCISE TUMOR, COMBINED Left 10/24/2019    Procedure: LEFT CRANIOTOMY, USING OPTICAL TRACKING SYSTEM, WITH NEOPLASM EXCISION;  Surgeon: Nicho Packer MD;  Location: [facility] OR     SEPTOPLASTY       VASECTOMY       acetaminophen (TYLENOL) 325 MG tablet  atenolol (TENORMIN) 100 MG tablet  cholecalciferol (VITAMIN D3) 5000 units (125 mcg) capsule  dexamethasone (DECADRON) 2 MG tablet  diazepam (VALIUM) 5 MG/ML (HIGH CONC) solution  diltiazem ER (DILT-XR) 180 MG 24 hr capsule  docusate sodium (COLACE) 50 MG capsule  Lacosamide (VIMPAT) 100 MG TABS tablet  levETIRAcetam  (KEPPRA) 1000 MG tablet  levETIRAcetam (KEPPRA) 250 MG tablet  lisinopril (PRINIVIL/ZESTRIL) 5 MG tablet  ondansetron (ZOFRAN) 4 MG tablet  rivaroxaban ANTICOAGULANT (XARELTO ANTICOAGULANT) 20 MG TABS tablet  sertraline (ZOLOFT) 100 MG tablet      Allergies   Allergen Reactions     Shellfish Allergy Difficulty breathing, Nausea and Vomiting, Swelling and Shortness Of Breath     No Clinical Screening - See Comments      Lupcasper bean doesn't remember reaction     Past medical history, past surgical history, medications, and allergies were reviewed with the patient. Additional pertinent items: None    Family History  Family History   Problem Relation Age of Onset     Macular Degeneration Mother      Diabetes Mother      Heart Failure Father      Alcoholism Father      Diabetes Sister      Heart Disease Maternal Grandfather      Pancreatic Cancer Maternal Grandmother      Rheumatoid Arthritis Sister      Other - See Comments Sister         glioma     Other - See Comments Sister         maculofacial digital syndrome     Kidney Disease Sister         s/p transplant     Glaucoma No family hx of      Family history was reviewed with the patient. Additional pertinent items: None    Social History  Social History     Tobacco Use     Smoking status: Former Smoker     Packs/day: 1.00     Years: 3.00     Pack years: 3.00     Types: Cigarettes     Start date: 1974     Last attempt to quit: 1977     Years since quittin.0     Smokeless tobacco: Never Used   Substance Use Topics     Alcohol use: Not Currently     Drug use: Never      Social history was reviewed with the patient. Additional pertinent items: None    Review of Systems  A complete review of systems was performed with pertinent positives and negatives noted in the HPI, and all other systems negative.    Physical Exam   BP: (!) 220/115  Pulse: 91  Heart Rate: 81  Temp: 98.8  F (37.1  C)  Resp: 23  Weight: 108.3 kg (238 lb 12.8 oz)  SpO2: 95 %  Physical  Exam  Vitals signs and nursing note reviewed.   Constitutional:       General: He is not in acute distress.     Appearance: He is not toxic-appearing or diaphoretic.      Comments: Patient appears post-ictal.   HENT:      Head: Normocephalic and atraumatic.   Eyes:      General: No scleral icterus.     Pupils: Pupils are equal, round, and reactive to light.   Neck:      Musculoskeletal: Neck supple.   Cardiovascular:      Rate and Rhythm: Normal rate and regular rhythm.      Heart sounds: Normal heart sounds.   Pulmonary:      Effort: Pulmonary effort is normal. No respiratory distress.      Breath sounds: Normal breath sounds.   Abdominal:      General: Bowel sounds are normal.      Palpations: Abdomen is soft.      Tenderness: There is no abdominal tenderness.   Musculoskeletal:         General: No tenderness.   Skin:     General: Skin is warm.      Capillary Refill: Capillary refill takes less than 2 seconds.      Findings: No rash.   Neurological:      Mental Status: He is easily aroused. He is lethargic.      Cranial Nerves: No cranial nerve deficit.      Motor: Weakness (right arm, leg) and pronator drift (right arm) present.      Comments: Post-ictal         ED Course      Procedures             EKG Interpretation:      Interpreted by Gabriela Bowen DO  Time reviewed: 1010  Symptoms at time of EKG: Seizure, neuro deficit   Rhythm: normal sinus   Rate: normal  Axis: normal  Ectopy: none  Conduction: normal  ST Segments/ T Waves: No ST-T wave changes  Q Waves: none  Comparison to prior: Unchanged from 3/31/2020    Clinical Impression: normal EKG    The patient has stroke symptoms:         ED Stroke specific documentation           NIHSS PDF     Patient last known well time: 0820  ED Provider first to bedside at: 0937  CT Results received at: 1000    tPA:   Not given due to stroke mimic: Seizure, post-ictal.    If treating with tPA: Ensure SBP<185 and DBP<105 prior to treatment with IV tPA.  Administering IV tPA  after treatment with IV labetalol, hydralazine, or nicardipine is reasonable once BP control is established.    Endovascular Retrieval:  Not initiated due to absence of proximal vessel occlusion    National Institutes of Health Stroke Scale (Baseline)  Time Performed: 0945     Score    Level of consciousness: (1)   Not alert; arousable w/ minor stim to obey/answer/respond    LOC questions: (2)   Answers neither question correctly    LOC commands: (1)   Performs one task correctly    Best gaze: (1)   Partial gaze palsy    Visual: (0)   No visual loss    Facial palsy: (0)   Normal symmetrical movements    Motor arm (left): (0)   No drift    Motor arm (right): (1)   Drift    Motor leg (left): (0)   No drift    Motor leg (right): (1)   Drift    Limb ataxia: (0)   Absent    Sensory: (0)   Normal- no sensory loss    Best language: (1)   Mild to moderate aphasia    Dysarthria: (0)   Normal    Extinction and inattention: (0)   No abnormality        Total Score:  8        Stroke Mimics were considered (including migraine headache, seizure disorder, hypoglycemia (or hyperglycemia), head or spinal trauma, CNS infection, Toxin ingestion and shock state (e.g. sepsis) .      National Institutes of Health Stroke Scale  Time Performed: 1200      Score    Level of consciousness: (0)   Alert, keenly responsive    LOC questions: (0)   Answers both questions correctly    LOC commands: (0)   Performs both tasks correctly    Best gaze: (0)   Normal    Visual: (0)   No visual loss    Facial palsy: (0)   Normal symmetrical movements    Motor arm (left): (0)   No drift    Motor arm (right): (0)   No drift    Motor leg (left): (0)   No drift    Motor leg (right): (0)   No drift    Limb ataxia: (0)   Absent    Sensory: (0)   Normal- no sensory loss    Best language: (0)   Normal- no aphasia    Dysarthria: (0)   Normal    Extinction and inattention: (0)   No abnormality        Total Score:  0             Results for orders placed or performed  during the hospital encounter of 06/26/20   CT Head w/o Contrast     Status: None    Narrative    CT HEAD W/O CONTRAST 6/26/2020 10:01 AM    Provided History: Focal neuro deficit, < 6 hrs, stroke suspected.    Per EPIC: Wife witnessed seizure activity and gave him. 2.5mg valium.  Called for right sided weakness. History of glioblastoma with  resection.    Comparison: Brain MRI from 4/1/2020.    Technique: Using multidetector thin collimation helical acquisition  technique, axial, coronal and sagittal CT images from the skull base  to the vertex were obtained without intravenous contrast.     Findings:      Postsurgical changes left parietal craniotomy for underlying mass  resection. Surrounding edema within the left parietal occipital white  matter. Ventricles are mildly dilated but no significant change  compared to prior study.    No intracranial hemorrhage, midline shift or extra-axial fluid  collection. Gray to white matter differentiation is preserved  throughout the bilateral cerebral hemispheres. The paranasal sinuses  are clear. The mastoid air cells are clear. The orbits are  unremarkable.  The visualized paranasal sinuses are clear. The mastoid air cells are  clear.       Impression    Impression:   1. No acute intracranial pathology.  2. Postsurgical changes left parietal craniotomy for underlying mass  resection. Surrounding edema within the left parietal occipital white  matter.    I have personally reviewed the examination and initial interpretation  and I agree with the findings.    MEHDI ARGUELLO MD   CTA Head Neck with Contrast     Status: None    Narrative    CTA  HEAD NECK WITH CONTRAST 6/26/2020 10:05 AM    CT angiogram of the neck   CT angiogram of the base of the brain with contrast  Reconstruction by the Radiologist on the 3D workstation    Provided History:  Neuro deficit(s), subacute    Comparison:  CT angiogram head 3/31/2020.      Technique:  HEAD and NECK CTA: During rapid bolus  intravenous injection of  nonionic contrast material, axial images were obtained using thin  collimation multidetector helical technique from the base of the upper  aortic arch through the [place]. This CT angiogram data was  reconstructed at thin intervals with mild overlap. Images were sent to  the [employer] workstation, and 3D reconstructions were obtained. The  axial source images, multiplanar reformations, 3D reconstructions in  both maximum intensity projection display and volume rendered models  were reviewed, with reconstructions performed by the technologist and  the radiologist.    Contrast: 75 cc of isovue 370    Findings:    Head CTA demonstrates no aneurysm or stenosis of the major  intracranial arteries. Anterior and right posterior communicating  arteries are patent. Left posterior communicating artery is not well  seen. A1 segment of the left anterior cerebral artery is not seen; A2  segment is filling through the anterior communicating artery.    Neck CTA demonstrates no stenosis of the major cervical arteries. The  origins of the great vessels from the aortic arch are patent. Left  vertebral artery is hypoplastic. The normal distal right internal  carotid artery measures 6 mm. The normal distal left internal carotid  artery measures 5.4 mm.     No mass within the visualized portions of the cervical soft tissues or  lung apices.       Impression    Impression:    1. Head CTA demonstrates no aneurysm or stenosis of the major  intracranial arteries.   2. Neck CTA demonstrates no stenosis of the major cervical arteries.    I have personally reviewed the examination and initial interpretation  and I agree with the findings.    MEHDI ARGUELLO MD   MR Brain w/o & w Contrast     Status: None    Narrative     MR BRAIN W/O & W CONTRAST 6/26/2020 1:47 PM    Provided History: Neuro deficit(s), subacute; Seizure, new, abn neuro  exam, nontraumatic; History of glioblastoma (left sided) s/p most  recent  resection 10/2019. Presenting with seizure and residual  right-sided weakness..    Comparison: 1/1/2020, CT head 6/26/2020, 1/31/2020.    Technique: Multiplanar T1-weighted, axial FLAIR, and susceptibility  images were obtained without intravenous contrast. Following  intravenous gadolinium-based contrast administration, axial  T2-weighted, diffusion, and T1-weighted images (in multiple planes)  were obtained.    Contrast: 10 mL Gadavist    Findings:  Postsurgical changes of left parieto-occipital craniotomy and  underlying mass resection. Cystic encephalomalacia in the resection  cavity with surrounding hemosiderin deposition. Unchanged surrounding  parenchymal FLAIR T2 hyperintensity in the left posterior temporal,  occipital and parietal lobe.    There is no mass effect, midline shift, or evidence of intracranial  hemorrhage. The ventricles are proportionate to the cerebral sulci.  Normal major vascular intracranial flow-voids.    Multiple small nodular foci of enhancement at the margins of the  resection cavity. Although some regions of enhancement have increased  in conspicuity and enhance more uniformly since 1/31/2020, there is no  change in size of the areas of enhancement.    Stable left greater than right postsurgical pachymeningeal  enhancement. There are no new areas of intracranial enhancement.    Regarding the perfusion images there is no focal area of abnormal  increased relative cerebral blood flow surrounding the resection  cavity.    Small left maxillary sinus mucosal retention cyst; otherwise the  visualized portions of paranasal sinuses, and mastoid air cells are  relatively clear. The orbits are grossly unremarkable.      Impression    Impression:  1. No acute intracranial pathology.  2. Relatively stable postoperative changes from left occipital  glioblastoma resection. Although there is increased conspicuity of  marginal enhancement, the overall extent is unchanged. Recommend  continued  surveillance.     I have personally reviewed the examination and initial interpretation  and I agree with the findings.    VITO ZAPATA MD   CBC with platelets differential     Status: Abnormal   Result Value Ref Range    WBC 6.1 4.0 - 11.0 10e9/L    RBC Count 4.44 4.4 - 5.9 10e12/L    Hemoglobin 10.7 (L) 13.3 - 17.7 g/dL    Hematocrit 35.4 (L) 40.0 - 53.0 %    MCV 80 78 - 100 fl    MCH 24.1 (L) 26.5 - 33.0 pg    MCHC 30.2 (L) 31.5 - 36.5 g/dL    RDW 16.4 (H) 10.0 - 15.0 %    Platelet Count 161 150 - 450 10e9/L    Diff Method Automated Method     % Neutrophils 64.4 %    % Lymphocytes 20.8 %    % Monocytes 11.9 %    % Eosinophils 1.5 %    % Basophils 0.3 %    % Immature Granulocytes 1.1 %    Nucleated RBCs 0 0 /100    Absolute Neutrophil 4.0 1.6 - 8.3 10e9/L    Absolute Lymphocytes 1.3 0.8 - 5.3 10e9/L    Absolute Monocytes 0.7 0.0 - 1.3 10e9/L    Absolute Eosinophils 0.1 0.0 - 0.7 10e9/L    Absolute Basophils 0.0 0.0 - 0.2 10e9/L    Abs Immature Granulocytes 0.1 0 - 0.4 10e9/L    Absolute Nucleated RBC 0.0    Basic metabolic panel     Status: Abnormal   Result Value Ref Range    Sodium 140 133 - 144 mmol/L    Potassium 3.4 3.4 - 5.3 mmol/L    Chloride 108 94 - 109 mmol/L    Carbon Dioxide 25 20 - 32 mmol/L    Anion Gap 6 3 - 14 mmol/L    Glucose 145 (H) 70 - 99 mg/dL    Urea Nitrogen 19 7 - 30 mg/dL    Creatinine 0.79 0.66 - 1.25 mg/dL    GFR Estimate >90 >60 mL/min/[1.73_m2]    GFR Estimate If Black >90 >60 mL/min/[1.73_m2]    Calcium 8.5 8.5 - 10.1 mg/dL   Partial thromboplastin time     Status: None   Result Value Ref Range    PTT 27 22 - 37 sec   Glucose by meter     Status: Abnormal   Result Value Ref Range    Glucose 137 (H) 70 - 99 mg/dL   INR     Status: None   Result Value Ref Range    INR 1.10 0.86 - 1.14   Troponin I     Status: None   Result Value Ref Range    Troponin I ES <0.015 0.000 - 0.045 ug/L   Creatinine POCT     Status: None   Result Value Ref Range    Creatinine 0.8 0.66 - 1.25 mg/dL    GFR  Estimate >90 >60 mL/min/[1.73_m2]    GFR Estimate If Black >90 >60 mL/min/[1.73_m2]   Magnesium     Status: Abnormal   Result Value Ref Range    Magnesium 2.4 (H) 1.6 - 2.3 mg/dL   Phosphorus     Status: None   Result Value Ref Range    Phosphorus 3.4 2.5 - 4.5 mg/dL   EKG 12-lead, tracing only     Status: None   Result Value Ref Range    Interpretation ECG Click View Image link to view waveform and result    Troponin POCT     Status: None   Result Value Ref Range    Troponin I 0.01 0.00 - 0.08 ug/L   ISTAT INR POCT     Status: None   Result Value Ref Range    ISTAT INR 1.1 0.86 - 1.14     Medications   0.9% sodium chloride BOLUS (0 mLs Intravenous Stopped 6/26/20 1136)   iopamidol (ISOVUE-370) solution 75 mL (75 mLs Intravenous Given 6/26/20 0948)   sodium chloride (PF) 0.9% PF flush 100 mL (100 mLs Intravenous Given 6/26/20 0948)   levETIRAcetam (KEPPRA) 2,000 mg in sodium chloride 0.9 % 250 mL intermittent infusion (2,000 mg Intravenous Given 6/26/20 1104)   lacosamide (VIMPAT) 250 mg in sodium chloride 0.9 % 100 mL intermittent infusion (250 mg Intravenous New Bag 6/26/20 1138)   dexamethasone PF (DECADRON) injection 2 mg (2 mg Intravenous Given 6/26/20 1220)   gadobutrol (GADAVIST) injection 10 mL (10 mLs Intravenous Given 6/26/20 1348)        Assessments & Plan (with Medical Decision Making)   Patient was seen and examined. CT and CTA head ordered and show no acute injury. Evaluated by neuro who recommended MRI. Patient loaded with Keppra and Vimpat. MRI shows stable appearance. Patient slowly improving although still remains somewhat lethargic. Weakness has essentially resolved. Will admit to neuro team for further monitoring.     I have reviewed the nursing notes. I have reviewed the findings, diagnosis, plan and need for follow up with the patient.    New Prescriptions    No medications on file       Final diagnoses:   Seizure disorder (H)   Glioblastoma (H)       I, Sandeep Johnson, am serving as a  trained medical scribe to document services personally performed by Gabriela Bowen DO, based on the provider's statements to me.  I, Gabriela Bowen DO, was physically present and have reviewed and verified the accuracy of this note documented by Sandeep Johnson.      --  Emergency Medicine   [facility], EMERGENCY DEPARTMENT  6/26/2020     Gabriela Bowen DO  06/26/20 0264

## 2020-06-26 NOTE — SIGNIFICANT EVENT
Stroke Code Nurse-Responder Note    Arrival Time to Stroke Code: 0926 pt arrived 0938    Stroke Code Team interventions:   - De-escalated at 1000 by Dr. Barros  - CT/CTA    ED/Bedside Nurse providing handoff: Stephanie LEON RN    Time left for CT: 0943    Time arrived to next location (ED/Unit/IR): Returned to the ED @ 0955    ED/Bedside Nurse given handoff (name/time): Bedside handoff given to Stephanie LEON RN at 0957    Meng Maurice RN

## 2020-06-26 NOTE — CONSULTS
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 Today, the patient was encephalopathic on exam therefore unable to obtain history. Spoke with his wife who provided an account of what happened.    Today at about 8:20am, she was with the patient when he suddenly had activities that \"told [her] he was going to have a seizure\". She gave him four doses of Valium, however this did not stop the seizure. She reports that in the past, Valium has stopped any seizures that he has had. She reports that he does chronically have R sided weakness.    TPA Treatment   Not given due to stroke mimic: seizure, current / recent anticoagulant.    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  CT and CTA negative for acute ischemic changes or hemorrhage, no blood vessel stenosis.    Suspect that the patient's encephalopathy and weakness are secondary to seizure activity rather than acute stroke.    Recommendations  - Would be reasonable to obtain MRI at this time as the patient does have an outpatient MRI scheduled for 2-3 days from now  - No additional work-up from a stroke perspective  - General neurology consult  - Additional work-up per general neurology and ED provider    Patient Follow-up    No patient follow-up required in stroke clinic.    Thank you for this consult. No further stroke evaluation is recommended, so we will sign off. Please contact us " with any additional questions.    The patient was discussed with Stroke Fellow, Dr. Cortez.  The Stroke Staff is Dr. Tariq.    Katia Barros MD  Neurology PGY-2  06/26/20 10:31AM  ______________________________________________________    Past Medical History   Past Medical History:   Diagnosis Date     Colon polyp      Dyslipidemia      Glioblastoma (H)      Hypertension      Lumbar disc herniation     L4-5     Rheumatoid arthritis (H)      Past Surgical History   Past Surgical History:   Procedure Laterality Date     ANAL SPHINCTEROTOMY       BACK SURGERY  2009    L4-5     HERNIA REPAIR  1974     OPTICAL TRACKING SYSTEM CRANIOTOMY, EXCISE TUMOR, COMBINED Left 3/22/2019    Procedure: Left Stealth Assisted Craniotomy And Tumor Resection;  Surgeon: Irving Hayes MD;  Location: [facility] OR     OPTICAL TRACKING SYSTEM CRANIOTOMY, EXCISE TUMOR, COMBINED Left 10/24/2019    Procedure: LEFT CRANIOTOMY, USING OPTICAL TRACKING SYSTEM, WITH NEOPLASM EXCISION;  Surgeon: Nicho Packer MD;  Location: [facility] OR     SEPTOPLASTY       VASECTOMY       Medications   Home Meds  Prior to Admission medications    Medication Sig Start Date End Date Taking? Authorizing Provider   acetaminophen (TYLENOL) 325 MG tablet Take 2 tablets (650 mg) by mouth every 4 hours as needed for other (multimodal surgical pain management along with NSAIDS and opioid medication as indicated based on pain control and physical function.) 10/28/19   Manuel Roth MD   atenolol (TENORMIN) 100 MG tablet Take 50 mg by mouth daily    Unknown, Entered By History   cholecalciferol (VITAMIN D3) 5000 units (125 mcg) capsule Take 5,000 Units by mouth daily    Unknown, Entered By History   dexamethasone (DECADRON) 2 MG tablet Take 1 tablet (2 mg) by mouth daily (with breakfast) 1/31/20   Nasra Olmos MD   diazepam (VALIUM) 5 MG/ML (HIGH CONC) solution Take 1 mL (5 mg) by mouth every 3 minutes as needed for seizures (lasting greater than 5 minutes.)  If seizure continues, repeat with 0.5mL by mouth every 2-3 minutes up to a max of 7.5mL in 1 day. Do not give if patient has very shallow or slow breathing. 4/2/20   Hever Kerr MD   diltiazem ER (DILT-XR) 180 MG 24 hr capsule Take 180 mg by mouth daily    Unknown, Entered By History   docusate sodium (COLACE) 50 MG capsule Take 50 mg by mouth 2 times daily as needed for constipation    Reported, Patient   Lacosamide (VIMPAT) 100 MG TABS tablet Take 150 mg by mouth 2 times daily  9/10/19   Reported, Patient   levETIRAcetam (KEPPRA) 1000 MG tablet Take 1.5 tablets (1,500 mg) by mouth 2 times daily In addition to 1 tab of 250mg Keppra for a total of 1,750mg Keppra twice/day. 4/2/20   Hever Kerr MD   levETIRAcetam (KEPPRA) 250 MG tablet Take 1 tablet (250 mg) by mouth 2 times daily In addition to the 1,500mg dose you were taking previously for a total of 1,750mg keppra twice/day 4/2/20   Hever Kerr MD   lisinopril (PRINIVIL/ZESTRIL) 5 MG tablet Take 1 tablet (5 mg) by mouth daily 10/29/19   Manuel Roth MD   ondansetron (ZOFRAN) 4 MG tablet 4 mg every 4 hours as needed 7/12/19   Reported, Patient   rivaroxaban ANTICOAGULANT (XARELTO ANTICOAGULANT) 20 MG TABS tablet Take 1 tablet (20 mg) by mouth daily (with dinner) 5/19/20   Nasra Olmos MD   sertraline (ZOLOFT) 100 MG tablet Take 1.5 tablets (150 mg) by mouth daily 4/20/20   Nasra Olmos MD       Allergies   Allergies   Allergen Reactions     Shellfish Allergy Difficulty breathing, Nausea and Vomiting, Swelling and Shortness Of Breath     No Clinical Screening - See Comments      Lupine bean doesn't remember reaction     Family History   Family History   Problem Relation Age of Onset     Macular Degeneration Mother      Diabetes Mother      Heart Failure Father      Alcoholism Father      Diabetes Sister      Heart Disease Maternal Grandfather      Pancreatic Cancer Maternal  Grandmother      Rheumatoid Arthritis Sister      Other - See Comments Sister         glioma     Other - See Comments Sister         maculofacial digital syndrome     Kidney Disease Sister         s/p transplant     Glaucoma No family hx of      Social History   Social History     Tobacco Use     Smoking status: Former Smoker     Packs/day: 1.00     Years: 3.00     Pack years: 3.00     Types: Cigarettes     Start date: 1974     Last attempt to quit: 1977     Years since quittin.0     Smokeless tobacco: Never Used   Substance Use Topics     Alcohol use: Not Currently     Drug use: Never       Review of Systems   Review of systems not obtained due to patient factors - altered mental status    PHYSICAL EXAMINATION  Temp:  [98.8  F (37.1  C)] 98.8  F (37.1  C)  Pulse:  [81-91] 85  Heart Rate:  [81] 81  Resp:  [11-23] 15  BP: (187-220)/() 188/109  SpO2:  [95 %-98 %] 98 %     General:  patient lying in bed without any acute distress    HEENT:  normocephalic/atraumatic, no epistaxis   Abdomen:  soft, non-distended  Extremities:  lower extremity edema  Skin:  intact, warm/dry     Neurologic  Mental Status:  awake but appears tired, eyes are closed, requires rigorous stimulation to open eyes. Not speaking.  Cranial Nerves:  extraoccular muscles in tact, facial movements equal and symmetric, did not formally assess hearing but intact to command/conversation  Motor:  normal muscle tone and bulk, no abnormal movements, moving all limbs to command, RUE and RLE strength moves antigravity but falls before 5 seconds, LUE and LLE strength is at least 4/5 and does not drift  Reflexes:  no clonus  Sensory:  all extremities move to noxious stimuli  Coordination:  unable to assess  Station/Gait:  deferred      Dysphagia Screen  Per Nursing    Stroke Scales    NIHSS  Interval baseline (20 1107)   Interval Comments     1a. Level of Consciousness 2-->Not alert, requires repeated stimulation to attend, or is  obtunded and requires strong or painful stimulation to make movements (not stereotyped)   1b. LOC Questions 2-->Answers neither question correctly   1c. LOC Commands 2-->Performs neither task correctly   2.   Best Gaze 0-->Normal   3.   Visual 0-->No visual loss   4.   Facial Palsy 0-->Normal symmetrical movements   5a. Motor Arm, Left 0-->No drift, limb holds 90 (or 45) degrees for full 10 secs   5b. Motor Arm, Right 3-->No effort against gravity, limb falls   6a. Motor Leg, Left 0-->No drift, leg holds 30 degree position for full 5 secs   6b. Motor Leg, right 2-->Some effort against gravity, leg falls to bed by 5 secs, but has some effort against gravity   7.   Limb Ataxia 0-->Absent   8.   Sensory 0-->Normal, no sensory loss   9.   Best Language 3-->Mute, global aphasia, no usable speech or auditory comprehension   10. Dysarthria 2-->Severe dysarthria, patients speech is so slurred as to be unintelligible in the absence of or out of proportion to any dysphasia, or is mute/anarthric   11. Extinction and Inattention  0-->No abnormality   Total 16 (06/26/20 1107)       Imaging  I personally reviewed all imaging; relevant findings per HPI.     Lab Results Data   CBC  Recent Labs   Lab 06/26/20  0940   WBC 6.1   RBC 4.44   HGB 10.7*   HCT 35.4*        Basic Metabolic Panel    Recent Labs   Lab 06/26/20  0940      POTASSIUM 3.4   CHLORIDE 108   CO2 25   BUN 19   CR 0.79   *   BEATRIS 8.5     Liver Panel  Recent Labs   Lab Test 04/01/20  0604 03/31/20 2203 01/31/20  1025   PROTTOTAL 6.2* 7.8 6.4*   ALBUMIN 3.1* 4.1 3.3*   BILITOTAL 0.2 0.2 0.3   ALKPHOS 41 55 54   AST 11 19 18   ALT 40 50 44     INR  Recent Labs   Lab Test 06/26/20  0940 03/31/20 2203 11/17/19  1345   INR 1.10 1.15* 1.11      Lipid ProfileNo lab results found.  A1CNo lab results found.  Troponin I  Recent Labs   Lab 06/26/20  0940   TROPI <0.015          Stroke Code / Stroke Consult Data Data   Stroke Code Data  (for stroke code  without tele)  Stroke code activated 06/26/20   0922   First stroke provider response 06/26/20   0935   Last known normal 06/26/20   0820   Time of discovery   (or onset of symptoms) 06/26/20   0820   Head CT read by me 06/26/20   0950   Was stroke code de-escalated? Yes 06/26/20 1003  symptoms not likely caused by stroke

## 2020-06-26 NOTE — PHARMACY
Pharmacy Stroke Code Response  Pharmacist responded as part of the Stroke Code Team activation to patient care area ED.  The Stroke Team determined that the patient was not a candidate for IV alteplase therapy and the pharmacy team was dismissed at 0943.

## 2020-06-26 NOTE — H&P
General Neurology Consult     Jayden Lackey MRN# [id number]   YOB: 1955 Age: 65 year old      Date of Admission:                  6/26/2020     Primary care provider: Jhoan Farooq              Assessment and Plan:    65M PMHx relevant for GBM s/p chemoradiation and resections 3/2019 and 11/2019 complicated by seizures now presenting with breakthrough partial onset seizure without identifiable trigger concerning for breakthrough seizure.      #Partial onset seizure without secondary generalization   #Hx of grand mal seizures  #Glioblastoma Multiforme  Unclear trigger for current seizure. Hx of 3 interval seizures between today's and last presentation requiring hospitalization 4/1 may suggest some degree of inadequate outpatient AED regimen +/- occult infection, stress, or lack of adequate/normal sleep schedule.Now with MRI with question of disease progression and increased brain edema. Given persistent breakthrough seizures requiring PRN diazepam and today's breakthrough despite PRN, we recommend increasing his anti-epileptics to a new regimen of below:   -Increase Keppra 2000mg every 12 hours starting tonight at 21:00  -Increase Vimpat 200mg every 12 hours starting tonight at 21:00  -increase dexamethasone to 4mg every day  -recommend admit to obs; no need for EEG unless change in clinical status makes such appropriate   - Neurosurgery consult for possible recurrence and long term treatment planning.      Chronic issues  #AF: diltiazem ER 360mg daily and rivaroxaban 20mg every day  #HTN: lisinopril 5mg every day  #DVT/PE: rivaroxaban 20mg every day  #depression: sertraline 150mg every day  #HTN: atenolol 50mg every day, lisinopril 5mg every day    FENGI: NPO except meds until more alert  DVT: rivaroxaban  CODE: full  DISPO: obs     Discussed and seen with Dr. Bhakta, attending neurologist     Fidel Eisenberg PGY3  Internal Medicine Resident  Neurology pager [phone]  My personal pager  [phone]              Chief Complaint:    seizure       History is obtained from the patient's wife      65M hx GBM s/p resections 3/22/19 and 10/24/19 complicated by partial onset  With secondary generalization seizures and requiring multiple admissions last 4/1.  5/13, 6/7, and  6/14 he experienced partial seizures with retention of consciousness  At which point he would notice smells, subjective feeling of feeling 'off' and overall be  able to articulate what is occurring so that wife can begin directed treatment which was given at prior discharge of administering 2.5mg of diazepam in 5 minute intervals until seizure breaks. Unfortunately, today wife walked in on  after the  had been alone for 20 minutes at which point wife noticed  at 9:21 to be in state of consciousness however inability to speak with classic right going vision and inability to respond to commands at which point at 9:23 she began dosing his diazepam in 5 minute intervals  For total of 7.5mg, however his seizure failed to break.     At this point, she called 911, and he presented to ER. Found to have right sided weakness at this time consistent with past 4 prior presentations following/during seizure episodes however given deficits underwent CT/CTA of head and neck to evaluate for stroke. Imaging unremarkable, and as such general neurology was asked to evaluate patient for recurrent seizures. He was loaded with keppra 2 grams and lacosamide 250mg (home dose of each is keppra 1750mg BID at 9AM/9PM and vimpat BID 150mg 9A/9PM. I do note that he was unable to receive his anti-epileptics this morning due to seizures, and as such went 2 hours without antiepileptic dosing and by extension his seizure occurred after 12 hours since last anti-epileptic dosing.     Regarding triggers, wife can not identify any classic trigger such as illness, and she notes they have been self isolating due to COVID. Additionally, she reports no  medication noncompliance and that they are militant about maintaining AED regimen regularity. She notes he has been sleeping mostly adequately however is not sure regarding how much he slept last night, noting that he has an inconsistent sleep schedule, sleeping sometimes 1AM-7AM other times 11P-7A, however he has recently had no episodes of missed sleeping.      Regarding his oncologic history, please see patient visit with his neuro-oncologist Dr. Olmos from 4/13/20. I do note he is due for interval MRI for monitoring of GBM originally scheduled to coincide with his next clinic visit 6/29/20.             Past Medical History:   I have reviewed this patient's past medical history          Past Surgical History:   I have reviewed this patient's past surgical history          Social History:   I have reviewed this patient's social history          Family History:   I have reviewed this patient's family history          Immunizations:           Immunization History   Administered Date(s) Administered     DTaP, Unspecified 10/25/2011, 07/29/2014     FLU 6-35 months 10/11/2011     Flu, Unspecified 10/14/2019     S4x9-11 Novel Flu 12/14/2009     HepA-Adult 12/14/2009     Influenza (H1N1) 12/14/2009     Influenza (IIV3) PF 12/06/2005, 10/11/2011, 09/01/2013, 10/08/2014     Influenza Vaccine IM 18-49 Yrs, RIV3 10/20/2015     Influenza Vaccine IM > 6 months Valent IIV4 10/22/2016, 11/27/2017, 09/05/2018     Td (Adult), Adsorbed 1955, 05/26/2000, 01/25/2005, 03/07/2005     Tdap (Adult) Unspecified Formulation 1955     Typhoid IM 12/14/2009     Zoster vaccine recombinant adjuvanted (SHINGRIX) 09/05/2018, 11/13/2018             Allergies:   All allergies reviewed and addressed          Medications:   I have reviewed this patient's current medications          Review of Systems:   The Review of Systems is negative other than noted in the HPI          Neurology Focused Physical Exam:   The following assessments were  done and were normal unless otherwise specified:  General Comments:    Lethargic but following 1 step commands      Handedness:    Right handed   Mental Status:    Level of consciousness: decreased  Orientation: unable to be assessed due to level of consciousness  Language: unable to be assessed due level of consciousness  Memory - normal  Attention / concentration - normal  Fund of knowledge - normal   Cranial Nerves:    Cranial nerves II through XII are intact   Dysphagia Screening tool:    Swallowing of water not evaluated due to observed risks of aspiration (decreased level of consciousness or inability to sit upright)   Motor:    Muscle bulk - normal  Muscle tone - normal  Muscle strength: 5/5 on left sided extremities; 4/5 right sided extremites except right  strength -4/5  Arm drift - none  Speed and dexterity - normal   Sensory perception:    Pain normal  Light touch - normal   Reflexes:    Deep tendon reflexes- normal  Babinski - normal   Cerebellar Coordination:    unable to be assessed due to level of consciousness   Gait / Stance:    Not assessed due to level of consciousness                 Data:   All laboratory data reviewed reviewed  All cardiac studies reviewed by me.  All imaging studies reviewed by me.      MR BRAIN W/O & W CONTRAST 6/26/2020 1:47 PM     Provided History: Neuro deficit(s), subacute; Seizure, new, abn neuro  exam, nontraumatic; History of glioblastoma (left sided) s/p most  recent resection 10/2019. Presenting with seizure and residual  right-sided weakness..     Comparison: 1/1/2020, CT head 6/26/2020, 1/31/2020.     Technique: Multiplanar T1-weighted, axial FLAIR, and susceptibility  images were obtained without intravenous contrast. Following  intravenous gadolinium-based contrast administration, axial  T2-weighted, diffusion, and T1-weighted images (in multiple planes)  were obtained.     Contrast: 10 mL Gadavist     Findings:  Postsurgical changes of left parietal  occipital craniotomy and mass  resection. Cystic encephalomalacia in the resection cavity with  surrounding hemosiderin deposition. Unchanged FLAIR hyperintense  relative to normal white matter encephalomalacia in the left posterior  temporal, occipital and parietal lobe.     There is no mass effect, midline shift, or evidence of intracranial  hemorrhage. The ventricles are proportionate to the cerebral sulci.  Normal major vascular intracranial flow-voids.     Multiple nodular foci of enhancement surrounding the resection cavity  for example a 5 x 3 mm of enhancement along the central inferior and  anterior margin of the posterior aspect of the left lateral ventricle  (axial series 24, image 88; coronal series 25, image 147). The former  has increased in conspicuity since 1/31/2020. Other areas include  focal areas nodular enhancement along the resection cavity superiorly  adjacent to the superior sagittal sinus and inferior laterally  posterior to the occipital horn of the left lateral ventricle. Stable  left greater than right patchy meningeal enhancement. There are no new  areas of intracranial enhancement.     Regarding the perfusion images there is no focal area of abnormal  increased cerebral blood flow surrounding the resection cavity.     No abnormality of the skull marrow signal. Left maxillary sinus  mucosal retention cyst, otherwise the visualized portions of paranasal  sinuses, and mastoid air cells are relatively clear. The orbits are  grossly unremarkable.                                                       Impression:  1. No acute intracranial pathology.  2. Focus of enhancement along the central interior and anterior margin  of the resection cavity along the left lateral ventricle has increased  in conspicuity compared to MRI 1/31/2020. Close follow-up is  recommended. Otherwise unchanged cystic encephalomalacia in the left  occipital lobe resection cavity from glioblastoma resection  with  otherwise unchanged focused areas of nodular enhancement.        I saw and evaluated the patient on 6/26/20 and agree with the findings and the plan of care as documented in the resident's note.      65-year-old male with history of known GBM who presents with a breakthrough seizure.  He was last admitted in early April for breakthrough seizure and Keppra was slightly increased.  Wife provides history and states that he has had a few events in the past 2 months that were concerning for partial seizures in which she gave Valium and were aborted for which she did not seek medical care.   However today she did not catch it right away and noticed that he had inability to speak, was not following commands, and was slumped over.   She states this is similar to his previous seizures although they often progressed to full generalized tonic-clonic events.   She gave 7.5 mg of Ativan and symptoms did not progress but she brought him to ER as he was not near baseline.       His current exam is somewhat limited by his postictal state and medications.  However he does awake, he follows simple commands without difficulty.  He has trouble speaking but wife states this is normal around his seizures.  He is able to drink and passes a bedside swallow.  He does have right greater than left-sided weakness and a mild right facial droop.  Deep tendon reflexes are slightly increased on the right.  He is able to try to do finger-nose-finger without gross dysmetria although was limited by his lethargy.     Similar to his seizures in the past he has had slow return to baseline and will require an observation admit as he is unsafe to go home currently.   I discussed with the ED observation he does not feel comfortable monitoring although my suspicion that he will have further seizures is quite low.  There is some subtle new contrast-enhancement on the MRI, but it does not significantly increase from prior.  I do not see a reason for  acute neurosurgical intervention that would require consult.  This will certainly require close monitoring as an outpatient.   I will also increase Keppra and Vimpat.  Lastly there is mild increase in edema so we will increase dexamethasone to 4 mg daily.    Meek Bhakta DO   of Neurology

## 2020-06-27 ENCOUNTER — TELEPHONE (OUTPATIENT)
Dept: NEUROLOGY | Facility: CLINIC | Age: 65
End: 2020-06-27

## 2020-06-27 VITALS
BODY MASS INDEX: 29.85 KG/M2 | RESPIRATION RATE: 16 BRPM | WEIGHT: 238.8 LBS | TEMPERATURE: 98.4 F | DIASTOLIC BLOOD PRESSURE: 94 MMHG | OXYGEN SATURATION: 99 % | SYSTOLIC BLOOD PRESSURE: 141 MMHG | HEART RATE: 56 BPM

## 2020-06-27 DIAGNOSIS — C71.9 GLIOBLASTOMA (H): ICD-10-CM

## 2020-06-27 DIAGNOSIS — R56.9 SEIZURE (H): ICD-10-CM

## 2020-06-27 DIAGNOSIS — G40.909 NONINTRACTABLE EPILEPSY WITHOUT STATUS EPILEPTICUS, UNSPECIFIED EPILEPSY TYPE (H): ICD-10-CM

## 2020-06-27 LAB
ANION GAP SERPL CALCULATED.3IONS-SCNC: 5 MMOL/L (ref 3–14)
BUN SERPL-MCNC: 12 MG/DL (ref 7–30)
CALCIUM SERPL-MCNC: 8.8 MG/DL (ref 8.5–10.1)
CHLORIDE SERPL-SCNC: 113 MMOL/L (ref 94–109)
CO2 SERPL-SCNC: 26 MMOL/L (ref 20–32)
CREAT SERPL-MCNC: 0.79 MG/DL (ref 0.66–1.25)
ERYTHROCYTE [DISTWIDTH] IN BLOOD BY AUTOMATED COUNT: 16.5 % (ref 10–15)
GFR SERPL CREATININE-BSD FRML MDRD: >90 ML/MIN/{1.73_M2}
GLUCOSE SERPL-MCNC: 86 MG/DL (ref 70–99)
HCT VFR BLD AUTO: 32.5 % (ref 40–53)
HGB BLD-MCNC: 9.7 G/DL (ref 13.3–17.7)
LEVETIRACETAM SERPL-MCNC: 25 UG/ML (ref 12–46)
MCH RBC QN AUTO: 23.7 PG (ref 26.5–33)
MCHC RBC AUTO-ENTMCNC: 29.8 G/DL (ref 31.5–36.5)
MCV RBC AUTO: 80 FL (ref 78–100)
PLATELET # BLD AUTO: 144 10E9/L (ref 150–450)
POTASSIUM SERPL-SCNC: 3.5 MMOL/L (ref 3.4–5.3)
RBC # BLD AUTO: 4.09 10E12/L (ref 4.4–5.9)
SARS-COV-2 PCR COMMENT: NORMAL
SARS-COV-2 RNA SPEC QL NAA+PROBE: NEGATIVE
SODIUM SERPL-SCNC: 143 MMOL/L (ref 133–144)
SPECIMEN SOURCE: NORMAL
WBC # BLD AUTO: 5.3 10E9/L (ref 4–11)

## 2020-06-27 PROCEDURE — 25000132 ZZH RX MED GY IP 250 OP 250 PS 637: Performed by: PSYCHIATRY & NEUROLOGY

## 2020-06-27 PROCEDURE — 36415 COLL VENOUS BLD VENIPUNCTURE: CPT | Performed by: PSYCHIATRY & NEUROLOGY

## 2020-06-27 PROCEDURE — 80048 BASIC METABOLIC PNL TOTAL CA: CPT | Performed by: PSYCHIATRY & NEUROLOGY

## 2020-06-27 PROCEDURE — 25000131 ZZH RX MED GY IP 250 OP 636 PS 637: Performed by: PSYCHIATRY & NEUROLOGY

## 2020-06-27 PROCEDURE — G0378 HOSPITAL OBSERVATION PER HR: HCPCS

## 2020-06-27 PROCEDURE — 85027 COMPLETE CBC AUTOMATED: CPT | Performed by: PSYCHIATRY & NEUROLOGY

## 2020-06-27 RX ORDER — DIAZEPAM ORAL SOLUTION (CONCENTRATE) 5 MG/ML
5 SOLUTION ORAL
Qty: 30 ML | Refills: 5 | Status: SHIPPED | OUTPATIENT
Start: 2020-06-27 | End: 2020-08-24

## 2020-06-27 RX ORDER — DIAZEPAM ORAL SOLUTION (CONCENTRATE) 5 MG/ML
5 SOLUTION ORAL
Qty: 30 ML | Refills: 5 | Status: SHIPPED | OUTPATIENT
Start: 2020-06-27 | End: 2020-06-27

## 2020-06-27 RX ORDER — DEXAMETHASONE 4 MG/1
4 TABLET ORAL
Qty: 40 TABLET | Refills: 1 | Status: SHIPPED | OUTPATIENT
Start: 2020-06-27 | End: 2020-08-24

## 2020-06-27 RX ORDER — LEVETIRACETAM 1000 MG/1
2000 TABLET ORAL 2 TIMES DAILY
Qty: 80 TABLET | Refills: 4 | Status: SHIPPED | OUTPATIENT
Start: 2020-06-27 | End: 2020-08-24

## 2020-06-27 RX ORDER — DIAZEPAM ORAL SOLUTION (CONCENTRATE) 5 MG/ML
5 SOLUTION ORAL EVERY 8 HOURS PRN
Qty: 30 ML | Refills: 5 | Status: CANCELLED | OUTPATIENT
Start: 2020-06-27

## 2020-06-27 RX ORDER — LACOSAMIDE 200 MG/1
200 TABLET ORAL 2 TIMES DAILY
Qty: 60 TABLET | Refills: 3 | Status: SHIPPED | OUTPATIENT
Start: 2020-06-27 | End: 2020-08-24

## 2020-06-27 RX ORDER — LACOSAMIDE 200 MG/1
200 TABLET ORAL 2 TIMES DAILY
Qty: 30 TABLET | Refills: 3 | Status: SHIPPED | OUTPATIENT
Start: 2020-06-27 | End: 2020-06-27

## 2020-06-27 RX ORDER — LEVETIRACETAM 1000 MG/1
2000 TABLET ORAL 2 TIMES DAILY
Qty: 60 TABLET | Refills: 3 | Status: SHIPPED | OUTPATIENT
Start: 2020-06-27 | End: 2020-06-27

## 2020-06-27 RX ORDER — DEXAMETHASONE 4 MG/1
4 TABLET ORAL
Qty: 30 TABLET | Refills: 1 | Status: SHIPPED | OUTPATIENT
Start: 2020-06-27 | End: 2020-06-27

## 2020-06-27 RX ADMIN — DEXAMETHASONE 4 MG: 4 TABLET ORAL at 08:45

## 2020-06-27 RX ADMIN — DILTIAZEM HYDROCHLORIDE 180 MG: 180 CAPSULE, COATED, EXTENDED RELEASE ORAL at 08:45

## 2020-06-27 RX ADMIN — LACOSAMIDE 200 MG: 100 TABLET, FILM COATED ORAL at 08:54

## 2020-06-27 RX ADMIN — ATENOLOL 50 MG: 25 TABLET ORAL at 08:45

## 2020-06-27 RX ADMIN — LEVETIRACETAM 2000 MG: 500 TABLET, FILM COATED ORAL at 08:45

## 2020-06-27 RX ADMIN — SERTRALINE HYDROCHLORIDE 150 MG: 100 TABLET ORAL at 08:45

## 2020-06-27 RX ADMIN — LISINOPRIL 5 MG: 5 TABLET ORAL at 08:45

## 2020-06-27 NOTE — PROGRESS NOTES
OBS GOALS  -vital signs normal or at patient baseline: Met   -tolerating oral intake to maintain hydration: Met   -Neurosurgery consult: not done  Status: Pt was admitted for a seizure at home  Vitals: VSS on RA  Neuros: Difficult to assess neuro status due to severe aphasia, oriented to self and hospital, strengths 5/5, denies N/T No seizures noted.  IV: PIV is SL  Resp/trach: Lung sounds are clear  Diet: Reg  Bowel status: No BM  : Voiding spontaneously  Skin: Intact  Pain: Denied  Activity: bedrest overnight.  Plan: Will continue to monitor and follow POC.

## 2020-06-27 NOTE — PROGRESS NOTES
OBS GOALS  -vital signs normal or at patient baseline: Met   -tolerating oral intake to maintain hydration: Met   -Neurosurgery consult: not done.

## 2020-06-27 NOTE — PROGRESS NOTES
OBS GOALS  -vital signs normal or at patient baseline: Met   -tolerating oral intake to maintain hydration: Met   -Neurosurgery consult: Not met

## 2020-06-27 NOTE — PROGRESS NOTES
Pt discharged with family to home, brought to front door via wheelchair. All belongings retrieved from security and all questions answered. PIV's removed.

## 2020-06-27 NOTE — DISCHARGE SUMMARY
[facility]  NEUROLOGY DISCHARGE SUMMARY    Patient Name:  Jayden Lackey  MRN:  [id number]      :  1955      Date of Admission:  2020  Date of Discharge:  2020  Admitting Physician:  Meek Bhakta MD  Discharge Physician:  Meek Bhakta MD  Primary Care Provider:   Jhoan Farooq  Discharge Disposition:   Discharged to home    Admission Diagnoses:  Partial onset seizure without secondary generalization   Hx of grand mal seizures  Glioblastoma Multiforme  AF  HTN  DVT/PE  depression  HTN    Discharge Diagnoses:    Partial onset seizure without secondary generalization   Hx of grand mal seizures  Glioblastoma Multiforme  AF  HTN  DVT/PE  depression  HTN    Brief History of Illness:   History is obtained from the patient's wife      65M hx GBM s/p resections 3/22/19 and 10/24/19 complicated by partial onset  With secondary generalization seizures and requiring multiple admissions last .  , , and   he experienced partial seizures with retention of consciousness  At which point he would notice smells, subjective feeling of feeling 'off' and overall be  able to articulate what is occurring so that wife can begin directed treatment which was given at prior discharge of administering 2.5mg of diazepam in 5 minute intervals until seizure breaks. Unfortunately, today wife walked in on  after the  had been alone for 20 minutes at which point wife noticed  at 9:21 to be in state of consciousness however inability to speak with classic right going vision and inability to respond to commands at which point at 9:23 she began dosing his diazepam in 5 minute intervals  For total of 7.5mg, however his seizure failed to break.     At this point, she called 911, and he presented to ER. Found to have right sided weakness at this time consistent with past 4 prior presentations following/during seizure episodes however given  deficits underwent CT/CTA of head and neck to evaluate for stroke. Imaging unremarkable, and as such general neurology was asked to evaluate patient for recurrent seizures. He was loaded with keppra 2 grams and lacosamide 250mg (home dose of each is keppra 1750mg BID at 9AM/9PM and vimpat BID 150mg 9A/9PM. I do note that he was unable to receive his anti-epileptics this morning due to seizures, and as such went 2 hours without antiepileptic dosing and by extension his seizure occurred after 12 hours since last anti-epileptic dosing.        Hospital Course:     #Partial onset seizure without secondary generalization   #Hx of grand mal seizures  #Glioblastoma Multiforme  Unclear trigger for current seizure. Hx of 3 interval seizures between today's and last presentation requiring hospitalization 4/1 may suggest some degree of inadequate outpatient AED regimen +/- occult infection, stress, or lack of adequate/normal sleep schedule.Now with MRI with question of disease progression and increased brain edema. Pt was admitted in general Neurology service for monitoring, since he was not at back to baseline cognition.Given persistent breakthrough seizures requiring PRN diazepam and breakthrough despite PRN, we recommend increasing his anti-epileptics to a new regimen of below:   -Increase Keppra 2000mg every 12 hours   -Increase Vimpat 200mg every 12 hours   -increase dexamethasone to 4mg every day    Chronic problems:  #AF: diltiazem ER 360mg daily and rivaroxaban 20mg every day  #HTN: lisinopril 5mg every day  #DVT/PE: rivaroxaban 20mg every day  #depression: sertraline 150mg every day  #HTN: atenolol 50mg every day, lisinopril 5mg every day    Pertinent Investigations:  MR BRAIN W/O & W CONTRAST 6/26/2020   Impression:  1. No acute intracranial pathology.  2. Relatively stable postoperative changes from left occipital  glioblastoma resection. Although there is increased conspicuity of  marginal enhancement, the overall  extent is unchanged. Recommend  continued surveillance.     CTA  HEAD NECK WITH CONTRAST 6/26/2020   Impression:    1. Head CTA demonstrates no aneurysm or stenosis of the major  intracranial arteries.   2. Neck CTA demonstrates no stenosis of the major cervical arteries.     CT HEAD W/O CONTRAST 6/26/2020   Impression:   1. No acute intracranial pathology.  2. Postsurgical changes left parietal craniotomy for underlying mass  resection. Surrounding edema within the left parietal occipital white  matter.    Consultations:    None    Recommendations and Follow-up:    Follow up with Oncology on 6/29/20    Discharge physical examination:   BP (!) 141/94 (BP Location: Right arm)   Pulse 56   Temp 98.4  F (36.9  C) (Oral)   Resp 16   Wt 108.3 kg (238 lb 12.8 oz)   SpO2 99%   BMI 29.85 kg/m    Physical Examination   Vitals: BP (!) 141/94 (BP Location: Right arm)   Pulse 56   Temp 98.4  F (36.9  C) (Oral)   Resp 16   Wt 108.3 kg (238 lb 12.8 oz)   SpO2 99%   BMI 29.85 kg/m    General: lying in bed, NAD  HEENT: NC/AT, no icterus, op pink and moist  Cardiac: RRR  Chest: non-labored on RA  Abdomen: S/NT/ND  Extremities: Warm, no edema  Skin: No rash or lesion   Psych: Mood pleasant, affect congruent  Neuro:  Mental Status:    Level of consciousness: alert and awake  Orientation: non oriented x3   Language: Follow simple commands, Unable to name, repeat and read, answers questions with Yes and no and shaking the head  Attention / concentration - normal   Cranial Nerves:    Cranial nerves II through XII are intact     Motor:    Muscle bulk - normal  Muscle tone - normal  Muscle strength: 5/5 on left sided extremities; 4/5 right sided extremities, no drift  Speed and dexterity - normal   Sensory perception:    Light touch - normal   Reflexes:    Deep tendon reflexes- normal  Babinski - normal   Cerebellar Coordination:    FTN intact   Gait / Stance:    Not assessed due to level of consciousness       Discharge  Medications:  Discharge Medication List as of 6/27/2020 10:54 AM      CONTINUE these medications which have CHANGED    Details   dexamethasone (DECADRON) 4 MG tablet Take 1 tablet (4 mg) by mouth daily (with breakfast), Disp-30 tablet,R-1, E-Prescribe      diazepam (VALIUM) 5 MG/ML (HIGH CONC) solution Take 1 mL (5 mg) by mouth every 3 minutes as needed for seizures (lasting greater than 5 minutes.) If seizure continues, repeat with 0.5mL by mouth every 2-3 minutes up to a max of 7.5mL in 1 day. Do not give if patient has very shallow or slow breathing ., Disp-30 mL,R-5, Local Print      lacosamide (VIMPAT) 200 MG TABS tablet Take 1 tablet (200 mg) by mouth 2 times daily, Disp-60 tablet,R-3, Local Print      levETIRAcetam (KEPPRA) 1000 MG tablet Take 2 tablets (2,000 mg) by mouth 2 times daily, Disp-60 tablet,R-3, E-Prescribe         CONTINUE these medications which have NOT CHANGED    Details   acetaminophen (TYLENOL) 325 MG tablet Take 2 tablets (650 mg) by mouth every 4 hours as needed for other (multimodal surgical pain management along with NSAIDS and opioid medication as indicated based on pain control and physical function.), Disp-28 tablet, R-3, Local Print      atenolol (TENORMIN) 100 MG tablet Take 50 mg by mouth daily, Historical      cholecalciferol (VITAMIN D3) 5000 units (125 mcg) capsule Take 5,000 Units by mouth daily, Historical      diltiazem ER (DILT-XR) 180 MG 24 hr capsule Take 180 mg by mouth daily, Historical      docusate sodium (COLACE) 50 MG capsule Take 50 mg by mouth 2 times daily as needed for constipation, Historical      lisinopril (PRINIVIL/ZESTRIL) 5 MG tablet Take 1 tablet (5 mg) by mouth daily, Disp-28 tablet, R-0, E-Prescribe      ondansetron (ZOFRAN) 4 MG tablet 4 mg every 4 hours as needed, Historical      rivaroxaban ANTICOAGULANT (XARELTO ANTICOAGULANT) 20 MG TABS tablet Take 1 tablet (20 mg) by mouth daily (with dinner), Disp-30 tablet,R-3, E-Prescribe      sertraline  (ZOLOFT) 100 MG tablet Take 1.5 tablets (150 mg) by mouth daily, Disp-30 tablet,R-3, E-Prescribe             Discharge follow up and instructions:     Reason for your hospital stay    Breakthrough seizure     Adult [facility] Follow-up and recommended labs and tests    Follow up with primary care provider, Jhoan Farooq, within 1-2 weeks for hospital follow- up.    Appointments on [place] and/or [facility] (with [facility] or [facility] provider or service). Call [phone] if you haven't heard regarding these appointments within 7 days of discharge.     Activity    Your activity upon discharge: activity as tolerated     Full Code     Diet    Follow this diet upon discharge: Orders Placed This Encounter      Regular Diet Adult       Patient seen and discussed with Dr. Bhakta.    Austin Corrigan MD  Neurology Resident PGY2  Pager [phone]

## 2020-06-27 NOTE — PLAN OF CARE
OBS GOALS  -vital signs normal or at patient baseline: Met   -tolerating oral intake to maintain hydration: Met   -Neurosurgery consult: Met; nsg will not be consulted per primary team.

## 2020-06-27 NOTE — PLAN OF CARE
Status: Pt was admitted for a seizure at home  Vitals: VSS on RA  Neuros: Difficult to assess neuro status due to severe aphasia, oriented to self and hospital, strengths 5/5, denies N/T  IV: PIV is SL  Resp/trach: Lung sounds are clear  Diet: Reg, tolerating   Bowel status: Bowel sounds are active  : Voiding spontaneously  Skin: Intact  Pain: Denied  Activity: SBA w/ GB  Social: Wife was updated by RN  Plan: Will continue to monitor and follow POC, waiting completion of OBS goals

## 2020-06-28 ENCOUNTER — DOCUMENTATION ONLY (OUTPATIENT)
Dept: CARE COORDINATION | Facility: CLINIC | Age: 65
End: 2020-06-28

## 2020-06-28 LAB — LACOSAMIDE SERPL-MCNC: 7 UG/ML (ref 5–10)

## 2020-06-29 ENCOUNTER — VIRTUAL VISIT (OUTPATIENT)
Dept: ONCOLOGY | Facility: CLINIC | Age: 65
End: 2020-06-29
Attending: PSYCHIATRY & NEUROLOGY
Payer: COMMERCIAL

## 2020-06-29 DIAGNOSIS — C71.9 GLIOBLASTOMA (H): Primary | ICD-10-CM

## 2020-06-29 DIAGNOSIS — R53.0 NEOPLASTIC MALIGNANT RELATED FATIGUE: ICD-10-CM

## 2020-06-29 PROCEDURE — 99215 OFFICE O/P EST HI 40 MIN: CPT | Mod: 95 | Performed by: PSYCHIATRY & NEUROLOGY

## 2020-06-29 PROCEDURE — 40001009 ZZH VIDEO/TELEPHONE VISIT; NO CHARGE

## 2020-06-29 RX ORDER — METHYLPHENIDATE HYDROCHLORIDE 5 MG/1
5 TABLET ORAL 2 TIMES DAILY
Qty: 60 TABLET | Refills: 0 | Status: SHIPPED | OUTPATIENT
Start: 2020-06-29 | End: 2020-08-24

## 2020-06-29 NOTE — LETTER
"    6/29/2020         RE: Jayden Lackey  [address]  [place] [address]        Dear Colleague,    Thank you for referring your patient, Jayden Lackey, to the [facility]. Please see a copy of my visit note below.    Jayden Lackey is a 65 year old male who is being evaluated via a billable video visit.      The patient has been notified of following:     \"This video visit will be conducted via a call between you and your physician/provider. We have found that certain health care needs can be provided without the need for an in-person physical exam.  This service lets us provide the care you need with a video conversation.  If a prescription is necessary we can send it directly to your pharmacy.  If lab work is needed we can place an order for that and you can then stop by our lab to have the test done at a later time.    Video visits are billed at different rates depending on your insurance coverage.  Please reach out to your insurance provider with any questions.    If during the course of the call the physician/provider feels a video visit is not appropriate, you will not be charged for this service.\"    Patient has given verbal consent for Video visit? Yes    Will anyone else be joining your video visit? No     Discuss MRI results.    send invite to [email or web address]    Emeterio Valdez LPN    _____________________________________________________________    NEURO-ONCOLOGY VISIT  Jun 29, 2020    CHIEF COMPLAINT: Mr. 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 He completed chemoradiotherapy on 6/11/2019. Imaging in 9/2019 was consistent with recurrent disease and adjuvant-dosed temozolomide was stop. Pathology from repeat resection on 10/24/2019 was consistent with recurrent tumor. He was started on lomustine, but stopped " after 1 cycle due to significant chemotherapy-induced fatigue.     Quality of life has been compromised by recurrent seizure events, PE requiring prolonged hospitalization, and chemotherapy-related toxicities.    Currently, he is not on any cancer-directed therapy.     I met with Jayden and Oz (wife) for this follow-up visit today.      HISTORY OF PRESENT ILLNESS  -Jayden is doing fairly well.   -Unfortunately, he experienced a breakthrough seizure and was admitted a few days ago due to prolonged post ictal state. He rapidly cleared by next day. Keppra was increased to 2000mg BID and Vimpat was increased to 200mg BID. Dexamethasone was increased to 4mg daily. Using Valium as needed.   -Feeling anxious and depressed. More of a better quality of life in the past few months.   -Physically, had been walking for 30 minutes at a time.   -Worsening memory. Cognition is more slow. Issues with word-finding, improved since discharge.   -No headaches.   -Visual field cut is stable.  -More fatigued, naps during the day.    -No arthritis pain lately.    REVIEW OF SYSTEMS  A comprehensive ROS negative except as in HPI.      MEDICATIONS   Current Outpatient Medications   Medication Sig Dispense Refill     acetaminophen (TYLENOL) 325 MG tablet Take 2 tablets (650 mg) by mouth every 4 hours as needed for other (multimodal surgical pain management along with NSAIDS and opioid medication as indicated based on pain control and physical function.) 28 tablet 3     atenolol (TENORMIN) 100 MG tablet Take 50 mg by mouth daily       cholecalciferol (VITAMIN D3) 5000 units (125 mcg) capsule Take 5,000 Units by mouth daily       dexamethasone (DECADRON) 2 MG tablet Take 1 tablet (2 mg) by mouth daily (with breakfast) 30 tablet 3     diazepam (VALIUM) 5 MG/ML (HIGH CONC) solution Take 1 mL (5 mg) by mouth every 3 minutes as needed for seizures (lasting greater than 5 minutes.) If seizure continues, repeat with 0.5mL by mouth every 2-3  minutes up to a max of 7.5mL in 1 day. Do not give if patient has very shallow or slow breathing. 30 mL 0     diltiazem ER (DILT-XR) 180 MG 24 hr capsule Take 180 mg by mouth daily       docusate sodium (COLACE) 50 MG capsule Take 50 mg by mouth 2 times daily as needed for constipation       Lacosamide (VIMPAT) 100 MG TABS tablet Take 150 mg by mouth 2 times daily        levETIRAcetam (KEPPRA) 1000 MG tablet Take 1.5 tablets (1,500 mg) by mouth 2 times daily In addition to 1 tab of 250mg Keppra for a total of 1,750mg Keppra twice/day.  1     levETIRAcetam (KEPPRA) 250 MG tablet Take 1 tablet (250 mg) by mouth 2 times daily In addition to the 1,500mg dose you were taking previously for a total of 1,750mg keppra twice/day 60 tablet 0     lisinopril (PRINIVIL/ZESTRIL) 5 MG tablet Take 1 tablet (5 mg) by mouth daily 28 tablet 0     ondansetron (ZOFRAN) 4 MG tablet 4 mg every 4 hours as needed       rivaroxaban ANTICOAGULANT (XARELTO ANTICOAGULANT) 20 MG TABS tablet Take 1 tablet (20 mg) by mouth daily (with dinner) 30 tablet 3     sertraline (ZOLOFT) 100 MG tablet Take 1.5 tablets (150 mg) by mouth daily 30 tablet 3     DRUG ALLERGIES   Allergies   Allergen Reactions     Shellfish Allergy Difficulty breathing, Nausea and Vomiting, Swelling and Shortness Of Breath     No Clinical Screening - See Comments      Tyrone ferrell doesn't remember reaction       ONCOLOGIC HISTORY  -1/2019 PRESENTATION: Visual disturbance.   -3/6/2019 Evaluated by Dr. Mauricio, neuro-ophthalmologist, found to have a right sided homonymous hemianopsia. MRI ordered.   -3/6/2019 MR brain imaging revealed a contrast-enhancing lesion in the left occipital lobe suspicious for a high-grade primary brain tumor.  -3/22/2019 SURGERY: Craniotomy with a gross total resection by Dr. Irving Hayes.   PATHOLOGY: Glioblastoma; Wildtype status for IDH1/2 by next generation sequencing. MGMT promoter methylated. Wildtype PTEN, TP53.  -4/8/2019 NEURO-ONC: Recommending  chemoradiotherapy.   -4/30 - 6/11/2019 CHEMORADS 6000cGy in 30 fractions with concurrent temozolomide 75mg/m2 (180mg).  -5/20/2019 NEURO-ONC: Not interested in Optune at this time.   -6/12/2019 NEURO-ONC: Clinically stable.   -6/21 - 6/28/2019 ADMISSION/ SZ: Admitted to [facility] for first ever seizure event, provoked by hyponatremia. Urine studies consistent with SIADH ; etiology of SIADH unclear. Had 10mm gastric lesion biopsied on 6/26/2019 by way of an EGD.  PATHOLOGY of gastric lesion: Hyperplastic polyp with erosion and foci of atypia; favor reactive. No intestinal metaplasia, no helicobacter pylori, no high grade dysplasia or invasive carcinoma.   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   -7/12/2019 NEURO-ONC/ MRB/ CHEMO: Clinical stable. Imaging with stable to slightly increased enhancement of previously seen nodular lesions; associated with mild degree of elevated rCBV. Starting adjuvant-dosed temozolomide 150mg/m2 (360mg), cycle 1 (start date 7/12/2019). Monitor fluid intake, will be checking CMP/ Na+.   -8/12/2019 NEURO-ONC/ MRB/ CHEMO: Clinical decompensated, but improving since hospital discharge. MR brain scan from last week with concern for non-contrast enhancing >> enhancing disease progression. Ordering a PET brain scan. Holding adjuvant-dosed temozolomide, cycle 3.  -10/24/2019 SURGERY: Repeat resection by Dr. Packer.  PATHOLOGY: Recurrent glioblastoma.   Next generation sequencing by [facility]; Microsatellite instability; stable. Tumor mutational burden; 4 (low). Mutations in ARID1A, ASXL1, ATRX, FANCE, MED12, MEF2B, NF1, RUNX1, TERT.   -11/25/2019 NEURO-ONC/ MRB/ CHEMO: Clinically doing well. Imaging with positive resection. Starting Lomustine (start date of 12/2). Next generation sequencing.   -12/4/2019 CHEMO: Lomustine, cycle 1.   -1/6/2020 NEURO-ONC: Clinically stable. Has TCP with Lomustine. Will hold off on starting next week until he sees Dr. Olmos with new imaging.  -1/31/2020 NEURO-ONC/  MRB/ CHEMO: Clinically doing well. Imaging with no evidence of disease recurrence. Holding Lomustine in the setting of a poor emotional state. Referrals to Dr. Aleksandr Garcia for neuro-psych testing, Dr. Eugene Vargas for counseling/ coping, and palliative care. Next generation sequencing results discussed. Increase Zoloft. Continue dexamethasone 2mg daily.  -4/1/2020 ADMISSION/ SZ- Keppra increased, added diazepam PRN, continue Vimpat.   -4/1/2020 MRB with no significant change.   -4/13/2020 NEURO-ONC: Clinically improved since hospitalization. Continuing imaging surveillance. Increase Zoloft.   -6/26/2020 ADMISSION/ SZ- Keppra and Vimpat increased, continued diazepam PRN.   -6/29/2020 NEURO-ONC/ MRB: Clinically improved since hospitalization. Imaging largely stable. Continuing imaging surveillance. Trial of ritalin.     SOCIAL HISTORY   Tobacco use: Former smoker, quit 40 years ago.   Alcohol use: Social use.  Drug use: Denies marijuana use.  Supplement, complimentary/ alternative medicine: None.    .   Employment: On disability.       PHYSICAL EXAMINATION  -Generally well appearing. Weight gain, moon-face.   -Respiratory: No audible wheezing.   -Skin: No facial rashes.  -Psychiatric: Flat mood and affect. Pleasant, talkative.  -Neurologic:   MENTAL STATUS:     Alert.   Confused at times, slowed cognition and processing.     Speech hesitant, word-finding issues.    Comprehension impaired to complex information.     CRANIAL NERVES:     Pupils are equal, round, reactive to light.     Extraocular movements full, patient denies diplopia.     Homonymous hemianopsia, right-side   Symmetric facial movements.   Hearing intact.  MOTOR: Antigravity in arms. Walking is slow, stable.   SENSATION: Intact to light touch throughout.     The rest of a comprehensive physical examination is deferred due to PHE (public health emergency) video visit restrictions.        MEDICAL RECORDS  Obtained and personally reviewed all  available outside medical records in addition to reviewing any records available in our electronic system.     LABS  Personally reviewed all available lab results.     IMAGING  Personally reviewed MR brain imaging from last week and compared to prior imaging. To me eye, the scan is essentially stable, although there is increased conspicuity of marginal enhancement, the overall extent is unchanged.    Imaging results were reviewed with Jayden and Kailey Edmondson.     Imaging and case to be reviewed and discussed at Brain Tumor Conference.        IMPRESSION  Clinic time was spent discussing in detail the nature of this tumor in light of repeat imaging. This was in addition to providing emotional support, answering questions pertaining to my recommendations and devising the treatment plan as outlined below.      Clinically, from a physical standpoint, Jayden is doing well and nearing his baseline since his most recent breakthrough seizure event. He is tolerating the dose increase in Keppra and Vimpat + dexamethasone well. Will wean down on dexamethasone and continue to aggressively manage seizures with Keppra and Vimpat plus prescribed diazepam PRN as an abortive therapy. Trial of ritalin for cognition, low energy, and poor motivation.     In the setting of grossly stable imaging results from today, will hold on further cancer-directed therapy. In the setting of an incurable cancer, Jayden and Oz are clear about wanting high quality of life over quantity of life.      Continue to hold on starting Avastin as patient is doing well and is largely asymptomatic and there is no significant contrast enhancement seen on imaging    PROBLEM LIST  Glioblastoma, MGMT methylated and IDH1 wildtype by NGS  Visual field cut; homonymous hemianopsia, right-side  Memory issues  Drug-induced constipation  Hyperplastic gastric polyps, benign  H/o SIADH  Epilepsy    PLAN  -CANCER DIRECTED THERAPY-  -As above; Holding cancer-directed therapy for  now.   -Next generation sequencing with no targetable mutations.   -Repeat imaging in 2 months.     -STEROIDS-  -Wean dexamethasone;   4mg daily for 1 week   4mg daily alternating with 2mg daily for 1 week   2mg daily     -SEIZURE MANAGEMENT-  -Given history of seizures, will continue Keppra + Vimpat.  -Diazepam PRN for abortive management: Give 1 ml (5mg) for seizures greater than 5 minutes or at time of aura. May repeat 0.5 ml (2.5 mg). Do not exceed 7.5 mg per day.     -Quality of life/ MOOD/ FATIGUE-  -Continued mood issues; depression, anxiety, irritability, poor coping.  -Trial of Ritalin; 5mg in AM or 5mg in AM and 5mg before 2pm or 10mg in AM.    -Continue Zoloft; consideration for dose increase to 200mg daily.  -Added to Brain Tumor Support Group email list; [email or web address]  -Physical therapy/ daily exercises.   -Referred to Dr. Eugene Vargas for counseling/ coping.   -Referred to palliative care.     -HICCUPS-  -Pepcid.  -Likely Steroid related.  -Possibly Baclofen (muscle relaxer) if needed.    -HYPONATREMIA/ History of SIADH-  -Monitor amount of fluid intake.   -Continue to track sodium level on routine CMP.     -MEMORY COMPLAINTS-  -Previously referred to Dr. Aleksandr Garcia for neuro-psych testing, not scheduled.   -Speech therapy completed.    -DVT/ PE-  -Need for lifelong anticoagulation.  -Continue Xarelto.  -If platelets drop <50, will need to hold anticoagulation    -ADDITIONAL SUPPORTIVE MANAGEMENT-  -Social work following.   -CT with lung nodules. Following with [facility].  -S/p EGD with benign pathology of the 10mm gastric antrum/pylorus posterior wall lesion.  -Breast biopsy showed necrotic issue, no malignancy.    Return to clinic in 1 month with BRIONNA Vazquez and then, with me + imaging in 8/2020.     In the meantime, Jayden and Oz know to call with questions or concerns or to report new complaints and can be seen sooner if needed.     Nasra Olmos,  MD  Neuro-oncology      Video-Visit Details  Type of service:  Video Visit    Video Start Time: 10:24 AM  Video End Time: 11:02 AM    Originating Location (pt. Location): Home    Distant Location (provider location):  [facility]     Platform used for Video Visit: Isaías Olmos MD      Again, thank you for allowing me to participate in the care of your patient.        Sincerely,        Nasra Olmos MD

## 2020-06-29 NOTE — PROGRESS NOTES
Patient has clinic visit within 24-48 hours of discharge so no post DC follow up call is needed    Name: Jayden Lackey MRN: [id number]   Date: 6/29/2020 Status: [facility]   Time: 10:00 AM Length: 30   Visit Type: VIDEO VISIT RETURN [2640] GREY: [id number]   Provider: Nasra Olmos MD Department:  ONCOLOGY ADULT     Sheryl Farr CMA   Post Hospital Discharge Team

## 2020-07-22 DIAGNOSIS — C71.9 GLIOBLASTOMA (H): ICD-10-CM

## 2020-07-22 RX ORDER — SERTRALINE HYDROCHLORIDE 100 MG/1
150 TABLET, FILM COATED ORAL DAILY
Qty: 30 TABLET | Refills: 3 | Status: SHIPPED | OUTPATIENT
Start: 2020-07-22 | End: 2020-08-24

## 2020-08-23 PROBLEM — T45.1X5A CHEMOTHERAPY INDUCED NEUTROPENIA (H): Status: RESOLVED | Noted: 2019-04-09 | Resolved: 2020-08-23

## 2020-08-23 PROBLEM — D70.1 CHEMOTHERAPY INDUCED NEUTROPENIA (H): Status: RESOLVED | Noted: 2019-04-09 | Resolved: 2020-08-23

## 2020-08-23 NOTE — PROGRESS NOTES
"Jayden Lackey is a 65 year old male who is being evaluated via a billable video visit.      The patient has been notified of following:     \"This video visit will be conducted via a call between you and your physician/provider. We have found that certain health care needs can be provided without the need for an in-person physical exam.  This service lets us provide the care you need with a video conversation.  If a prescription is necessary we can send it directly to your pharmacy.  If lab work is needed we can place an order for that and you can then stop by our lab to have the test done at a later time.    Video visits are billed at different rates depending on your insurance coverage.  Please reach out to your insurance provider with any questions.    If during the course of the call the physician/provider feels a video visit is not appropriate, you will not be charged for this service.\"    Patient has given verbal consent for Video visit? Yes  How would you like to obtain your AVS? MyChart  If you are dropped from the video visit, the video invite should be resent to: Text to cell phone: [phone]  Will anyone else be joining your video visit? No    SWETA Hays      Video-Visit Details  Type of service:  Video Visit    Video Start Time: 3:24 PM  Video End Time: 3:50 PM    Originating Location (pt. Location): Home    Distant Location (provider location):  [facility]     Platform used for Video Visit: Isaías Olmos MD    ________________________________________________________    NEURO-ONCOLOGY VISIT  Aug 24, 2020    CHIEF COMPLAINT: Mr. 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 He completed chemoradiotherapy on 6/11/2019. Imaging in 9/2019 was consistent with recurrent disease and adjuvant-dosed temozolomide was " stop. Pathology from repeat resection on 10/24/2019 was consistent with recurrent tumor. He was started on lomustine, but stopped after 1 cycle due to significant chemotherapy-induced fatigue.     Quality of life has been compromised by recurrent seizure events, PE requiring prolonged hospitalization, and chemotherapy-related toxicities.    Currently, he is not on any cancer-directed therapy and managed on imaging surveillance.     I met with Jayden and Oz (wife) for this follow-up visit today.      HISTORY OF PRESENT ILLNESS  -Jayden has enjoyed having family visit. He is less depressed when able to be social. He did hava a seizure when the family was visiting and his wife wondered if excess stimulation was the cause.   -He discontinued the Ritalin after about a week and a half because there was no big changes. Started Ritalin to help with projects. He has a problem with hyper focus with projects  -Needed Valium 4x in August. 2x in July. To abort seizures. 3/4 in August were in the last couple days when family was staying with them   -Having difficulties doing projects. Feels like he can take on more than he can. His wife worries because he tries to do things like electrical, using wires and making holes and mechanical stuff which she thinks are dangerous. She is very stressed out because of the impulsivity and judgment related issues.  -Still struggling with word finding and repeating words. Hard time naming objects. Memory and concentration are also poor.  -Sleeps very well.  -Good appetite.   -Remains on dex 2 mg daily. No changes with dose decreases.  -He is depressed and very emotional at times. He is on Zoloft and increased in a dose increase.   -Walking almost daily whether inside or outside with his wife. He gets tired easily after 15 minutes or 20 minutes of walk. He also continued to have right sided weakness and dropping things from his hands and his balance is off. The cognitive functions and the  balance is fluctuating and varies from day to day.   -He continued to have right visual field cut that also makes his walking worse.   -Oz is really struggling with how to deal with him and keep him safe. She herself started using antidepressants to deal with that stress. She asked about palliative care MD and  to contact her for help. Referral placed.     REVIEW OF SYSTEMS  A comprehensive ROS negative except as in HPI.      MEDICATIONS   Current Outpatient Medications   Medication Sig Dispense Refill     atenolol (TENORMIN) 100 MG tablet Take 50 mg by mouth daily       dexamethasone (DECADRON) 4 MG tablet Take 1 tablet (4 mg) by mouth daily (with breakfast) (Patient taking differently: Take 2 mg by mouth daily (with breakfast) ) 40 tablet 1     diazepam (VALIUM) 5 MG/ML (HIGH CONC) solution Take 1 mL (5 mg) by mouth every 3 minutes as needed for seizures (lasting greater than 5 minutes.) If seizure continues, repeat with 0.5mL by mouth every 2-3 minutes up to a max of 7.5mL in 1 day. Do not give if patient has very shallow or slow breathing. 30 mL 5     diltiazem ER (DILT-XR) 180 MG 24 hr capsule Take 180 mg by mouth daily       lacosamide (VIMPAT) 200 MG TABS tablet Take 1 tablet (200 mg) by mouth 2 times daily 60 tablet 3     levETIRAcetam (KEPPRA) 1000 MG tablet Take 2 tablets (2,000 mg) by mouth 2 times daily 80 tablet 4     lisinopril (PRINIVIL/ZESTRIL) 5 MG tablet Take 1 tablet (5 mg) by mouth daily 28 tablet 0     rivaroxaban ANTICOAGULANT (XARELTO ANTICOAGULANT) 20 MG TABS tablet Take 1 tablet (20 mg) by mouth daily (with dinner) 30 tablet 3     sertraline (ZOLOFT) 100 MG tablet Take 2 tablets (200 mg) by mouth daily 60 tablet 3     docusate sodium (COLACE) 50 MG capsule Take 50 mg by mouth 2 times daily as needed for constipation       ondansetron (ZOFRAN) 4 MG tablet 4 mg every 4 hours as needed       DRUG ALLERGIES   Allergies   Allergen Reactions     Shellfish Allergy Difficulty  breathing, Nausea and Vomiting, Swelling and Shortness Of Breath     No Clinical Screening - See Comments      Tyrone ferrell doesn't remember reaction       ONCOLOGIC HISTORY  -1/2019 PRESENTATION: Visual disturbance.   -3/6/2019 Evaluated by Dr. Mauricio, neuro-ophthalmologist, found to have a right sided homonymous hemianopsia. MRI ordered.   -3/6/2019 MR brain imaging revealed a contrast-enhancing lesion in the left occipital lobe suspicious for a high-grade primary brain tumor.  -3/22/2019 SURGERY: Craniotomy with a gross total resection by Dr. Irving Hayes.   PATHOLOGY: Glioblastoma; Wildtype status for IDH1/2 by next generation sequencing. MGMT promoter methylated. Wildtype PTEN, TP53.  -4/8/2019 NEURO-ONC: Recommending chemoradiotherapy.   -4/30 - 6/11/2019 CHEMORADS 6000cGy in 30 fractions with concurrent temozolomide 75mg/m2 (180mg).  -5/20/2019 NEURO-ONC: Not interested in Optune at this time.   -6/12/2019 NEURO-ONC: Clinically stable.   -6/21 - 6/28/2019 ADMISSION/ SZ: Admitted to [facility] for first ever seizure event, provoked by hyponatremia. Urine studies consistent with SIADH ; etiology of SIADH unclear. Had 10mm gastric lesion biopsied on 6/26/2019 by way of an EGD.  PATHOLOGY of gastric lesion: Hyperplastic polyp with erosion and foci of atypia; favor reactive. No intestinal metaplasia, no helicobacter pylori, no high grade dysplasia or invasive carcinoma.   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   -7/12/2019 NEURO-ONC/ MRB/ CHEMO: Clinical stable. Imaging with stable to slightly increased enhancement of previously seen nodular lesions; associated with mild degree of elevated rCBV. Starting adjuvant-dosed temozolomide 150mg/m2 (360mg), cycle 1 (start date 7/12/2019). Monitor fluid intake, will be checking CMP/ Na+.   -8/12/2019 NEURO-ONC/ MRB/ CHEMO: Clinical decompensated, but improving since hospital discharge. MR brain scan from last week with concern for non-contrast enhancing >> enhancing  disease progression. Ordering a PET brain scan. Holding adjuvant-dosed temozolomide, cycle 3.  -10/24/2019 SURGERY: Repeat resection by Dr. Packer.  PATHOLOGY: Recurrent glioblastoma.   Next generation sequencing by [facility]; Microsatellite instability; stable. Tumor mutational burden; 4 (low). Mutations in ARID1A, ASXL1, ATRX, FANCE, MED12, MEF2B, NF1, RUNX1, TERT.   -11/25/2019 NEURO-ONC/ MRB/ CHEMO: Clinically doing well. Imaging with positive resection. Starting Lomustine (start date of 12/2). Next generation sequencing.   -12/4/2019 CHEMO: Lomustine, cycle 1.   -1/6/2020 NEURO-ONC: Clinically stable. Has TCP with Lomustine. Will hold off on starting next week until he sees Dr. Olmos with new imaging.  -1/31/2020 NEURO-ONC/ MRB/ CHEMO: Clinically doing well. Imaging with no evidence of disease recurrence. Holding Lomustine in the setting of a poor emotional state. Referrals to Dr. Aleksandr Garcia for neuro-psych testing, Dr. Eugene Vargas for counseling/ coping, and palliative care. Next generation sequencing results discussed. Increase Zoloft. Continue dexamethasone 2mg daily.  -4/1/2020 ADMISSION/ SZ- Keppra increased, added diazepam PRN, continue Vimpat.   -4/1/2020 MRB with no significant change.   -4/13/2020 NEURO-ONC: Clinically improved since hospitalization. Continuing imaging surveillance. Increase Zoloft.   -6/26/2020 ADMISSION/ [facility]Z- Keppra and Vimpat increased, continued diazepam PRN.   -6/29/2020 NEURO-ONC/ MRB: Clinically improved since hospitalization. Imaging largely stable. Continuing imaging surveillance. Trial of ritalin.   -8/10/2020 NEURO-ONC: Clinically stable. Will refer to palliative SW.  -8/24/2020 NEURO-ONC/ MRB: Clinically stable to improved. Imaging largely stable with no concern for disease recurrence. Continuing imaging surveillance. Referral to palliative care.     SOCIAL HISTORY   Tobacco use: Former smoker, quit 40 years ago.   Alcohol use: Social use.  Drug use: Denies marijuana  use.  Supplement, complimentary/ alternative medicine: None.    .   Employment: On disability.       PHYSICAL EXAMINATION  -Generally well appearing. Weight gain, moon-face.   -Respiratory: No audible wheezing.   -Skin: No facial rashes.  -Psychiatric: Flat mood and affect. Pleasant, talkative.  -Neurologic:   MENTAL STATUS:     Alert.   Confused at times, slowed cognition and processing.     Speech hesitant, word-finding issues.    Comprehension impaired to complex information.     CRANIAL NERVES:     Extraocular movements full, patient denies diplopia.     Homonymous hemianopsia, right-side. Not tested virtually   Symmetric facial movements.   Hearing intact.  MOTOR: Antigravity in arms. Walking is slow, stable.   SENSATION: Intact to light touch.      The rest of a comprehensive physical examination is deferred due to PHE (public health emergency) video visit restrictions.        MEDICAL RECORDS  Obtained and personally reviewed all available outside medical records in addition to reviewing any records available in our electronic system.     LABS  Personally reviewed all available lab results.     IMAGING  Personally reviewed MR brain imaging from today and compared to prior imaging. To my eye, there is no evidence of disease progression as there is no increase in contrast enhancement and stable degree of T2 FLAIR about the resection cavity and genu of the corpus callosum.     Imaging was shown to and results were reviewed with Jayden and Oz    Imaging and case was reviewed and discussed at Brain Tumor Conference earlier today.        IMPRESSION  Clinic time was spent discussing in detail the nature of this tumor in light of repeat imaging. This was in addition to providing emotional support, answering questions pertaining to my recommendations and devising the plan as outlined below.      Clinically, Jayden is overall stable. He has needed to use his rescue Valium about 4 times in August thus far and 3  times in July. Several of these events occurred while his family was visiting. We had a long discussion today about precipitating factors of seizures including fatigue, stress and illness. Currently he is on max doses of Keppra and Vimpat. Discussed risk/ benefit of adding a third AED. At this time, feel he is being sufficiently managed with as needed Valium. A third AED will significant increase fatigue and slow cognition further. However, if his frequency of Valium use were to continue to increase, we can referral to [facility] for consideration of adding another medication. Kailey Edmondson also inquired about using dexamethasone at higher doses instead of the valium to treat the seizures. With a stable degree of T2 FLAIR seen on imaging, dexamethasone is not a good abortive option for the seizure and increasing it will increase the emotional fluctuation, impulsivity, and anxiety/ depression. Will continue dexamethasone at 2mg daily to help with energy and appetite.     We spent most of the visit discussing coping for both Jayden and Kailey Edmondson. With the pandemic, they are struggling very much emotionally. Specifically, Jayden is struggling with loss of function and lack of purpose. Kailey Edmondson is struggling with how to keep him safe in the setting of cognitive and memory impairments plus impulsivity without discouraging him. They are both requesting a referral to palliative care to work with MD on depression medications and completion of POLST and also SW to discuss coping and marital stresses. I thinking they will greatly benefit from palliative resources. Will increase Zoloft to max dose of 200mg daily. I also asked them to consider joining the 'Virtual' Brain Tumor Support Group through [facility] [facility].     As imaging again demonstrates no evidence of disease progression, will continue to hold on further cancer-directed therapy at this time. In the setting of an incurable cancer, Jayden and Oz are clear about wanting high quality  of life over quantity of life. Continue to hold on starting Avastin as patient is doing well and is largely asymptomatic and there is no significant contrast enhancement seen on imaging    PROBLEM LIST  Glioblastoma, MGMT methylated and IDH1 wildtype by NGS  Visual field cut; homonymous hemianopsia, right-side  Memory issues  Drug-induced constipation  Hyperplastic gastric polyps, benign  H/o SIADH  Epilepsy    PLAN  -CANCER DIRECTED THERAPY-  -As above; Holding cancer-directed therapy for now.   -Next generation sequencing with no targetable mutations.   -Repeat imaging in 2 months.     -STEROIDS-  -Continue dexamethasone 2mg daily.    -SEIZURE MANAGEMENT-  -Given history of seizures, will continue Keppra + Vimpat.  -Diazepam PRN for abortive management: Give 1 ml (5mg) for seizures greater than 5 minutes or at time of aura. May repeat 0.5 ml (2.5 mg). Do not exceed 7.5 mg per day.   -Referral to [facility] for consideration of adding a third AED if seizure frequency increases.    -Quality of life/ MOOD/ FATIGUE-  -Continued mood issues; depression, anxiety, irritability, poor coping.  -Off Ritalin  -Increase Zoloft to 200mg daily (max dose).  -Added to Brain Tumor Support Group email list; [email or web address]  -Physical therapy/ daily exercises.   -Referred to Dr. Eugene Vargas for counseling/ coping.   -Referral to palliative care MD and TRA[facility]. Completion of POLST.     -HICCUPS-  -Pepcid.  -Likely Steroid related.  -Possibly Baclofen (muscle relaxer) if needed.    -HYPONATREMIA/ History of SIADH-  -Monitor amount of fluid intake.   -Continue to track sodium level on routine CMP. Has been normal     -MEMORY COMPLAINTS-  -Previously referred to Dr. Aleksandr Garcia for neuro-psych testing. Was cancelled. Consider rescheduling to assess memory further  -Consider repeat speech therapy.    -DVT/ PE-  -Need for lifelong anticoagulation.  -Continue Xarelto.  -If platelets drop <50, will need to hold  anticoagulation    -ADDITIONAL SUPPORTIVE MANAGEMENT-  -Social work following.   -CT with lung nodules. Following with [facility].  -S/p EGD with benign pathology of the 10mm gastric antrum/pylorus posterior wall lesion.  -Breast biopsy showed necrotic issue, no malignancy.    Return to clinic in 1 months with BRIONNA Vazquez and then, in 2 months with me + imaging.     In the meantime, Jayden and Oz know to call with questions or concerns or to report new complaints and can be seen sooner if needed.     Patient was evaluated and examined by a neurology resident when my direct supervision.    Nasra Olmos MD  Neuro-oncology

## 2020-08-23 NOTE — PATIENT INSTRUCTIONS
Imaging reviewed; stable, no evidence of active cancer.   Repeat imaging in 2 months.     Continue dexamethasone 2mg daily.  Increase Zoloft to 200mg.     Refill Keppra, Vimpat, dexamethasone, Valium, and Zoloft.     Referral to palliative care to continue to work with MD and social work.  Completion of POLST.   Consider attending the Virtual Brain Tumor Support Group through [employer].     Return to clinic in 1 month with BRIONNA Vazquez and then, with me in 2 months + imaging.     Nasra Olmos MD  Neuro-oncology  8/24/2020

## 2020-08-24 ENCOUNTER — ANCILLARY PROCEDURE (OUTPATIENT)
Dept: MRI IMAGING | Facility: CLINIC | Age: 65
End: 2020-08-24
Attending: PSYCHIATRY & NEUROLOGY
Payer: COMMERCIAL

## 2020-08-24 ENCOUNTER — VIRTUAL VISIT (OUTPATIENT)
Dept: ONCOLOGY | Facility: CLINIC | Age: 65
End: 2020-08-24
Attending: PSYCHIATRY & NEUROLOGY
Payer: COMMERCIAL

## 2020-08-24 ENCOUNTER — TUMOR CONFERENCE (OUTPATIENT)
Dept: ONCOLOGY | Facility: CLINIC | Age: 65
End: 2020-08-24

## 2020-08-24 DIAGNOSIS — C71.9 GLIOBLASTOMA (H): ICD-10-CM

## 2020-08-24 DIAGNOSIS — R56.9 SEIZURE (H): ICD-10-CM

## 2020-08-24 DIAGNOSIS — Z78.9 POLST (PHYSICIAN ORDERS FOR LIFE-SUSTAINING TREATMENT): ICD-10-CM

## 2020-08-24 DIAGNOSIS — C71.9 GLIOBLASTOMA (H): Primary | ICD-10-CM

## 2020-08-24 DIAGNOSIS — F41.9 ANXIETY: ICD-10-CM

## 2020-08-24 DIAGNOSIS — R45.89 DIFFICULTY COPING: ICD-10-CM

## 2020-08-24 DIAGNOSIS — G40.909 NONINTRACTABLE EPILEPSY WITHOUT STATUS EPILEPTICUS, UNSPECIFIED EPILEPSY TYPE (H): ICD-10-CM

## 2020-08-24 DIAGNOSIS — R45.89 DEPRESSED MOOD: ICD-10-CM

## 2020-08-24 DIAGNOSIS — G40.909 SEIZURE DISORDER (H): ICD-10-CM

## 2020-08-24 DIAGNOSIS — R41.89 COGNITIVE CHANGES: ICD-10-CM

## 2020-08-24 PROCEDURE — 99214 OFFICE O/P EST MOD 30 MIN: CPT | Mod: 95 | Performed by: PSYCHIATRY & NEUROLOGY

## 2020-08-24 PROCEDURE — 40001009 ZZH VIDEO/TELEPHONE VISIT; NO CHARGE

## 2020-08-24 RX ORDER — DIAZEPAM ORAL SOLUTION (CONCENTRATE) 5 MG/ML
5 SOLUTION ORAL
Qty: 30 ML | Refills: 5 | Status: ON HOLD | OUTPATIENT
Start: 2020-08-24 | End: 2020-10-09

## 2020-08-24 RX ORDER — DEXAMETHASONE 2 MG/1
2 TABLET ORAL
Qty: 30 TABLET | Refills: 3 | Status: ON HOLD | OUTPATIENT
Start: 2020-08-24 | End: 2020-10-09

## 2020-08-24 RX ORDER — LACOSAMIDE 200 MG/1
200 TABLET ORAL 2 TIMES DAILY
Qty: 60 TABLET | Refills: 3 | Status: ON HOLD | OUTPATIENT
Start: 2020-08-24 | End: 2020-10-09

## 2020-08-24 RX ORDER — SERTRALINE HYDROCHLORIDE 100 MG/1
200 TABLET, FILM COATED ORAL DAILY
Qty: 60 TABLET | Refills: 3 | Status: SHIPPED | OUTPATIENT
Start: 2020-08-24 | End: 2020-01-01

## 2020-08-24 RX ORDER — GADOBUTROL 604.72 MG/ML
10 INJECTION INTRAVENOUS ONCE
Status: COMPLETED | OUTPATIENT
Start: 2020-08-24 | End: 2020-08-24

## 2020-08-24 RX ORDER — LEVETIRACETAM 1000 MG/1
2000 TABLET ORAL 2 TIMES DAILY
Qty: 80 TABLET | Refills: 4 | Status: SHIPPED | OUTPATIENT
Start: 2020-08-24 | End: 2020-09-25

## 2020-08-24 RX ADMIN — GADOBUTROL 10 ML: 604.72 INJECTION INTRAVENOUS at 11:20

## 2020-08-24 NOTE — LETTER
"    8/24/2020         RE: Jayden Lackey  [address]  [facility] [address]        Dear Colleague,    Thank you for referring your patient, Jayden Lackey, to the [facility]. Please see a copy of my visit note below.    Jayden Lackey is a 65 year old male who is being evaluated via a billable video visit.      The patient has been notified of following:     \"This video visit will be conducted via a call between you and your physician/provider. We have found that certain health care needs can be provided without the need for an in-person physical exam.  This service lets us provide the care you need with a video conversation.  If a prescription is necessary we can send it directly to your pharmacy.  If lab work is needed we can place an order for that and you can then stop by our lab to have the test done at a later time.    Video visits are billed at different rates depending on your insurance coverage.  Please reach out to your insurance provider with any questions.    If during the course of the call the physician/provider feels a video visit is not appropriate, you will not be charged for this service.\"    Patient has given verbal consent for Video visit? Yes  How would you like to obtain your AVS? MyChart  If you are dropped from the video visit, the video invite should be resent to: Text to cell phone: [phone]  Will anyone else be joining your video visit? No    SWETA Hays      Video-Visit Details  Type of service:  Video Visit    Video Start Time: 3:24 PM  Video End Time: 3:50 PM    Originating Location (pt. Location): Home    Distant Location (provider location):  [facility]     Platform used for Video Visit: Isaías Olmos MD    ________________________________________________________    NEURO-ONCOLOGY VISIT  Aug 24, 2020    CHIEF COMPLAINT: Mr. Jayden Lackey is a 65 year old right-handed man with a left occipital lobe " glioblastoma (IDH wildtype by NGS, MGMT promoter methylated), diagnosed following a gross total resection on 3/22/2019. He completed chemoradiotherapy on 6/11/2019. Imaging in 9/2019 was consistent with recurrent disease and adjuvant-dosed temozolomide was stop. Pathology from repeat resection on 10/24/2019 was consistent with recurrent tumor. He was started on lomustine, but stopped after 1 cycle due to significant chemotherapy-induced fatigue.     Quality of life has been compromised by recurrent seizure events, PE requiring prolonged hospitalization, and chemotherapy-related toxicities.    Currently, he is not on any cancer-directed therapy and managed on imaging surveillance.     I met with Jayden and Oz (wife) for this follow-up visit today.      HISTORY OF PRESENT ILLNESS  -Jayden has enjoyed having family visit. He is less depressed when able to be social. He did hava a seizure when the family was visiting and his wife wondered if excess stimulation was the cause.   -He discontinued the Ritalin after about a week and a half because there was no big changes. Started Ritalin to help with projects. He has a problem with hyper focus with projects  -Needed Valium 4x in August. 2x in July. To abort seizures. 3/4 in August were in the last couple days when family was staying with them   -Having difficulties doing projects. Feels like he can take on more than he can. His wife worries because he tries to do things like electrical, using wires and making holes and mechanical stuff which she thinks are dangerous. She is very stressed out because of the impulsivity and judgment related issues.  -Still struggling with word finding and repeating words. Hard time naming objects. Memory and concentration are also poor.  -Sleeps very well.  -Good appetite.   -Remains on dex 2 mg daily. No changes with dose decreases.  -He is depressed and very emotional at times. He is on Zoloft and increased in a dose increase.   -Walking  almost daily whether inside or outside with his wife. He gets tired easily after 15 minutes or 20 minutes of walk. He also continued to have right sided weakness and dropping things from his hands and his balance is off. The cognitive functions and the balance is fluctuating and varies from day to day.   -He continued to have right visual field cut that also makes his walking worse.   -Oz is really struggling with how to deal with him and keep him safe. She herself started using antidepressants to deal with that stress. She asked about palliative care MD and  to contact her for help. Referral placed.     REVIEW OF SYSTEMS  A comprehensive ROS negative except as in HPI.      MEDICATIONS   Current Outpatient Medications   Medication Sig Dispense Refill     atenolol (TENORMIN) 100 MG tablet Take 50 mg by mouth daily       dexamethasone (DECADRON) 4 MG tablet Take 1 tablet (4 mg) by mouth daily (with breakfast) (Patient taking differently: Take 2 mg by mouth daily (with breakfast) ) 40 tablet 1     diazepam (VALIUM) 5 MG/ML (HIGH CONC) solution Take 1 mL (5 mg) by mouth every 3 minutes as needed for seizures (lasting greater than 5 minutes.) If seizure continues, repeat with 0.5mL by mouth every 2-3 minutes up to a max of 7.5mL in 1 day. Do not give if patient has very shallow or slow breathing. 30 mL 5     diltiazem ER (DILT-XR) 180 MG 24 hr capsule Take 180 mg by mouth daily       lacosamide (VIMPAT) 200 MG TABS tablet Take 1 tablet (200 mg) by mouth 2 times daily 60 tablet 3     levETIRAcetam (KEPPRA) 1000 MG tablet Take 2 tablets (2,000 mg) by mouth 2 times daily 80 tablet 4     lisinopril (PRINIVIL/ZESTRIL) 5 MG tablet Take 1 tablet (5 mg) by mouth daily 28 tablet 0     rivaroxaban ANTICOAGULANT (XARELTO ANTICOAGULANT) 20 MG TABS tablet Take 1 tablet (20 mg) by mouth daily (with dinner) 30 tablet 3     sertraline (ZOLOFT) 100 MG tablet Take 2 tablets (200 mg) by mouth daily 60 tablet 3      docusate sodium (COLACE) 50 MG capsule Take 50 mg by mouth 2 times daily as needed for constipation       ondansetron (ZOFRAN) 4 MG tablet 4 mg every 4 hours as needed       DRUG ALLERGIES   Allergies   Allergen Reactions     Shellfish Allergy Difficulty breathing, Nausea and Vomiting, Swelling and Shortness Of Breath     No Clinical Screening - See Comments      Tyrone ferrell doesn't remember reaction       ONCOLOGIC HISTORY  -1/2019 PRESENTATION: Visual disturbance.   -3/6/2019 Evaluated by Dr. Mauricio, neuro-ophthalmologist, found to have a right sided homonymous hemianopsia. MRI ordered.   -3/6/2019 MR brain imaging revealed a contrast-enhancing lesion in the left occipital lobe suspicious for a high-grade primary brain tumor.  -3/22/2019 SURGERY: Craniotomy with a gross total resection by Dr. Irving Hayes.   PATHOLOGY: Glioblastoma; Wildtype status for IDH1/2 by next generation sequencing. MGMT promoter methylated. Wildtype PTEN, TP53.  -4/8/2019 NEURO-ONC: Recommending chemoradiotherapy.   -4/30 - 6/11/2019 CHEMORADS 6000cGy in 30 fractions with concurrent temozolomide 75mg/m2 (180mg).  -5/20/2019 NEURO-ONC: Not interested in Optune at this time.   -6/12/2019 NEURO-ONC: Clinically stable.   -6/21 - 6/28/2019 ADMISSION/ [facility]: Admitted to [facility] for first ever seizure event, provoked by hyponatremia. Urine studies consistent with SIADH ; etiology of SIADH unclear. Had 10mm gastric lesion biopsied on 6/26/2019 by way of an EGD.  PATHOLOGY of gastric lesion: Hyperplastic polyp with erosion and foci of atypia; favor reactive. No intestinal metaplasia, no helicobacter pylori, no high grade dysplasia or invasive carcinoma.   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   -7/12/2019 NEURO-ONC/ MRB/ CHEMO: Clinical stable. Imaging with stable to slightly increased enhancement of previously seen nodular lesions; associated with mild degree of elevated rCBV. Starting adjuvant-dosed temozolomide 150mg/m2 (360mg), cycle 1  (start date 7/12/2019). Monitor fluid intake, will be checking CMP/ Na+.   -8/12/2019 NEURO-ONC/ MRB/ CHEMO: Clinical decompensated, but improving since hospital discharge. MR brain scan from last week with concern for non-contrast enhancing >> enhancing disease progression. Ordering a PET brain scan. Holding adjuvant-dosed temozolomide, cycle 3.  -10/24/2019 SURGERY: Repeat resection by Dr. Packer.  PATHOLOGY: Recurrent glioblastoma.   Next generation sequencing by [facility]; Microsatellite instability; stable. Tumor mutational burden; 4 (low). Mutations in ARID1A, ASXL1, ATRX, FANCE, MED12, MEF2B, NF1, RUNX1, TERT.   -11/25/2019 NEURO-ONC/ MRB/ CHEMO: Clinically doing well. Imaging with positive resection. Starting Lomustine (start date of 12/2). Next generation sequencing.   -12/4/2019 CHEMO: Lomustine, cycle 1.   -1/6/2020 NEURO-ONC: Clinically stable. Has TCP with Lomustine. Will hold off on starting next week until he sees Dr. Olmos with new imaging.  -1/31/2020 NEURO-ONC/ MRB/ CHEMO: Clinically doing well. Imaging with no evidence of disease recurrence. Holding Lomustine in the setting of a poor emotional state. Referrals to Dr. Aleksandr Garcia for neuro-psych testing, Dr. Eugene Vargas for counseling/ coping, and palliative care. Next generation sequencing results discussed. Increase Zoloft. Continue dexamethasone 2mg daily.  -4/1/2020 ADMISSION/ [facility]- Keppra increased, added diazepam PRN, continue Vimpat.   -4/1/2020 MRB with no significant change.   -4/13/2020 NEURO-ONC: Clinically improved since hospitalization. Continuing imaging surveillance. Increase Zoloft.   -6/26/2020 ADMISSION/ [facility]- Keppra and Vimpat increased, continued diazepam PRN.   -6/29/2020 NEURO-ONC/ MRB: Clinically improved since hospitalization. Imaging largely stable. Continuing imaging surveillance. Trial of ritalin.   -8/10/2020 NEURO-ONC: Clinically stable. Will refer to palliative SW.  -8/24/2020 NEURO-ONC/ MRB: Clinically stable to improved.  Imaging largely stable with no concern for disease recurrence. Continuing imaging surveillance. Referral to palliative care.     SOCIAL HISTORY   Tobacco use: Former smoker, quit 40 years ago.   Alcohol use: Social use.  Drug use: Denies marijuana use.  Supplement, complimentary/ alternative medicine: None.    .   Employment: On disability.       PHYSICAL EXAMINATION  -Generally well appearing. Weight gain, moon-face.   -Respiratory: No audible wheezing.   -Skin: No facial rashes.  -Psychiatric: Flat mood and affect. Pleasant, talkative.  -Neurologic:   MENTAL STATUS:     Alert.   Confused at times, slowed cognition and processing.     Speech hesitant, word-finding issues.    Comprehension impaired to complex information.     CRANIAL NERVES:     Extraocular movements full, patient denies diplopia.     Homonymous hemianopsia, right-side. Not tested virtually   Symmetric facial movements.   Hearing intact.  MOTOR: Antigravity in arms. Walking is slow, stable.   SENSATION: Intact to light touch.      The rest of a comprehensive physical examination is deferred due to PHE (public health emergency) video visit restrictions.        MEDICAL RECORDS  Obtained and personally reviewed all available outside medical records in addition to reviewing any records available in our electronic system.     LABS  Personally reviewed all available lab results.     IMAGING  Personally reviewed MR brain imaging from today and compared to prior imaging. To my eye, there is no evidence of disease progression as there is no increase in contrast enhancement and stable degree of T2 FLAIR about the resection cavity and genu of the corpus callosum.     Imaging was shown to and results were reviewed with Aracelis    Imaging and case was reviewed and discussed at Brain Tumor Conference earlier today.        IMPRESSION  Clinic time was spent discussing in detail the nature of this tumor in light of repeat imaging. This was in  addition to providing emotional support, answering questions pertaining to my recommendations and devising the plan as outlined below.      Clinically, Jayden is overall stable. He has needed to use his rescue Valium about 4 times in August thus far and 3 times in July. Several of these events occurred while his family was visiting. We had a long discussion today about precipitating factors of seizures including fatigue, stress and illness. Currently he is on max doses of Keppra and Vimpat. Discussed risk/ benefit of adding a third AED. At this time, feel he is being sufficiently managed with as needed Valium. A third AED will significant increase fatigue and slow cognition further. However, if his frequency of Valium use were to continue to increase, we can referral to [facility] for consideration of adding another medication. Kailey Edmondson also inquired about using dexamethasone at higher doses instead of the valium to treat the seizures. With a stable degree of T2 FLAIR seen on imaging, dexamethasone is not a good abortive option for the seizure and increasing it will increase the emotional fluctuation, impulsivity, and anxiety/ depression. Will continue dexamethasone at 2mg daily to help with energy and appetite.     We spent most of the visit discussing coping for both Jayden and Kailey Edmondson. With the pandemic, they are struggling very much emotionally. Specifically, aJyden is struggling with loss of function and lack of purpose. Kailey Edmondson is struggling with how to keep him safe in the setting of cognitive and memory impairments plus impulsivity without discouraging him. They are both requesting a referral to palliative care to work with MD on depression medications and completion of POLST and also SW to discuss coping and marital stresses. I thinking they will greatly benefit from palliative resources. Will increase Zoloft to max dose of 200mg daily. I also asked them to consider joining the 'Virtual' Brain Tumor Support  Group through  [employer]     As imaging again demonstrates no evidence of disease progression, will continue to hold on further cancer-directed therapy at this time. In the setting of an incurable cancer, Jayden and Oz are clear about wanting high quality of life over quantity of life. Continue to hold on starting Avastin as patient is doing well and is largely asymptomatic and there is no significant contrast enhancement seen on imaging    PROBLEM LIST  Glioblastoma, MGMT methylated and IDH1 wildtype by NGS  Visual field cut; homonymous hemianopsia, right-side  Memory issues  Drug-induced constipation  Hyperplastic gastric polyps, benign  H/o SIADH  Epilepsy    PLAN  -CANCER DIRECTED THERAPY-  -As above; Holding cancer-directed therapy for now.   -Next generation sequencing with no targetable mutations.   -Repeat imaging in 2 months.     -STEROIDS-  -Continue dexamethasone 2mg daily.    -SEIZURE MANAGEMENT-  -Given history of seizures, will continue Keppra + Vimpat.  -Diazepam PRN for abortive management: Give 1 ml (5mg) for seizures greater than 5 minutes or at time of aura. May repeat 0.5 ml (2.5 mg). Do not exceed 7.5 mg per day.   -Referral to [facility] for consideration of adding a third AED if seizure frequency increases.    -Quality of life/ MOOD/ FATIGUE-  -Continued mood issues; depression, anxiety, irritability, poor coping.  -Off Ritalin  -Increase Zoloft to 200mg daily (max dose).  -Added to Brain Tumor Support Group email list; [email or web address]  -Physical therapy/ daily exercises.   -Referred to Dr. Eugene Vargas for counseling/ coping.   -Referral to palliative care MD and TRAVIS. Completion of POLST.     -HICCUPS-  -Pepcid.  -Likely Steroid related.  -Possibly Baclofen (muscle relaxer) if needed.    -HYPONATREMIA/ History of SIADH-  -Monitor amount of fluid intake.   -Continue to track sodium level on routine CMP. Has been normal     -MEMORY COMPLAINTS-  -Previously referred to Dr. Aleksandr Garcia for  neuro-psych testing. Was cancelled. Consider rescheduling to assess memory further  -Consider repeat speech therapy.    -DVT/ PE-  -Need for lifelong anticoagulation.  -Continue Xarelto.  -If platelets drop <50, will need to hold anticoagulation    -ADDITIONAL SUPPORTIVE MANAGEMENT-  -Social work following.   -CT with lung nodules. [facility].  -S/p EGD with benign pathology of the 10mm gastric antrum/pylorus posterior wall lesion.  -Breast biopsy showed necrotic issue, no malignancy.    Return to clinic in 1 months with BRIONNA Vazquez and then, in 2 months with me + imaging.     In the meantime, Jayden and Oz know to call with questions or concerns or to report new complaints and can be seen sooner if needed.     Patient was evaluated and examined by a neurology resident when my direct supervision.    Nasra Olmos MD  Neuro-oncology      Again, thank you for allowing me to participate in the care of your patient.        Sincerely,        Nasra Olmos MD

## 2020-08-24 NOTE — TUMOR CONFERENCE
Tumor Conference Information  Tumor Conference:  Brain  Specialties Present:  Medical oncology, Pathology, Radiology, Radiation oncology  Patient Status:  A current patient  Pathology:  Not Discussed  Treatment to Date:  Surgical intervention(s), Chemoradiation, Adjuvant chemotherapy, Palliative chemotherapy  Clinical Trial Eligibility:  Not discussed  Recommended Plan:  Observation (see comment) (Comment: continue with observation and reimage with follow up in 2 months)  Did the review exceed 30 minutes?:  did not           Documentation / Disclaimer Cancer Tumor Board Note  Cancer tumor board recommendations do not override what is determined to be reasonable care and treatment, which is dependent on the circumstances of a patient's case; the patient's medical, social, and personal concerns; and the clinical judgment of the oncologist [physician].

## 2020-09-18 ENCOUNTER — TRANSFERRED RECORDS (OUTPATIENT)
Dept: HEALTH INFORMATION MANAGEMENT | Facility: CLINIC | Age: 65
End: 2020-09-18

## 2020-09-18 ENCOUNTER — TELEPHONE (OUTPATIENT)
Dept: ONCOLOGY | Facility: CLINIC | Age: 65
End: 2020-09-18

## 2020-09-18 NOTE — TELEPHONE ENCOUNTER
Spouse Oz called in to triage reporting pt had a seizure about 10 minutes ago. He came in to the room, must have been aware seizure was coming on and yelling, beckoning at her to give him his diazepam. She was able to guide him to a chair and gave him diazepam 1ml/5mg right away. He continued to twitch and lost speech so she gave him another 0.5ml/2.5 mg after 3 minutes and once again at 6 minutes. Now he has stopped seizing, eyes are closed but he does squeeze her hand when she talks to him. She wanted to know if she can continue monitoring at home or have to call 911, stated last several times he went in he was just monitored overnight. Stated his instructions say he can have 7.5 mL in 1 day, is she ok to continue giving him more diazepam if he continues seizing?    Per Dr. Olmos: does not recommend any more diazepam as he's already had 10 mg. If he does have another seizure or becomes unresponsive, starts breathing shallow or slow she should call 911 and take him in. Relayed information to Oz who verbalized understanding.

## 2020-09-20 ENCOUNTER — COMMUNICATION - HEALTHEAST (OUTPATIENT)
Dept: SCHEDULING | Facility: CLINIC | Age: 65
End: 2020-09-20

## 2020-09-21 ENCOUNTER — TELEPHONE (OUTPATIENT)
Dept: ONCOLOGY | Facility: CLINIC | Age: 65
End: 2020-09-21

## 2020-09-21 DIAGNOSIS — G40.909 SEIZURE DISORDER (H): Primary | ICD-10-CM

## 2020-09-21 DIAGNOSIS — C71.9 GLIOBLASTOMA (H): ICD-10-CM

## 2020-09-21 DIAGNOSIS — G40.919 REFRACTORY EPILEPSY (H): ICD-10-CM

## 2020-09-25 ENCOUNTER — VIRTUAL VISIT (OUTPATIENT)
Dept: ONCOLOGY | Facility: CLINIC | Age: 65
End: 2020-09-25
Attending: PHYSICIAN ASSISTANT
Payer: COMMERCIAL

## 2020-09-25 ENCOUNTER — VIRTUAL VISIT (OUTPATIENT)
Dept: PALLIATIVE CARE | Facility: CLINIC | Age: 65
End: 2020-09-25
Attending: SOCIAL WORKER
Payer: COMMERCIAL

## 2020-09-25 DIAGNOSIS — C71.9 GLIOBLASTOMA (H): ICD-10-CM

## 2020-09-25 DIAGNOSIS — Z51.5 ENCOUNTER FOR PALLIATIVE CARE: Primary | ICD-10-CM

## 2020-09-25 DIAGNOSIS — Z63.8 CAREGIVER ROLE STRAIN: ICD-10-CM

## 2020-09-25 DIAGNOSIS — G40.909 SEIZURE DISORDER (H): ICD-10-CM

## 2020-09-25 DIAGNOSIS — F43.20 ADJUSTMENT REACTION: ICD-10-CM

## 2020-09-25 PROCEDURE — 99214 OFFICE O/P EST MOD 30 MIN: CPT | Mod: 95 | Performed by: PHYSICIAN ASSISTANT

## 2020-09-25 PROCEDURE — 40001009 ZZH VIDEO/TELEPHONE VISIT; NO CHARGE

## 2020-09-25 PROCEDURE — 90834 PSYTX W PT 45 MINUTES: CPT | Mod: 95 | Performed by: SOCIAL WORKER

## 2020-09-25 RX ORDER — LEVETIRACETAM 1000 MG/1
2000 TABLET ORAL 2 TIMES DAILY
Qty: 120 TABLET | Refills: 3 | Status: ON HOLD | OUTPATIENT
Start: 2020-09-25 | End: 2020-10-09

## 2020-09-25 RX ORDER — LEVETIRACETAM 500 MG/1
TABLET ORAL
COMMUNITY
Start: 2020-09-19 | End: 2020-10-01

## 2020-09-25 SDOH — SOCIAL STABILITY - SOCIAL INSECURITY: OTHER SPECIFIED PROBLEMS RELATED TO PRIMARY SUPPORT GROUP: Z63.8

## 2020-09-25 NOTE — LETTER
"    9/25/2020         RE: Jayden Lackey  [address]  [place] [address]        Dear Colleague,    Thank you for referring your patient, Jayden Lackey, to the [facility]. Please see a copy of my visit note below.    Jayden Lackey is a 65 year old male who is being evaluated via a billable video visit.      The patient has been notified of following:     \"This video visit will be conducted via a call between you and your physician/provider. We have found that certain health care needs can be provided without the need for an in-person physical exam.  This service lets us provide the care you need with a video conversation.  If a prescription is necessary we can send it directly to your pharmacy.  If lab work is needed we can place an order for that and you can then stop by our lab to have the test done at a later time.    Video visits are billed at different rates depending on your insurance coverage.  Please reach out to your insurance provider with any questions.    If during the course of the call the physician/provider feels a video visit is not appropriate, you will not be charged for this service.\"    Patient has given verbal consent for Video visit? Yes  How would you like to obtain your AVS? MyChart  If you are dropped from the video visit, the video invite should be resent to: Send to e-mail at: [email or web address]  Will anyone else be joining your video visit? No      I have reviewed and updated the patient's allergies and medication list.    Concerns: No new concerns.   Refills: None needed.      Vitals - Patient Reported  Weight (Patient Reported): 99.8 kg (220 lb)  Height (Patient Reported): 190.5 cm (6' 3\")  BMI (Based on Pt Reported Ht/Wt): 27.5  Pain Score: No Pain (0)          Jessica Yu CMA      Video failed and switch to phone. Telephone Visit: 23 minutes       NEURO-ONCOLOGY VISIT  Sep 25, 2020    CHIEF COMPLAINT: Mr. 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 He completed chemoradiotherapy on 6/11/2019. Imaging in 9/2019 was consistent with recurrent disease and adjuvant-dosed temozolomide was stop. Pathology from repeat resection on 10/24/2019 was consistent with recurrent tumor. He was started on lomustine, but stopped after 1 cycle due to significant chemotherapy-induced fatigue.     Quality of life has been compromised by recurrent seizure events, PE requiring prolonged hospitalization, and chemotherapy-related toxicities.    Currently, he is not on any cancer-directed therapy and managed on imaging surveillance.     I met with Jayden and Oz (wife) for this follow-up visit today.      HISTORY OF PRESENT ILLNESS  -was admitted again with seizures on 9/18. Has not heard from [facility] about appt  -walking and talking again though slowly. Always takes him a long time to recover. He is doing much better but still not as good as prior to most recent seizure  -increased dose of Keppra  -Has been able to make himself lunch and do chores around the house (with supervision)  -walking 15-20 minutes a day  -Eating well  -Still has word finding and memory problems.   -Remains on dex 2 mg daily. No changes with dose decreases.    REVIEW OF SYSTEMS  A comprehensive ROS negative except as in HPI.      MEDICATIONS   Current Outpatient Medications   Medication Sig Dispense Refill     atenolol (TENORMIN) 100 MG tablet Take 50 mg by mouth daily       dexamethasone (DECADRON) 2 MG tablet Take 1 tablet (2 mg) by mouth daily (with breakfast) 30 tablet 3     diazepam (VALIUM) 5 MG/ML (HIGH CONC) solution Take 1 mL (5 mg) by mouth every 3 minutes as needed for seizures (lasting greater than 5 minutes.) If seizure continues, repeat with 0.5mL by mouth every 2-3 minutes up to a max of 7.5mL in 1 day. Do not give if  patient has very shallow or slow breathing. 30 mL 5     diltiazem ER (DILT-XR) 180 MG 24 hr capsule Take 180 mg by mouth daily       docusate sodium (COLACE) 50 MG capsule Take 50 mg by mouth 2 times daily as needed for constipation       lacosamide (VIMPAT) 200 MG TABS tablet Take 1 tablet (200 mg) by mouth 2 times daily 60 tablet 3     levETIRAcetam (KEPPRA) 1000 MG tablet Take 2 tablets (2,000 mg) by mouth 2 times daily 80 tablet 4     lisinopril (PRINIVIL/ZESTRIL) 5 MG tablet Take 1 tablet (5 mg) by mouth daily 28 tablet 0     ondansetron (ZOFRAN) 4 MG tablet 4 mg every 4 hours as needed       rivaroxaban ANTICOAGULANT (XARELTO ANTICOAGULANT) 20 MG TABS tablet Take 1 tablet (20 mg) by mouth daily (with dinner) 30 tablet 3     sertraline (ZOLOFT) 100 MG tablet Take 2 tablets (200 mg) by mouth daily 60 tablet 3     DRUG ALLERGIES   Allergies   Allergen Reactions     Shellfish Allergy Difficulty breathing, Nausea and Vomiting, Swelling and Shortness Of Breath     No Clinical Screening - See Comments      Tyrone ferrell doesn't remember reaction       ONCOLOGIC HISTORY  -1/2019 PRESENTATION: Visual disturbance.   -3/6/2019 Evaluated by Dr. Mauricio, neuro-ophthalmologist, found to have a right sided homonymous hemianopsia. MRI ordered.   -3/6/2019 MR brain imaging revealed a contrast-enhancing lesion in the left occipital lobe suspicious for a high-grade primary brain tumor.  -3/22/2019 SURGERY: Craniotomy with a gross total resection by Dr. Irving Hayes.   PATHOLOGY: Glioblastoma; Wildtype status for IDH1/2 by next generation sequencing. MGMT promoter methylated. Wildtype PTEN, TP53.  -4/8/2019 NEURO-ONC: Recommending chemoradiotherapy.   -4/30 - 6/11/2019 CHEMORADS 6000cGy in 30 fractions with concurrent temozolomide 75mg/m2 (180mg).  -5/20/2019 NEURO-ONC: Not interested in Optune at this time.   -6/12/2019 NEURO-ONC: Clinically stable.   -6/21 - 6/28/2019 ADMISSION/ SZ: Admitted to [facility] for first ever seizure  event, provoked by hyponatremia. Urine studies consistent with SIADH ; etiology of SIADH unclear. Had 10mm gastric lesion biopsied on 6/26/2019 by way of an EGD.  PATHOLOGY of gastric lesion: Hyperplastic polyp with erosion and foci of atypia; favor reactive. No intestinal metaplasia, no helicobacter pylori, no high grade dysplasia or invasive carcinoma.   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   -7/12/2019 NEURO-ONC/ MRB/ CHEMO: Clinical stable. Imaging with stable to slightly increased enhancement of previously seen nodular lesions; associated with mild degree of elevated rCBV. Starting adjuvant-dosed temozolomide 150mg/m2 (360mg), cycle 1 (start date 7/12/2019). Monitor fluid intake, will be checking CMP/ Na+.   -8/12/2019 NEURO-ONC/ MRB/ CHEMO: Clinical decompensated, but improving since hospital discharge. MR brain scan from last week with concern for non-contrast enhancing >> enhancing disease progression. Ordering a PET brain scan. Holding adjuvant-dosed temozolomide, cycle 3.  -10/24/2019 SURGERY: Repeat resection by Dr. Packer.  PATHOLOGY: Recurrent glioblastoma.   Next generation sequencing by [facility]; Microsatellite instability; stable. Tumor mutational burden; 4 (low). Mutations in ARID1A, ASXL1, ATRX, FANCE, MED12, MEF2B, NF1, RUNX1, TERT.   -11/25/2019 NEURO-ONC/ MRB/ CHEMO: Clinically doing well. Imaging with positive resection. Starting Lomustine (start date of 12/2). Next generation sequencing.   -12/4/2019 CHEMO: Lomustine, cycle 1.   -1/6/2020 NEURO-ONC: Clinically stable. Has TCP with Lomustine. Will hold off on starting next week until he sees Dr. Olmos with new imaging.  -1/31/2020 NEURO-ONC/ MRB/ CHEMO: Clinically doing well. Imaging with no evidence of disease recurrence. Holding Lomustine in the setting of a poor emotional state. Referrals to Dr. Aleksandr Garcia for neuro-psych testing, Dr. Eugene Vargas for counseling/ coping, and palliative care. Next generation sequencing results discussed.  Increase Zoloft. Continue dexamethasone 2mg daily.  -4/1/2020 ADMISSION/ SZ- Keppra increased, added diazepam PRN, continue Vimpat.   -4/1/2020 MRB with no significant change.   -4/13/2020 NEURO-ONC: Clinically improved since hospitalization. Continuing imaging surveillance. Increase Zoloft.   -6/26/2020 ADMISSION/ SZ- Keppra and Vimpat increased, continued diazepam PRN.   -6/29/2020 NEURO-ONC/ MRB: Clinically improved since hospitalization. Imaging largely stable. Continuing imaging surveillance. Trial of ritalin.   -8/10/2020 NEURO-ONC: Clinically stable. Will refer to palliative SW.  -8/24/2020 NEURO-ONC/ MRB: Clinically stable to improved. Imaging largely stable with no concern for disease recurrence. Continuing imaging surveillance. Referral to palliative care.   -9/25/2020 NEURO-ONC: Clinically declined after seizures on 9/18, though now slowly improving. Sending to [facility] to obtain better seizure control. No active cancer treatment    SOCIAL HISTORY   Tobacco use: Former smoker, quit 40 years ago.   Alcohol use: Social use.  Drug use: Denies marijuana use.  Supplement, complimentary/ alternative medicine: None.    .   Employment: On disability.       PHYSICAL EXAMINATION  Objective:  General: patient sounds in no audible acute distress, alert and oriented, speech clear and fluid  Resp: Speaking in full sentences, no audible respiratory distress, no cough, no audible wheeze  Psych: able to articulate logical thoughts, able to abstract reason, no tangential thoughts, no hallucinations or delusions  His affect is normal    The rest of a comprehensive physical examination is deferred due to PHE (public health emergency) video visit restrictions.        MEDICAL RECORDS  Obtained and personally reviewed all available outside medical records in addition to reviewing any records available in our electronic system.     LABS  Personally reviewed all available lab results.     IMAGING  No new  imaging    IMPRESSION    Unfortunately Jayden had another seizure on 9/18 and has had clinical decline after this.  He is again slowly recovering after the seizure though this is very frustrating to both him and his wife.  He states he is almost back to baseline though like previous seizures, he never can quite get back to where he was before.  He has been active and doing projects around the house with supervision of his wife.  Due to uncontrolled nature of seizures, will send to [facility] for consultation to try and obtain better seizure control.  This is not been scheduled yet though I will reach out to the team to make sure he can be seen as soon as possible.  We again discussed natures of seizures and the worry of status epilepticus and need to go into the ER when he is having multiple seizures in a row.  Unfortunately these cannot be managed at home as if he were to lose an airway we have no support for him.    As imaging has continued to demonstrate no evidence of disease progression, will continue to hold on further cancer-directed therapy at this time. In the setting of an incurable cancer, Jayden and Oz are clear about wanting high quality of life over quantity of life. Continue to hold on starting Avastin as patient is doing well and is largely asymptomatic and there is no significant contrast enhancement seen on imaging. Will repeat imaging in 1 month.     PROBLEM LIST  Glioblastoma, MGMT methylated and IDH1 wildtype by NGS  Visual field cut; homonymous hemianopsia, right-side  Memory issues  Drug-induced constipation  Hyperplastic gastric polyps, benign  H/o SIADH  Epilepsy    PLAN  -CANCER DIRECTED THERAPY-  -As above; Holding cancer-directed therapy for now.   -Next generation sequencing with no targetable mutations.   -Repeat imaging in 1 month.     -STEROIDS-  -Continue dexamethasone 2mg daily. Will not attempt to taper further    -SEIZURE MANAGEMENT-  -Given history of seizures, will continue  Keppra + Vimpat.  -Keppra was just increased to 2000 mg BID due to recurrent seizure. Referring to [facility]. Continue on this dose until he is seen by them  -Diazepam PRN for abortive management: Give 1 ml (5mg) for seizures greater than 5 minutes or at time of aura. May repeat 0.5 ml (2.5 mg). Do not exceed 7.5 mg per day.     -Quality of life/ MOOD/ FATIGUE-  -Continued mood issues; depression, anxiety, irritability, poor coping.  -Off Ritalin  -Increase Zoloft to 200mg daily (max dose).  -Added to Brain Tumor Support Group email list; [email or web address]  -Physical therapy/ daily exercises.   -Referred to Dr. Eugene Vargas for counseling/ coping.   -Referral to palliative care MD and TRAVIS. Completion of POLST.     -HICCUPS-  -Pepcid.  -Likely Steroid related.  -Possibly Baclofen (muscle relaxer) if needed.    -HYPONATREMIA/ History of SIADH-  -Monitor amount of fluid intake.   -Continue to track sodium level on routine CMP. Has been normal     -MEMORY COMPLAINTS-  -Previously referred to Dr. Aleksandr Garcia for neuro-psych testing. Was cancelled. Consider rescheduling to assess memory further  -Consider repeat speech therapy.    -DVT/ PE-  -Need for lifelong anticoagulation.  -Continue Xarelto.  -If platelets drop <50, will need to hold anticoagulation    -ADDITIONAL SUPPORTIVE MANAGEMENT-  -Social work following.   -CT with lung nodules. [facility].  -S/p EGD with benign pathology of the 10mm gastric antrum/pylorus posterior wall lesion.  -Breast biopsy showed necrotic issue, no malignancy.    Return to clinic in 1 months with Dr. Olmos + imaging.     In the meantime, Jayden and Oz know to call with questions or concerns or to report new complaints and can be seen sooner if needed     Dolly Ramirez PA-C  Neuro-oncology

## 2020-09-25 NOTE — PROGRESS NOTES
"Jayden Lackey is a 65 year old male who is being evaluated via a billable video visit.      The patient has been notified of following:     \"This video visit will be conducted via a call between you and your physician/provider. We have found that certain health care needs can be provided without the need for an in-person physical exam.  This service lets us provide the care you need with a video conversation.  If a prescription is necessary we can send it directly to your pharmacy.  If lab work is needed we can place an order for that and you can then stop by our lab to have the test done at a later time.    Video visits are billed at different rates depending on your insurance coverage.  Please reach out to your insurance provider with any questions.    If during the course of the call the physician/provider feels a video visit is not appropriate, you will not be charged for this service.\"    Patient has given verbal consent for Video visit? Yes  How would you like to obtain your AVS? MyChart  If you are dropped from the video visit, the video invite should be resent to: Send to e-mail at: [email or web address]  Will anyone else be joining your video visit? No  {If patient encounters technical issues they should call [phone]}    I have reviewed and updated the patient's allergies and medication list.    Concerns: No new concerns.   Refills: None needed.      Vitals - Patient Reported  Weight (Patient Reported): 99.8 kg (220 lb)  Height (Patient Reported): 190.5 cm (6' 3\")  BMI (Based on Pt Reported Ht/Wt): 27.5  Pain Score: No Pain (0)          Jessica Yu CMA      Video-Visit Details    Type of service:  Video Visit    Video Start Time: 1:10 PM  Video End Time: 1:17 PM    Originating Location (pt. Location): Home    Distant Location (provider location):  [facility]CER [facility]     Platform used for Video Visit: Isaías Ramirez PA-C    Video failed and switch to phone. Telephone " Visit: 16 minutes       NEURO-ONCOLOGY VISIT  Sep 25, 2020    CHIEF COMPLAINT: Mr. 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 He completed chemoradiotherapy on 6/11/2019. Imaging in 9/2019 was consistent with recurrent disease and adjuvant-dosed temozolomide was stop. Pathology from repeat resection on 10/24/2019 was consistent with recurrent tumor. He was started on lomustine, but stopped after 1 cycle due to significant chemotherapy-induced fatigue.     Quality of life has been compromised by recurrent seizure events, PE requiring prolonged hospitalization, and chemotherapy-related toxicities.    Currently, he is not on any cancer-directed therapy and managed on imaging surveillance.     I met with Jayden and Oz (wife) for this follow-up visit today.      HISTORY OF PRESENT ILLNESS  -walk and talking again though slowly and went back   -increased dose of Keppra  -was admitted again with seizures on 9/18. Has not heard from   -Has been able to make himself lunch  -walked 15-20 minutes  -Eating well  -Still has word finding and memory problems.   -Doing projects with supervision   -Remains on dex 2 mg daily. No changes with dose decreases.      REVIEW OF SYSTEMS  A comprehensive ROS negative except as in HPI.      MEDICATIONS   Current Outpatient Medications   Medication Sig Dispense Refill     atenolol (TENORMIN) 100 MG tablet Take 50 mg by mouth daily       dexamethasone (DECADRON) 2 MG tablet Take 1 tablet (2 mg) by mouth daily (with breakfast) 30 tablet 3     diazepam (VALIUM) 5 MG/ML (HIGH CONC) solution Take 1 mL (5 mg) by mouth every 3 minutes as needed for seizures (lasting greater than 5 minutes.) If seizure continues, repeat with 0.5mL by mouth every 2-3 minutes up to a max of 7.5mL in 1 day. Do not give if patient has very shallow or slow breathing.  30 mL 5     diltiazem ER (DILT-XR) 180 MG 24 hr capsule Take 180 mg by mouth daily       docusate sodium (COLACE) 50 MG capsule Take 50 mg by mouth 2 times daily as needed for constipation       lacosamide (VIMPAT) 200 MG TABS tablet Take 1 tablet (200 mg) by mouth 2 times daily 60 tablet 3     levETIRAcetam (KEPPRA) 1000 MG tablet Take 2 tablets (2,000 mg) by mouth 2 times daily 80 tablet 4     lisinopril (PRINIVIL/ZESTRIL) 5 MG tablet Take 1 tablet (5 mg) by mouth daily 28 tablet 0     ondansetron (ZOFRAN) 4 MG tablet 4 mg every 4 hours as needed       rivaroxaban ANTICOAGULANT (XARELTO ANTICOAGULANT) 20 MG TABS tablet Take 1 tablet (20 mg) by mouth daily (with dinner) 30 tablet 3     sertraline (ZOLOFT) 100 MG tablet Take 2 tablets (200 mg) by mouth daily 60 tablet 3     DRUG ALLERGIES   Allergies   Allergen Reactions     Shellfish Allergy Difficulty breathing, Nausea and Vomiting, Swelling and Shortness Of Breath     No Clinical Screening - See Comments      Tyrone ferrell doesn't remember reaction       ONCOLOGIC HISTORY  -1/2019 PRESENTATION: Visual disturbance.   -3/6/2019 Evaluated by Dr. Mauricio, neuro-ophthalmologist, found to have a right sided homonymous hemianopsia. MRI ordered.   -3/6/2019 MR brain imaging revealed a contrast-enhancing lesion in the left occipital lobe suspicious for a high-grade primary brain tumor.  -3/22/2019 SURGERY: Craniotomy with a gross total resection by Dr. Irving Hayes.   PATHOLOGY: Glioblastoma; Wildtype status for IDH1/2 by next generation sequencing. MGMT promoter methylated. Wildtype PTEN, TP53.  -4/8/2019 NEURO-ONC: Recommending chemoradiotherapy.   -4/30 - 6/11/2019 CHEMORADS 6000cGy in 30 fractions with concurrent temozolomide 75mg/m2 (180mg).  -5/20/2019 NEURO-ONC: Not interested in Optune at this time.   -6/12/2019 NEURO-ONC: Clinically stable.   -6/21 - 6/28/2019 ADMISSION/ SZ: Admitted to [facility] for first ever seizure event, provoked by hyponatremia. Urine studies  consistent with SIADH ; etiology of SIADH unclear. Had 10mm gastric lesion biopsied on 6/26/2019 by way of an EGD.  PATHOLOGY of gastric lesion: Hyperplastic polyp with erosion and foci of atypia; favor reactive. No intestinal metaplasia, no helicobacter pylori, no high grade dysplasia or invasive carcinoma.   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   -7/12/2019 NEURO-ONC/ MRB/ CHEMO: Clinical stable. Imaging with stable to slightly increased enhancement of previously seen nodular lesions; associated with mild degree of elevated rCBV. Starting adjuvant-dosed temozolomide 150mg/m2 (360mg), cycle 1 (start date 7/12/2019). Monitor fluid intake, will be checking CMP/ Na+.   -8/12/2019 NEURO-ONC/ MRB/ CHEMO: Clinical decompensated, but improving since hospital discharge. MR brain scan from last week with concern for non-contrast enhancing >> enhancing disease progression. Ordering a PET brain scan. Holding adjuvant-dosed temozolomide, cycle 3.  -10/24/2019 SURGERY: Repeat resection by Dr. Packer.  PATHOLOGY: Recurrent glioblastoma.   Next generation sequencing by [facility]; Microsatellite instability; stable. Tumor mutational burden; 4 (low). Mutations in ARID1A, ASXL1, ATRX, FANCE, MED12, MEF2B, NF1, RUNX1, TERT.   -11/25/2019 NEURO-ONC/ MRB/ CHEMO: Clinically doing well. Imaging with positive resection. Starting Lomustine (start date of 12/2). Next generation sequencing.   -12/4/2019 CHEMO: Lomustine, cycle 1.   -1/6/2020 NEURO-ONC: Clinically stable. Has TCP with Lomustine. Will hold off on starting next week until he sees Dr. Olmos with new imaging.  -1/31/2020 NEURO-ONC/ MRB/ CHEMO: Clinically doing well. Imaging with no evidence of disease recurrence. Holding Lomustine in the setting of a poor emotional state. Referrals to Dr. Aleksandr Garcia for neuro-psych testing, Dr. Eugene Vargas for counseling/ coping, and palliative care. Next generation sequencing results discussed. Increase Zoloft. Continue dexamethasone 2mg  daily.  -4/1/2020 ADMISSION/ SZ- Keppra increased, added diazepam PRN, continue Vimpat.   -4/1/2020 MRB with no significant change.   -4/13/2020 NEURO-ONC: Clinically improved since hospitalization. Continuing imaging surveillance. Increase Zoloft.   -6/26/2020 ADMISSION/ SZ- Keppra and Vimpat increased, continued diazepam PRN.   -6/29/2020 NEURO-ONC/ MRB: Clinically improved since hospitalization. Imaging largely stable. Continuing imaging surveillance. Trial of ritalin.   -8/10/2020 NEURO-ONC: Clinically stable. Will refer to palliative SW.  -8/24/2020 NEURO-ONC/ MRB: Clinically stable to improved. Imaging largely stable with no concern for disease recurrence. Continuing imaging surveillance. Referral to palliative care.   -9/25/2020 NEURO-ONC: ***    SOCIAL HISTORY   Tobacco use: Former smoker, quit 40 years ago.   Alcohol use: Social use.  Drug use: Denies marijuana use.  Supplement, complimentary/ alternative medicine: None.    .   Employment: On disability.       PHYSICAL EXAMINATION  -Generally well appearing. Weight gain, moon-face.   -Respiratory: No audible wheezing.   -Skin: No facial rashes.  -Psychiatric: Flat mood and affect. Pleasant, talkative.  -Neurologic:   MENTAL STATUS:     Alert.   Confused at times, slowed cognition and processing.     Speech hesitant, word-finding issues.    Comprehension impaired to complex information.     CRANIAL NERVES:     Extraocular movements full, patient denies diplopia.     Homonymous hemianopsia, right-side. Not tested virtually   Symmetric facial movements.   Hearing intact.  MOTOR: Antigravity in arms. Walking is slow, stable.   SENSATION: Intact to light touch.      The rest of a comprehensive physical examination is deferred due to PHE (public health emergency) video visit restrictions.        MEDICAL RECORDS  Obtained and personally reviewed all available outside medical records in addition to reviewing any records available in our electronic system.      LABS  Personally reviewed all available lab results.     IMAGING  No new imaging    IMPRESSION  Clinic time was spent discussing in detail the nature of this tumor in light of repeat imaging. This was in addition to providing emotional support, answering questions pertaining to my recommendations and devising the plan as outlined below.      Clinically, Jayden is overall stable. He has needed to use his rescue Valium about 4 times in August thus far and 3 times in July. Several of these events occurred while his family was visiting. We had a long discussion today about precipitating factors of seizures including fatigue, stress and illness. Currently he is on max doses of Keppra and Vimpat. Discussed risk/ benefit of adding a third AED. At this time, feel he is being sufficiently managed with as needed Valium. A third AED will significant increase fatigue and slow cognition further. However, if his frequency of Valium use were to continue to increase, we can referral to [facility] for consideration of adding another medication. Kailey Edmondson also inquired about using dexamethasone at higher doses instead of the valium to treat the seizures. With a stable degree of T2 FLAIR seen on imaging, dexamethasone is not a good abortive option for the seizure and increasing it will increase the emotional fluctuation, impulsivity, and anxiety/ depression. Will continue dexamethasone at 2mg daily to help with energy and appetite.     We spent most of the visit discussing coping for both Jayden and Kailey Edmondson. With the pandemic, they are struggling very much emotionally. Specifically, Jayden is struggling with loss of function and lack of purpose. Kailey Edmondson is struggling with how to keep him safe in the setting of cognitive and memory impairments plus impulsivity without discouraging him. They are both requesting a referral to palliative care to work with MD on depression medications and completion of POLST and also SW to discuss coping and  marital stresses. I thinking they will greatly benefit from palliative resources. Will increase Zoloft to max dose of 200mg daily. I also asked them to consider joining the 'Virtual' Brain Tumor Support Group through [employer].     As imaging again demonstrates no evidence of disease progression, will continue to hold on further cancer-directed therapy at this time. In the setting of an incurable cancer, Jayden and Oz are clear about wanting high quality of life over quantity of life. Continue to hold on starting Avastin as patient is doing well and is largely asymptomatic and there is no significant contrast enhancement seen on imaging    PROBLEM LIST  Glioblastoma, MGMT methylated and IDH1 wildtype by NGS  Visual field cut; homonymous hemianopsia, right-side  Memory issues  Drug-induced constipation  Hyperplastic gastric polyps, benign  H/o SIADH  Epilepsy    PLAN  -CANCER DIRECTED THERAPY-  -As above; Holding cancer-directed therapy for now.   -Next generation sequencing with no targetable mutations.   -Repeat imaging in 2 months.     -STEROIDS-  -Continue dexamethasone 2mg daily.    -SEIZURE MANAGEMENT-  -Given history of seizures, will continue Keppra + Vimpat.  -Diazepam PRN for abortive management: Give 1 ml (5mg) for seizures greater than 5 minutes or at time of aura. May repeat 0.5 ml (2.5 mg). Do not exceed 7.5 mg per day.   -Referral to [facility] for consideration of adding a third AED if seizure frequency increases.    -Quality of life/ MOOD/ FATIGUE-  -Continued mood issues; depression, anxiety, irritability, poor coping.  -Off Ritalin  -Increase Zoloft to 200mg daily (max dose).  -Added to Brain Tumor Support Group email list; [email or web address]  -Physical therapy/ daily exercises.   -Referred to Dr. Eugene Vargas for counseling/ coping.   -Referral to palliative care MD and TRAVIS. Completion of POLST.     -HICCUPS-  -Pepcid.  -Likely Steroid related.  -Possibly Baclofen (muscle relaxer) if  needed.    -HYPONATREMIA/ History of SIADH-  -Monitor amount of fluid intake.   -Continue to track sodium level on routine CMP. Has been normal     -MEMORY COMPLAINTS-  -Previously referred to Dr. Aleksandr Garcia for neuro-psych testing. Was cancelled. Consider rescheduling to assess memory further  -Consider repeat speech therapy.    -DVT/ PE-  -Need for lifelong anticoagulation.  -Continue Xarelto.  -If platelets drop <50, will need to hold anticoagulation    -ADDITIONAL SUPPORTIVE MANAGEMENT-  -Social work following.   -CT with lung nodules. [facility].  -S/p EGD with benign pathology of the 10mm gastric antrum/pylorus posterior wall lesion.  -Breast biopsy showed necrotic issue, no malignancy.    Return to clinic in 1 months with Dr. Olmos + imaging.     In the meantime, Jayden and Oz know to call with questions or concerns or to report new complaints and can be seen sooner if needed     Dolly Ramirez PA-C  Neuro-oncology

## 2020-09-25 NOTE — LETTER
"9/25/2020       RE: Jayden Lackey  [address]  [place] [address]     Dear Colleague,    Thank you for referring your patient, Jayden Lackey, to the [facility] at [facility]. Please see a copy of my visit note below.    Palliative Care Clinical Social Work Return Appointment    PLEASE NOTE:  THIS IS A MENTAL HEALTH NOTE.  OTHER PROVIDERS VIEWING THIS NOTE SHOULD USE THIS ONLY FOR UNDERSTANDING THE CONTEXT OF THE PATIENT S EXPERIENCE.  TOPICS DESCRIBED IN THIS NOTE SHOULD NOT BE REFERENCED TO THE PATIENT BY MEDICAL PROVIDERS.    Jayden is a 65 year old man with glioblastoma, seen today by video for a return psychotherapy appointment to address concerns about coping and goals of care as these relate to coping with illness and treatment. Wife Oz participates and talks more than Jayden today, though both weigh in on their concerns. This is my first visit with them, but since they identified goals of care and resource needs, I did not complete a diagnostic assessment. Both do name depression and anxiety symptoms in recent months for which they are both taking antidepressants and would like counseling follow up.     Mental Status Exam:(List all that apply)      Appearance: Appropriate      Eye Contact: Good       Orientation: Yes, x4      Mood: Anxious, grieving      Affect: Appropriate      Thought Content: Clear         Thought Form: Logical      Psychomotor Behavior: Normal    Mental Health and Adjustment to Illness: Jayden and Oz both naming anxiety, depressed mood. Wife names feeling traumatize by seizure and hospitalization experiences, and caregiver strain with increased isolation during coronavirus pandemic. 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 Oz names concerns " connected with Jayden not estimating risk as well now and wanting to do what he used to be able to do; navigating how to communicate with him in those situations. We discussed Brain Tumor Support Group - they used to attend the one at [facility]. Jayden has a men's group on Monday evenings, but Oz will consider attending online group next week at [facility].  They would also like to meet with me again for counseling and we scheduled a time in October.     Behavioral/ Non-Pharmacological Skills Education: Not focus today    Relationships: Focus today - role changes, caregiver strain    Advance Care Planning: We discussed Oz's questions - Does Jayden have to keep going into the hospital for these seizure episodes? They note that he is not currently receiving any chemo or radiation. He prefers to avoid any further radiation if possible. Chemo stopped early in 2020; now things are more stable but since no disease progression and feeling better, they have chosen not to yet resume chemo, but open to it in future. Seizure episodes every month or two are main illness stressor. Last time Oz tried to manage at home, but as seizures continued she called 911. Her understanding is that at hospital they can check for stroke and they can give IV antiseizure meds. She wishes she could do antiseizure meds IV at home; we discuss perhaps working with home infusion or palliative or hospice MD to see if possible to learn to do this in context of coronavirus and preference to limit hospital visits.     Education provided connected with palliative care in various settings - client (us), home care, and hospital, as well as hospice eligibility and ability to go in and out of program. Hospice volunteer could be useful for respite which is limited for Oz and ths is major stressor, but I am not sure if this is offered in pandemic context. I encouraged Oz to call home care and hospice, and gave contact info by email as well as  support group info and date of our next appt.     Main therapeutic interventions provided this session include:  Make referrals to home care, clinic palliative MD and RN (already scheduled for next week); potentially hospice; Brain Tumor Support Group, Provide psychoeducation, Facilitate processing of thoughts and feelings, Facilitate goals of care discussion, Provide advance care planning education and Facilitate structured problem solving    Plan:  Will return for psychotherapy with palliative care focus in 5 weeks by phone or video - Oct 23 at 11:30am.    Time spent with patient/family:  50 minutes (Start 11:45am, end 12:35pm)        DO NOT SEND ANY LETTERS              Again, thank you for allowing me to participate in the care of your patient.      Sincerely,    ANKITA WallSW

## 2020-09-26 NOTE — PROGRESS NOTES
"Palliative Care Clinical Social Work Return Appointment    PLEASE NOTE:  THIS IS A MENTAL HEALTH NOTE.  OTHER PROVIDERS VIEWING THIS NOTE SHOULD USE THIS ONLY FOR UNDERSTANDING THE CONTEXT OF THE PATIENT S EXPERIENCE.  TOPICS DESCRIBED IN THIS NOTE SHOULD NOT BE REFERENCED TO THE PATIENT BY MEDICAL PROVIDERS.    Jayden is a 65 year old man with glioblastoma, seen today by video for a return psychotherapy appointment to address concerns about coping and goals of care as these relate to coping with illness and treatment. Wife Oz participates and talks more than Jayden today, though both weigh in on their concerns. This is my first visit with them, but since they identified goals of care and resource needs, I did not complete a diagnostic assessment. Both do name depression and anxiety symptoms in recent months for which they are both taking antidepressants and would like counseling follow up.     Mental Status Exam:(List all that apply)      Appearance: Appropriate      Eye Contact: Good       Orientation: Yes, x4      Mood: Anxious, grieving      Affect: Appropriate      Thought Content: Clear         Thought Form: Logical      Psychomotor Behavior: Normal    Mental Health and Adjustment to Illness: Jayden and Oz both naming anxiety, depressed mood. Wife names feeling traumatize by seizure and hospitalization experiences, and caregiver strain with increased isolation during coronavirus pandemic. 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 Oz names concerns connected with Jayden not estimating risk as well now and wanting to do what he used to be able to do; navigating how to communicate with him in those situations. We discussed Brain Tumor Support Group - they used to attend the one at [facility]. Jayden has a men's group on Monday evenings, but Oz " will consider attending [facility] group [facility] [employer] [facility] [employer]  They would also like to meet with me again for counseling and we scheduled a time in October.     Behavioral/ Non-Pharmacological Skills Education: Not focus today    Relationships: Focus today - role changes, caregiver strain    Advance Care Planning: We discussed Oz's questions - Does Jayden have to keep going into the hospital for these seizure episodes? They note that he is not currently receiving any chemo or radiation. He prefers to avoid any further radiation if possible. Chemo stopped early in 2020; now things are more stable but since no disease progression and feeling better, they have chosen not to yet resume chemo, but open to it in future. Seizure episodes every month or two are main illness stressor. Last time Oz tried to manage at home, but as seizures continued she called 911. Her understanding is that at hospital they can check for stroke and they can give IV antiseizure meds. She wishes she could do antiseizure meds IV at home; we discuss perhaps working with home infusion or palliative or hospice MD to see if possible to learn to do this in context of coronavirus and preference to limit hospital visits.     Education provided connected with palliative care in various settings - client (us), home care, and hospital, as well as hospice eligibility and ability to go in and out of program. Hospice volunteer could be useful for respite which is limited for Oz and ths is major stressor, but I am not sure if this is offered in pandemic context. I encouraged Oz to call home care and hospice, and gave contact info by email as well as support group info and date of our next appt.     Main therapeutic interventions provided this session include:  Make referrals to home care, clinic palliative MD and RN (already scheduled for next week); potentially hospice; Brain Tumor Support Group, Provide psychoeducation, Facilitate processing  of thoughts and feelings, Facilitate goals of care discussion, Provide advance care planning education and Facilitate structured problem solving    Plan:  Will return for psychotherapy with palliative care focus in 5 weeks by phone or video - Oct 23 at 11:30am.    Time spent with patient/family:  50 minutes (Start 11:45am, end 12:35pm)        DO NOT SEND ANY LETTERS

## 2020-09-28 NOTE — TELEPHONE ENCOUNTER
RECORDS RECEIVED FROM: Internal   Date of Appt: 10/13/20   NOTES (FOR ALL VISITS) STATUS DETAILS   OFFICE NOTE from referring provider Internal Dr Nsara Olmos @ [facility] Oncology:  9/21/20 telephone encounter   OFFICE NOTE from other specialist N/A    DISCHARGE SUMMARY from hospital Internal [facility]:  6/26/20-6/27/20  3/31/20-4/2/20    [facility]:  12/12/19-12/15/19    [facility]:  6/21/19-6/28/20   DISCHARGE REPORT from the ER Care Everywhere [facility]:  9/18/20   OPERATIVE REPORT N/A    MEDICATION LIST Internal    IMAGING  (FOR ALL VISITS)     EMG N/A    EEG Internal [facility]:  4/1/20-4/2/20     [facility]:  12/14/19 9/9/20 9/6/19 9/4/19 6/28/19   LUMBAR PUNCTURE N/A    GONSALO SCAN N/A    DEXA SCAN *NEUROSURGERY* N/A    ULTRASOUND (CAROTID BILAT) *VASCULAR* N/A    MRI (HEAD, NECK, SPINE) Internal [facility]:  MRI Brain 8/24/20  MRI Brain 11/25/19    [facility]:  MRI Brain 6/26/20  MRI Brain 4/1/20  MRI Brain 1/31/20  MRI Brain 10/26/19  MRI Brain 10/24/19  MRI Brain 7/12/19  MRI Brain 3/23/19  MRI Brain 3/22/19    [facility]:  MRI Brain 6/22/19   XRAY (SPINE) *NEUROSURGERY* N/A    CT (HEAD, NECK, SPINE) Received [facility]:  CTA Head Neck 6/26/20  CT Head 6/26/20  CT Head 3/31/20  CTA Head Neck 3/31/20  CT Head 11/19/19  CT Head 11/17/19  CT Head 10/26/19  (additional encounters in Epic)    [facility]:  CT Head 9/18/20  CT Head 12/13/19  CT Head 12/12/19  CTA Head Neck 9/4/19  CT Head 6/21/19      Action 9/28/20 MV 2.19pm   Action Taken Imaging request faxed to [facility] for:  CT Head 12/13/19  CT Head 12/12/19  CTA Head Neck 9/4/19     Imaging Received  10/8/20 MV 7.29am  [facility]   Image Type (x): Disc:   PACS: x   Exam Date/Name CT Head 12/13/19  CT Head 12/12/19  CTA Head Neck 9/4/19 Comments: images resolved in PACS

## 2020-09-29 ENCOUNTER — VIRTUAL VISIT (OUTPATIENT)
Dept: PALLIATIVE CARE | Facility: CLINIC | Age: 65
End: 2020-09-29
Attending: STUDENT IN AN ORGANIZED HEALTH CARE EDUCATION/TRAINING PROGRAM
Payer: COMMERCIAL

## 2020-09-29 DIAGNOSIS — Z98.890 S/P CRANIOTOMY: ICD-10-CM

## 2020-09-29 DIAGNOSIS — F43.20 ADJUSTMENT DISORDER, UNSPECIFIED TYPE: ICD-10-CM

## 2020-09-29 DIAGNOSIS — G40.909 SEIZURE DISORDER (H): ICD-10-CM

## 2020-09-29 DIAGNOSIS — Z51.5 ENCOUNTER FOR PALLIATIVE CARE: ICD-10-CM

## 2020-09-29 DIAGNOSIS — R41.89 COGNITIVE IMPAIRMENT: ICD-10-CM

## 2020-09-29 DIAGNOSIS — I26.99 MULTIPLE PULMONARY EMBOLI (H): ICD-10-CM

## 2020-09-29 DIAGNOSIS — C71.9 GLIOBLASTOMA (H): Primary | ICD-10-CM

## 2020-09-29 PROCEDURE — 99205 OFFICE O/P NEW HI 60 MIN: CPT | Mod: 95 | Performed by: STUDENT IN AN ORGANIZED HEALTH CARE EDUCATION/TRAINING PROGRAM

## 2020-09-29 PROCEDURE — 40001009 ZZH VIDEO/TELEPHONE VISIT; NO CHARGE

## 2020-09-29 NOTE — LETTER
"9/29/2020       RE: Jayden Lackey  [address]  [place] [address]     Dear Colleague,    Thank you for referring your patient, Jayden Lackey, to the [facility] at [facility]. Please see a copy of my visit note below.    Jayden Lackey is a 65 year old male who is being evaluated via a billable video visit.      The patient has been notified of following:     \"This video visit will be conducted via a call between you and your physician/provider. We have found that certain health care needs can be provided without the need for an in-person physical exam.  This service lets us provide the care you need with a video conversation.  If a prescription is necessary we can send it directly to your pharmacy.  If lab work is needed we can place an order for that and you can then stop by our lab to have the test done at a later time.    Video visits are billed at different rates depending on your insurance coverage.  Please reach out to your insurance provider with any questions.    If during the course of the call the physician/provider feels a video visit is not appropriate, you will not be charged for this service.\"    Patient has given verbal consent for Video visit? Yes    How would you like to obtain your AVS? MyChart     If you are dropped from the video visit, the video invite should be resent to: Send to e-mail at: [email or web address]     Will anyone else be joining your video visit? No       Vitals - Patient Reported  Weight (Patient Reported): 99.8 kg (220 lb)  Height (Patient Reported): 190.5 cm (6' 3\")  BMI (Based on Pt Reported Ht/Wt): 27.5  Pain Score: No Pain (0)    Emeterio Valdez JAKOB      Palliative Care Clinic Consult Note    Patient Name: Jayden Lackey  Primary Provider: Jhoan Farooq    Chief Complaint: Glioblastoma, recurrent seizures, chemotherapy-induced fatigue    Summary: 65 year old M Hx of left occipital lobe " glioblastoma s/p gross total resection on 3/22/2019. He completed chemoradiotherapy June 2019. Imaging in 9/2019 was concerning for and pathology in 10/2019 was consistent with recurrent tumor. He was started on lomustine, but stopped after 1 cycle due to significant chemotherapy-induced fatigue. Currently not on cancer-directed therapy and is being monitored with imaging surveillance. Has struggled with increasing frequency of seizures.      Reviewed: Yes, and consistent with history.  No concerns.    History of Present Illness:  Jayden Lackey is a 65 year old male who is seen for a new consult via Video visit today with Palliative Care. His wife joins him on the call.      Symptomatically, patient has no pain.  He is eating well and is actually gained some weight.  They both feel he sleeps well.  Weight up earlier than he used to, but he is consistently getting at least 6 hours of sleep at night and the naps during the day.  He does admit that he wakes up with his mind racing, and at that point he figures he might as well get even if it is early.  He does get fatigued much easier now, but states think is partly due to his seizure meds.  He does have increased impulsivity, which worries Oz.     Their main concern is that his quality of life has been compromised by recurrent seizure events, PE requiring prolonged hospitalization, and chemotherapy-related toxicities.  Every time they go to go to the hospital is an ordeal, and he seems to lose more of himself every time he has a seizure.  Wife had to give him the rescue Valium 3 times in June, twice in July, 5 times in August, and 3 times in September.  He has also been to the hospital 9 times for seizures.  In terms of trying to discover triggers, it seems that even good stress can prompt a seizure, as they had their family visiting August when he had more events, his most recent seizure occurred when excitedly showing a friend something in the garage.   "Jayden says he can sometimes feel the seizures coming on, which allows Oz to give him the Valium earlier course.  They are trying to decide if Jayden can give himself medication if he feels a seizure coming.    Patient and wife have both expressed increased anxiety and depression over the past few months.  Patient in particular struggles because he feels he loses more of himself each time he has a seizure and hospitalization.  They met with palliative care social work on 9/25 for first visit.  A follow-up visit is scheduled in October.  They particularly struggle with what their expectations of his cancer course have been and what the reality is.  They are glad that he has continued living and currently has no evidence of cancer on his scans, they were not impaired for how life altering the seizures would be. Oz attended support group for brain cancer in members, but no one else at the group was dealing with seizures.  They report feeling very isolated in their situation.     Excerpt from Palliative LICSW Debby Barlow's note 9/25- \"We discussed Oz's questions - Does Jayden have to keep going into the hospital for these seizure episodes? They note that he is not currently receiving any chemo or radiation. He prefers to avoid any further radiation if possible. Chemo stopped early in 2020; now things are more stable but since no disease progression and feeling better, they have chosen not to yet resume chemo, but open to it in future. Seizure episodes every month or two are main illness stressor. Last time Oz tried to manage at home, but as seizures continued she called 911. Her understanding is that at hospital they can check for stroke and they can give IV antiseizure meds. She wishes she could do antiseizure meds IV at home; we discuss perhaps working with home infusion or palliative or hospice MD to see if possible to learn to do this in context of coronavirus and preference to limit hospital " "visits.      Education provided connected with palliative care in various settings - client (), home care, and hospital, as well as hospice eligibility and ability to go in and out of program. Hospice volunteer could be useful for respite which is limited for Oz and ths is major stressor, but I am not sure if this is offered in pandemic context. I encouraged Oz to call home care and hospice, and gave contact info by email as well as support group info and date of our next appt.\"    Patient and wife both interested in seeing how palliative medicine can help him in the future.      Social History:  Lives with wife, Oz.  They have several children and grandchildren but none live in the area.  He is always enjoys tinkering with things in the garage.  Social History     Tobacco Use     Smoking status: Former Smoker     Packs/day: 1.00     Years: 3.00     Pack years: 3.00     Types: Cigarettes     Start date: 1974     Last attempt to quit: 1977     Years since quittin.3     Smokeless tobacco: Never Used   Substance Use Topics     Alcohol use: Not Currently     Drug use: Never     Family History- Reviewed in Epic. Notable for heart failure in his father, pancreatic cancer in his maternal grandmother.    Allergies   Allergen Reactions     Shellfish Allergy Difficulty breathing, Nausea and Vomiting, Swelling and Shortness Of Breath     No Clinical Screening - See Comments      Tyrone ferrell doesn't remember reaction     Advanced Care Planning: POLST completed 2019-indicated desire for full code and full treatments.     Medications- Reviewed in Epic.    Past Medical History- Reviewed in [employer].    Past Surgical History- Reviewed in Epic.    Review of Systems:   ROS: 10 point ROS neg other than the symptoms noted above in the HPI.    Physical Exam:   Constitutional: Alert, pleasant, no apparent distress. Sitting up in chair.  Eyes: Sclera non-icteric, no eye discharge.  ENT: No nasal discharge. " Ears grossly normal.  Respiratory: Unlabored respirations.Speaking in full sentences.  Musculoskeletal: Extremities appear normal- no gross deformities noted. No edema noted on upper body.   Skin: No suspicious lesions or rashes on visible skin.  Neurologic: Clear speech, no aphasia. No facial droop.  Psychiatric: Mentation appears normal, appropriate attention. Affect normal/bright. Does not appear anxious or depressed.      Key Data Reviewed:  LABS: 6/27/20- Cr 0.79. WBC 5.3, Hgb 9.7, Plts 144.     IMAGING: MR Brain 8/24/20- Personally reviewed.  Evidence of postop changes in the left parietal and occipital lobes related to glioblastoma resection.    Impression & Recommendations & Counseling:  Jayden Lackey is a 65 year old male with history of  left occipital lobe glioblastoma s/p gross total resection on 3/22/2019. He completed chemoradiotherapy June 2019. Imaging in 9/2019 was concerning for and pathology in 10/2019 was consistent with recurrent tumor. He was started on lomustine, but stopped after 1 cycle due to significant chemotherapy-induced fatigue. Currently not on cancer-directed therapy and is being monitored with imaging surveillance.  He has struggled with increased seizure activity resulting in trips to the hospital.    We spent quite a bit of time working through options for support for them.  I am glad that they found meeting with Debby from palliative care  helpful and they have a future visit scheduled with her at the end of October.  I am not aware of any support groups for seizure patients, but I suggested that they about this when they go to the epilepsy clinic appointment.  Specifically suggested they ask about any patients who would be willing to share their experience even if there are no organized support groups.  I did encourage them to continue going to the other support groups as they are able, so long as they do not interfere with their daily life.    At this point in  time, Jayden is quite impulsive and has some symptoms of anxiety.  I discussed with them that I would reach out to Dr. Olmos to ask her opinion about the utility and safety of some medications to possibly treat the symptoms.    We also discussed the difficulty of being in a purgatory of sorts in regards to blank symptoms.  They are grateful that he is still alive and doing fairly well, but they were quite surprised by the seizures and how they are impacting their lives.  While going to the hospital is stressful for Jayden, at this point in time it really seems the best plan of care after a seizure given his goals.    Follow up: As needed in about 3 months    Video-Visit Details      Video Start Time: 2:25 PM  Video End Time: 3:48 PM    Originating Location (pt. Location): Home     Distant Location (provider location): [facility]     Platform used for Video Visit: [employer]     83 minutes spent face-to-face, >50% on counseling/education with patient and wife re: GOC (the dilemma between heating going to the hospital but knowing that is the best outcome for him as they are not quite at a comfort minded approach to his care), POC, symptom management likely related to his seizures and anxiety/stress, and medical decision making.    Sydnee Grullon DO  Palliative Medicine   Pager [phone], [facility] [place] [id number]      Again, thank you for allowing me to participate in the care of your patient.      Sincerely,    Sydnee Grullon DO

## 2020-09-29 NOTE — PROGRESS NOTES
"Jayden Lackey is a 65 year old male who is being evaluated via a billable video visit.      The patient has been notified of following:     \"This video visit will be conducted via a call between you and your physician/provider. We have found that certain health care needs can be provided without the need for an in-person physical exam.  This service lets us provide the care you need with a video conversation.  If a prescription is necessary we can send it directly to your pharmacy.  If lab work is needed we can place an order for that and you can then stop by our lab to have the test done at a later time.    Video visits are billed at different rates depending on your insurance coverage.  Please reach out to your insurance provider with any questions.    If during the course of the call the physician/provider feels a video visit is not appropriate, you will not be charged for this service.\"    Patient has given verbal consent for Video visit? Yes    How would you like to obtain your AVS? MyChart     If you are dropped from the video visit, the video invite should be resent to: Send to e-mail at: [email or web address].     Will anyone else be joining your video visit? No       Vitals - Patient Reported  Weight (Patient Reported): 99.8 kg (220 lb)  Height (Patient Reported): 190.5 cm (6' 3\")  BMI (Based on Pt Reported Ht/Wt): 27.5  Pain Score: No Pain (0)    Emeterio Valdez JAKOB      Palliative Care Clinic Consult Note    Patient Name: Jayden Lackey  Primary Provider: Jhoan Farooq    Chief Complaint: Glioblastoma, recurrent seizures, chemotherapy-induced fatigue    Summary: 65 year old M Hx of left occipital lobe glioblastoma s/p gross total resection on 3/22/2019. He completed chemoradiotherapy June 2019. Imaging in 9/2019 was concerning for and pathology in 10/2019 was consistent with recurrent tumor. He was started on lomustine, but stopped after 1 cycle due to significant chemotherapy-induced fatigue. " Currently not on cancer-directed therapy and is being monitored with imaging surveillance. Has struggled with increasing frequency of seizures.      Reviewed: Yes, and consistent with history.  No concerns.    History of Present Illness:  Jayden Lackey is a 65 year old male who is seen for a new consult via Video visit today with Palliative Care. His wife joins him on the call.      Symptomatically, patient has no pain.  He is eating well and is actually gained some weight.  They both feel he sleeps well.  Weight up earlier than he used to, but he is consistently getting at least 6 hours of sleep at night and the naps during the day.  He does admit that he wakes up with his mind racing, and at that point he figures he might as well get even if it is early.  He does get fatigued much easier now, but states think is partly due to his seizure meds.  He does have increased impulsivity, which worries Oz.     Their main concern is that his quality of life has been compromised by recurrent seizure events, PE requiring prolonged hospitalization, and chemotherapy-related toxicities.  Every time they go to go to the hospital is an ordeal, and he seems to lose more of himself every time he has a seizure.  Wife had to give him the rescue Valium 3 times in June, twice in July, 5 times in August, and 3 times in September.  He has also been to the hospital 9 times for seizures.  In terms of trying to discover triggers, it seems that even good stress can prompt a seizure, as they had their family visiting August when he had more events, his most recent seizure occurred when excitedly showing a friend something in the garage.  Jayden says he can sometimes feel the seizures coming on, which allows Oz to give him the Valium earlier course.  They are trying to decide if Jayden can give himself medication if he feels a seizure coming.    Patient and wife have both expressed increased anxiety and depression over the past few  "months.  Patient in particular struggles because he feels he loses more of himself each time he has a seizure and hospitalization.  They met with palliative care social work on 9/25 for first visit.  A follow-up visit is scheduled in October.  They particularly struggle with what their expectations of his cancer course have been and what the reality is.  They are glad that he has continued living and currently has no evidence of cancer on his scans, they were not impaired for how life altering the seizures would be. Oz attended support group for brain cancer in members, but no one else at the group was dealing with seizures.  They report feeling very isolated in their situation.     Excerpt from Palliative LICSW Debby Barlow's note 9/25- \"We discussed Oz's questions - Does Jayden have to keep going into the hospital for these seizure episodes? They note that he is not currently receiving any chemo or radiation. He prefers to avoid any further radiation if possible. Chemo stopped early in 2020; now things are more stable but since no disease progression and feeling better, they have chosen not to yet resume chemo, but open to it in future. Seizure episodes every month or two are main illness stressor. Last time Oz tried to manage at home, but as seizures continued she called 911. Her understanding is that at hospital they can check for stroke and they can give IV antiseizure meds. She wishes she could do antiseizure meds IV at home; we discuss perhaps working with home infusion or palliative or hospice MD to see if possible to learn to do this in context of coronavirus and preference to limit hospital visits.      Education provided connected with palliative care in various settings - client (us), home care, and hospital, as well as hospice eligibility and ability to go in and out of program. Hospice volunteer could be useful for respite which is limited for Oz and ths is major stressor, but I am " "not sure if this is offered in pandemic context. I encouraged Oz to call home care and hospice, and gave contact info by email as well as support group info and date of our next appt.\"    Patient and wife both interested in seeing how palliative medicine can help him in the future.      Social History:  Lives with wife, Oz.  They have several children and grandchildren but none live in the area.  He is always enjoys tinkering with things in the garage.  Social History     Tobacco Use     Smoking status: Former Smoker     Packs/day: 1.00     Years: 3.00     Pack years: 3.00     Types: Cigarettes     Start date: 1974     Last attempt to quit: 1977     Years since quittin.3     Smokeless tobacco: Never Used   Substance Use Topics     Alcohol use: Not Currently     Drug use: Never     Family History- Reviewed in Epic. Notable for heart failure in his father, pancreatic cancer in his maternal grandmother.    Allergies   Allergen Reactions     Shellfish Allergy Difficulty breathing, Nausea and Vomiting, Swelling and Shortness Of Breath     No Clinical Screening - See Comments      Tyrone ferrell doesn't remember reaction     Advanced Care Planning: POLST completed 2019-indicated desire for full code and full treatments.     Medications- Reviewed in Epic.    Past Medical History- Reviewed in [employer].    Past Surgical History- Reviewed in Epic.    Review of Systems:   ROS: 10 point ROS neg other than the symptoms noted above in the HPI.    Physical Exam:   Constitutional: Alert, pleasant, no apparent distress. Sitting up in chair.  Eyes: Sclera non-icteric, no eye discharge.  ENT: No nasal discharge. Ears grossly normal.  Respiratory: Unlabored respirations.Speaking in full sentences.  Musculoskeletal: Extremities appear normal- no gross deformities noted. No edema noted on upper body.   Skin: No suspicious lesions or rashes on visible skin.  Neurologic: Clear speech, no aphasia. No facial " droop.  Psychiatric: Mentation appears normal, appropriate attention. Affect normal/bright. Does not appear anxious or depressed.      Key Data Reviewed:  LABS: 6/27/20- Cr 0.79. WBC 5.3, Hgb 9.7, Plts 144.     IMAGING: MR Brain 8/24/20- Personally reviewed.  Evidence of postop changes in the left parietal and occipital lobes related to glioblastoma resection.    Impression & Recommendations & Counseling:  Jayden Lackey is a 65 year old male with history of  left occipital lobe glioblastoma s/p gross total resection on 3/22/2019. He completed chemoradiotherapy June 2019. Imaging in 9/2019 was concerning for and pathology in 10/2019 was consistent with recurrent tumor. He was started on lomustine, but stopped after 1 cycle due to significant chemotherapy-induced fatigue. Currently not on cancer-directed therapy and is being monitored with imaging surveillance.  He has struggled with increased seizure activity resulting in trips to the hospital.    We spent quite a bit of time working through options for support for them.  I am glad that they found meeting with Debby from palliative care  helpful and they have a future visit scheduled with her at the end of October.  I am not aware of any support groups for seizure patients, but I suggested that they about this when they go to the epilepsy clinic appointment.  Specifically suggested they ask about any patients who would be willing to share their experience even if there are no organized support groups.  I did encourage them to continue going to the other support groups as they are able, so long as they do not interfere with their daily life.    At this point in time, Jayden is quite impulsive and has some symptoms of anxiety.  I discussed with them that I would reach out to Dr. Olmos to ask her opinion about the utility and safety of some medications to possibly treat the symptoms.    We also discussed the difficulty of being in a purgatory of sorts in  regards to blank symptoms.  They are grateful that he is still alive and doing fairly well, but they were quite surprised by the seizures and how they are impacting their lives.  While going to the hospital is stressful for Jayden, at this point in time it really seems the best plan of care after a seizure given his goals.    Follow up: As needed in about 3 months    Video-Visit Details      Video Start Time: 2:25 PM  Video End Time: 3:48 PM    Originating Location (pt. Location): Home     Distant Location (provider location): [facility]     Platform used for Video Visit: [employer]     83 minutes spent face-to-face, >50% on counseling/education with patient and wife re: GOC (the dilemma between heating going to the hospital but knowing that is the best outcome for him as they are not quite at a comfort minded approach to his care), POC, symptom management likely related to his seizures and anxiety/stress, and medical decision making.    Sydnee Grullon, DO  Palliative Medicine   Pager [phone], AMCOM ID [id number]

## 2020-09-29 NOTE — LETTER
"9/29/2020       RE: Jayden Lackey  [address]  [place] [address]     Dear Colleague,    Thank you for referring your patient, Jayden Lackey, to the [facility] at [facility]. Please see a copy of my visit note below.    Vitals - Patient Reported  Weight (Patient Reported): 99.8 kg (220 lb)  Height (Patient Reported): 190.5 cm (6' 3\")  BMI (Based on Pt Reported Ht/Wt): 27.5  Pain Score: No Pain (0)    Emeterio Valdez [facility]      Palliative Care Clinic Consult Note    Patient Name: Jayden Lackey  Primary Provider: Jhoan Farooq    Chief Complaint: Glioblastoma, recurrent seizures, chemotherapy-induced fatigue    Summary: 65 year old M Hx of left occipital lobe glioblastoma s/p gross total resection on 3/22/2019. He completed chemoradiotherapy June 2019. Imaging in 9/2019 was concerning for and pathology in 10/2019 was consistent with recurrent tumor. He was started on lomustine, but stopped after 1 cycle due to significant chemotherapy-induced fatigue. Currently not on cancer-directed therapy and is being monitored with imaging surveillance. Has struggled with increasing frequency of seizures.      Reviewed: Yes, and consistent with history.  No concerns.    History of Present Illness:  Jayden Lackey is a 65 year old male who is seen for a new consult via Video visit today with Palliative Care. His wife joins him on the call.      Symptomatically, patient has no pain.  He is eating well and is actually gained some weight.  They both feel he sleeps well.  Weight up earlier than he used to, but he is consistently getting at least 6 hours of sleep at night and the naps during the day.  He does admit that he wakes up with his mind racing, and at that point he figures he might as well get even if it is early.  He does get fatigued much easier now, but states think is partly due to his seizure meds.  He does have increased " "impulsivity, which worries Oz.     Their main concern is that his quality of life has been compromised by recurrent seizure events, PE requiring prolonged hospitalization, and chemotherapy-related toxicities.  Every time they go to go to the hospital is an ordeal, and he seems to lose more of himself every time he has a seizure.  Wife had to give him the rescue Valium 3 times in June, twice in July, 5 times in August, and 3 times in September.  He has also been to the hospital 9 times for seizures.  In terms of trying to discover triggers, it seems that even good stress can prompt a seizure, as they had their family visiting August when he had more events, his most recent seizure occurred when excitedly showing a friend something in the garage.  Jayden says he can sometimes feel the seizures coming on, which allows Oz to give him the Valium earlier course.  They are trying to decide if Jayden can give himself medication if he feels a seizure coming.    Patient and wife have both expressed increased anxiety and depression over the past few months.  Patient in particular struggles because he feels he loses more of himself each time he has a seizure and hospitalization.  They met with palliative care social work on 9/25 for first visit.  A follow-up visit is scheduled in October.  They particularly struggle with what their expectations of his cancer course have been and what the reality is.  They are glad that he has continued living and currently has no evidence of cancer on his scans, they were not impaired for how life altering the seizures would be. Oz attended support group for brain cancer in members, but no one else at the group was dealing with seizures.  They report feeling very isolated in their situation.     Excerpt from Palliative LICSW Debby Barlow's note 9/25- \"We discussed Oz's questions - Does Jayden have to keep going into the hospital for these seizure episodes? They note that he is " "not currently receiving any chemo or radiation. He prefers to avoid any further radiation if possible. Chemo stopped early in ; now things are more stable but since no disease progression and feeling better, they have chosen not to yet resume chemo, but open to it in future. Seizure episodes every month or two are main illness stressor. Last time Oz tried to manage at home, but as seizures continued she called 911. Her understanding is that at hospital they can check for stroke and they can give IV antiseizure meds. She wishes she could do antiseizure meds IV at home; we discuss perhaps working with home infusion or palliative or hospice MD to see if possible to learn to do this in context of coronavirus and preference to limit hospital visits.      Education provided connected with palliative care in various settings - client (), home care, and hospital, as well as hospice eligibility and ability to go in and out of program. Hospice volunteer could be useful for respite which is limited for Oz and ths is major stressor, but I am not sure if this is offered in pandemic context. I encouraged Oz to call home care and hospice, and gave contact info by email as well as support group info and date of our next appt.\"    Patient and wife both interested in seeing how palliative medicine can help him in the future.      Social History:  Lives with wife, Oz.  They have several children and grandchildren but none live in the area.  He is always enjoys tinkering with things in the garage.  Social History     Tobacco Use     Smoking status: Former Smoker     Packs/day: 1.00     Years: 3.00     Pack years: 3.00     Types: Cigarettes     Start date: 1974     Last attempt to quit: 1977     Years since quittin.3     Smokeless tobacco: Never Used   Substance Use Topics     Alcohol use: Not Currently     Drug use: Never     Family History- Reviewed in Epic. Notable for heart failure in his " father, pancreatic cancer in his maternal grandmother.    Allergies   Allergen Reactions     Shellfish Allergy Difficulty breathing, Nausea and Vomiting, Swelling and Shortness Of Breath     No Clinical Screening - See Comments      Tyrone ferrell doesn't remember reaction     Advanced Care Planning: POLST completed July 2019-indicated desire for full code and full treatments.     Medications- Reviewed in Epic.    Past Medical History- Reviewed in [facility].    Past Surgical History- Reviewed in Epic.    Review of Systems:   ROS: 10 point ROS neg other than the symptoms noted above in the HPI.    Physical Exam:   Constitutional: Alert, pleasant, no apparent distress. Sitting up in chair.  Eyes: Sclera non-icteric, no eye discharge.  ENT: No nasal discharge. Ears grossly normal.  Respiratory: Unlabored respirations.Speaking in full sentences.  Musculoskeletal: Extremities appear normal- no gross deformities noted. No edema noted on upper body.   Skin: No suspicious lesions or rashes on visible skin.  Neurologic: Clear speech, no aphasia. No facial droop.  Psychiatric: Mentation appears normal, appropriate attention. Affect normal/bright. Does not appear anxious or depressed.      Key Data Reviewed:  LABS: 6/27/20- Cr 0.79. WBC 5.3, Hgb 9.7, Plts 144.     IMAGING: MR Brain 8/24/20- Personally reviewed.  Evidence of postop changes in the left parietal and occipital lobes related to glioblastoma resection.    Impression & Recommendations & Counseling:  Jayden Lackey is a 65 year old male with history of  left occipital lobe glioblastoma s/p gross total resection on 3/22/2019. He completed chemoradiotherapy June 2019. Imaging in 9/2019 was concerning for and pathology in 10/2019 was consistent with recurrent tumor. He was started on lomustine, but stopped after 1 cycle due to significant chemotherapy-induced fatigue. Currently not on cancer-directed therapy and is being monitored with imaging surveillance.  He has struggled  with increased seizure activity resulting in trips to the hospital.    We spent quite a bit of time working through options for support for them.  I am glad that they found meeting with Debby from palliative care  helpful and they have a future visit scheduled with her at the end of October.  I am not aware of any support groups for seizure patients, but I suggested that they about this when they go to the epilepsy clinic appointment.  Specifically suggested they ask about any patients who would be willing to share their experience even if there are no organized support groups.  I did encourage them to continue going to the other support groups as they are able, so long as they do not interfere with their daily life.    At this point in time, Jayden is quite impulsive and has some symptoms of anxiety.  I discussed with them that I would reach out to Dr. Olmos to ask her opinion about the utility and safety of some medications to possibly treat the symptoms.    We also discussed the difficulty of being in a purgatory of sorts in regards to blank symptoms.  They are grateful that he is still alive and doing fairly well, but they were quite surprised by the seizures and how they are impacting their lives.  While going to the hospital is stressful for Jayden, at this point in time it really seems the best plan of care after a seizure given his goals.    Follow up: As needed in about 3 months    Video-Visit Details      Video Start Time: 2:25 PM  Video End Time: 3:48 PM    Originating Location (pt. Location): Home     Distant Location (provider location): [facility]     Platform used for Video Visit: [employer]     83 minutes spent face-to-face, >50% on counseling/education with patient and wife re: GOC (the dilemma between heating going to the hospital but knowing that is the best outcome for him as they are not quite at a comfort minded approach to his care), POC, symptom management likely  related to his seizures and anxiety/stress, and medical decision making.      Again, thank you for allowing me to participate in the care of your patient.  Sincerely,    Sydnee Grullon, DO  Palliative Medicine   Pager [phone], [facility]

## 2020-09-29 NOTE — PATIENT INSTRUCTIONS
Recommendations:  -Agree with referral to the epilepsy clinic.  I recommend asking them if they have any seizure support groups, or if there are any patients who have volunteered to be a resource for other patients going through seizure management.  -Strongly recommend follow-up with palliative care  Debby Barlow, as I think she will be the biggest help with you all as you are processing and coping with the changes in your roles as well as your illness, Jayden.  -I will reach out to Dr. Olmos in regards to possible medications that could help with the anxiety and impulsivity symptoms.    Follow up: As needed in about 3 months      Reasons to Call    If you are having worsening/uncontrolled symptoms we want you to call!    You or your other physicians make any changes to medications we have prescribed.    Important Phone Numbers    Abby Jones. LPN, Palliative care coordinator [phone]    ANA Neely, RN palliative care nurse clinician [phone]    Shilpi Boss. Palliative Care RN. Answered 8 am to 4 pm . [phone]    Appointment Line.[phone]   *After hours. Will connect you with on call MD. [phone]

## 2020-10-01 NOTE — PROGRESS NOTES
"Jayden Lackey is a 65 year old male who is being evaluated via a billable video visit.      The patient has been notified of following:     \"This video visit will be conducted via a call between you and your physician/provider. We have found that certain health care needs can be provided without the need for an in-person physical exam.  This service lets us provide the care you need with a video conversation.  If a prescription is necessary we can send it directly to your pharmacy.  If lab work is needed we can place an order for that and you can then stop by our lab to have the test done at a later time.    Video visits are billed at different rates depending on your insurance coverage.  Please reach out to your insurance provider with any questions.    If during the course of the call the physician/provider feels a video visit is not appropriate, you will not be charged for this service.\"    Patient has given verbal consent for Video visit? Yes  How would you like to obtain your AVS? MyChart  If you are dropped from the video visit, the video invite should be resent to: Send to e-mail at: [email or web address]  Will anyone else be joining your video visit? No      I have reviewed and updated the patient's allergies and medication list.    Concerns: No new concerns.   Refills: None needed.      Vitals - Patient Reported  Weight (Patient Reported): 99.8 kg (220 lb)  Height (Patient Reported): 190.5 cm (6' 3\")  BMI (Based on Pt Reported Ht/Wt): 27.5  Pain Score: No Pain (0)          Jessica Yu CMA      Video failed and switch to phone. Telephone Visit: 23 minutes       NEURO-ONCOLOGY VISIT  Sep 25, 2020    CHIEF COMPLAINT: Mr. 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 He completed chemoradiotherapy on 6/11/2019. Imaging in 9/2019 was consistent with recurrent " disease and adjuvant-dosed temozolomide was stop. Pathology from repeat resection on 10/24/2019 was consistent with recurrent tumor. He was started on lomustine, but stopped after 1 cycle due to significant chemotherapy-induced fatigue.     Quality of life has been compromised by recurrent seizure events, PE requiring prolonged hospitalization, and chemotherapy-related toxicities.    Currently, he is not on any cancer-directed therapy and managed on imaging surveillance.     I met with Jayden and Oz (wife) for this follow-up visit today.      HISTORY OF PRESENT ILLNESS  -was admitted again with seizures on 9/18. Has not heard from [facility] about appt  -walking and talking again though slowly. Always takes him a long time to recover. He is doing much better but still not as good as prior to most recent seizure  -increased dose of Keppra  -Has been able to make himself lunch and do chores around the house (with supervision)  -walking 15-20 minutes a day  -Eating well  -Still has word finding and memory problems.   -Remains on dex 2 mg daily. No changes with dose decreases.    REVIEW OF SYSTEMS  A comprehensive ROS negative except as in HPI.      MEDICATIONS   Current Outpatient Medications   Medication Sig Dispense Refill     atenolol (TENORMIN) 100 MG tablet Take 50 mg by mouth daily       dexamethasone (DECADRON) 2 MG tablet Take 1 tablet (2 mg) by mouth daily (with breakfast) 30 tablet 3     diazepam (VALIUM) 5 MG/ML (HIGH CONC) solution Take 1 mL (5 mg) by mouth every 3 minutes as needed for seizures (lasting greater than 5 minutes.) If seizure continues, repeat with 0.5mL by mouth every 2-3 minutes up to a max of 7.5mL in 1 day. Do not give if patient has very shallow or slow breathing. 30 mL 5     diltiazem ER (DILT-XR) 180 MG 24 hr capsule Take 180 mg by mouth daily       docusate sodium (COLACE) 50 MG capsule Take 50 mg by mouth 2 times daily as needed for constipation       lacosamide (VIMPAT) 200 MG TABS  tablet Take 1 tablet (200 mg) by mouth 2 times daily 60 tablet 3     levETIRAcetam (KEPPRA) 1000 MG tablet Take 2 tablets (2,000 mg) by mouth 2 times daily 80 tablet 4     lisinopril (PRINIVIL/ZESTRIL) 5 MG tablet Take 1 tablet (5 mg) by mouth daily 28 tablet 0     ondansetron (ZOFRAN) 4 MG tablet 4 mg every 4 hours as needed       rivaroxaban ANTICOAGULANT (XARELTO ANTICOAGULANT) 20 MG TABS tablet Take 1 tablet (20 mg) by mouth daily (with dinner) 30 tablet 3     sertraline (ZOLOFT) 100 MG tablet Take 2 tablets (200 mg) by mouth daily 60 tablet 3     DRUG ALLERGIES   Allergies   Allergen Reactions     Shellfish Allergy Difficulty breathing, Nausea and Vomiting, Swelling and Shortness Of Breath     No Clinical Screening - See Comments      Tyrone ferrell doesn't remember reaction       ONCOLOGIC HISTORY  -1/2019 PRESENTATION: Visual disturbance.   -3/6/2019 Evaluated by Dr. Mauricio, neuro-ophthalmologist, found to have a right sided homonymous hemianopsia. MRI ordered.   -3/6/2019 MR brain imaging revealed a contrast-enhancing lesion in the left occipital lobe suspicious for a high-grade primary brain tumor.  -3/22/2019 SURGERY: Craniotomy with a gross total resection by Dr. Irving Hayes.   PATHOLOGY: Glioblastoma; Wildtype status for IDH1/2 by next generation sequencing. MGMT promoter methylated. Wildtype PTEN, TP53.  -4/8/2019 NEURO-ONC: Recommending chemoradiotherapy.   -4/30 - 6/11/2019 CHEMORADS 6000cGy in 30 fractions with concurrent temozolomide 75mg/m2 (180mg).  -5/20/2019 NEURO-ONC: Not interested in Optune at this time.   -6/12/2019 NEURO-ONC: Clinically stable.   -6/21 - 6/28/2019 ADMISSION/ SZ: Admitted to [facility] for first ever seizure event, provoked by hyponatremia. Urine studies consistent with SIADH ; etiology of SIADH unclear. Had 10mm gastric lesion biopsied on 6/26/2019 by way of an EGD.  PATHOLOGY of gastric lesion: Hyperplastic polyp with erosion and foci of atypia; favor reactive. No intestinal  metaplasia, no helicobacter pylori, no high grade dysplasia or invasive carcinoma.   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   -7/12/2019 NEURO-ONC/ MRB/ CHEMO: Clinical stable. Imaging with stable to slightly increased enhancement of previously seen nodular lesions; associated with mild degree of elevated rCBV. Starting adjuvant-dosed temozolomide 150mg/m2 (360mg), cycle 1 (start date 7/12/2019). Monitor fluid intake, will be checking CMP/ Na+.   -8/12/2019 NEURO-ONC/ MRB/ CHEMO: Clinical decompensated, but improving since hospital discharge. MR brain scan from last week with concern for non-contrast enhancing >> enhancing disease progression. Ordering a PET brain scan. Holding adjuvant-dosed temozolomide, cycle 3.  -10/24/2019 SURGERY: Repeat resection by Dr. Packer.  PATHOLOGY: Recurrent glioblastoma.   Next generation sequencing by [facility]; Microsatellite instability; stable. Tumor mutational burden; 4 (low). Mutations in ARID1A, ASXL1, ATRX, FANCE, MED12, MEF2B, NF1, RUNX1, TERT.   -11/25/2019 NEURO-ONC/ MRB/ CHEMO: Clinically doing well. Imaging with positive resection. Starting Lomustine (start date of 12/2). Next generation sequencing.   -12/4/2019 CHEMO: Lomustine, cycle 1.   -1/6/2020 NEURO-ONC: Clinically stable. Has TCP with Lomustine. Will hold off on starting next week until he sees Dr. Olmos with new imaging.  -1/31/2020 NEURO-ONC/ MRB/ CHEMO: Clinically doing well. Imaging with no evidence of disease recurrence. Holding Lomustine in the setting of a poor emotional state. Referrals to Dr. Alekasndr Garcia for neuro-psych testing, Dr. Eugene Vargas for counseling/ coping, and palliative care. Next generation sequencing results discussed. Increase Zoloft. Continue dexamethasone 2mg daily.  -4/1/2020 ADMISSION/ SZ- Keppra increased, added diazepam PRN, continue Vimpat.   -4/1/2020 MRB with no significant change.   -4/13/2020 NEURO-ONC: Clinically improved since hospitalization. Continuing imaging  surveillance. Increase Zoloft.   -6/26/2020 ADMISSION/ [facility]Z- Keppra and Vimpat increased, continued diazepam PRN.   -6/29/2020 NEURO-ONC/ MRB: Clinically improved since hospitalization. Imaging largely stable. Continuing imaging surveillance. Trial of ritalin.   -8/10/2020 NEURO-ONC: Clinically stable. Will refer to palliative SW.  -8/24/2020 NEURO-ONC/ MRB: Clinically stable to improved. Imaging largely stable with no concern for disease recurrence. Continuing imaging surveillance. Referral to palliative care.   -9/25/2020 NEURO-ONC: Clinically declined after seizures on 9/18, though now slowly improving. Sending to [facility] to obtain better seizure control. No active cancer treatment    SOCIAL HISTORY   Tobacco use: Former smoker, quit 40 years ago.   Alcohol use: Social use.  Drug use: Denies marijuana use.  Supplement, complimentary/ alternative medicine: None.    .   Employment: On disability.       PHYSICAL EXAMINATION  Objective:  General: patient sounds in no audible acute distress, alert and oriented, speech clear and fluid  Resp: Speaking in full sentences, no audible respiratory distress, no cough, no audible wheeze  Psych: able to articulate logical thoughts, able to abstract reason, no tangential thoughts, no hallucinations or delusions  His affect is normal    The rest of a comprehensive physical examination is deferred due to PHE (public health emergency) video visit restrictions.        MEDICAL RECORDS  Obtained and personally reviewed all available outside medical records in addition to reviewing any records available in our electronic system.     LABS  Personally reviewed all available lab results.     IMAGING  No new imaging    IMPRESSION    Unfortunately Jayden had another seizure on 9/18 and has had clinical decline after this.  He is again slowly recovering after the seizure though this is very frustrating to both him and his wife.  He states he is almost back to baseline though like previous  seizures, he never can quite get back to where he was before.  He has been active and doing projects around the house with supervision of his wife.  Due to uncontrolled nature of seizures, will send to [facility] for consultation to try and obtain better seizure control.  This is not been scheduled yet though I will reach out to the team to make sure he can be seen as soon as possible.  We again discussed natures of seizures and the worry of status epilepticus and need to go into the ER when he is having multiple seizures in a row.  Unfortunately these cannot be managed at home as if he were to lose an airway we have no support for him.    As imaging has continued to demonstrate no evidence of disease progression, will continue to hold on further cancer-directed therapy at this time. In the setting of an incurable cancer, Jayden and Oz are clear about wanting high quality of life over quantity of life. Continue to hold on starting Avastin as patient is doing well and is largely asymptomatic and there is no significant contrast enhancement seen on imaging. Will repeat imaging in 1 month.     PROBLEM LIST  Glioblastoma, MGMT methylated and IDH1 wildtype by NGS  Visual field cut; homonymous hemianopsia, right-side  Memory issues  Drug-induced constipation  Hyperplastic gastric polyps, benign  H/o SIADH  Epilepsy    PLAN  -CANCER DIRECTED THERAPY-  -As above; Holding cancer-directed therapy for now.   -Next generation sequencing with no targetable mutations.   -Repeat imaging in 1 month.     -STEROIDS-  -Continue dexamethasone 2mg daily. Will not attempt to taper further    -SEIZURE MANAGEMENT-  -Given history of seizures, will continue Keppra + Vimpat.  -Keppra was just increased to 2000 mg BID due to recurrent seizure. Referring to [facility]. Continue on this dose until he is seen by them  -Diazepam PRN for abortive management: Give 1 ml (5mg) for seizures greater than 5 minutes or at time of aura. May repeat 0.5 ml (2.5  mg). Do not exceed 7.5 mg per day.     -Quality of life/ MOOD/ FATIGUE-  -Continued mood issues; depression, anxiety, irritability, poor coping.  -Off Ritalin  -Increase Zoloft to 200mg daily (max dose).  -Added to Brain Tumor Support Group email list; [email or web address]  -Physical therapy/ daily exercises.   -Referred to Dr. Eugene Vargas for counseling/ coping.   -Referral to palliative care MD and SW. Completion of POLST.     -HICCUPS-  -Pepcid.  -Likely Steroid related.  -Possibly Baclofen (muscle relaxer) if needed.    -HYPONATREMIA/ History of SIADH-  -Monitor amount of fluid intake.   -Continue to track sodium level on routine CMP. Has been normal     -MEMORY COMPLAINTS-  -Previously referred to Dr. Aleksandr Garcia for neuro-psych testing. Was cancelled. Consider rescheduling to assess memory further  -Consider repeat speech therapy.    -DVT/ PE-  -Need for lifelong anticoagulation.  -Continue Xarelto.  -If platelets drop <50, will need to hold anticoagulation    -ADDITIONAL SUPPORTIVE MANAGEMENT-  -Social work following.   -CT with lung nodules. [facility].  -S/p EGD with benign pathology of the 10mm gastric antrum/pylorus posterior wall lesion.  -Breast biopsy showed necrotic issue, no malignancy.    Return to clinic in 1 months with Dr. Olmos + imaging.     In the meantime, Arac[facility]is know to call with questions or concerns or to report new complaints and can be seen sooner if needed     Dolly Ramirez PA-C  Neuro-oncology

## 2020-10-07 ENCOUNTER — TELEPHONE (OUTPATIENT)
Dept: ONCOLOGY | Facility: CLINIC | Age: 65
End: 2020-10-07

## 2020-10-07 ENCOUNTER — NURSE TRIAGE (OUTPATIENT)
Dept: NURSING | Facility: CLINIC | Age: 65
End: 2020-10-07

## 2020-10-07 NOTE — TELEPHONE ENCOUNTER
Oz (wife) calls and says that pt. Has a brain tumor and has had 4 episodes today where pt. Trevorton as if he was going to have a seizure. Wife says that pt. Trevorton this way at 8:30 am, 1:30 pm, 3:20 pm, and at 5 pm. Wife says that she has given pt. Valium each time and cannot give pt. More valium. Wife was not sure if she would take pt. To the ER. Wife wants to speak to a neurologist from the [facility]. On-call [facility]. Neurologist was then paged, to call Kailey Edmondson, at: [phone], via page , at: [phone]. COVID 19 Nurse Triage Plan/Patient Instructions    Please be aware that novel coronavirus (COVID-19) may be circulating in the community. If you develop symptoms such as fever, cough, or SOB or if you have concerns about the presence of another infection including coronavirus (COVID-19), please contact your health care provider or visit [email or web address].     Disposition/Instructions    ED Visit recommended. Follow protocol based instructions.     Bring Your Own Device:  Please also bring your smart device(s) (smart phones, tablets, laptops) and their charging cables for your personal use and to communicate with your care team during your visit.    Thank you for taking steps to prevent the spread of this virus.  o Limit your contact with others.  o Wear a simple mask to cover your cough.  o Wash your hands well and often.    Resources    [facility]: About COVID-19: [email or web address]    CDC: What to Do If You're Sick: www.cdc.gov/coronavirus/2019-ncov/about/steps-when-sick.html    CDC: Ending Home Isolation: www.cdc.gov/coronavirus/2019-ncov/hcp/disposition-in-home-patients.html     CDC: Caring for Someone: www.cdc.gov/coronavirus/2019-ncov/if-you-are-sick/care-for-someone.html     [facility]: Interim Guidance for Hospital Discharge to Home: www.health.[facility].[place]/diseases/coronavirus/hcp/hospdischarge.pdf    [facility] clinical trials (COVID-19 research studies):  [email or web address]     Below are the COVID-19 hotlines at the [place] [facility] ([facility]). Interpreters are available.   o For health questions: Call 412-315-5022 or 1-649.631.3336 (7 a.m. to 7 p.m.)  o For questions about schools and childcare: Call 463-789-0839 or 1-430.560.5129 (7 a.m. to 7 p.m.)                     Reason for Disposition    Second seizure occurs on the same day    Additional Information    Negative: First seizure ever    Negative: [1] Epileptic seizure (in adult with known epilepsy) AND [2] continues > 5 minutes    Negative: [1] Two or more seizures AND [2] stays confused between seizures    Negative: Bluish (or gray) lips or face now    Negative: Head injury caused the seizure    Negative: Pregnant    Negative: Postpartum (from 0 to 6 weeks after delivery)    Negative: Known poisoning or overdose    Negative: Seizure in a swimming pool    Negative: Has diabetes (diabetes mellitus)    Negative: [1] Unresponsive (can't be awakened) after the seizure stops AND [2] persists > 5 minutes    Negative: [1] Acting confused (e.g., disoriented, slurred speech) after the seizure stops AND [2] persists > 30 minutes    Negative: Sounds like a life-threatening emergency to the triager    Protocols used: [id number]-H-CP

## 2020-10-07 NOTE — TELEPHONE ENCOUNTER
"Spoke with patient's spouse,  Cristiana, she calls as patient took morning medications last night, he took dose of Dexamethasone, Xarelto, Sertraline, Lisinopril. Spouse called PCP office yesterday evening regarding medications. She reports no unusual side effects since taking the double dose. She mentions patient has \"seemed a little lower the past couple of days\" described as slightly increased confusion, more fatigue, irritability. She asks if MRI should be moved up or continue to monitor at this time. Viviane Olmos at 1036    Spoke with Dr. Olmos, patient to meet with Dr. Pacheco as planned on 10/13.       Call returned to spouse. Clarified with Cristiana, patient typically takes Xarelto in the morning, he took two doses yesterday and a dose this morning. Advised to monitor for unusual bleeding/bruising and to contact clinic with any questions/concerns.  "

## 2020-10-08 ENCOUNTER — TELEPHONE (OUTPATIENT)
Dept: ONCOLOGY | Facility: CLINIC | Age: 65
End: 2020-10-08

## 2020-10-08 ENCOUNTER — APPOINTMENT (OUTPATIENT)
Dept: CT IMAGING | Facility: CLINIC | Age: 65
End: 2020-10-08
Attending: EMERGENCY MEDICINE
Payer: COMMERCIAL

## 2020-10-08 ENCOUNTER — HOSPITAL ENCOUNTER (OUTPATIENT)
Facility: CLINIC | Age: 65
Setting detail: OBSERVATION
Discharge: HOME | End: 2020-10-09
Attending: EMERGENCY MEDICINE | Admitting: PSYCHIATRY & NEUROLOGY
Payer: COMMERCIAL

## 2020-10-08 DIAGNOSIS — C71.9 GLIOBLASTOMA (H): Primary | ICD-10-CM

## 2020-10-08 DIAGNOSIS — G40.909 SEIZURE DISORDER (H): ICD-10-CM

## 2020-10-08 DIAGNOSIS — G40.909 NONINTRACTABLE EPILEPSY WITHOUT STATUS EPILEPTICUS, UNSPECIFIED EPILEPSY TYPE (H): ICD-10-CM

## 2020-10-08 DIAGNOSIS — Z20.828 CONTACT W AND EXPOSURE TO OTH VIRAL COMMUNICABLE DISEASES: ICD-10-CM

## 2020-10-08 DIAGNOSIS — R56.9 SEIZURES (H): ICD-10-CM

## 2020-10-08 DIAGNOSIS — R56.9 SEIZURE (H): ICD-10-CM

## 2020-10-08 DIAGNOSIS — C71.9 GBM (GLIOBLASTOMA MULTIFORME) (H): ICD-10-CM

## 2020-10-08 LAB
ALBUMIN SERPL-MCNC: 3.5 G/DL (ref 3.4–5)
ALBUMIN UR-MCNC: NEGATIVE MG/DL
ALP SERPL-CCNC: 49 U/L (ref 40–150)
ALT SERPL W P-5'-P-CCNC: 38 U/L (ref 0–70)
ANION GAP SERPL CALCULATED.3IONS-SCNC: 7 MMOL/L (ref 3–14)
APPEARANCE UR: CLEAR
AST SERPL W P-5'-P-CCNC: 12 U/L (ref 0–45)
BASOPHILS # BLD AUTO: 0 10E9/L (ref 0–0.2)
BASOPHILS NFR BLD AUTO: 0.1 %
BILIRUB SERPL-MCNC: 0.2 MG/DL (ref 0.2–1.3)
BILIRUB UR QL STRIP: NEGATIVE
BUN SERPL-MCNC: 22 MG/DL (ref 7–30)
CALCIUM SERPL-MCNC: 8.9 MG/DL (ref 8.5–10.1)
CHLORIDE SERPL-SCNC: 108 MMOL/L (ref 94–109)
CO2 SERPL-SCNC: 24 MMOL/L (ref 20–32)
COLOR UR AUTO: ABNORMAL
CREAT SERPL-MCNC: 0.87 MG/DL (ref 0.66–1.25)
DIFFERENTIAL METHOD BLD: ABNORMAL
EOSINOPHIL # BLD AUTO: 0 10E9/L (ref 0–0.7)
EOSINOPHIL NFR BLD AUTO: 0.3 %
ERYTHROCYTE [DISTWIDTH] IN BLOOD BY AUTOMATED COUNT: 17.1 % (ref 10–15)
GFR SERPL CREATININE-BSD FRML MDRD: >90 ML/MIN/{1.73_M2}
GLUCOSE SERPL-MCNC: 140 MG/DL (ref 70–99)
GLUCOSE UR STRIP-MCNC: NEGATIVE MG/DL
HCT VFR BLD AUTO: 32.9 % (ref 40–53)
HGB BLD-MCNC: 9.9 G/DL (ref 13.3–17.7)
HGB UR QL STRIP: NEGATIVE
IMM GRANULOCYTES # BLD: 0.1 10E9/L (ref 0–0.4)
IMM GRANULOCYTES NFR BLD: 1.3 %
KETONES UR STRIP-MCNC: NEGATIVE MG/DL
LEUKOCYTE ESTERASE UR QL STRIP: NEGATIVE
LYMPHOCYTES # BLD AUTO: 0.7 10E9/L (ref 0.8–5.3)
LYMPHOCYTES NFR BLD AUTO: 10.2 %
MCH RBC QN AUTO: 23.6 PG (ref 26.5–33)
MCHC RBC AUTO-ENTMCNC: 30.1 G/DL (ref 31.5–36.5)
MCV RBC AUTO: 79 FL (ref 78–100)
MONOCYTES # BLD AUTO: 0.4 10E9/L (ref 0–1.3)
MONOCYTES NFR BLD AUTO: 6.4 %
MUCOUS THREADS #/AREA URNS LPF: PRESENT /LPF
NEUTROPHILS # BLD AUTO: 5.4 10E9/L (ref 1.6–8.3)
NEUTROPHILS NFR BLD AUTO: 81.7 %
NITRATE UR QL: NEGATIVE
NRBC # BLD AUTO: 0 10*3/UL
NRBC BLD AUTO-RTO: 0 /100
PH UR STRIP: 5.5 PH (ref 5–7)
PLATELET # BLD AUTO: 177 10E9/L (ref 150–450)
POTASSIUM SERPL-SCNC: 3.9 MMOL/L (ref 3.4–5.3)
PROT SERPL-MCNC: 6.6 G/DL (ref 6.8–8.8)
RBC # BLD AUTO: 4.19 10E12/L (ref 4.4–5.9)
RBC #/AREA URNS AUTO: <1 /HPF (ref 0–2)
SODIUM SERPL-SCNC: 139 MMOL/L (ref 133–144)
SOURCE: ABNORMAL
SP GR UR STRIP: 1.01 (ref 1–1.03)
SQUAMOUS #/AREA URNS AUTO: <1 /HPF (ref 0–1)
UROBILINOGEN UR STRIP-MCNC: NORMAL MG/DL (ref 0–2)
WBC # BLD AUTO: 6.7 10E9/L (ref 4–11)
WBC #/AREA URNS AUTO: <1 /HPF (ref 0–5)

## 2020-10-08 PROCEDURE — 70450 CT HEAD/BRAIN W/O DYE: CPT

## 2020-10-08 PROCEDURE — 80235 DRUG ASSAY LACOSAMIDE: CPT | Performed by: EMERGENCY MEDICINE

## 2020-10-08 PROCEDURE — U0003 INFECTIOUS AGENT DETECTION BY NUCLEIC ACID (DNA OR RNA); SEVERE ACUTE RESPIRATORY SYNDROME CORONAVIRUS 2 (SARS-COV-2) (CORONAVIRUS DISEASE [COVID-19]), AMPLIFIED PROBE TECHNIQUE, MAKING USE OF HIGH THROUGHPUT TECHNOLOGIES AS DESCRIBED BY CMS-2020-01-R: HCPCS | Performed by: EMERGENCY MEDICINE

## 2020-10-08 PROCEDURE — 99285 EMERGENCY DEPT VISIT HI MDM: CPT | Mod: 25 | Performed by: EMERGENCY MEDICINE

## 2020-10-08 PROCEDURE — 70450 CT HEAD/BRAIN W/O DYE: CPT | Mod: 26 | Performed by: RADIOLOGY

## 2020-10-08 PROCEDURE — 80177 DRUG SCRN QUAN LEVETIRACETAM: CPT | Performed by: EMERGENCY MEDICINE

## 2020-10-08 PROCEDURE — 96361 HYDRATE IV INFUSION ADD-ON: CPT | Performed by: EMERGENCY MEDICINE

## 2020-10-08 PROCEDURE — 96360 HYDRATION IV INFUSION INIT: CPT | Performed by: EMERGENCY MEDICINE

## 2020-10-08 PROCEDURE — 81001 URINALYSIS AUTO W/SCOPE: CPT | Performed by: EMERGENCY MEDICINE

## 2020-10-08 PROCEDURE — C9803 HOPD COVID-19 SPEC COLLECT: HCPCS

## 2020-10-08 PROCEDURE — 258N000003 HC RX IP 258 OP 636: Performed by: EMERGENCY MEDICINE

## 2020-10-08 PROCEDURE — G0378 HOSPITAL OBSERVATION PER HR: HCPCS

## 2020-10-08 PROCEDURE — 80053 COMPREHEN METABOLIC PANEL: CPT | Performed by: EMERGENCY MEDICINE

## 2020-10-08 PROCEDURE — 85025 COMPLETE CBC W/AUTO DIFF WBC: CPT | Performed by: EMERGENCY MEDICINE

## 2020-10-08 PROCEDURE — 99285 EMERGENCY DEPT VISIT HI MDM: CPT | Performed by: EMERGENCY MEDICINE

## 2020-10-08 PROCEDURE — 250N000013 HC RX MED GY IP 250 OP 250 PS 637: Performed by: EMERGENCY MEDICINE

## 2020-10-08 RX ORDER — AMOXICILLIN 250 MG
1 CAPSULE ORAL 2 TIMES DAILY
Status: DISCONTINUED | OUTPATIENT
Start: 2020-10-08 | End: 2020-10-09 | Stop reason: HOSPADM

## 2020-10-08 RX ORDER — POTASSIUM CL/LIDO/0.9 % NACL 10MEQ/0.1L
10 INTRAVENOUS SOLUTION, PIGGYBACK (ML) INTRAVENOUS
Status: DISCONTINUED | OUTPATIENT
Start: 2020-10-08 | End: 2020-10-09 | Stop reason: HOSPADM

## 2020-10-08 RX ORDER — SODIUM CHLORIDE 9 MG/ML
INJECTION, SOLUTION INTRAVENOUS CONTINUOUS
Status: DISCONTINUED | OUTPATIENT
Start: 2020-10-08 | End: 2020-10-08

## 2020-10-08 RX ORDER — ATENOLOL 50 MG/1
50 TABLET ORAL DAILY
Status: DISCONTINUED | OUTPATIENT
Start: 2020-10-09 | End: 2020-10-09 | Stop reason: HOSPADM

## 2020-10-08 RX ORDER — MAGNESIUM SULFATE HEPTAHYDRATE 40 MG/ML
4 INJECTION, SOLUTION INTRAVENOUS EVERY 4 HOURS PRN
Status: DISCONTINUED | OUTPATIENT
Start: 2020-10-08 | End: 2020-10-09 | Stop reason: HOSPADM

## 2020-10-08 RX ORDER — POTASSIUM CHLORIDE 750 MG/1
20-40 TABLET, EXTENDED RELEASE ORAL
Status: DISCONTINUED | OUTPATIENT
Start: 2020-10-08 | End: 2020-10-09 | Stop reason: HOSPADM

## 2020-10-08 RX ORDER — POTASSIUM CHLORIDE 29.8 MG/ML
20 INJECTION INTRAVENOUS
Status: DISCONTINUED | OUTPATIENT
Start: 2020-10-08 | End: 2020-10-09 | Stop reason: HOSPADM

## 2020-10-08 RX ORDER — NALOXONE HYDROCHLORIDE 0.4 MG/ML
.1-.4 INJECTION, SOLUTION INTRAMUSCULAR; INTRAVENOUS; SUBCUTANEOUS
Status: DISCONTINUED | OUTPATIENT
Start: 2020-10-08 | End: 2020-10-09 | Stop reason: HOSPADM

## 2020-10-08 RX ORDER — LACOSAMIDE 200 MG/1
200 TABLET ORAL 2 TIMES DAILY
Status: COMPLETED | OUTPATIENT
Start: 2020-10-08 | End: 2020-10-08

## 2020-10-08 RX ORDER — DILTIAZEM HYDROCHLORIDE 180 MG/1
180 CAPSULE, EXTENDED RELEASE ORAL DAILY
Status: DISCONTINUED | OUTPATIENT
Start: 2020-10-09 | End: 2020-10-09 | Stop reason: HOSPADM

## 2020-10-08 RX ORDER — POTASSIUM CHLORIDE 7.45 MG/ML
10 INJECTION INTRAVENOUS
Status: DISCONTINUED | OUTPATIENT
Start: 2020-10-08 | End: 2020-10-09 | Stop reason: HOSPADM

## 2020-10-08 RX ORDER — DEXAMETHASONE 2 MG/1
4 TABLET ORAL
Status: DISCONTINUED | OUTPATIENT
Start: 2020-10-09 | End: 2020-10-09 | Stop reason: HOSPADM

## 2020-10-08 RX ORDER — AMOXICILLIN 250 MG
2 CAPSULE ORAL 2 TIMES DAILY
Status: DISCONTINUED | OUTPATIENT
Start: 2020-10-08 | End: 2020-10-09 | Stop reason: HOSPADM

## 2020-10-08 RX ORDER — LACOSAMIDE 200 MG/1
200 TABLET ORAL 2 TIMES DAILY
Status: DISCONTINUED | OUTPATIENT
Start: 2020-10-08 | End: 2020-10-09 | Stop reason: HOSPADM

## 2020-10-08 RX ORDER — LEVETIRACETAM 500 MG/1
2000 TABLET ORAL 2 TIMES DAILY
Status: DISCONTINUED | OUTPATIENT
Start: 2020-10-09 | End: 2020-10-09 | Stop reason: HOSPADM

## 2020-10-08 RX ORDER — LISINOPRIL 5 MG/1
5 TABLET ORAL DAILY
Status: DISCONTINUED | OUTPATIENT
Start: 2020-10-09 | End: 2020-10-09 | Stop reason: HOSPADM

## 2020-10-08 RX ORDER — LEVETIRACETAM 500 MG/1
2000 TABLET ORAL 2 TIMES DAILY
Status: COMPLETED | OUTPATIENT
Start: 2020-10-08 | End: 2020-10-08

## 2020-10-08 RX ORDER — DIAZEPAM ORAL SOLUTION (CONCENTRATE) 5 MG/ML
5 SOLUTION ORAL
Status: DISCONTINUED | OUTPATIENT
Start: 2020-10-08 | End: 2020-10-09

## 2020-10-08 RX ORDER — POTASSIUM CHLORIDE 1.5 G/1.58G
20-40 POWDER, FOR SOLUTION ORAL
Status: DISCONTINUED | OUTPATIENT
Start: 2020-10-08 | End: 2020-10-09 | Stop reason: HOSPADM

## 2020-10-08 RX ORDER — LEVETIRACETAM 500 MG/1
500 TABLET ORAL AT BEDTIME
Status: DISCONTINUED | OUTPATIENT
Start: 2020-10-09 | End: 2020-10-09 | Stop reason: HOSPADM

## 2020-10-08 RX ORDER — LEVETIRACETAM 500 MG/1
500 TABLET ORAL AT BEDTIME
Status: ON HOLD | COMMUNITY
End: 2020-10-09

## 2020-10-08 RX ORDER — LIDOCAINE 40 MG/G
CREAM TOPICAL
Status: DISCONTINUED | OUTPATIENT
Start: 2020-10-08 | End: 2020-10-09 | Stop reason: HOSPADM

## 2020-10-08 RX ORDER — ONDANSETRON 4 MG/1
4 TABLET, FILM COATED ORAL EVERY 6 HOURS PRN
Status: DISCONTINUED | OUTPATIENT
Start: 2020-10-08 | End: 2020-10-09 | Stop reason: HOSPADM

## 2020-10-08 RX ADMIN — LACOSAMIDE 200 MG: 200 TABLET, FILM COATED ORAL at 20:13

## 2020-10-08 RX ADMIN — LEVETIRACETAM 2000 MG: 500 TABLET ORAL at 20:13

## 2020-10-08 RX ADMIN — SODIUM CHLORIDE, POTASSIUM CHLORIDE, SODIUM LACTATE AND CALCIUM CHLORIDE 1000 ML: 600; 310; 30; 20 INJECTION, SOLUTION INTRAVENOUS at 16:28

## 2020-10-08 ASSESSMENT — ENCOUNTER SYMPTOMS
SEIZURES: 0
FATIGUE: 1
CONFUSION: 1
CARDIOVASCULAR NEGATIVE: 1
GASTROINTESTINAL NEGATIVE: 1
RESPIRATORY NEGATIVE: 1

## 2020-10-08 NOTE — TELEPHONE ENCOUNTER
Call received from patient's family member and spouse who are calling to inform Dr. Olmos they are in route to [facility] ED for seizure like activity patient has been experiencing today.

## 2020-10-08 NOTE — ED PROVIDER NOTES
"    [facility] ([facility])  10/08/20    History     Chief Complaint   Patient presents with     Seizures     History was provided by the patient, the patient's wife and medical records.        Jayden Lackey is a 65 year old male with a past medical history of HTN, rheumatoid arthritis, hypercholesteremia, PE, glioblastoma multiforme s/p tumor resection x2, (most recently 10/24/2019) and s/p chemoradiotherapy (2019), and seizures who presents with his wife for evaluation of frequent auras associated with seizure.  2 nights ago the patient accidentally took his morning medications at night including dose of dexamethasone, Xarelto, sertraline and lisinopril.  Patient denies having unusual symptoms associated with that incident.  Prior to the incident patient's wife describes that the patient seemed slightly more confused, more fatigued as well as more irritable.  Yesterday, patient reports experiencing auras on 5 occasions requiring him to take doses of Valium.  His auras typically present as increased confusion as well as an abnormal taste in his mouth.  His wife states he typically exclaims \"I feel, it feels like it is coming on\".  Today the patient reportedly slept throughout most of the day which is abnormal for him according to his wife.  Patient also reportedly was shuffling around the house instead of his usual gait.  Patient is wife are concerned that this is an indication of an incoming seizure.  Patient denies having had a seizure in the past several days.  Patient estimates he was given greater than 20 mg of Valium yesterday.  Today he estimates taking approximately 12.5.  Patient recently switched his care to follow soley with Dr. Olmos at [facility] as opposed to splitting his care with [facility] and [facility].  Patient is scheduled to have a brain MRI on 10/26/2020.  Patient's last brain MRI occurred on 8/24/2020.    MR Brain - 8/24/2020 11:47 AM  Impression: This patient with " glioblastoma status post surgical resection and chemoradiotherapy: Postoperative changes of left parietal occipital craniotomy and underlying glioblastoma resection. No substantial change in nodularity contrast enhancement along the resection cavity without associated increased relative CBV.  Continued attention on follow-up is recommended.    EEG 4/3/2020 09:46  IMPRESSION:  Video EEG day 2 is abnormal due to the presence of continuous slowing in the left hemisphere with maximum slowing in the temporal parietooccipital region.  These findings are consistent with a maximum cortical dysfunction in the left temporal parietooccipital region. The patient does have a known history of glioblastoma multiforme in this region and had a recent seizure (post ictal slowing), which may account for this finding on the EEG.  No electrographic seizures or epileptiform discharges were seen.  Clinical correlation is advised.     Past Medical History:   Diagnosis Date     Colon polyp      Dyslipidemia      Glioblastoma (H)      Hypertension      Lumbar disc herniation     L4-5     Rheumatoid arthritis (H)        Past Surgical History:   Procedure Laterality Date     ANAL SPHINCTEROTOMY       BACK SURGERY  2009    L4-5     HERNIA REPAIR  1974     OPTICAL TRACKING SYSTEM CRANIOTOMY, EXCISE TUMOR, COMBINED Left 3/22/2019    Procedure: Left Stealth Assisted Craniotomy And Tumor Resection;  Surgeon: Irving Hayes MD;  Location: [facility] OR     OPTICAL TRACKING SYSTEM CRANIOTOMY, EXCISE TUMOR, COMBINED Left 10/24/2019    Procedure: LEFT CRANIOTOMY, USING OPTICAL TRACKING SYSTEM, WITH NEOPLASM EXCISION;  Surgeon: Nicho Packer MD;  Location: [facility] OR     SEPTOPLASTY       VASECTOMY         Family History   Problem Relation Age of Onset     Macular Degeneration Mother      Diabetes Mother      Heart Failure Father      Alcoholism Father      Diabetes Sister      Heart Disease Maternal Grandfather      Pancreatic Cancer Maternal  Grandmother      Rheumatoid Arthritis Sister      Other - See Comments Sister         glioma     Other - See Comments Sister         maculofacial digital syndrome     Kidney Disease Sister         s/p transplant     Glaucoma No family hx of        Social History     Tobacco Use     Smoking status: Former Smoker     Packs/day: 1.00     Years: 3.00     Pack years: 3.00     Types: Cigarettes     Start date: 1974     Quit date: 1977     Years since quittin.3     Smokeless tobacco: Never Used   Substance Use Topics     Alcohol use: Not Currently     Current Facility-Administered Medications   Medication     lacosamide (VIMPAT) tablet 200 mg     levETIRAcetam (KEPPRA) tablet 2,000 mg     Current Outpatient Medications   Medication     atenolol (TENORMIN) 100 MG tablet     dexamethasone (DECADRON) 2 MG tablet     diazepam (VALIUM) 5 MG/ML (HIGH CONC) solution     diltiazem ER (DILT-XR) 180 MG 24 hr capsule     docusate sodium (COLACE) 50 MG capsule     lacosamide (VIMPAT) 200 MG TABS tablet     levETIRAcetam (KEPPRA) 1000 MG tablet     lisinopril (PRINIVIL/ZESTRIL) 5 MG tablet     ondansetron (ZOFRAN) 4 MG tablet     rivaroxaban ANTICOAGULANT (XARELTO ANTICOAGULANT) 20 MG TABS tablet     sertraline (ZOLOFT) 100 MG tablet        Allergies   Allergen Reactions     Shellfish Allergy Difficulty breathing, Nausea and Vomiting, Swelling and Shortness Of Breath     No Clinical Screening - See Comments      Tyrone ferrell doesn't remember reaction         Review of Systems   Constitutional: Positive for fatigue.   HENT: Negative for nosebleeds.    Respiratory: Negative.    Cardiovascular: Negative.    Gastrointestinal: Negative.    Neurological: Negative for seizures.        Aura typically precedes a seizure   Psychiatric/Behavioral: Positive for confusion.   All other systems reviewed and are negative.    A complete review of systems was performed with pertinent positives and negatives noted in the HPI, and all other  "systems negative.    Physical Exam   BP: 131/76  Pulse: 52  Temp: 98.3  F (36.8  C)  Resp: 16  Height: 190.5 cm (6' 3\")  SpO2: 97 %  Physical Exam  Vitals signs and nursing note reviewed.   Constitutional:       General: He is not in acute distress.     Appearance: He is well-developed.      Comments: Somnolent/sedated   HENT:      Head: Normocephalic and atraumatic.      Mouth/Throat:      Mouth: Mucous membranes are moist.   Eyes:      General: No scleral icterus.     Conjunctiva/sclera: Conjunctivae normal.      Pupils: Pupils are equal, round, and reactive to light.   Neck:      Musculoskeletal: Neck supple.   Cardiovascular:      Rate and Rhythm: Normal rate and regular rhythm.      Heart sounds: Normal heart sounds.   Pulmonary:      Effort: Pulmonary effort is normal. No respiratory distress.      Breath sounds: Normal breath sounds. No wheezing.   Abdominal:      General: Abdomen is flat.      Palpations: Abdomen is soft.   Skin:     General: Skin is warm and dry.   Neurological:      General: No focal deficit present.      Mental Status: He is alert and oriented to person, place, and time.      Cranial Nerves: No cranial nerve deficit.   Psychiatric:         Mood and Affect: Mood normal.         Behavior: Behavior normal.         ED Course     4:13 PM  The patient was seen and examined by Dr. Eren Hicks in Room ED 10.     Procedures        Neurology consulted     Results for orders placed or performed during the hospital encounter of 10/08/20   CT Head w/o Contrast     Status: None    Narrative    CT HEAD W/O CONTRAST 10/8/2020 5:20 PM    History: GBM more sedated   ICD-10:    Comparison: MRI brain 8/24/2020 and head CT 6/26/2020    Technique: Using multidetector thin collimation helical acquisition  technique, axial, coronal and sagittal CT images from the skull base  to the vertex were obtained without intravenous contrast.    Findings: Postsurgical changes of left parietal craniotomy " and  underlying mass resection. No significant change in the posterior left  parietal resection cavity which communicates with the posterior horn  of the left lateral ventricle. Unchanged edema in the left parietal  and occipital white matter. There is no intracranial hemorrhage or  midline shift. No acute loss of gray-white matter differentiation.  Ventricles are proportionate to the cerebral sulci. The basal cisterns  are clear.     The visualized portions of the paranasal sinuses and mastoid air  cells are clear.       Impression    Impression:  1. No acute intracranial pathology.   2. Postsurgical changes of left parietal craniotomy and underlying  mass resection. Stable edema in the left parieto-occipital white  matter.    I have personally reviewed the examination and initial interpretation  and I agree with the findings.    DAYSI MORSE MD   CBC with platelets differential     Status: Abnormal   Result Value Ref Range    WBC 6.7 4.0 - 11.0 10e9/L    RBC Count 4.19 (L) 4.4 - 5.9 10e12/L    Hemoglobin 9.9 (L) 13.3 - 17.7 g/dL    Hematocrit 32.9 (L) 40.0 - 53.0 %    MCV 79 78 - 100 fl    MCH 23.6 (L) 26.5 - 33.0 pg    MCHC 30.1 (L) 31.5 - 36.5 g/dL    RDW 17.1 (H) 10.0 - 15.0 %    Platelet Count 177 150 - 450 10e9/L    Diff Method Automated Method     % Neutrophils 81.7 %    % Lymphocytes 10.2 %    % Monocytes 6.4 %    % Eosinophils 0.3 %    % Basophils 0.1 %    % Immature Granulocytes 1.3 %    Nucleated RBCs 0 0 /100    Absolute Neutrophil 5.4 1.6 - 8.3 10e9/L    Absolute Lymphocytes 0.7 (L) 0.8 - 5.3 10e9/L    Absolute Monocytes 0.4 0.0 - 1.3 10e9/L    Absolute Eosinophils 0.0 0.0 - 0.7 10e9/L    Absolute Basophils 0.0 0.0 - 0.2 10e9/L    Abs Immature Granulocytes 0.1 0 - 0.4 10e9/L    Absolute Nucleated RBC 0.0    Comprehensive metabolic panel     Status: Abnormal   Result Value Ref Range    Sodium 139 133 - 144 mmol/L    Potassium 3.9 3.4 - 5.3 mmol/L    Chloride 108 94 - 109 mmol/L    Carbon Dioxide 24 20  - 32 mmol/L    Anion Gap 7 3 - 14 mmol/L    Glucose 140 (H) 70 - 99 mg/dL    Urea Nitrogen 22 7 - 30 mg/dL    Creatinine 0.87 0.66 - 1.25 mg/dL    GFR Estimate >90 >60 mL/min/[1.73_m2]    GFR Estimate If Black >90 >60 mL/min/[1.73_m2]    Calcium 8.9 8.5 - 10.1 mg/dL    Bilirubin Total 0.2 0.2 - 1.3 mg/dL    Albumin 3.5 3.4 - 5.0 g/dL    Protein Total 6.6 (L) 6.8 - 8.8 g/dL    Alkaline Phosphatase 49 40 - 150 U/L    ALT 38 0 - 70 U/L    AST 12 0 - 45 U/L   UA with Microscopic     Status: Abnormal   Result Value Ref Range    Color Urine Light Yellow     Appearance Urine Clear     Glucose Urine Negative NEG^Negative mg/dL    Bilirubin Urine Negative NEG^Negative    Ketones Urine Negative NEG^Negative mg/dL    Specific Gravity Urine 1.009 1.003 - 1.035    Blood Urine Negative NEG^Negative    pH Urine 5.5 5.0 - 7.0 pH    Protein Albumin Urine Negative NEG^Negative mg/dL    Urobilinogen mg/dL Normal 0.0 - 2.0 mg/dL    Nitrite Urine Negative NEG^Negative    Leukocyte Esterase Urine Negative NEG^Negative    Source Midstream Urine     WBC Urine <1 0 - 5 /HPF    RBC Urine <1 0 - 2 /HPF    Squamous Epithelial /HPF Urine <1 0 - 1 /HPF    Mucous Urine Present (A) NEG^Negative /LPF     Medications   levETIRAcetam (KEPPRA) tablet 2,000 mg (has no administration in time range)   lacosamide (VIMPAT) tablet 200 mg (has no administration in time range)   lactated ringers BOLUS 1,000 mL (0 mLs Intravenous Stopped 10/8/20 1949)        Assessments & Plan (with Medical Decision Making)   Jayden Lackey is a 65-year-old gentleman with glioblastoma resulting in seizures. He is on large dose of Keppra and Vimpat.  He is also on PRN Valium. His wife brings him in saying that he is having increased frequencies of aura that she is treating with liquid Valium. He has not had a seizure.  She says she is somewhat struggling with this as she is having to frequently give him Valium which is causing sedation and he is at risk for falling.  He does not  have any injuries. I did CT his head there is no acute abnormality, no increased edema.  His labs are otherwise normal. Neurology was consulted and saw the patient. His drug levels are pending. They will admit him. He is in stable condition.    This part of the document was transcribed by Stacy Navarro Medical Scribe.      I have reviewed the nursing notes. I have reviewed the findings, diagnosis, plan and need for follow up with the patient.    New Prescriptions    No medications on file       Final diagnoses:   GBM (glioblastoma multiforme) (H)   Seizures (H)     I, Jose Whipple, am serving as a trained medical scribe to document services personally performed by Eren Hicks MD, based on the provider's statements to me.      I, Eren Hicks MD, was physically present and have reviewed and verified the accuracy of this note documented by Jose Whipple.     --  Eren Hicks MD  [facility] EMERGENCY DEPARTMENT  10/8/2020     Eren Hicks MD  10/08/20 2020

## 2020-10-09 VITALS
SYSTOLIC BLOOD PRESSURE: 161 MMHG | RESPIRATION RATE: 16 BRPM | TEMPERATURE: 95.7 F | HEIGHT: 75 IN | DIASTOLIC BLOOD PRESSURE: 94 MMHG | HEART RATE: 60 BPM | BODY MASS INDEX: 29.85 KG/M2 | OXYGEN SATURATION: 99 %

## 2020-10-09 LAB
ANION GAP SERPL CALCULATED.3IONS-SCNC: 6 MMOL/L (ref 3–14)
BUN SERPL-MCNC: 17 MG/DL (ref 7–30)
CALCIUM SERPL-MCNC: 8.8 MG/DL (ref 8.5–10.1)
CHLORIDE SERPL-SCNC: 111 MMOL/L (ref 94–109)
CO2 SERPL-SCNC: 27 MMOL/L (ref 20–32)
CREAT SERPL-MCNC: 0.77 MG/DL (ref 0.66–1.25)
ERYTHROCYTE [DISTWIDTH] IN BLOOD BY AUTOMATED COUNT: 16.7 % (ref 10–15)
GFR SERPL CREATININE-BSD FRML MDRD: >90 ML/MIN/{1.73_M2}
GLUCOSE SERPL-MCNC: 82 MG/DL (ref 70–99)
HCT VFR BLD AUTO: 33.2 % (ref 40–53)
HGB BLD-MCNC: 10 G/DL (ref 13.3–17.7)
LABORATORY COMMENT REPORT: NORMAL
MCH RBC QN AUTO: 23.5 PG (ref 26.5–33)
MCHC RBC AUTO-ENTMCNC: 30.1 G/DL (ref 31.5–36.5)
MCV RBC AUTO: 78 FL (ref 78–100)
PLATELET # BLD AUTO: 153 10E9/L (ref 150–450)
POTASSIUM SERPL-SCNC: 3.4 MMOL/L (ref 3.4–5.3)
RBC # BLD AUTO: 4.26 10E12/L (ref 4.4–5.9)
SARS-COV-2 RNA SPEC QL NAA+PROBE: NEGATIVE
SARS-COV-2 RNA SPEC QL NAA+PROBE: NORMAL
SODIUM SERPL-SCNC: 144 MMOL/L (ref 133–144)
SPECIMEN SOURCE: NORMAL
SPECIMEN SOURCE: NORMAL
WBC # BLD AUTO: 5.3 10E9/L (ref 4–11)

## 2020-10-09 PROCEDURE — 85027 COMPLETE CBC AUTOMATED: CPT | Performed by: PSYCHIATRY & NEUROLOGY

## 2020-10-09 PROCEDURE — 80048 BASIC METABOLIC PNL TOTAL CA: CPT | Performed by: PSYCHIATRY & NEUROLOGY

## 2020-10-09 PROCEDURE — 36415 COLL VENOUS BLD VENIPUNCTURE: CPT | Performed by: PSYCHIATRY & NEUROLOGY

## 2020-10-09 PROCEDURE — 250N000013 HC RX MED GY IP 250 OP 250 PS 637: Performed by: STUDENT IN AN ORGANIZED HEALTH CARE EDUCATION/TRAINING PROGRAM

## 2020-10-09 PROCEDURE — 99219 PR INITIAL OBSERVATION CARE,LEVEL II: CPT | Mod: AI | Performed by: PSYCHIATRY & NEUROLOGY

## 2020-10-09 PROCEDURE — G0378 HOSPITAL OBSERVATION PER HR: HCPCS

## 2020-10-09 PROCEDURE — 250N000012 HC RX MED GY IP 250 OP 636 PS 637: Performed by: PSYCHIATRY & NEUROLOGY

## 2020-10-09 PROCEDURE — 250N000013 HC RX MED GY IP 250 OP 250 PS 637: Performed by: PSYCHIATRY & NEUROLOGY

## 2020-10-09 RX ORDER — LACOSAMIDE 50 MG/1
TABLET ORAL
Qty: 60 TABLET | Refills: 0 | Status: SHIPPED | OUTPATIENT
Start: 2020-10-09 | End: 2020-10-09

## 2020-10-09 RX ORDER — LORAZEPAM 2 MG/ML
2 INJECTION INTRAMUSCULAR EVERY 6 HOURS PRN
Status: DISCONTINUED | OUTPATIENT
Start: 2020-10-09 | End: 2020-10-09 | Stop reason: HOSPADM

## 2020-10-09 RX ORDER — DEXAMETHASONE 4 MG/1
TABLET ORAL
Qty: 18 TABLET | Refills: 0 | Status: SHIPPED | OUTPATIENT
Start: 2020-10-09 | End: 2021-01-01

## 2020-10-09 RX ORDER — LACOSAMIDE 200 MG/1
TABLET ORAL
Qty: 30 TABLET | Refills: 0 | Status: SHIPPED | OUTPATIENT
Start: 2020-10-09 | End: 2020-10-09

## 2020-10-09 RX ORDER — LEVETIRACETAM 1000 MG/1
TABLET ORAL
Qty: 30 TABLET | Refills: 0 | Status: SHIPPED | OUTPATIENT
Start: 2020-10-09 | End: 2020-10-13

## 2020-10-09 RX ORDER — PANTOPRAZOLE SODIUM 40 MG/1
40 TABLET, DELAYED RELEASE ORAL DAILY
Qty: 14 TABLET | Refills: 0 | Status: SHIPPED | OUTPATIENT
Start: 2020-10-09 | End: 2020-10-09

## 2020-10-09 RX ORDER — PANTOPRAZOLE SODIUM 40 MG/1
40 TABLET, DELAYED RELEASE ORAL DAILY
Qty: 14 TABLET | Refills: 0 | Status: SHIPPED | OUTPATIENT
Start: 2020-10-09 | End: 2020-10-26

## 2020-10-09 RX ORDER — LACOSAMIDE 100 MG/1
100 TABLET ORAL ONCE
Status: COMPLETED | OUTPATIENT
Start: 2020-10-09 | End: 2020-10-09

## 2020-10-09 RX ORDER — LEVETIRACETAM 500 MG/1
TABLET ORAL
Qty: 30 TABLET | Refills: 0 | Status: SHIPPED | OUTPATIENT
Start: 2020-10-09 | End: 2020-10-13

## 2020-10-09 RX ORDER — DEXAMETHASONE 4 MG/1
4 TABLET ORAL
Qty: 14 TABLET | Refills: 0 | Status: SHIPPED | OUTPATIENT
Start: 2020-10-10 | End: 2020-10-09

## 2020-10-09 RX ORDER — DIAZEPAM ORAL SOLUTION (CONCENTRATE) 5 MG/ML
5 SOLUTION ORAL
Qty: 10 ML | Refills: 0 | Status: SHIPPED | OUTPATIENT
Start: 2020-10-09 | End: 2021-01-01

## 2020-10-09 RX ORDER — LEVETIRACETAM 1000 MG/1
TABLET ORAL
Qty: 30 TABLET | Refills: 0 | Status: SHIPPED | OUTPATIENT
Start: 2020-10-09 | End: 2020-10-09

## 2020-10-09 RX ORDER — LEVETIRACETAM 500 MG/1
TABLET ORAL
Qty: 30 TABLET | Refills: 0 | Status: SHIPPED | OUTPATIENT
Start: 2020-10-09 | End: 2020-10-09

## 2020-10-09 RX ORDER — LACOSAMIDE 50 MG/1
TABLET ORAL
Qty: 30 TABLET | Refills: 0 | Status: SHIPPED | OUTPATIENT
Start: 2020-10-09 | End: 2020-10-13

## 2020-10-09 RX ORDER — LACOSAMIDE 200 MG/1
TABLET ORAL
Qty: 30 TABLET | Refills: 0 | Status: SHIPPED | OUTPATIENT
Start: 2020-10-09 | End: 2020-10-13

## 2020-10-09 RX ORDER — LEVETIRACETAM 500 MG/1
1000 TABLET ORAL ONCE
Status: COMPLETED | OUTPATIENT
Start: 2020-10-09 | End: 2020-10-09

## 2020-10-09 RX ORDER — DIAZEPAM ORAL SOLUTION (CONCENTRATE) 5 MG/ML
5 SOLUTION ORAL
Qty: 10 ML | Refills: 0 | Status: SHIPPED | OUTPATIENT
Start: 2020-10-09 | End: 2020-10-09

## 2020-10-09 RX ADMIN — LEVETIRACETAM 1000 MG: 500 TABLET ORAL at 13:23

## 2020-10-09 RX ADMIN — ATENOLOL 50 MG: 50 TABLET ORAL at 09:04

## 2020-10-09 RX ADMIN — LACOSAMIDE 200 MG: 200 TABLET, FILM COATED ORAL at 09:03

## 2020-10-09 RX ADMIN — LEVETIRACETAM 2000 MG: 500 TABLET ORAL at 09:03

## 2020-10-09 RX ADMIN — LISINOPRIL 5 MG: 5 TABLET ORAL at 09:03

## 2020-10-09 RX ADMIN — RIVAROXABAN 20 MG: 20 TABLET, FILM COATED ORAL at 00:01

## 2020-10-09 RX ADMIN — SERTRALINE HYDROCHLORIDE 200 MG: 50 TABLET ORAL at 09:04

## 2020-10-09 RX ADMIN — DILTIAZEM HYDROCHLORIDE 180 MG: 180 CAPSULE, COATED, EXTENDED RELEASE ORAL at 09:03

## 2020-10-09 RX ADMIN — DEXAMETHASONE 4 MG: 2 TABLET ORAL at 09:03

## 2020-10-09 RX ADMIN — LEVETIRACETAM 500 MG: 500 TABLET ORAL at 00:01

## 2020-10-09 RX ADMIN — LACOSAMIDE 100 MG: 100 TABLET, FILM COATED ORAL at 13:23

## 2020-10-09 RX ADMIN — LACOSAMIDE 200 MG: 200 TABLET, FILM COATED ORAL at 00:01

## 2020-10-09 NOTE — PLAN OF CARE
Admitted for seizure monitoring. Hx GBM with resections in 3/22/19 and 10/24/19. Neuros intact ex forgetful, slow to respond. D/o time. Right field cut. VSS ex HTN within parameters. Denies pain. PIV SL. Regular diet. Up 1/gb. Voids without difficulty. Continue to monitor.

## 2020-10-09 NOTE — ED NOTES
Family wondering about meds that they take at 8-830PM. Adv I will ask the MD for the medications.

## 2020-10-09 NOTE — PLAN OF CARE
Observation goals PER UNIT ROUTINE     Comments: Make sure he is not developing full blown GTCs after the auras. Monitor vital signs and keep BP/O2 sat/HR within normal limit.     No GTC's or auras present, all vital signs stable. Will continue to monitor.

## 2020-10-09 NOTE — DISCHARGE SUMMARY
[facility]  NEUROLOGY DISCHARGE SUMMARY    Patient Name:  Jayden Lackey  MRN:  [id number]      :  1955      Date of Admission:  10/8/2020  Date of Discharge:  10/9/2020  Admitting Physician:  Meek Bhakta MD  Discharge Physician:  Meek Bhakta MD  Primary Care Provider:   Jhoan Farooq  Discharge Disposition:   Discharged to home    Admission Diagnoses:  #GBM s/p resection and chemoradiotherapy  #Seizure disorder 2/2 to above (focal to bilateral tonic-clonic)  #AF  #HTN  #DVT/PE  #Depression  #HTN  #Constipation    Discharge Diagnoses:    #GBM s/p resection and chemoradiotherapy  #Seizure disorder 2/2 to above (focal to bilateral tonic-clonic)  #AF  #HTN  #DVT/PE  #Depression  #HTN  #Constipation    Brief History of Illness:   Mr Jayden Lackey is a 65 year old male with a PMHx of GBM s/p resections on 3/22/19 and 10/24/19, s/p chemotherapy and radiotherapy (currently off cancer directed therapy with stable imaging) complicated by partial onset with secondary generalization seizures who presents for evaluation of increased aura frequency (5 times on 10/7) and somnolence. His seizure auras include a metallic taste in the mouth followed by a epigastric rising sensation in the chest.      Hospital Course:  Mr. Lackey was admitted under observation status to the general neurology service. His neurological exam was felt to be at baseline. Given stereotypical aura events, we opted not to complete vEEG. He had no seizure auras overnight but one witnessed one prior to discharge that resoled spontaneously. After much discussion with Mr. Lackey and his wife, the decision was made to increase his PTA AED's. Keppra was increased from 2g in the AM and 2.5g in the PM to 2.5g BID. Vimpat was increased from 200mg BID to 250mg BID. Dexamethasone was increased from 2mg daily to 6mg daily for 5 days followed by 4mg daily until he follows up with Dr. Olmos.  "      Pertinent Investigations:  CTH 10/8 - No acute intracranial pathology. Stable known edema in left parietal lobe.    Consultations:    None    Recommendations and Follow-up:  - Keep appointment with Dr. Pacheco on 10/13/20 (epilepsy)   - Keep appointment with Dr. Olmos on 10/26/20 (neuro-oncology)  - Keep appointment for MR Brain on 10/26/20     Discharge physical examination:   BP (!) 161/94   Pulse 60   Temp 95.7  F (35.4  C) (Oral)   Resp 16   Ht 1.905 m (6' 3\")   SpO2 99%   BMI 29.85 kg/m    General: Adult patient, sitting up, eating food   HEENT: NC/AT, no icterus, op pink and moist  Chest: non-labored on RA  Extremities: Warm, no edema  Skin: No rash or lesion   Psych: Mood pleasant, affect congruent  Neuro:  Mental status: Awake, alert, attentive, oriented to self. Unsure of the month. Knows he is in a hospital. Word-finding difficulty. Names 1/5 objects correctly. Often uses word substitution.   Cranial nerves: R visual field cut,  PERRL, conjugate gaze, EOMI, facial sensation intact, face symmetric, shoulder shrug strong, tongue/uvula midline, no dysarthria.   Motor: Normal bulk and tone. No abnormal movements. 5/5 strength in 4/4 extremities.   Reflexes: Brisk reflexes at biceps, brachioradialis, patellae.   Sensory: Intact to light touch in all four extremities.   Coordination: FNF and HS without ataxia  Gait: Ambulated without assistance     Discharge Medications:  Discharge Medication List as of 10/9/2020  3:23 PM      CONTINUE these medications which have CHANGED    Details   dexamethasone (DECADRON) 4 MG tablet Take 6mg daily for 5 days, followed by 4mg daily for 5 days, Disp-18 tablet, R-0, Local Print      diazepam (VALIUM) 5 MG/ML (HIGH CONC) solution Take 1 mL (5 mg) by mouth every 3 minutes as needed for seizures (For seizure activity.) If seizure worsens or has new symptoms, repeat with 1 mL by mouth up to a max of 3.0 mL in 1 day. Do not give if patient has very shallow or slow " breathing., Disp-10  mL, R-0, E-Prescribe      !! lacosamide (VIMPAT) 200 MG TABS tablet Take 250mg in the AM and 250mg in the PM, Disp-30 tablet, R-0, Local Print      !! lacosamide (VIMPAT) 50 MG TABS tablet Take 250mg in the AM and 250mg in the PM, Disp-30 tablet, R-0, Local Print      !! levETIRAcetam (KEPPRA) 1000 MG tablet Take 2500mg in AM and 2500mg in PM, Disp-30 tablet, R-0, Local Print      !! levETIRAcetam (KEPPRA) 500 MG tablet Take 2500mg in AM and 2500mg in PM, Disp-30 tablet, R-0, Local Print      pantoprazole (PROTONIX) 40 MG EC tablet Take 1 tablet (40 mg) by mouth daily, Disp-14 tablet, R-0, Local Print       !! - Potential duplicate medications found. Please discuss with provider.      CONTINUE these medications which have NOT CHANGED    Details   atenolol (TENORMIN) 100 MG tablet Take 50 mg by mouth daily, Historical      diltiazem ER (DILT-XR) 180 MG 24 hr capsule Take 180 mg by mouth daily, Historical      docusate sodium (COLACE) 50 MG capsule Take 50 mg by mouth 2 times daily as needed for constipation, Historical      lisinopril (PRINIVIL/ZESTRIL) 5 MG tablet Take 1 tablet (5 mg) by mouth daily, Disp-28 tablet, R-0, E-Prescribe      rivaroxaban ANTICOAGULANT (XARELTO ANTICOAGULANT) 20 MG TABS tablet Take 1 tablet (20 mg) by mouth daily (with dinner), Disp-30 tablet, R-3, E-Prescribe      sertraline (ZOLOFT) 100 MG tablet Take 2 tablets (200 mg) by mouth daily, Disp-60 tablet, R-3, E-Prescribe         STOP taking these medications       ondansetron (ZOFRAN) 4 MG tablet Comments:   Reason for Stopping:               Discharge follow up and instructions:     Reason for your hospital stay    You were admitted to have your seizure auras evaluated     Adult [facility] Follow-up and recommended labs and tests    - Keep appointment with Dr. Pacheco on 10/13/20 (epilepsy)   - Keep appointment with Dr. Olmos on 10/26/20 (neuro-oncology)  - Keep appointment for MR Brain on 10/26/20        Appointments on  [place] and/or [facility] (with [facility] or [facility] provider or service). Call [phone] if you haven't heard regarding these appointments within 7 days of discharge.     Activity    Your activity upon discharge: activity as tolerated. No driving for at least 3 months.     Discharge Instructions    Valium instructions: Take 5mg (1 mL) at onset of seizure aura. If after 3 minutes, the symptoms have changed or worsened (new aura symptom), take an additional 5mg (1 mL).     Diet    Follow this diet upon discharge: Orders Placed This Encounter      Regular Diet Adult       Patient seen and discussed with Dr. Bhakta.     Richard Madrid MD  PGY-2 Neurology Resident  P: [phone]

## 2020-10-09 NOTE — PHARMACY-ADMISSION MEDICATION HISTORY
"  Admission Medication History Completed by Pharmacy    See [facility] Admission Navigator for allergy information, preferred outpatient pharmacy, prior to admission medications and immunization status.     Medication History Sources:     The patient's spouse (Oz)    Care Everywhere    Outside pharmacy fill records ([facility])      Changes made to PTA medication list (reason):    Added:   o 500mg of Keppra at bedtime    Deleted:   o Ondansetron 4mg; the patient hasn't used this medication \"in a long time\"    Changed: None.     Additional Information:    The patient's spouse seems to be a good historian and helps the patient with the medications    500mg of Keppra at bedtime was recently added to the patient's therapy plan at [facility] (09/18) in addition to the patient's 2000mg twice daily per spouse.     The patient takes 2000mg in the evening and then later takes 500mg right before going to bed.     Prior to Admission medications    Medication Sig Last Dose Taking? Auth Provider   atenolol (TENORMIN) 100 MG tablet Take 50 mg by mouth daily 10/8/2020 at AM Yes Unknown, Entered By History   dexamethasone (DECADRON) 2 MG tablet Take 1 tablet (2 mg) by mouth daily (with breakfast) 10/8/2020 at AM Yes Nasra Olmos MD   diazepam (VALIUM) 5 MG/ML (HIGH CONC) solution Take 1 mL (5 mg) by mouth every 3 minutes as needed for seizures (lasting greater than 5 minutes.) If seizure continues, repeat with 0.5mL by mouth every 2-3 minutes up to a max of 7.5mL in 1 day. Do not give if patient has very shallow or slow breathing. 10/8/2020 at PM Yes Nasra Olmos MD   diltiazem ER (DILT-XR) 180 MG 24 hr capsule Take 180 mg by mouth daily 10/8/2020 at AM Yes Unknown, Entered By History   docusate sodium (COLACE) 50 MG capsule Take 50 mg by mouth 2 times daily as needed for constipation Past Week at AM Yes Reported, Patient   lacosamide (VIMPAT) 200 MG TABS tablet Take 1 tablet (200 mg) by mouth 2 times " daily 10/8/2020 at AM Yes Nasra Olmos MD   levETIRAcetam (KEPPRA) 1000 MG tablet Take 2 tablets (2,000 mg) by mouth 2 times daily 10/8/2020 at AM Yes Dolly Ramirez PA-C   levETIRAcetam (KEPPRA) 500 MG tablet Take 500 mg by mouth At Bedtime 10/7/2020 at PM Yes Unknown, Entered By History   lisinopril (PRINIVIL/ZESTRIL) 5 MG tablet Take 1 tablet (5 mg) by mouth daily 10/8/2020 at AM Yes Manuel Roth MD   rivaroxaban ANTICOAGULANT (XARELTO ANTICOAGULANT) 20 MG TABS tablet Take 1 tablet (20 mg) by mouth daily (with dinner) 10/8/2020 at AM Yes Nasra Olmos MD   sertraline (ZOLOFT) 100 MG tablet Take 2 tablets (200 mg) by mouth daily 10/8/2020 at AM Yes Nasra Olmos MD       Date completed: 10/08/20    Medication history completed by: NORBERT Keyes

## 2020-10-09 NOTE — PLAN OF CARE
Status: Patient admitted on 10/8 on observation status for seizure monitoring.  Vitals: VSS  Neuros: Intact ex disoriented to time, R field cut, slow to respond, forgetful, generalized weakness  IV: PIV removed  Resp/trach: Lung sounds clear throughout  Diet: Regular  Bowel status: No BM, last BM yesterday per patient, BS+  : Voiding spontaneously  Skin: Intact  Pain: Denies  Activity: Up with SBA, ambulated in hallway 2x  Social: Calm and cooperative with cares , wife at bedside, supportive with cares  Plan: Discharge instructions given to patient and wife at bedside, patient transported to pharmacy and front door via wheelchair

## 2020-10-09 NOTE — ED NOTES
[facility]    ED Nurse to Floor Handoff     Jayden Lackey is a 65 year old male who speaks English and lives with family members,  in a home  They arrived in the ED by car from home    ED Chief Complaint: Seizures    ED Dx;   Final diagnoses:   GBM (glioblastoma multiforme) (H)   Seizures (H)         Needed?: No    Allergies:   Allergies   Allergen Reactions     Shellfish Allergy Difficulty breathing, Nausea and Vomiting, Swelling and Shortness Of Breath     No Clinical Screening - See Comments      Tyrone ferrell doesn't remember reaction   .  Past Medical Hx:   Past Medical History:   Diagnosis Date     Colon polyp      Dyslipidemia      Glioblastoma (H)      Hypertension      Lumbar disc herniation     L4-5     Rheumatoid arthritis (H)       Baseline Mental status: WDL  Current Mental Status changes: at basesline    Infection present or suspected this encounter: no  Sepsis suspected: No  Isolation type: No active isolations     Activity level - Baseline/Home:  Stand with Assist  Activity Level - Current:   Stand with Assist    Bariatric equipment needed?: No    In the ED these meds were given:   Medications   levETIRAcetam (KEPPRA) tablet 2,000 mg (has no administration in time range)   lacosamide (VIMPAT) tablet 200 mg (has no administration in time range)   lactated ringers BOLUS 1,000 mL (0 mLs Intravenous Stopped 10/8/20 1949)       Drips running?  No    Home pump  No    Current LDAs  Peripheral IV 10/08/20 Right Hand (Active)   Site Assessment WDL 10/08/20 1549   Number of days: 0       Incision/Surgical Site 03/22/19 Left Head (Active)   Number of days: 566       Labs results:   Labs Ordered and Resulted from Time of ED Arrival Up to the Time of Departure from the ED   CBC WITH PLATELETS DIFFERENTIAL - Abnormal; Notable for the following components:       Result Value    RBC Count 4.19 (*)     Hemoglobin 9.9 (*)     Hematocrit 32.9 (*)     MCH 23.6 (*)      MCHC 30.1 (*)     RDW 17.1 (*)     Absolute Lymphocytes 0.7 (*)     All other components within normal limits   COMPREHENSIVE METABOLIC PANEL - Abnormal; Notable for the following components:    Glucose 140 (*)     Protein Total 6.6 (*)     All other components within normal limits   ROUTINE UA WITH MICROSCOPIC - Abnormal; Notable for the following components:    Mucous Urine Present (*)     All other components within normal limits   KEPPRA (LEVETIRACETAM) LEVEL   LACOSAMIDE LEVEL       Imaging Studies:   Recent Results (from the past 24 hour(s))   CT Head w/o Contrast    Narrative    CT HEAD W/O CONTRAST 10/8/2020 5:20 PM    History: GBM more sedated   ICD-10:    Comparison: MRI brain 8/24/2020 and head CT 6/26/2020    Technique: Using multidetector thin collimation helical acquisition  technique, axial, coronal and sagittal CT images from the skull base  to the vertex were obtained without intravenous contrast.    Findings: Postsurgical changes of left parietal craniotomy and  underlying mass resection. No significant change in the posterior left  parietal resection cavity which communicates with the posterior horn  of the left lateral ventricle. Unchanged edema in the left parietal  and occipital white matter. There is no intracranial hemorrhage or  midline shift. No acute loss of gray-white matter differentiation.  Ventricles are proportionate to the cerebral sulci. The basal cisterns  are clear.     The visualized portions of the paranasal sinuses and mastoid air  cells are clear.       Impression    Impression:  1. No acute intracranial pathology.   2. Postsurgical changes of left parietal craniotomy and underlying  mass resection. Stable edema in the left parieto-occipital white  matter.    I have personally reviewed the examination and initial interpretation  and I agree with the findings.    DAYSI MORSE MD       Recent vital signs:   BP (!) 170/100   Pulse 62   Temp 98.3  F (36.8  C) (Oral)   Resp  "15   Ht 1.905 m (6' 3\")   SpO2 97%   BMI 29.85 kg/m      [place] Coma Scale Score: 13 (10/08/20 1629)       Cardiac Rhythm: Normal Sinus  Pt needs tele? No  Skin/wound Issues: None    Code Status: Full Code    Pain control: good    Nausea control: pt had none    Abnormal labs/tests/findings requiring intervention:     Family present during ED course? Yes   Family Comments/Social Situation comments:     Tasks needing completion: None    Nasra Fournier RN  [facility] --   [phone] [place] ED  [phone] [facility] ED      "

## 2020-10-09 NOTE — H&P
General acute hospital: [place]  Neurology History and Physical    Patient Name:  Jayden Lackey  MRN:  [id number]    :  1955  Date of Admission:  10/8/2020  Date of Service:  2020  Primary care provider:  Jhoan Farooq      Chief Complaint: Increased aura frequency    History of Present Illness:   Mr Jayden Lackey is a 65 year old male with a PMHx of GBM s/p resections on 3/22/19 and 10/24/19, s/p chemotherapy and radiotherapy (currently off cancer directed therapy with stable imaging) complicated by partial onset with secondary generalization seizures. He has had frequent admissions due to breakthrough seizures.  He was last admitted on to [facility] on 20 after having 2 seizures 2 hours apart on  followed by right sided weakness. His Keppra was found to be therapeutic at 25.5 and his Keppra was increased from 1g BID to 1g AM and 1.5g in the evening.     His seizure auras include a metallic taste in the mouth followed by a epigastric rising sensation in the chest. They typically occur once every week to a week and a half. He has valium as a rescue medication. Yesterday, patient reports experience auras on 5 occasions requiring him to take doses of Valium (total of 20mg). Today, he slept throughout most of the day which is abnormal for him according to his wife. He had two further aura episodes today (total 7.5mg). Wife brought him to the ED due to increased aura frequency and concern that he might develop a convulsive seizure. Wife also reports that he seems slightly more confused and more fatigued over the last few days. In addition, he gait seems more slow and shuffled than usual (but not significantly increased). He has not had any fevers recently or been ill, except for a bit of a stuffy nose. No dysuria or other urinary symptoms. Reports that his sleep overall has been unchanged (waking up slightly earlier than usual might be the only difference). No  recent new stressors.     Only other thing to happen recently is that two nights ago is that he accidentally took his morning medication at night including a dose dexamethasone, Xarelto, sertraline, lisinopril. He did not have any unusual symptoms associated with that incident.      ROS: A 10-point ROS was performed as per HPI.     PMH:  Past Medical History:   Diagnosis Date     Colon polyp      Dyslipidemia      Glioblastoma (H)      Hypertension      Lumbar disc herniation     L4-5     Rheumatoid arthritis (H)      Past Surgical History:   Procedure Laterality Date     ANAL SPHINCTEROTOMY       BACK SURGERY  2009    L4-5     HERNIA REPAIR  1974     OPTICAL TRACKING SYSTEM CRANIOTOMY, EXCISE TUMOR, COMBINED Left 3/22/2019    Procedure: Left Stealth Assisted Craniotomy And Tumor Resection;  Surgeon: Irving Hayes MD;  Location: [facility] OR     OPTICAL TRACKING SYSTEM CRANIOTOMY, EXCISE TUMOR, COMBINED Left 10/24/2019    Procedure: LEFT CRANIOTOMY, USING OPTICAL TRACKING SYSTEM, WITH NEOPLASM EXCISION;  Surgeon: Nicho Packer MD;  Location: [facility] OR     SEPTOPLASTY       VASECTOMY         Allergies:  Allergies   Allergen Reactions     Shellfish Allergy Difficulty breathing, Nausea and Vomiting, Swelling and Shortness Of Breath     No Clinical Screening - See Comments      Tyrone ferrell doesn't remember reaction       Medications:      Current Facility-Administered Medications:      lacosamide (VIMPAT) tablet 200 mg, 200 mg, Oral, BID, Eren Hicks MD     levETIRAcetam (KEPPRA) tablet 2,000 mg, 2,000 mg, Oral, BID, Eren Hicks MD    Current Outpatient Medications:      atenolol (TENORMIN) 100 MG tablet, Take 50 mg by mouth daily, Disp: , Rfl:      dexamethasone (DECADRON) 2 MG tablet, Take 1 tablet (2 mg) by mouth daily (with breakfast), Disp: 30 tablet, Rfl: 3     diazepam (VALIUM) 5 MG/ML (HIGH CONC) solution, Take 1 mL (5 mg) by mouth every 3 minutes as needed for seizures (lasting  greater than 5 minutes.) If seizure continues, repeat with 0.5mL by mouth every 2-3 minutes up to a max of 7.5mL in 1 day. Do not give if patient has very shallow or slow breathing., Disp: 30 mL, Rfl: 5     diltiazem ER (DILT-XR) 180 MG 24 hr capsule, Take 180 mg by mouth daily, Disp: , Rfl:      docusate sodium (COLACE) 50 MG capsule, Take 50 mg by mouth 2 times daily as needed for constipation, Disp: , Rfl:      lacosamide (VIMPAT) 200 MG TABS tablet, Take 1 tablet (200 mg) by mouth 2 times daily, Disp: 60 tablet, Rfl: 3     levETIRAcetam (KEPPRA) 1000 MG tablet, Take 2 tablets (2,000 mg) by mouth 2 times daily, Disp: 120 tablet, Rfl: 3     lisinopril (PRINIVIL/ZESTRIL) 5 MG tablet, Take 1 tablet (5 mg) by mouth daily, Disp: 28 tablet, Rfl: 0     ondansetron (ZOFRAN) 4 MG tablet, 4 mg every 4 hours as needed, Disp: , Rfl:      rivaroxaban ANTICOAGULANT (XARELTO ANTICOAGULANT) 20 MG TABS tablet, Take 1 tablet (20 mg) by mouth daily (with dinner), Disp: 30 tablet, Rfl: 3     sertraline (ZOLOFT) 100 MG tablet, Take 2 tablets (200 mg) by mouth daily, Disp: 60 tablet, Rfl: 3    Social History:  Social History     Tobacco Use     Smoking status: Former Smoker     Packs/day: 1.00     Years: 3.00     Pack years: 3.00     Types: Cigarettes     Start date: 1974     Quit date: 1977     Years since quittin.3     Smokeless tobacco: Never Used   Substance Use Topics     Alcohol use: Not Currently       Family History:    Family History   Problem Relation Age of Onset     Macular Degeneration Mother      Diabetes Mother      Heart Failure Father      Alcoholism Father      Diabetes Sister      Heart Disease Maternal Grandfather      Pancreatic Cancer Maternal Grandmother      Rheumatoid Arthritis Sister      Other - See Comments Sister         glioma     Other - See Comments Sister         maculofacial digital syndrome     Kidney Disease Sister         s/p transplant     Glaucoma No family hx of        Physical  "Examination:   Vitals: BP (!) 170/100   Pulse 62   Temp 98.3  F (36.8  C) (Oral)   Resp 15   Ht 1.905 m (6' 3\")   SpO2 97%   BMI 29.85 kg/m    General: Adult patient, lying in bed, NAD  HEENT: NC/AT, no icterus, op pink and moist  Chest: non-labored on RA  Extremities: Warm, no edema  Skin: No rash or lesion   Psych: Mood pleasant, affect congruent  Neuro:  Mental status: Awake, alert, attentive, oriented to self, stated that it's December and that he is in a hospital but unable to state its name, occasionally with some word finding difficulty (wife reports this is his baseline)   Cranial nerves: R visual field cut,  PERRL, conjugate gaze, EOMI, facial sensation intact, face symmetric, shoulder shrug strong, tongue/uvula midline, no dysarthria.   Motor: Normal bulk and tone. No abnormal movements. 5/5 strength in 4/4 extremities.   Reflexes: Hypereflexic and symmetric biceps, brachioradialis, patellae, and achilles. Negative Fontenot, no clonus, toes down-going.  Sensory: Intact to light touch in all four extremities.   Coordination: FNF and HS without ataxia  Gait: Deferred    Investigations:  CT HEAD 10/8/20  Impression:  1. No acute intracranial pathology.   2. Postsurgical changes of left parietal craniotomy and underlying  mass resection. Stable edema in the left parieto-occipital white  matter.      EEG 9/19/20 (done at [facility])   Impression: Abnormal EEG recording due to focal polymorphic delta activity   noted in the left parietal region with frequent sharp waves.  There is   theta slowing noted in the left posterior quadrant.  This finding would be   consistent with a structural abnormality in that region consistent with   the patient's history of glioblastoma.  The sharp waves would be   consistent with a history of seizures noted by the patient.  There are   brief episodes of semi-rhythmic activity that could raise concern for   brief partial seizures.    Assessment and Plan:  Mr Jayden Lackey is a " 65 year old male with a PMHx of GBM s/p resections on 3/22/19 and 10/24/19, s/p chemotherapy and radiotherapy (currently off cancer directed therapy with stable imaging) complicated by partial onset with secondary generalization seizures. He has had frequent admissions due to breakthrough seizures.  He was last admitted on to [facility] on 9/19/20 after having 2 seizures 2 hours apart on 9/18 followed by right sided weakness. His Keppra was found to be therapeutic at 25.5 and his Keppra was increased from 1g BID to 1g AM and 1.5g in the evening. He presented to the ED today with increased aura frequency. Neurological exam is at his baseline. CT Head with stable edema of the left parietoccipital white matter. We will admit him for monitoring but not change AED doses now. Will increase Dexamethasone from 2mg to 4mg in AM. No indication for vEEG for now, given sterotypical aura events - unlikely to .       #GBM s/p resection and chemoradiotherapy  #Seizure disorder 2/2 to above (focal to bilateral tonic-clonic)  - Admit to obs for monitoring  - Continue PTA AEDs:   - LEV 1g AM, 1g PM, 500mg HS   - LAC 200mg BID  - Increase dexamethasone from 2g to 4mg daily starting 10/9  - No indication for vEEG monitoring  - Will discuss with patient possibility of increasing third AED agent tomorrow, options include oxcarbazapine or lamotrigine  - Patient has scheduled follow up with [facility] on 10/13  - Patient has scheduled MRI for 10/26  - IV Ativan for breakthrough seizure  - Seizure precautions    Chronic issues:  #AF: diltiazem ER 360mg daily and rivaroxaban 20mg every day  #HTN: lisinopril 5mg every day  #DVT/PE: rivaroxaban 20mg every day  #Depression: sertraline 150mg every day  #HTN: atenolol 50mg every day, lisinopril 5mg every day  #Constipation: docusate sodium 50mg PRN    FEN: Regular diet  PPx: Patient is on DOAC  Code: Full code    Patient was seen and discussed with Dr. Bhakta.     Sandra Asher  [facility]  Neurology, PGY4

## 2020-10-10 LAB — LEVETIRACETAM SERPL-MCNC: 45 UG/ML (ref 12–46)

## 2020-10-11 ENCOUNTER — PATIENT OUTREACH (OUTPATIENT)
Dept: CARE COORDINATION | Facility: CLINIC | Age: 65
End: 2020-10-11

## 2020-10-12 LAB — LACOSAMIDE SERPL-MCNC: 9.1 UG/ML (ref 5–10)

## 2020-10-12 NOTE — PROGRESS NOTES
[facility]: Post-Discharge Note  SITUATION                                                      Admission:    Admission Date: 10/08/20   Reason for Admission: GBM s/p resection and chemoradiotherapy  Discharge:   Discharge Date: 10/09/20  Discharge Diagnosis: GBM s/p resection and chemoradiotherapy    BACKGROUND                                                      Mr Jayden Lackey is a 65 year old male with a PMHx of GBM s/p resections on 3/22/19 and 10/24/19, s/p chemotherapy and radiotherapy (currently off cancer directed therapy with stable imaging) complicated by partial onset with secondary generalization seizures who presents for evaluation of increased aura frequency (5 times on 10/7) and somnolence. His seizure auras include a metallic taste in the mouth followed by a epigastric rising sensation in the chest.      ASSESSMENT      Discharge Assessment  Patient reports symptoms are: Improved  Does the patient have all of their medications?: Yes  Does patient know what their new medications are for?: Yes  Does patient have a follow-up appointment scheduled?: Yes  Does patient have any other questions or concerns?: No    Post-op  Did the patient have surgery or a procedure: No  Fever: No  Chills: No  Eating & Drinking: eating and drinking without complaints/concerns  PO Intake: regular diet  Bowel Function: normal  Urinary Status: voiding without complaint/concerns        PLAN                                                      Outpatient Plan:  Recommendations and Follow-up:  - Keep appointment with Dr. Pacheco on 10/13/20 (epilepsy)   - Keep appointment with Dr. Olmos on 10/26/20 (neuro-oncology)  - Keep appointment for MR Brain on 10/26/20     Future Appointments   Date Time Provider Department Center   10/13/2020  3:00 PM Frank Pacheco MD [facility]   10/26/2020  7:00 AM [id number]   10/26/2020 11:30 AM Nasra Olmos MD [facility]           Ligia  Charla, CMA

## 2020-10-13 ENCOUNTER — OFFICE VISIT (OUTPATIENT)
Dept: NEUROLOGY | Facility: CLINIC | Age: 65
End: 2020-10-13
Attending: PSYCHIATRY & NEUROLOGY
Payer: COMMERCIAL

## 2020-10-13 ENCOUNTER — PRE VISIT (OUTPATIENT)
Dept: NEUROLOGY | Facility: CLINIC | Age: 65
End: 2020-10-13

## 2020-10-13 VITALS
SYSTOLIC BLOOD PRESSURE: 123 MMHG | DIASTOLIC BLOOD PRESSURE: 75 MMHG | BODY MASS INDEX: 29.84 KG/M2 | WEIGHT: 240 LBS | OXYGEN SATURATION: 97 % | HEIGHT: 75 IN | HEART RATE: 55 BPM

## 2020-10-13 DIAGNOSIS — C71.9 GLIOBLASTOMA (H): ICD-10-CM

## 2020-10-13 DIAGNOSIS — R56.9 SEIZURES (H): ICD-10-CM

## 2020-10-13 DIAGNOSIS — G40.909 SEIZURE DISORDER (H): ICD-10-CM

## 2020-10-13 DIAGNOSIS — G40.219 LOCALIZATION-RELATED EPILEPSY WITH COMPLEX PARTIAL SEIZURES WITH INTRACTABLE EPILEPSY (H): ICD-10-CM

## 2020-10-13 DIAGNOSIS — G40.219 LOCALIZATION-RELATED EPILEPSY WITH COMPLEX PARTIAL SEIZURES WITH INTRACTABLE EPILEPSY (H): Primary | ICD-10-CM

## 2020-10-13 PROCEDURE — 36415 COLL VENOUS BLD VENIPUNCTURE: CPT | Mod: 90 | Performed by: PATHOLOGY

## 2020-10-13 PROCEDURE — 80177 DRUG SCRN QUAN LEVETIRACETAM: CPT | Mod: 90 | Performed by: PATHOLOGY

## 2020-10-13 PROCEDURE — 80235 DRUG ASSAY LACOSAMIDE: CPT | Mod: 90 | Performed by: PATHOLOGY

## 2020-10-13 PROCEDURE — 99205 OFFICE O/P NEW HI 60 MIN: CPT | Mod: GC | Performed by: PSYCHIATRY & NEUROLOGY

## 2020-10-13 PROCEDURE — 99000 SPECIMEN HANDLING OFFICE-LAB: CPT | Performed by: PATHOLOGY

## 2020-10-13 RX ORDER — LACOSAMIDE 50 MG/1
TABLET ORAL
Qty: 180 TABLET | Refills: 1 | Status: SHIPPED | OUTPATIENT
Start: 2020-10-13 | End: 2021-01-01

## 2020-10-13 RX ORDER — LEVETIRACETAM 500 MG/1
TABLET ORAL
Qty: 180 TABLET | Refills: 3 | Status: SHIPPED | OUTPATIENT
Start: 2020-10-13 | End: 2021-01-01

## 2020-10-13 RX ORDER — LACOSAMIDE 200 MG/1
TABLET ORAL
Qty: 180 TABLET | Refills: 1 | Status: SHIPPED | OUTPATIENT
Start: 2020-10-13 | End: 2021-01-01

## 2020-10-13 RX ORDER — LEVETIRACETAM 1000 MG/1
TABLET ORAL
Qty: 360 TABLET | Refills: 3 | Status: SHIPPED | OUTPATIENT
Start: 2020-10-13 | End: 2021-01-01

## 2020-10-13 ASSESSMENT — MIFFLIN-ST. JEOR: SCORE: 1964.26

## 2020-10-13 ASSESSMENT — PAIN SCALES - GENERAL: PAINLEVEL: NO PAIN (0)

## 2020-10-13 NOTE — LETTER
10/13/2020       RE: Jayden Lackey  [address]  [facility] [address]     Dear Colleague,    Thank you for referring your patient, Jayden Lackey, to the [facility] [place] at [facility]. Please see a copy of my visit note below.              [facility]:  NEW PATIENT EVALUATION         Service Date: 10/13/2020    HISTORY:  Mr. Jayden Lackey is a 65-year-old man who was referred for evaluation of epilepsy in the setting of a left occipital lobe glioblastoma.  He was able to provide some medical history, which was significantly supplemented by his wife, who brought him to the visit today.  We reviewed extensive notes on the [facility] medical record.      Ictal semiology-history:   His first seizure occurred in June 2019, as a generalized tonic-clinic seizure. He was initially placed on levetiracetam. He has since been experiencing auras which at times progress to complex partial seizures. He has since been placed on lacosamide as well. Since May of this year he's been having approximately 2-3 auras per month. He has been prescribed Valium PRN for seizures. His wife will administer 5 mg at aura onset. Subsequent doses are administered every 3 minutes for no more than 3 doses. If aura persists or progresses to complex partial seizure, she calls 911. He has been brought to the hospital about once every 3 months since last June for seizures. His most recent admission was prompted by numerous auras in one day without progressive to complex partial seizure. His wife expresses frustration with the frequent hospitalizations and is hoping for a strategy to prevent admission. On his last admission earlier this month his Keppra level was 45 and his lacosamide level was 9.1. It was felt that it would be preferable to increase these medications than add a third agent. He feels that his cognition gets worse every time he  is admitted for seizures. His wife notes that many of his auras occur within 1 hour of his next anticonvulsant dose. He has generally good compliance. He is not currently driving. While he sometimes has right sided weakness following his seizures, this resolves. He has had right visual field cut since his resection.     Seizure Types:  Seizure type 1: Auras, patient experiences metallic taste in his mouth, a rising sensation in his stomach, anxiety, and rarely tingling in his fingers bilaterally. These occur about 2-3 times per month.    Seizure type 2: Complex partial seizures. These will occur following auras. Patient has head turning to the left and becomes unresponsive. Unclear if lateral gaze deviation. He has vomited during these events.    Seizure type 3: GTC. It is reported that he had a GTC on initial presentation. His wife reports that he progresses to GTCs whenever he has a complex partial seizure, though she does not witness this as he is taken by EMS. Notes do not indicate that he always progresses to GTC.    His seizures may be exacerbated by lack of sleep and stress.     Epilepsy-seizure predispositions:   The patient has limited family history of epilepsy, with one cousin who had an uncertain type of seizures.      He has no history of gestational or  injury, febrile convulsions, developmental delay, stroke, meningitis, encephalitis, significant head injury, or other epileptic predispositions than the left occipital lobe glioblastoma.     Laboratory evaluations:     Previous Evaluations at [facility]:    EEG 2020:   SUMMARY OF 2 DAYS OF VIDEO EEG RECORDING:  EEG is abnormal due to the presences of persistent slowing in the left temporal parieto-occipital region consistent with maximum cortical dysfunction in this region.  This is consistent with patient's history of GBM in this area and may also be due to postictal slowing.  No electrographic seizures or epileptiform discharges were  seen during these 2 days of video EEG recording.      EEG 9/19/2020:   Impression: Abnormal EEG recording due to focal polymorphic delta activity   noted in the left parietal region with frequent sharp waves.  There is   theta slowing noted in the left posterior quadrant.  This finding would be   consistent with a structural abnormality in that region consistent with   the patient's history of glioblastoma.  The sharp waves would be   consistent with a history of seizures noted by the patient.  There are   brief episodes of semi-rhythmic activity that could raise concern for   brief partial seizures.     Brain MRI 8/24/20:   Impression: This patient with glioblastoma status post surgical  resection and chemoradiotherapy:   Postoperative changes of left parietal occipital craniotomy and  underlying glioblastoma resection. No substantial change in nodularity  contrast enhancement along the resection cavity without associated  increased relative CBV.  Continued attention on follow-up is  Recommended.     Epilepsy therapeutics:   He has been treated with levetiracetam and lacosamide, without reported adverse effects.  His wife manages oral diazepam as a rescue medication.    PAST MEDICAL-SURGICAL HISTORY:    1. 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  2. Left occipital lobe glioblastoma (IDH wildtype by NGS, MGMT promoter methylated); status post gross total resection (March 2019) and repeat resection (October 2019); with chemoradiotherapy.   3. Rheumatoid arthritis.   4. Hypertension.   5. Atrial fibrillation.   6. History of SIADH.   7. History of DVT with PE.    FAMILY HISTORY:  There is no family history of seizures or epilepsy, or of other neurological conditions.      PERSONAL AND SOCIAL HISTORY:  He is on disability.  He lives with his wife.  He does not smoke or use illicit drugs.  He has ethanol less than once per week.      REVIEW OF  SYSTEMS:  The patient has had progressive lethargy, attention and memory disturbance, difficulties with comprehension and expression, and difficulty in solving problems over 1-2 years, according to his wife; he concurs.  He does not have weakness, tremors or other abnormal involuntary movements, numbness or tingling, incoordination, falling or difficulty in walking, urinary or fecal incontinence, except as described above.  The patient denied suicidal ideation.  He denied rashes and easy bruising.  The remainder of a 14-system symptom review was negative except as noted above.       MEDICATIONS:  Levetiracetam 2500 mg b.i.d., lacosamide 250 mg b.i.d., diazepam as rescue medication (5 mg oral solution, up to 15 mg in one day for persisting focal impaired seizures), and other medications as per the electronic medical record.     PHYSICAL EXAMINATION:    On physical examination the patient appeared well nourished and in no acute distress.  Pulse was 55/min, /75, respirations 16/min.  Skull was consistent with reported craniotomy.  Neck was supple, without signs of meningeal irritation.      Neurologic examination:  Mental status:  Patient is alert. Oriented to self. Difficulty with place and month.      Cranial nerves:   Pupils were round and reacted to light. Visual fields showed right visual field cut.  Extraocular movements were full. There was no face, jaw, palate or tongue weakness or atrophy. Hearing was grossly intact.  Shoulder shrug was normal.       Motor exam:    Right Left   Shoulder abduction:  5 5   Elbow Flexion: 4+ 4+   Elbow Extension:  5 5   Hip Flexion 5 5   Knee Extension 5 5   Knee Flexion 5 5   Dorsiflexion 4+ 4+   Plantar flexion 5 5       Complex motor skills revealed normal coordination.  Finger-nose- finger and heel to shin were intact.      Sensory exam revealed intact light touch sensation throughout    Gait: Slightly unsteady gait. Able to walk on toes, though with difficulty     Deep  tendon reflexes:   Right Left   Biceps 2 2   Brachioradialis 2 2   Knee jerk 2 2   Ankle jerk 2 2   Toes downgoing bilaterally    IMPRESSION:    He has focal epilepsy secondary to glioblastoma with both focal aware and focal impaired seizures, and occasional progression to GTC. We discussed that it is too soon to assess the efficacy of the levetiracetam and lacosamide dose increases made during his most recent admission. We will check AED levels today.     If seizures recur and AED levels suggest that there is further room to increase his medications in the future, we may consider increasing one or both of the current AEDs first.  Alternatively, zonisamide would be a good option if he were require a third agent. He has no history of renal calculi. We discussed the risks, benefits, and possible side effects of zonisamide with the patient and his wife.     We advised that she not administer Valium more than three times consecutively for auras. We discussed that EMTs don't necessarily need to be called for complex partial seizures, but that GTCs will absolutely require EMT evaluation.     We confirmed that he is not currently driving and does not plan to.    PLAN:    1) Continue current levetiracetam and lacosamide doses at this time; consider further dose increases versus addition of zonisamide if seizures recur.   2) Check AED levels today.   3) Return to clinic in 3 months.   Staffed with Dr. Pacheco.   Jorge Snyder M.D., Epilepsy Fellow     Note prepared By: Jorge Snyder MD, Epilepsy Fellow  I agree with the findings and plan of care as documented.  I personally examined the patient, with whom I discussed our diagnostic impressions and therapeutic recommendations.  The patient and his wife were agreeable to this plan.  I spent 65 minutes in this patient care, more than 50% of which consisted of counseling and coordinating care.       Frank Pacheco M.D.   Professor of Neurology         Again, thank you for  allowing me to participate in the care of your patient.      Sincerely,    Frank Pacheco MD

## 2020-10-13 NOTE — LETTER
10/13/2020       RE: Jayden Lackey  [address]  [facility] [address]     Dear Colleague,    Thank you for referring your patient, Jayden Lackey, to the [facility] [place] at [facility]. Please see a copy of my visit note below.    [facility]:  NEW PATIENT EVALUATION         Service Date: 10/13/2020    HISTORY:  Mr. Jayden Lackey is a 65-year-old man who was referred for evaluation of epilepsy in the setting of a left occipital lobe glioblastoma.  He was able to provide some medical history, which was significantly supplemented by his wife, who brought him to the visit today.  We reviewed extensive notes on the [facility] medical record.      Ictal semiology-history:   His first seizure occurred in June 2019, as a generalized tonic-clinic seizure. He was initially placed on levetiracetam. He has since been experiencing auras which at times progress to complex partial seizures. He has since been placed on lacosamide as well. Since May of this year he's been having approximately 2-3 auras per month. He has been prescribed Valium PRN for seizures. His wife will administer 5 mg at aura onset. Subsequent doses are administered every 3 minutes for no more than 3 doses. If aura persists or progresses to complex partial seizure, she calls 911. He has been brought to the hospital about once every 3 months since last June for seizures. His most recent admission was prompted by numerous auras in one day without progressive to complex partial seizure. His wife expresses frustration with the frequent hospitalizations and is hoping for a strategy to prevent admission. On his last admission earlier this month his Keppra level was 45 and his lacosamide level was 9.1. It was felt that it would be preferable to increase these medications than add a third agent. He feels that his cognition gets worse every time he is admitted  for seizures. His wife notes that many of his auras occur within 1 hour of his next anticonvulsant dose. He has generally good compliance. He is not currently driving. While he sometimes has right sided weakness following his seizures, this resolves. He has had right visual field cut since his resection.     Seizure Types:  Seizure type 1: Auras, patient experiences metallic taste in his mouth, a rising sensation in his stomach, anxiety, and rarely tingling in his fingers bilaterally. These occur about 2-3 times per month.    Seizure type 2: Complex partial seizures. These will occur following auras. Patient has head turning to the left and becomes unresponsive. Unclear if lateral gaze deviation. He has vomited during these events.    Seizure type 3: GTC. It is reported that he had a GTC on initial presentation. His wife reports that he progresses to GTCs whenever he has a complex partial seizure, though she does not witness this as he is taken by EMS. Notes do not indicate that he always progresses to GTC.    His seizures may be exacerbated by lack of sleep and stress.     Epilepsy-seizure predispositions:   The patient has limited family history of epilepsy, with one cousin who had an uncertain type of seizures.      He has no history of gestational or  injury, febrile convulsions, developmental delay, stroke, meningitis, encephalitis, significant head injury, or other epileptic predispositions than the left occipital lobe glioblastoma.     Laboratory evaluations:     Previous Evaluations at [facility]:    EEG 2020:   SUMMARY OF 2 DAYS OF VIDEO EEG RECORDING:  EEG is abnormal due to the presences of persistent slowing in the left temporal parieto-occipital region consistent with maximum cortical dysfunction in this region.  This is consistent with patient's history of GBM in this area and may also be due to postictal slowing.  No electrographic seizures or epileptiform discharges were seen during  these 2 days of video EEG recording.      EEG 9/19/2020:   Impression: Abnormal EEG recording due to focal polymorphic delta activity   noted in the left parietal region with frequent sharp waves.  There is   theta slowing noted in the left posterior quadrant.  This finding would be   consistent with a structural abnormality in that region consistent with   the patient's history of glioblastoma.  The sharp waves would be   consistent with a history of seizures noted by the patient.  There are   brief episodes of semi-rhythmic activity that could raise concern for   brief partial seizures.     Brain MRI 8/24/20:   Impression: This patient with glioblastoma status post surgical  resection and chemoradiotherapy:   Postoperative changes of left parietal occipital craniotomy and  underlying glioblastoma resection. No substantial change in nodularity  contrast enhancement along the resection cavity without associated  increased relative CBV.  Continued attention on follow-up is  Recommended.     Epilepsy therapeutics:   He has been treated with levetiracetam and lacosamide, without reported adverse effects.  His wife manages oral diazepam as a rescue medication.    PAST MEDICAL-SURGICAL HISTORY:    1. 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  2. Left occipital lobe glioblastoma (IDH wildtype by NGS, MGMT promoter methylated); status post gross total resection (March 2019) and repeat resection (October 2019); with chemoradiotherapy.   3. Rheumatoid arthritis.   4. Hypertension.   5. Atrial fibrillation.   6. History of SIADH.   7. History of DVT with PE.    FAMILY HISTORY:  There is no family history of seizures or epilepsy, or of other neurological conditions.      PERSONAL AND SOCIAL HISTORY:  He is on disability.  He lives with his wife.  He does not smoke or use illicit drugs.  He has ethanol less than once per week.      REVIEW OF SYSTEMS:  The  patient has had progressive lethargy, attention and memory disturbance, difficulties with comprehension and expression, and difficulty in solving problems over 1-2 years, according to his wife; he concurs.  He does not have weakness, tremors or other abnormal involuntary movements, numbness or tingling, incoordination, falling or difficulty in walking, urinary or fecal incontinence, except as described above.  The patient denied suicidal ideation.  He denied rashes and easy bruising.  The remainder of a 14-system symptom review was negative except as noted above.       MEDICATIONS:  Levetiracetam 2500 mg b.i.d., lacosamide 250 mg b.i.d., diazepam as rescue medication (5 mg oral solution, up to 15 mg in one day for persisting focal impaired seizures), and other medications as per the electronic medical record.     PHYSICAL EXAMINATION:    On physical examination the patient appeared well nourished and in no acute distress.  Pulse was 55/min, /75, respirations 16/min.  Skull was consistent with reported craniotomy.  Neck was supple, without signs of meningeal irritation.      Neurologic examination:  Mental status:  Patient is alert. Oriented to self. Difficulty with place and month.      Cranial nerves:   Pupils were round and reacted to light. Visual fields showed right visual field cut.  Extraocular movements were full. There was no face, jaw, palate or tongue weakness or atrophy. Hearing was grossly intact.  Shoulder shrug was normal.       Motor exam:    Right Left   Shoulder abduction:  5 5   Elbow Flexion: 4+ 4+   Elbow Extension:  5 5   Hip Flexion 5 5   Knee Extension 5 5   Knee Flexion 5 5   Dorsiflexion 4+ 4+   Plantar flexion 5 5       Complex motor skills revealed normal coordination.  Finger-nose- finger and heel to shin were intact.      Sensory exam revealed intact light touch sensation throughout    Gait: Slightly unsteady gait. Able to walk on toes, though with difficulty     Deep tendon  reflexes:   Right Left   Biceps 2 2   Brachioradialis 2 2   Knee jerk 2 2   Ankle jerk 2 2   Toes downgoing bilaterally    IMPRESSION:    He has focal epilepsy secondary to glioblastoma with both focal aware and focal impaired seizures, and occasional progression to GTC. We discussed that it is too soon to assess the efficacy of the levetiracetam and lacosamide dose increases made during his most recent admission. We will check AED levels today.     If seizures recur and AED levels suggest that there is further room to increase his medications in the future, we may consider increasing one or both of the current AEDs first.  Alternatively, zonisamide would be a good option if he were require a third agent. He has no history of renal calculi. We discussed the risks, benefits, and possible side effects of zonisamide with the patient and his wife.     We advised that she not administer Valium more than three times consecutively for auras. We discussed that EMTs don't necessarily need to be called for complex partial seizures, but that GTCs will absolutely require EMT evaluation.     We confirmed that he is not currently driving and does not plan to.    PLAN:    1) Continue current levetiracetam and lacosamide doses at this time; consider further dose increases versus addition of zonisamide if seizures recur.   2) Check AED levels today.   3) Return to clinic in 3 months.   Staffed with Dr. Pacheco.   Jorge Snyder M.D., Epilepsy Fellow     Note prepared By: Jorge Snyder MD, Epilepsy Fellow  I agree with the findings and plan of care as documented.  I personally examined the patient, with whom I discussed our diagnostic impressions and therapeutic recommendations.  The patient and his wife were agreeable to this plan.  I spent 65 minutes in this patient care, more than 50% of which consisted of counseling and coordinating care.    Again, thank you for allowing me to participate in the care of your  patient.  Sincerely,    Frank Pacheco M.D.    of Neurology

## 2020-10-14 NOTE — PROGRESS NOTES
[facility]:  NEW PATIENT EVALUATION         Service Date: 10/13/2020    HISTORY:  Mr. Jayden Lackey is a 65-year-old man who was referred for evaluation of epilepsy in the setting of a left occipital lobe glioblastoma.  He was able to provide some medical history, which was significantly supplemented by his wife, who brought him to the visit today.  We reviewed extensive notes on the [facility] medical record.      Ictal semiology-history:   His first seizure occurred in June 2019, as a generalized tonic-clinic seizure. He was initially placed on levetiracetam. He has since been experiencing auras which at times progress to complex partial seizures. He has since been placed on lacosamide as well. Since May of this year he's been having approximately 2-3 auras per month. He has been prescribed Valium PRN for seizures. His wife will administer 5 mg at aura onset. Subsequent doses are administered every 3 minutes for no more than 3 doses. If aura persists or progresses to complex partial seizure, she calls 911. He has been brought to the hospital about once every 3 months since last June for seizures. His most recent admission was prompted by numerous auras in one day without progressive to complex partial seizure. His wife expresses frustration with the frequent hospitalizations and is hoping for a strategy to prevent admission. On his last admission earlier this month his Keppra level was 45 and his lacosamide level was 9.1. It was felt that it would be preferable to increase these medications than add a third agent. He feels that his cognition gets worse every time he is admitted for seizures. His wife notes that many of his auras occur within 1 hour of his next anticonvulsant dose. He has generally good compliance. He is not currently driving. While he sometimes has right sided weakness following his seizures, this resolves. He has had right visual field cut since his resection.      Seizure Types:  Seizure type 1: Auras, patient experiences metallic taste in his mouth, a rising sensation in his stomach, anxiety, and rarely tingling in his fingers bilaterally. These occur about 2-3 times per month.    Seizure type 2: Complex partial seizures. These will occur following auras. Patient has head turning to the left and becomes unresponsive. Unclear if lateral gaze deviation. He has vomited during these events.    Seizure type 3: GTC. It is reported that he had a GTC on initial presentation. His wife reports that he progresses to GTCs whenever he has a complex partial seizure, though she does not witness this as he is taken by EMS. Notes do not indicate that he always progresses to GTC.    His seizures may be exacerbated by lack of sleep and stress.     Epilepsy-seizure predispositions:   The patient has limited family history of epilepsy, with one cousin who had an uncertain type of seizures.      He has no history of gestational or  injury, febrile convulsions, developmental delay, stroke, meningitis, encephalitis, significant head injury, or other epileptic predispositions than the left occipital lobe glioblastoma.     Laboratory evaluations:     Previous Evaluations at [facility]:    EEG 2020:   SUMMARY OF 2 DAYS OF VIDEO EEG RECORDING:  EEG is abnormal due to the presences of persistent slowing in the left temporal parieto-occipital region consistent with maximum cortical dysfunction in this region.  This is consistent with patient's history of GBM in this area and may also be due to postictal slowing.  No electrographic seizures or epileptiform discharges were seen during these 2 days of video EEG recording.      EEG 2020:   Impression: Abnormal EEG recording due to focal polymorphic delta activity   noted in the left parietal region with frequent sharp waves.  There is   theta slowing noted in the left posterior quadrant.  This finding would be   consistent with a  structural abnormality in that region consistent with   the patient's history of glioblastoma.  The sharp waves would be   consistent with a history of seizures noted by the patient.  There are   brief episodes of semi-rhythmic activity that could raise concern for   brief partial seizures.     Brain MRI 8/24/20:   Impression: This patient with glioblastoma status post surgical  resection and chemoradiotherapy:   Postoperative changes of left parietal occipital craniotomy and  underlying glioblastoma resection. No substantial change in nodularity  contrast enhancement along the resection cavity without associated  increased relative CBV.  Continued attention on follow-up is  Recommended.     Epilepsy therapeutics:   He has been treated with levetiracetam and lacosamide, without reported adverse effects.  His wife manages oral diazepam as a rescue medication.    PAST MEDICAL-SURGICAL HISTORY:    1. 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  2. Left occipital lobe glioblastoma (IDH wildtype by NGS, MGMT promoter methylated); status post gross total resection (March 2019) and repeat resection (October 2019); with chemoradiotherapy.   3. Rheumatoid arthritis.   4. Hypertension.   5. Atrial fibrillation.   6. History of SIADH.   7. History of DVT with PE.    FAMILY HISTORY:  There is no family history of seizures or epilepsy, or of other neurological conditions.      PERSONAL AND SOCIAL HISTORY:  He is on disability.  He lives with his wife.  He does not smoke or use illicit drugs.  He has ethanol less than once per week.      REVIEW OF SYSTEMS:  The patient has had progressive lethargy, attention and memory disturbance, difficulties with comprehension and expression, and difficulty in solving problems over 1-2 years, according to his wife; he concurs.  He does not have weakness, tremors or other abnormal involuntary movements, numbness or tingling,  incoordination, falling or difficulty in walking, urinary or fecal incontinence, except as described above.  The patient denied suicidal ideation.  He denied rashes and easy bruising.  The remainder of a 14-system symptom review was negative except as noted above.       MEDICATIONS:  Levetiracetam 2500 mg b.i.d., lacosamide 250 mg b.i.d., diazepam as rescue medication (5 mg oral solution, up to 15 mg in one day for persisting focal impaired seizures), and other medications as per the electronic medical record.     PHYSICAL EXAMINATION:    On physical examination the patient appeared well nourished and in no acute distress.  Pulse was 55/min, /75, respirations 16/min.  Skull was consistent with reported craniotomy.  Neck was supple, without signs of meningeal irritation.      Neurologic examination:  Mental status:  Patient is alert. Oriented to self. Difficulty with place and month.      Cranial nerves:   Pupils were round and reacted to light. Visual fields showed right visual field cut.  Extraocular movements were full. There was no face, jaw, palate or tongue weakness or atrophy. Hearing was grossly intact.  Shoulder shrug was normal.       Motor exam:    Right Left   Shoulder abduction:  5 5   Elbow Flexion: 4+ 4+   Elbow Extension:  5 5   Hip Flexion 5 5   Knee Extension 5 5   Knee Flexion 5 5   Dorsiflexion 4+ 4+   Plantar flexion 5 5       Complex motor skills revealed normal coordination.  Finger-nose- finger and heel to shin were intact.      Sensory exam revealed intact light touch sensation throughout    Gait: Slightly unsteady gait. Able to walk on toes, though with difficulty     Deep tendon reflexes:   Right Left   Biceps 2 2   Brachioradialis 2 2   Knee jerk 2 2   Ankle jerk 2 2   Toes downgoing bilaterally    IMPRESSION:    He has focal epilepsy secondary to glioblastoma with both focal aware and focal impaired seizures, and occasional progression to GTC. We discussed that it is too soon to  assess the efficacy of the levetiracetam and lacosamide dose increases made during his most recent admission. We will check AED levels today.     If seizures recur and AED levels suggest that there is further room to increase his medications in the future, we may consider increasing one or both of the current AEDs first.  Alternatively, zonisamide would be a good option if he were require a third agent. He has no history of renal calculi. We discussed the risks, benefits, and possible side effects of zonisamide with the patient and his wife.     We advised that she not administer Valium more than three times consecutively for auras. We discussed that EMTs don't necessarily need to be called for complex partial seizures, but that GTCs will absolutely require EMT evaluation.     We confirmed that he is not currently driving and does not plan to.    PLAN:    1) Continue current levetiracetam and lacosamide doses at this time; consider further dose increases versus addition of zonisamide if seizures recur.   2) Check AED levels today.   3) Return to clinic in 3 months.   Staffed with Dr. Pacheco.   Jorge Snyder M.D., Epilepsy Fellow     Note prepared By: Jorge Snyder MD, Epilepsy Fellow  I agree with the findings and plan of care as documented.  I personally examined the patient, with whom I discussed our diagnostic impressions and therapeutic recommendations.  The patient and his wife were agreeable to this plan.  I spent 65 minutes in this patient care, more than 50% of which consisted of counseling and coordinating care.       Frank Pacheco M.D.   Professor of Neurology

## 2020-10-15 LAB — LEVETIRACETAM SERPL-MCNC: 37 UG/ML (ref 12–46)

## 2020-10-16 LAB — LACOSAMIDE SERPL-MCNC: 11.6 UG/ML (ref 5–10)

## 2020-10-19 ENCOUNTER — TELEPHONE (OUTPATIENT)
Dept: ONCOLOGY | Facility: CLINIC | Age: 65
End: 2020-10-19

## 2020-10-19 ENCOUNTER — TELEPHONE (OUTPATIENT)
Dept: NEUROLOGY | Facility: CLINIC | Age: 65
End: 2020-10-19

## 2020-10-19 NOTE — TELEPHONE ENCOUNTER
Call placed to Francesca.  They had a MyCHart notification that the level was high, and she wanted to know if there is anything that needs to be dose.    Overall they feel Jayden is doing well.  He is a little sleepier, but is not having all the auras.  No other issues noted.  He is able to go about usual daily routines, and has needed no valium for 9 days  Dexamethasone is also tapering off    LEV [id number]  -250      Will route chart to MD to check if any changes are indicated at this time

## 2020-10-19 NOTE — TELEPHONE ENCOUNTER
"dexamethasone (DECADRON) 4 MG tablet Take 6mg daily for 5 days, followed by 4mg daily for 5 days, Disp-18 tablet, R-0, Local        Pts wife calling to see where to go now. Routed to care team.    \"Continue at 2mg daily, unless there were new/ worsening symptoms experienced with the steroid taper.   Nasra Olmos MD\"    Notified wife who states there were no problems. Will switch to 2mg and contact triage if any issues  "

## 2020-10-19 NOTE — TELEPHONE ENCOUNTER
What is the concern that needs to be addressed by a nurse? Patient Wife Cristiana called and stated patient had some labs drawn and the Vimpat levels came back over limit in the toxic range. Cristiana said patient is doing fine but would like to discuss it with a nurse.    May a detailed message be left on voicemail? yes    Date of last office visit: 10/13/20    Message routed to: Juan jj

## 2020-10-19 NOTE — TELEPHONE ENCOUNTER
Call made to patients wife Kailey Edmondson to discuss Dr Miguel Cantu recommendations in treating Blakes impulsivity. Kailey Edmondson states that MEd changes were made recently and Blakes symptoms appear to have improved.     At this point she feels no other med changes are required, she plans to keep the appointment 12/22 with palliative care and will call if she has further concerns

## 2020-10-19 NOTE — TELEPHONE ENCOUNTER
"Message from   \"  Frank Pacheco MD Hamley, David RN   Caller: Unspecified (Today, 11:14 AM)               Given that his glioblastoma is associated with a high risk of seizure recurrence, I would not decrease the AEDs unless he develops intolerable adverse effects.  Thanks.      \"    Call returned to Oz Fry discussed.  Instructed to call if questions or concerns  No other questions  "

## 2020-10-21 ENCOUNTER — COMMUNICATION - HEALTHEAST (OUTPATIENT)
Dept: SCHEDULING | Facility: CLINIC | Age: 65
End: 2020-10-21

## 2020-10-23 ENCOUNTER — VIRTUAL VISIT (OUTPATIENT)
Dept: PALLIATIVE CARE | Facility: CLINIC | Age: 65
End: 2020-10-23
Attending: SOCIAL WORKER
Payer: COMMERCIAL

## 2020-10-23 DIAGNOSIS — Z63.8 CAREGIVER ROLE STRAIN: ICD-10-CM

## 2020-10-23 DIAGNOSIS — R41.89 COGNITIVE IMPAIRMENT: ICD-10-CM

## 2020-10-23 DIAGNOSIS — F43.20 ADJUSTMENT DISORDER, UNSPECIFIED TYPE: Primary | ICD-10-CM

## 2020-10-23 DIAGNOSIS — Z51.5 ENCOUNTER FOR PALLIATIVE CARE: ICD-10-CM

## 2020-10-23 PROCEDURE — 90834 PSYTX W PT 45 MINUTES: CPT | Mod: 95 | Performed by: SOCIAL WORKER

## 2020-10-23 SDOH — SOCIAL STABILITY - SOCIAL INSECURITY: OTHER SPECIFIED PROBLEMS RELATED TO PRIMARY SUPPORT GROUP: Z63.8

## 2020-10-23 NOTE — PROGRESS NOTES
Palliative Care Clinical Social Work Return Appointment    PLEASE NOTE:  THIS IS A MENTAL HEALTH NOTE.  OTHER PROVIDERS VIEWING THIS NOTE SHOULD USE THIS ONLY FOR UNDERSTANDING THE CONTEXT OF THE PATIENT S EXPERIENCE.  TOPICS DESCRIBED IN THIS NOTE SHOULD NOT BE REFERENCED TO THE PATIENT BY MEDICAL PROVIDERS.    Jayden is a 65 year old man with glioblastoma, seen today by video for a return psychotherapy appointment to address concerns about coping and goals of care as these relate to coping with illness and treatment. Wife Cristiana participates along with Jayden today for family psychotherapy.     Mental Status Exam:(List all that apply)      Appearance: Appropriate      Eye Contact: Good       Orientation: Yes, x4      Mood: sad, grieving, reflective      Affect: Appropriate      Thought Content: Clear         Thought Form: Logical      Psychomotor Behavior: Normal    Mental Health and Adjustment to Illness:   Jayden and Cristiana both naming anxiety, depressed mood.     Jayden processing fast and intense mood swings including sadness, anger, gratitude. They think these may relate to seizure medicaitons at high dose. Cristiana finding ways to relate to these shifts that work for her. With anger she finds stepping away works best vs trying to finish the argument. We discuss anger vs other emotions.     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, now dangerous  He is self aware about limitations and reflects on wishing he could still do these tasks and struggling to know what he can and cannot manage now  We discuss other areas that might bring him more intellectual enjoyment despite being unable to reach, vision problems  He is interested in using audio recording sometimes when he wants to tell Cristiana a thought he worries he may forget  They play cards each day and he enjoys this  They will continue to think together about activities which might bring Jayden  more satisfaction    They note he now wants to talk with her a lot and at times more than she wants - a change from the past    They found it validating though in some ways sad to hear at Epilepsy Clinic that Jayden's situation is indeed unpredictable and there is no simple solution for this. Both name strategies for coping including acceptance, compassion, and self compassion.    Oz attended brain tumor support group and will consider attending occasionally in the future, ie when speakers come, she is on the email list    Behavioral/ Non-Pharmacological Skills Education: Not focus today    Relationships: Focus today - role changes, caregiver strain, grief and loss  Interactions around mood swings and anger    Advance Care Planning: Not focus today    Main therapeutic interventions provided this session include:  Provide psychoeducation, Facilitate processing of thoughts and feelings, Facilitate goals of care discussion, Provide strengths-based family counseling and Facilitate structured problem solving      Plan:  Will return for psychotherapy with palliative care focus in 3 - 5 weeks.     Time spent with patient/family:  50 minutes (Start 11:45am, end 12:35pm)        DO NOT SEND ANY LETTERS

## 2020-10-23 NOTE — LETTER
10/23/2020       RE: Jayden Lackey  [address]  [place] [address]     Dear Colleague,    Thank you for referring your patient, Jayden Lackey, to the [facility] at [facility]. Please see a copy of my visit note below.    Palliative Care Clinical Social Work Return Appointment    PLEASE NOTE:  THIS IS A MENTAL HEALTH NOTE.  OTHER PROVIDERS VIEWING THIS NOTE SHOULD USE THIS ONLY FOR UNDERSTANDING THE CONTEXT OF THE PATIENT S EXPERIENCE.  TOPICS DESCRIBED IN THIS NOTE SHOULD NOT BE REFERENCED TO THE PATIENT BY MEDICAL PROVIDERS.    Jayden is a 65 year old man with glioblastoma, seen today by video for a return psychotherapy appointment to address concerns about coping and goals of care as these relate to coping with illness and treatment. Wife Oz participates along with Jayden today for family psychotherapy.     Mental Status Exam:(List all that apply)      Appearance: Appropriate      Eye Contact: Good       Orientation: Yes, x4      Mood: sad, grieving, reflective      Affect: Appropriate      Thought Content: Clear         Thought Form: Logical      Psychomotor Behavior: Normal    Mental Health and Adjustment to Illness:   Jayden and Oz both naming anxiety, depressed mood.     Jayden processing fast and intense mood swings including sadness, anger, gratitude. They think these may relate to seizure medicaitons at high dose. Oz finding ways to relate to these shifts that work for her. With anger she finds stepping away works best vs trying to finish the argument. We discuss anger vs other emotions.     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, now dangerous  He is self aware about limitations and reflects on wishing he could still do these tasks and struggling to know what he can and cannot manage now  We discuss other areas that might bring him more  intellectual enjoyment despite being unable to reach, vision problems  He is interested in using audio recording sometimes when he wants to tell Cristiana a thought he worries he may forget  They play cards each day and he enjoys this  They will continue to think together about activities which might bring Jayden more satisfaction    They note he now wants to talk with her a lot and at times more than she wants - a change from the past    They found it validating though in some ways sad to hear at Epilepsy Clinic that Jayden's situation is indeed unpredictable and there is no simple solution for this. Both name strategies for coping including acceptance, compassion, and self compassion.    Cristiana attended brain tumor support group and will consider attending occasionally in the future, ie when speakers come, she is on the email list    Behavioral/ Non-Pharmacological Skills Education: Not focus today    Relationships: Focus today - role changes, caregiver strain, grief and loss  Interactions around mood swings and anger    Advance Care Planning: Not focus today    Main therapeutic interventions provided this session include:  Provide psychoeducation, Facilitate processing of thoughts and feelings, Facilitate goals of care discussion, Provide strengths-based family counseling and Facilitate structured problem solving      Plan:  Will return for psychotherapy with palliative care focus in 3 - 5 weeks.     Time spent with patient/family:  50 minutes (Start 11:45am, end 12:35pm)        DO NOT SEND ANY LETTERS              Again, thank you for allowing me to participate in the care of your patient.      Sincerely,    ANKITA WallSW

## 2020-10-23 NOTE — LETTER
Date:October 28, 2020      Provider requested that no letter be sent. Do not send.       [facility] Information

## 2020-10-23 NOTE — PROGRESS NOTES
"Jayden Lackey is a 65 year old male who is being evaluated via a billable video visit.      The patient has been notified of following:     \"This video visit will be conducted via a call between you and your physician/provider. We have found that certain health care needs can be provided without the need for an in-person physical exam.  This service lets us provide the care you need with a video conversation.  If a prescription is necessary we can send it directly to your pharmacy.  If lab work is needed we can place an order for that and you can then stop by our lab to have the test done at a later time.    Video visits are billed at different rates depending on your insurance coverage.  Please reach out to your insurance provider with any questions.    If during the course of the call the physician/provider feels a video visit is not appropriate, you will not be charged for this service.\"    Patient has given verbal consent for Video visit? Yes    How would you like to obtain your AVS? MyChart     If you are dropped from the video visit, the video invite should be resent to: Text to cell phone: [phone]     Will anyone else be joining your video visit? No     Vitals - Patient Reported  Weight (Patient Reported): 108.4 kg (239 lb)  Height (Patient Reported): 190.5 cm (6' 3\")  BMI (Based on Pt Reported Ht/Wt): 29.87  Pain Score: No Pain (0)    Emeterio Valdez LPN      Video-Visit Details  Type of service:  Video Visit    Video Start Time: 11:50 AM  Video End Time: 12:21 PM    Originating Location (pt. Location): Home    Distant Location (provider location):  [facility]     Platform used for Video Visit: Isaías Olmos MD    _____________________________________________________________    NEURO-ONCOLOGY VISIT  Oct 26, 2020    CHIEF COMPLAINT: Mr. Jayden Lackey is a 65 year old right-handed man with a left occipital lobe glioblastoma (IDH wildtype by NGS, MGMT " "promoter methylated), diagnosed following a gross total resection on 3/22/2019. He completed chemoradiotherapy on 6/11/2019. Imaging in 9/2019 was consistent with recurrent disease and adjuvant-dosed temozolomide was stop. Pathology from repeat resection on 10/24/2019 was consistent with recurrent tumor. He was started on lomustine, but stopped after 1 cycle due to significant chemotherapy-induced fatigue.     Quality of life has been compromised by recurrent seizure events, PE requiring prolonged hospitalization, and chemotherapy-related toxicities.    Currently, he is not on any cancer-directed therapy and managed on imaging surveillance.     I met with Jayden and Oz (wife) for this follow-up visit today.      HISTORY OF PRESENT ILLNESS  -Jayden is feeling \"more up, than down\". Oz feels that he is in a \"better place\" physically and emotionally; having bouts of sadden and depression, but now they are both more accepting of his new reality. Zoloft has been very helpful. Some friends and family are stopping by and this raises his spirits. Walking every other day with good stability, however, at times, leaning to the left. No falls, but at times can loose his balance. Walks for about 30 minutes. Working on getting up from a chair more quickly.   -Admitted in late September for 3 seizures that were not responsive to abortive Valium. Returned to baseline. CT head without any acute pathology. Referral placed to [facility]. Then, he went to ED again on 10/8 due to a seizure. Has since seen Dr. Pacheco for epilepsy who recommended continuing with levetiracetam and lacosamide, may consider increasing one or both of the current AEDs first before adding a third agent, like zonisamide. Advised that she not administer Valium more than three times consecutively for auras.   -Cognitively slow. Continued word finding and memory problems. However, some days are better than others.   -Eating well.  -Remains on dexamethasone 2 mg " daily. No arthritic pain.   -Aracelis are thinking of spending extended time with family in [place] come January, pending COVID.     REVIEW OF SYSTEMS  A comprehensive ROS negative except as in HPI.      MEDICATIONS   Current Outpatient Medications   Medication Sig Dispense Refill     atenolol (TENORMIN) 100 MG tablet Take 50 mg by mouth daily 50 mg       dexamethasone (DECADRON) 4 MG tablet Take 6mg daily for 5 days, followed by 4mg daily for 5 days (Patient taking differently: 2 mg Take 6mg daily for 5 days, followed by 4mg daily for 5 days) 18 tablet 0     diazepam (VALIUM) 5 MG/ML (HIGH CONC) solution Take 1 mL (5 mg) by mouth every 3 minutes as needed for seizures (For seizure activity.) If seizure worsens or has new symptoms, repeat with 1 mL by mouth up to a max of 3.0 mL in 1 day. Do not give if patient has very shallow or slow breathing. 10 mL 0     diltiazem ER (DILT-XR) 180 MG 24 hr capsule Take 180 mg by mouth daily       docusate sodium (COLACE) 50 MG capsule Take 50 mg by mouth 2 times daily as needed for constipation       lacosamide (VIMPAT) 200 MG TABS tablet Take 1 tablet (200 mg) twice daily (together with 50 mg tablets, to total 250mg in AM and 250mg in PM). 180 tablet 1     lacosamide (VIMPAT) 50 MG TABS tablet Take 1 tablet (50 mg) twice daily (together with 200 mg tablets, to total 250mg in AM and 250mg in PM). 180 tablet 1     levETIRAcetam (KEPPRA) 1000 MG tablet Take 2 tablets (2000 mg) twice daily (together with 500 mg tablets, to total 2500mg in AM and 2500mg in PM). 360 tablet 3     levETIRAcetam (KEPPRA) 500 MG tablet Take 1 tablet (500 mg) twice daily (together with 1000 mg tablets, to total 2500mg in AM and 2500mg in PM). 180 tablet 3     lisinopril (PRINIVIL/ZESTRIL) 5 MG tablet Take 1 tablet (5 mg) by mouth daily 28 tablet 0     rivaroxaban ANTICOAGULANT (XARELTO ANTICOAGULANT) 20 MG TABS tablet Take 1 tablet (20 mg) by mouth daily (with dinner) 30 tablet 3      sertraline (ZOLOFT) 100 MG tablet Take 2 tablets (200 mg) by mouth daily 60 tablet 3     DRUG ALLERGIES   Allergies   Allergen Reactions     Shellfish Allergy Difficulty breathing, Nausea and Vomiting, Swelling and Shortness Of Breath     Lupine Bean Extract      Shellfish-Derived Products      No Clinical Screening - See Comments      Lupine bean doesn't remember reaction       ONCOLOGIC HISTORY  -1/2019 PRESENTATION: Visual disturbance.   -3/6/2019 Evaluated by Dr. Mauricio, neuro-ophthalmologist, found to have a right sided homonymous hemianopsia. MRI ordered.   -3/6/2019 MR brain imaging revealed a contrast-enhancing lesion in the left occipital lobe suspicious for a high-grade primary brain tumor.  -3/22/2019 SURGERY: Craniotomy with a gross total resection by Dr. Irving Hayes.   PATHOLOGY: Glioblastoma; Wildtype status for IDH1/2 by next generation sequencing. MGMT promoter methylated. Wildtype PTEN, TP53.  -4/8/2019 NEURO-ONC: Recommending chemoradiotherapy.   -4/30 - 6/11/2019 CHEMORADS 6000cGy in 30 fractions with concurrent temozolomide 75mg/m2 (180mg).  -5/20/2019 NEURO-ONC: Not interested in [employer] at this time.   -6/12/2019 NEURO-ONC: Clinically stable.   -6/21 - 6/28/2019 ADMISSION/ [facility]: Admitted to [facility] for first ever seizure event, provoked by hyponatremia. Urine studies consistent with SIADH ; etiology of SIADH unclear. Had 10mm gastric lesion biopsied on 6/26/2019 by way of an EGD.  PATHOLOGY of gastric lesion: Hyperplastic polyp with erosion and foci of atypia; favor reactive. No intestinal metaplasia, no helicobacter pylori, no high grade dysplasia or invasive carcinoma.   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   -7/12/2019 NEURO-ONC/ MRB/ CHEMO: Clinical stable. Imaging with stable to slightly increased enhancement of previously seen nodular lesions; associated with mild degree of elevated rCBV. Starting adjuvant-dosed temozolomide 150mg/m2 (360mg), cycle 1 (start date 7/12/2019). Monitor  fluid intake, will be checking CMP/ Na+.   -8/12/2019 NEURO-ONC/ MRB/ CHEMO: Clinical decompensated, but improving since hospital discharge. MR brain scan from last week with concern for non-contrast enhancing >> enhancing disease progression. Ordering a PET brain scan. Holding adjuvant-dosed temozolomide, cycle 3.  -10/24/2019 SURGERY: Repeat resection by Dr. Packer.  PATHOLOGY: Recurrent glioblastoma.   Next generation sequencing by [facility]; Microsatellite instability; stable. Tumor mutational burden; 4 (low). Mutations in ARID1A, ASXL1, ATRX, FANCE, MED12, MEF2B, NF1, RUNX1, TERT.   -11/25/2019 NEURO-ONC/ MRB/ CHEMO: Clinically doing well. Imaging with positive resection. Starting Lomustine (start date of 12/2). Next generation sequencing.   -12/4/2019 CHEMO: Lomustine, cycle 1.   -1/6/2020 NEURO-ONC: Clinically stable. Has TCP with Lomustine. Will hold off on starting next week until he sees Dr. Olmos with new imaging.  -1/31/2020 NEURO-ONC/ MRB/ CHEMO: Clinically doing well. Imaging with no evidence of disease recurrence. Holding Lomustine in the setting of a poor emotional state. Referrals to Dr. Aleksandr Garcia for neuro-psych testing, Dr. Eugene Vargas for counseling/ coping, and palliative care. Next generation sequencing results discussed. Increase Zoloft. Continue dexamethasone 2mg daily.  -4/1/2020 ADMISSION/ [facility]- Keppra increased, added diazepam PRN, continue Vimpat.   -4/1/2020 MRB with no significant change.   -4/13/2020 NEURO-ONC: Clinically improved since hospitalization. Continuing imaging surveillance. Increase Zoloft.   -6/26/2020 ADMISSION/ [facility]- Keppra and Vimpat increased, continued diazepam PRN.   -6/29/2020 NEURO-ONC/ MRB: Clinically improved since hospitalization. Imaging largely stable. Continuing imaging surveillance. Trial of ritalin.   -8/10/2020 NEURO-ONC: Clinically stable. Will refer to palliative SW.  -8/24/2020 NEURO-ONC/ MRB: Clinically stable to improved. Imaging largely stable with no  concern for disease recurrence. Continuing imaging surveillance. Referral to palliative care.   -9/25/2020 NEURO-ONC: Clinically declined after seizures on 9/18, though now slowly improving. Sending to [facility] to obtain better seizure control. No active cancer treatment.  -10/26/2020 NEURO-ONC/ MRB: Clinically stable. Imaging stable. Continue management of epilepsy per Dr. Pacheco. Weaning dexamethasone to 1mg daily.     SOCIAL HISTORY   Tobacco use: Former smoker, quit 40 years ago.   Alcohol use: Social use.  Drug use: Denies marijuana use.  .   Employment: On disability.       PHYSICAL EXAMINATION  KPS: 70  -Generally well appearing. Moon face.   -Respiratory: No audible wheezing.   -Skin: No facial rashes.  -Psychiatric: Normal mood and affect. Pleasant, talkative.  -Neurologic:   MENTAL STATUS:     Alert, oriented to date.    Recall: Intact, but difficult to fully assess due to expressive aphasia.    Speech near fluent, some hesitation noted.    Comprehension intact to multi-step commands.     CRANIAL NERVES:     Pupils are equal, round, reactive to light.     Extraocular movements full, patient denies diplopia.     Symmetric facial movements.   Hearing intact.   With normal phonation, no dysfunction of the palate or tongue.   MOTOR: Antigravity in arms.  SENSATION: Intact to light touch throughout.   COORDINATION: Intact bilaterally.     The rest of a comprehensive physical examination is deferred due to PHE (public health emergency) video visit restrictions.        MEDICAL RECORDS  Obtained and personally reviewed all available outside medical records in addition to reviewing any records available in our electronic system.     LABS  Personally reviewed all available lab results.     IMAGING  Personally reviewed MR brain imaging from today and compared to prior imaging. To my eye, there is no evidence of disease progression. The contrast enhancement about the resection cavity is stable with no increase in  perfusion.     Imaging was shown to and results were reviewed with Jayden and Oz.     Imaging and case reviewed and discussed later today at Brain Tumor Conference.        IMPRESSION  Clinic time was spent discussing in detail the nature of his cancer in light of repeat imaging. This was in addition to providing emotional support, answering questions pertaining to my recommendations, and devising the plan as outlined below.      Refractory epilepsy remains a barrier to further clinical improvement as with recurrent seizures, Jayden regresses physically and cognitively and this severely decreases his quality of life. Jayden has since seen Dr. Pacheco with [facility] and the current plan is to continue levetiracetam and lacosamide and the recently increased dose. If levels are adequate and recurrent events are noted, a third agent, like zonisamide, could be added. They were advised to not administer Valium more than three times consecutively for auras.     As imaging from today continues to demonstrate no evidence of disease progression, will continue to hold on further cancer-directed therapy at this time. In the setting of an incurable cancer, Jayden and Oz are clear about wanting high quality of life over quantity of life. Continue to hold on starting Avastin as patient is doing well and is largely asymptomatic and there is no significant contrast enhancement seen on imaging. Will repeat imaging in 2 months. If imaging is stable in December, can extend imaging interval to every 3 months.    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 Encouraged that Jayden continue with daily/ routine physical activity as tolerated.     PROBLEM LIST  Glioblastoma, MGMT methylated and IDH1 wildtype by NGS  Visual field cut; homonymous hemianopsia, right-side  Memory issues  Drug-induced constipation  Hyperplastic gastric polyps,  benign  H/o SIADH  Epilepsy    PLAN  -CANCER DIRECTED THERAPY-  -As above; Holding cancer-directed therapy for now. Continue imaging surveillance and repeat imaging in 2 months.   -Next generation sequencing with no targetable mutations.     -STEROIDS-  -Decrease dexamethasone  To 1mg daily.   -Monitor for worsening of neurological symptoms as well as a flair in RA.     -SEIZURE MANAGEMENT-  -Per Dr. Pacheco.     -Quality of life/ MOOD/ FATIGUE-  -History of mood issues; depression, anxiety, irritability, poor coping.  -Off Ritalin  -Continue Zoloft 200mg daily (max dose).  -Added to Brain Tumor Support Group email list; [email or web address]  -Physical therapy/ daily exercises.   -Following with Dr. Eugene Vargas for counseling/ coping.   -Following with palliative care MD and SW. Completion of POLST.     -HICCUPS-  -Pepcid as needed.  -Likely Steroid related.  -Possibly Baclofen (muscle relaxer) if needed.    -HYPONATREMIA/ History of SIADH-    -DVT/ PE-  -Need for lifelong anticoagulation.  -Continue Xarelto.  -If platelets drop <50, will need to hold anticoagulation    -ADDITIONAL SUPPORTIVE MANAGEMENT-  -Social work following.   -CT with lung nodules. [facility].  -S/p EGD with benign pathology of the 10mm gastric antrum/pylorus posterior wall lesion.  -Breast biopsy showed necrotic issue, no malignancy.    Return to clinic in 12/2020 + imaging.     In the meantime, Jayden and Oz know to call with questions or concerns or to report new complaints and can be seen sooner if needed     Nasra Olmos MD  Neuro-oncology

## 2020-10-26 ENCOUNTER — TUMOR CONFERENCE (OUTPATIENT)
Dept: ONCOLOGY | Facility: CLINIC | Age: 65
End: 2020-10-26

## 2020-10-26 ENCOUNTER — VIRTUAL VISIT (OUTPATIENT)
Dept: ONCOLOGY | Facility: CLINIC | Age: 65
End: 2020-10-26
Attending: PSYCHIATRY & NEUROLOGY
Payer: COMMERCIAL

## 2020-10-26 ENCOUNTER — ANCILLARY PROCEDURE (OUTPATIENT)
Dept: MRI IMAGING | Facility: CLINIC | Age: 65
End: 2020-10-26
Attending: PSYCHIATRY & NEUROLOGY
Payer: COMMERCIAL

## 2020-10-26 DIAGNOSIS — C71.9 GLIOBLASTOMA (H): Primary | ICD-10-CM

## 2020-10-26 DIAGNOSIS — C71.9 GLIOBLASTOMA (H): ICD-10-CM

## 2020-10-26 DIAGNOSIS — G93.6 BRAIN SWELLING (H): ICD-10-CM

## 2020-10-26 PROCEDURE — 99214 OFFICE O/P EST MOD 30 MIN: CPT | Mod: 95 | Performed by: PSYCHIATRY & NEUROLOGY

## 2020-10-26 PROCEDURE — 70553 MRI BRAIN STEM W/O & W/DYE: CPT | Mod: GC | Performed by: RADIOLOGY

## 2020-10-26 PROCEDURE — 999N001193 HC VIDEO/TELEPHONE VISIT; NO CHARGE

## 2020-10-26 PROCEDURE — A9585 GADOBUTROL INJECTION: HCPCS | Performed by: RADIOLOGY

## 2020-10-26 RX ORDER — GADOBUTROL 604.72 MG/ML
10 INJECTION INTRAVENOUS ONCE
Status: COMPLETED | OUTPATIENT
Start: 2020-10-26 | End: 2020-10-26

## 2020-10-26 RX ORDER — DEXAMETHASONE 1 MG
1 TABLET ORAL
Qty: 30 TABLET | Refills: 3 | Status: SHIPPED | OUTPATIENT
Start: 2020-10-26 | End: 2020-01-01

## 2020-10-26 RX ADMIN — GADOBUTROL 10 ML: 604.72 INJECTION INTRAVENOUS at 07:25

## 2020-10-26 NOTE — LETTER
"    10/26/2020         RE: Jayden Lackey  [address]  [facility] [address]        Dear Colleague,    Thank you for referring your patient, Jayden Lackey, to the [facility]. Please see a copy of my visit note below.    Jayden Lackey is a 65 year old male who is being evaluated via a billable video visit.      The patient has been notified of following:     \"This video visit will be conducted via a call between you and your physician/provider. We have found that certain health care needs can be provided without the need for an in-person physical exam.  This service lets us provide the care you need with a video conversation.  If a prescription is necessary we can send it directly to your pharmacy.  If lab work is needed we can place an order for that and you can then stop by our lab to have the test done at a later time.    Video visits are billed at different rates depending on your insurance coverage.  Please reach out to your insurance provider with any questions.    If during the course of the call the physician/provider feels a video visit is not appropriate, you will not be charged for this service.\"    Patient has given verbal consent for Video visit? Yes    How would you like to obtain your AVS? MyChart     If you are dropped from the video visit, the video invite should be resent to: Text to cell phone: [phone]     Will anyone else be joining your video visit? No     Vitals - Patient Reported  Weight (Patient Reported): 108.4 kg (239 lb)  Height (Patient Reported): 190.5 cm (6' 3\")  BMI (Based on Pt Reported Ht/Wt): 29.87  Pain Score: No Pain (0)    Emeterio Valdez LPN      Video-Visit Details  Type of service:  Video Visit    Video Start Time: 11:50 AM  Video End Time: 12:21 PM    Originating Location (pt. Location): Home    Distant Location (provider location):  [facility]     Platform used for Video Visit: " "Isaías Olmos MD    _____________________________________________________________    NEURO-ONCOLOGY VISIT  Oct 26, 2020    CHIEF COMPLAINT: Mr. 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 He completed chemoradiotherapy on 6/11/2019. Imaging in 9/2019 was consistent with recurrent disease and adjuvant-dosed temozolomide was stop. Pathology from repeat resection on 10/24/2019 was consistent with recurrent tumor. He was started on lomustine, but stopped after 1 cycle due to significant chemotherapy-induced fatigue.     Quality of life has been compromised by recurrent seizure events, PE requiring prolonged hospitalization, and chemotherapy-related toxicities.    Currently, he is not on any cancer-directed therapy and managed on imaging surveillance.     I met with Jayden and Oz (wife) for this follow-up visit today.      HISTORY OF PRESENT ILLNESS  -Jayden is feeling \"more up, than down\". Oz feels that he is in a \"better place\" physically and emotionally; having bouts of sadden and depression, but now they are both more accepting of his new reality. Zoloft has been very helpful. Some friends and family are stopping by and this raises his spirits. Walking every other day with good stability, however, at times, leaning to the left. No falls, but at times can loose his balance. Walks for about 30 minutes. Working on getting up from a chair more quickly.   -Admitted in late September for 3 seizures that were not responsive to abortive Valium. Returned to baseline. CT head without any acute pathology. Referral placed to [facility]. Then, he went to ED again on 10/8 due to a seizure. Has since seen Dr. Pacheco for epilepsy who recommended continuing with levetiracetam and lacosamide, may consider increasing one or both of the current AEDs first before adding " a third agent, like zonisamide. Advised that she not administer Valium more than three times consecutively for auras.   -Cognitively slow. Continued word finding and memory problems. However, some days are better than others.   -Eating well.  -Remains on dexamethasone 2 mg daily. No arthritic pain.   -Aracelis are thinking of spending extended time with family in [place] come January, pending COVID.     REVIEW OF SYSTEMS  A comprehensive ROS negative except as in HPI.      MEDICATIONS   Current Outpatient Medications   Medication Sig Dispense Refill     atenolol (TENORMIN) 100 MG tablet Take 50 mg by mouth daily 50 mg       dexamethasone (DECADRON) 4 MG tablet Take 6mg daily for 5 days, followed by 4mg daily for 5 days (Patient taking differently: 2 mg Take 6mg daily for 5 days, followed by 4mg daily for 5 days) 18 tablet 0     diazepam (VALIUM) 5 MG/ML (HIGH CONC) solution Take 1 mL (5 mg) by mouth every 3 minutes as needed for seizures (For seizure activity.) If seizure worsens or has new symptoms, repeat with 1 mL by mouth up to a max of 3.0 mL in 1 day. Do not give if patient has very shallow or slow breathing. 10 mL 0     diltiazem ER (DILT-XR) 180 MG 24 hr capsule Take 180 mg by mouth daily       docusate sodium (COLACE) 50 MG capsule Take 50 mg by mouth 2 times daily as needed for constipation       lacosamide (VIMPAT) 200 MG TABS tablet Take 1 tablet (200 mg) twice daily (together with 50 mg tablets, to total 250mg in AM and 250mg in PM). 180 tablet 1     lacosamide (VIMPAT) 50 MG TABS tablet Take 1 tablet (50 mg) twice daily (together with 200 mg tablets, to total 250mg in AM and 250mg in PM). 180 tablet 1     levETIRAcetam (KEPPRA) 1000 MG tablet Take 2 tablets (2000 mg) twice daily (together with 500 mg tablets, to total 2500mg in AM and 2500mg in PM). 360 tablet 3     levETIRAcetam (KEPPRA) 500 MG tablet Take 1 tablet (500 mg) twice daily (together with 1000 mg tablets, to total 2500mg in AM  and 2500mg in PM). 180 tablet 3     lisinopril (PRINIVIL/ZESTRIL) 5 MG tablet Take 1 tablet (5 mg) by mouth daily 28 tablet 0     rivaroxaban ANTICOAGULANT (XARELTO ANTICOAGULANT) 20 MG TABS tablet Take 1 tablet (20 mg) by mouth daily (with dinner) 30 tablet 3     sertraline (ZOLOFT) 100 MG tablet Take 2 tablets (200 mg) by mouth daily 60 tablet 3     DRUG ALLERGIES   Allergies   Allergen Reactions     Shellfish Allergy Difficulty breathing, Nausea and Vomiting, Swelling and Shortness Of Breath     Lupine Bean Extract      Shellfish-Derived Products      No Clinical Screening - See Comments      Lupine bean doesn't remember reaction       ONCOLOGIC HISTORY  -1/2019 PRESENTATION: Visual disturbance.   -3/6/2019 Evaluated by Dr. Mauricio, neuro-ophthalmologist, found to have a right sided homonymous hemianopsia. MRI ordered.   -3/6/2019 MR brain imaging revealed a contrast-enhancing lesion in the left occipital lobe suspicious for a high-grade primary brain tumor.  -3/22/2019 SURGERY: Craniotomy with a gross total resection by Dr. Irving Hayes.   PATHOLOGY: Glioblastoma; Wildtype status for IDH1/2 by next generation sequencing. MGMT promoter methylated. Wildtype PTEN, TP53.  -4/8/2019 NEURO-ONC: Recommending chemoradiotherapy.   -4/30 - 6/11/2019 CHEMORADS 6000cGy in 30 fractions with concurrent temozolomide 75mg/m2 (180mg).  -5/20/2019 NEURO-ONC: Not interested in Optune at this time.   -6/12/2019 NEURO-ONC: Clinically stable.   -6/21 - 6/28/2019 ADMISSION/ [facility]: Admitted to [facility] for first ever seizure event, provoked by hyponatremia. Urine studies consistent with SIADH ; etiology of SIADH unclear. Had 10mm gastric lesion biopsied on 6/26/2019 by way of an EGD.  PATHOLOGY of gastric lesion: Hyperplastic polyp with erosion and foci of atypia; favor reactive. No intestinal metaplasia, no helicobacter pylori, no high grade dysplasia or invasive carcinoma.   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   -7/12/2019  NEURO-ONC/ MRB/ CHEMO: Clinical stable. Imaging with stable to slightly increased enhancement of previously seen nodular lesions; associated with mild degree of elevated rCBV. Starting adjuvant-dosed temozolomide 150mg/m2 (360mg), cycle 1 (start date 7/12/2019). Monitor fluid intake, will be checking CMP/ Na+.   -8/12/2019 NEURO-ONC/ MRB/ CHEMO: Clinical decompensated, but improving since hospital discharge. MR brain scan from last week with concern for non-contrast enhancing >> enhancing disease progression. Ordering a PET brain scan. Holding adjuvant-dosed temozolomide, cycle 3.  -10/24/2019 SURGERY: Repeat resection by Dr. Packer.  PATHOLOGY: Recurrent glioblastoma.   Next generation sequencing by [facility]; Microsatellite instability; stable. Tumor mutational burden; 4 (low). Mutations in ARID1A, ASXL1, ATRX, FANCE, MED12, MEF2B, NF1, RUNX1, TERT.   -11/25/2019 NEURO-ONC/ MRB/ CHEMO: Clinically doing well. Imaging with positive resection. Starting Lomustine (start date of 12/2). Next generation sequencing.   -12/4/2019 CHEMO: Lomustine, cycle 1.   -1/6/2020 NEURO-ONC: Clinically stable. Has TCP with Lomustine. Will hold off on starting next week until he sees Dr. Olmos with new imaging.  -1/31/2020 NEURO-ONC/ MRB/ CHEMO: Clinically doing well. Imaging with no evidence of disease recurrence. Holding Lomustine in the setting of a poor emotional state. Referrals to Dr. Aleksandr Garcia for neuro-psych testing, Dr. Eugene Vargas for counseling/ coping, and palliative care. Next generation sequencing results discussed. Increase Zoloft. Continue dexamethasone 2mg daily.  -4/1/2020 ADMISSION/ [facility]Z- Keppra increased, added diazepam PRN, continue Vimpat.   -4/1/2020 MRB with no significant change.   -4/13/2020 NEURO-ONC: Clinically improved since hospitalization. Continuing imaging surveillance. Increase Zoloft.   -6/26/2020 ADMISSION/ [facility]Z- Keppra and Vimpat increased, continued diazepam PRN.   -6/29/2020 NEURO-ONC/ MRB: Clinically  improved since hospitalization. Imaging largely stable. Continuing imaging surveillance. Trial of ritalin.   -8/10/2020 NEURO-ONC: Clinically stable. Will refer to palliative SW.  -8/24/2020 NEURO-ONC/ MRB: Clinically stable to improved. Imaging largely stable with no concern for disease recurrence. Continuing imaging surveillance. Referral to palliative care.   -9/25/2020 NEURO-ONC: Clinically declined after seizures on 9/18, though now slowly improving. Sending to [facility] to obtain better seizure control. No active cancer treatment.  -10/26/2020 NEURO-ONC/ MRB: Clinically stable. Imaging stable. Continue management of epilepsy per Dr. Pacheco. Weaning dexamethasone to 1mg daily.     SOCIAL HISTORY   Tobacco use: Former smoker, quit 40 years ago.   Alcohol use: Social use.  Drug use: Denies marijuana use.  .   Employment: On disability.       PHYSICAL EXAMINATION  KPS: 70  -Generally well appearing. Moon face.   -Respiratory: No audible wheezing.   -Skin: No facial rashes.  -Psychiatric: Normal mood and affect. Pleasant, talkative.  -Neurologic:   MENTAL STATUS:     Alert, oriented to date.    Recall: Intact, but difficult to fully assess due to expressive aphasia.    Speech near fluent, some hesitation noted.    Comprehension intact to multi-step commands.     CRANIAL NERVES:     Pupils are equal, round, reactive to light.     Extraocular movements full, patient denies diplopia.     Symmetric facial movements.   Hearing intact.   With normal phonation, no dysfunction of the palate or tongue.   MOTOR: Antigravity in arms.  SENSATION: Intact to light touch throughout.   COORDINATION: Intact bilaterally.     The rest of a comprehensive physical examination is deferred due to PHE (public health emergency) video visit restrictions.        MEDICAL RECORDS  Obtained and personally reviewed all available outside medical records in addition to reviewing any records available in our electronic system.      LABS  Personally reviewed all available lab results.     IMAGING  Personally reviewed MR brain imaging from today and compared to prior imaging. To my eye, there is no evidence of disease progression. The contrast enhancement about the resection cavity is stable with no increase in perfusion.     Imaging was shown to and results were reviewed with Jayden and Oz.     Imaging and case reviewed and discussed later today at Brain Tumor Conference.        IMPRESSION  Clinic time was spent discussing in detail the nature of his cancer in light of repeat imaging. This was in addition to providing emotional support, answering questions pertaining to my recommendations, and devising the plan as outlined below.      Refractory epilepsy remains a barrier to further clinical improvement as with recurrent seizures, Jayden regresses physically and cognitively and this severely decreases his quality of life. Jayden has since seen Dr. Pacheco with [facility] and the current plan is to continue levetiracetam and lacosamide and the recently increased dose. If levels are adequate and recurrent events are noted, a third agent, like zonisamide, could be added. They were advised to not administer Valium more than three times consecutively for auras.     As imaging from today continues to demonstrate no evidence of disease progression, will continue to hold on further cancer-directed therapy at this time. In the setting of an incurable cancer, Jayden and Oz are clear about wanting high quality of life over quantity of life. Continue to hold on starting Avastin as patient is doing well and is largely asymptomatic and there is no significant contrast enhancement seen on imaging. Will repeat imaging in 2 months. If imaging is stable in December, can extend imaging interval to every 3 months.    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  Encouraged that Jayden continue with daily/ routine physical activity as tolerated.     PROBLEM LIST  Glioblastoma, MGMT methylated and IDH1 wildtype by NGS  Visual field cut; homonymous hemianopsia, right-side  Memory issues  Drug-induced constipation  Hyperplastic gastric polyps, benign  H/o SIADH  Epilepsy    PLAN  -CANCER DIRECTED THERAPY-  -As above; Holding cancer-directed therapy for now. Continue imaging surveillance and repeat imaging in 2 months.   -Next generation sequencing with no targetable mutations.     -STEROIDS-  -Decrease dexamethasone  To 1mg daily.   -Monitor for worsening of neurological symptoms as well as a flair in RA.     -SEIZURE MANAGEMENT-  -Per Dr. Pacheco.     -Quality of life/ MOOD/ FATIGUE-  -History of mood issues; depression, anxiety, irritability, poor coping.  -Off Ritalin  -Continue Zoloft 200mg daily (max dose).  -Added to Brain Tumor Support Group email list; [email or web address]  -Physical therapy/ daily exercises.   -Following with Dr. Eugene Vargas for counseling/ coping.   -Following with palliative care MD and SW. Completion of POLST.     -HICCUPS-  -Pepcid as needed.  -Likely Steroid related.  -Possibly Baclofen (muscle relaxer) if needed.    -HYPONATREMIA/ History of SIADH-    -DVT/ PE-  -Need for lifelong anticoagulation.  -Continue Xarelto.  -If platelets drop <50, will need to hold anticoagulation    -ADDITIONAL SUPPORTIVE MANAGEMENT-  -Social work following.   -CT with lung nodules. [facility].  -S/p EGD with benign pathology of the 10mm gastric antrum/pylorus posterior wall lesion.  -Breast biopsy showed necrotic issue, no malignancy.    Return to clinic in 12/2020 + imaging.     In the meantime, Aracelis know to call with questions or concerns or to report new complaints and can be seen sooner if needed     Nasra Olmos MD  Neuro-oncology

## 2020-10-26 NOTE — PATIENT INSTRUCTIONS
Imaging stable.   Repeat in 2 months. If stable in December, can extend imaging interval to every 3 months.    Dexamethasone taper; 1mg daily. [facility]   Continue with physical activity as tolerated.     Dr. Thompson Zarco is a neuro-oncologist at [facility].     Return to in 12/2020 + imaging.     Nasra Olmos MD  Neuro-oncology  10/26/2020

## 2020-10-26 NOTE — DISCHARGE INSTRUCTIONS
MRI Contrast Discharge Instructions    The IV contrast you received today will pass out of your body in your  urine. This will happen in the next 24 hours. You will not feel this process.  Your urine will not change color.    Drink at least 4 extra glasses of water or juice today (unless your doctor  has restricted your fluids). This reduces the stress on your kidneys.  You may take your regular medicines.    If you are on dialysis: It is best to have dialysis today.    If you have a reaction: Most reactions happen right away. If you have  any new symptoms after leaving the hospital (such as hives or swelling),  call your hospital at the correct number below. Or call your family doctor.  If you have breathing distress or wheezing, call 911.    Special instructions: ***    I have read and understand the above information.    Signature:______________________________________ Date:___________    Staff:__________________________________________ Date:___________     Time:__________    [facility] Departments:    ___[place]: [phone]  ___[facility]: [phone]  ___[place]: [phone] ___[facility]: [phone]  ___[facility]: [phone]  ___[facility]: [phone]  ___[phone]  ___[facility]: [phone]  ___[phone]

## 2020-10-26 NOTE — TUMOR CONFERENCE
Tumor Conference Information  Tumor Conference: Brain  Specialties Present: Medical oncology, Pathology, Radiology, Radiation oncology, Surgery  Patient Status: A current patient  Pathology: Not Discussed  Treatment to Date: Surgical intervention(s), Chemoradiation, Adjuvant chemotherapy, Palliative chemotherapy  Clinical Trial Eligibility: Not discussed  Recommended Plan: Observation (see comment) (Comment: reviewed imaging - grossly stable; repeat imaging and follow up in 2 months)  Did the review exceed 30 minutes?: did not           Documentation / Disclaimer Cancer Tumor Board Note  Cancer tumor board recommendations do not override what is determined to be reasonable care and treatment, which is dependent on the circumstances of a patient's case; the patient's medical, social, and personal concerns; and the clinical judgment of the oncologist [physician].

## 2020-11-16 ENCOUNTER — VIRTUAL VISIT (OUTPATIENT)
Dept: PALLIATIVE CARE | Facility: CLINIC | Age: 65
End: 2020-11-16
Attending: SOCIAL WORKER
Payer: COMMERCIAL

## 2020-11-16 DIAGNOSIS — F43.20 ADJUSTMENT DISORDER, UNSPECIFIED TYPE: Primary | ICD-10-CM

## 2020-11-16 DIAGNOSIS — Z51.5 ENCOUNTER FOR PALLIATIVE CARE: ICD-10-CM

## 2020-11-16 DIAGNOSIS — Z63.8 CAREGIVER ROLE STRAIN: ICD-10-CM

## 2020-11-16 DIAGNOSIS — R41.89 COGNITIVE IMPAIRMENT: ICD-10-CM

## 2020-11-16 PROCEDURE — 999N001193 HC VIDEO/TELEPHONE VISIT; NO CHARGE

## 2020-11-16 PROCEDURE — 90834 PSYTX W PT 45 MINUTES: CPT | Mod: 95 | Performed by: SOCIAL WORKER

## 2020-11-16 SDOH — SOCIAL STABILITY - SOCIAL INSECURITY: OTHER SPECIFIED PROBLEMS RELATED TO PRIMARY SUPPORT GROUP: Z63.8

## 2020-11-16 NOTE — LETTER
11/16/2020       RE: Jayden Lackey  [address]  [place] [address]     Dear Colleague,    Thank you for referring your patient, Jayden Lackey, to the [facility] at [facility]. Please see a copy of my visit note below.    Palliative Care Clinical Social Work Return Appointment    PLEASE NOTE:  THIS IS A MENTAL HEALTH NOTE.  OTHER PROVIDERS VIEWING THIS NOTE SHOULD USE THIS ONLY FOR UNDERSTANDING THE CONTEXT OF THE PATIENT S EXPERIENCE.  TOPICS DESCRIBED IN THIS NOTE SHOULD NOT BE REFERENCED TO THE PATIENT BY MEDICAL PROVIDERS.    Jayden is a 65 year old man with glioblastoma, seen today by video for a return psychotherapy appointment to address concerns about coping and goals of care as these relate to coping with illness and treatment. Wife Cristiana participates along with Jayden today for family psychotherapy.     Mental Status Exam:(List all that apply)      Appearance: Appropriate      Eye Contact: Good       Orientation: Yes, x4      Mood: sad, grieving, reflective      Affect: Appropriate      Thought Content: Clear         Thought Form: Logical      Psychomotor Behavior: Normal    Mental Health and Adjustment to Illness:   Jayden and Cristiana continue to process relationship changes due to his tumor and seizures with functional losses. They are supportive an caring toward one another and we discuss strategies for coping, communicating and balance in challenging circumstances.       Behavioral/ Non-Pharmacological Skills Education: Not focus today    Relationships: Focus today - role changes, caregiver strain, grief and loss  Interactions around mood swings and anger    Advance Care Planning: Not focus today    Main therapeutic interventions provided this session include:  Provide psychoeducation, Facilitate processing of thoughts and feelings, Facilitate goals of care discussion, Provide strengths-based family counseling and  Facilitate structured problem solving      Plan:  Will return for psychotherapy with palliative care focus in 3 - 5 weeks.     Time spent with patient/family:  50 minutes (Start 11:45am, end 12:35pm)        DO NOT SEND ANY LETTERS              Again, thank you for allowing me to participate in the care of your patient.      Sincerely,    Preethi Barlow, LICSW

## 2020-12-04 DIAGNOSIS — C71.9 GLIOBLASTOMA (H): ICD-10-CM

## 2020-12-16 NOTE — PROGRESS NOTES
"Jayden Lackey is a 65 year old male who is being evaluated via a billable video visit.      The patient has been notified of following:     \"This video visit will be conducted via a call between you and your physician/provider. We have found that certain health care needs can be provided without the need for an in-person physical exam.  This service lets us provide the care you need with a video conversation.  If a prescription is necessary we can send it directly to your pharmacy.  If lab work is needed we can place an order for that and you can then stop by our lab to have the test done at a later time.    Video visits are billed at different rates depending on your insurance coverage.  Please reach out to your insurance provider with any questions.    If during the course of the call the physician/provider feels a video visit is not appropriate, you will not be charged for this service.\"    Patient has given verbal consent for Video visit? Yes  How would you like to obtain your AVS? MyChart  If you are dropped from the video visit, the video invite should be resent to: Text to cell phone: [phone]  Will anyone else be joining your video visit? No    Vitals - Patient Reported  Weight (Patient Reported): 108.9 kg (240 lb)  Height (Patient Reported): 190.5 cm (6' 3\")  BMI (Based on Pt Reported Ht/Wt): 30  Pain Score: No Pain (0)    I have reviewed and updated patient's allergy and medication list.    Concerns: NONE  Refills: NONE    Lizz Roe CMA      Video-Visit Details  Type of service:  Video Visit    Video Start Time: 4:11 PM  Video End Time: 4:50 PM    Originating Location (pt. Location): Home    Distant Location (provider location):  [facility]     Platform used for Video Visit: Isaías Olmos MD    _____________________________________________________________    NEURO-ONCOLOGY VISIT  Dec 21, 2020    CHIEF COMPLAINT: Mr. Jayden Lackey is a 65 year old " "right-handed man with a left occipital lobe glioblastoma (IDH wildtype by NGS, MGMT promoter methylated), diagnosed following a gross total resection on 3/22/2019. He completed chemoradiotherapy on 6/11/2019. Imaging in 9/2019 was consistent with recurrent disease and adjuvant-dosed temozolomide was stop. Pathology from repeat resection on 10/24/2019 was consistent with recurrent tumor. He was started on lomustine, but stopped after 1 cycle due to significant chemotherapy-induced fatigue.     Quality of life has been compromised by recurrent seizure events, PE requiring prolonged hospitalization, and chemotherapy-related toxicities.    Currently, he is not on any cancer-directed therapy and managed on imaging surveillance. Imaging in 12/2020 showed a new area of contrast enhancement.     I met with Jayden and Oz (wife) for this follow-up visit today.    HISTORY OF PRESENT ILLNESS  -Jayden is feeling \"up and down\".   -He had a staring episode/ complex seizure last week; used Valium. Post-ictal confusion and aphasia. Did not progress into a \"full\" seizure. Contacted Dr. Pacheco; consideration for adding a third agent.   -Baseline confusion and issues with speech/ word finding.   -Has been remaining active, walking at night for up to about 30 minutes. Good balance and no weakness, but when fatigued, leans to his left and drags his right foot.   -Fatigued, but sleeping well.   -Days when he is feeling more anxious. Continued periods of depression. Overall, mood is good.   -Eating well.  -Remains on dexamethasone 1 mg daily. No arthritic pain.   -Aracelis are not going to [place] in January.     REVIEW OF SYSTEMS  A comprehensive ROS negative except as in HPI.      MEDICATIONS   Current Outpatient Medications   Medication Sig Dispense Refill     atenolol (TENORMIN) 100 MG tablet Take 50 mg by mouth daily 50 mg       dexamethasone (DECADRON) 1 MG tablet Take 1 tablet (1 mg) by mouth daily (with breakfast) 30 " tablet 3     dexamethasone (DECADRON) 4 MG tablet Take 6mg daily for 5 days, followed by 4mg daily for 5 days (Patient taking differently: 2 mg Take 6mg daily for 5 days, followed by 4mg daily for 5 days) 18 tablet 0     diazepam (VALIUM) 5 MG/ML (HIGH CONC) solution Take 1 mL (5 mg) by mouth every 3 minutes as needed for seizures (For seizure activity.) If seizure worsens or has new symptoms, repeat with 1 mL by mouth up to a max of 3.0 mL in 1 day. Do not give if patient has very shallow or slow breathing. 10 mL 0     diltiazem ER (DILT-XR) 180 MG 24 hr capsule Take 180 mg by mouth daily       docusate sodium (COLACE) 50 MG capsule Take 50 mg by mouth 2 times daily as needed for constipation       lacosamide (VIMPAT) 200 MG TABS tablet Take 1 tablet (200 mg) twice daily (together with 50 mg tablets, to total 250mg in AM and 250mg in PM). 180 tablet 1     lacosamide (VIMPAT) 50 MG TABS tablet Take 1 tablet (50 mg) twice daily (together with 200 mg tablets, to total 250mg in AM and 250mg in PM). 180 tablet 1     levETIRAcetam (KEPPRA) 1000 MG tablet Take 2 tablets (2000 mg) twice daily (together with 500 mg tablets, to total 2500mg in AM and 2500mg in PM). 360 tablet 3     levETIRAcetam (KEPPRA) 500 MG tablet Take 1 tablet (500 mg) twice daily (together with 1000 mg tablets, to total 2500mg in AM and 2500mg in PM). 180 tablet 3     lisinopril (PRINIVIL/ZESTRIL) 5 MG tablet Take 1 tablet (5 mg) by mouth daily 28 tablet 0     rivaroxaban ANTICOAGULANT (XARELTO ANTICOAGULANT) 20 MG TABS tablet Take 1 tablet (20 mg) by mouth daily (with dinner) 30 tablet 3     sertraline (ZOLOFT) 100 MG tablet Take 2 tablets (200 mg) by mouth daily 60 tablet 3     DRUG ALLERGIES   Allergies   Allergen Reactions     Shellfish Allergy Difficulty breathing, Nausea and Vomiting, Swelling and Shortness Of Breath     Lupine Bean Extract      Shellfish-Derived Products      No Clinical Screening - See Comments      Lupine ferrell doesn't remember  reaction       ONCOLOGIC HISTORY  -1/2019 PRESENTATION: Visual disturbance.   -3/6/2019 Evaluated by Dr. Mauricio, neuro-ophthalmologist, found to have a right sided homonymous hemianopsia. MRI ordered.   -3/6/2019 MR brain imaging revealed a contrast-enhancing lesion in the left occipital lobe suspicious for a high-grade primary brain tumor.  -3/22/2019 SURGERY: Craniotomy with a gross total resection by Dr. Irving Hayes.   PATHOLOGY: Glioblastoma; Wildtype status for IDH1/2 by next generation sequencing. MGMT promoter methylated. Wildtype PTEN, TP53.  -4/8/2019 NEURO-ONC: Recommending chemoradiotherapy.   -4/30 - 6/11/2019 CHEMORADS 6000cGy in 30 fractions with concurrent temozolomide 75mg/m2 (180mg).  -5/20/2019 NEURO-ONC: Not interested in Optune at this time.   -6/12/2019 NEURO-ONC: Clinically stable.   -6/21 - 6/28/2019 ADMISSION/ SZ: Admitted to [facility] for first ever seizure event, provoked by hyponatremia. Urine studies consistent with SIADH ; etiology of SIADH unclear. Had 10mm gastric lesion biopsied on 6/26/2019 by way of an EGD.  PATHOLOGY of gastric lesion: Hyperplastic polyp with erosion and foci of atypia; favor reactive. No intestinal metaplasia, no helicobacter pylori, no high grade dysplasia or invasive carcinoma.   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   -7/12/2019 NEURO-ONC/ MRB/ CHEMO: Clinical stable. Imaging with stable to slightly increased enhancement of previously seen nodular lesions; associated with mild degree of elevated rCBV. Starting adjuvant-dosed temozolomide 150mg/m2 (360mg), cycle 1 (start date 7/12/2019). Monitor fluid intake, will be checking CMP/ Na+.   -8/12/2019 NEURO-ONC/ MRB/ CHEMO: Clinical decompensated, but improving since hospital discharge. MR brain scan from last week with concern for non-contrast enhancing >> enhancing disease progression. Ordering a PET brain scan. Holding adjuvant-dosed temozolomide, cycle 3.  -10/24/2019 SURGERY: Repeat resection by   [facility].  PATHOLOGY: Recurrent glioblastoma.   Next generation sequencing by [facility]; Microsatellite instability; stable. Tumor mutational burden; 4 (low). Mutations in ARID1A, ASXL1, ATRX, FANCE, MED12, MEF2B, NF1, RUNX1, TERT.   -11/25/2019 NEURO-ONC/ MRB/ CHEMO: Clinically doing well. Imaging with positive resection. Starting Lomustine (start date of 12/2). Next generation sequencing.   -12/4/2019 CHEMO: Lomustine, cycle 1.   -1/6/2020 NEURO-ONC: Clinically stable. Has TCP with Lomustine. Will hold off on starting next week until he sees Dr. Olmos with new imaging.  -1/31/2020 NEURO-ONC/ MRB/ CHEMO: Clinically doing well. Imaging with no evidence of disease recurrence. Holding Lomustine in the setting of a poor emotional state. Referrals to Dr. Aleksandr Garcia for neuro-psych testing, Dr. Eugene Vargas for counseling/ coping, and palliative care. Next generation sequencing results discussed. Increase Zoloft. Continue dexamethasone 2mg daily.  -4/1/2020 ADMISSION/ SZ- Keppra increased, added diazepam PRN, continue Vimpat.   -4/1/2020 MRB with no significant change.   -4/13/2020 NEURO-ONC: Clinically improved since hospitalization. Continuing imaging surveillance. Increase Zoloft.   -6/26/2020 ADMISSION/ SZ- Keppra and Vimpat increased, continued diazepam PRN.   -6/29/2020 NEURO-ONC/ MRB: Clinically improved since hospitalization. Imaging largely stable. Continuing imaging surveillance. Trial of ritalin.   -8/10/2020 NEURO-ONC: Clinically stable. Will refer to palliative SW.  -8/24/2020 NEURO-ONC/ MRB: Clinically stable to improved. Imaging largely stable with no concern for disease recurrence. Continuing imaging surveillance. Referral to palliative care.   -9/25/2020 NEURO-ONC: Clinically declined after seizures on 9/18, though now slowly improving. Sending to [facility] to obtain better seizure control. No active cancer treatment.  -10/26/2020 NEURO-ONC/ MRB: Clinically stable. Imaging stable. Continue management of epilepsy  per Dr. Pacheco. Weaning dexamethasone to 1mg daily.   -12/21/2020 NEURO-ONC/ MRB: Clinically with a recurrent seizure. Imaging with a new 6mm growth about the resection cavity. Repeat imaging in 6 weeks.     SOCIAL HISTORY   Tobacco use: Former smoker, quit 40 years ago.   Alcohol use: Social use.  Drug use: Denies.  .   Employment: On disability.       PHYSICAL EXAMINATION  KPS: 60  -Generally well appearing.  -Respiratory: No audible wheezing.   -Skin: No facial rashes.  -Psychiatric: Normal mood and affect. Pleasant, talkative.  -Neurologic:   MENTAL STATUS:     Alert, oriented.    Recall: Impaired.    Speech near fluent, but with hesitation and word finding issues.    Comprehension with some impairment.     CRANIAL NERVES:     Pupils are equal, round.     Extraocular movements full.     Symmetric facial movements.   Hearing intact.   With normal phonation, no dysfunction of the palate or tongue.   MOTOR: Antigravity in arms.  SENSATION: Intact to light touch throughout.   COORDINATION: Intact bilaterally.     The rest of a comprehensive physical examination is deferred due to PHE (public health emergency) video visit restrictions.        MEDICAL RECORDS  Obtained and personally reviewed all available outside medical records in addition to reviewing any records available in our electronic system.     LABS  Personally reviewed all available lab results.     IMAGING  Personally reviewed MR brain imaging from today and compared to prior imaging. To my eye, there is a new, 6mm contrast enhancing lesion about the resection cavity. T2 FLAIR is stable.     Imaging was shown to and results were reviewed with Aracelis.     Imaging and case reviewed and discussed later today at Brain Tumor Conference.       IMPRESSION  Clinic time was spent discussing in detail the nature of his cancer in light of his repeat imaging. This was in addition to providing emotional support, answering questions pertaining to my  recommendations, and devising the plan as outlined below.      Refractory epilepsy remains a barrier to further clinical improvement as with recurrent seizures, Jayden regresses physically and cognitively and this severely decreases his quality of life. Since increasing the doses of levetiracetam and lacosamide plus adding Diltazem, Jayden did have a recurrent seizure last week. He will be seeing Dr. Pacheco tomorrow.     Imaging from today shows a new 6mm area of contrast enhancement about the resection cavity. It is unclear what this change represents; new cancer growth or treatment/ radiation-induced changes. Will repeat imaging in 6 weeks. In the setting of an incurable cancer, Jayden and Oz are clear about wanting high quality of life over quantity of life and will need to factor this in for future decisions.    PROBLEM LIST  Glioblastoma, MGMT methylated and IDH1 wildtype by NGS  Visual field cut; homonymous hemianopsia, right-side  Memory issues  Drug-induced constipation  Hyperplastic gastric polyps, benign  H/o SIADH  Epilepsy    PLAN  -CANCER DIRECTED THERAPY-  -As above.     -STEROIDS-  -Continue dexamethasone 1mg daily.   -Monitor for worsening of neurological symptoms as well as a flair in RA.     -SEIZURE MANAGEMENT-  -Per Dr. Pacheco.     -Quality of life/ MOOD/ FATIGUE-  -History of mood issues; depression, anxiety, irritability, poor coping.  -Off Ritalin  -Continue Zoloft 200mg daily (max dose).  -Added to Brain Tumor Support Group email list; [email or web address]  -Conitnue daily exercises.   -Following with Dr. Eugene Vargas for counseling/ coping.   -Following with palliative care MD and SW. Completion of POLST.     -HICCUPS-  -Pepcid as needed.  -Likely Steroid related.  -Possibly Baclofen (muscle relaxer) if needed.    -HYPONATREMIA/ History of SIADH-    -DVT/ PE-  -Need for lifelong anticoagulation.  -Continue Xarelto.    Pharmacy to reach out regarding insurance coverage.   -If platelets  drop <50, will need to hold anticoagulation.    -ADDITIONAL SUPPORTIVE MANAGEMENT-  -Social work following.   -CT with lung nodules. [facility].  -S/p EGD with benign pathology of the 10mm gastric antrum/pylorus posterior wall lesion.  -Breast biopsy showed necrotic issue, no malignancy.    Return to clinic in 2/2021 + imaging.     In the meantime, Jayden and Oz know to call with questions or concerns or to report new complaints and can be seen sooner if needed     Nasra Olmos MD  Neuro-oncology

## 2020-12-16 NOTE — PATIENT INSTRUCTIONS
Imaging with a subtle change; new 6mm spot about the resection cavity.   Repeat in 6 weeks.    Dexamethasone 1mg daily; refilled.   Zoloft refilled.  Pharmacy to reach out regarding Xarelto.     Continue routine exercise.     Return to in 2/2021 + imaging.     Nasra Olmos MD  Neuro-oncology  12/21/20

## 2020-12-17 NOTE — TELEPHONE ENCOUNTER
Call returned to Oz   reported that olivier did not need to go to a hospital following all seizures.  Olivier had a seizure yesterday, with staring , speech arrest    Seizure was about noon.  Wife called him for lunch and he did not respond.  Looking to the side, staring  Valium was given by Kailey edmondson was given 1ml, no improvement and he was given an additional 0.5ml and then he went to sleep  Patient slept about 5 hours, and when he woke he was not speaking, but followed commands, able to eat a small amount and drink. Was able to move around safely, able to walk up the stairs.  Speech and memory are still affected today, he seems tired,   This is typical for post seizure recovery. he can take a week until he gets to his baseline    [address] [id number]    No missed or late medications recently  No other acute illness or complaints of of symptoms.  No known COVID-19 exposure.    Has an MRI scheduled for Monday    Kailey Edmondson wanted to make sure that she did not need to take Olivier to the hospital for the seizure.  I reassured her that at this point, as it was a typical seizures and his recovery is much the same as other seizures he has had, he is okay recovering at home.    I will route the chart to  in case there are any adjustments to treatment wanted.

## 2020-12-17 NOTE — TELEPHONE ENCOUNTER
[facility] Call Center    Phone Message    May a detailed message be left on voicemail: no    Reason for Call: Other: Patients wife Oz calling to speak with Dr. Pacheco and care team in regards to patient. States that she is wanting information about a recent seizure that they are managing at home and is wondering if there are any changes to recommend for patients medication.      Please advise and call Oz back at your earliest convenience - Oz is requesting to keep calling if there is no answer.      Action Taken: Other: ME MINCEP    Travel Screening: Not Applicable

## 2020-12-18 NOTE — TELEPHONE ENCOUNTER
"Message from   \"The doses/levels of the 2 AEDs were relatively high on 10/13/2020.     We can offer a return VIDEO visit on Tuesday, 12/22/2020, to discuss addition of a third agent versus other options.  Thanks. \"    Call placed to Oz.  Above recommendations discussed.  Appointment scheduled for 12/22/20 at 11:30 AM    Jayden continues to have some language comprehension issues, and this seems to be recovering a little slower than Oz has noticed in the past.  I encouraged Oz to keep watching, and if there was worsening, or more seizures, to contact the clinic.  I provided the main number [phone] to call if she has concerns.    Appointment time and date confirmed, Francesca is familiar with video calls and had no questions  "

## 2020-12-21 NOTE — DISCHARGE INSTRUCTIONS
MRI Contrast Discharge Instructions    The IV contrast you received today will pass out of your body in your  urine. This will happen in the next 24 hours. You will not feel this process.  Your urine will not change color.    Drink at least 4 extra glasses of water or juice today (unless your doctor  has restricted your fluids). This reduces the stress on your kidneys.  You may take your regular medicines.    If you are on dialysis: It is best to have dialysis today.    If you have a reaction: Most reactions happen right away. If you have  any new symptoms after leaving the hospital (such as hives or swelling),  call your hospital at the correct number below. Or call your family doctor.  If you have breathing distress or wheezing, call 911.    Special instructions: ***    I have read and understand the above information.    Signature:______________________________________ Date:___________    Staff:__________________________________________ Date:___________     Time:__________    [facility] Departments:    ___[place]: [phone]  ___[facility]: [phone]  ___[place]: [phone] ___[facility]: [phone]  ___[facility]: [phone]  ___[facility]: [phone]  ___[phone]  ___[facility]: [phone]  ___[phone]

## 2020-12-21 NOTE — TUMOR CONFERENCE
Tumor Conference Information  Tumor Conference: Brain  Specialties Present: Medical oncology, Pathology, Radiology, Radiation oncology, Surgery  Patient Status: A current patient  Pathology: Not Discussed  Treatment to Date: Surgical intervention(s), Chemoradiation, Adjuvant chemotherapy, Palliative chemotherapy  Clinical Trial Eligibility: Not discussed  Recommended Plan: Observation (see comment) (Comment: reviewed imaging - some indeterminate changes seen, no overt findings/concerns; plan to repeat imaging in 1-2 months pending patient preference)  Did the review exceed 30 minutes?: did not           Documentation / Disclaimer Cancer Tumor Board Note  Cancer tumor board recommendations do not override what is determined to be reasonable care and treatment, which is dependent on the circumstances of a patient's case; the patient's medical, social, and personal concerns; and the clinical judgment of the oncologist [physician].

## 2020-12-21 NOTE — LETTER
"    12/21/2020         RE: Jayden Lackey  [address]  [facility] [address]        Dear Colleague,    Thank you for referring your patient, Jayden Lackey, to the [facility]. Please see a copy of my visit note below.    Jayden Lackey is a 65 year old male who is being evaluated via a billable video visit.      The patient has been notified of following:     \"This video visit will be conducted via a call between you and your physician/provider. We have found that certain health care needs can be provided without the need for an in-person physical exam.  This service lets us provide the care you need with a video conversation.  If a prescription is necessary we can send it directly to your pharmacy.  If lab work is needed we can place an order for that and you can then stop by our lab to have the test done at a later time.    Video visits are billed at different rates depending on your insurance coverage.  Please reach out to your insurance provider with any questions.    If during the course of the call the physician/provider feels a video visit is not appropriate, you will not be charged for this service.\"    Patient has given verbal consent for Video visit? Yes  How would you like to obtain your AVS? MyChart  If you are dropped from the video visit, the video invite should be resent to: Text to cell phone: [phone]  Will anyone else be joining your video visit? No    Vitals - Patient Reported  Weight (Patient Reported): 108.9 kg (240 lb)  Height (Patient Reported): 190.5 cm (6' 3\")  BMI (Based on Pt Reported Ht/Wt): 30  Pain Score: No Pain (0)    I have reviewed and updated patient's allergy and medication list.    Concerns: NONE  Refills: NONE    Lizz Roe CMA      Video-Visit Details  Type of service:  Video Visit    Video Start Time: 4:11 PM  Video End Time: 4:50 PM    Originating Location (pt. Location): Home    Distant Location (provider location):  M " "[facility]     Platform used for Video Visit: Isaías Olmos MD    _____________________________________________________________    NEURO-ONCOLOGY VISIT  Dec 21, 2020    CHIEF COMPLAINT: Mr. 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 He completed chemoradiotherapy on 6/11/2019. Imaging in 9/2019 was consistent with recurrent disease and adjuvant-dosed temozolomide was stop. Pathology from repeat resection on 10/24/2019 was consistent with recurrent tumor. He was started on lomustine, but stopped after 1 cycle due to significant chemotherapy-induced fatigue.     Quality of life has been compromised by recurrent seizure events, PE requiring prolonged hospitalization, and chemotherapy-related toxicities.    Currently, he is not on any cancer-directed therapy and managed on imaging surveillance. Imaging in 12/2020 showed a new area of contrast enhancement.     I met with Jayden and Oz (wife) for this follow-up visit today.    HISTORY OF PRESENT ILLNESS  -Jayden is feeling \"up and down\".   -He had a staring episode/ complex seizure last week; used Valium. Post-ictal confusion and aphasia. Did not progress into a \"full\" seizure. Contacted Dr. Pacheco; consideration for adding a third agent.   -Baseline confusion and issues with speech/ word finding.   -Has been remaining active, walking at night for up to about 30 minutes. Good balance and no weakness, but when fatigued, leans to his left and drags his right foot.   -Fatigued, but sleeping well.   -Days when he is feeling more anxious. Continued periods of depression. Overall, mood is good.   -Eating well.  -Remains on dexamethasone 1 mg daily. No arthritic pain.   -Aracelis are not going to [place] in January.     REVIEW OF SYSTEMS  A comprehensive ROS negative " except as in HPI.      MEDICATIONS   Current Outpatient Medications   Medication Sig Dispense Refill     atenolol (TENORMIN) 100 MG tablet Take 50 mg by mouth daily 50 mg       dexamethasone (DECADRON) 1 MG tablet Take 1 tablet (1 mg) by mouth daily (with breakfast) 30 tablet 3     dexamethasone (DECADRON) 4 MG tablet Take 6mg daily for 5 days, followed by 4mg daily for 5 days (Patient taking differently: 2 mg Take 6mg daily for 5 days, followed by 4mg daily for 5 days) 18 tablet 0     diazepam (VALIUM) 5 MG/ML (HIGH CONC) solution Take 1 mL (5 mg) by mouth every 3 minutes as needed for seizures (For seizure activity.) If seizure worsens or has new symptoms, repeat with 1 mL by mouth up to a max of 3.0 mL in 1 day. Do not give if patient has very shallow or slow breathing. 10 mL 0     diltiazem ER (DILT-XR) 180 MG 24 hr capsule Take 180 mg by mouth daily       docusate sodium (COLACE) 50 MG capsule Take 50 mg by mouth 2 times daily as needed for constipation       lacosamide (VIMPAT) 200 MG TABS tablet Take 1 tablet (200 mg) twice daily (together with 50 mg tablets, to total 250mg in AM and 250mg in PM). 180 tablet 1     lacosamide (VIMPAT) 50 MG TABS tablet Take 1 tablet (50 mg) twice daily (together with 200 mg tablets, to total 250mg in AM and 250mg in PM). 180 tablet 1     levETIRAcetam (KEPPRA) 1000 MG tablet Take 2 tablets (2000 mg) twice daily (together with 500 mg tablets, to total 2500mg in AM and 2500mg in PM). 360 tablet 3     levETIRAcetam (KEPPRA) 500 MG tablet Take 1 tablet (500 mg) twice daily (together with 1000 mg tablets, to total 2500mg in AM and 2500mg in PM). 180 tablet 3     lisinopril (PRINIVIL/ZESTRIL) 5 MG tablet Take 1 tablet (5 mg) by mouth daily 28 tablet 0     rivaroxaban ANTICOAGULANT (XARELTO ANTICOAGULANT) 20 MG TABS tablet Take 1 tablet (20 mg) by mouth daily (with dinner) 30 tablet 3     sertraline (ZOLOFT) 100 MG tablet Take 2 tablets (200 mg) by mouth daily 60 tablet 3     DRUG  ALLERGIES   Allergies   Allergen Reactions     Shellfish Allergy Difficulty breathing, Nausea and Vomiting, Swelling and Shortness Of Breath     Lupine Bean Extract      Shellfish-Derived Products      No Clinical Screening - See Comments      Lupine bean doesn't remember reaction       ONCOLOGIC HISTORY  -1/2019 PRESENTATION: Visual disturbance.   -3/6/2019 Evaluated by Dr. Mauricio, neuro-ophthalmologist, found to have a right sided homonymous hemianopsia. MRI ordered.   -3/6/2019 MR brain imaging revealed a contrast-enhancing lesion in the left occipital lobe suspicious for a high-grade primary brain tumor.  -3/22/2019 SURGERY: Craniotomy with a gross total resection by Dr. Irving Hayes.   PATHOLOGY: Glioblastoma; Wildtype status for IDH1/2 by next generation sequencing. MGMT promoter methylated. Wildtype PTEN, TP53.  -4/8/2019 NEURO-ONC: Recommending chemoradiotherapy.   -4/30 - 6/11/2019 CHEMORADS 6000cGy in 30 fractions with concurrent temozolomide 75mg/m2 (180mg).  -5/20/2019 NEURO-ONC: Not interested in Optune at this time.   -6/12/2019 NEURO-ONC: Clinically stable.   -6/21 - 6/28/2019 ADMISSION/ SZ: Admitted to [facility] for first ever seizure event, provoked by hyponatremia. Urine studies consistent with SIADH ; etiology of SIADH unclear. Had 10mm gastric lesion biopsied on 6/26/2019 by way of an EGD.  PATHOLOGY of gastric lesion: Hyperplastic polyp with erosion and foci of atypia; favor reactive. No intestinal metaplasia, no helicobacter pylori, no high grade dysplasia or invasive carcinoma.   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   -7/12/2019 NEURO-ONC/ MRB/ CHEMO: Clinical stable. Imaging with stable to slightly increased enhancement of previously seen nodular lesions; associated with mild degree of elevated rCBV. Starting adjuvant-dosed temozolomide 150mg/m2 (360mg), cycle 1 (start date 7/12/2019). Monitor fluid intake, will be checking CMP/ Na+.   -8/12/2019 NEURO-ONC/ MRB/ CHEMO: Clinical  decompensated, but improving since hospital discharge. MR brain scan from last week with concern for non-contrast enhancing >> enhancing disease progression. Ordering a PET brain scan. Holding adjuvant-dosed temozolomide, cycle 3.  -10/24/2019 SURGERY: Repeat resection by Dr. Packer.  PATHOLOGY: Recurrent glioblastoma.   Next generation sequencing by [facility]; Microsatellite instability; stable. Tumor mutational burden; 4 (low). Mutations in ARID1A, ASXL1, ATRX, FANCE, MED12, MEF2B, NF1, RUNX1, TERT.   -11/25/2019 NEURO-ONC/ MRB/ CHEMO: Clinically doing well. Imaging with positive resection. Starting Lomustine (start date of 12/2). Next generation sequencing.   -12/4/2019 CHEMO: Lomustine, cycle 1.   -1/6/2020 NEURO-ONC: Clinically stable. Has TCP with Lomustine. Will hold off on starting next week until he sees Dr. Olmos with new imaging.  -1/31/2020 NEURO-ONC/ MRB/ CHEMO: Clinically doing well. Imaging with no evidence of disease recurrence. Holding Lomustine in the setting of a poor emotional state. Referrals to Dr. Aleksandr Garcia for neuro-psych testing, Dr. Eugene Vargas for counseling/ coping, and palliative care. Next generation sequencing results discussed. Increase Zoloft. Continue dexamethasone 2mg daily.  -4/1/2020 ADMISSION/ SZ- Keppra increased, added diazepam PRN, continue Vimpat.   -4/1/2020 MRB with no significant change.   -4/13/2020 NEURO-ONC: Clinically improved since hospitalization. Continuing imaging surveillance. Increase Zoloft.   -6/26/2020 ADMISSION/ SZ- Keppra and Vimpat increased, continued diazepam PRN.   -6/29/2020 NEURO-ONC/ MRB: Clinically improved since hospitalization. Imaging largely stable. Continuing imaging surveillance. Trial of ritalin.   -8/10/2020 NEURO-ONC: Clinically stable. Will refer to palliative SW.  -8/24/2020 NEURO-ONC/ MRB: Clinically stable to improved. Imaging largely stable with no concern for disease recurrence. Continuing imaging surveillance. Referral to palliative  care.   -9/25/2020 NEURO-ONC: Clinically declined after seizures on 9/18, though now slowly improving. Sending to [facility] to obtain better seizure control. No active cancer treatment.  -10/26/2020 NEURO-ONC/ MRB: Clinically stable. Imaging stable. Continue management of epilepsy per Dr. Pacheco. Weaning dexamethasone to 1mg daily.   -12/21/2020 NEURO-ONC/ MRB: Clinically with a recurrent seizure. Imaging with a new 6mm growth about the resection cavity. Repeat imaging in 6 weeks.     SOCIAL HISTORY   Tobacco use: Former smoker, quit 40 years ago.   Alcohol use: Social use.  Drug use: Denies.  .   Employment: On disability.       PHYSICAL EXAMINATION  KPS: 60  -Generally well appearing.  -Respiratory: No audible wheezing.   -Skin: No facial rashes.  -Psychiatric: Normal mood and affect. Pleasant, talkative.  -Neurologic:   MENTAL STATUS:     Alert, oriented.    Recall: Impaired.    Speech near fluent, but with hesitation and word finding issues.    Comprehension with some impairment.     CRANIAL NERVES:     Pupils are equal, round.     Extraocular movements full.     Symmetric facial movements.   Hearing intact.   With normal phonation, no dysfunction of the palate or tongue.   MOTOR: Antigravity in arms.  SENSATION: Intact to light touch throughout.   COORDINATION: Intact bilaterally.     The rest of a comprehensive physical examination is deferred due to PHE (public health emergency) video visit restrictions.        MEDICAL RECORDS  Obtained and personally reviewed all available outside medical records in addition to reviewing any records available in our electronic system.     LABS  Personally reviewed all available lab results.     IMAGING  Personally reviewed MR brain imaging from today and compared to prior imaging. To my eye, there is a new, 6mm contrast enhancing lesion about the resection cavity. T2 FLAIR is stable.     Imaging was shown to and results were reviewed with Aracelis.     Imaging  and case reviewed and discussed later today at Brain Tumor Conference.       IMPRESSION  Clinic time was spent discussing in detail the nature of his cancer in light of his repeat imaging. This was in addition to providing emotional support, answering questions pertaining to my recommendations, and devising the plan as outlined below.      Refractory epilepsy remains a barrier to further clinical improvement as with recurrent seizures, Jayden regresses physically and cognitively and this severely decreases his quality of life. Since increasing the doses of levetiracetam and lacosamide plus adding Diltazem, Jayden did have a recurrent seizure last week. He will be seeing Dr. Pacheco tomorrow.     Imaging from today shows a new 6mm area of contrast enhancement about the resection cavity. It is unclear what this change represents; new cancer growth or treatment/ radiation-induced changes. Will repeat imaging in 6 weeks. In the setting of an incurable cancer, Jayden and Oz are clear about wanting high quality of life over quantity of life and will need to factor this in for future decisions.    PROBLEM LIST  Glioblastoma, MGMT methylated and IDH1 wildtype by NGS  Visual field cut; homonymous hemianopsia, right-side  Memory issues  Drug-induced constipation  Hyperplastic gastric polyps, benign  H/o SIADH  Epilepsy    PLAN  -CANCER DIRECTED THERAPY-  -As above.     -STEROIDS-  -Continue dexamethasone 1mg daily.   -Monitor for worsening of neurological symptoms as well as a flair in RA.     -SEIZURE MANAGEMENT-  -Per Dr. Pacheco.     -Quality of life/ MOOD/ FATIGUE-  -History of mood issues; depression, anxiety, irritability, poor coping.  -Off Ritalin  -Continue Zoloft 200mg daily (max dose).  -Added to Brain Tumor Support Group email list; [email or web address]  -Conitnue daily exercises.   -Following with Dr. Eugene Vargas for counseling/ coping.   -Following with palliative care MD and SW. Completion of POLST.      -HICCUPS-  -Pepcid as needed.  -Likely Steroid related.  -Possibly Baclofen (muscle relaxer) if needed.    -HYPONATREMIA/ History of SIADH-    -DVT/ PE-  -Need for lifelong anticoagulation.  -Continue Xarelto.    Pharmacy to reach out regarding insurance coverage.   -If platelets drop <50, will need to hold anticoagulation.    -ADDITIONAL SUPPORTIVE MANAGEMENT-  -Social work following.   -CT with lung nodules. Following with [facility].  -S/p EGD with benign pathology of the 10mm gastric antrum/pylorus posterior wall lesion.  -Breast biopsy showed necrotic issue, no malignancy.    Return to clinic in 2/2021 + imaging.     In the meantime, Jayden and Oz know to call with questions or concerns or to report new complaints and can be seen sooner if needed     Nasra Olmos MD  Neuro-oncology

## 2020-12-22 NOTE — PROGRESS NOTES
"Jayden Lackey is a 65 year old male who is being evaluated via a billable video visit.       The patient has been notified of following:      \"This video visit will be conducted via a call between you and your physician/provider. We have found that certain health care needs can be provided without the need for an in-person physical exam.  This service lets us provide the care you need with a video conversation.  If a prescription is necessary we can send it directly to your pharmacy.  If lab work is needed we can place an order for that and you can then stop by our lab to have the test done at a later time.     Video visits are billed at different rates depending on your insurance coverage.  Please reach out to your insurance provider with any questions.     If during the course of the call the physician/provider feels a video visit is not appropriate, you will not be charged for this service.\"     Patient has given verbal consent for Video visit? Yes  How would you like to obtain your AVS? MyChart  If you are dropped from the video visit, the video invite should be resent to: Send to e-mail at: [email or web address]  Will anyone else be joining your video visit? No     [facility]      EPILEPSY CLINIC VIDEO VISIT NOTE  The scheduled clinic visit was changed to a video visit to reduce the risk of COVID-19 transmission.           Service Date: 12/22/2020    HISTORY: I spoke with Mr. Jayden Lackey and his wife.  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      The patient denied having recent fever or cough, and exposure to anyone thought to have COVID-19.     Following the most recent visit to this clinic on 10/13/2020, the patient reportedly has had no grand mal seizures; the most recent generalized convulsions probably occurred on 09/18/2020.  He has had 2 witnessed complex partial seizures after the most recent visit to this clinic on 10/13/2020; these " seizures occurred on 11/14/2020 and 12/16/2020.  One isolated aura occurred over this period (on 11/27/2020).      His wife checks on his medication use, and she is certain that no doses have been missed.  He has not had adverse effects that appear directly linked to AED use, but he often is excessively drowsy during the day and this may have increased following increased AED doses 2-3 months ago.  In recent months, his wife has not noted change in chronic attention and memory disturbance, difficulties with comprehension and expression, and difficulty in solving problems.      MEDICATIONS:  Levetiracetam 2500 mg b.i.d., lacosamide 250 mg b.i.d., diazepam as rescue medication (5 mg oral solution, up to 15 mg in one day for persisting focal impaired seizures), and other medications as per the electronic medical record.      VIDEO OBSERVATIONS:   The patient demonstrated a paucity of spontaneous speech, but he spoke with normal articulation, fluency and syntax, with normal comprehension of questions.    On command, the patient moved the direction of gaze into all 4 quadrants conjugately and without nystagmus.   Facial movements were normal.    Tongue protruded on the midline.    The patient did not display any resting tremors or action tremors of the outstretched arms.  Limb movements were normal.      LABORATORY DATA:   AED levels on 10/13/2020:   Levetiracetam: 37 mcg/ml.   Lacosamide: 11.6 mcg/ml.     IMPRESSION:   Fortunately, GTC seizures have been controlled with largely tolerable doses of levetiracetam and lacosamide, although multifactorial daytime lethargy with AED exacerbation seems likely.      He and his wife would like for him to achieve freedom from seizures causing impaired awareness.  I reviewed reasons that further increases in levetiracetam or lacosamide would be unlikely to achieve seizure freedom without increased sedation and potentially other AEs.  I recommended consideration of addition of a  third agent at a low dose.      We reviewed strategies for selecting a third AED and available options at length.  Noting that the most recent platelet counts and liver enzyme checks were normal earlier this year, I recommended low-dose divalproex.  Possibly little or no further upwards dose titration will be needed.  I did review common adverse effects of divalproex.  He elected to proceed with this.  We will arrange for AED levels and safety checks at the next planned phlebotomy.    We spent a prolonged period reviewing his wife s anti-COVID-19 precautions, which have been extensive, inkling installation of a new ventilation system in their home, with  high-level  HEPA filters.  They will have immediate family member staying at their home this month, and we did discuss means to reduce viral transmission, include separate eating areas and remote sleeping areas.    He is not driving and does not plan to do so.     PLAN:   1)  Continue current doses of levetiracetam and lacosamide.   2)  Star divalproex 250 mg b.i.d.   3)  Check total and free valproate, lacosamide and levetiracetam levels, with CBC and CMP at next planned clinic visit on 02/01/2020.  4)  Return in 2 months for VIDEO clinic visit.    Video Conference via [employer].   Video Start Time: 11:40 a.m.   Video Stop Time: 12:34 p.m.   Provider location: [facility]   Reported Patient Location: [place]     Frank Pacheco M.D., Professor of Neurology

## 2020-12-22 NOTE — LETTER
"12/22/2020       RE: Jayden Lackey  [address]  [facility] [address]     Dear Colleague,    Thank you for referring your patient, Jayden Lackey, to the [facility] [place] at [facility]. Please see a copy of my visit note below.    Jayden Lackey is a 65 year old male who is being evaluated via a billable video visit.       The patient has been notified of following:      \"This video visit will be conducted via a call between you and your physician/provider. We have found that certain health care needs can be provided without the need for an in-person physical exam.  This service lets us provide the care you need with a video conversation.  If a prescription is necessary we can send it directly to your pharmacy.  If lab work is needed we can place an order for that and you can then stop by our lab to have the test done at a later time.     Video visits are billed at different rates depending on your insurance coverage.  Please reach out to your insurance provider with any questions.     If during the course of the call the physician/provider feels a video visit is not appropriate, you will not be charged for this service.\"     Patient has given verbal consent for Video visit? Yes  How would you like to obtain your AVS? MyChart  If you are dropped from the video visit, the video invite should be resent to: Send to e-mail at: [email or web address]  Will anyone else be joining your video visit? No     [facility]        EPILEPSY CLINIC VIDEO VISIT NOTE  The scheduled clinic visit was changed to a video visit to reduce the risk of COVID-19 transmission.           Service Date: 12/22/2020    HISTORY: I spoke with Mr. Jayden Lackey and his wife.  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      The patient denied having recent fever or cough, and exposure to anyone thought to have " COVID-19.     Following the most recent visit to this clinic on 10/13/2020, the patient reportedly has had no grand mal seizures; the most recent generalized convulsions probably occurred on 09/18/2020.  He has had 2 witnessed complex partial seizures after the most recent visit to this clinic on 10/13/2020; these seizures occurred on 11/14/2020 and 12/16/2020.  One isolated aura occurred over this period (on 11/27/2020).      His wife checks on his medication use, and she is certain that no doses have been missed.  He has not had adverse effects that appear directly linked to AED use, but he often is excessively drowsy during the day and this may have increased following increased AED doses 2-3 months ago.  In recent months, his wife has not noted change in chronic attention and memory disturbance, difficulties with comprehension and expression, and difficulty in solving problems.      MEDICATIONS:  Levetiracetam 2500 mg b.i.d., lacosamide 250 mg b.i.d., diazepam as rescue medication (5 mg oral solution, up to 15 mg in one day for persisting focal impaired seizures), and other medications as per the electronic medical record.      VIDEO OBSERVATIONS:   The patient demonstrated a paucity of spontaneous speech, but he spoke with normal articulation, fluency and syntax, with normal comprehension of questions.    On command, the patient moved the direction of gaze into all 4 quadrants conjugately and without nystagmus.   Facial movements were normal.    Tongue protruded on the midline.    The patient did not display any resting tremors or action tremors of the outstretched arms.  Limb movements were normal.      LABORATORY DATA:   AED levels on 10/13/2020:   Levetiracetam: 37 mcg/ml.   Lacosamide: 11.6 mcg/ml.     IMPRESSION:   Fortunately, GTC seizures have been controlled with largely tolerable doses of levetiracetam and lacosamide, although multifactorial daytime lethargy with AED exacerbation seems likely.      He  and his wife would like for him to achieve freedom from seizures causing impaired awareness.  I reviewed reasons that further increases in levetiracetam or lacosamide would be unlikely to achieve seizure freedom without increased sedation and potentially other AEs.  I recommended consideration of addition of a third agent at a low dose.      We reviewed strategies for selecting a third AED and available options at length.  Noting that the most recent platelet counts and liver enzyme checks were normal earlier this year, I recommended low-dose divalproex.  Possibly little or no further upwards dose titration will be needed.  I did review common adverse effects of divalproex.  He elected to proceed with this.  We will arrange for AED levels and safety checks at the next planned phlebotomy.    We spent a prolonged period reviewing his wife s anti-COVID-19 precautions, which have been extensive, inkling installation of a new ventilation system in their home, with  high-level  HEPA filters.  They will have immediate family member staying at their home this month, and we did discuss means to reduce viral transmission, include separate eating areas and remote sleeping areas.    He is not driving and does not plan to do so.     PLAN:   1)  Continue current doses of levetiracetam and lacosamide.   2)  Star divalproex 250 mg b.i.d.   3)  Check total and free valproate, lacosamide and levetiracetam levels, with CBC and CMP at next planned clinic visit on 02/01/2020.  4)  Return in 2 months for VIDEO clinic visit.    Video Conference via [employer].   Video Start Time: 11:40 a.m.   Video Stop Time: 12:34 p.m.   Provider location: [facility]   Reported Patient Location: Home     Frank Pacheco M.D., Professor of Neurology

## 2021-01-01 ENCOUNTER — APPOINTMENT (OUTPATIENT)
Dept: LAB | Facility: CLINIC | Age: 66
End: 2021-01-01
Attending: PSYCHIATRY & NEUROLOGY
Payer: MEDICARE

## 2021-01-01 ENCOUNTER — INFUSION THERAPY VISIT (OUTPATIENT)
Dept: ONCOLOGY | Facility: CLINIC | Age: 66
End: 2021-01-01
Attending: PSYCHIATRY & NEUROLOGY
Payer: MEDICARE

## 2021-01-01 ENCOUNTER — AMBULATORY - HEALTHEAST (OUTPATIENT)
Dept: RADIATION ONCOLOGY | Facility: HOSPITAL | Age: 66
End: 2021-01-01

## 2021-01-01 ENCOUNTER — COMMUNICATION - HEALTHEAST (OUTPATIENT)
Dept: ADMINISTRATIVE | Facility: HOSPITAL | Age: 66
End: 2021-01-01

## 2021-01-01 ENCOUNTER — MYC MEDICAL ADVICE (OUTPATIENT)
Dept: PALLIATIVE CARE | Facility: CLINIC | Age: 66
End: 2021-01-01

## 2021-01-01 ENCOUNTER — VIRTUAL VISIT (OUTPATIENT)
Dept: PALLIATIVE CARE | Facility: CLINIC | Age: 66
End: 2021-01-01
Attending: INTERNAL MEDICINE
Payer: MEDICARE

## 2021-01-01 ENCOUNTER — COMMUNICATION - HEALTHEAST (OUTPATIENT)
Dept: ONCOLOGY | Facility: HOSPITAL | Age: 66
End: 2021-01-01

## 2021-01-01 ENCOUNTER — AMBULATORY - HEALTHEAST (OUTPATIENT)
Dept: LAB | Facility: HOSPITAL | Age: 66
End: 2021-01-01

## 2021-01-01 ENCOUNTER — AMBULATORY - HEALTHEAST (OUTPATIENT)
Dept: FAMILY MEDICINE | Facility: CLINIC | Age: 66
End: 2021-01-01

## 2021-01-01 ENCOUNTER — PATIENT OUTREACH (OUTPATIENT)
Dept: PALLIATIVE CARE | Facility: CLINIC | Age: 66
End: 2021-01-01

## 2021-01-01 ENCOUNTER — RECORDS - HEALTHEAST (OUTPATIENT)
Dept: ADMINISTRATIVE | Facility: CLINIC | Age: 66
End: 2021-01-01

## 2021-01-01 ENCOUNTER — HOSPITAL ENCOUNTER (INPATIENT)
Facility: CLINIC | Age: 66
Setting detail: SURGERY ADMIT
End: 2021-01-01
Attending: NEUROLOGICAL SURGERY | Admitting: NEUROLOGICAL SURGERY
Payer: MEDICARE

## 2021-01-01 ENCOUNTER — ANCILLARY PROCEDURE (OUTPATIENT)
Dept: MRI IMAGING | Facility: CLINIC | Age: 66
End: 2021-01-01
Attending: PSYCHIATRY & NEUROLOGY
Payer: MEDICARE

## 2021-01-01 ENCOUNTER — VIRTUAL VISIT (OUTPATIENT)
Dept: NEUROLOGY | Facility: CLINIC | Age: 66
End: 2021-01-01
Payer: MEDICARE

## 2021-01-01 ENCOUNTER — COMMUNICATION - HEALTHEAST (OUTPATIENT)
Dept: RADIATION ONCOLOGY | Facility: HOSPITAL | Age: 66
End: 2021-01-01

## 2021-01-01 ENCOUNTER — RECORDS - HEALTHEAST (OUTPATIENT)
Dept: RADIOLOGY | Facility: CLINIC | Age: 66
End: 2021-01-01

## 2021-01-01 ENCOUNTER — PRE VISIT (OUTPATIENT)
Dept: SURGERY | Facility: CLINIC | Age: 66
End: 2021-01-01

## 2021-01-01 ENCOUNTER — HOSPITAL ENCOUNTER (OUTPATIENT)
Dept: MRI IMAGING | Facility: HOSPITAL | Age: 66
Setting detail: RADIATION/ONCOLOGY SERIES
Discharge: STILL ADMITTED OR ADMINISTRATIVE | End: 2021-04-20
Attending: RADIOLOGY

## 2021-01-01 ENCOUNTER — RECORDS - HEALTHEAST (OUTPATIENT)
Dept: ADMINISTRATIVE | Facility: OTHER | Age: 66
End: 2021-01-01

## 2021-01-01 ENCOUNTER — COMMUNICATION - HEALTHEAST (OUTPATIENT)
Dept: SCHEDULING | Facility: CLINIC | Age: 66
End: 2021-01-01

## 2021-01-01 ENCOUNTER — PREP FOR PROCEDURE (OUTPATIENT)
Dept: NEUROSURGERY | Facility: CLINIC | Age: 66
End: 2021-01-01

## 2021-01-01 ENCOUNTER — HEALTH MAINTENANCE LETTER (OUTPATIENT)
Age: 66
End: 2021-01-01

## 2021-01-01 ENCOUNTER — OFFICE VISIT - HEALTHEAST (OUTPATIENT)
Dept: RADIATION ONCOLOGY | Facility: HOSPITAL | Age: 66
End: 2021-01-01

## 2021-01-01 ENCOUNTER — PATIENT OUTREACH (OUTPATIENT)
Dept: ONCOLOGY | Facility: CLINIC | Age: 66
End: 2021-01-01

## 2021-01-01 ENCOUNTER — VIRTUAL VISIT (OUTPATIENT)
Dept: PALLIATIVE CARE | Facility: CLINIC | Age: 66
End: 2021-01-01
Attending: SOCIAL WORKER
Payer: MEDICARE

## 2021-01-01 ENCOUNTER — TUMOR CONFERENCE (OUTPATIENT)
Dept: ONCOLOGY | Facility: CLINIC | Age: 66
End: 2021-01-01
Payer: MEDICARE

## 2021-01-01 ENCOUNTER — TELEPHONE (OUTPATIENT)
Dept: NEUROLOGY | Facility: CLINIC | Age: 66
End: 2021-01-01

## 2021-01-01 ENCOUNTER — VIRTUAL VISIT (OUTPATIENT)
Dept: NEUROSURGERY | Facility: CLINIC | Age: 66
End: 2021-01-01
Attending: NEUROLOGICAL SURGERY
Payer: MEDICARE

## 2021-01-01 ENCOUNTER — PATIENT OUTREACH (OUTPATIENT)
Dept: CARE COORDINATION | Facility: CLINIC | Age: 66
End: 2021-01-01

## 2021-01-01 ENCOUNTER — VIRTUAL VISIT (OUTPATIENT)
Dept: PALLIATIVE CARE | Facility: CLINIC | Age: 66
End: 2021-01-01
Attending: STUDENT IN AN ORGANIZED HEALTH CARE EDUCATION/TRAINING PROGRAM
Payer: MEDICARE

## 2021-01-01 VITALS
DIASTOLIC BLOOD PRESSURE: 60 MMHG | SYSTOLIC BLOOD PRESSURE: 108 MMHG | HEIGHT: 75 IN | HEART RATE: 60 BPM | BODY MASS INDEX: 26.73 KG/M2 | WEIGHT: 215 LBS

## 2021-01-01 VITALS
HEART RATE: 64 BPM | BODY MASS INDEX: 29.46 KG/M2 | WEIGHT: 235.7 LBS | TEMPERATURE: 98 F | RESPIRATION RATE: 16 BRPM | SYSTOLIC BLOOD PRESSURE: 141 MMHG | DIASTOLIC BLOOD PRESSURE: 92 MMHG | OXYGEN SATURATION: 96 %

## 2021-01-01 VITALS — WEIGHT: 213.5 LBS | BODY MASS INDEX: 27.55 KG/M2

## 2021-01-01 VITALS
BODY MASS INDEX: 27.23 KG/M2 | OXYGEN SATURATION: 98 % | SYSTOLIC BLOOD PRESSURE: 127 MMHG | DIASTOLIC BLOOD PRESSURE: 64 MMHG | WEIGHT: 219 LBS | HEART RATE: 80 BPM | HEIGHT: 75 IN

## 2021-01-01 VITALS
HEIGHT: 75 IN | HEART RATE: 66 BPM | DIASTOLIC BLOOD PRESSURE: 78 MMHG | BODY MASS INDEX: 26.73 KG/M2 | SYSTOLIC BLOOD PRESSURE: 138 MMHG | WEIGHT: 215 LBS

## 2021-01-01 VITALS — OXYGEN SATURATION: 98 % | HEART RATE: 52 BPM | DIASTOLIC BLOOD PRESSURE: 94 MMHG | SYSTOLIC BLOOD PRESSURE: 164 MMHG

## 2021-01-01 VITALS — BODY MASS INDEX: 28.26 KG/M2 | WEIGHT: 219 LBS

## 2021-01-01 VITALS — BODY MASS INDEX: 28.2 KG/M2 | WEIGHT: 218.6 LBS

## 2021-01-01 VITALS
DIASTOLIC BLOOD PRESSURE: 84 MMHG | HEART RATE: 52 BPM | OXYGEN SATURATION: 97 % | TEMPERATURE: 98.6 F | RESPIRATION RATE: 16 BRPM | SYSTOLIC BLOOD PRESSURE: 154 MMHG

## 2021-01-01 VITALS
WEIGHT: 243 LBS | DIASTOLIC BLOOD PRESSURE: 70 MMHG | RESPIRATION RATE: 16 BRPM | BODY MASS INDEX: 30.37 KG/M2 | SYSTOLIC BLOOD PRESSURE: 133 MMHG | OXYGEN SATURATION: 99 % | HEART RATE: 58 BPM | TEMPERATURE: 98.7 F

## 2021-01-01 VITALS
OXYGEN SATURATION: 97 % | RESPIRATION RATE: 16 BRPM | TEMPERATURE: 98.6 F | DIASTOLIC BLOOD PRESSURE: 88 MMHG | SYSTOLIC BLOOD PRESSURE: 155 MMHG | WEIGHT: 233.1 LBS | HEART RATE: 52 BPM | BODY MASS INDEX: 29.14 KG/M2

## 2021-01-01 VITALS — WEIGHT: 221.5 LBS | BODY MASS INDEX: 28.58 KG/M2

## 2021-01-01 VITALS — BODY MASS INDEX: 28.19 KG/M2 | WEIGHT: 212.7 LBS | HEIGHT: 73 IN

## 2021-01-01 VITALS — BODY MASS INDEX: 28.49 KG/M2 | WEIGHT: 220.8 LBS

## 2021-01-01 VITALS — WEIGHT: 217.6 LBS | BODY MASS INDEX: 28.08 KG/M2

## 2021-01-01 VITALS — BODY MASS INDEX: 28.45 KG/M2 | WEIGHT: 220.5 LBS

## 2021-01-01 DIAGNOSIS — Z51.11 CHEMOTHERAPY MANAGEMENT, ENCOUNTER FOR: Primary | ICD-10-CM

## 2021-01-01 DIAGNOSIS — C71.9 GLIOBLASTOMA (H): Primary | ICD-10-CM

## 2021-01-01 DIAGNOSIS — R56.9 SEIZURE (H): ICD-10-CM

## 2021-01-01 DIAGNOSIS — F43.20 ADJUSTMENT DISORDER, UNSPECIFIED TYPE: Primary | ICD-10-CM

## 2021-01-01 DIAGNOSIS — G40.909 SEIZURE DISORDER (H): ICD-10-CM

## 2021-01-01 DIAGNOSIS — C71.9 GBM (GLIOBLASTOMA MULTIFORME) (H): ICD-10-CM

## 2021-01-01 DIAGNOSIS — G40.909 NONINTRACTABLE EPILEPSY WITHOUT STATUS EPILEPTICUS, UNSPECIFIED EPILEPSY TYPE (H): ICD-10-CM

## 2021-01-01 DIAGNOSIS — D50.9 IRON DEFICIENCY ANEMIA, UNSPECIFIED IRON DEFICIENCY ANEMIA TYPE: ICD-10-CM

## 2021-01-01 DIAGNOSIS — Z11.59 ENCOUNTER FOR SCREENING FOR OTHER VIRAL DISEASES: ICD-10-CM

## 2021-01-01 DIAGNOSIS — G40.219 LOCALIZATION-RELATED EPILEPSY WITH COMPLEX PARTIAL SEIZURES WITH INTRACTABLE EPILEPSY (H): Primary | ICD-10-CM

## 2021-01-01 DIAGNOSIS — Z63.8 CAREGIVER ROLE STRAIN: ICD-10-CM

## 2021-01-01 DIAGNOSIS — M06.9 RHEUMATOID ARTHRITIS, INVOLVING UNSPECIFIED SITE, UNSPECIFIED WHETHER RHEUMATOID FACTOR PRESENT (H): ICD-10-CM

## 2021-01-01 DIAGNOSIS — C71.9 GLIOBLASTOMA (H): ICD-10-CM

## 2021-01-01 DIAGNOSIS — J96.01 ACUTE RESPIRATORY FAILURE WITH HYPOXIA (H): ICD-10-CM

## 2021-01-01 DIAGNOSIS — G40.219 PSYCHOMOTOR EPILEPSY, INTRACTABLE (H): ICD-10-CM

## 2021-01-01 DIAGNOSIS — I48.91 NEW ONSET A-FIB (H): ICD-10-CM

## 2021-01-01 DIAGNOSIS — F32.9 REACTIVE DEPRESSION: Primary | ICD-10-CM

## 2021-01-01 DIAGNOSIS — Z71.89 ADVANCE CARE PLANNING: ICD-10-CM

## 2021-01-01 DIAGNOSIS — F43.9 STRESS: ICD-10-CM

## 2021-01-01 DIAGNOSIS — I26.99 MULTIPLE PULMONARY EMBOLI (H): ICD-10-CM

## 2021-01-01 DIAGNOSIS — R45.89 DIFFICULTY COPING: ICD-10-CM

## 2021-01-01 DIAGNOSIS — Z51.5 ENCOUNTER FOR PALLIATIVE CARE: ICD-10-CM

## 2021-01-01 DIAGNOSIS — D50.9 IRON DEFICIENCY ANEMIA, UNSPECIFIED IRON DEFICIENCY ANEMIA TYPE: Primary | ICD-10-CM

## 2021-01-01 DIAGNOSIS — F32.9 REACTIVE DEPRESSION: ICD-10-CM

## 2021-01-01 DIAGNOSIS — C71.9 BRAIN NEOPLASM MALIGNANT (H): ICD-10-CM

## 2021-01-01 DIAGNOSIS — G40.219 LOCALIZATION-RELATED EPILEPSY WITH COMPLEX PARTIAL SEIZURES WITH INTRACTABLE EPILEPSY (H): ICD-10-CM

## 2021-01-01 DIAGNOSIS — R41.89 COGNITIVE IMPAIRMENT: ICD-10-CM

## 2021-01-01 LAB
ALBUMIN SERPL-MCNC: 3.5 G/DL (ref 3.4–5)
ALBUMIN SERPL-MCNC: 3.6 G/DL (ref 3.4–5)
ALP SERPL-CCNC: 59 U/L (ref 40–150)
ALP SERPL-CCNC: 73 U/L (ref 40–150)
ALT SERPL W P-5'-P-CCNC: 32 U/L (ref 0–70)
ALT SERPL W P-5'-P-CCNC: 74 U/L (ref 0–70)
ANION GAP SERPL CALCULATED.3IONS-SCNC: 11 MMOL/L (ref 3–14)
ANION GAP SERPL CALCULATED.3IONS-SCNC: 7 MMOL/L (ref 3–14)
AST SERPL W P-5'-P-CCNC: 14 U/L (ref 0–45)
AST SERPL W P-5'-P-CCNC: 25 U/L (ref 0–45)
BASOPHILS # BLD AUTO: 0 10E9/L (ref 0–0.2)
BASOPHILS # BLD AUTO: 0 10E9/L (ref 0–0.2)
BASOPHILS # BLD AUTO: 0 THOU/UL (ref 0–0.2)
BASOPHILS NFR BLD AUTO: 0 % (ref 0–2)
BASOPHILS NFR BLD AUTO: 0.1 %
BASOPHILS NFR BLD AUTO: 0.2 %
BILIRUB SERPL-MCNC: 0.2 MG/DL (ref 0.2–1.3)
BILIRUB SERPL-MCNC: 0.3 MG/DL (ref 0.2–1.3)
BUN SERPL-MCNC: 12 MG/DL (ref 7–30)
BUN SERPL-MCNC: 20 MG/DL (ref 7–30)
CALCIUM SERPL-MCNC: 8.6 MG/DL (ref 8.5–10.1)
CALCIUM SERPL-MCNC: 8.9 MG/DL (ref 8.5–10.1)
CHLORIDE SERPL-SCNC: 106 MMOL/L (ref 94–109)
CHLORIDE SERPL-SCNC: 109 MMOL/L (ref 94–109)
CO2 SERPL-SCNC: 24 MMOL/L (ref 20–32)
CO2 SERPL-SCNC: 26 MMOL/L (ref 20–32)
CREAT SERPL-MCNC: 0.79 MG/DL (ref 0.7–1.3)
CREAT SERPL-MCNC: 0.86 MG/DL (ref 0.66–1.25)
CREAT SERPL-MCNC: 0.9 MG/DL (ref 0.66–1.25)
DESMETHYLLACOSAMIDE: 0.8 UG/ML
DIFFERENTIAL METHOD BLD: ABNORMAL
DIFFERENTIAL METHOD BLD: ABNORMAL
EOSINOPHIL # BLD AUTO: 0 10E9/L (ref 0–0.7)
EOSINOPHIL # BLD AUTO: 0 10E9/L (ref 0–0.7)
EOSINOPHIL # BLD AUTO: 0.1 THOU/UL (ref 0–0.4)
EOSINOPHIL NFR BLD AUTO: 0.3 %
EOSINOPHIL NFR BLD AUTO: 0.6 %
EOSINOPHIL NFR BLD AUTO: 2 % (ref 0–6)
ERYTHROCYTE [DISTWIDTH] IN BLOOD BY AUTOMATED COUNT: 16.5 % (ref 10–15)
ERYTHROCYTE [DISTWIDTH] IN BLOOD BY AUTOMATED COUNT: 18.7 % (ref 10–15)
ERYTHROCYTE [DISTWIDTH] IN BLOOD BY AUTOMATED COUNT: 19.1 % (ref 10–15)
ERYTHROCYTE [DISTWIDTH] IN BLOOD BY AUTOMATED COUNT: 19.1 % (ref 11–14.5)
GFR SERPL CREATININE-BSD FRML MDRD: 89 ML/MIN/{1.73_M2}
GFR SERPL CREATININE-BSD FRML MDRD: >60 ML/MIN/1.73M2
GFR SERPL CREATININE-BSD FRML MDRD: >90 ML/MIN/{1.73_M2}
GLUCOSE SERPL-MCNC: 115 MG/DL (ref 70–99)
GLUCOSE SERPL-MCNC: 146 MG/DL (ref 70–99)
HCT VFR BLD AUTO: 30.1 % (ref 40–53)
HCT VFR BLD AUTO: 30.2 % (ref 40–54)
HCT VFR BLD AUTO: 31.2 % (ref 40–53)
HCT VFR BLD AUTO: 32.5 % (ref 40–53)
HGB BLD-MCNC: 8.6 G/DL (ref 13.3–17.7)
HGB BLD-MCNC: 8.6 G/DL (ref 14–18)
HGB BLD-MCNC: 8.9 G/DL (ref 13.3–17.7)
HGB BLD-MCNC: 9.5 G/DL (ref 13.3–17.7)
IMM GRANULOCYTES # BLD: 0 10E9/L (ref 0–0.4)
IMM GRANULOCYTES # BLD: 0 10E9/L (ref 0–0.4)
IMM GRANULOCYTES # BLD: 0 THOU/UL
IMM GRANULOCYTES NFR BLD: 0 %
IMM GRANULOCYTES NFR BLD: 0.4 %
IMM GRANULOCYTES NFR BLD: 0.6 %
LACOSAMIDE SERPL-MCNC: 13 UG/ML (ref 5–10)
LACOSAMIDE: 9.7 UG/ML (ref 1–10)
LEVETIRACETAM (KEPPRA): 53.4 UG/ML (ref 6–46)
LEVETIRACETAM SERPL-MCNC: 69 UG/ML (ref 12–46)
LYMPHOCYTES # BLD AUTO: 0.5 10E9/L (ref 0.8–5.3)
LYMPHOCYTES # BLD AUTO: 0.6 10E9/L (ref 0.8–5.3)
LYMPHOCYTES # BLD AUTO: 1.5 THOU/UL (ref 0.8–4.4)
LYMPHOCYTES NFR BLD AUTO: 11.5 %
LYMPHOCYTES NFR BLD AUTO: 24 % (ref 20–40)
LYMPHOCYTES NFR BLD AUTO: 7.4 %
MCH RBC QN AUTO: 20 PG (ref 26.5–33)
MCH RBC QN AUTO: 20.1 PG (ref 27–34)
MCH RBC QN AUTO: 20.2 PG (ref 26.5–33)
MCH RBC QN AUTO: 22.2 PG (ref 26.5–33)
MCHC RBC AUTO-ENTMCNC: 28.5 G/DL (ref 31.5–36.5)
MCHC RBC AUTO-ENTMCNC: 28.5 G/DL (ref 32–36)
MCHC RBC AUTO-ENTMCNC: 28.6 G/DL (ref 31.5–36.5)
MCHC RBC AUTO-ENTMCNC: 29.2 G/DL (ref 31.5–36.5)
MCV RBC AUTO: 70 FL (ref 78–100)
MCV RBC AUTO: 71 FL (ref 78–100)
MCV RBC AUTO: 71 FL (ref 80–100)
MCV RBC AUTO: 76 FL (ref 78–100)
MONOCYTES # BLD AUTO: 0.2 10E9/L (ref 0–1.3)
MONOCYTES # BLD AUTO: 0.3 10E9/L (ref 0–1.3)
MONOCYTES # BLD AUTO: 0.7 THOU/UL (ref 0–0.9)
MONOCYTES NFR BLD AUTO: 11 % (ref 2–10)
MONOCYTES NFR BLD AUTO: 2.7 %
MONOCYTES NFR BLD AUTO: 5.8 %
NEUTROPHILS # BLD AUTO: 3.9 THOU/UL (ref 2–7.7)
NEUTROPHILS # BLD AUTO: 4.2 10E9/L (ref 1.6–8.3)
NEUTROPHILS # BLD AUTO: 6.4 10E9/L (ref 1.6–8.3)
NEUTROPHILS NFR BLD AUTO: 63 % (ref 50–70)
NEUTROPHILS NFR BLD AUTO: 81.5 %
NEUTROPHILS NFR BLD AUTO: 88.9 %
NRBC # BLD AUTO: 0 10*3/UL
NRBC # BLD AUTO: 0 10*3/UL
NRBC BLD AUTO-RTO: 0 /100
NRBC BLD AUTO-RTO: 0 /100
PLATELET # BLD AUTO: 186 THOU/UL (ref 140–440)
PLATELET # BLD AUTO: 199 10E9/L (ref 150–450)
PLATELET # BLD AUTO: 205 10E9/L (ref 150–450)
PLATELET # BLD AUTO: 208 THOU/UL (ref 140–440)
PLATELET # BLD AUTO: 239 10E9/L (ref 150–450)
PMV BLD AUTO: 8.9 FL (ref 8.5–12.5)
POTASSIUM SERPL-SCNC: 3.8 MMOL/L (ref 3.4–5.3)
POTASSIUM SERPL-SCNC: 4 MMOL/L (ref 3.4–5.3)
PROT SERPL-MCNC: 6.3 G/DL (ref 6.8–8.8)
PROT SERPL-MCNC: 6.9 G/DL (ref 6.8–8.8)
RBC # BLD AUTO: 4.26 10E12/L (ref 4.4–5.9)
RBC # BLD AUTO: 4.27 MILL/UL (ref 4.4–6.2)
RBC # BLD AUTO: 4.28 10E12/L (ref 4.4–5.9)
RBC # BLD AUTO: 4.45 10E12/L (ref 4.4–5.9)
SARS-COV-2 PCR COMMENT: NORMAL
SARS-COV-2 RNA SPEC QL NAA+PROBE: NEGATIVE
SARS-COV-2 VIRUS SPECIMEN SOURCE: NORMAL
SODIUM SERPL-SCNC: 140 MMOL/L (ref 133–144)
SODIUM SERPL-SCNC: 143 MMOL/L (ref 133–144)
SPECIMEN STATUS: ABNORMAL
SPECIMEN STATUS: NORMAL
VALPROATE FREE SERPL-MCNC: 5.4 UG/ML (ref 6–20)
VALPROATE FREE SERPL-MCNC: <5 UG/ML (ref 6–20)
VALPROATE SERPL-MCNC: 35 MG/L (ref 50–100)
VALPROATE SERPL-MCNC: 51.1 UG/ML (ref 50–150)
WBC # BLD AUTO: 5.2 10E9/L (ref 4–11)
WBC # BLD AUTO: 6.7 10E9/L (ref 4–11)
WBC # BLD AUTO: 7.2 10E9/L (ref 4–11)
WBC: 6.2 THOU/UL (ref 4–11)

## 2021-01-01 PROCEDURE — 99214 OFFICE O/P EST MOD 30 MIN: CPT | Mod: 95 | Performed by: PSYCHIATRY & NEUROLOGY

## 2021-01-01 PROCEDURE — 999N000127 HC STATISTIC PERIPHERAL IV START W US GUIDANCE

## 2021-01-01 PROCEDURE — 90834 PSYTX W PT 45 MINUTES: CPT | Mod: GT | Performed by: SOCIAL WORKER

## 2021-01-01 PROCEDURE — 99215 OFFICE O/P EST HI 40 MIN: CPT | Mod: 95 | Performed by: NEUROLOGICAL SURGERY

## 2021-01-01 PROCEDURE — 999N001193 HC VIDEO/TELEPHONE VISIT; NO CHARGE

## 2021-01-01 PROCEDURE — 250N000011 HC RX IP 250 OP 636: Performed by: PSYCHIATRY & NEUROLOGY

## 2021-01-01 PROCEDURE — 80235 DRUG ASSAY LACOSAMIDE: CPT | Mod: 90 | Performed by: PATHOLOGY

## 2021-01-01 PROCEDURE — A9585 GADOBUTROL INJECTION: HCPCS | Performed by: RADIOLOGY

## 2021-01-01 PROCEDURE — 99215 OFFICE O/P EST HI 40 MIN: CPT | Mod: 95 | Performed by: PSYCHIATRY & NEUROLOGY

## 2021-01-01 PROCEDURE — 36415 COLL VENOUS BLD VENIPUNCTURE: CPT | Performed by: PATHOLOGY

## 2021-01-01 PROCEDURE — 99214 OFFICE O/P EST MOD 30 MIN: CPT | Mod: 95 | Performed by: INTERNAL MEDICINE

## 2021-01-01 PROCEDURE — 70553 MRI BRAIN STEM W/O & W/DYE: CPT | Mod: ME | Performed by: RADIOLOGY

## 2021-01-01 PROCEDURE — 85025 COMPLETE CBC W/AUTO DIFF WBC: CPT | Performed by: PSYCHIATRY & NEUROLOGY

## 2021-01-01 PROCEDURE — 96413 CHEMO IV INFUSION 1 HR: CPT

## 2021-01-01 PROCEDURE — 999N000102 HC STATISTIC NO CHARGE CLINIC VISIT

## 2021-01-01 PROCEDURE — 258N000003 HC RX IP 258 OP 636: Performed by: PSYCHIATRY & NEUROLOGY

## 2021-01-01 PROCEDURE — 96365 THER/PROPH/DIAG IV INF INIT: CPT

## 2021-01-01 PROCEDURE — 99215 OFFICE O/P EST HI 40 MIN: CPT | Mod: 95 | Performed by: STUDENT IN AN ORGANIZED HEALTH CARE EDUCATION/TRAINING PROGRAM

## 2021-01-01 PROCEDURE — 80053 COMPREHEN METABOLIC PANEL: CPT | Performed by: PSYCHIATRY & NEUROLOGY

## 2021-01-01 PROCEDURE — 80165 DIPROPYLACETIC ACID FREE: CPT | Mod: 90 | Performed by: PATHOLOGY

## 2021-01-01 PROCEDURE — 96367 TX/PROPH/DG ADDL SEQ IV INF: CPT

## 2021-01-01 PROCEDURE — 80053 COMPREHEN METABOLIC PANEL: CPT | Performed by: PATHOLOGY

## 2021-01-01 PROCEDURE — G1004 CDSM NDSC: HCPCS | Mod: GC | Performed by: RADIOLOGY

## 2021-01-01 PROCEDURE — 85027 COMPLETE CBC AUTOMATED: CPT | Performed by: PATHOLOGY

## 2021-01-01 PROCEDURE — 80177 DRUG SCRN QUAN LEVETIRACETAM: CPT | Mod: 90 | Performed by: PATHOLOGY

## 2021-01-01 PROCEDURE — 99417 PROLNG OP E/M EACH 15 MIN: CPT | Performed by: PSYCHIATRY & NEUROLOGY

## 2021-01-01 PROCEDURE — 80164 ASSAY DIPROPYLACETIC ACD TOT: CPT | Performed by: PATHOLOGY

## 2021-01-01 PROCEDURE — 99000 SPECIMEN HANDLING OFFICE-LAB: CPT | Performed by: PATHOLOGY

## 2021-01-01 RX ORDER — LACOSAMIDE 200 MG/1
TABLET ORAL
Qty: 180 TABLET | Refills: 1 | OUTPATIENT
Start: 2021-01-01

## 2021-01-01 RX ORDER — NALOXONE HYDROCHLORIDE 0.4 MG/ML
.1-.4 INJECTION, SOLUTION INTRAMUSCULAR; INTRAVENOUS; SUBCUTANEOUS
Status: CANCELLED | OUTPATIENT
Start: 2021-01-01

## 2021-01-01 RX ORDER — GADOBUTROL 604.72 MG/ML
10 INJECTION INTRAVENOUS ONCE
Status: COMPLETED | OUTPATIENT
Start: 2021-01-01 | End: 2021-01-01

## 2021-01-01 RX ORDER — EPINEPHRINE 1 MG/ML
0.3 INJECTION, SOLUTION INTRAMUSCULAR; SUBCUTANEOUS EVERY 5 MIN PRN
Status: CANCELLED | OUTPATIENT
Start: 2021-01-01

## 2021-01-01 RX ORDER — LACOSAMIDE 50 MG/1
TABLET ORAL
Qty: 180 TABLET | Refills: 1 | Status: CANCELLED | OUTPATIENT
Start: 2021-01-01

## 2021-01-01 RX ORDER — LORAZEPAM 2 MG/ML
0.5 INJECTION INTRAMUSCULAR EVERY 4 HOURS PRN
Status: CANCELLED
Start: 2021-01-01

## 2021-01-01 RX ORDER — MEPERIDINE HYDROCHLORIDE 25 MG/ML
25 INJECTION INTRAMUSCULAR; INTRAVENOUS; SUBCUTANEOUS EVERY 30 MIN PRN
Status: CANCELLED | OUTPATIENT
Start: 2021-01-01

## 2021-01-01 RX ORDER — LACOSAMIDE 50 MG/1
TABLET ORAL
Qty: 180 TABLET | Refills: 1 | Status: SHIPPED | OUTPATIENT
Start: 2021-01-01 | End: 2021-01-01

## 2021-01-01 RX ORDER — METHYLPREDNISOLONE SODIUM SUCCINATE 125 MG/2ML
125 INJECTION, POWDER, LYOPHILIZED, FOR SOLUTION INTRAMUSCULAR; INTRAVENOUS
Status: CANCELLED
Start: 2021-01-01

## 2021-01-01 RX ORDER — NALOXONE HYDROCHLORIDE 0.4 MG/ML
0.2 INJECTION, SOLUTION INTRAMUSCULAR; INTRAVENOUS; SUBCUTANEOUS
Status: CANCELLED | OUTPATIENT
Start: 2021-01-01

## 2021-01-01 RX ORDER — ALBUTEROL SULFATE 0.83 MG/ML
2.5 SOLUTION RESPIRATORY (INHALATION)
Status: CANCELLED | OUTPATIENT
Start: 2021-01-01

## 2021-01-01 RX ORDER — DIPHENHYDRAMINE HYDROCHLORIDE 50 MG/ML
50 INJECTION INTRAMUSCULAR; INTRAVENOUS
Status: CANCELLED
Start: 2021-01-01

## 2021-01-01 RX ORDER — DIAZEPAM ORAL SOLUTION (CONCENTRATE) 5 MG/ML
SOLUTION ORAL
Qty: 30 ML | Refills: 0 | Status: SHIPPED | OUTPATIENT
Start: 2021-01-01 | End: 2021-01-01

## 2021-01-01 RX ORDER — ALBUTEROL SULFATE 90 UG/1
1-2 AEROSOL, METERED RESPIRATORY (INHALATION)
Status: CANCELLED
Start: 2021-01-01

## 2021-01-01 RX ORDER — ALBUTEROL SULFATE 5 MG/ML
2.5 SOLUTION RESPIRATORY (INHALATION)
Status: CANCELLED | OUTPATIENT
Start: 2021-01-01

## 2021-01-01 RX ORDER — LACOSAMIDE 50 MG/1
TABLET ORAL
Qty: 180 TABLET | Refills: 1 | Status: SHIPPED | OUTPATIENT
Start: 2021-01-01

## 2021-01-01 RX ORDER — DIVALPROEX SODIUM 250 MG/1
500 TABLET, DELAYED RELEASE ORAL 2 TIMES DAILY
Qty: 360 TABLET | Refills: 3 | Status: SHIPPED | OUTPATIENT
Start: 2021-01-01

## 2021-01-01 RX ORDER — LACOSAMIDE 200 MG/1
TABLET ORAL
Qty: 180 TABLET | Refills: 1 | Status: SHIPPED | OUTPATIENT
Start: 2021-01-01 | End: 2021-01-01

## 2021-01-01 RX ORDER — LACOSAMIDE 200 MG/1
TABLET ORAL
Qty: 60 TABLET | Refills: 0 | Status: SHIPPED | OUTPATIENT
Start: 2021-01-01

## 2021-01-01 RX ORDER — DIAZEPAM ORAL SOLUTION (CONCENTRATE) 5 MG/ML
SOLUTION ORAL
Qty: 30 ML | Refills: 0 | OUTPATIENT
Start: 2021-01-01

## 2021-01-01 RX ORDER — ARIPIPRAZOLE 2 MG/1
2 TABLET ORAL DAILY
Qty: 30 TABLET | Refills: 1 | Status: SHIPPED | OUTPATIENT
Start: 2021-01-01 | End: 2021-01-01

## 2021-01-01 RX ORDER — DIAZEPAM ORAL SOLUTION (CONCENTRATE) 5 MG/ML
SOLUTION ORAL
Qty: 30 ML | Refills: 0 | Status: CANCELLED | OUTPATIENT
Start: 2021-01-01

## 2021-01-01 RX ORDER — LACOSAMIDE 50 MG/1
TABLET ORAL
Qty: 180 TABLET | Refills: 1 | OUTPATIENT
Start: 2021-01-01

## 2021-01-01 RX ORDER — DIAZEPAM ORAL SOLUTION (CONCENTRATE) 5 MG/ML
SOLUTION ORAL
Qty: 30 ML | Refills: 0 | Status: SHIPPED | OUTPATIENT
Start: 2021-01-01

## 2021-01-01 RX ORDER — LACOSAMIDE 200 MG/1
TABLET ORAL
Qty: 60 TABLET | Refills: 0 | Status: SHIPPED | OUTPATIENT
Start: 2021-01-01 | End: 2021-01-01

## 2021-01-01 RX ORDER — ARIPIPRAZOLE 2 MG/1
4 TABLET ORAL DAILY
Qty: 60 TABLET | Refills: 3 | Status: SHIPPED | OUTPATIENT
Start: 2021-01-01

## 2021-01-01 RX ORDER — LEVETIRACETAM 1000 MG/1
TABLET ORAL
Qty: 360 TABLET | Refills: 3 | Status: SHIPPED | OUTPATIENT
Start: 2021-01-01

## 2021-01-01 RX ORDER — SODIUM CHLORIDE 9 MG/ML
1000 INJECTION, SOLUTION INTRAVENOUS CONTINUOUS PRN
Status: CANCELLED
Start: 2021-01-01

## 2021-01-01 RX ADMIN — SODIUM CHLORIDE 250 ML: 9 INJECTION, SOLUTION INTRAVENOUS at 14:49

## 2021-01-01 RX ADMIN — SODIUM CHLORIDE 250 ML: 9 INJECTION, SOLUTION INTRAVENOUS at 15:29

## 2021-01-01 RX ADMIN — GADOBUTROL 10 ML: 604.72 INJECTION INTRAVENOUS at 12:15

## 2021-01-01 RX ADMIN — GADOBUTROL 10 ML: 604.72 INJECTION INTRAVENOUS at 13:07

## 2021-01-01 RX ADMIN — SODIUM CHLORIDE 250 ML: 9 INJECTION, SOLUTION INTRAVENOUS at 15:20

## 2021-01-01 RX ADMIN — FERRIC CARBOXYMALTOSE INJECTION 750 MG: 50 INJECTION, SOLUTION INTRAVENOUS at 15:43

## 2021-01-01 RX ADMIN — SODIUM CHLORIDE 250 ML: 9 INJECTION, SOLUTION INTRAVENOUS at 16:39

## 2021-01-01 RX ADMIN — FERRIC CARBOXYMALTOSE INJECTION 750 MG: 50 INJECTION, SOLUTION INTRAVENOUS at 14:49

## 2021-01-01 RX ADMIN — SODIUM CHLORIDE 250 ML: 9 INJECTION, SOLUTION INTRAVENOUS at 15:40

## 2021-01-01 RX ADMIN — BEVACIZUMAB-AWWB 1100 MG: 400 INJECTION, SOLUTION INTRAVENOUS at 16:47

## 2021-01-01 RX ADMIN — BEVACIZUMAB-AWWB 1100 MG: 400 INJECTION, SOLUTION INTRAVENOUS at 15:15

## 2021-01-01 RX ADMIN — BEVACIZUMAB-AWWB 1100 MG: 400 INJECTION, SOLUTION INTRAVENOUS at 15:31

## 2021-01-01 SDOH — SOCIAL STABILITY - SOCIAL INSECURITY: OTHER SPECIFIED PROBLEMS RELATED TO PRIMARY SUPPORT GROUP: Z63.8

## 2021-01-01 ASSESSMENT — PAIN SCALES - GENERAL
PAINLEVEL: NO PAIN (0)

## 2021-01-04 NOTE — LETTER
Date:Alee 10, 2021      Provider requested that no letter be sent. Do not send.       [facility]

## 2021-01-04 NOTE — LETTER
1/4/2021       RE: Jayden Lackey  [address]  [place] [address]     Dear Colleague,    Thank you for referring your patient, Jayden Lackey, to the [facility] at [facility]. Please see a copy of my visit note below.    Palliative Care Clinical Social Work Return Appointment    PLEASE NOTE:  THIS IS A MENTAL HEALTH NOTE.  OTHER PROVIDERS VIEWING THIS NOTE SHOULD USE THIS ONLY FOR UNDERSTANDING THE CONTEXT OF THE PATIENT S EXPERIENCE.  TOPICS DESCRIBED IN THIS NOTE SHOULD NOT BE REFERENCED TO THE PATIENT BY MEDICAL PROVIDERS.    Jayden is a 65 year old man with glioblastoma, seen today by video for a return psychotherapy appointment to address concerns about coping and goals of care as these relate to coping with illness and treatment. Wife Cristiana participates along with Jayden today for family psychotherapy.     Mental Status Exam:(List all that apply)      Appearance: Appropriate      Eye Contact: Good       Orientation: Yes, x4      Mood: sad, grieving, reflective      Affect: Appropriate      Thought Content: Clear         Thought Form: Logical      Psychomotor Behavior: Normal    Mental Health and Adjustment to Illness:   Jayden and Cristiana continue to process relationship changes due to his tumor and seizures with functional losses. They are supportive an caring toward one another and we discuss strategies for coping, communicating and balance in challenging circumstances.  Some recent seizures.      Behavioral/ Non-Pharmacological Skills Education: Not focus today    Relationships: Focus today - role changes, caregiver strain, grief and loss, limited time with family and friends in setting of pandemic  Did get to see family over [place] with strategies for safety and this was overall positive    Advance Care Planning: Not focus today    Main therapeutic interventions provided this session include:  Provide  psychoeducation, Facilitate processing of thoughts and feelings, Facilitate goals of care discussion, Provide strengths-based family counseling and Facilitate structured problem solving      Plan:  Will return for psychotherapy with palliative care focus in 3 - 5 weeks.     Time spent with patient/family:  50 minutes (Start 2:05pm, end 2:55pm)        DO NOT SEND ANY LETTERS              Again, thank you for allowing me to participate in the care of your patient.      Sincerely,    ANKITA WallSW

## 2021-01-05 NOTE — PROGRESS NOTES
Social Work Intervention  [facility]    Data/Intervention:    Patient Name:  Jayden Lackey  /Age:  1955 (65 year old)    Visit Type: telephone  Referral Source: Patient's spouse, Cristiana contacted   Reason for Referral:  Financial Assistance    Collaborated With:    -Patient's spouse, Cristiana  -[employer]    Patient Concerns/Issues:   Cristiana contacted  with a request for an application for [employer] through the [employer] be submitted on Patient's behalf. Cristiana reported that Patient has a large copay for his medications.     Intervention/Education/Resources Provided:   completed application for [employer] through [employer] and submitted for consideration.  also mentioned other copay assistance Patient may qualify for. Nya requested  email her the information. Information for [employer] sent to Cristiana.    Assessment/Plan:  [employer] will contact Patient directly with the status of the jhony and next steps. Cristiana and Patient will review grants available through [employer] and will contact the clinic if any paperwork is needed on our end.    Provided patient/family with contact information and availability.    Irena Levine [facility]  Outpatient Specialty Clinics  Direct Phone: [phone]

## 2021-01-13 NOTE — PROGRESS NOTES
"Jayden is a 66 year old who is being evaluated via a billable video visit.      How would you like to obtain your AVS? MyChart  If the video visit is dropped, the invitation should be resent by: Text to cell phone: [phone]  Will anyone else be joining your video visit? No      Vitals - Patient Reported  Weight (Patient Reported): 108.9 kg (240 lb)  Height (Patient Reported): 190.5 cm (6' 3\")  BMI (Based on Pt Reported Ht/Wt): 30  Pain Score: No Pain (0)      I have reviewed and updated patient's allergy and medication list.    Concerns: none  Refills: none      Lizz Roe [facility]    Palliative Care Outpatient Clinic    (This note was transcribed using voice recognition software. While I review and edit the transcription, I may miss errors, and the software sometimes does unexpected capitalizations and formatting that I miss. Please let me know of any serious mistranscriptions and I will addend this note.)    Patient ID:  Medical - 65 year old M Hx of left occipital lobe glioblastoma s/p gross total resection on 3/22/2019. He completed chemoradiotherapy June 2019. Imaging in 9/2019 was concerning for and pathology in 10/2019 was consistent with recurrent tumor. He was started on lomustine, but stopped after 1 cycle due to significant chemotherapy-induced fatigue. Currently not on cancer-directed therapy and is being monitored with imaging surveillance.   Has GTC and partial seizures which have sig impaired qol; word finding challenges & other cognitive changes.     Social - lives with wife Oz who is his major caregiver. They are in relative COVID isolation.    Care Planning - No HCD on chart but they have one at home. FC POLST on chart. Code status discussion 1/2021 palliative visit: full code.   Follows with [facility] palliative LICSW.       History:  History gathered today from: patient, family/loved ones, medical chart including recent onc and neurology notes. Neurol started divalproex recently.   He is " "with his wife today who supplements hx    I am seeing them today covering for a colleague who's out on maternity leave.    They feel the divalproex has helped; no partial or GTC seizure they know of since then.     Oz notes their major concerns for palliative care have been about need for help at home, etc, and notes he's currently pretty stable and there are no acute needs.    He continues to have difficulty with word finding. He is walking every day. They don't think he need professional help at home.    Jayden notes he wishes he was more independent and wishes he didn't need any help.  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  He is walking a little more though.  Oz notes they are trying to accept where he's at but notes so many losses.    His cognition perhaps continue to slowly deteriorate.    He talks about hearing his prognosis was <2 years when he was initially diagnosed and that he appears to be outliving that.     Mood is good \"antiddepressants really helped\"    PE: There were no vitals taken for this visit.   Wt Readings from Last 3 Encounters:   10/13/20 108.9 kg (240 lb)   06/26/20 108.3 kg (238 lb 12.8 oz)   03/31/20 107.3 kg (236 lb 8 oz)     Ambulates slowly with obvious mild L hemiparesis.   Alert, speech slow but entirely fluent; obvious word finding problems, syntax and sentences normal, complete, he is able to 'speak in full paragraphs' with some word finding difficulties. Does not initiate much speech but responds well to Qs, prompts.    Data reviewed:  I reviewed recent labs and imaging, my comments:  Cr 0.7  Hgb 10    MRI Dec  Impression: In this patient with history of glioblastoma status post  surgical resection and chemoradiation:  1. Postsurgical changes of left parieto-occipital craniotomy with new  hazy/nodular enhancement along the resection cavity/inferior margin of  the left lateral ventricle. Findings are nonspecific and may " represent  posttreatment changes however progression could have a similar  appearance. Attention is recommended on follow-up imaging.  2. Unchanged left hippocampus volume loss.    Impression & Recommendations:  67 yo M with recurrent GBM currently receiving supportive-only care, complicated by seizures and cognitive impairments    They are grateful for the improvements in his seizure control recently.  Struggles with qol impairments from his cognitive impairments but overall stable, making the best of things, making meaning.     We discussed ACP and code status decisions -he has discussed these before. He very much wants to remain full code right now and seems to understand what that means. Oz very much supports this. They acknowledge there will likely be a time in the future when they change that but not currently. Lengthy discussion about mood/coping; overall they are doing well. They are being helped by visiting with palliative TRAVIS [facility] and should continue that.     I answered their Qs about COVID vaccination - currently I think he'll be in [place] stage 1c, but I noted that it's still a fluid situation jaskaran with recent [facility] chatter about asap offering it to all people over 65 years. I let them know there may not be any sort of 'active case finding' when he move into stage 1c so they should continue to ask at every visit    45 minutes spent on the date of the encounter doing chart review, history and exam, documentation and further activities as noted above.    Thank you for involving us in the patient's care.   Ronaldo Banuelos MD / Palliative Medicine / Val me via [facility].    Follow-up 3 months with Dr Grullon      Video Start Time: 11:10 AM  Video-Visit Details    Type of service:  Video Visit    Video End Time:11:41 AM    Originating Location (pt. Location): Home    Distant Location (provider location):  Home    Platform used for Video Visit: [employer]

## 2021-01-19 NOTE — PROGRESS NOTES
Palliative Care Clinical Social Work Return Appointment    PLEASE NOTE:  THIS IS A MENTAL HEALTH NOTE.  OTHER PROVIDERS VIEWING THIS NOTE SHOULD USE THIS ONLY FOR UNDERSTANDING THE CONTEXT OF THE PATIENT S EXPERIENCE.  TOPICS DESCRIBED IN THIS NOTE SHOULD NOT BE REFERENCED TO THE PATIENT BY MEDICAL PROVIDERS.    Jayden is a 65 year old man with glioblastoma, seen today by video for a return psychotherapy appointment to address concerns about coping and goals of care as these relate to coping with illness and treatment. Wife Cristiana participates along with Jayden today for family psychotherapy.     Mental Status Exam:(List all that apply)      Appearance: Appropriate      Eye Contact: Good       Orientation: Yes, x4      Mood: sad, grieving, reflective      Affect: Appropriate      Thought Content: Clear         Thought Form: Logical      Psychomotor Behavior: Normal    Mental Health and Adjustment to Illness:   Jayden and Cristiana continue to process relationship changes due to his tumor and seizures with functional losses. They are supportive an caring toward one another and we discuss strategies for coping, communicating and balance in challenging circumstances.       Behavioral/ Non-Pharmacological Skills Education: Not focus today    Relationships: Focus today - role changes, caregiver strain, grief and loss  Interactions around mood swings and anger    Advance Care Planning: Not focus today    Main therapeutic interventions provided this session include:  Provide psychoeducation, Facilitate processing of thoughts and feelings, Facilitate goals of care discussion, Provide strengths-based family counseling and Facilitate structured problem solving      Plan:  Will return for psychotherapy with palliative care focus in 3 - 5 weeks.     Time spent with patient/family:  50 minutes (Start 11:45am, end 12:35pm)        DO NOT SEND ANY LETTERS

## 2021-01-30 NOTE — PROGRESS NOTES
Jayden is a 66 year old who is being evaluated via a billable video visit.      How would you like to obtain your AVS? Mail a copy  If the video visit is dropped, the invitation should be resent by: Send to e-mail at: [email or web address]  Will anyone else be joining your video visit? No    Patient's wife states that they had a hard time getting his MRI and it took an extra 30 minutes to get. She is worried there won't be enough to with the visit with you today and is wondering if they can have an extra 15-30 minutes. I informed her that I would relay the message but the decision would need to be made by the MD.     Patient's wife instructs there are no changes to his medications and seemed frustrated to even go through them at this time due to feeling rushed and feeling behind schedule.      Saundra Perea CMA February 1, 2021  2:09 PM       Video-Visit Details  Type of service:  Video Visit    Prep time: 10 minutes  Video Start Time: 2:37 PM  Video End Time: 3:19 PM  Post-visit documentation: 10 minutes    Originating Location (pt. Location): Home    Distant Location (provider location):  [facility]     Platform used for Video Visit: Isaías Olmos MD    _____________________________________________________________    NEURO-ONCOLOGY VISIT  Feb 1, 2021    CHIEF COMPLAINT: Mr. 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 He completed chemoradiotherapy on 6/11/2019. Imaging in 9/2019 was consistent with recurrent disease and adjuvant-dosed temozolomide was stop. Pathology from repeat resection on 10/24/2019 was consistent with recurrent tumor. He was started on lomustine, but stopped after 1 cycle due to significant chemotherapy-induced fatigue.     Quality of life has been compromised by recurrent seizure events, PE  requiring prolonged hospitalization, and chemotherapy-related toxicities.    Currently, he is not on any cancer-directed therapy and managed on imaging surveillance. Imaging in 12/2020 showed a new area of contrast enhancement and repeat imaging today demonstrated further increase in contrast enhancement, but continues to demonstrate no increase in perfusion.     I met with Jayden and Oz (wife) for this follow-up visit today.    HISTORY OF PRESENT ILLNESS  -Jayden is feeling fairly good today.   -Denies any recurrent seizures.   -Has been remaining active, walking at night for up to about 30 minutes. Good balance and no weakness, but when fatigued, leans to his left and drags his right foot.   -Fatigued, but sleeping well.   -Jayden shares that he is still living with depression. His medications are still working for him, but he still feels bogged down with depressive thoughts relating to his decreased ability to communicate effectively with his family. Moreover, he retains his ability to accomplish daily tasks independently, but having to scale back on activities that have brought him colin has taken a toll on him. He has seen palliative care for further management.   -Jayden is also noticing that he is having a progressively harder time with cognition. He shares that 'finding words' to describe or name objects has been difficult. His wife has also noticed this as it has added difficulty in communicating together. Jayden's cognitive deficits were noticed a handful of times throughout the visit; there were more pauses in his speech as he was searching for the words to convey his thoughts, however Jadyen still asked pointed questions that showed intentional thinking, and replied to questions with thoughtful answers. For potential improvement in cognition, recommendation to speech therapy was discussed. This is something they have thought about before, but will need some more time to consider before taking action.    -Eating well.  -Remains on dexamethasone 1 mg daily. No arthritic pain.     REVIEW OF SYSTEMS  A comprehensive ROS negative except as in HPI.      MEDICATIONS   Current Outpatient Medications   Medication Sig Dispense Refill     atenolol (TENORMIN) 100 MG tablet Take 50 mg by mouth daily 50 mg       Cholecalciferol 1.25 MG (68952 UT) TABS 1 cap(s)       dexamethasone (DECADRON) 1 MG tablet Take 1 tablet (1 mg) by mouth daily (with breakfast) 90 tablet 3     diazepam (VALIUM) 5 MG/ML (HIGH CONC) solution Take 1 mL (5 mg) by mouth every 3 minutes as needed for seizures (For seizure activity.) If seizure worsens or has new symptoms, repeat with 1 mL by mouth up to a max of 3.0 mL in 1 day. Do not give if patient has very shallow or slow breathing. 10 mL 0     diltiazem ER (DILT-XR) 180 MG 24 hr capsule Take 180 mg by mouth daily       divalproex sodium delayed-release (DEPAKOTE) 250 MG DR tablet Take 1 tablet (250 mg) by mouth 2 times daily 180 tablet 3     docusate sodium (COLACE) 50 MG capsule Take 50 mg by mouth 2 times daily as needed for constipation       lacosamide (VIMPAT) 200 MG TABS tablet Take 1 tablet (200 mg) twice daily (together with 50 mg tablets, to total 250mg in AM and 250mg in PM). 180 tablet 1     lacosamide (VIMPAT) 50 MG TABS tablet Take 1 tablet (50 mg) twice daily (together with 200 mg tablets, to total 250mg in AM and 250mg in PM). 180 tablet 1     levETIRAcetam (KEPPRA) 1000 MG tablet Take 2 tablets (2000 mg) twice daily (together with 500 mg tablets, to total 2500mg in AM and 2500mg in PM). 360 tablet 3     levETIRAcetam (KEPPRA) 500 MG tablet Take 1 tablet (500 mg) twice daily (together with 1000 mg tablets, to total 2500mg in AM and 2500mg in PM). 180 tablet 3     lisinopril (PRINIVIL/ZESTRIL) 5 MG tablet Take 1 tablet (5 mg) by mouth daily 28 tablet 0     rivaroxaban ANTICOAGULANT (XARELTO ANTICOAGULANT) 20 MG TABS tablet Take 1 tablet (20 mg) by mouth daily (with dinner) 30 tablet 3      sertraline (ZOLOFT) 100 MG tablet Take 2 tablets (200 mg) by mouth daily 180 tablet 3     DRUG ALLERGIES   Allergies   Allergen Reactions     Shellfish Allergy Difficulty breathing, Nausea and Vomiting, Swelling and Shortness Of Breath     Lupine Bean Extract      Shellfish-Derived Products      No Clinical Screening - See Comments      Lupine bean doesn't remember reaction       ONCOLOGIC HISTORY  -1/2019 PRESENTATION: Visual disturbance.   -3/6/2019 Evaluated by Dr. Mauricio, neuro-ophthalmologist, found to have a right sided homonymous hemianopsia. MRI ordered.   -3/6/2019 MR brain imaging revealed a contrast-enhancing lesion in the left occipital lobe suspicious for a high-grade primary brain tumor.  -3/22/2019 SURGERY: Craniotomy with a gross total resection by Dr. Irving Hayes.   PATHOLOGY: Glioblastoma; Wildtype status for IDH1/2 by next generation sequencing. MGMT promoter methylated. Wildtype PTEN, TP53.  -4/8/2019 NEURO-ONC: Recommending chemoradiotherapy.   -4/30 - 6/11/2019 CHEMORADS 6000cGy in 30 fractions with concurrent temozolomide 75mg/m2 (180mg).  -5/20/2019 NEURO-ONC: Not interested in [employer] at this time.   -6/12/2019 NEURO-ONC: Clinically stable.   -6/21 - 6/28/2019 ADMISSION/ SZ: Admitted to [facility] for first ever seizure event, provoked by hyponatremia. Urine studies consistent with SIADH ; etiology of SIADH unclear. Had 10mm gastric lesion biopsied on 6/26/2019 by way of an EGD.  PATHOLOGY of gastric lesion: Hyperplastic polyp with erosion and foci of atypia; favor reactive. No intestinal metaplasia, no helicobacter pylori, no high grade dysplasia or invasive carcinoma.   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   -7/12/2019 NEURO-ONC/ MRB/ CHEMO: Clinical stable. Imaging with stable to slightly increased enhancement of previously seen nodular lesions; associated with mild degree of elevated rCBV. Starting adjuvant-dosed temozolomide 150mg/m2 (360mg), cycle 1 (start date 7/12/2019).  Monitor fluid intake, will be checking CMP/ Na+.   -8/12/2019 NEURO-ONC/ MRB/ CHEMO: Clinical decompensated, but improving since hospital discharge. MR brain scan from last week with concern for non-contrast enhancing >> enhancing disease progression. Ordering a PET brain scan. Holding adjuvant-dosed temozolomide, cycle 3.  -10/24/2019 SURGERY: Repeat resection by Dr. Packer.  PATHOLOGY: Recurrent glioblastoma.   Next generation sequencing by [facility]; Microsatellite instability; stable. Tumor mutational burden; 4 (low). Mutations in ARID1A, ASXL1, ATRX, FANCE, MED12, MEF2B, NF1, RUNX1, TERT.   -11/25/2019 NEURO-ONC/ MRB/ CHEMO: Clinically doing well. Imaging with positive resection. Starting Lomustine (start date of 12/2). Next generation sequencing.   -12/4/2019 CHEMO: Lomustine, cycle 1.   -1/6/2020 NEURO-ONC: Clinically stable. Has TCP with Lomustine. Will hold off on starting next week until he sees Dr. Olmos with new imaging.  -1/31/2020 NEURO-ONC/ MRB/ CHEMO: Clinically doing well. Imaging with no evidence of disease recurrence. Holding Lomustine in the setting of a poor emotional state. Referrals to Dr. Aleksandr Garcia for neuro-psych testing, Dr. Eugene Vargas for counseling/ coping, and palliative care. Next generation sequencing results discussed. Increase Zoloft. Continue dexamethasone 2mg daily.  -4/1/2020 ADMISSION/ SZ- Keppra increased, added diazepam PRN, continue Vimpat.   -4/1/2020 MRB with no significant change.   -4/13/2020 NEURO-ONC: Clinically improved since hospitalization. Continuing imaging surveillance. Increase Zoloft.   -6/26/2020 ADMISSION/ SZ- Keppra and Vimpat increased, continued diazepam PRN.   -6/29/2020 NEURO-ONC/ MRB: Clinically improved since hospitalization. Imaging largely stable. Continuing imaging surveillance. Trial of ritalin.   -8/10/2020 NEURO-ONC: Clinically stable. Will refer to palliative SW.  -8/24/2020 NEURO-ONC/ MRB: Clinically stable to improved. Imaging largely stable with  no concern for disease recurrence. Continuing imaging surveillance. Referral to palliative care.   -9/25/2020 NEURO-ONC: Clinically declined after seizures on 9/18, though now slowly improving. Sending to [facility] to obtain better seizure control. No active cancer treatment.  -10/26/2020 NEURO-ONC/ MRB: Clinically stable. Imaging stable. Continue management of epilepsy per Dr. Pacheco. Weaning dexamethasone to 1mg daily.   -12/21/2020 NEURO-ONC/ MRB: Clinically with a recurrent seizure. Imaging with a new 6mm growth about the resection cavity. Repeat imaging in 6 weeks.   -2/1/2021 NEURO-ONC/ MRB: Clinically stable. Imaging with increased contrast enhancement, but no associated perfusion.    SOCIAL HISTORY   Tobacco use: Former smoker, quit 40 years ago.   Alcohol use: Social use.  Drug use: Denies.  .   Employment: On disability.       PHYSICAL EXAMINATION  KPS: 60  -Generally well appearing.  -Respiratory: No audible wheezing.   -Skin: No facial rashes.  -Psychiatric: Normal mood and affect. Pleasant, talkative.  -Neurologic:   MENTAL STATUS:     Alert, oriented.    Recall: Impaired.    Speech near fluent, but with hesitation and word finding issues.    Comprehension with some impairment.     CRANIAL NERVES:     Pupils are equal, round.     Extraocular movements full.     Symmetric facial movements.   Hearing intact.   With normal phonation, no dysfunction of the palate or tongue.   MOTOR: Antigravity in arms.  SENSATION: Intact to light touch throughout.   COORDINATION: Intact bilaterally.     The rest of a comprehensive physical examination is deferred due to PHE (public health emergency) video visit restrictions.        MEDICAL RECORDS  Obtained and personally reviewed all available outside medical records in addition to reviewing any records available in our electronic system.     LABS  Personally reviewed all available lab results.     IMAGING  Personally reviewed MR brain imaging from today and compared  "to prior imaging. To my eye, the 6mm contrast enhancing lesion about the resection cavity. T2 FLAIR is stable.     Imaging was shown to and results were reviewed with Jayden and Oz.     Imaging and case was reviewed at Brain Tumor Conference today.       IMPRESSION  Clinic time for this high complexity visit was spent discussing in detail the nature of his cancer in light of his repeat imaging. This was in addition to providing emotional support, answering questions pertaining to my recommendations, and devising the plan as outlined below.      Refractory epilepsy remains a barrier to further clinical improvement as with recurrent seizures, Jayden regresses physically and cognitively and this severely decreases his quality of life. Since increasing the doses of levetiracetam and lacosamide plus adding Diltazem, Jayden has not had any recurrent seizures over the past 6 weeks. He is concerned about the increased frequency of \"pre-seizures/ auras\" and I recommended that he check-in with Dr. Pacheco. The imaging changes could result in a reduced seizure threshold.     For continued depressed mood, I recommended following up with palliative care for recommendation on other medications.     For continued issues with cognition and aphasia, I recommended a repeat referral to speech therapy and Jayden will consider.     Imaging from today showed further enlargement of the area of contrast enhancement about the resection cavity, but again, there was no assoicated increase in perfusion. It is unclear what this change represents; new cancer growth or treatment/ radiation-induced changes, however, the changes remain most consistent with the latter. Will repeat imaging in 6-8 weeks. In the setting of an incurable cancer, Jayden and Oz are clear about wanting high quality of life over quantity of life and will need to factor this in for future decisions.    PROBLEM LIST  Glioblastoma, MGMT methylated and IDH1 wildtype by " NGS  Visual field cut; homonymous hemianopsia, right-side  Memory issues  Drug-induced constipation  Hyperplastic gastric polyps, benign  H/o SIADH  Epilepsy    PLAN  -CANCER DIRECTED THERAPY-  -As above.     -STEROIDS-  -Continue dexamethasone 1mg daily.   -Monitor for worsening of neurological symptoms as well as a flair in RA.     -SEIZURE MANAGEMENT-  -Per Dr. Pacheco.     -Quality of life/ MOOD/ FATIGUE-  -History of mood issues; depression, anxiety, irritability, poor coping.  -Off Ritalin  -Continue Zoloft 200mg daily (max dose).  -Added to Brain Tumor Support Group email list; [email or web address]  -Conitnue daily exercises.   -Following with Dr. Eugene Vargas for counseling/ coping.   -Following with palliative care MD and SW. Completion of POLST.     -HICCUPS-  -Pepcid as needed.  -Likely Steroid related.  -Possibly Baclofen (muscle relaxer) if needed.    -HYPONATREMIA/ History of SIADH-    -DVT/ PE-  -Need for lifelong anticoagulation.  -Continue Xarelto.    Pharmacy to reach out regarding insurance coverage.   -If platelets drop <50, will need to hold anticoagulation.    -ADDITIONAL SUPPORTIVE MANAGEMENT-  -Social work following.  -Consideration for referral to speech therapy.   -CT with lung nodules. [facility].  -S/p EGD with benign pathology of the 10mm gastric antrum/pylorus posterior wall lesion.  -Breast biopsy showed necrotic issue, no malignancy.    Return to clinic in 6-8 weeks + imaging.     In the meantime, Jayden and Oz know to call with questions or concerns or to report new complaints and can be seen sooner if needed     Nasra Olmos MD  Neuro-oncology

## 2021-02-01 NOTE — LETTER
2/1/2021         RE: Jayden Lackey  [address]  [place] [address]        Dear Colleague,    Thank you for referring your patient, Jayden Lackey, to the [facility]. Please see a copy of my visit note below.    Jayden is a 66 year old who is being evaluated via a billable video visit.      How would you like to obtain your AVS? Mail a copy  If the video visit is dropped, the invitation should be resent by: Send to e-mail at: [email or web address]  Will anyone else be joining your video visit? No    Patient's wife states that they had a hard time getting his MRI and it took an extra 30 minutes to get. She is worried there won't be enough to with the visit with you today and is wondering if they can have an extra 15-30 minutes. I informed her that I would relay the message but the decision would need to be made by the MD.     Patient's wife instructs there are no changes to his medications and seemed frustrated to even go through them at this time due to feeling rushed and feeling behind schedule.      Saundra Perea, SIOBHAN February 1, 2021  2:09 PM       Video-Visit Details  Type of service:  Video Visit    Prep time: 10 minutes  Video Start Time: 2:37 PM  Video End Time: 3:19 PM  Post-visit documentation: 10 minutes    Originating Location (pt. Location): Home    Distant Location (provider location):  [facility]     Platform used for Video Visit: Isaías Olmos MD    _____________________________________________________________    NEURO-ONCOLOGY VISIT  Feb 1, 2021    CHIEF COMPLAINT: Mr. 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 He completed chemoradiotherapy on 6/11/2019. Imaging in 9/2019 was consistent with recurrent disease and adjuvant-dosed temozolomide was stop.  Pathology from repeat resection on 10/24/2019 was consistent with recurrent tumor. He was started on lomustine, but stopped after 1 cycle due to significant chemotherapy-induced fatigue.     Quality of life has been compromised by recurrent seizure events, PE requiring prolonged hospitalization, and chemotherapy-related toxicities.    Currently, he is not on any cancer-directed therapy and managed on imaging surveillance. Imaging in 12/2020 showed a new area of contrast enhancement and repeat imaging today demonstrated further increase in contrast enhancement, but continues to demonstrate no increase in perfusion.     I met with Jayden and Oz (wife) for this follow-up visit today.    HISTORY OF PRESENT ILLNESS  -Jayden is feeling fairly good today.   -Denies any recurrent seizures.   -Has been remaining active, walking at night for up to about 30 minutes. Good balance and no weakness, but when fatigued, leans to his left and drags his right foot.   -Fatigued, but sleeping well.   -Jayden shares that he is still living with depression. His medications are still working for him, but he still feels bogged down with depressive thoughts relating to his decreased ability to communicate effectively with his family. Moreover, he retains his ability to accomplish daily tasks independently, but having to scale back on activities that have brought him colin has taken a toll on him. He has seen palliative care for further management.   -Jayden is also noticing that he is having a progressively harder time with cognition. He shares that 'finding words' to describe or name objects has been difficult. His wife has also noticed this as it has added difficulty in communicating together. Jayden's cognitive deficits were noticed a handful of times throughout the visit; there were more pauses in his speech as he was searching for the words to convey his thoughts, however Jayden still asked pointed questions that showed intentional  thinking, and replied to questions with thoughtful answers. For potential improvement in cognition, recommendation to speech therapy was discussed. This is something they have thought about before, but will need some more time to consider before taking action.   -Eating well.  -Remains on dexamethasone 1 mg daily. No arthritic pain.     REVIEW OF SYSTEMS  A comprehensive ROS negative except as in HPI.      MEDICATIONS   Current Outpatient Medications   Medication Sig Dispense Refill     atenolol (TENORMIN) 100 MG tablet Take 50 mg by mouth daily 50 mg       Cholecalciferol 1.25 MG (08193 UT) TABS 1 cap(s)       dexamethasone (DECADRON) 1 MG tablet Take 1 tablet (1 mg) by mouth daily (with breakfast) 90 tablet 3     diazepam (VALIUM) 5 MG/ML (HIGH CONC) solution Take 1 mL (5 mg) by mouth every 3 minutes as needed for seizures (For seizure activity.) If seizure worsens or has new symptoms, repeat with 1 mL by mouth up to a max of 3.0 mL in 1 day. Do not give if patient has very shallow or slow breathing. 10 mL 0     diltiazem ER (DILT-XR) 180 MG 24 hr capsule Take 180 mg by mouth daily       divalproex sodium delayed-release (DEPAKOTE) 250 MG DR tablet Take 1 tablet (250 mg) by mouth 2 times daily 180 tablet 3     docusate sodium (COLACE) 50 MG capsule Take 50 mg by mouth 2 times daily as needed for constipation       lacosamide (VIMPAT) 200 MG TABS tablet Take 1 tablet (200 mg) twice daily (together with 50 mg tablets, to total 250mg in AM and 250mg in PM). 180 tablet 1     lacosamide (VIMPAT) 50 MG TABS tablet Take 1 tablet (50 mg) twice daily (together with 200 mg tablets, to total 250mg in AM and 250mg in PM). 180 tablet 1     levETIRAcetam (KEPPRA) 1000 MG tablet Take 2 tablets (2000 mg) twice daily (together with 500 mg tablets, to total 2500mg in AM and 2500mg in PM). 360 tablet 3     levETIRAcetam (KEPPRA) 500 MG tablet Take 1 tablet (500 mg) twice daily (together with 1000 mg tablets, to total 2500mg in AM  and 2500mg in PM). 180 tablet 3     lisinopril (PRINIVIL/ZESTRIL) 5 MG tablet Take 1 tablet (5 mg) by mouth daily 28 tablet 0     rivaroxaban ANTICOAGULANT (XARELTO ANTICOAGULANT) 20 MG TABS tablet Take 1 tablet (20 mg) by mouth daily (with dinner) 30 tablet 3     sertraline (ZOLOFT) 100 MG tablet Take 2 tablets (200 mg) by mouth daily 180 tablet 3     DRUG ALLERGIES   Allergies   Allergen Reactions     Shellfish Allergy Difficulty breathing, Nausea and Vomiting, Swelling and Shortness Of Breath     Lupine Bean Extract      Shellfish-Derived Products      No Clinical Screening - See Comments      Lupine bean doesn't remember reaction       ONCOLOGIC HISTORY  -1/2019 PRESENTATION: Visual disturbance.   -3/6/2019 Evaluated by Dr. Mauricio, neuro-ophthalmologist, found to have a right sided homonymous hemianopsia. MRI ordered.   -3/6/2019 MR brain imaging revealed a contrast-enhancing lesion in the left occipital lobe suspicious for a high-grade primary brain tumor.  -3/22/2019 SURGERY: Craniotomy with a gross total resection by Dr. Irving Hayes.   PATHOLOGY: Glioblastoma; Wildtype status for IDH1/2 by next generation sequencing. MGMT promoter methylated. Wildtype PTEN, TP53.  -4/8/2019 NEURO-ONC: Recommending chemoradiotherapy.   -4/30 - 6/11/2019 CHEMORADS 6000cGy in 30 fractions with concurrent temozolomide 75mg/m2 (180mg).  -5/20/2019 NEURO-ONC: Not interested in Optune at this time.   -6/12/2019 NEURO-ONC: Clinically stable.   -6/21 - 6/28/2019 ADMISSION/ SZ: Admitted to [facility] for first ever seizure event, provoked by hyponatremia. Urine studies consistent with SIADH ; etiology of SIADH unclear. Had 10mm gastric lesion biopsied on 6/26/2019 by way of an EGD.  PATHOLOGY of gastric lesion: Hyperplastic polyp with erosion and foci of atypia; favor reactive. No intestinal metaplasia, no helicobacter pylori, no high grade dysplasia or invasive carcinoma.   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   -7/12/2019  NEURO-ONC/ MRB/ CHEMO: Clinical stable. Imaging with stable to slightly increased enhancement of previously seen nodular lesions; associated with mild degree of elevated rCBV. Starting adjuvant-dosed temozolomide 150mg/m2 (360mg), cycle 1 (start date 7/12/2019). Monitor fluid intake, will be checking CMP/ Na+.   -8/12/2019 NEURO-ONC/ MRB/ CHEMO: Clinical decompensated, but improving since hospital discharge. MR brain scan from last week with concern for non-contrast enhancing >> enhancing disease progression. Ordering a PET brain scan. Holding adjuvant-dosed temozolomide, cycle 3.  -10/24/2019 SURGERY: Repeat resection by Dr. Packer.  PATHOLOGY: Recurrent glioblastoma.   Next generation sequencing by [facility]; Microsatellite instability; stable. Tumor mutational burden; 4 (low). Mutations in ARID1A, ASXL1, ATRX, FANCE, MED12, MEF2B, NF1, RUNX1, TERT.   -11/25/2019 NEURO-ONC/ MRB/ CHEMO: Clinically doing well. Imaging with positive resection. Starting Lomustine (start date of 12/2). Next generation sequencing.   -12/4/2019 CHEMO: Lomustine, cycle 1.   -1/6/2020 NEURO-ONC: Clinically stable. Has TCP with Lomustine. Will hold off on starting next week until he sees Dr. Olmos with new imaging.  -1/31/2020 NEURO-ONC/ MRB/ CHEMO: Clinically doing well. Imaging with no evidence of disease recurrence. Holding Lomustine in the setting of a poor emotional state. Referrals to Dr. Aleksandr Garcia for neuro-psych testing, Dr. Eugene Vargas for counseling/ coping, and palliative care. Next generation sequencing results discussed. Increase Zoloft. Continue dexamethasone 2mg daily.  -4/1/2020 ADMISSION/ SZ- Keppra increased, added diazepam PRN, continue Vimpat.   -4/1/2020 MRB with no significant change.   -4/13/2020 NEURO-ONC: Clinically improved since hospitalization. Continuing imaging surveillance. Increase Zoloft.   -6/26/2020 ADMISSION/ SZ- Keppra and Vimpat increased, continued diazepam PRN.   -6/29/2020 NEURO-ONC/ MRB: Clinically  improved since hospitalization. Imaging largely stable. Continuing imaging surveillance. Trial of ritalin.   -8/10/2020 NEURO-ONC: Clinically stable. Will refer to palliative SW.  -8/24/2020 NEURO-ONC/ MRB: Clinically stable to improved. Imaging largely stable with no concern for disease recurrence. Continuing imaging surveillance. Referral to palliative care.   -9/25/2020 NEURO-ONC: Clinically declined after seizures on 9/18, though now slowly improving. Sending to [facility] to obtain better seizure control. No active cancer treatment.  -10/26/2020 NEURO-ONC/ MRB: Clinically stable. Imaging stable. Continue management of epilepsy per Dr. Pacheco. Weaning dexamethasone to 1mg daily.   -12/21/2020 NEURO-ONC/ MRB: Clinically with a recurrent seizure. Imaging with a new 6mm growth about the resection cavity. Repeat imaging in 6 weeks.   -2/1/2021 NEURO-ONC/ MRB: Clinically stable. Imaging with increased contrast enhancement, but no associated perfusion.    SOCIAL HISTORY   Tobacco use: Former smoker, quit 40 years ago.   Alcohol use: Social use.  Drug use: Denies.  .   Employment: On disability.       PHYSICAL EXAMINATION  KPS: 60  -Generally well appearing.  -Respiratory: No audible wheezing.   -Skin: No facial rashes.  -Psychiatric: Normal mood and affect. Pleasant, talkative.  -Neurologic:   MENTAL STATUS:     Alert, oriented.    Recall: Impaired.    Speech near fluent, but with hesitation and word finding issues.    Comprehension with some impairment.     CRANIAL NERVES:     Pupils are equal, round.     Extraocular movements full.     Symmetric facial movements.   Hearing intact.   With normal phonation, no dysfunction of the palate or tongue.   MOTOR: Antigravity in arms.  SENSATION: Intact to light touch throughout.   COORDINATION: Intact bilaterally.     The rest of a comprehensive physical examination is deferred due to PHE (public health emergency) video visit restrictions.        MEDICAL RECORDS  Obtained  "and personally reviewed all available outside medical records in addition to reviewing any records available in our electronic system.     LABS  Personally reviewed all available lab results.     IMAGING  Personally reviewed MR brain imaging from today and compared to prior imaging. To my eye, the 6mm contrast enhancing lesion about the resection cavity. T2 FLAIR is stable.     Imaging was shown to and results were reviewed with Jayden and Oz.     Imaging and case was reviewed at Brain Tumor Conference today.       IMPRESSION  Clinic time for this high complexity visit was spent discussing in detail the nature of his cancer in light of his repeat imaging. This was in addition to providing emotional support, answering questions pertaining to my recommendations, and devising the plan as outlined below.      Refractory epilepsy remains a barrier to further clinical improvement as with recurrent seizures, Jayden regresses physically and cognitively and this severely decreases his quality of life. Since increasing the doses of levetiracetam and lacosamide plus adding Diltazem, Jayden has not had any recurrent seizures over the past 6 weeks. He is concerned about the increased frequency of \"pre-seizures/ auras\" and I recommended that he check-in with Dr. Pacheco. The imaging changes could result in a reduced seizure threshold.     For continued depressed mood, I recommended following up with palliative care for recommendation on other medications.     For continued issues with cognition and aphasia, I recommended a repeat referral to speech therapy and Jayden will consider.     Imaging from today showed further enlargement of the area of contrast enhancement about the resection cavity, but again, there was no assoicated increase in perfusion. It is unclear what this change represents; new cancer growth or treatment/ radiation-induced changes, however, the changes remain most consistent with the latter. Will repeat imaging " in 6-8 weeks. In the setting of an incurable cancer, Aracelis are clear about wanting high quality of life over quantity of life and will need to factor this in for future decisions.    PROBLEM LIST  Glioblastoma, MGMT methylated and IDH1 wildtype by NGS  Visual field cut; homonymous hemianopsia, right-side  Memory issues  Drug-induced constipation  Hyperplastic gastric polyps, benign  H/o SIADH  Epilepsy    PLAN  -CANCER DIRECTED THERAPY-  -As above.     -STEROIDS-  -Continue dexamethasone 1mg daily.   -Monitor for worsening of neurological symptoms as well as a flair in RA.     -SEIZURE MANAGEMENT-  -Per Dr. Pacheco.     -Quality of life/ MOOD/ FATIGUE-  -History of mood issues; depression, anxiety, irritability, poor coping.  -Off Ritalin  -Continue Zoloft 200mg daily (max dose).  -Added to Brain Tumor Support Group email list; [email or web address]  -Conitnue daily exercises.   -Following with Dr. Eugene Vargas for counseling/ coping.   -Following with palliative care MD and SW. Completion of POLST.     -HICCUPS-  -Pepcid as needed.  -Likely Steroid related.  -Possibly Baclofen (muscle relaxer) if needed.    -HYPONATREMIA/ History of SIADH-    -DVT/ PE-  -Need for lifelong anticoagulation.  -Continue Xarelto.    Pharmacy to reach out regarding insurance coverage.   -If platelets drop <50, will need to hold anticoagulation.    -ADDITIONAL SUPPORTIVE MANAGEMENT-  -Social work following.  -Consideration for referral to speech therapy.   -CT with lung nodules. Following with the [facility].  -S/p EGD with benign pathology of the 10mm gastric antrum/pylorus posterior wall lesion.  -Breast biopsy showed necrotic issue, no malignancy.    Return to clinic in 6-8 weeks + imaging.     In the meantime, Aracelis know to call with questions or concerns or to report new complaints and can be seen sooner if needed     Nasra Olmos MD  Neuro-oncology

## 2021-02-01 NOTE — PATIENT INSTRUCTIONS
Imaging with increased contrast enhancement, but no increase in blood flow.   Will repeat in 6-8 weeks.     Follow with Epilepsy for seizures.   Follow with palliative care for depression.   Consideration for referral to speech therapy.    Return to clinic in 6-8 weeks + imaging.     Nasra Olmos MD  Neuro-oncology  2/1/2021

## 2021-02-01 NOTE — DISCHARGE INSTRUCTIONS
MRI Contrast Discharge Instructions    The IV contrast you received today will pass out of your body in your  urine. This will happen in the next 24 hours. You will not feel this process.  Your urine will not change color.    Drink at least 4 extra glasses of water or juice today (unless your doctor  has restricted your fluids). This reduces the stress on your kidneys.  You may take your regular medicines.    If you are on dialysis: It is best to have dialysis today.    If you have a reaction: Most reactions happen right away. If you have  any new symptoms after leaving the hospital (such as hives or swelling),  call your hospital at the correct number below. Or call your family doctor.  If you have breathing distress or wheezing, call 911.    Special instructions: ***    I have read and understand the above information.    Signature:______________________________________ Date:___________    Staff:__________________________________________ Date:___________     Time:__________    [place] [facility] Departments:    ___[place]: [phone]  ___[facility]: [phone]  ___[place]: [phone] ___[facility]: [phone]  ___[facility]: [phone]  ___[facility]: [phone]  ___[phone]  ___[facility]: [phone]  ___[phone]

## 2021-02-18 NOTE — TELEPHONE ENCOUNTER
Call returned to Oz    Jayden has had increasing auras over the past few days  He has not had a seizure with loss of awareness.  With the auras of a rushing feeling, he has received his rescue med and the seizures have not progressed.      Patient did have the first of his COVID19 vaccination injections.      Patient is due for a 2 month follow up (as recommended at the last visit.  Oz would like to arrange a follow up with to discuss the auras, and future treatment    Video appointment was scheduled for Monday 22ndat 3.30pm

## 2021-02-18 NOTE — TELEPHONE ENCOUNTER
Health Call Center    Phone Message    May a detailed message be left on voicemail: yes     Reason for Call: Other: Patients spouse zO calling to speak with Dr. Pacheco's care team in regards to patient. States that patient has been having more frequent auras.    Please advise and call Oz back at your earliest convenience to discuss further    Action Taken: Other: ME MINCEP     Travel Screening: Not Applicable

## 2021-02-22 NOTE — LETTER
2/22/2021       RE: Jayden Lackey  [address]  [place] [address]     Dear Colleague,    Thank you for referring your patient, Jayden Lackey, to the [facility] [place] at [facility]. Please see a copy of my visit note below.        EPILEPSY CLINIC VIDEO VISIT NOTE  The scheduled clinic visit was changed to a video visit to reduce the risk of COVID-19 transmission.            Service Date: 02/22/2021     HISTORY: I spoke with Mr. Jayden Lackey and his wife.  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     The patient denied having recent fever or cough, and exposure to anyone thought to have COVID-19.      Following the most recent visit to this clinic on 12/22/2020, the patient reportedly has had no grand mal seizures and no complex partial seizures.  The most recent generalized convulsions probably occurred on 09/18/2020.  The most recent complex partial seizure 12/16/2020.      His wife has kept summaries of isolate auras that he has reported to her at the time of the aura (usually with a metallic taste in his mouth, a rising sensation in his stomach, and anxiety).  She recorded none in December 2020, 6 in January 2021, and 5 thus far in February 2021 (most recently on 02/18/2021).       His wife checks on his medication use, and she is certain that no doses have been missed.  He has not had adverse effects that appear directly linked to AED use.  He often is excessively drowsy during the day and this may have increased following increased AED doses 4-5 months ago.  In recent months, his wife has not noted change in chronic attention and memory disturbance, difficulties  with comprehension and expression, and difficulty in solving problems.      He has chronic depression and  anger management  issues, dating back about 40 years.  In the last 2 months, his irritability has worsened.  Twice in February, he has become extremely frustrated and has thrown furniture into a wall.  Although he shouts at his wife when he is angry, he has not thrown objects at her, hit her or otherwise physically injured her.  He does not own a gun.  He often feels  degraded  by the conditions of his brain tumor.  He denied having suicidal ideation.  He uses sertraline as prescribed, possibly with some benefit in mood.  He recently was referred to the Palliative Care service by Dr. Olmos.     MEDICATIONS:  Levetiracetam 2500 mg b.i.d., lacosamide 250 mg b.i.d., diazepam as rescue medication (5 mg oral solution, up to 15 mg in one day for persisting focal impaired seizures), and other medications as per the electronic medical record.      VIDEO OBSERVATIONS:   The patient demonstrated a paucity of spontaneous speech, but he spoke with normal articulation, fluency and syntax, with normal comprehension of questions.    On command, the patient moved the direction of gaze into all 4 quadrants conjugately and without nystagmus.   Facial movements were normal.    Tongue protruded on the midline.    The patient did not display any resting tremors or action tremors of the outstretched arms.  Limb movements were normal.       LABORATORY DATA:   AED levels on 02/01/2021:   Levetiracetam: 69 mcg/ml.   Lacosamide: 13.0 mcg/ml.   Valproate: 35 mcg/ml., with normal platelet count, AST and ALT.       IMPRESSION:   GTC seizures were controlled with relatively high doses of levetiracetam and lacosamide.  No complex partial seizures occurred after divalproex was started about 2 months ago.  Platelet count and hepatic enzymes were normal, after starting divalproex.    Auras have persisted.  He finds these quite unpleasant.  Given  the low valproate level, we agreed to increase divalproex dosing today.    He has had difficulties with irritability and occasional flares of anger for essentially his entire adult life.  Angry outbursts have increased recently, perhaps related to his discouraging brain tumor prognosis, loss of independence, and other life conditions that he finds degrading.  I told the patient and his wife that levetiracetam clearly is not the only cause of his longstanding irritability, which began long before levetiracetam was started, but that levetiracetam may be exacerbating these issues.  I pointed out that he is on a rather high dose of the drug and has quite high blood levels.  He and his wife ere comfortable with making a dose decrease today, and I told them that we likely could make a further decrease in a month.  We will assess his response to the increased divalproex dose in making this decision.  I strongly supported Dr. Olmos s referral to the Palliative Care service, with consideration of further medical and psychological therapy of depression.       He is not driving and does not plan to do so.     PLAN:   1)  Decrease levetiracetam to 2000 mg b.i.d.   2)  Increase divalproex to 500 mg b.i.d.   3)  Continue current dose of lacosamide.  4)  Return in 4 weeks for VIDEO clinic visit.     Video Conference via [employer].   Video Start Time: 3:41 p.m.   Video Stop Time: 4:27 p.m.   Provider location: [facility] Neu[facility]   Reported Patient Location: Home      Frank Pacheco M.D., Professor of Neurology

## 2021-02-22 NOTE — PROGRESS NOTES
Jayden Lackey is a 66 year old who is being evaluated via a billable video visit.       How would you like to obtain your AVS? Mychart  If the video visit is dropped, the invitation should be resent by: [phone]        EPILEPSY CLINIC VIDEO VISIT NOTE  The scheduled clinic visit was changed to a video visit to reduce the risk of COVID-19 transmission.            Service Date: 02/22/2021     HISTORY: I spoke with Mr. Jayden Lackey and his wife.  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     The patient denied having recent fever or cough, and exposure to anyone thought to have COVID-19.      Following the most recent visit to this clinic on 12/22/2020, the patient reportedly has had no grand mal seizures and no complex partial seizures.  The most recent generalized convulsions probably occurred on 09/18/2020.  The most recent complex partial seizure 12/16/2020.      His wife has kept summaries of isolate auras that he has reported to her at the time of the aura (usually with a metallic taste in his mouth, a rising sensation in his stomach, and anxiety).  She recorded none in December 2020, 6 in January 2021, and 5 thus far in February 2021 (most recently on 02/18/2021).       His wife checks on his medication use, and she is certain that no doses have been missed.  He has not had adverse effects that appear directly linked to AED use.  He often is excessively drowsy during the day and this may have increased following increased AED doses 4-5 months ago.  In recent months, his wife has not noted change in chronic attention and memory disturbance, difficulties with comprehension and expression, and difficulty in solving problems.      He has chronic depression  and  anger management  issues, dating back about 40 years.  In the last 2 months, his irritability has worsened.  Twice in February, he has become extremely frustrated and has thrown furniture into a wall.  Although he shouts at his wife when he is angry, he has not thrown objects at her, hit her or otherwise physically injured her.  He does not own a gun.  He often feels  degraded  by the conditions of his brain tumor.  He denied having suicidal ideation.  He uses sertraline as prescribed, possibly with some benefit in mood.  He recently was referred to the Palliative Care service by Dr. Olmos.     MEDICATIONS:  Levetiracetam 2500 mg b.i.d., lacosamide 250 mg b.i.d., diazepam as rescue medication (5 mg oral solution, up to 15 mg in one day for persisting focal impaired seizures), and other medications as per the electronic medical record.      VIDEO OBSERVATIONS:   The patient demonstrated a paucity of spontaneous speech, but he spoke with normal articulation, fluency and syntax, with normal comprehension of questions.    On command, the patient moved the direction of gaze into all 4 quadrants conjugately and without nystagmus.   Facial movements were normal.    Tongue protruded on the midline.    The patient did not display any resting tremors or action tremors of the outstretched arms.  Limb movements were normal.       LABORATORY DATA:   AED levels on 02/01/2021:   Levetiracetam: 69 mcg/ml.   Lacosamide: 13.0 mcg/ml.   Valproate: 35 mcg/ml., with normal platelet count, AST and ALT.       IMPRESSION:   GTC seizures were controlled with relatively high doses of levetiracetam and lacosamide.  No complex partial seizures occurred after divalproex was started about 2 months ago.  Platelet count and hepatic enzymes were normal, after starting divalproex.    Auras have persisted.  He finds these quite unpleasant.  Given the low valproate level, we agreed to increase divalproex dosing today.    He has had difficulties  with irritability and occasional flares of anger for essentially his entire adult life.  Angry outbursts have increased recently, perhaps related to his discouraging brain tumor prognosis, loss of independence, and other life conditions that he finds degrading.  I told the patient and his wife that levetiracetam clearly is not the only cause of his longstanding irritability, which began long before levetiracetam was started, but that levetiracetam may be exacerbating these issues.  I pointed out that he is on a rather high dose of the drug and has quite high blood levels.  He and his wife ere comfortable with making a dose decrease today, and I told them that we likely could make a further decrease in a month.  We will assess his response to the increased divalproex dose in making this decision.  I strongly supported Dr. Olmos s referral to the Palliative Care service, with consideration of further medical and psychological therapy of depression.       He is not driving and does not plan to do so.     PLAN:   1)  Decrease levetiracetam to 2000 mg b.i.d.   2)  Increase divalproex to 500 mg b.i.d.   3)  Continue current dose of lacosamide.  4)  Return in 4 weeks for VIDEO clinic visit.     Video Conference via [employer].   Video Start Time: 3:41 p.m.   Video Stop Time: 4:27 p.m.   Provider location: [facility]   Reported Patient Location: Home      Frank Pacheco M.D., Professor of Neurology

## 2021-02-23 NOTE — TELEPHONE ENCOUNTER
"Was able to reach patient's wife this afternoon. She apologized for her full VM. She reports she did not have my direct contact phone # so didn't call back. Did advise of that phone #.     Advised per MD's [employer] message, \"A lot of the 'more standard' medication options at this point are ones that also raise his seizure risk unfortunately. One option is a medicine called aripiprazole/Abilify which has a very minimal increased risk of seizure. It is used as an 'add-on' medicine for severe depression when high dose antidepressants aren't working anymore.\"     She does think that they are interested in this change, but notes that Neuro did change his seizure meds below in an effort to help with depression as well. Per their note:   \"PLAN:   1)  Decrease levetiracetam to 2000 mg b.i.d.   2)  Increase divalproex to 500 mg b.i.d.   3)  Continue current dose of lacosamide.  4)  Return in 4 weeks for VIDEO clinic visit.\"    She wants to make sure that Dr. Banuelos knows that and would like to know if they should start Abilify now or if they should wait? She does states that they would like to add that in if MD thinks it will help.    She notes that she doesn't want him to feel how he is anymore, but also wants to be cautious and not create seizure issues.     Advised I would ask the MD and get back to her about this. She would like med sent to their preferred [facility] in [place].  "

## 2021-02-23 NOTE — TELEPHONE ENCOUNTER
Received VM from patient's spouse attempting to follow up on medication question below - tried to call her back but was unable to reach her. Her VM was full so I was unable to leave a VM. If I do not hear back this morning, will attempt to call again this afternoon.

## 2021-02-24 NOTE — TELEPHONE ENCOUNTER
"Per MD, \"Those were significant changes and I think it best we wait 3 weeks; I don't want her/us to be confused if he eg has a reaction, was it the new med, or higher dose of divalproex, etc. So let's wait. IF he's not better in a few weeks I'll add aripiprazole.\"    Called patient's wife to discuss. Patient's wife thinks that sounds reasonable and is okay with the plan. They started new med regimen today. She will call us in 3 weeks or so if she feels they need more support/med change.    "

## 2021-03-09 NOTE — PROGRESS NOTES
Received VM from patient's wife wanting to follow up about a new medication for depression for patient as discussed a few weeks ago.     Called her back - they haven't noticed a big change in mood since the seizure med change. She notes she knows it hasn't been a long time yet, but wanted to touch base to see exactly how long Dr. Banuelos would recommend waiting before adding in a new depression medication.     They are scheduled to see Dr. Grullon in 1 month, but do not want to wait another full month before trying a new medication for patient's mood.     She will wait to hear back what timeframe Dr. Banuelos thinks for adding in a new medication (abilify was previously discussed).

## 2021-03-10 NOTE — PROGRESS NOTES
Spoke with patient's wife and advised of below recommendation per Dr. Banuelos. She will  script today and give it a try. Will let us know about any arising questions/concerns/changes. We did discuss dosage instructions at length as well as potential side effects.

## 2021-03-10 NOTE — PROGRESS NOTES
We can start it now  Ordered it; 2 mg daily; doesn't matter when he takes it; if he feels tired with it take it at night

## 2021-03-19 NOTE — DISCHARGE INSTRUCTIONS
MRI Contrast Discharge Instructions    The IV contrast you received today will pass out of your body in your  urine. This will happen in the next 24 hours. You will not feel this process.  Your urine will not change color.    Drink at least 4 extra glasses of water or juice today (unless your doctor  has restricted your fluids). This reduces the stress on your kidneys.  You may take your regular medicines.    If you are on dialysis: It is best to have dialysis today.    If you have a reaction: Most reactions happen right away. If you have  any new symptoms after leaving the hospital (such as hives or swelling),  call your hospital at the correct number below. Or call your family doctor.  If you have breathing distress or wheezing, call 911.    Special instructions: ***    I have read and understand the above information.    Signature:______________________________________ Date:___________    Staff:__________________________________________ Date:___________     Time:__________    [facility] Departments:    ___[place]: [phone]  ___[facility]: [phone]  ___[place]: [phone] ___[facility]: [phone]  ___[facility]: [phone]  ___[place]: [phone]  ___[phone]  ___[facility]: [phone]  ___[phone]

## 2021-03-22 PROBLEM — S06.5XAA SUBDURAL HEMATOMA (H): Status: RESOLVED | Noted: 2019-12-12 | Resolved: 2021-01-01

## 2021-03-22 NOTE — PROGRESS NOTES
"Jayden is a 66 year old who is being evaluated via a billable video visit.      How would you like to obtain your AVS? MyChart     If the video visit is dropped, the invitation should be resent by: Text to cell phone: [phone]     Will anyone else be joining your video visit? No     Vitals - Patient Reported  Weight (Patient Reported): 109.8 kg (242 lb)  Height (Patient Reported): 190.5 cm (6' 3\")  BMI (Based on Pt Reported Ht/Wt): 30.25  Pain Score: No Pain (0)    Emeterio Valdez LPN      Video-Visit Details  Type of service:  Video Visit    Prep time: 10 minutes  Video Start Time: 2:07 PM  Video End Time: 2:59 PM  Post-visit documentation: 10 minutes    Originating Location (pt. Location): Home    Distant Location (provider location):  [facility]     Platform used for Video Visit: Isaías Olmos MD    _____________________________________________________________    NEURO-ONCOLOGY VISIT  Mar 22, 2021    CHIEF COMPLAINT: Mr. 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 He completed chemoradiotherapy on 6/11/2019. Imaging in 9/2019 was consistent with recurrent disease and adjuvant-dosed temozolomide was stop. Pathology from repeat resection on 10/24/2019 was consistent with recurrent tumor. He was started on lomustine, but stopped after 1 cycle due to significant chemotherapy-induced fatigue.     Quality of life has been compromised by recurrent seizure events, PE requiring prolonged hospitalization, and chemotherapy-related toxicities.    Currently, he is not on any cancer-directed therapy and managed on imaging surveillance. Imaging in 12/2020 showed a new area of contrast enhancement and repeat imaging from 2/2021 and 3/2021 demonstrated further increase in contrast enhancement, now with increased perfusion.     I met " "with Jayden and Oz (wife) for this follow-up visit today.    HISTORY OF PRESENT ILLNESS  -Jayden is feeling fairly good today; \"about 5/10\".   -He has been experiencing more seizure auras lately, but only 1 partial seizure and no \"full\" recurrent seizures.   -He has been more and more frustrated with not being able to do things that he used to be able to do. Good balance, but walking more slow. Worsening right sided weakness; leans to his left and drags his right foot. Worsening vision/ bumping into things. More fatigued, though sleeping well at night. Depressed at times. Jayden is also noticing that he is having a progressively harder time with cognition and word-finding.  -Eating well.    -Remains on dexamethasone 1 mg daily. No arthritic pain. No headaches.     REVIEW OF SYSTEMS  A comprehensive ROS negative except as in HPI.      MEDICATIONS   Current Outpatient Medications   Medication Sig Dispense Refill     ARIPiprazole (ABILIFY) 2 MG tablet Take 1 tablet (2 mg) by mouth daily 30 tablet 1     atenolol (TENORMIN) 100 MG tablet Take 50 mg by mouth daily 50 mg       Cholecalciferol 1.25 MG (53636 UT) TABS 1 cap(s)       dexamethasone (DECADRON) 1 MG tablet Take 1 tablet (1 mg) by mouth daily (with breakfast) 90 tablet 3     diazepam (VALIUM) 5 MG/ML (HIGH CONC) solution Take 1 mL (5 mg) by mouth every 3 minutes as needed for seizures (For seizure activity.) If seizure worsens or has new symptoms, repeat with 1 mL by mouth up to a max of 3.0 mL in 1 day. Do not give if patient has very shallow or slow breathing. 10 mL 0     diltiazem ER (DILT-XR) 180 MG 24 hr capsule Take 180 mg by mouth daily       divalproex sodium delayed-release (DEPAKOTE) 250 MG DR tablet Take 2 tablets (500 mg) by mouth 2 times daily 360 tablet 3     docusate sodium (COLACE) 50 MG capsule Take 50 mg by mouth 2 times daily as needed for constipation       lacosamide (VIMPAT) 200 MG TABS tablet Take 1 tablet (200 mg) twice daily (together " with 50 mg tablets, to total 250mg in AM and 250mg in PM). 180 tablet 1     lacosamide (VIMPAT) 50 MG TABS tablet Take 1 tablet (50 mg) twice daily (together with 200 mg tablets, to total 250mg in AM and 250mg in PM). 180 tablet 1     levETIRAcetam (KEPPRA) 1000 MG tablet Take 2 tablets (2000 mg) twice daily. 360 tablet 3     lisinopril (PRINIVIL/ZESTRIL) 5 MG tablet Take 1 tablet (5 mg) by mouth daily 28 tablet 0     rivaroxaban ANTICOAGULANT (XARELTO ANTICOAGULANT) 20 MG TABS tablet Take 1 tablet (20 mg) by mouth daily (with dinner) 30 tablet 3     sertraline (ZOLOFT) 100 MG tablet Take 2 tablets (200 mg) by mouth daily 180 tablet 3     DRUG ALLERGIES   Allergies   Allergen Reactions     Shellfish Allergy Difficulty breathing, Nausea and Vomiting, Swelling and Shortness Of Breath     Lupine Bean Extract      Shellfish-Derived Products      No Clinical Screening - See Comments      Lupine bean doesn't remember reaction       ONCOLOGIC HISTORY  -1/2019 PRESENTATION: Visual disturbance.   -3/6/2019 Evaluated by Dr. Mauricio, neuro-ophthalmologist, found to have a right sided homonymous hemianopsia. MRI ordered.   -3/6/2019 MR brain imaging revealed a contrast-enhancing lesion in the left occipital lobe suspicious for a high-grade primary brain tumor.  -3/22/2019 SURGERY: Craniotomy with a gross total resection by Dr. Irving Hayes.   PATHOLOGY: Glioblastoma; Wildtype status for IDH1/2 by next generation sequencing. MGMT promoter methylated. Wildtype PTEN, TP53.  -4/8/2019 NEURO-ONC: Recommending chemoradiotherapy.   -4/30 - 6/11/2019 CHEMORADS 6000cGy in 30 fractions with concurrent temozolomide 75mg/m2 (180mg).  -5/20/2019 NEURO-ONC: Not interested in Optune at this time.   -6/12/2019 NEURO-ONC: Clinically stable.   -6/21 - 6/28/2019 ADMISSION/ SZ: Admitted to [facility] for first ever seizure event, provoked by hyponatremia. Urine studies consistent with SIADH ; etiology of SIADH unclear. Had 10mm gastric lesion biopsied  on 6/26/2019 by way of an EGD.  PATHOLOGY of gastric lesion: Hyperplastic polyp with erosion and foci of atypia; favor reactive. No intestinal metaplasia, no helicobacter pylori, no high grade dysplasia or invasive carcinoma.   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   -7/12/2019 NEURO-ONC/ MRB/ CHEMO: Clinical stable. Imaging with stable to slightly increased enhancement of previously seen nodular lesions; associated with mild degree of elevated rCBV. Starting adjuvant-dosed temozolomide 150mg/m2 (360mg), cycle 1 (start date 7/12/2019). Monitor fluid intake, will be checking CMP/ Na+.   -8/12/2019 NEURO-ONC/ MRB/ CHEMO: Clinical decompensated, but improving since hospital discharge. MR brain scan from last week with concern for non-contrast enhancing >> enhancing disease progression. Ordering a PET brain scan. Holding adjuvant-dosed temozolomide, cycle 3.  -10/24/2019 SURGERY: Repeat resection by Dr. Packer.  PATHOLOGY: Recurrent glioblastoma.   Next generation sequencing by [facility]; Microsatellite instability; stable. Tumor mutational burden; 4 (low). Mutations in ARID1A, ASXL1, ATRX, FANCE, MED12, MEF2B, NF1, RUNX1, TERT.   -11/25/2019 NEURO-ONC/ MRB/ CHEMO: Clinically doing well. Imaging with positive resection. Starting Lomustine (start date of 12/2). Next generation sequencing.   -12/4/2019 CHEMO: Lomustine, cycle 1.   -1/6/2020 NEURO-ONC: Clinically stable. Has TCP with Lomustine. Will hold off on starting next week until he sees Dr. Olmos with new imaging.  -1/31/2020 NEURO-ONC/ MRB/ CHEMO: Clinically doing well. Imaging with no evidence of disease recurrence. Holding Lomustine in the setting of a poor emotional state. Referrals to Dr. Aleksandr Garcia for neuro-psych testing, Dr. Eugene Vargas for counseling/ coping, and palliative care. Next generation sequencing results discussed. Increase Zoloft. Continue dexamethasone 2mg daily.  -4/1/2020 ADMISSION/ SZ- Keppra increased, added diazepam PRN, continue  Vimpat.   -4/1/2020 MRB with no significant change.   -4/13/2020 NEURO-ONC: Clinically improved since hospitalization. Continuing imaging surveillance. Increase Zoloft.   -6/26/2020 ADMISSION/ SZ- Keppra and Vimpat increased, continued diazepam PRN.   -6/29/2020 NEURO-ONC/ MRB: Clinically improved since hospitalization. Imaging largely stable. Continuing imaging surveillance. Trial of ritalin.   -8/10/2020 NEURO-ONC: Clinically stable. Will refer to palliative SW.  -8/24/2020 NEURO-ONC/ MRB: Clinically stable to improved. Imaging largely stable with no concern for disease recurrence. Continuing imaging surveillance. Referral to palliative care.   -9/25/2020 NEURO-ONC: Clinically declined after seizures on 9/18, though now slowly improving. Sending to [facility] to obtain better seizure control. No active cancer treatment.  -10/26/2020 NEURO-ONC/ MRB: Clinically stable. Imaging stable. Continue management of epilepsy per Dr. Pacheco. Weaning dexamethasone to 1mg daily.   -12/21/2020 NEURO-ONC/ MRB: Clinically with a recurrent seizure. Imaging with a new 6mm growth about the resection cavity. Repeat imaging in 6 weeks.   -2/1/2021 NEURO-ONC/ MRB: Clinically stable. Imaging with increased contrast enhancement, but no associated perfusion.  -3/22/2021 NEURO-ONC/ MRB: Clinically with more symptoms. Imaging with increased contrast enhancement and now with associated perfusion. The plan is for consideration for surgery or radiation, either followed by chemotherapy. I recommended against GammaTile Therapy given history of epilepsy. Following consultation with neurosurgery and radiation oncology, the decision may be to transition to hospice.     SOCIAL HISTORY   Tobacco use: Former smoker, quit 40 years ago.   Alcohol use: Social use.  Drug use: Denies.  .   Employment: On disability.       PHYSICAL EXAMINATION  KPS: 50-60  -Generally well appearing.  -Respiratory: No audible wheezing.   -Skin: No facial  kya.  -Psychiatric: Normal mood and affect. Pleasant, talkative.  -Neurologic:   MENTAL STATUS:     Alert, oriented.    Recall: Impaired.    Speech with hesitation and word finding issues.    Comprehension with impairment.     CRANIAL NERVES:     Pupils are equal, round.       Symmetric facial movements.   Hearing intact.   With normal phonation, no dysfunction of the palate or tongue.   MOTOR: Antigravity in arms.  SENSATION: Intact to light touch throughout.     The rest of a comprehensive physical examination is deferred due to PHE (public health emergency) video visit restrictions.        MEDICAL RECORDS  Obtained and personally reviewed all available outside medical records in addition to reviewing any records available in our electronic system.     LABS  Personally reviewed all available lab results.     IMAGING  Personally reviewed MR brain imaging from last week and compared to prior imaging. To my eye, the contrast enhancing lesion about the anterior aspect of the left temporoparietal resection cavity has increased in size and is now demonstrating increased cerebral blood volume and diffusion restriction.    Imaging was shown to and results were reviewed with Jayden and Oz.     Imaging and case was reviewed at Brain Tumor Conference earlier today.       IMPRESSION  Clinic time for this high complexity visit was spent discussing in detail the nature of his cancer in light of his repeat imaging. This was in addition to answering questions pertaining to my recommendations and devising the plan as outlined below.      Clinically, Jayden has continued to see some subtle decline in terms of cognition, aphasia, gait stability, and coordination. He has periods where he becomes very frustrated and depressed give his current functional status and inability to do the things that he used to be able to do. He has chronic fatigue. Thankfully, he has not had any recent recurrent seizures, but does experience  frequent seizure auras.      Recent imaging again showed further enlargement of the area of contrast enhancement about the resection cavity and now, there is an assoicated increase in perfusion. Jayden's case was discussed at Brain Tumor Conference earlier today and both the option of surgery and repeat radiation are discussed.     However, as I explained to Jayden and Oz today, Jayden's primary goal over the past 6+ months has been to avoid excessive measures (including restarting chemotherapy) as a means to optimize quality of life. I have concern about Jayden's recovery from either surgery or radiation therapy and think that he will be left with more functional decline faster than if neither intervention was use. In addition, I explained that surgery or radiation therapy should be considered a bridge to more chemotherapy (likely bevacizumab (sofia) +/- additional chemotherapy) and he would have to intend on following up surgery/ radiation therapy with adjuvant chemotherapy. Of note, I recommended against the use of GammaTile Therapy given history of epilepsy. To gather all the information needed to make this decision, I will reach out to Alem Packer and Leo to arrange appts to discuss the option of surgery/ radiation therapy respectively.     If Jayden and Oz make the decision to forgo treatment, then a transition to hospice is an option. In the setting of an incurable cancer, Jayden and Oz have remained clear about their desire to maintain a high quality of life over quantity of life. Following this discussion today, Jayden stated that a main goal of his would be to send a month with his grandchildren in [place].    Palliative care appt scheduled for April.     PROBLEM LIST  Glioblastoma, MGMT methylated and IDH1 wildtype by NGS  Visual field cut; homonymous hemianopsia, right-side  Memory issues  Drug-induced constipation  Hyperplastic gastric polyps, benign  H/o SIADH  Epilepsy    PLAN  -CANCER DIRECTED  THERAPY-  -As above.     -STEROIDS-  -Continue dexamethasone 1mg daily.   -Monitor for worsening of neurological symptoms as well as a flair in RA.     -SEIZURE MANAGEMENT-  -Per Dr. Pacheco.     -Quality of life/ MOOD/ FATIGUE-  -History of mood issues; depression, anxiety, irritability, poor coping.  -Off Ritalin  -Added to Brain Tumor Support Group email list; [email or web address]  -Conitnue daily exercises.   -Following with Dr. Eugene Vargas for counseling/ coping.   -Following with palliative care MD and SW.     -HYPONATREMIA/ History of SIADH-    -DVT/ PE-  -Need for lifelong anticoagulation.  -Continue Xarelto.    Pharmacy to reach out regarding insurance coverage.   -If platelets drop <50, will need to hold anticoagulation.    Return to clinic pending treatment decisions.     In the meantime, Jayden and Oz know to call with questions or concerns or to report new complaints and can be seen sooner if needed     Nasra Olmos MD  Neuro-oncology

## 2021-03-22 NOTE — LETTER
"    3/22/2021         RE: Jayden Lackey  [address]  [place] [address]        Dear Colleague,    Thank you for referring your patient, Jayden Lackey, to the [facility]. Please see a copy of my visit note below.    Jayden is a 66 year old who is being evaluated via a billable video visit.      How would you like to obtain your AVS? MyChart     If the video visit is dropped, the invitation should be resent by: Text to cell phone: [phone]     Will anyone else be joining your video visit? No     Vitals - Patient Reported  Weight (Patient Reported): 109.8 kg (242 lb)  Height (Patient Reported): 190.5 cm (6' 3\")  BMI (Based on Pt Reported Ht/Wt): 30.25  Pain Score: No Pain (0)    Emetreio Valdez LPN      Video-Visit Details  Type of service:  Video Visit    Prep time: 10 minutes  Video Start Time: 2:07 PM  Video End Time: 2:59 PM  Post-visit documentation: 10 minutes    Originating Location (pt. Location): Home    Distant Location (provider location):  [facility]     Platform used for Video Visit: Isaías Olmos MD    _____________________________________________________________    NEURO-ONCOLOGY VISIT  Mar 22, 2021    CHIEF COMPLAINT: Mr. 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 He completed chemoradiotherapy on 6/11/2019. Imaging in 9/2019 was consistent with recurrent disease and adjuvant-dosed temozolomide was stop. Pathology from repeat resection on 10/24/2019 was consistent with recurrent tumor. He was started on lomustine, but stopped after 1 cycle due to significant chemotherapy-induced fatigue.     Quality of life has been compromised by recurrent seizure events, PE requiring prolonged hospitalization, and chemotherapy-related toxicities.    Currently, he is not on " "any cancer-directed therapy and managed on imaging surveillance. Imaging in 12/2020 showed a new area of contrast enhancement and repeat imaging from 2/2021 and 3/2021 demonstrated further increase in contrast enhancement, now with increased perfusion.     I met with Jayden and Oz (wife) for this follow-up visit today.    HISTORY OF PRESENT ILLNESS  -Jayden is feeling fairly good today; \"about 5/10\".   -He has been experiencing more seizure auras lately, but only 1 partial seizure and no \"full\" recurrent seizures.   -He has been more and more frustrated with not being able to do things that he used to be able to do. Good balance, but walking more slow. Worsening right sided weakness; leans to his left and drags his right foot. Worsening vision/ bumping into things. More fatigued, though sleeping well at night. Depressed at times. aJyden is also noticing that he is having a progressively harder time with cognition and word-finding.  -Eating well.    -Remains on dexamethasone 1 mg daily. No arthritic pain. No headaches.     REVIEW OF SYSTEMS  A comprehensive ROS negative except as in HPI.      MEDICATIONS   Current Outpatient Medications   Medication Sig Dispense Refill     ARIPiprazole (ABILIFY) 2 MG tablet Take 1 tablet (2 mg) by mouth daily 30 tablet 1     atenolol (TENORMIN) 100 MG tablet Take 50 mg by mouth daily 50 mg       Cholecalciferol 1.25 MG (12754 UT) TABS 1 cap(s)       dexamethasone (DECADRON) 1 MG tablet Take 1 tablet (1 mg) by mouth daily (with breakfast) 90 tablet 3     diazepam (VALIUM) 5 MG/ML (HIGH CONC) solution Take 1 mL (5 mg) by mouth every 3 minutes as needed for seizures (For seizure activity.) If seizure worsens or has new symptoms, repeat with 1 mL by mouth up to a max of 3.0 mL in 1 day. Do not give if patient has very shallow or slow breathing. 10 mL 0     diltiazem ER (DILT-XR) 180 MG 24 hr capsule Take 180 mg by mouth daily       divalproex sodium delayed-release (DEPAKOTE) 250 MG DR" tablet Take 2 tablets (500 mg) by mouth 2 times daily 360 tablet 3     docusate sodium (COLACE) 50 MG capsule Take 50 mg by mouth 2 times daily as needed for constipation       lacosamide (VIMPAT) 200 MG TABS tablet Take 1 tablet (200 mg) twice daily (together with 50 mg tablets, to total 250mg in AM and 250mg in PM). 180 tablet 1     lacosamide (VIMPAT) 50 MG TABS tablet Take 1 tablet (50 mg) twice daily (together with 200 mg tablets, to total 250mg in AM and 250mg in PM). 180 tablet 1     levETIRAcetam (KEPPRA) 1000 MG tablet Take 2 tablets (2000 mg) twice daily. 360 tablet 3     lisinopril (PRINIVIL/ZESTRIL) 5 MG tablet Take 1 tablet (5 mg) by mouth daily 28 tablet 0     rivaroxaban ANTICOAGULANT (XARELTO ANTICOAGULANT) 20 MG TABS tablet Take 1 tablet (20 mg) by mouth daily (with dinner) 30 tablet 3     sertraline (ZOLOFT) 100 MG tablet Take 2 tablets (200 mg) by mouth daily 180 tablet 3     DRUG ALLERGIES   Allergies   Allergen Reactions     Shellfish Allergy Difficulty breathing, Nausea and Vomiting, Swelling and Shortness Of Breath     Lupine Bean Extract      Shellfish-Derived Products      No Clinical Screening - See Comments      Lupine bean doesn't remember reaction       ONCOLOGIC HISTORY  -1/2019 PRESENTATION: Visual disturbance.   -3/6/2019 Evaluated by Dr. Mauricio, neuro-ophthalmologist, found to have a right sided homonymous hemianopsia. MRI ordered.   -3/6/2019 MR brain imaging revealed a contrast-enhancing lesion in the left occipital lobe suspicious for a high-grade primary brain tumor.  -3/22/2019 SURGERY: Craniotomy with a gross total resection by Dr. Irving Hayes.   PATHOLOGY: Glioblastoma; Wildtype status for IDH1/2 by next generation sequencing. MGMT promoter methylated. Wildtype PTEN, TP53.  -4/8/2019 NEURO-ONC: Recommending chemoradiotherapy.   -4/30 - 6/11/2019 CHEMORADS 6000cGy in 30 fractions with concurrent temozolomide 75mg/m2 (180mg).  -5/20/2019 NEURO-ONC: Not interested in Optune at this  time.   -6/12/2019 NEURO-ONC: Clinically stable.   -6/21 - 6/28/2019 ADMISSION/ SZ: Admitted to [facility] for first ever seizure event, provoked by hyponatremia. Urine studies consistent with SIADH ; etiology of SIADH unclear. Had 10mm gastric lesion biopsied on 6/26/2019 by way of an EGD.  PATHOLOGY of gastric lesion: Hyperplastic polyp with erosion and foci of atypia; favor reactive. No intestinal metaplasia, no helicobacter pylori, no high grade dysplasia or invasive carcinoma.   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   -7/12/2019 NEURO-ONC/ MRB/ CHEMO: Clinical stable. Imaging with stable to slightly increased enhancement of previously seen nodular lesions; associated with mild degree of elevated rCBV. Starting adjuvant-dosed temozolomide 150mg/m2 (360mg), cycle 1 (start date 7/12/2019). Monitor fluid intake, will be checking CMP/ Na+.   -8/12/2019 NEURO-ONC/ MRB/ CHEMO: Clinical decompensated, but improving since hospital discharge. MR brain scan from last week with concern for non-contrast enhancing >> enhancing disease progression. Ordering a PET brain scan. Holding adjuvant-dosed temozolomide, cycle 3.  -10/24/2019 SURGERY: Repeat resection by Dr. Packer.  PATHOLOGY: Recurrent glioblastoma.   Next generation sequencing by [facility]; Microsatellite instability; stable. Tumor mutational burden; 4 (low). Mutations in ARID1A, ASXL1, ATRX, FANCE, MED12, MEF2B, NF1, RUNX1, TERT.   -11/25/2019 NEURO-ONC/ MRB/ CHEMO: Clinically doing well. Imaging with positive resection. Starting Lomustine (start date of 12/2). Next generation sequencing.   -12/4/2019 CHEMO: Lomustine, cycle 1.   -1/6/2020 NEURO-ONC: Clinically stable. Has TCP with Lomustine. Will hold off on starting next week until he sees Dr. Olmos with new imaging.  -1/31/2020 NEURO-ONC/ MRB/ CHEMO: Clinically doing well. Imaging with no evidence of disease recurrence. Holding Lomustine in the setting of a poor emotional state. Referrals to Dr. Aleksandr Garcia  for neuro-psych testing, Dr. Eugene Vargas for counseling/ coping, and palliative care. Next generation sequencing results discussed. Increase Zoloft. Continue dexamethasone 2mg daily.  -4/1/2020 ADMISSION/ SZ- Keppra increased, added diazepam PRN, continue Vimpat.   -4/1/2020 MRB with no significant change.   -4/13/2020 NEURO-ONC: Clinically improved since hospitalization. Continuing imaging surveillance. Increase Zoloft.   -6/26/2020 ADMISSION/ SZ- Keppra and Vimpat increased, continued diazepam PRN.   -6/29/2020 NEURO-ONC/ MRB: Clinically improved since hospitalization. Imaging largely stable. Continuing imaging surveillance. Trial of ritalin.   -8/10/2020 NEURO-ONC: Clinically stable. Will refer to palliative SW.  -8/24/2020 NEURO-ONC/ MRB: Clinically stable to improved. Imaging largely stable with no concern for disease recurrence. Continuing imaging surveillance. Referral to palliative care.   -9/25/2020 NEURO-ONC: Clinically declined after seizures on 9/18, though now slowly improving. Sending to [facility] to obtain better seizure control. No active cancer treatment.  -10/26/2020 NEURO-ONC/ MRB: Clinically stable. Imaging stable. Continue management of epilepsy per Dr. Pacheco. Weaning dexamethasone to 1mg daily.   -12/21/2020 NEURO-ONC/ MRB: Clinically with a recurrent seizure. Imaging with a new 6mm growth about the resection cavity. Repeat imaging in 6 weeks.   -2/1/2021 NEURO-ONC/ MRB: Clinically stable. Imaging with increased contrast enhancement, but no associated perfusion.  -3/22/2021 NEURO-ONC/ MRB: Clinically with more symptoms. Imaging with increased contrast enhancement and now with associated perfusion. The plan is for consideration for surgery or radiation, either followed by chemotherapy. I recommended against GammaTile Therapy given history of epilepsy. Following consultation with neurosurgery and radiation oncology, the decision may be to transition to hospice.     SOCIAL HISTORY   Tobacco use:  Former smoker, quit 40 years ago.   Alcohol use: Social use.  Drug use: Denies.  .   Employment: On disability.       PHYSICAL EXAMINATION  KPS: 50-60  -Generally well appearing.  -Respiratory: No audible wheezing.   -Skin: No facial rashes.  -Psychiatric: Normal mood and affect. Pleasant, talkative.  -Neurologic:   MENTAL STATUS:     Alert, oriented.    Recall: Impaired.    Speech with hesitation and word finding issues.    Comprehension with impairment.     CRANIAL NERVES:     Pupils are equal, round.       Symmetric facial movements.   Hearing intact.   With normal phonation, no dysfunction of the palate or tongue.   MOTOR: Antigravity in arms.  SENSATION: Intact to light touch throughout.     The rest of a comprehensive physical examination is deferred due to PHE (public health emergency) video visit restrictions.        MEDICAL RECORDS  Obtained and personally reviewed all available outside medical records in addition to reviewing any records available in our electronic system.     LABS  Personally reviewed all available lab results.     IMAGING  Personally reviewed MR brain imaging from last week and compared to prior imaging. To my eye, the contrast enhancing lesion about the anterior aspect of the left temporoparietal resection cavity has increased in size and is now demonstrating increased cerebral blood volume and diffusion restriction.    Imaging was shown to and results were reviewed with Jayden and Oz.     Imaging and case was reviewed at Brain Tumor Conference earlier today.       IMPRESSION  Clinic time for this high complexity visit was spent discussing in detail the nature of his cancer in light of his repeat imaging. This was in addition to answering questions pertaining to my recommendations and devising the plan as outlined below.      Clinically, Jayden has continued to see some subtle decline in terms of cognition, aphasia, gait stability, and coordination. He has periods where he  becomes very frustrated and depressed give his current functional status and inability to do the things that he used to be able to do. He has chronic fatigue. Thankfully, he has not had any recent recurrent seizures, but does experience frequent seizure auras.      Recent imaging again showed further enlargement of the area of contrast enhancement about the resection cavity and now, there is an assoicated increase in perfusion. Jayden's case was discussed at Brain Tumor Conference earlier today and both the option of surgery and repeat radiation are discussed.     However, as I explained to Jayden and Oz today, Jayden's primary goal over the past 6+ months has been to avoid excessive measures (including restarting chemotherapy) as a means to optimize quality of life. I have concern about Jayden's recovery from either surgery or radiation therapy and think that he will be left with more functional decline faster than if neither intervention was use. In addition, I explained that surgery or radiation therapy should be considered a bridge to more chemotherapy (likely bevacizumab (sofia) +/- additional chemotherapy) and he would have to intend on following up surgery/ radiation therapy with adjuvant chemotherapy. Of note, I recommended against the use of GammaTile Therapy given history of epilepsy. To gather all the information needed to make this decision, I will reach out to Alem Packer and Leo to arrange appts to discuss the option of surgery/ radiation therapy respectively.     If Jayden and Oz make the decision to forgo treatment, then a transition to hospice is an option. In the setting of an incurable cancer, Jayden and Oz have remained clear about their desire to maintain a high quality of life over quantity of life. Following this discussion today, Jayden stated that a main goal of his would be to send a month with his grandchildren in [place].    Palliative care appt scheduled for April.     PROBLEM  LIST  Glioblastoma, MGMT methylated and IDH1 wildtype by NGS  Visual field cut; homonymous hemianopsia, right-side  Memory issues  Drug-induced constipation  Hyperplastic gastric polyps, benign  H/o SIADH  Epilepsy    PLAN  -CANCER DIRECTED THERAPY-  -As above.     -STEROIDS-  -Continue dexamethasone 1mg daily.   -Monitor for worsening of neurological symptoms as well as a flair in RA.     -SEIZURE MANAGEMENT-  -Per Dr. Pacheco.     -Quality of life/ MOOD/ FATIGUE-  -History of mood issues; depression, anxiety, irritability, poor coping.  -Off Ritalin  -Added to Brain Tumor Support Group email list; [email or web address]  -Conitnue daily exercises.   -Following with Dr. Eugene Vargas for counseling/ coping.   -Following with palliative care MD and SW.     -HYPONATREMIA/ History of SIADH-    -DVT/ PE-  -Need for lifelong anticoagulation.  -Continue Xarelto.    Pharmacy to reach out regarding insurance coverage.   -If platelets drop <50, will need to hold anticoagulation.    Return to clinic pending treatment decisions.     In the meantime, [facility] know to call with questions or concerns or to report new complaints and can be seen sooner if needed     Nasra Olmos MD  Neuro-oncology

## 2021-03-23 NOTE — LETTER
3/23/2021       RE: Jayden Lackey  [address]  [facility] [address]     Dear Colleague,    Thank you for referring your patient, Jayden Lackey, to the [facility] [place] at [facility]. Please see a copy of my visit note below.      Jayden Lackey is a 66 year old who is being evaluated via a billable video visit.       How would you like to obtain your AVS? Mychart  If the video visit is dropped, the invitation should be resent by: [phone]       EPILEPSY CLINIC VIDEO VISIT NOTE  The scheduled clinic visit was changed to a video visit to reduce the risk of COVID-19 transmission.           Service Date: 03/23/2021    HISTORY: I spoke with Mr. Jayden Lackey and his wife.  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     The patient denied having recent fever or cough, and exposure to anyone thought to have COVID-19.      Following the most recent visit to this clinic on 02/22/2021, the patient reportedly has had no grand mal seizures and no complex partial seizures.  The most recent generalized convulsions probably occurred on 09/18/2020.      He had one complex partial seizureover this period, which occurred on 03/14/2021.       His wife has kept summaries of isolated auras that he has reported to her at the time of the aura (usually with a metallic taste in his mouth, a rising sensation in his stomach, and anxiety).  She recorded 9 auras overhte last month, never with more than one on a single day; the last of these occurred on 03/21/2021.    His wife checks on his medication use, and she is certain that no doses have been missed.  He has not had adverse effects that appear  directly linked to AED use, including after initiation of divalproex.  He often is excessively drowsy during the day and this may have increased following increased AED doses in 2020.  In recent months, his wife has not noted change in chronic attention and memory disturbance, difficulties with comprehension and expression, and difficulty in solving problems.       He has chronic depression and  anger management  issues, dating back about 40 years.  In late 2020, his irritability worsened.  Twice in February, he has become extremely frustrated and has thrown furniture into a wall.  Although he shouts at his wife when he is angry, he has not thrown objects at her, hit her or otherwise physically injured her.  He does not own a gun.  He often feels  degraded  by the conditions of his brain tumor.  He denied having suicidal ideation.  He uses sertraline as prescribed, possibly with some benefit in mood.  He recently was referred to the Palliative Care service by Dr. Olmos.    His mood improved, and irritability decreased, after the levetiracetam dose was decreased, divalproex was started, and aripiprazole was added to the prior sertraline regimen.       MEDICATIONS:  Levetiracetam 2000 mg b.i.d., lacosamide 250 mg b.i.d., divalproex 500 mg b.i.d., diazepam as rescue medication (5 mg oral solution, up to 15 mg in one day for persisting focal impaired seizures), and other medications as per the electronic medical record.      VIDEO OBSERVATIONS:   The patient demonstrated a paucity of spontaneous speech, but he spoke with normal articulation, fluency and syntax, with normal comprehension of questions.    On command, the patient moved the direction of gaze into all 4 quadrants conjugately and without nystagmus.   Facial movements were normal.    Tongue protruded on the midline.    The patient did not display any resting tremors or action tremors of the outstretched arms.  Limb movements were normal.       LABORATORY DATA:    AED levels on 02/01/2021:   Levetiracetam: 69 mcg/ml.   Lacosamide: 13.0 mcg/ml.   Valproate: 35 mcg/ml., with normal platelet count, AST and ALT.       IMPRESSION:   GTC seizures were controlled with relatively high doses of levetiracetam and lacosamide.  One complex partial seizures occurred after divalproex was increased last month.  We will check AED levels this week, and depending on the results will consider a dose increase in divalproex.    He has had improvement in the exacerbadtion of chronic difficulties with irritability and occasional flares of anger that had occurred in late 2020.  The improvement may be multifactorial, given that the levetiracetam dose was decreased, divalproex was started, and aripiprazole was added to the prior sertraline regimen in early 2021.       He is not driving and does not plan to do so.     PLAN:   1)  Continue current doses of levetiracetam, divalproex, and lacosamide.   2)  Check levels of levetiracetam, divalproex, and lacosamide.   3)  Return in 4 weeks for VIDEO clinic visit.     Video Conference via [employer].   Video Start Time: 10:33 a.m.   Video Stop Time: 11:04 a.m.   Provider location: [facility]   Reported Patient Location: Home      I personally spent 45 minutes in this patient care on the day of this visit, including time in direct contact with the patient of which more than 50% consisted of counseling and coordinating care, and time in other necessary patient care activities without direct patient contact including medical record and imaging review.      Frank Pacheco M.D., Professor of Neurology

## 2021-03-23 NOTE — PATIENT INSTRUCTIONS
Imaging with cancer growth.     I will help arrange appts with Alem Packer and Leo to discuss surgery and radiation therapy.     Hospice was an option discussed today.     Return to clinic pending future plan.     Nasra Olmos MD  Neuro-oncology  3/22/2021

## 2021-03-23 NOTE — PROGRESS NOTES
Jayden Lackey is a 66 year old who is being evaluated via a billable video visit.       How would you like to obtain your AVS? Mychart  If the video visit is dropped, the invitation should be resent by: [phone]       EPILEPSY CLINIC VIDEO VISIT NOTE  The scheduled clinic visit was changed to a video visit to reduce the risk of COVID-19 transmission.           Service Date: 03/23/2021    HISTORY: I spoke with Mr. Jayden Lackey and his wife.  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     The patient denied having recent fever or cough, and exposure to anyone thought to have COVID-19.      Following the most recent visit to this clinic on 02/22/2021, the patient reportedly has had no grand mal seizures and no complex partial seizures.  The most recent generalized convulsions probably occurred on 09/18/2020.      He had one complex partial seizureover this period, which occurred on 03/14/2021.       His wife has kept summaries of isolated auras that he has reported to her at the time of the aura (usually with a metallic taste in his mouth, a rising sensation in his stomach, and anxiety).  She recorded 9 auras overhte last month, never with more than one on a single day; the last of these occurred on 03/21/2021.    His wife checks on his medication use, and she is certain that no doses have been missed.  He has not had adverse effects that appear directly linked to AED use, including after initiation of divalproex.  He often is excessively drowsy during the day and this may have increased following increased AED doses in 2020.  In recent months, his wife has not noted change in chronic attention and memory disturbance, difficulties with comprehension and  expression, and difficulty in solving problems.       He has chronic depression and  anger management  issues, dating back about 40 years.  In late 2020, his irritability worsened.  Twice in February, he has become extremely frustrated and has thrown furniture into a wall.  Although he shouts at his wife when he is angry, he has not thrown objects at her, hit her or otherwise physically injured her.  He does not own a gun.  He often feels  degraded  by the conditions of his brain tumor.  He denied having suicidal ideation.  He uses sertraline as prescribed, possibly with some benefit in mood.  He recently was referred to the Palliative Care service by Dr. Olmos.    His mood improved, and irritability decreased, after the levetiracetam dose was decreased, divalproex was started, and aripiprazole was added to the prior sertraline regimen.       MEDICATIONS:  Levetiracetam 2000 mg b.i.d., lacosamide 250 mg b.i.d., divalproex 500 mg b.i.d., diazepam as rescue medication (5 mg oral solution, up to 15 mg in one day for persisting focal impaired seizures), and other medications as per the electronic medical record.      VIDEO OBSERVATIONS:   The patient demonstrated a paucity of spontaneous speech, but he spoke with normal articulation, fluency and syntax, with normal comprehension of questions.    On command, the patient moved the direction of gaze into all 4 quadrants conjugately and without nystagmus.   Facial movements were normal.    Tongue protruded on the midline.    The patient did not display any resting tremors or action tremors of the outstretched arms.  Limb movements were normal.       LABORATORY DATA:   AED levels on 02/01/2021:   Levetiracetam: 69 mcg/ml.   Lacosamide: 13.0 mcg/ml.   Valproate: 35 mcg/ml., with normal platelet count, AST and ALT.       IMPRESSION:   GTC seizures were controlled with relatively high doses of levetiracetam and lacosamide.  One complex partial seizures occurred after divalproex  was increased last month.  We will check AED levels this week, and depending on the results will consider a dose increase in divalproex.    He has had improvement in the exacerbadtion of chronic difficulties with irritability and occasional flares of anger that had occurred in late 2020.  The improvement may be multifactorial, given that the levetiracetam dose was decreased, divalproex was started, and aripiprazole was added to the prior sertraline regimen in early 2021.       He is not driving and does not plan to do so.     PLAN:   1)  Continue current doses of levetiracetam, divalproex, and lacosamide.   2)  Check levels of levetiracetam, divalproex, and lacosamide.   3)  Return in 4 weeks for VIDEO clinic visit.     Video Conference via [employer].   Video Start Time: 10:33 a.m.   Video Stop Time: 11:04 a.m.   Provider location: [facility]   Reported Patient Location: Home      I personally spent 45 minutes in this patient care on the day of this visit, including time in direct contact with the patient of which more than 50% consisted of counseling and coordinating care, and time in other necessary patient care activities without direct patient contact including medical record and imaging review.      Frank Pacheco M.D., Professor of Neurology

## 2021-03-26 PROBLEM — C71.9 GLIOBLASTOMA (H): Status: ACTIVE | Noted: 2019-04-07

## 2021-03-26 NOTE — PROGRESS NOTES
"Jayden is a 66 year old who is being evaluated via a billable video visit.      How would you like to obtain your AVS? MyChart  If the video visit is dropped, the invitation should be resent by: Text to cell phone: [phone]  Will anyone else be joining your video visit? No      I have reviewed and updated the patient's allergies and medication list.    Concerns: No new concerns.   Refills: None needed.        Vitals - Patient Reported  Weight (Patient Reported): 109.8 kg (242 lb)  Height (Patient Reported): 190.5 cm (6' 3\")  BMI (Based on Pt Reported Ht/Wt): 30.25  Pain Score: No Pain (0)    Jessica Yu CMA      Video Start Time: 3:15 PM  Video-Visit Details    Type of service:  Video Visit    Video End Time: 4:00 PM    Originating Location (pt. Location): Home    Distant Location (provider location):  [facility]     Platform used for Video Visit: [facility]     Neurosurgery Clinic Note    66 M who presents with the third recurrence of a left occipital glioblastoma (IDHwt, MGMTm) for consideration of surgical resection.    On review of systems, the wife noted that the patient has declined in his visual function, cognitive, and gait stability over the past month or so.    Examination notable for word finding difficulties. The patient is able ambulate stably and move all extremities to command.    MRI of the brain from 3/2021 showed an enlarging 2.4 x 3.1 x 1.5 cm left occipital lesion (previously 1.5 x 1.2 x 0.9 cm), immediately deep to the previous resection cavity.    AP: 66 M with recurrent left occipital glioblastoma. I believe that the patient had benefited from his second resection and that resection of this third recurrence can be safely achieved. However, the patient is also interested in exploring palliative care. I have discussed this case with Dr. Olmos, my neuro-oncology colleague. I reviewed the risks and benefits of the surgery with the patient. He will contact me should he " decide to proceed with surgery.    I have spent 45 minutes today, with the majority of the visit counseling the patient on the diagnosis. All questions were answered. Over 50% of my time on the unit was spent counseling the patient and/or coordinating care regarding his recurrent glioblastoma.

## 2021-03-26 NOTE — LETTER
3/26/2021         RE: Jayden Lackey  [address]  [place] [facility] [address]        Dear Colleague,    Thank you for referring your patient, Jayden Lackey, to the [facility]. Please see a copy of my visit note below.      Neurosurgery Clinic Note    66 M who presents with the third recurrence of a left occipital glioblastoma (IDHwt, MGMTm) for consideration of surgical resection.    On review of systems, the wife noted that the patient has declined in his visual function, cognitive, and gait stability over the past month or so.    Examination notable for word finding difficulties. The patient is able ambulate stably and move all extremities to command.    MRI of the brain from 3/2021 showed an enlarging 2.4 x 3.1 x 1.5 cm left occipital lesion (previously 1.5 x 1.2 x 0.9 cm), immediately deep to the previous resection cavity.    AP: 66 M with recurrent left occipital glioblastoma. I believe that the patient had benefited from his second resection and that resection of this third recurrence can be safely achieved. However, the patient is also interested in exploring palliative care. I have discussed this case with Dr. Olmos, my neuro-oncology colleague. I reviewed the risks and benefits of the surgery with the patient. He will contact me should he decide to proceed with surgery.    I have spent 45 minutes today, with the majority of the visit counseling the patient on the diagnosis. All questions were answered. Over 50% of my time on the unit was spent counseling the patient and/or coordinating care regarding his recurrent glioblastoma.          Again, thank you for allowing me to participate in the care of your patient.      Sincerely,    Nicho Packer MD

## 2021-03-29 NOTE — TELEPHONE ENCOUNTER
Patient leaving [facility], has one small bottle left and auras have increased.  This has not been filled by Dr. Pacheco in the past but is used as a rescue medication for seizures so Jayden would like him to fill moving forward.

## 2021-03-30 NOTE — TELEPHONE ENCOUNTER
Records received 03/30/21    Facility  [facility]   Outcome 9/18/2020 ECG tracings sent to scan

## 2021-03-30 NOTE — TELEPHONE ENCOUNTER
diazepam (VALIUM) 5 MG/ML (HIGH CONC) solution  Last Written Prescription Date:  10/9/20  Last Fill Quantity: 10ml,   # refills: 0  Last Office Visit : 3/23/21  Future Office visit:  5/3/21      Routing refill request to provider for review/approval because: controlled. see  note below. last ordered by other provider, requested be filled by RONNI Pacheco. Will be traveling. thanks.

## 2021-03-30 NOTE — TELEPHONE ENCOUNTER
FUTURE VISIT INFORMATION      SURGERY INFORMATION:    Date: 21    Location: [facility] OR    Surgeon:  Dr. Packer    Anesthesia Type:  general    Procedure: INTRAOPERATIVE MAGNETIC RESONANCE IMAGING CRANIOTOMY    Consult: 3.26.21    RECORDS REQUESTED FROM:       Primary Care Provider: Dr. Farooq    Most recent EKG+ Tracin20 [facility]    Most recent ECHO: 19    Action 3.30.21 MJ   Action Taken Requested EKG strips from [facility]

## 2021-04-03 NOTE — PROGRESS NOTES
Palliative Care Clinical Social Work Return Appointment    PLEASE NOTE:  THIS IS A MENTAL HEALTH NOTE.  OTHER PROVIDERS VIEWING THIS NOTE SHOULD USE THIS ONLY FOR UNDERSTANDING THE CONTEXT OF THE PATIENT S EXPERIENCE.  TOPICS DESCRIBED IN THIS NOTE SHOULD NOT BE REFERENCED TO THE PATIENT BY MEDICAL PROVIDERS.    Jayden is a 65 year old man with glioblastoma, seen today by video for a return psychotherapy appointment to address concerns about coping and goals of care as these relate to coping with illness and treatment. Wife Cristiana participates along with Jayden today for family psychotherapy.     Mental Status Exam:(List all that apply)      Appearance: Appropriate      Eye Contact: Good       Orientation: Yes, x4      Mood: sad, grieving, reflective      Affect: Appropriate      Thought Content: Clear         Thought Form: Logical      Psychomotor Behavior: Normal    Mental Health and Adjustment to Illness:   Jayden and Cristiana continue to process relationship changes due to his tumor and seizures with functional losses. They are supportive an caring toward one another and we discuss strategies for coping, communicating and balance in challenging circumstances.  Some recent seizures.      Behavioral/ Non-Pharmacological Skills Education: Not focus today    Relationships: Focus today - role changes, caregiver strain, grief and loss, limited time with family and friends in setting of pandemic  Did get to see family over [place] with strategies for safety and this was overall positive    Advance Care Planning: Not focus today    Main therapeutic interventions provided this session include:  Provide psychoeducation, Facilitate processing of thoughts and feelings, Facilitate goals of care discussion, Provide strengths-based family counseling and Facilitate structured problem solving      Plan:  Will return for psychotherapy with palliative care focus in 3 - 5 weeks.     Time spent with patient/family:  50 minutes (Start  2:05pm, end 2:55pm)        DO NOT SEND ANY LETTERS

## 2021-04-05 NOTE — LETTER
2021       RE: Jayden Lackey  : 1955   MRN: [id number]      Dear Colleague,    Thank you for referring your patient, Jayden Lackey, to the [facility] EPILEPSY CARE at [facility]. Please see a copy of my visit note below.      Jayden Lackey is a 66 year old who is being evaluated via a billable telephone visit.       What phone number would you like to be contacted at? [phone]  How would you like to obtain your AVS? [facility]      EPILEPSY CLINIC TELEPHONE VISIT NOTE  The scheduled clinic visit was changed to a telephone visit to reduce the risk of COVID-19 transmission.            Service Date: 2021     HISTORY: I spoke with Mr. Jayden Lackey and his wife.  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     Imaging showed a further increase in the volume of contrast enhancing tissue, on 2021.  He has met to discuss this with Dr. Nicho Packer and Dr. Nasra Olmos.  Currently he is considering resective surgery for a third recurrence of a left occipital glioblastoma, versus radiation with or without following chemotherapy, or no specific therapy.      Following the most recent visit to this clinic on 2021, the patient reportedly has had no grand mal seizures and no complex partial seizures.  The most recent complex partial seizure occurred on 2021.  The most recent generalized convulsion probably occurred on 2020.       He has continued to experience isolated auras at about 1-2 times per week, usually with a metallic taste in his mouth, a rising sensation in his stomach, and anxiety.  The last of these occurred on 2021.     His wife checks on his medication use,  and she is certain that he has not missed any doses.  He has not had adverse effects that appear directly linked to AED use, including after initiation of divalproex.  He often is excessively drowsy during the day and this may have increased following increased AED doses in 2020.  In recent months, his wife has not noted change in chronic attention and memory disturbance, difficulties with comprehension and expression, and difficulty in solving problems.      He has chronic depression and  anger management  issues, dating back about 40 years.  In late 2020, his irritability worsened.  Twice in February, he has become extremely frustrated and has thrown furniture into a wall.  Although he shouts at his wife when he is angry, he has not thrown objects at her, hit her or otherwise physically injured her.  He does not own a gun.  He often feels  degraded  by the conditions of his brain tumor.  He denied having suicidal ideation.  He uses sertraline as prescribed, possibly with some benefit in mood.  He recently was referred to the Palliative Care service by Dr. Olmos.     His mood improved, and irritability decreased, after the levetiracetam dose was decreased, divalproex was started, and aripiprazole was added to the prior sertraline regimen.       MEDICATIONS:  Levetiracetam 2000 mg b.i.d., lacosamide 250 mg b.i.d., divalproex 500 mg b.i.d., diazepam as rescue medication (5 mg oral solution, up to 15 mg in one day for persisting focal impaired seizures), and other medications as per the electronic medical record.      LABORATORY DATA:   AED levels on 03/24/2021:   Levetiracetam: 53.4 mcg/ml.   Lacosamide: 9.7 mcg/ml.   Valproate: 51.1 mcg/ml., with normal platelet count.       IMPRESSION:   GTC and complex partial seizures have recently been controlled with addition of divalproex to relatively high doses of levetiracetam and lacosamide.  He is chronically lethargic, but no new symptoms emerged with addition of  divalproex.       Imaging showed evidence of a third recurrence of a left occipital glioblastoma, on 03/19/2021.  He reviewed this with Dr. Nicho Packer and Dr. Nasra Olmos.  He is considering resective surgery, versus radiation with or without following chemotherapy, or no specific therapy.      He and his wife expressed concern about whether these therapies might cause increased seizures.  I told them that resection and/or radiation would be likely to cause either decrease in seizure frequency, or no change in seizure frequency, over the long term, and less likely to cause a persisting increase in seizure frequency.      He has had improvement in the exacerbation of chronic difficulties with irritability and occasional flares of anger that had occurred in late 2020.  The improvement may be multifactorial, given that the levetiracetam dose was decreased, divalproex was started, and aripiprazole was added to the prior sertraline regimen in early 2021.       He is not driving and does not plan to do so.     PLAN:   1)  Continue current doses of levetiracetam, divalproex, and lacosamide.   2)  Return in 4 weeks for VIDEO clinic visit.     I personally spent 45 minutes in this patient care on the day of this visit, including 29 minutes of telephone contact with the patient, and 16 minutes of patient care activities without direct patient contact including medical record and imaging review.       Frank Pacheco M.D., Professor of Neurology

## 2021-04-05 NOTE — PROGRESS NOTES
Jayden Lackey is a 66 year old who is being evaluated via a billable telephone visit.       What phone number would you like to be contacted at? [phone]  How would you like to obtain your AVS? [facility]      EPILEPSY CLINIC TELEPHONE VISIT NOTE  The scheduled clinic visit was changed to a telephone visit to reduce the risk of COVID-19 transmission.            Service Date: 03/23/2021     HISTORY: I spoke with Mr. Jayden Lackey and his wife.  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     Imaging showed a further increase in the volume of contrast enhancing tissue, on 03/19/2021.  He has met to discuss this with Dr. Nicho Packer and Dr. Nasra Olmos.  Currently he is considering resective surgery for a third recurrence of a left occipital glioblastoma, versus radiation with or without following chemotherapy, or no specific therapy.      Following the most recent visit to this clinic on 03/23/2021, the patient reportedly has had no grand mal seizures and no complex partial seizures.  The most recent complex partial seizure occurred on 03/14/2021.  The most recent generalized convulsion probably occurred on 09/18/2020.       He has continued to experience isolated auras at about 1-2 times per week, usually with a metallic taste in his mouth, a rising sensation in his stomach, and anxiety.  The last of these occurred on 04/01/2021.     His wife checks on his medication use, and she is certain that he has not missed any doses.  He has not had adverse effects that appear directly linked to AED use, including after initiation of divalproex.  He often is excessively drowsy during the day and this may have increased following increased AED doses in  2020.  In recent months, his wife has not noted change in chronic attention and memory disturbance, difficulties with comprehension and expression, and difficulty in solving problems.      He has chronic depression and  anger management  issues, dating back about 40 years.  In late 2020, his irritability worsened.  Twice in February, he has become extremely frustrated and has thrown furniture into a wall.  Although he shouts at his wife when he is angry, he has not thrown objects at her, hit her or otherwise physically injured her.  He does not own a gun.  He often feels  degraded  by the conditions of his brain tumor.  He denied having suicidal ideation.  He uses sertraline as prescribed, possibly with some benefit in mood.  He recently was referred to the Palliative Care service by Dr. Olmos.     His mood improved, and irritability decreased, after the levetiracetam dose was decreased, divalproex was started, and aripiprazole was added to the prior sertraline regimen.       MEDICATIONS:  Levetiracetam 2000 mg b.i.d., lacosamide 250 mg b.i.d., divalproex 500 mg b.i.d., diazepam as rescue medication (5 mg oral solution, up to 15 mg in one day for persisting focal impaired seizures), and other medications as per the electronic medical record.      LABORATORY DATA:   AED levels on 03/24/2021:   Levetiracetam: 53.4 mcg/ml.   Lacosamide: 9.7 mcg/ml.   Valproate: 51.1 mcg/ml., with normal platelet count.       IMPRESSION:   GTC and complex partial seizures have recently been controlled with addition of divalproex to relatively high doses of levetiracetam and lacosamide.  He is chronically lethargic, but no new symptoms emerged with addition of divalproex.       Imaging showed evidence of a third recurrence of a left occipital glioblastoma, on 03/19/2021.  He reviewed this with Dr. Nicho Packer and Dr. Nasra Olmos.  He is considering resective surgery, versus radiation with or without following chemotherapy, or no  specific therapy.      He and his wife expressed concern about whether these therapies might cause increased seizures.  I told them that resection and/or radiation would be likely to cause either decrease in seizure frequency, or no change in seizure frequency, over the long term, and less likely to cause a persisting increase in seizure frequency.      He has had improvement in the exacerbation of chronic difficulties with irritability and occasional flares of anger that had occurred in late 2020.  The improvement may be multifactorial, given that the levetiracetam dose was decreased, divalproex was started, and aripiprazole was added to the prior sertraline regimen in early 2021.       He is not driving and does not plan to do so.     PLAN:   1)  Continue current doses of levetiracetam, divalproex, and lacosamide.   2)  Return in 4 weeks for VIDEO clinic visit.     I personally spent 45 minutes in this patient care on the day of this visit, including 29 minutes of telephone contact with the patient, and 16 minutes of patient care activities without direct patient contact including medical record and imaging review.       Frank Pacheco M.D., Professor of Neurology

## 2021-04-23 NOTE — TELEPHONE ENCOUNTER
Patient's wife called into triage requesting xarelto refill for patient, stating that he only has around one week left.     Last OV 3/22, next follow-up: does not have follow up appt made  Per last OV notes:  -Need for lifelong anticoagulation.  -Continue Xarelto.   Last filled: 12/4 for #30 by Dr. Olmos  Routed to provider for approval

## 2021-04-27 NOTE — PROGRESS NOTES
"Received VM from patient's wife requesting refill of abilify. She notes that they feel like it has helped. They missed last apt due to \"so much going on\" but would like to reschedule.     Last refill: 3/10/2021  Last office visit: 1/13/2021  Message sent to schedulers for follow up apt.      Will route request to MD for review.     Reviewed [place]  Report.    "

## 2021-05-03 NOTE — LETTER
5/3/2021       RE: Jayden Lackey  [address]  [facility] [address]     Dear Colleague,    Thank you for referring your patient, Jayden Lackey, to the [facility] [place] [facility]. Please see a copy of my visit note below.      Jayden is a 66 year old who is being evaluated via a billable video visit.       How would you like to obtain your AVS? MyChart  If the video visit is dropped, the invitation should be resent by: Send to e-mail at: [email or web address]  Will anyone else be joining your video visit? No      EPILEPSY CLINIC VIDEO VISIT NOTE  The scheduled clinic visit was changed to a video visit to reduce the risk of COVID-19 transmission.           Service Date: 05/03/2021    HISTORY: I spoke with Mr. Jayden Lackey and his wife.  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    Following the most recent visit to this clinic on 03/23/2021, the patient reportedly has had no seizures causing impaired awareness.  He has had 6 isolated auras that he has reported to his wife, who tracks the auras; these were spread out fairly evenly over the last month.  The auras feature a metallic taste in his mouth, a rising sensation in his stomach, and anxiety.  The last of these occurred on 04/29/2021.  The most recent complex partial seizure occurred on 03/14/2021.  The most recent generalized convulsion probably occurred on 09/18/2020.      Imaging showed a further increase in the volume of contrast enhancing tissue, on 03/19/2021.  He met to discuss this with Dr. Nicho Packer and Dr. Nasra Olmos.  After discussing various options, he elected radiotherapy  followed by chemotherapy.      He started radiotherapy, under the care of Dr. Nasra Bailey at [facility], on 04/27/2021.  He had many hours of severely reduced speak output on 04/28/2021, which was suspected of being caused by acute post-radiation edema.  Dexamethasone was increased to daily use, and within another day, his speech was nearly back to baseline.      MEDICATIONS:  Levetiracetam 2000 mg b.i.d., lacosamide 250 mg b.i.d., divalproex 500 mg b.i.d., diazepam as rescue medication (5 mg oral solution, up to 15 mg in one day for persisting focal impaired seizures), and other medications as per the electronic medical record.      VIDEO OBSERVATIONS:   The patient demonstrated a paucity of spontaneous speech, and he spoke with reduced fluency, but with normal articulation and syntax.  He demonstrated grossly normal comprehension of questions.    On command, the patient moved the direction of gaze into all 4 quadrants conjugately and without nystagmus.   Facial movements were normal.    Tongue protruded on the midline.    The patient did not display any resting tremors or action tremors of the outstretched arms.  Limb movements were normal.       LABORATORY DATA:   AED levels on 03/24/2021:   Levetiracetam: 53.4 mcg/ml.   Lacosamide: 9.7 mcg/ml.   Valproate: 51.1 mcg/ml., with normal platelet count.       IMPRESSION:   Seizures with impaired awareness remain in complete control, as isolated auras have been stable, with no overt AED toxicity.    He is planning to complete radiotherapy as tolerated, followed by chemotherapy, and then a trip to see family members in [place], [place].  His first radiation treatment was associated with acutely increased aphasia, which largely resolved with increased dexamethasone.  He and his wife expressed concern with safe travel to [place] in 1-2 months.  I did agree to schedule another video visit with me in about a month, to plan his AED regimen and epilepsy coverage in  [place].      He is not driving and does not plan to do so.     PLAN:   1)  Continue current AED doses.    2)  Return in 4 weeks for Video clinic visit.    Video Conference via [employer].   Video Start Time: 3:05 p.m.   Video Stop Time: 3:26 p.m.  Provider location: [facility]   Provider location: [facility] Neurology   Reported Patient Location: Home     I personally spent 30 minutes in this patient care on the day of this visit, including time in Video contact with the patient, and time in other necessary patient care activities without direct patient contact including medical record, EEG and imaging review.      Frank Pacheco M.D., Professor of Neurology

## 2021-05-03 NOTE — PROGRESS NOTES
Jayden is a 66 year old who is being evaluated via a billable video visit.       How would you like to obtain your AVS? MyChart  If the video visit is dropped, the invitation should be resent by: Send to e-mail at: [email or web address]  Will anyone else be joining your video visit? No      EPILEPSY CLINIC VIDEO VISIT NOTE  The scheduled clinic visit was changed to a video visit to reduce the risk of COVID-19 transmission.           Service Date: 05/03/2021    HISTORY: I spoke with Mr. Jayden Lackey and his wife.  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    Following the most recent visit to this clinic on 03/23/2021, the patient reportedly has had no seizures causing impaired awareness.  He has had 6 isolated auras that he has reported to his wife, who tracks the auras; these were spread out fairly evenly over the last month.  The auras feature a metallic taste in his mouth, a rising sensation in his stomach, and anxiety.  The last of these occurred on 04/29/2021.  The most recent complex partial seizure occurred on 03/14/2021.  The most recent generalized convulsion probably occurred on 09/18/2020.      Imaging showed a further increase in the volume of contrast enhancing tissue, on 03/19/2021.  He met to discuss this with Dr. Nicho Packer and Dr. Nasra Olmos.  After discussing various options, he elected radiotherapy followed by chemotherapy.      He started radiotherapy, under the care of Dr. Nasra Bailey at [facility], on 04/27/2021.  He had many hours of severely reduced speak output on 04/28/2021, which was suspected of being caused by acute post-radiation edema.  Dexamethasone was increased to daily use, and within another day,  his speech was nearly back to baseline.      MEDICATIONS:  Levetiracetam 2000 mg b.i.d., lacosamide 250 mg b.i.d., divalproex 500 mg b.i.d., diazepam as rescue medication (5 mg oral solution, up to 15 mg in one day for persisting focal impaired seizures), and other medications as per the electronic medical record.      VIDEO OBSERVATIONS:   The patient demonstrated a paucity of spontaneous speech, and he spoke with reduced fluency, but with normal articulation and syntax.  He demonstrated grossly normal comprehension of questions.    On command, the patient moved the direction of gaze into all 4 quadrants conjugately and without nystagmus.   Facial movements were normal.    Tongue protruded on the midline.    The patient did not display any resting tremors or action tremors of the outstretched arms.  Limb movements were normal.       LABORATORY DATA:   AED levels on 03/24/2021:   Levetiracetam: 53.4 mcg/ml.   Lacosamide: 9.7 mcg/ml.   Valproate: 51.1 mcg/ml., with normal platelet count.       IMPRESSION:   Seizures with impaired awareness remain in complete control, as isolated auras have been stable, with no overt AED toxicity.    He is planning to complete radiotherapy as tolerated, followed by chemotherapy, and then a trip to see family members in [place], [place].  His first radiation treatment was associated with acutely increased aphasia, which largely resolved with increased dexamethasone.  He and his wife expressed concern with safe travel to [place] in 1-2 months.  I did agree to schedule another video visit with me in about a month, to plan his AED regimen and epilepsy coverage in [place].      He is not driving and does not plan to do so.     PLAN:   1)  Continue current AED doses.    2)  Return in 4 weeks for Video clinic visit.    Video Conference via [employer].   Video Start Time: 3:05 p.m.   Video Stop Time: 3:26 p.m.  Provider location: [facility] Epilepsy [facility]   Provider location: [facility]  Neurology   Reported Patient Location: Home     I personally spent 30 minutes in this patient care on the day of this visit, including time in Video contact with the patient, and time in other necessary patient care activities without direct patient contact including medical record, EEG and imaging review.      Frank Pacheco M.D., Professor of Neurology

## 2021-05-07 NOTE — PROGRESS NOTES
"Jayden is a 66 year old who is being evaluated via a billable video visit.      How would you like to obtain your AVS? MyChart     If the video visit is dropped, the invitation should be resent by: Text to cell phone: [phone]     Will anyone else be joining your video visit? No          Vitals - Patient Reported  Weight (Patient Reported): 108.9 kg (240 lb)  Height (Patient Reported): 190.5 cm (6' 3\")  BMI (Based on Pt Reported Ht/Wt): 30  Pain Score: No Pain (0)    Emeterio Valdez LPN      Palliative Care Progress Note    Patient Name: Jayden Lackey  Primary Provider: Jhoan Farooq    Chief Complaint/Patient ID: 66 year old M Hx of left occipital lobe glioblastoma s/p gross total resection on 3/22/2019. He completed chemoradiotherapy June 2019. Imaging in 9/2019 was concerning for and pathology in 10/2019 was consistent with recurrent tumor. He was started on lomustine, but stopped after 1 cycle due to significant chemotherapy-induced fatigue. March 2021 noted that tumor was growing, decision made to pursue radiation followed by chemotherapy, 1st dose of Avastin 5/10/21.  Has GTC and partial seizures which have sig impaired qol; word finding challenges & other cognitive changes.     Last Palliative care appointment: 1/13/21 with Dr. Banuelos.     Reviewed: Yes, no concerns.    Interim History:  Jayden Lackey 66 year old male is seen today for follow up with Palliative Care via billable video visit.     Reviewed notes from oncology (visit 3/22/21), neurosurgery (visit 05/03/21), and neurology (visit 4/5/21) notes. Evidence of recurrence on recent imaging.  Dr. Olmos from oncology did express concern at most recent visit about Jayden's ability to recover from either surgery or radiation therapy, as there may be faster functional decline than without intervention. Opted to go forward with radiotherapy followed by chemotherapy. Struggled with speech suspected to be 2/2 post-radiation edema, improved with " "increased dexamethasone dose.      Continues to have better seizure control on Vimpat and Depakote. No grand mal seizures since 2020, had partial seizure 3/14/21. Does have frequent seizure auras.     Has struggled with not being able to be independent and losing his ability to work on small projects at home.  He does feel that the antidepressants he is on have been helpful. Started Abilify 2mg in March. Steroids make him more restless and \"cranky\", so they are glad to be decreasing dose soon. Interesting in slight increase in abilify.    Had his first dose of IV Avastin yesterday, tolerated well. No pain. States he is eating and sleeping well. They are interested in seeing about home PT, as his strength seemed to change when the tumor grew.    They will be moving to [place] for the Summer but are currently planning to come back to [place] in a few months. Will establish with a new medical team while they are there.     Social History     Tobacco Use     Smoking status: Former Smoker     Packs/day: 1.00     Years: 3.00     Pack years: 3.00     Types: Cigarettes     Start date: 1974     Quit date: 1977     Years since quittin.9     Smokeless tobacco: Never Used   Substance Use Topics     Alcohol use: Not Currently     Drug use: Never       Family History- Reviewed in Epic.    Allergies   Allergen Reactions     Shellfish Allergy Difficulty breathing, Nausea and Vomiting, Swelling and Shortness Of Breath     Lupine Bean Extract      Shellfish-Derived Products      No Clinical Screening - See Comments      Lupine bean doesn't remember reaction     Advanced Care Planning: No HCD on chart but they have one at home. FC POLST on chart. Code status discussion 2021 palliative visit: full code.   Follows with [facility] palliative LICSW.    Medications- Reviewed in Epic.    Past Medical History- Reviewed in [facility].    Past Surgical History- Reviewed in Epic.    Review of Systems:   ROS: 10 point ROS neg other than " the symptoms noted above in the HPI.      Physical Exam:   Constitutional: Alert, pleasant, no apparent distress. Sitting up in chair.  Eyes: Sclera non-icteric, no eye discharge.  ENT: No nasal discharge. Ears grossly normal.  Respiratory: Unlabored respirations. Speaking in full sentences.  Musculoskeletal: Extremities appear normal- no gross deformities noted. No edema noted on upper body.   Skin: No suspicious lesions or rashes on visible skin.  Neurologic: Speech is slow but fluent. Does have word finding difficulties. No facial droop.  Psychiatric: Mentation appears normal, appropriate attention. Affect normal/bright. Does not appear anxious or depressed.    Key Data Reviewed:  LABS: 02/01/21- Cr 0.86, Albumin 3.6, LFTs wnl. WBC 6.7, Hgb 9.5, Plts 199.     IMAGING: MR Brain 3/19/21- Impression:  Findings concerning for recurrent malignancy at the anterior margin of the left temporoparietal resection cavity with nodular enhancing mass demonstrating increased cerebral blood volume and diffusion restriction. Posttreatment changes could have similar appearance but is thought less likely.    Impression & Recommendations & Counseling:  Jayden Lackey is a 66 year old male with history of recurrent GBM, now s/p radiation and starting chemotherapy. Had 1st dose Avastin 5/10. Course complicated by seizures and cognitive impairments.     Mood - Has been more irritable and restless. They feel the steroids are partially to blame but would like to try increasing Abilify.  - OK to increase Abilify to 4mg (two 2mg tablets). Would not increase higher due to his seizure history.  - Discussed that if he feels more restless, they can decrease back to 2mg.    Stress  Difficulty Coping  Cognitive Impairment - They are also moving to [place] for the Summer and anticipate returning to [place] at this time. They will try to establish with a medical team down there.   - Discussed we can place a referral to Homecare in [place] when they  return.    ACP - Disease-directed therapy at this time. We did discuss that in the future, they could have an informational meeting with Hospice if that would be helpful.    Follow up in 4 months when they return, but they know to call with questions or concerns earlier.    Video-Visit Details  Video Start Time: 11:20AM2  Video End Time: 11:49AM    Originating Location (pt. Location): Home     Distant Location (provider location): [facility] CANCER [facility]     Platform used for Video Visit: [employer]     Total time spent on day of encounter is 46 mins, including reviewing record, review of above studies, above visit with patient, and documentation.     Sydnee Grullon, DO  Palliative Medicine   Pager [phone], AMCOM ID [id number]

## 2021-05-10 NOTE — PROGRESS NOTES
Infusion Nursing Note:  Jayden Lackey presents today for Day 1 Cycle 1 MVASI.    Patient seen by provider today: No   present during visit today: Not Applicable.    Note: Patient new to oncology infusion room and is receiving MVASI for the first time. Pt oriented to infusion room and call light. New patient teaching done while in infusion. Writer reinforced Immunotherapy teaching/side effects and schedule. Patient instructed to call triage with questions/concerns or if he/she has chills and/or temperature greater than or equal to 100.5. Triage number: [phone]; After hours, weekends, or holidays, call [phone] and ask for oncology doctor on call. Patients wife stayed in infusion bay throughout infusion since patient has some cognitive deficits from brain cancer history. Patient overall conversed appropriately with accurate information. Please see assessment documentation for more info. Specifically, patient does continue to have Aura's about 8-10 times a month that requires liquid Valium to help prevent a full seizure. Patient states Aura can start with a weird feeling in his stomach, metallic taste in mouth, and/or a feeling of anxiety. Patients wife brings liquid Valium everywhere they go. Patients wife thought they were to have a appointment with Dr. Olmos prior to starting new regimen today. Patients wife had a variety of questions in regards to new regimen along with wanting clarification on oral Dex dosage. Patient has been taking 4mg of oral Dex, but has side effects of increased irritability and anxiety with dosage increase. Hemoglobin 8.6 today. Patient and wife deny s/s of bleeding such as blood in urine/stool, increased bruising, or frequent nose bleeds. Patient and wife made aware to seek medical attention if shortness of breath, chest pain, headache, dizziness, lightheadedness, feeling faint, or any signs of bleeding noted at home.     Dr. Olmos notified of the information above.    ROMÁN. 5/10/2021. 1515. Dr. Olmos. Db Santizo RN. Patient has the following 2 options: 1. Delay MVASI today. Schedule a provider appointment and infusion at [facility] for tomorrow 5/11, Dex taper will be discussed at appointment. 2. Receive MVASI today and schedule appointment with provider tomorrow on 5/11 at [facility], patient to start PO dex 2mg on Wednesday 5/12. Regardless of option picked, patient to have CBC rechecked in 1 week for hemoglobin surveillance.     Patient and wife decided to go ahead with MVASI today and is aware to start 2mg of PO Dex on Wednesday 5/12. Questions about medication regimen answered throughout encounter with the help of pharmacist Vitaliy. Patient and wife agreeable with overall plan and voices understanding of teaching. Patients wife requested that someone from scheduling call before making lab and Dr. Olmos appointment since they have many appointments and plans throughout the week. IB sent to scheduling to connect with patient to coordinate 1 week lab appointment and Dr. Olmos appointment.      Patient did meet criteria for an asymptomatic covid-19 PCR test in infusion today. Patient declined the covid-19 test.    Intravenous Access:  Peripheral IV placed.    Treatment Conditions:  Lab Results   Component Value Date    HGB 8.6 05/10/2021     Lab Results   Component Value Date    WBC 7.2 05/10/2021      Lab Results   Component Value Date    ANEU 6.4 05/10/2021     Lab Results   Component Value Date     05/10/2021      Lab Results   Component Value Date     05/10/2021                   Lab Results   Component Value Date    POTASSIUM 4.0 05/10/2021           Lab Results   Component Value Date    MAG 2.4 06/26/2020            Lab Results   Component Value Date    CR 0.90 05/10/2021                   Lab Results   Component Value Date    BEATRIS 8.6 05/10/2021                Lab Results   Component Value Date    BILITOTAL 0.3 05/10/2021           Lab Results    Component Value Date    ALBUMIN 3.5 05/10/2021                    Lab Results   Component Value Date    ALT 74 05/10/2021           Lab Results   Component Value Date    AST 25 05/10/2021       Results reviewed, labs MET treatment parameters, ok to proceed with treatment.      Post Infusion Assessment:  Patient tolerated infusion without incident.  Blood return noted pre and post infusion.  Site patent and intact, free from redness, edema or discomfort.  No evidence of extravasations.  Access discontinued per protocol.       Discharge Plan:   Patient declined prescription refills.  Discharge instructions reviewed with: Patient.  Patient and/or family verbalized understanding of discharge instructions and all questions answered.  Copy of AVS reviewed with patient and/or family.  Patient will return 5/24 for next appointment.  Patient discharged in stable condition accompanied by: wife.  Departure Mode: Ambulatory.  Face to Face time: 0 minutes.    Db Santizo RN

## 2021-05-10 NOTE — PATIENT INSTRUCTIONS
Start 2mg of Dexamethasone on Wednesday 5/12   -check blood pressure every day and record data. Notify if blood pressure is progressively increasing and/or 150/90 or above.  -increase iron rich foods for 1 week. Have blood rechecked in 1 week to check hemoglobin.       Patient Education     Iron Replacement  Most people think of iron as a metal used to make pots, frying pans, and soup kettles. This same metal (or mineral) also plays a vital role in the body. Iron helps the blood cells carry oxygen. When you don t get enough iron, you may feel tired and lack energy. Over time, without enough iron, your body makes fewer red blood cells. This can cause a condition called iron-deficiency anemia. Since your body doesn t make iron, you must get it from foods or supplements.  Food sources of iron  Iron is found in a few types of foods. Good sources include:    Red meat, poultry, fish, eggs    Dried fruits (especially raisins, prunes, figs)    Legumes such as dried beans and lentils    Breads and cereals with iron added    Blackstrap molasses    Spinach    Foods cooked in cast-iron pans. This is especially true of acidic foods, such as tomatoes and tish.  Why use a supplement?  Women often need additional iron because they lose blood during their menstrual period. But even grown men and boys may need a supplement at some point. You may need an iron supplement if any of the following is true for you:    I rarely eat foods that are high in iron (such as red meat, poultry, dried beans, and foods with iron added).    I am a vegetarian and I rarely eat legumes (dried beans, peas, lentils).    I have heavy menstrual periods.    I am pregnant or breastfeeding.    I have been diagnosed with iron-deficiency anemia.  If you take iron  Here are some tips to help you get the most from an iron supplement:    Take it with vitamin C for better absorption.    Don t take an iron supplement with milk. The calcium in milk limits iron  absorption.    Iron supplements can cause constipation. To prevent this, drink plenty of water, eat high-fiber foods, and exercise often. Also try an iron supplement with an added stool softener.    Eat a healthy diet to get all the nutrients your body needs.  Caution  The amount of iron each person needs is different, and varies by age. Women who are pregnant or breastfeeding need extra iron. But taking more than the suggested amount is not always healthy. Your healthcare provider can help you choose the right amount of iron for you.  [employer] last reviewed this educational content on 5/1/2020 2000-2021 [employer]. All rights reserved. This information is not intended as a substitute for professional medical care. Always follow your healthcare professional's instructions.           [facility] Triage and after hours / weekends / holidays:  [phone]    Please call the triage or after hours line if you experience a temperature greater than or equal to 100.5, shaking chills, have uncontrolled nausea, vomiting and/or diarrhea, dizziness, shortness of breath, chest pain, bleeding, unexplained bruising, or if you have any other new/concerning symptoms, questions or concerns.      If you are having any concerning symptoms or wish to speak to a provider before your next infusion visit, please call your care coordinator or triage to notify them so we can adequately serve you.     If you need a refill on a narcotic prescription or other medication, please call before your infusion appointment.                 May 2021      [place] Monday Tuesday Wednesday Thursday Friday Saturday                                 1       2     3    VIDEO VISIT RETURN   2:45 PM   (30 min.)   Frank Pacheco MD   [facility] [place] 4     5     6     7     8       9     10    LAB PERIPHERAL   2:15 PM   (15 min.)   [facility] DRAW   [facility]    [facility] ONC INFUSION  60   3:00 PM   (60 min.)    ONC INFUSION NURSE   [facility] 11    VIDEO VISIT RETURN  11:05 AM   (40 min.)   Sydnee Grullon DO   [facility] 12     13     14     15       16     17     18     19     20     21     22       23     24    LAB PERIPHERAL   2:30 PM   (15 min.)   [facility] DRAW   [facility]    [facility] ONC INFUSION 60   3:00 PM   (60 min.)    ONC INFUSION NURSE   [facility] 25     26     27     28     29       30     31 June 2021 Sunday Monday Tuesday Wednesday Thursday Friday Saturday             1    VIDEO VISIT RETURN  10:45 AM   (30 min.)   Frank Pacheco MD   [facility] Neurology [place] [id number]     3     4     5       6     7     8     9     10     11     12       13     14     15     16     17     18     19       20     21     22     23     24     25     26       27     28     29     30                                    Lab Results:  Recent Results (from the past 12 hour(s))   Comprehensive metabolic panel    Collection Time: 05/10/21  2:14 PM   Result Value Ref Range    Sodium 140 133 - 144 mmol/L    Potassium 4.0 3.4 - 5.3 mmol/L    Chloride 106 94 - 109 mmol/L    Carbon Dioxide 24 20 - 32 mmol/L    Anion Gap 11 3 - 14 mmol/L    Glucose 146 (H) 70 - 99 mg/dL    Urea Nitrogen 20 7 - 30 mg/dL    Creatinine 0.90 0.66 - 1.25 mg/dL    GFR Estimate 89 >60 mL/min/[1.73_m2]    GFR Estimate If Black >90 >60 mL/min/[1.73_m2]    Calcium 8.6 8.5 - 10.1 mg/dL    Bilirubin Total 0.3 0.2 - 1.3 mg/dL    Albumin 3.5 3.4 - 5.0 g/dL    Protein Total 6.3 (L) 6.8 - 8.8 g/dL    Alkaline Phosphatase 59 40 - 150 U/L    ALT 74 (H) 0 - 70 U/L    AST 25 0 - 45 U/L   CBC with platelets differential    Collection Time: 05/10/21  2:14 PM   Result Value Ref Range    WBC 7.2 4.0 - 11.0 10e9/L    RBC Count 4.26 (L) 4.4 - 5.9 10e12/L     Hemoglobin 8.6 (L) 13.3 - 17.7 g/dL    Hematocrit 30.1 (L) 40.0 - 53.0 %    MCV 71 (L) 78 - 100 fl    MCH 20.2 (L) 26.5 - 33.0 pg    MCHC 28.6 (L) 31.5 - 36.5 g/dL    RDW 18.7 (H) 10.0 - 15.0 %    Platelet Count 239 150 - 450 10e9/L    Diff Method Automated Method     % Neutrophils 88.9 %    % Lymphocytes 7.4 %    % Monocytes 2.7 %    % Eosinophils 0.3 %    % Basophils 0.1 %    % Immature Granulocytes 0.6 %    Nucleated RBCs 0 0 /100    Absolute Neutrophil 6.4 1.6 - 8.3 10e9/L    Absolute Lymphocytes 0.5 (L) 0.8 - 5.3 10e9/L    Absolute Monocytes 0.2 0.0 - 1.3 10e9/L    Absolute Eosinophils 0.0 0.0 - 0.7 10e9/L    Absolute Basophils 0.0 0.0 - 0.2 10e9/L    Abs Immature Granulocytes 0.0 0 - 0.4 10e9/L    Absolute Nucleated RBC 0.0

## 2021-05-10 NOTE — NURSING NOTE
Chief Complaint   Patient presents with     Blood Draw     Labs drawn via PIV placed by RN in lab. VS taken.      Labs drawn from PIV placed by RN. Line flushed with saline. Vitals taken. Pt checked in for appointment(s).    Maggie Monaco RN

## 2021-05-11 NOTE — LETTER
"5/11/2021       RE: Jayden Lackey  [address]  [facility] [address]     Dear Colleague,    Thank you for referring your patient, Jayden Lackey, to [facility]. Please see a copy of my visit note below.    Jayden is a 66 year old who is being evaluated via a billable video visit.      How would you like to obtain your AVS? MyChart     If the video visit is dropped, the invitation should be resent by: Text to cell phone: [phone]     Will anyone else be joining your video visit? No        Vitals - Patient Reported  Weight (Patient Reported): 108.9 kg (240 lb)  Height (Patient Reported): 190.5 cm (6' 3\")  BMI (Based on Pt Reported Ht/Wt): 30  Pain Score: No Pain (0)    Emeterio Valdez LPN      Palliative Care Progress Note    Patient Name: Jayden Lackey  Primary Provider: Jhoan Farooq    Chief Complaint/Patient ID: 66 year old M Hx of left occipital lobe glioblastoma s/p gross total resection on 3/22/2019. He completed chemoradiotherapy June 2019. Imaging in 9/2019 was concerning for and pathology in 10/2019 was consistent with recurrent tumor. He was started on lomustine, but stopped after 1 cycle due to significant chemotherapy-induced fatigue. March 2021 noted that tumor was growing, decision made to pursue radiation followed by chemotherapy, 1st dose of Avastin 5/10/21.  Has GTC and partial seizures which have sig impaired qol; word finding challenges & other cognitive changes.     Last Palliative care appointment: 1/13/21 with Dr. Banuelos.     Reviewed: Yes, no concerns.    Interim History:  Jayden Lackey 66 year old male is seen today for follow up with Palliative Care via billable video visit.     Reviewed notes from oncology (visit 3/22/21), neurosurgery (visit 05/03/21), and neurology (visit 4/5/21) notes. Evidence of recurrence on recent imaging.  Dr. Olmos from oncology did express " "concern at most recent visit about Jayden's ability to recover from either surgery or radiation therapy, as there may be faster functional decline than without intervention. Opted to go forward with radiotherapy followed by chemotherapy. Struggled with speech suspected to be 2/2 post-radiation edema, improved with increased dexamethasone dose.      Continues to have better seizure control on Vimpat and Depakote. No grand mal seizures since 2020, had partial seizure 3/14/21. Does have frequent seizure auras.     Has struggled with not being able to be independent and losing his ability to work on small projects at home.  He does feel that the antidepressants he is on have been helpful. Started Abilify 2mg in March. Steroids make him more restless and \"cranky\", so they are glad to be decreasing dose soon. Interesting in slight increase in abilify.    Had his first dose of IV Avastin yesterday, tolerated well. No pain. States he is eating and sleeping well. They are interested in seeing about home PT, as his strength seemed to change when the tumor grew.    They will be moving to [place] for the Summer but are currently planning to come back to [place] in a few months. Will establish with a new medical team while they are there.     Social History     Tobacco Use     Smoking status: Former Smoker     Packs/day: 1.00     Years: 3.00     Pack years: 3.00     Types: Cigarettes     Start date: 1974     Quit date: 1977     Years since quittin.9     Smokeless tobacco: Never Used   Substance Use Topics     Alcohol use: Not Currently     Drug use: Never       Family History- Reviewed in Epic.    Allergies   Allergen Reactions     Shellfish Allergy Difficulty breathing, Nausea and Vomiting, Swelling and Shortness Of Breath     Lupine Bean Extract      Shellfish-Derived Products      No Clinical Screening - See Comments      Lupine bean doesn't remember reaction     Advanced Care Planning: No HCD on chart but they " have one at home. FC POLST on chart. Code status discussion 1/2021 palliative visit: full code.   Follows with [facility] palliative LICSW.    Medications- Reviewed in Epic.    [facility]    Past Surgical History- Reviewed in Epic.    Review of Systems:   ROS: 10 point ROS neg other than the symptoms noted above in the HPI.      Physical Exam:   Constitutional: Alert, pleasant, no apparent distress. Sitting up in chair.  Eyes: Sclera non-icteric, no eye discharge.  ENT: No nasal discharge. Ears grossly normal.  Respiratory: Unlabored respirations. Speaking in full sentences.  Musculoskeletal: Extremities appear normal- no gross deformities noted. No edema noted on upper body.   Skin: No suspicious lesions or rashes on visible skin.  Neurologic: Speech is slow but fluent. Does have word finding difficulties. No facial droop.  Psychiatric: Mentation appears normal, appropriate attention. Affect normal/bright. Does not appear anxious or depressed.    Key Data Reviewed:  LABS: 02/01/21- Cr 0.86, Albumin 3.6, LFTs wnl. WBC 6.7, Hgb 9.5, Plts 199.     IMAGING: MR Brain 3/19/21- Impression:  Findings concerning for recurrent malignancy at the anterior margin of the left temporoparietal resection cavity with nodular enhancing mass demonstrating increased cerebral blood volume and diffusion restriction. Posttreatment changes could have similar appearance but is thought less likely.    Impression & Recommendations & Counseling:  Jayden Lackey is a 66 year old male with history of recurrent GBM, now s/p radiation and starting chemotherapy. Had 1st dose Avastin 5/10. Course complicated by seizures and cognitive impairments.     Mood - Has been more irritable and restless. They feel the steroids are partially to blame but would like to try increasing Abilify.  - OK to increase Abilify to 4mg (two 2mg tablets). Would not increase higher due to his seizure history.  - Discussed that if he feels more  restless, they can decrease back to 2mg.    Stress  Difficulty Coping  Cognitive Impairment - They are also moving to [place] for the Summer and anticipate returning to [place] at this time. They will try to establish with a medical team down there.   - Discussed we can place a referral to Homecare in [place] when they return.    ACP - Disease-directed therapy at this time. We did discuss that in the future, they could have an informational meeting with Hospice if that would be helpful.    Follow up in 4 months when they return, but they know to call with questions or concerns earlier.    Video-Visit Details  Video Start Time: 11:20AM2  Video End Time: 11:49AM    Originating Location (pt. Location): Home     Distant Location (provider location): [facility]     Platform used for Video Visit: Isaías     Total time spent on day of encounter is 46 mins, including reviewing record, review of above studies, above visit with patient, and documentation.     Sydnee Grullon, DO  Palliative Medicine   Pager [phone], AMCOM ID [id number]

## 2021-05-11 NOTE — PATIENT INSTRUCTIONS
Recommendations:  - OK to try increasing the abilify to 4mg (two tablets). If you have increased agitation or restless, OK to go back down to one tablet.  - If you need a referral for Homecare in [place], please let me know.    Follow up: 4 months, call sooner with questions or concerns.      Reasons to Call    If you are having worsening/uncontrolled symptoms we want you to call!    You or your other physicians make any changes to medications we have prescribed.    Important Phone Numbers    Shilpi Bazzi. Palliative Care RN. Answered 8 am to 4 pm . [phone]    Jessica Sexton. [facility] palliative care nurse clinician [phone]    Appointment Line.[phone]   *After hours or on weekends. Will connect you with on call MD. [phone]

## 2021-05-20 NOTE — PROGRESS NOTES
"Jayden is a 66 year old who is being evaluated via a billable video visit.      How would you like to obtain your AVS? MyChart  If the video visit is dropped, the invitation should be resent by: Text to cell phone: [phone]  Will anyone else be joining your video visit? No     Vitals - Patient Reported  Weight (Patient Reported): 109.8 kg (242 lb)  Height (Patient Reported): 189.2 cm (6' 2.5\")  BMI (Based on Pt Reported Ht/Wt): 30.65  Pain Score: No Pain (0)    Saundra Perea CMA May 24, 2021  7:37 AM       Video-Visit Details  Type of service:  Video Visit    Prep time: 10 minutes  Video Start Time: 08:14 AM  Video End Time: 08:45 AM  Post-visit documentation: 10 minutes    Originating Location (pt. Location): Home    Distant Location (provider location):  [facility]     Platform used for Video Visit: Isaías Olmos MD    _____________________________________________________________    NEURO-ONCOLOGY VISIT  May 24, 2021    CHIEF COMPLAINT: Mr. 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 He completed chemoradiotherapy on 6/11/2019. Imaging in 9/2019 was consistent with recurrent disease and adjuvant-dosed temozolomide was stop. Pathology from repeat resection on 10/24/2019 was consistent with recurrent tumor. He was started on lomustine, but stopped after 1 cycle due to significant chemotherapy-induced fatigue.     Quality of life has been compromised by recurrent seizure events, PE requiring prolonged hospitalization, and chemotherapy-related toxicities.    Imaging in 12/2020 showed a new area of contrast enhancement and repeat imaging from 2/2021 and 3/2021 demonstrated further increase in contrast enhancement, with new increased perfusion. He declined surgery and underwent repeat radiation therapy plus bevacizumab " (sofia).    I met with Jayden and Oz (wife) for this follow-up visit today.    HISTORY OF PRESENT ILLNESS  -Jayden completed radiation therapy. He received 2000cGy instead of the originally planned 2500cGy, as he became mute after first fraction. Neurological symptoms improved some with steroids. Currently, he is on dexamethasone 2mg daily. 4mg daily associated with agitation.   -Neurological symptoms fluctuate; worsening seen with stress, frustration. Continued aphasia and memory issues. Walking is slow at times with worsening right sided weakness and leaning to the left; steps are small; difficulty initiating gait.  -He was low energy, fatigue exacerbated by activity, even showering. Trying to remain active. Anemic.   -He has been experiencing auras lately; using Valium PRN. Using Valium for episodes of high anxiety.   -Depressed at times. Increased dose of Abilify and on Zoloft.   -Eating well.    -No headaches.   -Tolerating sofia. Cognition and aphasia has improved with sofia use.   -Planning to travel down to [place] for the summer. Will be coming back to [place] in August and then, intend on spending the fall/ winter in [place].    REVIEW OF SYSTEMS  A comprehensive ROS negative except as in HPI.      MEDICATIONS   Current Outpatient Medications   Medication Sig Dispense Refill     ARIPiprazole (ABILIFY) 2 MG tablet Take 2 tablets (4 mg) by mouth daily 60 tablet 3     atenolol (TENORMIN) 100 MG tablet Take 50 mg by mouth daily 50 mg       dexamethasone (DECADRON) 1 MG tablet Take 1 tablet (1 mg) by mouth daily (with breakfast) (Patient taking differently: Take 4 mg by mouth daily (with breakfast) Per Dr. Olmos on 5/10/2021: patient to start 2mg on Wednesday 5/12/2021) 90 tablet 3     diazepam (VALIUM) 5 MG/ML (HIGH CONC) solution Take 1 ml (5 mg) by mouth for seizure with impaired consciousness; do not exceed 15 mg in one day; do not use if breathing is slow or shallow. 30 mL 0     diltiazem ER (DILT-XR) 180 MG 24 hr  capsule Take 180 mg by mouth daily       divalproex sodium delayed-release (DEPAKOTE) 250 MG DR tablet Take 2 tablets (500 mg) by mouth 2 times daily 360 tablet 3     docusate sodium (COLACE) 50 MG capsule Take 50 mg by mouth 2 times daily as needed for constipation       lacosamide (VIMPAT) 200 MG TABS tablet Take 1 tablet (200 mg) twice daily (together with 50 mg tablets, to total 250mg in AM and 250mg in PM). 180 tablet 1     lacosamide (VIMPAT) 50 MG TABS tablet Take 1 tablet (50 mg) twice daily (together with 200 mg tablets, to total 250mg in AM and 250mg in PM). 180 tablet 1     levETIRAcetam (KEPPRA) 1000 MG tablet Take 2 tablets (2000 mg) twice daily. 360 tablet 3     lisinopril (PRINIVIL/ZESTRIL) 5 MG tablet Take 1 tablet (5 mg) by mouth daily 28 tablet 0     rivaroxaban ANTICOAGULANT (XARELTO ANTICOAGULANT) 20 MG TABS tablet Take 1 tablet (20 mg) by mouth daily (with dinner) 30 tablet 3     sertraline (ZOLOFT) 100 MG tablet Take 2 tablets (200 mg) by mouth daily 180 tablet 3     Cholecalciferol 1.25 MG (22759 UT) TABS 1 cap(s)       DRUG ALLERGIES   Allergies   Allergen Reactions     Shellfish Allergy Difficulty breathing, Nausea and Vomiting, Swelling and Shortness Of Breath     Lupine Bean Extract      Shellfish-Derived Products      No Clinical Screening - See Comments      Lupine bean doesn't remember reaction       ONCOLOGIC HISTORY  -1/2019 PRESENTATION: Visual disturbance.   -3/6/2019 Evaluated by Dr. Mauricio, neuro-ophthalmologist, found to have a right sided homonymous hemianopsia. MRI ordered.   -3/6/2019 MR brain imaging revealed a contrast-enhancing lesion in the left occipital lobe suspicious for a high-grade primary brain tumor.  -3/22/2019 SURGERY: Craniotomy with a gross total resection by Dr. Irving Hayes.   PATHOLOGY: Glioblastoma; Wildtype status for IDH1/2 by next generation sequencing. MGMT promoter methylated. Wildtype PTEN, TP53.  -4/8/2019 NEURO-ONC: Recommending chemoradiotherapy.    -4/30 - 6/11/2019 CHEMORADS 6000cGy in 30 fractions with concurrent temozolomide 75mg/m2 (180mg).  -5/20/2019 NEURO-ONC: Not interested in Optune at this time.   -6/12/2019 NEURO-ONC: Clinically stable.   -6/21 - 6/28/2019 ADMISSION/ SZ: Admitted to [facility] for first ever seizure event, provoked by hyponatremia. Urine studies consistent with SIADH ; etiology of SIADH unclear. Had 10mm gastric lesion biopsied on 6/26/2019 by way of an EGD.  PATHOLOGY of gastric lesion: Hyperplastic polyp with erosion and foci of atypia; favor reactive. No intestinal metaplasia, no helicobacter pylori, no high grade dysplasia or invasive carcinoma.   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   -7/12/2019 NEURO-ONC/ MRB/ CHEMO: Clinical stable. Imaging with stable to slightly increased enhancement of previously seen nodular lesions; associated with mild degree of elevated rCBV. Starting adjuvant-dosed temozolomide 150mg/m2 (360mg), cycle 1 (start date 7/12/2019). Monitor fluid intake, will be checking CMP/ Na+.   -8/12/2019 NEURO-ONC/ MRB/ CHEMO: Clinical decompensated, but improving since hospital discharge. MR brain scan from last week with concern for non-contrast enhancing >> enhancing disease progression. Ordering a PET brain scan. Holding adjuvant-dosed temozolomide, cycle 3.  -10/24/2019 SURGERY: Repeat resection by Dr. Packer.  PATHOLOGY: Recurrent glioblastoma.   Next generation sequencing by [facility]; Microsatellite instability; stable. Tumor mutational burden; 4 (low). Mutations in ARID1A, ASXL1, ATRX, FANCE, MED12, MEF2B, NF1, RUNX1, TERT.   -11/25/2019 NEURO-ONC/ MRB/ CHEMO: Clinically doing well. Imaging with positive resection. Starting Lomustine (start date of 12/2). Next generation sequencing.   -12/4/2019 CHEMO: Lomustine, cycle 1.   -1/6/2020 NEURO-ONC: Clinically stable. Has TCP with Lomustine. Will hold off on starting next week until he sees Dr. Olmos with new imaging.  -1/31/2020 NEURO-ONC/ MRB/ CHEMO:  Clinically doing well. Imaging with no evidence of disease recurrence. Holding Lomustine in the setting of a poor emotional state. Referrals to Dr. lAeksandr Garcia for neuro-psych testing, Dr. Eugene Vargas for counseling/ coping, and palliative care. Next generation sequencing results discussed. Increase Zoloft. Continue dexamethasone 2mg daily.  -4/1/2020 ADMISSION/ SZ- Keppra increased, added diazepam PRN, continue Vimpat.   -4/1/2020 MRB with no significant change.   -4/13/2020 NEURO-ONC: Clinically improved since hospitalization. Continuing imaging surveillance. Increase Zoloft.   -6/26/2020 ADMISSION/ SZ- Keppra and Vimpat increased, continued diazepam PRN.   -6/29/2020 NEURO-ONC/ MRB: Clinically improved since hospitalization. Imaging largely stable. Continuing imaging surveillance. Trial of ritalin.   -8/10/2020 NEURO-ONC: Clinically stable. Will refer to palliative SW.  -8/24/2020 NEURO-ONC/ MRB: Clinically stable to improved. Imaging largely stable with no concern for disease recurrence. Continuing imaging surveillance. Referral to palliative care.   -9/25/2020 NEURO-ONC: Clinically declined after seizures on 9/18, though now slowly improving. Sending to [facility] to obtain better seizure control. No active cancer treatment.  -10/26/2020 NEURO-ONC/ MRB: Clinically stable. Imaging stable. Continue management of epilepsy per Dr. Pacheco. Weaning dexamethasone to 1mg daily.   -12/21/2020 NEURO-ONC/ MRB: Clinically with a recurrent seizure. Imaging with a new 6mm growth about the resection cavity. Repeat imaging in 6 weeks.   -2/1/2021 NEURO-ONC/ MRB: Clinically stable. Imaging with increased contrast enhancement, but no associated perfusion.  -3/22/2021 NEURO-ONC/ MRB: Clinically with more symptoms. Imaging with increased contrast enhancement and now with associated perfusion. The plan is for consideration for surgery or radiation, either followed by chemotherapy. I recommended against GammaTile Therapy given history  of epilepsy. Following consultation with neurosurgery and radiation oncology, the decision may be to transition to hospice.   -4/2021 RADS: 2500cGy.  -4/2021 CHEMO: Bevacizumab.  -5/24/2021 NEURO-ONC: Clinically stable. Continue sofia for now. Decrease dexamethasone to 1mg daily. IV and PO iron for anemia. The plan is to transition care to [place].     SOCIAL HISTORY   Tobacco use: Former smoker, quit 40 years ago.   Alcohol use: Social use.  Drug use: Denies.  .   Employment: On disability.       PHYSICAL EXAMINATION  KPS: 60  -Generally well appearing.  -Respiratory: No audible wheezing.   -Skin: No facial rashes.  -Psychiatric: Normal mood and affect. Pleasant, talkative.  -Neurologic:   MENTAL STATUS:     Alert, oriented.    Recall: Impaired, but improved.    Speech with hesitation, but less word finding issues. Expressing good meaning in conversation today.    Comprehension better today.     CRANIAL NERVES:     Pupils are equal, round.       Symmetric facial movements.   Hearing intact.   With normal phonation, no dysfunction of the palate or tongue.   MOTOR: Antigravity in arms.  SENSATION: Intact to light touch throughout.     The rest of a comprehensive physical examination is deferred due to PHE (public health emergency) video visit restrictions.        MEDICAL RECORDS  Obtained and personally reviewed all available outside medical records in addition to reviewing any records available in our electronic system.     LABS  Personally reviewed all available lab results.     IMAGING  No new neuro-imaging to review.        IMPRESSION  Clinic time for this high complexity visit was spent discussing in detail the nature of his cancer in light of his completed radiation therapy and continued use of sofia. This was in addition to answering questions pertaining to my recommendations and devising the plan as outlined below.      Clinically, Jayden is doing fairly well post-radiation. Neurological symptoms fluctuate,  but overall, he is largely stable. With the use of dexamethasone and sofia, symptoms improved some. Aphasia and cognition are improved today, but he continues to have issues with gait stability and coordination. Will decrease dexamethasone to 1mg daily. He has chronic fatigue, however, this is confounded by anemia. His Hb remains low ~8 and MCV is low (70's); indication iron deficiency anemia. Jayden and Oz have focused on eating iron-rich food, but will need to start IV iron today and also provided instruction on starting PO supplements. Cautioned against the side effect of increased constipation. Thankfully, he has not had any recent recurrent seizures, but does experience frequent seizure auras. His mood is down, but coping better with Abilify and Zoloft. With the increase in Abilify, I am concerned with the potential of drug-induced parkinsonism contributing to gait instability given their description of small steps and difficulty initiating walking. Encouraged Jayden and Oz to discuss this with palliative care.     For management of the enlarging area of contrast enhancement about the resection cavity; Jayden appropriately declined repeat surgery and opted for repeat radiation. In terms of sofia, it can be continued for now, but I anticipate that with future clinical and radiographic stability, it can be stopped. Jayden will be transitioning his oncology care to [place], but it is my impression at this time that there is no role for additional chemotherapy. It remians critical to continue to optimize quality of life. I agree with Jayden's request for PT when he moves to [place] and asked that they reach out to their new care team for a referral.     PROBLEM LIST  Glioblastoma, MGMT methylated and IDH1 wildtype by NGS  Visual field cut; homonymous hemianopsia, right-side  Memory issues  Drug-induced constipation  Hyperplastic gastric polyps, benign  H/o SIADH  Epilepsy    PLAN  -CANCER DIRECTED THERAPY-  -As  above; Transitioning oncology care to [place].    -ANEMIA-  -Iron deficiency.   -IV iron today.   -Instruction provided on PO supplementation; Ferrous sulfate 325 mg- 2 tablets (contains 65 mg elemental iron per tablet) ONCE daily to be taken on Monday, Wednesday, and Friday as every-other-day dosing resulted in greater iron absorption.     -STEROIDS-  -Decrease dexamethasone to 1 mg daily.   -Monitor for worsening of neurological symptoms as well as a flair in RA.     -SEIZURE MANAGEMENT-  -Per Dr. Pacheco.     -Quality of life/ MOOD/ FATIGUE-  -History of mood issues; depression, anxiety, irritability, poor coping.  -Off Ritalin  -Added to Brain Tumor Support Group email list; [email or web address]  -Conitnue daily exercises.   -Following with Dr. Eugene Vargas for counseling/ coping.   -Following with palliative care MD and TRAVIS.   -Abilify and Zoloft.    -HYPONATREMIA/ History of SIADH-    -DVT/ PE-  -Need for lifelong anticoagulation.  -Continue Xarelto.    Pharmacy to reach out regarding insurance coverage.   -If platelets drop <50, will need to hold anticoagulation.    Return to clinic as needed as Jayden and Oz are going to be spending most of their time in [place].    Nasra Olmos MD  Neuro-oncology

## 2021-05-24 PROBLEM — D50.9 ANEMIA, IRON DEFICIENCY: Status: ACTIVE | Noted: 2021-01-01

## 2021-05-24 NOTE — PATIENT INSTRUCTIONS
Nneka later today.   -Likely to be stopped in the future if clinically stable.     Decrease dexamethasone to 1mg daily.     Iron deficiency anemia;   -I will arrange for IV iron replacement today.   -Ferrous sulfate 325 mg; 2 tablets (contains 65 mg elemental iron per tablet) ONCE daily  Please take a dose on Monday, Wednesday, and Friday as every-other-day dosing resulted in greater iron absorption   Do not use enteric-coated or sustained-release capsules  Caution regarding increased constipation  Iron generally should not be given with food. Iron should especially be taken separately from calcium-containing foods and beverages (milk), calcium supplements, cereals, dietary fiber, tea, coffee, and eggs.    Enjoy your time spent in [place]. Please reach out as needed.     Nasra Olmos MD  Neuro-oncology  5/24/2021

## 2021-05-24 NOTE — PROGRESS NOTES
"Infusion Nursing Note:  Jayden Lackey presents today for New 1/2 injectafer. Day 1 Cycle 2 Avastin   Patient seen by provider today: Yes: Dr. Olmos   present during visit today: Not Applicable.    Note: Patient presents to infusion feeling ok. Patient states progressive fatigue throughout the day today post shower. Patient states the shower activity really \"wiped me out\". Also upon arrival, patient also has a face/neck flushed/red appearance that per patients wife, has been present for quite some time. Patient denies acute complaints or concerns not addressed with Dr. Olmos during virtual visit today on 5/24. Patients wife who is an approved visitor, present in infusion to validate various information.    At the end of the observation period of injectafer, an audible cough and clearing of throat noted. Patient states a tickle in throat without tightness or any other symptoms such as itching, hives, chest pain, shortness of breath, etc noted. Vss. Patient states he had a tickle in throat prior to Injectafer infusion start and that this feeling is not anything new. Post 10 minutes, patient states tickle is still present, but no increase or change in symptoms. Therefore Avastin infusion started.  At the end of the Avastin infusion patient states tickle in throat has resolved and denied further symptoms. BP trends reviewed while in infusion which concluded systolic bp within 140's consistently. Therefore, patient encouraged to check bp daily at home and to notify Dr. Olmos if bp is 150/100 or greater do to increased bp side effect of Avastin.      Patient did meet criteria for an asymptomatic covid-19 PCR test in infusion today. Patient declined the covid-19 test.    Intravenous Access:  Peripheral IV placed.    Treatment Conditions:  Lab Results   Component Value Date    HGB 8.9 05/24/2021     Lab Results   Component Value Date    WBC 5.2 05/24/2021      Lab Results   Component Value Date    ANEU 4.2 " 05/24/2021     Lab Results   Component Value Date     05/24/2021      Results reviewed, labs MET treatment parameters, ok to proceed with treatment.  BP x2 less than 150/100.      Post Infusion Assessment:  Patient tolerated infusion without incident.  Patient observed for 30 minutes post Iron infusion per protocol.  Blood return noted pre and post infusion.  Site patent and intact, free from redness, edema or discomfort.  No evidence of extravasations.  Access discontinued per protocol.       Discharge Plan:   Patient declined prescription refills.  Discharge instructions reviewed with: Patient.  Patient and/or family verbalized understanding of discharge instructions and all questions answered.  Copy of AVS reviewed with patient and/or family.  Patient will return in 1 week for next Iron infusion appointment. IB sent to scheduling to call patient to set up next infusion  Patient discharged in stable condition accompanied by: wife.  Departure Mode: Wheelchair.  Face to Face time: 0 minutes.      Db Santizo RN

## 2021-05-24 NOTE — PATIENT INSTRUCTIONS
[facility] Triage and after hours / weekends / holidays:  [phone]    Please call the triage or after hours line if you experience a temperature greater than or equal to 100.5, shaking chills, have uncontrolled nausea, vomiting and/or diarrhea, dizziness, shortness of breath, chest pain, bleeding, unexplained bruising, or if you have any other new/concerning symptoms, questions or concerns.      If you are having any concerning symptoms or wish to speak to a provider before your next infusion visit, please call your care coordinator or triage to notify them so we can adequately serve you.     If you need a refill on a narcotic prescription or other medication, please call before your infusion appointment.                 May 2021      [place] Monday Tuesday Wednesday Thursday Friday Saturday                                 1       2     3    VIDEO VISIT RETURN   2:45 PM   (30 min.)   Frank Pacheco MD   [facility] [place] 4     5     6     7     8       9     10    LAB PERIPHERAL   2:15 PM   (15 min.)   [facility] DRAW   [facility]    ONC INFUSION 1 HR (60 MIN)   3:00 PM   (60 min.)    ONC INFUSION NURSE   [facility] 11    VIDEO VISIT RETURN  11:05 AM   (40 min.)   Sydnee Grullon DO   [facility] 12     13     14     15       16     17     18     19     20     21     22       23     24    VIDEO VISIT RETURN   7:45 AM   (30 min.)   Nasra Olmos MD   [facility]    LAB PERIPHERAL   2:30 PM   (15 min.)   [facility] DRAW   [facility]    ONC INFUSION 1 HR (60 MIN)   3:00 PM   (60 min.)    ONC INFUSION NURSE   [facility] 25     26     27     28     29       30     31 June 2021 Sunday Monday Tuesday Wednesday Thursday Friday Saturday              1    VIDEO VISIT RETURN  10:45 AM   (30 min.)   Frank Pacheco MD   [facility] [place] 2     3     4     5       6     7    LAB PERIPHERAL  12:45 PM   (15 min.)    [facility]   [facility]    ONC INFUSION 1 HR (60 MIN)   1:30 PM   (60 min.)    ONC INFUSION NURSE   [facility] 8     9     10     11     12       13     14     15     16     17     18     19       20     21     22     23     24     25     26       27     28     29     30                                    Lab Results:  Recent Results (from the past 12 hour(s))   *CBC with platelets differential    Collection Time: 05/24/21  3:21 PM   Result Value Ref Range    WBC 5.2 4.0 - 11.0 10e9/L    RBC Count 4.45 4.4 - 5.9 10e12/L    Hemoglobin 8.9 (L) 13.3 - 17.7 g/dL    Hematocrit 31.2 (L) 40.0 - 53.0 %    MCV 70 (L) 78 - 100 fl    MCH 20.0 (L) 26.5 - 33.0 pg    MCHC 28.5 (L) 31.5 - 36.5 g/dL    RDW 19.1 (H) 10.0 - 15.0 %    Platelet Count 205 150 - 450 10e9/L    Diff Method Automated Method     % Neutrophils 81.5 %    % Lymphocytes 11.5 %    % Monocytes 5.8 %    % Eosinophils 0.6 %    % Basophils 0.2 %    % Immature Granulocytes 0.4 %    Nucleated RBCs 0 0 /100    Absolute Neutrophil 4.2 1.6 - 8.3 10e9/L    Absolute Lymphocytes 0.6 (L) 0.8 - 5.3 10e9/L    Absolute Monocytes 0.3 0.0 - 1.3 10e9/L    Absolute Eosinophils 0.0 0.0 - 0.7 10e9/L    Absolute Basophils 0.0 0.0 - 0.2 10e9/L    Abs Immature Granulocytes 0.0 0 - 0.4 10e9/L    Absolute Nucleated RBC 0.0

## 2021-05-24 NOTE — LETTER
"    5/24/2021         RE: Jayden Lackey  [address]  [place] [address]        Dear Colleague,    Thank you for referring your patient, Jayden Lakcey, to the [facility]. Please see a copy of my visit note below.    Jayden is a 66 year old who is being evaluated via a billable video visit.      How would you like to obtain your AVS? MyChart  If the video visit is dropped, the invitation should be resent by: Text to cell phone: [phone]  Will anyone else be joining your video visit? No     Vitals - Patient Reported  Weight (Patient Reported): 109.8 kg (242 lb)  Height (Patient Reported): 189.2 cm (6' 2.5\")  BMI (Based on Pt Reported Ht/Wt): 30.65  Pain Score: No Pain (0)    Saundra Perea CMA May 24, 2021  7:37 AM       Video-Visit Details  Type of service:  Video Visit    Prep time: 10 minutes  Video Start Time: 08:14 AM  Video End Time: 08:45 AM  Post-visit documentation: 10 minutes    Originating Location (pt. Location): Home    Distant Location (provider location):  [facility]     Platform used for Video Visit: Isaías Olmos MD    _____________________________________________________________    NEURO-ONCOLOGY VISIT  May 24, 2021    CHIEF COMPLAINT: Mr. 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 He completed chemoradiotherapy on 6/11/2019. Imaging in 9/2019 was consistent with recurrent disease and adjuvant-dosed temozolomide was stop. Pathology from repeat resection on 10/24/2019 was consistent with recurrent tumor. He was started on lomustine, but stopped after 1 cycle due to significant chemotherapy-induced fatigue.     Quality of life has been compromised by recurrent seizure events, PE requiring prolonged hospitalization, and chemotherapy-related " toxicities.    Imaging in 12/2020 showed a new area of contrast enhancement and repeat imaging from 2/2021 and 3/2021 demonstrated further increase in contrast enhancement, with new increased perfusion. He declined surgery and underwent repeat radiation therapy plus bevacizumab (sofia).    I met with Jayden and Oz (wife) for this follow-up visit today.    HISTORY OF PRESENT ILLNESS  -Jayden completed radiation therapy. He received 2000cGy instead of the originally planned 2500cGy, as he became mute after first fraction. Neurological symptoms improved some with steroids. Currently, he is on dexamethasone 2mg daily. 4mg daily associated with agitation.   -Neurological symptoms fluctuate; worsening seen with stress, frustration. Continued aphasia and memory issues. Walking is slow at times with worsening right sided weakness and leaning to the left; steps are small; difficulty initiating gait.  -He was low energy, fatigue exacerbated by activity, even showering. Trying to remain active. Anemic.   -He has been experiencing auras lately; using Valium PRN. Using Valium for episodes of high anxiety.   -Depressed at times. Increased dose of Abilify and on Zoloft.   -Eating well.    -No headaches.   -Tolerating sofia. Cognition and aphasia has improved with sofia use.   -Planning to travel down to [place] for the summer. Will be coming back to [place] in August and then, intend on spending the fall/ winter in [place].    REVIEW OF SYSTEMS  A comprehensive ROS negative except as in HPI.      MEDICATIONS   Current Outpatient Medications   Medication Sig Dispense Refill     ARIPiprazole (ABILIFY) 2 MG tablet Take 2 tablets (4 mg) by mouth daily 60 tablet 3     atenolol (TENORMIN) 100 MG tablet Take 50 mg by mouth daily 50 mg       dexamethasone (DECADRON) 1 MG tablet Take 1 tablet (1 mg) by mouth daily (with breakfast) (Patient taking differently: Take 4 mg by mouth daily (with breakfast) Per Dr. Olmos on 5/10/2021: patient to start 2mg  on Wednesday 5/12/2021) 90 tablet 3     diazepam (VALIUM) 5 MG/ML (HIGH CONC) solution Take 1 ml (5 mg) by mouth for seizure with impaired consciousness; do not exceed 15 mg in one day; do not use if breathing is slow or shallow. 30 mL 0     diltiazem ER (DILT-XR) 180 MG 24 hr capsule Take 180 mg by mouth daily       divalproex sodium delayed-release (DEPAKOTE) 250 MG DR tablet Take 2 tablets (500 mg) by mouth 2 times daily 360 tablet 3     docusate sodium (COLACE) 50 MG capsule Take 50 mg by mouth 2 times daily as needed for constipation       lacosamide (VIMPAT) 200 MG TABS tablet Take 1 tablet (200 mg) twice daily (together with 50 mg tablets, to total 250mg in AM and 250mg in PM). 180 tablet 1     lacosamide (VIMPAT) 50 MG TABS tablet Take 1 tablet (50 mg) twice daily (together with 200 mg tablets, to total 250mg in AM and 250mg in PM). 180 tablet 1     levETIRAcetam (KEPPRA) 1000 MG tablet Take 2 tablets (2000 mg) twice daily. 360 tablet 3     lisinopril (PRINIVIL/ZESTRIL) 5 MG tablet Take 1 tablet (5 mg) by mouth daily 28 tablet 0     rivaroxaban ANTICOAGULANT (XARELTO ANTICOAGULANT) 20 MG TABS tablet Take 1 tablet (20 mg) by mouth daily (with dinner) 30 tablet 3     sertraline (ZOLOFT) 100 MG tablet Take 2 tablets (200 mg) by mouth daily 180 tablet 3     Cholecalciferol 1.25 MG (81835 UT) TABS 1 cap(s)       DRUG ALLERGIES   Allergies   Allergen Reactions     Shellfish Allergy Difficulty breathing, Nausea and Vomiting, Swelling and Shortness Of Breath     Lupine Bean Extract      Shellfish-Derived Products      No Clinical Screening - See Comments      Lupine bean doesn't remember reaction       ONCOLOGIC HISTORY  -1/2019 PRESENTATION: Visual disturbance.   -3/6/2019 Evaluated by Dr. Mauricio, neuro-ophthalmologist, found to have a right sided homonymous hemianopsia. MRI ordered.   -3/6/2019 MR brain imaging revealed a contrast-enhancing lesion in the left occipital lobe suspicious for a high-grade primary brain  tumor.  -3/22/2019 SURGERY: Craniotomy with a gross total resection by Dr. Irving Hayes.   PATHOLOGY: Glioblastoma; Wildtype status for IDH1/2 by next generation sequencing. MGMT promoter methylated. Wildtype PTEN, TP53.  -4/8/2019 NEURO-ONC: Recommending chemoradiotherapy.   -4/30 - 6/11/2019 CHEMORADS 6000cGy in 30 fractions with concurrent temozolomide 75mg/m2 (180mg).  -5/20/2019 NEURO-ONC: Not interested in Optune at this time.   -6/12/2019 NEURO-ONC: Clinically stable.   -6/21 - 6/28/2019 ADMISSION/ SZ: Admitted to [facility] for first ever seizure event, provoked by hyponatremia. Urine studies consistent with SIADH ; etiology of SIADH unclear. Had 10mm gastric lesion biopsied on 6/26/2019 by way of an EGD.  PATHOLOGY of gastric lesion: Hyperplastic polyp with erosion and foci of atypia; favor reactive. No intestinal metaplasia, no helicobacter pylori, no high grade dysplasia or invasive carcinoma.   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   -7/12/2019 NEURO-ONC/ MRB/ CHEMO: Clinical stable. Imaging with stable to slightly increased enhancement of previously seen nodular lesions; associated with mild degree of elevated rCBV. Starting adjuvant-dosed temozolomide 150mg/m2 (360mg), cycle 1 (start date 7/12/2019). Monitor fluid intake, will be checking CMP/ Na+.   -8/12/2019 NEURO-ONC/ MRB/ CHEMO: Clinical decompensated, but improving since hospital discharge. MR brain scan from last week with concern for non-contrast enhancing >> enhancing disease progression. Ordering a PET brain scan. Holding adjuvant-dosed temozolomide, cycle 3.  -10/24/2019 SURGERY: Repeat resection by Dr. Packer.  PATHOLOGY: Recurrent glioblastoma.   Next generation sequencing by [facility]; Microsatellite instability; stable. Tumor mutational burden; 4 (low). Mutations in ARID1A, ASXL1, ATRX, FANCE, MED12, MEF2B, NF1, RUNX1, TERT.   -11/25/2019 NEURO-ONC/ MRB/ CHEMO: Clinically doing well. Imaging with positive resection. Starting Lomustine  (start date of 12/2). Next generation sequencing.   -12/4/2019 CHEMO: Lomustine, cycle 1.   -1/6/2020 NEURO-ONC: Clinically stable. Has TCP with Lomustine. Will hold off on starting next week until he sees Dr. Olmos with new imaging.  -1/31/2020 NEURO-ONC/ MRB/ CHEMO: Clinically doing well. Imaging with no evidence of disease recurrence. Holding Lomustine in the setting of a poor emotional state. Referrals to Dr. Aleksandr Garcia for neuro-psych testing, Dr. Eugene Vargas for counseling/ coping, and palliative care. Next generation sequencing results discussed. Increase Zoloft. Continue dexamethasone 2mg daily.  -4/1/2020 ADMISSION/ SZ- Keppra increased, added diazepam PRN, continue Vimpat.   -4/1/2020 MRB with no significant change.   -4/13/2020 NEURO-ONC: Clinically improved since hospitalization. Continuing imaging surveillance. Increase Zoloft.   -6/26/2020 ADMISSION/ SZ- Keppra and Vimpat increased, continued diazepam PRN.   -6/29/2020 NEURO-ONC/ MRB: Clinically improved since hospitalization. Imaging largely stable. Continuing imaging surveillance. Trial of ritalin.   -8/10/2020 NEURO-ONC: Clinically stable. Will refer to palliative SW.  -8/24/2020 NEURO-ONC/ MRB: Clinically stable to improved. Imaging largely stable with no concern for disease recurrence. Continuing imaging surveillance. Referral to palliative care.   -9/25/2020 NEURO-ONC: Clinically declined after seizures on 9/18, though now slowly improving. Sending to [facility] to obtain better seizure control. No active cancer treatment.  -10/26/2020 NEURO-ONC/ MRB: Clinically stable. Imaging stable. Continue management of epilepsy per Dr. Pacheco. Weaning dexamethasone to 1mg daily.   -12/21/2020 NEURO-ONC/ MRB: Clinically with a recurrent seizure. Imaging with a new 6mm growth about the resection cavity. Repeat imaging in 6 weeks.   -2/1/2021 NEURO-ONC/ MRB: Clinically stable. Imaging with increased contrast enhancement, but no associated perfusion.  -3/22/2021  NEURO-ONC/ MRB: Clinically with more symptoms. Imaging with increased contrast enhancement and now with associated perfusion. The plan is for consideration for surgery or radiation, either followed by chemotherapy. I recommended against GammaTile Therapy given history of epilepsy. Following consultation with neurosurgery and radiation oncology, the decision may be to transition to hospice.   -4/2021 RADS: 2500cGy.  -4/2021 CHEMO: Bevacizumab.  -5/24/2021 NEURO-ONC: Clinically stable. Continue sofia for now. Decrease dexamethasone to 1mg daily. IV and PO iron for anemia. The plan is to transition care to [place].     SOCIAL HISTORY   Tobacco use: Former smoker, quit 40 years ago.   Alcohol use: Social use.  Drug use: Denies.  .   Employment: On disability.       PHYSICAL EXAMINATION  KPS: 60  -Generally well appearing.  -Respiratory: No audible wheezing.   -Skin: No facial rashes.  -Psychiatric: Normal mood and affect. Pleasant, talkative.  -Neurologic:   MENTAL STATUS:     Alert, oriented.    Recall: Impaired, but improved.    Speech with hesitation, but less word finding issues. Expressing good meaning in conversation today.    Comprehension better today.     CRANIAL NERVES:     Pupils are equal, round.       Symmetric facial movements.   Hearing intact.   With normal phonation, no dysfunction of the palate or tongue.   MOTOR: Antigravity in arms.  SENSATION: Intact to light touch throughout.     The rest of a comprehensive physical examination is deferred due to PHE (public health emergency) video visit restrictions.        MEDICAL RECORDS  Obtained and personally reviewed all available outside medical records in addition to reviewing any records available in our electronic system.     LABS  Personally reviewed all available lab results.     IMAGING  No new neuro-imaging to review.        IMPRESSION  Clinic time for this high complexity visit was spent discussing in detail the nature of his cancer in light of  his completed radiation therapy and continued use of sofia. This was in addition to answering questions pertaining to my recommendations and devising the plan as outlined below.      Clinically, Jayden is doing fairly well post-radiation. Neurological symptoms fluctuate, but overall, he is largely stable. With the use of dexamethasone and sofia, symptoms improved some. Aphasia and cognition are improved today, but he continues to have issues with gait stability and coordination. Will decrease dexamethasone to 1mg daily. He has chronic fatigue, however, this is confounded by anemia. His Hb remains low ~8 and MCV is low (70's); indication iron deficiency anemia. Jayden and Oz have focused on eating iron-rich food, but will need to start IV iron today and also provided instruction on starting PO supplements. Cautioned against the side effect of increased constipation. Thankfully, he has not had any recent recurrent seizures, but does experience frequent seizure auras. His mood is down, but coping better with Abilify and Zoloft. With the increase in Abilify, I am concerned with the potential of drug-induced parkinsonism contributing to gait instability given their description of small steps and difficulty initiating walking. Encouraged Jayden and Oz to discuss this with palliative care.     For management of the enlarging area of contrast enhancement about the resection cavity; Jayden appropriately declined repeat surgery and opted for repeat radiation. In terms of sofia, it can be continued for now, but I anticipate that with future clinical and radiographic stability, it can be stopped. Jayden will be transitioning his oncology care to [place], but it is my impression at this time that there is no role for additional chemotherapy. It remians critical to continue to optimize quality of life. I agree with Jayden's request for PT when he moves to [place] and asked that they reach out to their new care team for a referral.      PROBLEM LIST  Glioblastoma, MGMT methylated and IDH1 wildtype by NGS  Visual field cut; homonymous hemianopsia, right-side  Memory issues  Drug-induced constipation  Hyperplastic gastric polyps, benign  H/o SIADH  Epilepsy    PLAN  -CANCER DIRECTED THERAPY-  -As above; Transitioning oncology care to [place].    -ANEMIA-  -Iron deficiency.   -IV iron today.   -Instruction provided on PO supplementation; Ferrous sulfate 325 mg- 2 tablets (contains 65 mg elemental iron per tablet) ONCE daily to be taken on Monday, Wednesday, and Friday as every-other-day dosing resulted in greater iron absorption.     -STEROIDS-  -Decrease dexamethasone to 1 mg daily.   -Monitor for worsening of neurological symptoms as well as a flair in RA.     -SEIZURE MANAGEMENT-  -Per Dr. Pacheco.     -Quality of life/ MOOD/ FATIGUE-  -History of mood issues; depression, anxiety, irritability, poor coping.  -Off Ritalin  -Added to Brain Tumor Support Group email list; [email or web address]  -Conitnue daily exercises.   -Following with Dr. Eugene Vargas for counseling/ coping.   -Following with palliative care MD and SW.   -Abilify and Zoloft.    -HYPONATREMIA/ History of SIADH-    -DVT/ PE-  -Need for lifelong anticoagulation.  -Continue Xarelto.    Pharmacy to reach out regarding insurance coverage.   -If platelets drop <50, will need to hold anticoagulation.    Return to clinic as needed as [facility] are going to be spending most of their time in [place].    Nasra Olmos MD  Neuro-oncology      Again, thank you for allowing me to participate in the care of your patient.        Sincerely,        Nasra Olmos MD

## 2021-05-24 NOTE — NURSING NOTE
Chief Complaint   Patient presents with     Blood Draw     IV placement with blood draw and vitals      Labs drawn via IV placed by Maryanne Dela Cruz in right arm

## 2021-05-27 NOTE — PROGRESS NOTES
"Received VM from patient's wife - she reports that they met with Dr. Olmos recently and she mentioned to them that some anti-depressant medications can cause Parkinsons like symptoms.     She reports over the last couple months he has had some trouble with walking. Short walks, difficultly initiating walking sometimes.     She asks if Dr. Banuelos thinks this could be related to sertraline, Abilify, or the combo of the two? Or if this is the cancer? Should we adjust anything?    Called patient's wife to discuss. She does think issues walking started end of winter/March (when Abilify was started). She said it was a real sudden change that he couldn't walk as far. They didn't think much of that as that was also around the timing when the tumor started to grow again, so assumed that was what the issue was. She notes he has been on the sertraline for some time, Abilify was the newest med added.     When the dose of abilify was just changes, she is not sure if that was any change, just knows for sure it definitely not improving.     She says walking at a normal pace is not something he can do anymore. He is still having some mood issues, but mood is a little better with abilify she thinks. She notes that he has a lot to deal with right now as they are moving and he is dealing with his cancer.      Per Dr. Olmos's note: \"His mood is down, but coping better with Abilify and Zoloft. With the increase in Abilify, I am concerned with the potential of drug-induced parkinsonism contributing to gait instability given their description of small steps and difficulty initiating walking. Encouraged Jayden and Oz to discuss this with palliative care.\"    Advised I would send this information to the MD for review and follow up with her. She thanked me for calling and letting her know.   "

## 2021-05-27 NOTE — PROGRESS NOTES
Patient here ambulatory accompanied by wife for radiation consult for his GBM.  Patient will be following his medical oncologist Dr. Olmos at the [facility].  He would like to have his labs and daily radiation closer to home and is therefore hoping to have treatment at [facility].  Patient states he is recovering from surgery well.  Still has some visual deficits and expressive aphasia but feels things are continuing to improve.  30 minutes spent in review of radiation process and potential side effects.  Written information given for review: ACS RT book, RT to brain handout, [facility] RT handouts, doctor bio card, HE class schedule, team photo sheet and cancer care welcome letter.  Seen by Dr. Bailey.  Plan RTC for follow up and/or CT simulation as directed by physician.

## 2021-05-27 NOTE — PROGRESS NOTES
[facility] Radiation Oncology Consult Note    Patient: Jayden Lackey  MRN: [id number]  Date of Service: 04/12/2019    Assessment / Impression     1. GBM (glioblastoma multiforme), left occipital lobe (H)       Cancer Staging  No matching staging information was found for the patient.  ECOG Peformance Status  ECOG Performance Status: 1  Distress Assessment Score: 3    Plan:   GBM right occipital lobe, post resection MRI shows post surgical change, no obvious residual.  Pathology IDH1/2 wildtype, MGMT promoter methylated. Residual right sided homonymous hemianopsia, visual processing delay    1. We discussed risks and benefits, in detail, of radiation therapy including side effects as described below. The patient voices understanding of the information discussed and wishes to proceed forward with treatment. We will obtain CT sim to begin radiation planning. All questions and concerns addressed.  I will treat him with 30 fractions for total of 60Gy  (EORTC)   2. History of RA methotrexate injections resumed after flare in mariann-op setting  Discussed that radiation may induce flare which we could co-manage with his rheumatologist. Temozolomide will likely lessen this chance/degree of reaction.   3. Patient's wife Oz asked many questions about the temozolomide precautions and referred her to Dr Olmos and her team.   4. Discussed that visual sx may worsen with radiation, had what sounds like visual siezures pre-op but none since.  Not currently on anti-convulsants/steroids.       Face to face time  60 minutes with > 75% spent on consultation, education and coordination of care.  Intent of Therapy: Curative  Side effects that may occur during or within weeks after Radiation Therapy      Fatigue and general weakness    Headache and scalp irritation    Loss of hair    Nausea, vomiting, and decrease in appetite    Darkening, irritation, itchiness, redness, dryness, peeling, thickening, scabbing, and ulceration of the  "scalp, neck and forehead    Worsening of RA symptoms due to histamine response from radiation.    Side effects that may occur months or years after Radiation Therapy      Development of another tumor or cancer           Dizziness and balance problems    Decrease in hormone production    Brain inflammation or necrosis that may cause various neurologic symptoms    Seizures    Decrease in memory and thinking abilities    Decrease in hearing and feeling of ear congestion    Eye dryness and irritation    Loss of vision    Cataracts in the eyes    Poor healing after a trauma or surgery in the irradiated area    Facial numbness, pain and weakness    The risks, benefits and alternatives to radiation therapy were outlined with the patient. All questions were answered and a consent was signed.      Subjective:      HPI: Jayden Lackey is a 64 y.o. male with left occipital GBM, WHO grade IV, MGMT promoter methylation present, IDH wild type.     The patient originally developed right sided vision loss while on a cruise around 1/11/2019. Unfortunately, his AUGUSTIN was dying and he had to drive to [place]. During his time in [place], he had apprently seen a retina specialist who found no abnormalities. The patient continued to have difficulties with the right side of his vision, described as 1-2 minute episodes of \"Ginger lights\" several times throughout the day. He then presented to an ophthalmologist who discovered the visual field defect was present in both eyes, right worse than left per patient. An MR head was obtained on 3/7/2019 which showed a mass, 3.1 x 3.1 cm, in the left occipital lobe suspicious for high grade primary brain tumor or lymphoma.     He underwent a craniotomy and tumor resection on 3/22/2019, pathology showed GBM, WHO grade IV, MGMT promoter methylation positive, IDH wild type.     Today the patient presents to discuss radiation therapy. Since surgery, the patient now describes worsened right sided visual field " cuts. Also noticing some expressive aphasia. Otherwise no new complaints. History of RA and is currently well controlled on Methotrexate. Not currently on Keppra.     Prior Radiation: No  Concurrent Chemotherapy: Yes, temodar    Current Outpatient Medications   Medication Sig Dispense Refill     aspirin 81 mg chewable tablet Chew.       atenolol (TENORMIN) 50 MG tablet Take 50 mg by mouth.       FISH OIL-DHA-EPA ORAL Take by mouth.       folic acid (FOLVITE) 1 MG tablet Take 1 mg by mouth.       lisinopril-hydrochlorothiazide (PRINZIDE,ZESTORETIC) 20-25 mg per tablet Take 1 tablet by mouth.       methotrexate, PF, 25 mg/mL Soln injection TAKE 0.7ML (17.5MG) SUBCUTANEOUS WEEKLY ORAL.       naproxen (EC NAPROSYN) 500 MG EC tablet Take by mouth.       nutritional supplement-fiber Liqd Take 1 tablet by mouth.       omega-3 acid ethyl esters (LOVAZA) 1 gram capsule Take by mouth.       rosuvastatin (CRESTOR) 5 MG tablet Take 5 mg by mouth.       FOLIC ACID, BULK, MISC Take 1 mg by mouth.       ondansetron (ZOFRAN) 4 MG tablet Take 4 mg by mouth.       temozolomide (TEMODAR) 180 MG capsule Take 180 mg by mouth.       No current facility-administered medications for this visit.      No past medical history on file.  No past surgical history on file.  Shellfish containing products and Other allergy (see comments)  No family history on file.  Social History     Socioeconomic History     Marital status:      Spouse name: Not on file     Number of children: Not on file     Years of education: Not on file     Highest education level: Not on file   Occupational History     Not on file   Social Needs     Financial resource strain: Not on file     Food insecurity:     Worry: Not on file     Inability: Not on file     Transportation needs:     Medical: Not on file     Non-medical: Not on file   Tobacco Use     Smoking status: Not on file   Substance and Sexual Activity     Alcohol use: Not on file     Drug use: Not on file      Sexual activity: Not on file   Lifestyle     Physical activity:     Days per week: Not on file     Minutes per session: Not on file     Stress: Not on file   Relationships     Social connections:     Talks on phone: Not on file     Gets together: Not on file     Attends [employer] service: Not on file     Active member of club or organization: Not on file     Attends meetings of clubs or organizations: Not on file     Relationship status: Not on file     Intimate partner violence:     Fear of current or ex partner: Not on file     Emotionally abused: Not on file     Physically abused: Not on file     Forced sexual activity: Not on file   Other Topics Concern     Not on file   Social History Narrative     Not on file        Review of Systems:        General  General (WDL): All general elements are within defined limits  EENT  ENT (WDL): Exceptions to WDL  Changes in vision: Yes - Recent (Less than 3 months)(peripheral visual cut)  Glasses or Contacts: Yes - Chronic (Greater than 3 months)(glasses )  Hearing loss: Yes - Chronic (Greater than 3 months)(R>L)  Hearing Aids: Yes - Chronic (Greater than 3 months)(Rt ear)  Tinnitus: Yes - Chronic (Greater than 3 months)(bilateral)  Sinus Congestion/Drainage: Yes - Chronic (Greater than 3 months)(Hx of sinus surgery)  Respiratory       Respiratory (WDL): Exceptions to WDL(Lung nodules)  Cardiovascular  Cardiovascular (WDL): All cardiovascular elements are within defined limits  Endocrine  Endocrine (WDL): All endocrine elements are within defined limits  Gastrointestinal  Gastrointestinal (WDL): All gastrointestinal elements are within defined limits  Musculoskeletal  Musculoskeletal (WDL): Exceptions to WDL  Joint pain: Yes - Chronic (Greater than 3 months)(Hx of RA)  Integumentary                  Neurological  Neurological (WDL): Exceptions to WDL  Difficulty walking: Yes - Recent (Less than 3 months)(difficulty with heel to toe walking)  Seizures: Yes - Recent (Less  than 3 months)(focal surgery preop-vision changes)  Difficulty with Balance: Yes - Recent (Less than 3 months)(peripheral vision changes)  Psychological/Emotional   Psychological/Emotional (WDL): Exceptions to WDL  Hematological/Lymphatic  Hematological/Lymphatic (WDL): All hematological/lymphatic elements are within defined limits  Dermatologic  Dermatologic (WDL): Exceptions to WDL  Healing Incision: Yes - Recent (Less than 3 months)(right scalp)  Genitourinary/Reproductive  Genitourinary/Reproductive (WDL): All genitourinary/reproductive elements are within defined limits  Reproductive     Pain              Currently in Pain: No/denies   AUA Assessment                    Accompanied by         Objective:     Physical Exam    Vitals:    04/12/19 1316   BP: 133/81   Pulse: (!) 53   Temp: 98.5  F (36.9  C)   TempSrc: Oral   SpO2: 98%   Weight: 213 lb 8 oz (96.8 kg)       GENERAL: No acute distress. Cooperative in conversation.   HEENT: Pupils are equal, round and reactive. EOMI. Oromucosa is clean and intact. No ulcerations or mucositis noted. No bleeding noted.  RESP: Normal respiratory rate.  CV: Regular, rate and rhythm.  ABD: Soft, nontender.  MUSCULOSKELETAL: No palpable points of tenderness.   PSYCH: Within normal limits. No depression or anxiety.  SKIN: Warm dry intact.   LYMPH: No cervical, supraclavicular lymphadenopathy.  EXTREMITIES: No edema .  NEUROLOGIC: Right sided homonymous hemianopsia. CNIII-XII otherwise symmetric, normal strength, sensation and reflexes throughout, gait normal. Had slowed visual processing, but verbal processing intact.           Imaging: MRI pre/post resection viewed personally in [facility].   Pathology:   MOLECULAR ADDENDUM  Status: Signed Out  Date Ordered:3/29/2019  Date Reported:4/8/2019 11:56  Signed Out By: Darnell Villagomez M.D., [facility]    INTERPRETATION:    Testing was performed by next generation sequencing for mutations in   IDH-1, IDH-2, TP53 and PTEN  "([id number]).  Testing was performed for methylation status of the MGMT-promoter   ([id number]). The final diagnosis remains  unchanged.    RESULTS:  - No mutations were detected in IDH1, IDH2, TP53 or PTEN (see [id number]).  - POSITIVE for MGMT promoter methylation (see [id number]).    FINAL INTEGRATED DIAGNOSIS:  - GLIOBLASTOMA, IDH-WILDTYPE, WHO grade IV.       -  POSITIVE for MGMT-promoter methylation.  - - No mutations detected in IDH-1, IDH-2, TP53 or PTEN.    ORIGINAL REPORT:    SPECIMEN(S):  A: Left occipital tumor  B: Left occipital tumor, MAMTA contents  C: Left occipital tumor    FINAL DIAGNOSIS:  A. Brain, \"left occipital tumor\", biopsy:  - GLIOBLASTOMA (WHO GRADE IV), SEE MICROSCOPIC DESCRIPTION AND COMMENT.    B. Brain, \"left occipital tumor Mamta contents\", excision:  - GLIOBLASTOMA (WHO GRADE IV), SEE MICROSCOPIC DESCRIPTION AND COMMENT.    C. Brain, \"left occipital tumor\", excision:  - GLIOBLASTOMA (WHO GRADE IV), SEE MICROSCOPIC DESCRIPTION AND COMMENT.    COMMENT:  Molecular studies for MGMT promoter methylation and IDH sequencing are   pending and will be reported in an  addendum.    I have personally reviewed all specimens and/or slides, including the   listed special stains, and used them  with my medical judgement to determine or confirm the final diagnosis.    Electronically signed out by:    Darnell Villagomez M.D., [facility]    GROSS:  A: The specimen is received fresh for frozen section, with proper patient   identification, labeled \"left  occipital tumor\".  It consists of a tan pink irregular pieces of soft   tissue (0.9 x 0.4 x 0.3 cm).  A touch  prep is made  The specimen is submitted entirely for frozen section.  The   remainder of the frozen section  block is submitted as A1 FS.    B: The specimen is received in formalin with proper patient   identification, labeled \"left occipital tumor Mamta  contents\".  The specimen consists of a collection of tan-white irregular,   hemorrhagic soft tissue " "(3.5 x 2.6 x  0.7 cm in aggregate) received in a translucent plastic medical device.    The specimen is filtered and entirely  submitted in cassettes B1 - B5.    C: The specimen is received in formalin with proper patient   identification, labeled \"left occipital tumor\".  The specimen consists of seven tan-pink irregular segments of soft tissue   ranging from 0.42.3 cm in greatest  dimension.  The specimen is wrapped and entirely submitted in cassettes C1   - C3.  (Dictated by: Eren Gavin 3/22/2019 02:43 PM)    INTRAOPERATIVE CONSULTATION:  Frozen section is performed on part A. Please refer to Epic Result History   for the Preliminary Intraoperative  Diagnosis.    MICROSCOPIC:  A, B and C. Examination of H&E stained slides discloses a cellular   infiltrating astrocytic neoplasm with  predominantly gemistocytic morphology. Permanent sections of part A   confirm the intraoperative diagnosis. Part  B and C show malignant features with brisk mitotic activity, microvascular   proliferation and multifocal  spontaneous tumor necrosis. Patches of dense lymphocytes are identified in   a perivascular distribution with  the neoplasm. Immunostaining for mutant IDH1(R132H) protein with   appropriate controls is negative in tumor  cells. Immunostaining for ATRX with appropriate controls shows nuclear   expression is retained in tumor cells.    The technical component of this testing was completed at the [facility], with the professional component performed   at the [facility], [address] [place] [id number] ([phone])    CPT Codes:  A: 22640-AS9, 18029-GE  B: 00212-TY3  C: 41902-GJ6, SOH, 38783-SPI, 94557-KABAOV5XO, 02216-QUYWMQ+    COLLECTION SITE:  Client: [facility]  Location: Nasra MAY MD (B) personally performed the services " described in this documentation, as scribed by Dyllan Acuña in my presence, and it is both accurate and complete.    Signed by: Nasra Bailey MD, MPH

## 2021-05-27 NOTE — PROGRESS NOTES
"I met with Jayden and his wife, Oz last evening at the brain tumor support group.  They had many good questions for the group.  I briefly said \"hello\" today as Jayden was headed to have his radiation simulation and Oz was on the phone making appointments.  I will follow up with them shortly.  "

## 2021-05-28 NOTE — PROGRESS NOTES
I met with Jayden in clinic today.  He was here for his radiation treatment and then a blood draw.    I gave him a folder on brain tumors to share with his wife, Oz.  I also gave him:  An Artistic Trading Card with an Origami Crane and a word or two of inspiration.  The Origami Carranza is a symbol of Long Life, Hope, Healing and Peace.  He appreciated the check in.  He denies needing any services at this time.

## 2021-05-28 NOTE — PROGRESS NOTES
Per MD, this could be from the abilify. Could go back down to the 2mg dose of abilify and see if things are better. She is concerned that we may be balancing side effects for better control of mood symptoms.     She also advised if the symptoms are still incredibly bothersome to him, we could look at trying a different medication for mood, though we may run into other side effects. He is already on maximum dose sertraline, so we could start 7.5mg of mirtazapine at bedtime and titrate that depending on side effects/benefit.     --    Attempted to call patient's wife to discuss the above, but was unable to reach her. Unable to leave VM as her VM box was full. Will attempt to reach them again later.

## 2021-05-28 NOTE — PROGRESS NOTES
Patient started RT today for GBM. Pt's wife asked if inhaled antibiotic was available per their MO's request. I had not seen any orders as such. Called [facility] where pt sees MO, spoke with Irena in triage who faxed order twice but never came through. IC here was able to obtain the order from [facility] via email at this time. Order is for Pentamidine neb 300mg ONCE. Order transcribed per tip sheet provided by manager. IC in MO here at [facility] said once order is in place to call the  Lung clinic on campus ([phone]) and they should be able to schedule for pt one of the days he's here for RT. (Should be ASAP). Called the Lung clinic at about 1620 and IC there said they don't do those neb treatments in their clinic.     5/1/19 0800: Called  Lung clinic again, spoke to Gabriela NUR who wasn't sure if they did the pentamidine nebs in their clinic so she transferred me to Noa KENNEDY and I left Noa a msg to call me back ASAP as we're hoping to have the pt get this medication today. Noa called me back and said they do NOT do pentamidine nebs in their clinic, pt's have to be observed in the hospital setting for at least the first neb.     0825: Called RT here in the hospital at [phone] and spoke to Debi. They don't have any openings here at [facility] until 5/22, she was able to get him on the [facility] schedule for 0730 tomorrow morning. Will discuss with patient/spouse when they get here today to make sure that time/location is okay for them. If not, call Debi back at [phone]. Note forwarded to Anabel KENNEDY as she's working in  today.

## 2021-05-28 NOTE — PROGRESS NOTES
I met with Jayden after radiation today.  He is doing well and only complaint is the hair loss which he expected.  He said his wife did the brain tumor walk yesterday, but he stayed home due to the poor weather.  No needs identified.  Stated he will be at support group [facility]..

## 2021-05-28 NOTE — PROGRESS NOTES
Patient came into PFT lab for a Pentamidine neb treatment.This is his first dose. Pt on RA, SPO2@ 97%, HR 58-67, RR 18, BS clear. /72. Pt given Albuterol neb prior to Pentamidine. After neb treatments SPO2 96% HR 61 , RR 16, BS clear. Pt left in no distress.  RESPIRATORY CARE NOTE     Patient Name: Jayden Lackey  Today's Date: 5/2/2019     Problem List  Patient Active Problem List   Diagnosis     Hyperlipidemia     Hypertension     Rheumatoid Arthritis     GBM (glioblastoma multiforme), left occipital lobe (H)                           Casandra Torre LRT

## 2021-05-28 NOTE — PROGRESS NOTES
RADIATION ONCOLOGY WEEKLY TREATMENT VISIT NOTE      Assessment     1. GBM (glioblastoma multiforme), left occipital lobe (H)           Impression/Plan:   GBM right occipital lobe, post resection MRI shows post surgical change, no obvious residual.  Pathology IDH1/2 wildtype, MGMT promoter methylated. Residual right sided homonymous hemianopsia, visual processing delay    1. Continue radiation treatment as prescribed. Tolerating radiation therapy well.    2. Tolerating Temodar well. No complaints at this time.  3. Patient having his labs drawn here as it is more convenient. Weekly CBC to be drawn weekly on  per Dr Olmos.   4. Getting Pentamidine instead of Bactrim due to interaction with Methotrexate.     All questions and concerns addressed.    Radiation: Site: left occipital  Stereotactic Radiosurgery: No  Concurrent Therapy: Yes (Temodar)  Today's Dose: 400  Total Dose for Brain: 6000  Today's Fraction/Total Fraction Brain:       Subjective:      HPI: Jayden Lackey is a 64 y.o. male with    1. GBM (glioblastoma multiforme), left occipital lobe (H)         The following portions of the patient's history were reviewed and updated as appropriate: allergies, current medications, past family history, past medical history, past social history, past surgical history and problem list.    Assessment                  Body Site: Brain Site: left occipital  Stereotactic Radiosurgery: No  Concurrent Therapy: Yes(Temodar)  Today's Dose: 400  Total Dose for Brain: 6000  Today's Fraction/Total Fraction Brain:   Ocular/Visual - other : 1: Mild  Middle ear/hearin: Normal  Tinnitus: 2: Yes, chronic (greater than 3 months)  Depressed level of consciousness: 0: Normal  Oriented x of spheres: 3  Neuropathy - motor: 0: Normal  Ataxia: 0: Normal  Speech Impairment (e.g. Dysphasia or aphasia): 0: Normal  Seizures: 0: None  Insomnia: 0: Normal                                            Sexuality Alteration                  Emotional Alteration Copin: Effective  Comfort Alteration KPS: 90% Can perform normal activity, minor signs of disease  Fatigue (ONS scale) : 0: No Fatigue  Pain Location: denies   Nutrition Alteration Anorexia: 0: None  Nausea: 0: None  Vomitin: None  Dyspepsia and/or Heartburn: 0: None  Skin Alteration Skin Sensation: 0: No problem  Skin Reaction: 0: None  Alopecia: 0: Normal  AUA Assessment                                  Accompanied by       Objective:     Exam:     Vitals:    19 1332   BP: 157/78   Pulse: 63   Temp: 98.8  F (37.1  C)   TempSrc: Oral   SpO2: 99%   Weight: 221 lb 8 oz (100.5 kg)       Wt Readings from Last 8 Encounters:   19 221 lb 8 oz (100.5 kg)   19 213 lb 8 oz (96.8 kg)       General: Alert and oriented, in no acute distress  Jayden has no Erythema. No change neuro status.     Treatment Summary to Date    Aria chart and setup information reviewed    INasra MD personally performed the services described in this documentation, as scribed by Dyllan Acuña in my presence, and it is both accurate and complete.    Signed by: Nasra Bailey MD, MPH

## 2021-05-28 NOTE — PROGRESS NOTES
RADIATION ONCOLOGY WEEKLY TREATMENT VISIT NOTE      Assessment:     1. GBM (glioblastoma multiforme), left occipital lobe (H)         Impression/Plan:   GBM left occipital lobe, post resection MRI shows post surgical change, no obvious residual.  Pathology IDH1/2 wildtype, MGMT promoter methylated. Residual right sided homonymous hemianopsia, visual processing delay.    1. Continue radiation treatment as prescribed. Tolerating radiation therapy well.   2. Trying to eat more, still difficulty with appetite. Continue nutrition, weight is stable.   3. Noticing some constipation lately, provided with Colace.  4. Patient does report worsening of a small raised area over his left distal forearm. Over the past 1-2 months it has scabbed and does not appear to be resolving, unsure if he struck this area. Concerned for possible folliculitis/infection and I&D performed, no purulence. Unclear etiology at this time. Advised to follow up with primary or dermatology for further evaluation.    5. Additionally, we discussed the CT CAP performed on 3/7/2019. There are a few indeterminate findings on the scan which we discussed briefly. He will follow up with his oncology to arrange further evaluation. All questions and concerns addressed.     Radiation: Site: left occipital GBM  Stereotactic Radiosurgery: No  Concurrent Therapy: Yes  Protocol: Temodar  Today's Dose: 2400  Total Dose for Brain: 6000  Today's Fraction/Total Fraction Brain: 12/30      Subjective:      HPI: Jayden Lackey is a 64 y.o. male with    1. GBM (glioblastoma multiforme), left occipital lobe (H)         The following portions of the patient's history were reviewed and updated as appropriate: allergies, current medications, past family history, past medical history, past social history, past surgical history and problem list.    Assessment                  Body Site: Brain Site: left occipital GBM  Stereotactic Radiosurgery: No  Concurrent Therapy:  Yes  Protocol: Temodar  Today's Dose: 2400  Total Dose for Brain: 6000  Today's Fraction/Total Fraction Brain:   Ocular/Visual - other : 0: Normal  Middle ear/hearin: Normal  Tinnitus: 2: Yes, chronic (greater than 3 months)  Depressed level of consciousness: 0: Normal  Oriented x of spheres: 3  Neuropathy - motor: 0: Normal  Ataxia: 0: Normal  Speech Impairment (e.g. Dysphasia or aphasia): 1: Minimal  Seizures: 0: None  Insomnia: 0: Normal                                            Sexuality Alteration                 Emotional Alteration Copin: Effective  Comfort Alteration KPS: 90% Can perform normal activity, minor signs of disease  Fatigue (ONS scale) : 4: Moderate Fatigue  Pain Location: denies   Nutrition Alteration Anorexia: 1: Loss of appetite  Nausea: 0: None  Vomitin: None  Dyspepsia and/or Heartburn: 0: None  Skin Alteration Skin Sensation: 0: No problem  Skin Reaction: 0: None  Alopecia: 0: Normal  AUA Assessment                                  Accompanied by       Objective:     Exam:     Vitals:    05/15/19 1318   BP: 135/72   Pulse: (!) 53   Temp: 98  F (36.7  C)   TempSrc: Oral   SpO2: 99%   Weight: 219 lb (99.3 kg)       Wt Readings from Last 8 Encounters:   05/15/19 219 lb (99.3 kg)   19 217 lb 9.6 oz (98.7 kg)   19 221 lb 8 oz (100.5 kg)   19 213 lb 8 oz (96.8 kg)       General: Alert and oriented, in no acute distress  Jayden has mild Erythema.  Small raised area, approximately 16mm, over left forearm with some scabbing, likes to pick at it.     I&D performed with minor expression of blood, no purulent drainage. Did not unroof or remove so unable to tell if ingrown hair.     Treatment Summary to Date    Aria chart and setup information reviewed    INasra MD personally performed the services described in this documentation, as scribed by Dyllan Acuña in my presence, and it is both accurate and complete.    Signed by: Nasra  MD Leo, MPH

## 2021-05-28 NOTE — PROGRESS NOTES
RADIATION ONCOLOGY WEEKLY TREATMENT VISIT NOTE      Assessment:     1. GBM (glioblastoma multiforme), left occipital lobe (H)       Cancer Staging  No matching staging information was found for the patient.       Impression/Plan:   GBM right occipital lobe, post resection MRI shows post surgical change, no obvious residual.  Pathology IDH1/2 wildtype, MGMT promoter methylated. Residual right sided homonymous hemianopsia, visual processing delay.    1. Continue radiation treatment as prescribed. Tolerating radiation therapy well.   2. Decreased appetite, nausea controlled with Zofran. Encouraged small calorically dense meals.   3. Minor infrequent pain at site of surgery. Likely healing peripheral nerves from surgery. No signs of infection. All questions and concerns addressed.  4. Next pentamidine ordered for 2019    Radiation: Site: left occipital GBM  Stereotactic Radiosurgery: No  Concurrent Therapy: Yes  Protocol: Temodar  Today's Dose: 1400  Total Dose for Brain: 6000  Today's Fraction/Total Fraction Brain:       Subjective:      HPI: Jayden Lackey is a 64 y.o. male with    1. GBM (glioblastoma multiforme), left occipital lobe (H)         The following portions of the patient's history were reviewed and updated as appropriate: allergies, current medications, past family history, past medical history, past social history, past surgical history and problem list.    Assessment                  Body Site: Brain Site: left occipital GBM  Stereotactic Radiosurgery: No  Concurrent Therapy: Yes  Protocol: Temodar  Today's Dose: 1400  Total Dose for Brain: 6000  Today's Fraction/Total Fraction Brain:   Ocular/Visual - other : 0: Normal  Middle ear/hearin: Normal  Tinnitus: 2: Yes, chronic (greater than 3 months)  Depressed level of consciousness: 0: Normal  Oriented x of spheres: 3  Neuropathy - motor: 0: Normal  Ataxia: 0: Normal  Speech Impairment (e.g. Dysphasia or aphasia): 1: Minimal(does get  numbers mixed up, wife says since diagnosis)  Seizures: 0: None  Insomnia: 0: Normal                                            Sexuality Alteration                 Emotional Alteration    Comfort Alteration KPS: 90% Can perform normal activity, minor signs of disease  Fatigue (ONS scale) : 3: Mild Fatigue  Pain Location: denies   Nutrition Alteration Anorexia: 1: Loss of appetite(hasn't eatten yet today)  Skin Alteration    AUA Assessment                                  Accompanied by       Objective:     Exam:     Vitals:    05/08/19 1327   BP: 109/75   Pulse: (!) 57   Temp: 98.2  F (36.8  C)   TempSrc: Oral   SpO2: 97%   Weight: 217 lb 9.6 oz (98.7 kg)       Wt Readings from Last 8 Encounters:   05/08/19 217 lb 9.6 oz (98.7 kg)   05/01/19 221 lb 8 oz (100.5 kg)   04/12/19 213 lb 8 oz (96.8 kg)       General: Alert and oriented, in no acute distress  Jayden has no Erythema.  Surgical incision on scalp intact and dry, no redness or warmth suggestive of infection.     Treatment Summary to Date    Aria chart and setup information reviewed    INasra MD personally performed the services described in this documentation, as scribed by Dyllan Acuña in my presence, and it is both accurate and complete.    Signed by: Nasra Bailey MD, MPH

## 2021-05-28 NOTE — PROGRESS NOTES
Was able to reach patient's wife - explained MD's below recommendations/thoughts. They will try reduced dose of Abilify (2mg) for a week or so and see how that helps. Will then call for next steps.     We talked at length about balancing side effects and determining if mood benefit outweighs the side effects.     She notes that they are also moving 1.5 weeks from now - she reports that she will have to get palliative involved there right away along with ONC team.     She will keep us posted and thanked me for calling them back prior to the weekend.

## 2021-05-29 NOTE — PROGRESS NOTES
"I met with wife, Kailey Edmondson.  She first wanted to thank me for getting the Aisha's fund for them.    Also, she has concerns regarding insurance coverage for Jayden's brain surgery on 3-.  Apparently, [facility] did not have a contract for a short time (February to 2019) so now his surgery is being covered only as \"out-of-network\" and not \"in-network\" so of course they will have to pay much more than they had anticipated.  I called [facility] billing and was referred to [employer].  They basically have 2 options:  1. Appeal to their insurance company  2.  Apply/be screened for financial assistance through [facility]    I gave the above info to Kailey Edmondson and gave her the number for [facility] [phone]    She appreciated my help.  "

## 2021-05-29 NOTE — PROGRESS NOTES
RESPIRATORY CARE NOTE     Patient Name: Jayden Lackey  Today's Date: 5/30/2019     VS prior to treatment- RR 18, sats 98% on RA, /72, BS clear/ diminished.  Albuterol 2.5 mg given prior to Pentamidine neb, VS post treatment RR 18, sats 100% on RA, /74, BS clear. Pt. Left in no distress.    Susie Hutson, TERRYT

## 2021-05-29 NOTE — PROGRESS NOTES
I met with Jayden's wife, Bia for an emotional support visit.  She was interested in meeting to discuss issues related to caregiver stress.  We discussed her issues and concerns at length.  I also gave her some resources that may be helpful for her in this area.    Plan: Bia will follow up with me on 7/16/2019 at 1:00 PM.    Kailey Gonsalez MA, LP, LICSW

## 2021-05-29 NOTE — PROGRESS NOTES
"  RADIATION ONCOLOGY WEEKLY TREATMENT VISIT NOTE      Assessment:     1. GBM (glioblastoma multiforme), left occipital lobe (H)         Impression/Plan:   GBM left occipital lobe, post resection MRI shows post surgical change, no obvious residual.  Pathology IDH1/2 wildtype, MGMT promoter methylated. Residual right sided homonymous hemianopsia, visual processing delay.    1. Continue radiation treatment as prescribed.  2. Very small amount of clear fluid from surgical incisions during the night. No fevers. No fluid expression here in clinic or evidence of infection.   3. Return in 4-6 weeks after completion of radiation therapy.   4. Activity modification PRN fatigue.   5. Last CBC ordered 6/10/2019, no further pentamidine ordered.   6. Breast biopsy today to f/u CT and mammographic abnormality.  7. Imaging per Dr Olmos while on maintenance temozolomide     Radiation: Site: LEft Occipital GBM  Stereotactic Radiosurgery: No  Concurrent Therapy: Yes  Protocol: Temodar  Today's Dose: 5200  Total Dose for Brain: 6000  Today's Fraction/Total Fraction Brain: 26/30      Subjective:      Radiation Treatment Summary    HISTORY: Jayden Lackey is a 64 y.o. male who was treated with radiation therapy for left occipital GBM, WHO grade IV, MGMT promoter methylation present, IDH wild type.      The patient originally developed right sided vision loss while on a cruise around 1/11/2019. Unfortunately, his AUGUSTIN was dying and he had to drive to [place]. During his time in [place], he had apprently seen a retina specialist who found no abnormalities. The patient continued to have difficulties with the right side of his vision, described as 1-2 minute episodes of \"[place] lights\" several times throughout the day. He then presented to an ophthalmologist who discovered the visual field defect was present in both eyes, right worse than left per patient. An MR head was obtained on 3/7/2019 which showed a mass, 3.1 x 3.1 cm, in the left occipital " lobe suspicious for high grade primary brain tumor or lymphoma.      He underwent a craniotomy and tumor resection on 3/22/2019, pathology showed GBM, WHO grade IV, MGMT promoter methylation positive, IDH wild type.     SITE TREATED: Left occipital brain  TOTAL DOSE: 6000  NUMBER OF FRACTIONS: 30  DATES COMPLETED: 2019  CONCURRENT CHEMOTHERAPY: Yes, Temodar  ADJUVANT THERAPY:Yes, Temodar    He tolerated the treatment without unexpected side effects.       The following portions of the patient's history were reviewed and updated as appropriate: allergies, current medications, past family history, past medical history, past social history, past surgical history and problem list.    Assessment                  Body Site: Brain Site: LEft Occipital GBM  Stereotactic Radiosurgery: No  Concurrent Therapy: Yes  Protocol: Temodar  Today's Dose: 5200  Total Dose for Brain: 6000  Today's Fraction/Total Fraction Brain: 30  Ocular/Visual - other : 0: Normal  Middle ear/hearin: Normal  Tinnitus: 2: Yes, chronic (greater than 3 months)  Depressed level of consciousness: 0: Normal  Oriented x of spheres: 3  Neuropathy - motor: 0: Normal  Ataxia: 0: Normal  Speech Impairment (e.g. Dysphasia or aphasia): 1: Minimal  Seizures: 0: None  Urinary Incontinence: 0: None  Bowel Incontinence: 0: None(constipation)  Insomnia: 0: Normal                                            Sexuality Alteration                 Emotional Alteration Copin: Effective  Comfort Alteration KPS: 80% Can perform normal activity with effort, some signs of disease  Fatigue (ONS scale) : 3: Mild Fatigue  Pain Location: Denies   Nutrition Alteration Anorexia: 1: Loss of appetite  Nausea: 0: None  Vomitin: None  Dyspepsia and/or Heartburn: 0: None  Skin Alteration Skin Sensation: 1:Pruitis(drainage from incision site)  Skin Reaction: 2: Bright erythema  Alopecia: 2: Pronounced hair loss  AUA Assessment                                   Accompanied by       Objective:     Exam:     Vitals:    06/05/19 1315   BP: 118/76   Pulse: (!) 58   Temp: 98  F (36.7  C)   TempSrc: Oral   SpO2: 97%   Weight: 220 lb 12.8 oz (100.2 kg)       Wt Readings from Last 8 Encounters:   06/05/19 220 lb 12.8 oz (100.2 kg)   05/29/19 218 lb 9.6 oz (99.2 kg)   05/22/19 220 lb 8 oz (100 kg)   05/15/19 219 lb (99.3 kg)   05/08/19 217 lb 9.6 oz (98.7 kg)   05/01/19 221 lb 8 oz (100.5 kg)   04/12/19 213 lb 8 oz (96.8 kg)       General: Alert and oriented, in no acute distress  Jayden has mild Erythema.  Surgical incision intact, no erythema or warmth to suggest infection.    Treatment Summary to Date    Aria chart and setup information reviewed    I, Nasra Bailey MD personally performed the services described in this documentation, as scribed by Dyllan Acuña in my presence, and it is both accurate and complete.    Signed by: Nasra Bailey MD, MPH

## 2021-05-29 NOTE — TELEPHONE ENCOUNTER
Pt called to check on s/p RT. Doing well. Pt just following with med onc at this time, will let schedulers know. Informed to call with any questions or concerns.

## 2021-05-29 NOTE — PROGRESS NOTES
Received notice of award of Aisha's Fund from today's application and informed patient and spouse who were still here in clinic.  We discussed ways the funds could be utilized and they determined full amount of $200 should be applied to [employer] cards.  [employer] Bill Payment Form completed and faxed to [employer] with fax confirmation received. Ellen Zimmerman RN

## 2021-05-29 NOTE — PROGRESS NOTES
Late Entry:  I met briefly with Jayden and his wife, Oz yesterday as they were meeting with aleksandr Gonsalez.  Overall, Jayden is doing ok.  He completed his 6 weeks of concurrent chemo and radiation the day before.  They denied needing anything from me today.  They still have my number and I encouraged them to call with any questions or concerns.

## 2021-05-29 NOTE — PROGRESS NOTES
RADIATION ONCOLOGY WEEKLY TREATMENT VISIT NOTE      Assessment:     1. GBM (glioblastoma multiforme), left occipital lobe (H)  Ambulatory referral to Oncology Psychotherapist       Impression/Plan:   GBM left occipital lobe, post resection MRI shows post surgical change, no obvious residual.  Pathology IDH1/2 wildtype, MGMT promoter methylated. Residual right sided homonymous hemianopsia, visual processing delay.     1. Continue radiation treatment as prescribed. Tolerating radiation therapy well.   2. Patient expressing difficulties with coping regarding his recent diagnosis. Referral to oncology psychotherapist provided.   3. Also like to follow with GI here locally regarding GI issue seen on staging CT 3/7/2019, provided with referral.   4. Additionally, 1.4 cm left breast UIQ mass seen on staging  CT. Not palpable on physical exam. Mammogram and left breast US ordered per radiology recommendation.   5. Has appointment with lung nodule clinic for f/u nodules seen on staging CT    Continue radiation treatment as prescribed.  Radiation: Site: left occipital GBM  Stereotactic Radiosurgery: No  Concurrent Therapy: Yes  Protocol: Temodar  Today's Dose: 3400  Total Dose for Brain: 6000  Today's Fraction/Total Fraction Brain: 17/30      Subjective:      HPI: Jayden Lackey is a 64 y.o. male with    1. GBM (glioblastoma multiforme), left occipital lobe (H)  Ambulatory referral to Oncology Psychotherapist       The following portions of the patient's history were reviewed and updated as appropriate: allergies, current medications, past family history, past medical history, past social history, past surgical history and problem list.    Assessment                  Body Site: Brain Site: left occipital GBM  Stereotactic Radiosurgery: No  Concurrent Therapy: Yes  Protocol: Temodar  Today's Dose: 3400  Total Dose for Brain: 6000  Today's Fraction/Total Fraction Brain: 17/30  Ocular/Visual - other : 0: Normal  Middle  ear/hearin: Normal  Tinnitus: 2: Yes, chronic (greater than 3 months)  Depressed level of consciousness: 0: Normal  Oriented x of spheres: 3  Neuropathy - motor: 0: Normal  Ataxia: 0: Normal  Speech Impairment (e.g. Dysphasia or aphasia): 1: Minimal  Seizures: 0: None  Urinary Incontinence: 0: None  Bowel Incontinence: 0: None  Insomnia: 0: Normal                                            Sexuality Alteration                 Emotional Alteration Copin: Effective  Comfort Alteration KPS: 80% Can perform normal activity with effort, some signs of disease  Fatigue (ONS scale) : 6: Moderate Fatigue  Pain Location: scalp  Pain Intensity. Rate degree of pain ranging from 0 (no pain) to 10 (severe pain) : 3  Pain Description: Burning - Burning, hot, fire type pain  Pain Intervention: 1: Over the counter medications(using aloe gel)  Effectiveness of pain intervention: 2: Pain relieved 50%   Nutrition Alteration Anorexia: 1: Loss of appetite  Nausea: 0: None  Vomitin: None  Dyspepsia and/or Heartburn: 0: None  Skin Alteration Skin Sensation: 2: Burning  Skin Reaction: 2: Bright erythema  Alopecia: 2: Pronounced hair loss  AUA Assessment                                  Accompanied by       Objective:     Exam:     Vitals:    19 1305   BP: 119/73   Pulse: (!) 58   SpO2: 97%   Weight: 220 lb 8 oz (100 kg)       Wt Readings from Last 8 Encounters:   19 220 lb 8 oz (100 kg)   05/15/19 219 lb (99.3 kg)   19 217 lb 9.6 oz (98.7 kg)   19 221 lb 8 oz (100.5 kg)   19 213 lb 8 oz (96.8 kg)       General: Alert and oriented, in no acute distress  Jayden has mild Erythema.  Hair loss consistent with radiation treatment.     Treatment Summary to Date    Aria chart and setup information reviewed    INasra MD personally performed the services described in this documentation, as scribed by Dyllan Acuña in my presence, and it is both accurate and complete.    Signed  by: Nasra Bailey MD, MPH

## 2021-05-29 NOTE — PROGRESS NOTES
Psychology Psychotherapy  Note    Name:  Jayden Lackey  :  1955  MRN:  [id number]      Date of Service: 2019  Duration: 60 minutes (2:00-3:00)    Target Symptoms:    The patient was seen in light of concerns regarding symptoms of depression and anxiety as evidenced by patient and staff report.    Participation:  The patient was able to participate and benefit from treatment as evidenced by his verbal expression of ideas and initiation of topics discussed.    Mental Status:    Mood:  anxious and sad  Affect:  anxious, frustrated  Suicidal Ideation:  absent  Homicidal Ideation:  absent  Thought process:  normal  Thought content:  Normal  Fund of Knowledge:  Sufficient  Attention/Concentration:  intact  Language ability:  intact  Speech: normal  Memory:  recent and remote memory intact  Insight and Judgement:  fair  Orientation:  person, place, time and situation  Appearance: casually-dressed, , slow, and engaged,  Eye Contact:  Appropriate  Estimated IQ:  Average      Intervention:    I met with Jayden and his wife, Bia for psychotherapy visit to help them work through the emotional aspects of his cancer.  In addition, they were interested in discussing some issues related to the relationship.    Jayden has a diagnosis of glioblastoma in the left occipital lobe.  He developed a field cut on 2019 when he was on a cruise with his wife in [place].  After their cruise, they needed to go to [place] as Bia's father had .  When they returned to [place], Jayden went to the eye doctor and then was referred on and received his diagnosis of a GBM.  He had surgery on 3/22/2019.  Jayden has been receiving concurrent radiation and chemotherapy.  He just completed his last radiation treatment just prior to our appointment today.  He will have a 1 month break before he goes back on chemotherapy.    Jayden also had a knee injury in 2018 due to a hockey injury.  Is also discovered that he has  lung nodules and a nodule in his stomach.  He has a follow-up appointment tomorrow at the lung nodule clinic.    Erwin have been together for 42 years.  Have been  for 39 years.  They have 2 daughters who both live in [place].  1 of their daughters is  and has 2 boys and a girl.  Their grandchildren's ages are 5, 3 and 5 months.  They have a close relationship with their children and grandchildren and it was their hope to spend more time with them in [place].    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  He decided to retire early as he struggled with the stress from his work and with the advancements and technology that he needed to do for his job.  Bia is an occupational therapist and has worked in early childhood.  She retired in December 2018.    Jayden and Bia have had a difficult relationship.  They both indicate that their personalities are very different as Jayden tends to be more of an introvert and needs time to process and think about things.  Bia is more extroverted and needs to verbally talk through and process information.  They also have struggled with communication and conflict resolution issues.  Jayden indicates that sometimes he becomes angry and can be verbally abusive towards Bia.  They were  from [id number].  They received marriage counseling and decided to get back together.  They both worked hard in the therapy process and made some positive changes.  They both noticed that over the past few years, they have fallen back to their old ways and that they have much more conflict between them.  We discussed this at length.  We talked about different strategies to help with their communication and conflict resolution.  I also recommended [facility], which is retreat with long-term follow-up for couples who struggle in the relationships and need help with their communication and conflict resolution  skills.  They are interested in this referral and follow-up to schedule a retreat in the near future.    Jayden was raised in [place].  He is the third oldest in a family of 4 children.  He has 2 older sisters who he is very close to.  They both live in [place] he has a younger sister who he indicates he gets along with, but they are not as close.  Jayden indicates that his father was an alcoholic and  of complications of alcoholism at age 58.  He indicates that his father was extremely disruptive to their home life.  He also felt that he never had any good male role models.  His mother  at age 87.    Jayden and Bia attend [employer]. Their Gnosticism susie is very important to them.      Jayden has played a hockey all of his life.  Prior to his cancer diagnosis, he played hockey 3 times a week.  Since his cancer, he has not been able to play any hockey games, but has been out skating and practicing hockey.  He also likes to play the [employer].  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  He is been a member of the band for about 4 to 5 years.  He enjoys being a member of the band and having an outlet of friends that he plays with.    We discussed issues related to Jayden's mental health.  He talked about how he is an adult child of an alcoholic.  He has struggled with issues of control and anger that he feels may be routed back to some unresolved issues of his past.  He indicates that he has had some depression, but has not been formally diagnosed.  He denies any suicidal ideation.  He also indicates that he has some OCD tendencies.  He also has never been formally diagnosed with OCD.  Jayden's main interest is to work through some of his feelings of grief and loss related to his life changes due to his cancer.  He also would like to work on issues of conflict resolution and communication with his wife.    It was Jayden's hope in his  prison life to go on road trips around the [place].  He is very interested in history and was also interested in the history aspect of his travels.  He is disappointed that he is not able to drive and travel in the way that he had hoped.    Bia not share some of the same interest with Jayden.  She was not interested in traveling across the country with him.  She is an anxious  and does not drive on the freeway.  It was her hope to spend 3 months of the year in [place] to be near her children and grandchildren.    We spent some time talking about their hopes and dreams and other ways to reframe some of their life goals.  We also talked about strategies to help with their communication and conflict resolution.    Psychoterapeutic Techniques:  Cognitive-behavioral therapy, motivational interviewing and supportive psychotherapy strategies were utilized.    Necessity:    The session was necessary for the care of the patient to address symptoms of anxiety and depression related to the patient's medical condition.    Progress:    Jayden shared the story of his cancer journey.  He also shared social history information.  Bia interjected her input about their journey together.    Jayden is interested in ongoing supportive therapy to help him deal with the emotional aspects of his cancer and also work through some of his anger issues.  We discussed cognitive behavioral therapy strategies to help him better manage his symptoms of anger.  We also discussed communication and conflict resolution strategies that they both can work on together.    Plan:    1.  Jayden agrees to work on some of the cognitive behavioral therapy techniques to better manage his symptoms of anger, anxiety and depression as discussed.    2.  Jayden will work on some of the strategies we discussed to work through his grief and loss related to his limitations from his cancer and treatment.    3.  Jayden and Bia both agreed to  work on some of the communication and conflict resolution strategies as discussed.  Information was given to them about [employer].    4.  I will continue to be available to Jayden and Bia for ongoing support and assistance as needed.    Diagnosis:    1. Adjustment disorder with mixed anxiety and depressed mood        Problem List:  Patient Active Problem List   Diagnosis     Hyperlipidemia     Hypertension     Rheumatoid Arthritis     GBM (glioblastoma multiforme), left occipital lobe (H)       Provider: Kailey Gonsalez MA, LP, LICSW    Date:  6/11/2019  Time:  4:52 PM

## 2021-05-29 NOTE — TELEPHONE ENCOUNTER
Online portal for [employer] used to enter application for [facility] jhony.  Ellen Zimmerman RN

## 2021-05-29 NOTE — PROGRESS NOTES
"  RADIATION ONCOLOGY WEEKLY TREATMENT VISIT NOTE      Assessment / Impression       1. GBM (glioblastoma multiforme), left occipital lobe (H)       Cancer Staging  No matching staging information was found for the patient.   Tolerating radiation therapy well.  All questions and concerns addressed.  Doing well \"some depression, going to see a psychiatrist\"   fx  TD 4200/6000 cGy    Plan:     Continue radiation treatment as prescribed.  Radiation: Site: left occipital GBM  Stereotactic Radiosurgery: No  Concurrent Therapy: Yes  Protocol: Temodar  Today's Dose: 4200  Total Dose for Brain: 6000  Today's Fraction/Total Fraction Brain:     Per plan    Subjective:      HPI: Jayden Lackey is a 64 y.o. male with    1. GBM (glioblastoma multiforme), left occipital lobe (H)         The following portions of the patient's history were reviewed and updated as appropriate: allergies, current medications, past family history, past medical history, past social history, past surgical history and problem list.    Assessment                  Body Site: Brain Site: left occipital GBM  Stereotactic Radiosurgery: No  Concurrent Therapy: Yes  Protocol: Temodar  Today's Dose: 4200  Total Dose for Brain: 6000  Today's Fraction/Total Fraction Brain:   Ocular/Visual - other : 0: Normal  Middle ear/hearin: Normal  Tinnitus: 2: Yes, chronic (greater than 3 months)  Depressed level of consciousness: 0: Normal  Oriented x of spheres: 3  Neuropathy - motor: 0: Normal  Ataxia: 0: Normal  Speech Impairment (e.g. Dysphasia or aphasia): 1: Minimal(same mild level of word-finding difficulty)  Seizures: 0: None  Urinary Incontinence: 0: None  Bowel Incontinence: 0: None  Insomnia: 0: Normal                                            Sexuality Alteration                 Emotional Alteration Copin: Effective  Comfort Alteration KPS: 80% Can perform normal activity with effort, some signs of disease  Fatigue (ONS scale) : " 6: Moderate Fatigue  Pain Location: scalp  Pain Intensity. Rate degree of pain ranging from 0 (no pain) to 10 (severe pain) : 3  Pain Description: Burning - Burning, hot, fire type pain  Pain Intervention: 1: Over the counter medications(aloe gel daily)  Effectiveness of pain intervention: 2: Pain relieved 50%   Nutrition Alteration Anorexia: 1: Loss of appetite  Nausea: 0: None  Vomitin: None  Dyspepsia and/or Heartburn: 0: None  Skin Alteration Skin Sensation: 2: Burning  Skin Reaction: 2: Bright erythema  Alopecia: 2: Pronounced hair loss  AUA Assessment                                  Accompanied by       Objective:     Exam:     Vitals:    19 1242   BP: 131/72   Pulse: (!) 53   SpO2: 98%       Wt Readings from Last 8 Encounters:   19 220 lb 8 oz (100 kg)   05/15/19 219 lb (99.3 kg)   19 217 lb 9.6 oz (98.7 kg)   19 221 lb 8 oz (100.5 kg)   19 213 lb 8 oz (96.8 kg)       General: Alert and oriented, in no acute distress  Jayden has mild Erythema.    Treatment Summary to Date    Aria chart and setup information reviewed    Dejon Carey MD

## 2021-05-29 NOTE — PROGRESS NOTES
Pt ambulatory to radiation clinic for last treatment, accompanied by wife. D/C instructions given. Informed to call with any questions or concerns. Follow up on discharge at the [facility] or prn with us.

## 2021-05-30 NOTE — TELEPHONE ENCOUNTER
Discussed with patient that [facility] MD accidentally dictated that gastric lesion was 10 cm not 10mm.  SO when pathology came back negative for malignancy there was concern for sampling error.  SO called GI MD to discuss whereby error was noted. irmed 10mm and she got all the tissue.     Oz knows we  will cancel PET and consult with Dr Mantilla. They have aapointments for sodium checks and keep schedule appointments with Dr Olmos

## 2021-05-30 NOTE — ANESTHESIA PREPROCEDURE EVALUATION
Anesthesia Evaluation      Patient summary reviewed   No history of anesthetic complications     Airway   Mallampati: III  Neck ROM: full   Pulmonary - normal exam   (+) sleep apnea,                          Cardiovascular - normal exam  Exercise tolerance: > or = 4 METS  (+) hypertension, , hypercholesterolemia,      Neuro/Psych    (+) seizures,     Endo/Other    (+) arthritis,      GI/Hepatic/Renal    (-) renal disease     Other findings: H/o glioblastoma multiforme resected 3/22/19 followed by chemo and rad tx. Seizure 6/22/19 requiring intubation, associated with a-fib with RVR, possible aspiration pneumonia, and low Na.  Suspect SIADH, have been bringing Na up slowly.  Has gastric mass, concern for neuroendocrine tumor causing SIADH, need bx.   L4-5 disc disease.  NICOLÁS is borderline per spouse, no CPAP. Today: Na 130, K 4.1, GFR>60.      Dental - normal exam                        Anesthesia Plan  Planned anesthetic: MAC    ASA 3     Anesthetic plan and risks discussed with: patient and spouse    Post-op plan: routine recovery

## 2021-05-30 NOTE — ANESTHESIA CARE TRANSFER NOTE
Patient transferred to [facility].  Taken to PACU on O2 at 10L via mask.  In PACU monitors on, vital signs stable.  SBAR report given to RN on floor per institutional policy and procedure.  Transport called.  Transfer of care.      Last vitals:   Vitals:    06/26/19 0920   BP: 125/71   Pulse: (!) 50   Resp: 16   Temp: 36.5  C (97.7  F)   SpO2: 100%     Patient's level of consciousness is drowsy  Spontaneous respirations: yes  Maintains airway independently: yes  Dentition unchanged: yes  Oropharynx: oropharynx clear of all foreign objects    QCDR Measures:  ASA# 20 - Surgical Safety Checklist: WHO surgical safety checklist completed prior to induction    PQRS# 430 - Adult PONV Prevention: 4558F - Pt received => 2 anti-emetic agents (different classes) preop & intraop  ASA# 8 - Peds PONV Prevention: NA - Not pediatric patient, not GA or 2 or more risk factors NOT present  PQRS# 424 - Tonja-op Temp Management: 4559F - At least one body temp DOCUMENTED => 35.5C or 95.9F within required timeframe  PQRS# 426 - PACU Transfer Protocol: - Transfer of care checklist used  ASA# 14 - Acute Post-op Pain: ASA14B - Patient did NOT experience pain >= 7 out of 10

## 2021-05-30 NOTE — ANESTHESIA POSTPROCEDURE EVALUATION
Patient: Jayden Lackey  ESOPHAGOGASTRODUODENOSCOPY (EGD) GASTRIC MASS BIOPSY  Anesthesia type: MAC    Patient location: as being transferred to [facility]  Last vitals:   Vitals Value Taken Time   /71 6/26/2019  9:20 AM   Temp 36.5  C (97.7  F) 6/26/2019  9:20 AM   Pulse 50 6/26/2019  9:20 AM   Resp 16 6/26/2019  9:20 AM   SpO2 100 % 6/26/2019  9:20 AM     Post vital signs: stable  Level of consciousness: baseline  Post-anesthesia pain: pain controlled  Post-anesthesia nausea and vomiting: no  Pulmonary: supplemental oxygen  Cardiovascular: stable and blood pressure at baseline  Hydration: adequate  Anesthetic events: no    QCDR Measures:  ASA# 11 - Tonja-op Cardiac Arrest: ASA11B - Patient did NOT experience unanticipated cardiac arrest  ASA# 12 - Tonja-op Mortality Rate: ASA12B - Patient did NOT die  ASA# 13 - PACU Re-Intubation Rate: ASA13B - Patient did NOT require a new airway mgmt  ASA# 10 - Composite Anes Safety: ASA10A - No serious adverse event    Additional Notes:

## 2021-05-31 NOTE — TELEPHONE ENCOUNTER
I received a call from wife, Cristiana asking about our monthly support group that meets at [facility].  I did confirm with her that we do not meet in August but will resume meeting again in September.  I also forwarded some other good information about some other brain tumor groups.  They appreciated the return call.

## 2021-06-01 NOTE — PROGRESS NOTES
Pt is here for a second opinion. Pt started out with originally symptoms towards the beginning of the year with bilateral field cuts. He saw Dr. Hayes at the [facility] and had surgery on 3/22/19 and has radiation and chemo. Pt's symptoms became worse after the surgery. His field cuts became worse, he had word finding troubles and has had 3 seizures. Pt and his wife are looking for a new surgeon because Dr. Hayes has moved out to [facility] and is a far drive for them. They also have a appt with Dr. Heller at the [facility] on Friday.   SIOBHAN [place]

## 2021-06-01 NOTE — PROGRESS NOTES
Neurosurgery Progress Note  09/30/19    A/P: Jayden Lackey is a 64 y.o. male with GBM sp resection who has new imaging findings concerning for tumor recurrence based on most recent brain PET    MRI negative for increased CBV however PET reportedly concerning for increased uptake within the contrast enhancing area.  Discussed options regarding treatment and surgical intervention and I offered surgical resection with stealth image guidance. Pt and family note that they have a second opinion appointment coming up at [facility]. They will contact us if they wish to proceed with craniotomy for repeat surgical intervention    S: Hard time over the last several months- multiple admissions to the hospital for seizures. Pt feels he has not returned to his baseline and he continues to struggle w memory and expressive aphasia. Feels that following his initial surgery his vision got worse and never got better.    PMH: No interval change  PSH: No interval change    ROS: Denies changes in balance or coordination. A full 14 point review of systems was otherwise completed and is negative aside from that mentioned above in the HPI    O:  Vitals:    09/30/19 0808   BP: 127/64   Pulse: 80   SpO2: 98%     General: Awake, alert, NAD  HEENT: AT/NC, EOMI, face symmetric, oral mucosa moist and pink  Heart: RRR  Lungs: Nonlabored respirations without accessory muscle use.  Neuro: Awake, alert, speech is clear and content is appropriate, appears to have component of mild receptive aphasia and mild to moderate receptive aphasia. MAEW x 4 with full strength in b/l UE and LE. Sensation is intact to temperature and LT. No drift. CN II-XII grossly intact w exception of right homonymous hemianopsia  Coordination: Gait WNL  Reflexes: No clonus. Toes downgoing bilaterally.  Musculoskeletal: Negative straight leg raise. Negative Spurling maneuver  Psych: Appropriate mood and affect, pleasant, cooperative, alert and oriented x 3  Skin: Incision C/D/I, well  healed scar    Alyssa Rice MD  09/30/19

## 2021-06-01 NOTE — PATIENT INSTRUCTIONS - HE
Did offer pt surgery.   Pt is going to see Dr. Heller on Friday and call with who they would like to proceed with.

## 2021-06-01 NOTE — LETTER
6/1/2021       RE: Jayden Lackey  [address]  [place] [address]     Dear Colleague,    Thank you for referring your patient, Jayden Lackey, to the [facility] NEUROLOGY CLINIC [place] [facility]. Please see a copy of my visit note below.      Jayden is a 66 year old who is being evaluated via a billable video visit.       How would you like to obtain your AVS? MyChart  If the video visit is dropped, the invitation should be resent by: Send to e-mail at: [email or web address]  Will anyone else be joining your video visit? No        EPILEPSY CLINIC VIDEO VISIT NOTE  The scheduled clinic visit was changed to a video visit to reduce the risk of COVID-19 transmission.            Service Date: 06/01/2021     HISTORY: I spoke with Mr. Jayden Lackey and his wife.  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     Following the most recent video visit to this clinic on 05/03/2021, the patient reportedly has had no seizures causing impaired awareness.  He has had 4-6 isolated auras per month, which he has reported to his wife, who tracks the auras; these were spread out evenly, with no clusters.  The auras consist of a metallic taste in his mouth, a rising sensation in his stomach, and anxiety.  The last of these occurred on 05/29/2021.  The most recent complex partial seizure occurred on 03/14/2021.  The most recent generalized convulsion probably occurred on 09/18/2020.       Imaging showed a further increase in the volume of contrast enhancing tissue, on 03/19/2021.  He met to discuss this with Dr. Nicho Packer and Dr. Nasra Olmos.  After discussing various options, he elected  radiotherapy followed by chemotherapy.       He started radiotherapy, under the care of Dr. Nasra Bailey at [facility], on 04/27/2021.  He had many hours of severely reduced speak output on 04/28/2021, which was suspected of being caused by acute post-radiation edema.  Dexamethasone was increased to daily use, and within another day, his speech was nearly back to baseline.      MEDICATIONS:  Levetiracetam 2000 mg b.i.d., lacosamide 250 mg b.i.d., divalproex 500 mg b.i.d., diazepam as rescue medication (5 mg oral solution, up to 15 mg in one day for persisting focal impaired seizures), and other medications as per the electronic medical record.      VIDEO OBSERVATIONS:   The patient demonstrated a paucity of spontaneous speech, and he spoke with mildly reduced fluency, but with normal articulation and syntax.  He demonstrated grossly normal comprehension of questions.    On command, the patient moved the direction of gaze into all 4 quadrants conjugately and without nystagmus.   Facial movements were normal.    Tongue protruded on the midline.    The patient did not display any resting tremors or action tremors of the outstretched arms.  Limb movements were normal.       LABORATORY DATA:   AED levels on 03/24/2021:   Levetiracetam: 53.4 mcg/ml.   Lacosamide: 9.7 mcg/ml.   Valproate: 51.1 mcg/ml., with normal platelet count.       IMPRESSION:   Seizures with impaired awareness remain in complete control, as isolated auras have been stable, with no overt AED toxicity.     He has completed the most recent course of radiotherapy.  There are no current plans for further lesion resection or for additional chemotherapy.      He and his wife are planning to move to a house that they own in [place], [place].  Multiple family members live in [place].  Anticipating this decision, prior to the visit today I spent some time investigating specialty epilepsy care in [place].  I review this information with the patient  and his wife, and they have decided to pursue epilepsy care at [facility], which has its medical campus in [place].  We also reviewed means to plan an extra supply of AEDs to assist in the transition.  He thinks that there is a small chance that they will return to [place] in the fall.     He is not driving and does not plan to do so.     PLAN:   1)  Continue current AED doses.    2)  Return in about 6 months for an in-person clinic visit, if he decides to return to [place].     Video Conference via [employer].   Video Start Time: 11:19 a.m.   Video Stop Time: 11:42 a.m.  Provider location: [facility] Neurology   Reported Patient Location: Home   I personally spent 45 minutes in this patient care on the day of this visit, including time in Video contact with the patient, and time in other necessary patient care activities without direct patient contact including medical record, EEG and imaging review.      Frank Pacheco M.D., Professor of Neurology

## 2021-06-01 NOTE — PROGRESS NOTES
Bedside EEG was performed that included photic stimulation; hyperventilation was deferred. The patient was awake and asleep throughout the recording.  EEG #   EEG [id number] system was used for this recording.

## 2021-06-04 NOTE — PROGRESS NOTES
ASSESSMENT AND PLAN:  Jayden Lackey 64 y.o. male is seen here on 12/09/19 for evaluation of arthralgias, well described rheumatoid arthritis longstanding well controlled with methotrexate, having developed glioblastoma 4, status post 2 neurosurgical procedures, the second 1 seems to have appeared more quickly than anticipated, currently asymptomatic as far as RA is concerned on methotrexate.  His liver function studies are abnormal I have asked him to hold methotrexate.  Even if the liver studies become normal the question of whether he should continue methotrexate versus take more simplified/palliative approach such as taking prednisone only with its pros and cons is discussed.  X-rays of the knees taken today show intact spaces in all the 3 compartments without chondrocalcinosis.  They are going to think through that and will meet here in near future to further fine-tune this issue meanwhile because of the liver abnormalities methotrexate is being held, prednisone 7.5 mg daily started major side effects including ocular metabolic bone related reviewed, the role of immune system and multiple issues here were reviewed including the risk of infection while he is on prednisone.  We will meet here in the next few weeks today lab today's labs orders will be done over the next few days when he is due for another set of labs at the hospital.  Diagnoses and all orders for this visit:    Rheumatoid arthritis involving multiple joints (H)  -     predniSONE (DELTASONE) 2.5 MG tablet; 7.5 mg/d prednisone PO for 1 month.  Dispense: 90 tablet; Refill: 2  -     HM1(CBC and Differential)  -     Creatinine  -     ALT (SGPT)  -     Albumin  -     Rheumatoid Factor Quant  -     CCP Antibodies  -     Hepatitis C Antibody (Anti-HCV)  -     Uric Acid  -     XR Hands Bilateral 3 or More VWS; Future; Expected date: 12/09/2019  -     XR Knees Bilateral 1 Or 2 VWS; Future; Expected date: 12/09/2019    Abnormal liver enzymes  -     HM1(CBC  and Differential)  -     Creatinine  -     ALT (SGPT)  -     Albumin  -     Rheumatoid Factor Quant  -     CCP Antibodies  -     Hepatitis C Antibody (Anti-HCV)  -     Uric Acid  -     XR Hands Bilateral 3 or More VWS; Future; Expected date: 12/09/2019  -     XR Knees Bilateral 1 Or 2 VWS; Future; Expected date: 12/09/2019    High risk medication use  -     HM1(CBC and Differential)  -     Creatinine  -     ALT (SGPT)  -     Albumin  -     Rheumatoid Factor Quant  -     CCP Antibodies  -     Hepatitis C Antibody (Anti-HCV)  -     Uric Acid  -     XR Hands Bilateral 3 or More VWS; Future; Expected date: 12/09/2019  -     XR Knees Bilateral 1 Or 2 VWS; Future; Expected date: 12/09/2019      HISTORY OF PRESENTING ILLNESS:  Jayden Lackey, 64 y.o., male is here for establishment of care is rheumatoid arthritis, that he has had excellent control with methotrexate over the past 23 years having followed up with Dr. David Tan ordered  , The change was necessitated because of insurance reasons.  He is accompanied by his wife.  He reports that the methotrexate had controlled his symptoms very well.  Part of the history is obtained from the wife.  It appears that most recently he was seen in rheumatology several months ago.  Just around that time he was found to have glioblastoma for, and underwent surgery, held methotrexate for prolonged time, had flareup.  Subsequently underwent chemotherapy, and radiation treatment.  In the thought originally was that he would good to what he would consider to have prognosis of 1 to 1-1/2 years that was 9 months ago.  Soon thereafter back in October he had another surgery to remove another recurrence of the glioblastoma of the same side.  He is currently on methotrexate 0.7 mL into the orange juice,.  He reports no recent flareup although not too long ago he had bilateral severe painful knees.  There was no significant swellings there may have been some.  This responded to over-the-counter  measures.  In view of his DVTs he has been started on anticoagulation has been asked not to take nonsteroidals.  He noted that today is a good day for him and there is hardly any pain.  She he has noted fatigue, weakness, visual symptoms field cut on the right side has a component of his neurosurgical issues.  He has had ringing in the ears, here Ling hearing loss.  Sister has rheumatoid arthritis. Further historical information and ADL limitations as noted in the multidimensional health assessment questionnaire attached in the EMR. Rest of the 13 system ROS is negative.     ALLERGIES:Shellfish containing products and Other allergy (see comments)    PAST MEDICAL/ACTIVE PROBLEMS/MEDICATION/ FAMILY HISTORY/SOCIAL DATA:  The patient has a family history of  Past Medical History:   Diagnosis Date     Anxiety      Depression      Glioblastoma (H)      Hx antineoplastic chemotherapy     now   for brain cancer.         Hx of radiation therapy      Hyperlipidemia      Hypertension      RA (rheumatoid arthritis) (H)      Seizures (H)      Social History     Tobacco Use   Smoking Status Former Smoker     Packs/day: 1.00     Years: 3.00     Pack years: 3.00     Last attempt to quit: 1977     Years since quittin.9   Smokeless Tobacco Never Used     Patient Active Problem List   Diagnosis     Hyperlipidemia     Hypertension, unspecified type     Rheumatoid arthritis (H)     GBM (glioblastoma multiforme), left occipital lobe (H)     Seizure (H)     Acute respiratory failure with hypoxia (H)     Acute kidney injury (H)     Glioblastoma (H)     New onset a-fib (H)     Brain edema (H)     Gastric mass     Colon polyp     Chemotherapy induced neutropenia (H)     Brain cancer (H)     Herniated lumbar intervertebral disc     S/P craniotomy     Cerebral edema (H)     Paroxysmal atrial fibrillation (H)     Delirium     Current Outpatient Medications   Medication Sig Dispense Refill     atenolol (TENORMIN) 100 MG tablet Take 50  mg by mouth daily.       dexAMETHasone (DECADRON) 4 MG tablet Take 8 mg by mouth 2 (two) times a day. 30 tablet 0     diltiazem (CARDIZEM CD) 180 MG 24 hr capsule Take 1 capsule (180 mg total) by mouth daily. 30 capsule 0     docusate sodium (COLACE) 100 MG capsule Take 1 capsule (100 mg total) by mouth daily as needed for constipation. 30 capsule 2     famotidine (PEPCID) 20 MG tablet Take 1 tablet (20 mg total) by mouth 2 (two) times a day. (Patient taking differently: Take 20 mg by mouth daily.       )  0     folic acid (FOLVITE) 1 MG tablet Take 1 mg by mouth daily.       lacosamide (VIMPAT) 100 mg Tab Take 1 tablet (100 mg total) by mouth 2 (two) times a day. 60 tablet 0     levETIRAcetam (KEPPRA) 1000 MG tablet Take 1 tablet (1,000 mg total) by mouth 2 (two) times a day. 60 tablet 0     methotrexate 25 mg/mL injection 15 mg every 7 days. Methotrexate injection: Mixes in juice and takes by mouth every Saturday or Sunday             naproxen (EC NAPROSYN) 500 MG EC tablet Take 500 mg by mouth 2 (two) times a day as needed (PAIN).              nutritional supplement-fiber Liqd Take 1 tablet by mouth daily.              temozolomide (TEMODAR) 180 MG capsule Take 360 mg by mouth see administration instructions. Take 150 mg/m2 (360 mg) for first 5 days of 28 day cycle             Current Facility-Administered Medications   Medication Dose Route Frequency Provider Last Rate Last Dose     pentamidine inhalation solution 300 mg (PENTAM)  300 mg Inhalation Once Erick Joyner MD         pentamidine inhalation solution 300 mg (PENTAM)  300 mg Inhalation Once System, Provider Not In           COMPREHENSIVE EXAMINATION:  There were no vitals filed for this visit.  A well appearing alert oriented male. Vital data as noted above. His eyes without inflammation/scleromalacia. ENTwithout oral mucositis, thrush, nasal deformity, external ear redness, deformity. His neck is without lymphadenopathy and supple. Lungs normal  sounds, no pleural rub. Heart auscultation normal rate, rhythm; no pericardial rub and murmurs. Abdomen soft, non tender, no organomegaly. Skin examined for heliotrope, malar area eruption, lupus pernio, periungual erythema, sclerodactyly, papules, erythema nodosum, purpura, nail pitting, onycholysis, and obvious psoriasis lesion. Neurological examination shows normal alertness, speech, facial symmetry, tone and power in upper and lower extremities. The joint examination is performed for swelling, tenderness, warmth, erythema, and range of motion in the following joints: DIPs, PIPs, MCPs, wrists, first CMC's, elbows, shoulders, hips, knees, ankles, feet; spine for range of motion and paraspinal muscles for tenderness. The salient  findings are: He does not have synovitis in any of the palpable joints of upper and lower extremities.  He has very early Heberden's.  He has minimal JLT of the knees.  He has mild impingement in the shoulders.  There is no dactylitis.    LAB / IMAGING DATA:  ALT   Date Value Ref Range Status   12/02/2019 91 (H) 0 - 45 U/L Final   11/17/2019 35 0 - 45 U/L Final   06/24/2019 28 0 - 45 U/L Final     Albumin   Date Value Ref Range Status   12/02/2019 3.2 (L) 3.5 - 5.0 g/dL Final   11/17/2019 2.8 (L) 3.5 - 5.0 g/dL Final   06/24/2019 3.7 3.5 - 5.0 g/dL Final     Creatinine   Date Value Ref Range Status   12/02/2019 0.97 0.70 - 1.30 mg/dL Final   11/17/2019 0.80 0.70 - 1.30 mg/dL Final   09/07/2019 0.74 0.70 - 1.30 mg/dL Final       WBC   Date Value Ref Range Status   12/02/2019 9.2 4.0 - 11.0 thou/uL Final   09/07/2019 8.9 4.0 - 11.0 thou/uL Final     Hemoglobin   Date Value Ref Range Status   12/02/2019 11.3 (L) 14.0 - 18.0 g/dL Final   09/07/2019 12.7 (L) 14.0 - 18.0 g/dL Final   09/05/2019 12.4 (L) 14.0 - 18.0 g/dL Final     Platelets   Date Value Ref Range Status   12/02/2019 258 140 - 440 thou/uL Final   09/07/2019 182 140 - 440 thou/uL Final   09/05/2019 170 140 - 440 thou/uL Final        No results found for: JAMES, RF, SEDRATE

## 2021-06-04 NOTE — PROGRESS NOTES
Infusion Nursing Note:  Jayden Lackey presents today for dose 2/2 Injectafer.    Patient seen by provider today: No   present during visit today: Not Applicable.    Note: Jayden arrives to infusion today at his baseline. He received Injectafer last week and offers no changes or concerns today. His wife, Oz, states they are moving next week and she is working to resolve some BP medication changes prior to the move. She also states he has ear wax that they have been unable to remove with OTC kits. His right ear is plugged from wax build up. Writer attempted to call ENT but was unable to reach a nurse line. Patient's wife will continue to reach out to primary care for an appointment.     Intravenous Access:  Peripheral IV placed.    Treatment Conditions:  Not Applicable.    Post Infusion Assessment:  Patient tolerated infusion without incident.  Patient observed for 30 minutes post Injectafer per protocol.   Blood return noted pre and post infusion.  Site patent and intact, free from redness, edema or discomfort.  No evidence of extravasations.  Access discontinued per protocol.     Discharge Plan:   Patient declined prescription refills.  AVS to patient via MYCHART.   Patient discharged in stable condition accompanied by: self.  Departure Mode: Wheelchair.    Vashti Mckeon RN

## 2021-06-04 NOTE — PATIENT INSTRUCTIONS
Contact Numbers  [facility]: [phone] (for symptom and scheduling needs)    Please call the [facility] Triage line if you experience a temperature greater than or equal to 100.4, shaking chills, have uncontrolled nausea, vomiting and/or diarrhea, dizziness, shortness of breath, chest pain, bleeding, unexplained bruising, or if you have any other new/concerning symptoms, questions or concerns.     If you are having any concerning symptoms or wish to speak to a provider before your next infusion visit, please call your care coordinator or triage to notify them so we can adequately serve you.     If you need a refill on a narcotic prescription or other medication, please call triage before your infusion appointment.

## 2021-06-04 NOTE — TELEPHONE ENCOUNTER
ABIOLAM stating that per Dr. Green  it's okay to take the 2 meds together.     SIOBHAN [facility] 12/10/2019 3:15 PM

## 2021-06-04 NOTE — TELEPHONE ENCOUNTER
Pt's wife was asking if pt can take prednisone 7.5 mg daily and  dexAMETHasone (DECADRON) 2 mg daily. The pharmacist was concerned about the 2 steroid meds taking at the same time. Will give pt a call back after Dr. Green. gets back from his break.     SIOBHAN [facility] 12/10/2019 1:51 PM

## 2021-06-04 NOTE — TELEPHONE ENCOUNTER
I received a message from the [place]'s financial counselor that wife, Oz is looking for financial help, for instance the [employer] or any other jhony money available.  I called and left Oz a message that since Jayden is a patient at the [facility] she will have to work with their social workers and/or nurse navigators.

## 2021-06-05 NOTE — PROGRESS NOTES
Jayden is a 66 year old who is being evaluated via a billable video visit.       How would you like to obtain your AVS? MyChart  If the video visit is dropped, the invitation should be resent by: Send to e-mail at: [email or web address]  Will anyone else be joining your video visit? No        EPILEPSY CLINIC VIDEO VISIT NOTE  The scheduled clinic visit was changed to a video visit to reduce the risk of COVID-19 transmission.            Service Date: 06/01/2021     HISTORY: I spoke with Mr. Jayden Lackey and his wife.  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     Following the most recent video visit to this clinic on 05/03/2021, the patient reportedly has had no seizures causing impaired awareness.  He has had 4-6 isolated auras per month, which he has reported to his wife, who tracks the auras; these were spread out evenly, with no clusters.  The auras consist of a metallic taste in his mouth, a rising sensation in his stomach, and anxiety.  The last of these occurred on 05/29/2021.  The most recent complex partial seizure occurred on 03/14/2021.  The most recent generalized convulsion probably occurred on 09/18/2020.       Imaging showed a further increase in the volume of contrast enhancing tissue, on 03/19/2021.  He met to discuss this with Dr. Nicho Packer and Dr. Nasra Olmos.  After discussing various options, he elected radiotherapy followed by chemotherapy.       He started radiotherapy, under the care of Dr. Nasra Bailey at [facility], on 04/27/2021.  He had many hours of severely reduced speak output on 04/28/2021, which was suspected of being caused by acute post-radiation edema.  Dexamethasone was increased to daily use, and  within another day, his speech was nearly back to baseline.      MEDICATIONS:  Levetiracetam 2000 mg b.i.d., lacosamide 250 mg b.i.d., divalproex 500 mg b.i.d., diazepam as rescue medication (5 mg oral solution, up to 15 mg in one day for persisting focal impaired seizures), and other medications as per the electronic medical record.      VIDEO OBSERVATIONS:   The patient demonstrated a paucity of spontaneous speech, and he spoke with mildly reduced fluency, but with normal articulation and syntax.  He demonstrated grossly normal comprehension of questions.    On command, the patient moved the direction of gaze into all 4 quadrants conjugately and without nystagmus.   Facial movements were normal.    Tongue protruded on the midline.    The patient did not display any resting tremors or action tremors of the outstretched arms.  Limb movements were normal.       LABORATORY DATA:   AED levels on 03/24/2021:   Levetiracetam: 53.4 mcg/ml.   Lacosamide: 9.7 mcg/ml.   Valproate: 51.1 mcg/ml., with normal platelet count.       IMPRESSION:   Seizures with impaired awareness remain in complete control, as isolated auras have been stable, with no overt AED toxicity.     He has completed the most recent course of radiotherapy.  There are no current plans for further lesion resection or for additional chemotherapy.      He and his wife are planning to move to a house that they own in [place], [place].  Multiple family members live in [place].  Anticipating this decision, prior to the visit today I spent some time investigating specialty epilepsy care in [place].  I review this information with the patient and his wife, and they have decided to pursue epilepsy care at [facility], which has its medical campus in [place].  We also reviewed means to plan an extra supply of AEDs to assist in the transition.  He thinks that there is a small chance that they will return to [place] in the fall.     He is not driving  and does not plan to do so.     PLAN:   1)  Continue current AED doses.    2)  Return in about 6 months for an in-person clinic visit, if he decides to return to [place].     Video Conference via [employer].   Video Start Time: 11:19 a.m.   Video Stop Time: 11:42 a.m.  Provider location: [facility]   Reported Patient Location: Home   I personally spent 45 minutes in this patient care on the day of this visit, including time in Video contact with the patient, and time in other necessary patient care activities without direct patient contact including medical record, EEG and imaging review.    Frank Pacheco M.D., Professor of Neurology

## 2021-06-05 NOTE — PROGRESS NOTES
Psychology Psychotherapy  Note    Name:  Jayden Lackey  :  1955  MRN:  [id number]      Date of Service: 2020  Duration: 60 minutes (2:00-3:00)    Target Symptoms:    The patient was seen in light of concerns regarding symptoms of depression and anxiety as evidenced by patient and staff report.    Participation:  The patient was able to participate and benefit from treatment as evidenced by his verbal expression of ideas and initiation of topics discussed.    Mental Status:    Mood:  sad  Affect:  frustrated, blunted  Suicidal Ideation:  absent  Homicidal Ideation:  absent  Thought process:  blocked  Thought content:  Normal  Fund of Knowledge: Limited  Attention/Concentration:  limited  Language ability:  intact  Speech: normal, but slowed  Memory: Mild impairment  Insight and Judgement:  fair  Orientation:  person, place, time and situation  Appearance: Casually dressed, slow  Eye Contact:  Appropriate  Estimated IQ:  Average    Intervention:    I met with Jayden and his wife, Bia for psychotherapy visit to help them work through the emotional aspects of his cancer. Our last psychotherapy visit was on 2019.    Jayden has a diagnosis of glioblastoma in the left occipital lobe.  He developed a field cut on 2019 when he was on a cruise with his wife in [place].  After their cruise, they needed to go to [place] as Bia's father had .  When they returned to [place], Jayden went to the eye doctor and then was referred on and received his diagnosis of a GBM.  He had surgery on 3/22/2019.  Jayden has been receiving treatment at the [facility].  He was connected with our clinic when he received radiation therapy here in 2019.    Bia indicated that they have had a very difficult time with Jayden's health since 2019 until mid 2019.  He had 2 recent neuro surgeries with the last surgery on 10/24/2019.  He had problems with  blood clots.  He also had 3 hospitalizations during that time where he had problems with seizures.  Bia contacted me last week and was struggling with caregiver stress issues.  She was interested in having us meet today to discuss the impact that his cancer has on their lives and strategies to help focus on the quality of their lives.    Since our phone conversation last week, they made the decision to have Jayden take about 1 to 2 months off from treatment so that he can have some quality of life.  His recent MRI was stable.  He also had an increase in his Zoloft from 50 to 100 mg starting last week.  He also is continuing to take his steroids.  Since he is not doing treatment, they feel that Jayden is doing better physically and is able to be a part of more of his quality of life activities.  He enjoys playing hockey and was able to go skating for a short time with the help of his sister and a friend earlier today.  He also plays drums in a band and has not played since August 2019.  He will be playing for about an hour tonight with a group of his friends.  We talked about the importance of taking rests and conserving his energy.    Bia also has been working at putting some plans in place that she can get some breaks.  She went for an overnight at the [facility] for 1 night last week.  Jayden heller spent the night being his caregiver.  They have worked it out for Jayden heller to come out to their home for 1/2-day a week.  They have another friend who also is willing to come out for 1 day a week.  They are trying to find another friend who also can provide some respite for 1 day a week.  They are very involved in their [employer] and they both long to support groups at their [employer].  I recommended that they check to see if they can get a volunteer from the [employer] to also provide some respite care.  Bia also plans to go and visit their children and grandchildren in [place] at the end of  February.  Jayden sister will stay with him during this time.  She also plans to go back to the [facility] every 4 to 6 weeks.    Jayden  had a knee injury in December 2018 due to a hockey injury.  It also was discovered that he has lung nodules and a nodule in his stomach.  He has following up with the lung clinic.    Jayden and Bia have been together for 42 years.  They have been  for 39 years.  They have 2 daughters who both live in [place].  One of their daughters is  and has 2 boys and a girl.  Their grandchildren's range in ages from 1 years old to 6 years old.  They have a close relationship with their children and grandchildren.     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  He decided to retire early as he struggled with the stress from his work and with the advancements and technology that he needed to do for his job.  Bia is an occupational therapist and has worked in early childhood.  She retired in December 2018.    Jayden and Bia have had a difficult relationship.  They both indicate that their personalities are very different as Jayden tends to be more of an introvert and needs time to process and think about things.  Bia is more extroverted and needs to verbally talk through and process information.  They also have struggled with communication and conflict resolution issues.  Jayden indicates that sometimes he becomes angry and can be verbally abusive towards Bia.  They were  from 3900-4500.  They received marriage counseling and decided to get back together.  They both worked hard in the therapy process and made some positive changes.  They both noticed that over the past few years, they have fallen back to their old ways and that they have much more conflict between them.  They had considered going back to the marriage therapist that they had worked with, but due to Jayden's physical  condition, they did not to follow through with ongoing marriage therapy at this time.    Jayden was raised in [place].  He is the third oldest in a family of 4 children.  He has 2 older sisters who he is very close to.  They both live in [place]. He has a younger sister who he indicates he gets along with, but they are not as close.  Jayden indicates that his father was an alcoholic and  of complications of alcoholism at age 58.  He indicates that his father was extremely disruptive to their home life.  He also felt that he never had any good male role models.  His mother  at age 87.    Jayden and Bia attend [employer]. Their [employer] susie is very important to them.  They both are involved with small groups at their [employer].  Over the past week, they have been trying to work at taking some time in the morning to pray together.    Jayden has played a hockey all of his life.  Prior to his cancer diagnosis, he played hockey 3 times a week.  Since his cancer, he has not been able to play any hockey games. He also likes to play the [employer].  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  He is been a member of the band for about 4 to 5 years.  He enjoys being a member of the band and having an outlet of friends this area.    We discussed issues related to Jayden's mental health.  He talked about how he is an adult child of an alcoholic.  He has struggled with issues of control and anger that he feels may be routed back to some unresolved issues of his past.  He indicates that he has had some depression, but has not been formally diagnosed.  He denies any suicidal ideation.  He also indicates that he has some OCD tendencies.  He also has never been formally diagnosed with OCD.  Jayden's main interest is to work through some of his feelings of grief and loss related to his life changes due to his cancer.  He also would like to work on issues of  "conflict resolution and communication with his wife.    It was Jayden's hope in his senior care life to go on road trips around the [place].  He is very interested in history and was also interested in the history aspect of his travels.  He is disappointed that he is not able to drive and travel in the way that he had hoped.  Since both Jayden and Bia share an interesting history, I recommended that they consider getting a passed to the [place] history [facility] so they could take short afternoon trips to the various historical sites in the area.    Donalds has some different interests than Jayden, but they share and interest in history and their Catholic group.  Bia indicates that she is  not interested in traveling across the country with him.  She is an anxious  and does not drive on the freeway.  It was her hope to spend 3 months of the year in [place] to be near her children and grandchildren.    We spent some time talking about their hopes and dreams and other ways to reframe some of their life goals and create some quality of life activities and memories.  We also talked about strategies to help with their communication and conflict resolution.    Psychoterapeutic Techniques:  Cognitive-behavioral therapy, motivational interviewing and supportive psychotherapy strategies were utilized.    Necessity:    The session was necessary for the care of the patient to address symptoms of anxiety and depression related to the patient's medical condition.    Progress:    Jayden shows a significant impairment since our last meeting in June 2019.  He has some impairment in his memory.  He also is slow to respond and process information.  He walks with a cane, which is somewhat distressing to him.  He states that he \"feels like an invalid\" and using a cane.  He is somewhat unstable on his feet.  We talked about the possibility of using walking sticks rather than a cane.  He felt that this would be " psychologically better for him and also may provide more stability.    Jayden is interested in ongoing supportive therapy to help him deal with the emotional aspects of his cancer and also work through some of his feelings of grief and loss related to his cancer.  He is scheduled to meet with Eugene Vargas, PhD at [facility] sometime later this month.  Bia struggles with caregiver stress issues.  She is interested in meeting with me again to make sure that she continues to have a good plan in place to help her manage her caregiver stress issues.  Jayden meets diagnostic criteria for adjustment disorder with mixed anxiety and depressed mood.  He is experiencing some normal symptoms of depression and anxiety related to his diagnosis and treatment for his glioblastoma.  He has had some significant struggles with his health over the past 4 months with having 2 surgeries and 3 hospitalizations for seizures.  He is interested finding ways to have more quality of life activities.  We also discussed communication and conflict resolution strategies that they both can work on together.    Plan:    1.  Jayden scheduled to follow-up with Dr. Eugene Vargas, PhD, at [facility] later this month.    2.  Jayden will work on some of the strategies we discussed to work through his grief and loss related to his limitations from his cancer and treatment.    3.  Jayden and Bia both agreed to work on some of the communication and conflict resolution strategies as discussed.      4.  Bia is interested in meeting with me for a follow-up visit to further discuss issues related to her caregiver stress and to continue to work on strategies in this area.  We will meet again on 3/5/2020 at 11:00 AM.    Diagnosis:    1. Adjustment disorder with mixed anxiety and depressed mood    2. Glioblastoma (H)        Problem List:  Patient Active Problem List   Diagnosis      Hyperlipidemia     Hypertension, unspecified type     GBM (glioblastoma multiforme), left occipital lobe (H)     Seizure (H)     Acute respiratory failure with hypoxia (H)     Acute kidney injury (H)     Glioblastoma (H)     New onset a-fib (H)     Brain edema (H)     Gastric mass     Colon polyp     Chemotherapy induced neutropenia (H)     Brain cancer (H)     Herniated lumbar intervertebral disc     S/P craniotomy     Cerebral edema (H)     Paroxysmal atrial fibrillation (H)     Delirium     Abnormal liver enzymes     Subdural hematoma (H)     Seizures (H)     Seronegative rheumatoid arthritis (H)     Provider: Kailey Gonsalez MA, LP, [facility]    Date:  2/6/2020  Time:  4:52 PM

## 2021-06-05 NOTE — PROGRESS NOTES
ASSESSMENT AND PLAN:  Jayden Lackey 65 y.o. male is seen here on 01/21/20 for follow-up, seronegative rheumatoid arthritis been on methotrexate for several years going back 20+, developed glioblastoma stage IV, has had neurosurgical intervention, repeated seizures.  On his previous visit his liver function studies were abnormal.  The methotrexate was discontinued.  He also talked about prognosis.  Typically one would not want to use oral steroids for long-term in patients with RA.  His particular circumstance may be an exception and use of prednisone or an equivalent dexamethasone may be a appropriate palliative intervention.  Understandably he and his wife are very concerned regarding a flareup of joint pains, currently he is on prednisone and dexamethasone which is not something I would recommend going forward.  Of the various possibilities that we discussed the following plan was ultimately decided upon: #1 discontinue prednisone.  #2 continue dexamethasone 2 mg daily.  They realize that the dose of dexamethasone needed to control his RA symptoms is significantly less then the dose is required to control brain edema.  Even at 2 mg this is typically more than most patients with rheumatoid arthritis would require an equivalent dose of prednisone (around 13 mg) in case of a flareup of -which may be other potential reasons for joint symptoms besides rheumatoid arthritis-  his rheumatoid arthritis his wife may and will be able to give him a half an extra dexamethasone and then we can meet here in person to decide further course.         Diagnoses and all orders for this visit:    Seronegative rheumatoid arthritis (H)    Seizures (H)    GBM (glioblastoma multiforme), left occipital lobe (H)    Cerebral edema (H)      HISTORY OF PRESENTING ILLNESS:  Jayden Lackey, 65 y.o., male is here for follow-up.  He has seronegative  rheumatoid arthritis, that he has had excellent control with methotrexate over the past 23 years  "having followed up with Dr. David Tan the change was necessitated because of insurance reasons.  He is accompanied by his wife.  Given the history of brain tumor, glioblastoma multiforme with regrowth after initial neurosurgical intervention, seizures, being on dexamethasone for brain edema and seizures along with antiepileptics, abnormal liver function studies, methotrexate was held.  At that point it was unclear he would be continued on dexamethasone hence prednisone was added as a temporary measure in case dexamethasone was discontinued.  It turns out that he is going to be on dexamethasone for quite some time.  We discussed the differences between and the commonalities of various steroid preparations, particularly dexamethasone versus prednisone.  He has not had a flareup since his previous visit.  His wife is very concerned that he might flareup she is not sure how much dexamethasone he was in the past when he would have a \"\" flareup.  We discussed that not all joint pains necessarily a flareup of RA.  At one point during his surgical interventions methotrexate was held and he did have a flareup of his joint symptoms.  Subsequently underwent chemotherapy, and radiation treatment.  In the thought originally was that he would good to what he would consider to have prognosis of 1 to 1-1/2 years that was 9 months ago.  Soon thereafter back in October he had another surgery to remove another recurrence of the glioblastoma of the same side.  He is currently on methotrexate 0.7 mL into the orange juice,.  He reports no recent flareup although not too long ago he had bilateral severe painful knees.  There was no significant swellings there may have been some.  This responded to over-the-counter measures.  In view of his DVTs he has been started on anticoagulation has been asked not to take nonsteroidals.  He noted that today is a good day for him and there is hardly any pain.  She he has noted fatigue, weakness, visual " symptoms field cut on the right side has a component of his neurosurgical issues.  He has had ringing in the ears, here Ling hearing loss.  Sister has rheumatoid arthritis. Further historical information and ADL limitations as noted in the multidimensional health assessment questionnaire attached in the EMR.   ALLERGIES:Shellfish containing products; Lorazepam; and Other allergy (see comments)    PAST MEDICAL/ACTIVE PROBLEMS/MEDICATION/ FAMILY HISTORY/SOCIAL DATA:  The patient has a family history of  Past Medical History:   Diagnosis Date     Anxiety      Depression      Glioblastoma (H)      Hx antineoplastic chemotherapy     now   for brain cancer.         Hx of radiation therapy      Hyperlipidemia      Hypertension      RA (rheumatoid arthritis) (H)      Seizures (H)      Social History     Tobacco Use   Smoking Status Former Smoker     Packs/day: 1.00     Years: 3.00     Pack years: 3.00     Last attempt to quit: 1977     Years since quittin.0   Smokeless Tobacco Never Used     Patient Active Problem List   Diagnosis     Hyperlipidemia     Hypertension, unspecified type     Rheumatoid arthritis (H)     GBM (glioblastoma multiforme), left occipital lobe (H)     Seizure (H)     Acute respiratory failure with hypoxia (H)     Acute kidney injury (H)     Glioblastoma (H)     New onset a-fib (H)     Brain edema (H)     Gastric mass     Colon polyp     Chemotherapy induced neutropenia (H)     Brain cancer (H)     Herniated lumbar intervertebral disc     S/P craniotomy     Cerebral edema (H)     Paroxysmal atrial fibrillation (H)     Delirium     Abnormal liver enzymes     Subdural hematoma (H)     Seizures (H)     Current Outpatient Medications   Medication Sig Dispense Refill     atenolol (TENORMIN) 100 MG tablet Take 50 mg by mouth daily.       cholecalciferol, vitamin D3, 125 mcg (5,000 unit) capsule Take 5,000 Units by mouth daily.       dexAMETHasone (DECADRON) 2 MG tablet Take 2 mg by mouth daily.    "    diltiazem (CARTIA XT) 240 MG 24 hr capsule Take 240 mg by mouth daily.       lacosamide 150 mg Tab Take 150 mg by mouth 2 (two) times a day. 60 tablet 0     levETIRAcetam (KEPPRA) 1000 MG tablet Take 1.5 tablets (1,500 mg total) by mouth 2 (two) times a day. 90 tablet 0     lisinopril (PRINIVIL,ZESTRIL) 5 MG tablet Take 5 mg by mouth daily.       predniSONE (DELTASONE) 2.5 MG tablet 7.5 mg/d prednisone PO for 1 month. (Patient taking differently: daily 7.5 mg/d prednisone PO for 1 month.) 90 tablet 2     sertraline (ZOLOFT) 100 MG tablet Take 50 mg by mouth daily.       XARELTO 20 mg tablet        acetaminophen (TYLENOL) 325 MG tablet Take 650 mg by mouth every 6 (six) hours as needed.        docusate sodium (COLACE) 100 MG capsule Take 1 capsule (100 mg total) by mouth daily as needed for constipation. 30 capsule 2     lomustine (CEENU) 100 MG capsule Take 200 mg by mouth once.        ondansetron (ZOFRAN) 4 MG tablet Take 4 mg by mouth every 8 (eight) hours as needed.        No current facility-administered medications for this visit.      DETAILED EXAMINATION  01/21/20  :  Vitals:    01/21/20 1426   BP: 138/78   Patient Site: Right Arm   Patient Position: Sitting   Cuff Size: Adult Regular   Pulse: 66   Weight: 215 lb (97.5 kg)   Height: 6' 2.5\" (1.892 m)     Alert oriented. Head including the face is examined for malar rash, heliotropes, scarring, lupus pernio. Eyes examined for redness such as in episcleritis/scleritis, periorbital lesions.   Neck/ Face examined for parotid gland swelling, range of motion of neck.  Left upper and lower and right upper and lower extremities examined for tenderness, swelling, warmth of the appendicular joints, range of motion, edema, rash.  Some of the important findings included: he does not have evidence of synovitis in the palpable joints of the upper extremities.  No significant deformities of the digits.  + Heberden nodes.  Range of motion of the shoulders show full " abduction.  No JLT effusion or warmth of the knees.    He has mild impingement in the shoulders.  There is no dactylitis.    LAB / IMAGING DATA:  ALT   Date Value Ref Range Status   12/23/2019 35 0 - 45 U/L Final   12/13/2019 34 0 - 45 U/L Final   12/12/2019 50 (H) 0 - 45 U/L Final     Albumin   Date Value Ref Range Status   12/23/2019 3.7 3.5 - 5.0 g/dL Final   12/13/2019 2.9 (L) 3.5 - 5.0 g/dL Final   12/12/2019 3.4 (L) 3.5 - 5.0 g/dL Final     Creatinine   Date Value Ref Range Status   12/23/2019 0.79 0.70 - 1.30 mg/dL Final   12/14/2019 0.65 (L) 0.70 - 1.30 mg/dL Final   12/13/2019 0.69 (L) 0.70 - 1.30 mg/dL Final       WBC   Date Value Ref Range Status   12/23/2019 6.5 4.0 - 11.0 thou/uL Final   12/14/2019 4.7 4.0 - 11.0 thou/uL Final     Hemoglobin   Date Value Ref Range Status   12/23/2019 11.3 (L) 14.0 - 18.0 g/dL Final   12/14/2019 9.6 (L) 14.0 - 18.0 g/dL Final   12/13/2019 9.4 (L) 14.0 - 18.0 g/dL Final     Platelets   Date Value Ref Range Status   12/23/2019 147 140 - 440 thou/uL Final   12/14/2019 181 140 - 440 thou/uL Final   12/13/2019 173 140 - 440 thou/uL Final       Lab Results   Component Value Date    RF <15.0 12/13/2019

## 2021-06-05 NOTE — PROGRESS NOTES
Jayden's wife, Bia contacted me today and is interested in meeting again to discuss issues related to caregiver stress.  I saw them last on 6/18/2019.  She updated me about his health status.  She is interested in them coming together me on 2/6/2020 at 2:00 PM.    Kailey Gonsalez MA, LP, LICSW

## 2021-06-06 NOTE — PROGRESS NOTES
I met with Jayden's wife, Bia for an emotional support visit.  She is struggling with caregiver stress issues related to caring for Jayden who is a glioblastoma patient.  He currently is on a break from treatment and will not have any further treatment for a month.    We discussed strategies for self-care.  Bia has been going to yoga 2 times a week.  She also plans to go to a retreat center for the day after our meeting today.  She also went to visit her grandchildren for 3 days last month.    We discussed resources options that would be available to her including the [facility] [place] and the cancer legal line.  We discussed some strategies that might help with communication and conflict resolution with Jayden given his deficits related to insight and problem-solving skills.  We also talked about how to get more help and resources in the home.  In addition, we discussed Bia's expectations and how to weigh out having quality time with Jayden, versus trying to be a perfectionist.    Plan: Bia is interested in meeting with me again for additional support regarding her caregiver stress.  We will meet again on 4/2/2020 at 11:00 AM.    Kailey Gonsalez MA, LP, LICSW

## 2021-06-07 NOTE — PATIENT INSTRUCTIONS
Contact Numbers:   [facility] Main Line: [phone]    Call triage to speak with triage if you are experiencing chills and/or temperature greater than or equal to 100.5, uncontrolled nausea/vomiting, diarrhea, constipation, dizziness, shortness of breath, chest pain, bleeding, unexplained bruising, or any new/concerning symptoms, questions/concerns.     If you are having any concerning symptoms or wish to speak to a provider before your next infusion visit, please call your care coordinator or triage to notify them so we can adequately serve you.     If you need a refill on a medication or narcotic prescription, please call triage or your care coordinator before your infusion appointment.                 June 2021 Sunday Monday Tuesday Wednesday Thursday Friday Saturday             1    VIDEO VISIT RETURN  10:45 AM   (30 min.)   Frank Pacheco MD   [facility] [place] 2     3     4    ONC INFUSION 1 HR (60 MIN)   2:00 PM   (60 min.)    ONC INFUSION NURSE   [facility] 5       6     7    ONC INFUSION 1 HR (60 MIN)   1:30 PM   (60 min.)    ONC INFUSION NURSE   [facility] 8     9     10     11     12       13     14     15     16     17     18     19       20     21     22     23     24     25     26       27     28     29     30                                July 2021 Sunday Monday Tuesday Wednesday Thursday Friday Saturday                       1     2     3       4     5     6     7     8     9     10       11     12     13     14     15     16     17       18     19     20     21     22     23     24       25     26     27     28     29     30     31                     Lab Results:  No results found for this or any previous visit (from the past 12 hour(s)).

## 2021-06-07 NOTE — PROGRESS NOTES
Infusion Nursing Note:  Jayden Lackey presents today for cycle 3 day 1 Avastin.    Patient seen by provider today: No   present during visit today: Not Applicable.    Note: Patient arrives in a wheelchair and is accompanied by his wife. Wife states that Jayden has been more fatigued and weak in the last week. She feels his gait is worsening as he gets tired throughout the day. He seems to need more assistance. He will have times where his energy level is higher and he does better with walking but then after awhile he starts to slow down and his balance is off. She has gotten a cane out for him to use throughout the house. Denies any falls, seizures, auras, or stroke symptoms. BP is elevated today 155/88. Jayden has an appointment with his PCP on Wednesday and they are hoping to increase his lisinopril.    TORB: Dr. Olmos/Saundra Duran RN  - ok to increase lisinopril to 10 mg orally   -ok to get avastin today or ok to hold and wait to get next dose after they move to [place]. Patient and wife's choice.   -pt should rest as needed, avastin can cause fatigue  -use cane for ambulating to prevent falls  -if symptoms are waxing and waning then no need for head CT.  Reviewed above with patient and wife and both agree they would like to ahead with the avastin today.    Intravenous Access:  Peripheral IV placed.    Treatment Conditions:  Not Applicable.      Post Infusion Assessment:  Patient tolerated infusion without incident.  Blood return noted pre and post infusion.  Site patent and intact, free from redness, edema or discomfort.  No evidence of extravasations.  Access discontinued per protocol.       Discharge Plan:   Patient declined prescription refills.  Discharge instructions reviewed with: Patient.  Patient and/or family verbalized understanding of discharge instructions and all questions answered.  Copy of AVS reviewed with patient and/or family.  Patient discharged in stable condition accompanied  by: wife.  Departure Mode: Wheelchair.      Saundra Duran RN

## 2021-06-07 NOTE — PROGRESS NOTES
Jayden's wife, Bia was interested in a telephone visit  for an emotional support.  She is struggling with caregiver stress issues related to caring for Jayden who is a glioblastoma patient.  He is currently hospitalized at [facility] due to seizures.  He was admitted on Tuesday and she anticipates that he will be discharged in the next few days.    We discussed how she is coping with COVID-19 and the need for social distancing.  She processed her thoughts and feelings about this and we also discussed strategies to cope during this difficult time. She actually feels that she is coping better than she expected and is appreciating how much simplier it has made her life.     We discussed strategies for self-care.  Bia has been going to yoga 2 times a week. We talked about ways to continue to do yoga and mediation at home.    She is maintaining contact with family and friends through phone calls and face time.  We also talked about how she can use skype, zoom and facetime Jayden when he returns home.    We discussed resources options that would be available to her including the [facility] [place] [facility] [place] and the cancer legal line.  We discussed some strategies that might help with communication and conflict resolution with Jayden given his deficits related to insight and problem-solving skills.  We also talked about how to get more help and resources in the home, especially once there is more relief from this time of social distancing.  In addition, we discussed Bia's expectations and how to weigh out having quality time with Jayden, versus trying to be a perfectionist.    Plan: Bia is interested in meeting with me again for additional support regarding her caregiver stress.  We will meet again on 5/26/2020 at 2:00.    Kailey Gonsalez MA, LP, LICSW

## 2021-06-07 NOTE — TELEPHONE ENCOUNTER
Attempted to call patient twice to notifiy visit with Kailey Gonsalez on 4/2 will be conducted over the phone versus in person. The voicemail box is full for the patient and unable to leave a message. I will attempt a 3rd time if no success, I will try emergency contact.     Sandra Grubbs

## 2021-06-08 NOTE — PROGRESS NOTES
"Jayden's wife, Bia was interested in a telephone visit  for an emotional support.  She had forgotten about our meeting, but was still interested in talking about her struggles with caregiver stress issues related to caring for Jayden who is a glioblastoma patient.  She indicates that things are on \"standstill\" since COVID-19.  After his last hospitalization, he had an increase in his seizure medication.  His next visit with Dr. Olmos is scheduled for mid June 2020.  She feels that she is doing better and they both are working at coping in this time of COVID-19 with the need for social distancing. She actually feels that she is coping better than she expected and is appreciating how much simplier it has made her life.     We discussed strategies for self-care.  Bia had been going to yoga 2 times a week, prior to COVID-19.  She has been doing some yoga and meditation at home.  She also is starting an outdoor yoga class next week. She is maintaining contact with family and friends through phone calls and face time.      We discussed resources options that would be available to her including the [facility] [place] [facility] [place] and the cancer legal line.  We discussed some strategies that might help with communication and conflict resolution with Jayden given his deficits related to insight and problem-solving skills.  We also talked about how to get more help and resources in the home, especially once there is more relief from this time of social distancing.  In addition, we discussed Bia's expectations and how to weigh out having quality time with Jayden, versus trying to be a perfectionist.    Plan: Bia will contact me on an as-needed basis for additional support and assistance as needed.    Kailey Gonsalez MA, LP, LICSW  "

## 2021-06-08 NOTE — PROGRESS NOTES
Phone got disconnected.   Called again, rang, then went straight to .   Will try again in a few mins     Annalee Ferguson CMA [facility] Rheumatology 5/28/2020 4:00 PM

## 2021-06-08 NOTE — PROGRESS NOTES
"Jayden Lackey is a 65 y.o. male who is being evaluated via a billable telephone visit.      The patient has been notified of following:     \"This telephone visit will be conducted via a call between you and your physician/provider. We have found that certain health care needs can be provided without the need for a physical exam.  This service lets us provide the care you need with a short phone conversation.  If a prescription is necessary we can send it directly to your pharmacy.  If lab work is needed we can place an order for that and you can then stop by our lab to have the test done at a later time.    Telephone visits are billed at different rates depending on your insurance coverage. During this emergency period, for some insurers they may be billed the same as an in-person visit.  Please reach out to your insurance provider with any questions.    If during the course of the call the physician/provider feels a telephone visit is not appropriate, you will not be charged for this service.\"    Patient has given verbal consent to a Telephone visit? Yes    What phone number would you like to be contacted at? [phone]     Patient would like to receive their AVS by AVS Preference: Eusebio.      ASSESSMENT AND PLAN:    Diagnoses and all orders for this visit:    Seronegative rheumatoid arthritis (H)    Herniated lumbar intervertebral disc    GBM (glioblastoma multiforme), left occipital lobe (H)    Seizure (H)    S/P craniotomy          HISTORY OF PRESENTING ILLNESS:  Jayden Lackey 65 y.o. is evaluated here via phone Link, his significant other also was there.  At this patient has history of seronegative rheumatoid arthritis.  He used to be on methotrexate.  He then developed glioblastoma stage IV.  He had craniotomy, he has developed seizures since.  He has been on dexamethasone currently at 2 mg daily.  At one point his liver function studies were abnormal as noted in the chart more recently those studies " have been normal with normal ALT and AST.  He noted no joint pains.  Occasionally he will get some discomfort for which she will take Tylenol and his wife wondered if she can give her him more than 1 pill a day.  [facility] most [facility].  We discussed that he can easily have 3 and 25 mg tablets 2 of them up to 3 times daily.  We discussed the pros and cons of methotrexate and we are both in agreement that this stage given that there is no flareup of his RA with dexamethasone dose he will continue that approach.  We will meet here in 3 months or sooner.. ROS enquiry held for fever, ocular symptoms, rash, headache,  GI issues.  Today we also discussed the issues related to the current pandemic, the pros and cons of the current treatment plan, the CDC guidelines such as social distancing washing the hands covering the cough.  ALLERGIES:Shellfish containing products; Lorazepam; Nsaids (non-steroidal anti-inflammatory drug); and Other allergy (see comments)    PAST MEDICAL/ACTIVE PROBLEMS/MEDICATION/SOCIAL DATA  Past Medical History:   Diagnosis Date     Anxiety      Depression      Glioblastoma (H)      Hx antineoplastic chemotherapy     now   for brain cancer.         Hx of radiation therapy      Hyperlipidemia      Hypertension      RA (rheumatoid arthritis) (H)      Seizures (H)      Social History     Tobacco Use   Smoking Status Former Smoker     Packs/day: 1.00     Years: 3.00     Pack years: 3.00     Last attempt to quit: 1977     Years since quittin.4   Smokeless Tobacco Never Used     Patient Active Problem List   Diagnosis     Hyperlipidemia     Hypertension, unspecified type     GBM (glioblastoma multiforme), left occipital lobe (H)     Seizure (H)     Acute respiratory failure with hypoxia (H)     Acute kidney injury (H)     Glioblastoma (H)     New onset a-fib (H)     Brain edema (H)     Gastric mass     Colon polyp     Chemotherapy induced neutropenia (H)      Brain cancer (H)     Herniated lumbar intervertebral disc     S/P craniotomy     Cerebral edema (H)     Paroxysmal atrial fibrillation (H)     Delirium     Abnormal liver enzymes     Subdural hematoma (H)     Seizures (H)     Seronegative rheumatoid arthritis (H)     Current Outpatient Medications   Medication Sig Dispense Refill     acetaminophen (TYLENOL) 325 MG tablet Take 650 mg by mouth every 6 (six) hours as needed.        atenolol (TENORMIN) 100 MG tablet Take 50 mg by mouth daily.       cholecalciferol, vitamin D3, 125 mcg (5,000 unit) capsule Take 5,000 Units by mouth daily.       dexAMETHasone (DECADRON) 2 MG tablet Take 2 mg by mouth daily.       diltiazem (CARTIA XT) 240 MG 24 hr capsule Take 240 mg by mouth daily.       lacosamide 150 mg Tab Take 150 mg by mouth 2 (two) times a day. 60 tablet 0     levETIRAcetam (KEPPRA) 1000 MG tablet Take 1.5 tablets (1,500 mg total) by mouth 2 (two) times a day. 90 tablet 0     lisinopril (PRINIVIL,ZESTRIL) 5 MG tablet Take 5 mg by mouth daily.       lomustine (CEENU) 100 MG capsule Take 200 mg by mouth once.        ondansetron (ZOFRAN) 4 MG tablet Take 4 mg by mouth every 8 (eight) hours as needed.        sertraline (ZOLOFT) 100 MG tablet Take 50 mg by mouth daily.       XARELTO 20 mg tablet        No current facility-administered medications for this visit.          EXAMINATION: Phone visit    LAB / IMAGING DATA:  ALT   Date Value Ref Range Status   12/23/2019 35 0 - 45 U/L Final   12/13/2019 34 0 - 45 U/L Final   12/12/2019 50 (H) 0 - 45 U/L Final     Albumin   Date Value Ref Range Status   12/23/2019 3.7 3.5 - 5.0 g/dL Final   12/13/2019 2.9 (L) 3.5 - 5.0 g/dL Final   12/12/2019 3.4 (L) 3.5 - 5.0 g/dL Final     Creatinine   Date Value Ref Range Status   12/23/2019 0.79 0.70 - 1.30 mg/dL Final   12/14/2019 0.65 (L) 0.70 - 1.30 mg/dL Final   12/13/2019 0.69 (L) 0.70 - 1.30 mg/dL Final       WBC   Date Value Ref Range Status   12/23/2019 6.5 4.0 - 11.0 thou/uL Final    12/14/2019 4.7 4.0 - 11.0 thou/uL Final     Hemoglobin   Date Value Ref Range Status   12/23/2019 11.3 (L) 14.0 - 18.0 g/dL Final   12/14/2019 9.6 (L) 14.0 - 18.0 g/dL Final   12/13/2019 9.4 (L) 14.0 - 18.0 g/dL Final     Platelets   Date Value Ref Range Status   12/23/2019 147 140 - 440 thou/uL Final   12/14/2019 181 140 - 440 thou/uL Final   12/13/2019 173 140 - 440 thou/uL Final       Lab Results   Component Value Date    RF <15.0 12/13/2019     Duration of the call:7  Minutes  Call start: 525  pm  Call end:   532pm

## 2021-06-14 NOTE — TELEPHONE ENCOUNTER
[facility] Call Center    Phone Message    May a detailed message be left on voicemail: yes     Reason for Call: Medication Refill Request    Has the patient contacted the pharmacy for the refill? Yes   Name of medication being requested: valium and lacosamide   Provider who prescribed the medication: Dr. Pacheco  Pharmacy: [id number]. [address] [place] [address]    Pharmacy said patient is moving to [place] and wants his valium and lacosamide prescriptions transferred to their pharmacy. Pharmacy needs either a verbal or electronic order. Phone: [phone]. Fax: [phone]    Action Taken: Message routed to:  [facility] Neurology    Travel Screening: Not Applicable

## 2021-06-16 NOTE — TELEPHONE ENCOUNTER
I called Jayden and his wife, Oz but was unable to reach them.  Their voice mailbox is full so I was unable to leave a message.  I will try again in the morning.  They are interested in meeting with Dr. Bailey again for some potential radiation treatments.

## 2021-06-16 NOTE — PROGRESS NOTES
[facility] Radiation Oncology Follow Up Note    Patient: Jayden Lackey  MRN: [id number]  Date of Service: 04/14/2021    Assessment:     1. GBM (glioblastoma multiforme), left occipital lobe (H)  dexAMETHasone (DECADRON) 4 MG tablet        Body site: Brain    Impression/Plan:   66 y.o. male with GBM left occipital lobe, pathology IDH1/2 wildtype, MGMT promoter methylated. Residual right sided homonymous hemianopsia, visual processing delay. 2nd resection on 10/24/2019.     Progression deep to resection cavity from MRI head w/wo contrast 3/19/60372.     1. Radiation therapy previously discussed. Explained rationale for radiation therapy versus surgery. He and his wife understand this is no longer curable intent and will offer some tumor control. After our conversation patient wishes to proceed forward with radiation. Continue onto CT simulation to begin radiation planning.     2. Patient voices he would like to remain full code status.    3. Dexamethasone 4mg on days of radiation therapy, prescription sent.     4. All patient's and his wife Cristiana questions were answered.       Intent of Therapy: Palliative  Side effects that may occur during or within weeks after Radiation Therapy      Fatigue and general weakness    Headache and scalp irritation    Loss of hair    Nausea, vomiting, and decrease in appetite    Darkening, irritation, itchiness, redness, dryness, peeling, thickening, scabbing, and ulceration of the scalp, neck and forehead    Side effects that may occur months or years after Radiation Therapy      Development of another tumor or cancer           Dizziness and balance problems    Decrease in hormone production    Brain inflammation or necrosis that may cause various neurologic symptoms    Seizures    Decrease in memory and thinking abilities    Decrease in hearing and feeling of ear congestion    Eye dryness and irritation    Loss of vision    Cataracts in the eyes    Poor healing after a trauma  or surgery in the irradiated area    Facial numbness, pain and weakness    The risks, benefits and alternatives to radiation therapy were outlined with the patient. All questions were answered and a consent was signed.    Subjective:                    HPI:  Jayden Lackey is a 66 y.o. male who was treated with radiation therapy for left occipital GBM, WHO grade IV, MGMT promoter methylation present, IDH wild type.      Local treatment to date:  1) Craniotomy and tumor resection on 3/22/2019  2) Radiation therapy, 6000 cGy over 30 fractions completed on 6/11/2019   3) Surgical resection on 10/24/2019      He was only able to tolerate one cycle of lomustine due to chemotherapy-induced fatigue. Watched on MRI, most recent 03/2021 showing progression deep to resection cavity.     Per neurology 4/5/2021, GTC and complex partial seizures controlled with addition of divalproex to relatively high doses of levetiracetam and lacosamide. Chronically lethargic but no new symptoms emerged with addition of Divalproex. Radiation therapy and/or resection would likely cause either decrease in seizure frequency or no change at all over the long term and less likely to cause persisting increase in seizure frequency.    The patient presents today for CT simulation.     Past Medical History:   Diagnosis Date     Anxiety      Depression      Glioblastoma (H)      Hx antineoplastic chemotherapy     now   for brain cancer.         Hx of radiation therapy      Hyperlipidemia      Hypertension      RA (rheumatoid arthritis) (H)      Seizures (H)      Past Surgical History:   Procedure Laterality Date     ANAL SPHINCTEROTOMY       BACK SURGERY      laminectomy L45 on 3/1/09      CRANIOTOMY  03/22/2019    Dr. Hayes      HERNIA REPAIR       ME ESOPHAGOGASTRODUODENOSCOPY TRANSORAL DIAGNOSTIC N/A 6/26/2019    Procedure: ESOPHAGOGASTRODUODENOSCOPY (EGD) GASTRIC MASS BIOPSY;  Surgeon: Elizabeth Pitts MD;  Location: [facility] OR;  Service:  Gastroenterology     SEPTOPLASTY       US BREAST CORE BIOPSY LEFT Left 6/5/2019     vasectomy       Current Outpatient Medications on File Prior to Visit   Medication Sig Dispense Refill     acetaminophen (TYLENOL) 325 MG tablet Take 650 mg by mouth every 6 (six) hours as needed.        ARIPiprazole (ABILIFY) 2 MG tablet Take 2 mg by mouth daily.       atenolol (TENORMIN) 100 MG tablet Take 50 mg by mouth daily.       dexAMETHasone (DECADRON) 1 MG tablet Take 1 mg by mouth daily.       diazepam intensol (DIAZEPAM) 5 mg/mL Conc concentrated solution Take 5 mg by mouth see administration instructions. As needed for seizure, 5mg x1 initially, then 2.5 mg every 5 minutes up to 37.5mg/7.5ml       diltiazem (CARDIZEM CD) 180 MG 24 hr capsule Take 180 mg by mouth daily.       divalproex (DEPAKOTE DR) 250 MG 12 hour tablet Take 500 mg by mouth 2 (two) times a day.        mg capsule Take 1 capsule by mouth as needed.       lacosamide (VIMPAT) 50 mg Tab Take 1 tablet (50 mg) twice daily (together with 200 mg tablets, to total 250mg in AM and 250mg in PM).       lacosamide 200 mg Tab Take 1 tablet (200 mg) twice daily (together with 50 mg tablets, to total 250mg in AM and 250mg in PM).       levETIRAcetam (KEPPRA) 1000 MG tablet Take 2,000 mg by mouth 2 (two) times a day.       lisinopriL (PRINIVIL,ZESTRIL) 10 MG tablet Take 10 mg by mouth daily.       sertraline (ZOLOFT) 100 MG tablet Take 200 mg by mouth daily.       XARELTO 20 mg tablet Take 20 mg by mouth daily before breakfast.        Current Facility-Administered Medications on File Prior to Visit   Medication Dose Route Frequency Provider Last Rate Last Admin     [COMPLETED] iohexoL 300 mg iodine/mL injection 100 mL (OMNIPAQUE)  100 mL Intravenous Once in imaging Nasra Bailey MD   90 mL at 04/14/21 1140     Shellfish containing products, Lorazepam, Nsaids (non-steroidal anti-inflammatory drug), and Other allergy (see comments)  Social History      Socioeconomic History     Marital status:      Spouse name: Not on file     Number of children: Not on file     Years of education: Not on file     Highest education level: Not on file   Occupational History     Not on file   Social Needs     Financial resource strain: Not on file     Food insecurity     Worry: Not on file     Inability: Not on file     Transportation needs     Medical: Not on file     Non-medical: Not on file   Tobacco Use     Smoking status: Former Smoker     Packs/day: 1.00     Years: 3.00     Pack years: 3.00     Quit date: 1977     Years since quittin.3     Smokeless tobacco: Never Used   Substance and Sexual Activity     Alcohol use: Not Currently     Alcohol/week: 1.0 standard drinks     Types: 1 Cans of beer per week     Drug use: Never     Sexual activity: Not on file   Lifestyle     Physical activity     Days per week: Not on file     Minutes per session: Not on file     Stress: Not on file   Relationships     Social connections     Talks on phone: Not on file     Gets together: Not on file     Attends Worship service: Not on file     Active member of club or organization: Not on file     Attends meetings of clubs or organizations: Not on file     Relationship status: Not on file     Intimate partner violence     Fear of current or ex partner: Not on file     Emotionally abused: Not on file     Physically abused: Not on file     Forced sexual activity: Not on file   Other Topics Concern     Not on file   Social History Narrative     Not on file     Objective:        Exam:  There were no vitals filed for this visit.    Recent Labs:   Recent Results (from the past 168 hour(s))   Creatinine   Result Value Ref Range    Creatinine 0.79 0.70 - 1.30 mg/dL    GFR MDRD Af Amer >60 >60 mL/min/1.73m2    GFR MDRD Non Af Amer >60 >60 mL/min/1.73m2       Imaging: Imaging results 30 days:   Interface, Radiant Ib - 2021  3:39 PM CDT  Formatting of this note might be different  from the original.  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  ICD-10: Glioblastoma (H)    Comparison: 2/1/2021.    Technique: Multiplanar T1-weighted, axial FLAIR, and susceptibility  images were obtained without intravenous contrast. Following  intravenous gadolinium-based contrast administration, axial  T2-weighted, diffusion, and T1-weighted images (in multiple planes)  were obtained.    Contrast: 10.0mL Gadavist     Findings: Postsurgical changes of left parieto-occipital craniotomy  and mass resection. Enhancing focus in the anterior/inferior margin of  the resection cavity has increased in size now measuring 2.4 x 3.1 x  1.5 cm (series 21 image 146 and series 22 image 102), previously 1.5 x  1.2 x 0.9 on 2/1/2021. There are multiple relatively hypoenhancing  nodular foci within the lesion which demonstrate increased cerebral  blood volume on perfusion images. There is mild associated restricted  diffusion.    No additional suspicious enhancing intracranial foci or abnormal  diffusion restriction. No evidence of intracranial hemorrhage,  significant mass effect, or midline shift. Ventricles are  proportionate the cerebral sulci with ex vacuo dilation of the left  lateral ventricle temporal horn. Major vascular flow voids are intact.    Impression:  Findings concerning for recurrent malignancy at the anterior margin of  the left temporoparietal resection cavity with nodular enhancing mass  demonstrating increased cerebral blood volume and diffusion  restriction. Posttreatment changes could have similar appearance but  is thought less likely.    I have personally reviewed the examination and initial interpretation  and I agree with the findings.    MEHDI ARGUELLO MD    Pathology:   No results found for this or any previous visit (from the past 8760 hour(s)).  Pathology Results       Benign - Core biopsy, Left - 6/5/2019       Pathology Code Malignancy Type    Fat necrosis     Inflammation           Additional Information            [place]: Not Entered                   45 minutes spent on the date of the encounter doing chart review, review of outside records, review of test results, interpretation of tests, patient visit and documentation, discussion of plan with Ignacio and answering all questions.       I, Nasra Bailey MD personally performed the services described in this documentation, as scribed by Dyllan Acuña in my presence, and it is both accurate and complete.    Signed by: Nasra Bailey MD, MPH

## 2021-06-16 NOTE — PROGRESS NOTES
[facility] Radiation Oncology Follow Up Note    Patient: Jayden Lackey  MRN: [id number]  Date of Service: 03/30/2021    Assessment:     1. Glioblastoma (H)          Body site: Brain    Impression/Plan:   66 y.o. male with GBM left occipital lobe, pathology IDH1/2 wildtype, MGMT promoter methylated. Residual right sided homonymous hemianopsia, visual processing delay. 2nd resection on 10/24/2019.     1. MRI head w/o contrast from 3/19/2021, personally reviewed. The previously resected mass measures 2.4 x 3.1 x 1.5 cm previously 1.5 x 1.2 x 0.9 cm on 2/1/2021. His tumor appears to be progressing deep to the resection cavity. He has met with Dr. Packer and discussed a third resection. As an alternative to surgery, we discussed SRS to the resection cavity.    2. I am largely concerned irritation from the radiation may trigger his epilepsy and/or have a large impact on his quality of life offsetting any survival benefit of re-irradiation.. We also discussed the potential of continuing forward with single agent bevacizumab in place of radiation or surgery. Our discussion largely focused on quality of life as this has always been most important to Jayden and they are planning to go to [place] to spend time with family which I encouraged. This may be better supported by Avastin alone.   He and his wife Kailey Edmondson voiced understanding of the information discussed and will consider the options we discussed today.       Intent of Therapy: Palliative  Side effects that may occur during or within weeks after Radiation Therapy      Fatigue and general weakness    Headache and scalp irritation    Loss of hair    Nausea, vomiting, and decrease in appetite    Darkening, irritation, itchiness, redness, dryness, peeling, thickening, scabbing, and ulceration of the scalp, neck and forehead    Side effects that may occur months or years after Radiation Therapy      Development of another tumor or cancer           Dizziness and  "balance problems    Decrease in hormone production    Brain inflammation or necrosis that may cause various neurologic symptoms    Seizures    Decrease in memory and thinking abilities    Decrease in hearing and feeling of ear congestion    Eye dryness and irritation    Loss of vision    Cataracts in the eyes    Poor healing after a trauma or surgery in the irradiated area    Facial numbness, pain and weakness    The risks, benefits and alternatives to radiation therapy were outlined with the patient. All questions were answered and a consent was signed.          Subjective:   Jayden Lackey is a 66 y.o. male who is being evaluated via a billable telephone visit.      The patient has been notified of following:     \"This telephone visit will be conducted via a call between you and your physician/provider. We have found that certain health care needs can be provided without the need for a physical exam.  This service lets us provide the care you need with a short phone conversation.  If a prescription is necessary we can send it directly to your pharmacy.  If lab work is needed we can place an order for that and you can then stop by our lab to have the test done at a later time.    Telephone visits are billed at different rates depending on your insurance coverage. During this emergency period, for some insurers they may be billed the same as an in-person visit.  Please reach out to your insurance provider with any questions.    If during the course of the call the physician/provider feels a telephone visit is not appropriate, you will not be charged for this service.\"    Patient has given verbal consent to a Telephone visit? Yes    Patient would like to receive their AVS by AVS Preference: Eusebio.          HPI:  Jayden Lackey is a 66 y.o. male who was treated with radiation therapy for left occipital GBM, WHO grade IV, MGMT promoter methylation present, IDH wild type.      The patient originally developed right " "sided vision loss while on a cruise around 1/11/2019. Unfortunately, his AUGUSTIN was dying and he had to drive to [place]. During his time in [place], he had apprently seen a retina specialist who found no abnormalities. The patient continued to have difficulties with the right side of his vision, described as 1-2 minute episodes of \"Declo lights\" several times throughout the day. He then presented to an ophthalmologist who discovered the visual field defect was present in both eyes, right worse than left per patient. An MR head was obtained on 3/7/2019 which showed a mass, 3.1 x 3.1 cm, in the left occipital lobe suspicious for high grade primary brain tumor or lymphoma.      He underwent a craniotomy and tumor resection on 3/22/2019, pathology showed GBM, WHO grade IV, MGMT promoter methylation positive, IDH wild type.      SITE TREATED: Left occipital brain  TOTAL DOSE: 6000  NUMBER OF FRACTIONS: 30  DATES COMPLETED: 6/11/2019  CONCURRENT CHEMOTHERAPY: Yes, Temodar  ADJUVANT THERAPY:Yes, Temodar     He tolerated the treatment without unexpected side effects.     The patient was followed by MRI imaging, progression noted on MRI on 7/2019 so temodar stopped and he underwent a surgical resection on 10/24/2019. He was only able to tolerate one cycle of lomustine due to chemotherapy-induced fatigue. Watched on MRI, most recent 03/2021 showing progression.     The patient was contacted via telephone to discuss radiation therapy. He has not had any of his epileptic type seizures for several months now, managed well on lomustine and         Past Medical History:   Diagnosis Date     Anxiety      Depression      Glioblastoma (H)      Hx antineoplastic chemotherapy     now   for brain cancer.         Hx of radiation therapy      Hyperlipidemia      Hypertension      RA (rheumatoid arthritis) (H)      Seizures (H)      Past Surgical History:   Procedure Laterality Date     ANAL SPHINCTEROTOMY       BACK SURGERY      laminectomy L45 " on 3/1/09      CRANIOTOMY  03/22/2019    Dr. Hayes      HERNIA REPAIR       MA ESOPHAGOGASTRODUODENOSCOPY TRANSORAL DIAGNOSTIC N/A 6/26/2019    Procedure: ESOPHAGOGASTRODUODENOSCOPY (EGD) GASTRIC MASS BIOPSY;  Surgeon: Elizabeth Pitts MD;  Location: [facility];  Service: Gastroenterology     SEPTOPLASTY       US BREAST CORE BIOPSY LEFT Left 6/5/2019     vasectomy       Current Outpatient Medications on File Prior to Visit   Medication Sig Dispense Refill     ARIPiprazole (ABILIFY) 2 MG tablet Take 2 mg by mouth daily.       dexAMETHasone (DECADRON) 1 MG tablet Take 1 mg by mouth daily.       divalproex (DEPAKOTE DR) 250 MG 12 hour tablet Take 500 mg by mouth 2 (two) times a day.       lacosamide (VIMPAT) 50 mg Tab Take 1 tablet (50 mg) twice daily (together with 200 mg tablets, to total 250mg in AM and 250mg in PM).       lacosamide 200 mg Tab Take 1 tablet (200 mg) twice daily (together with 50 mg tablets, to total 250mg in AM and 250mg in PM).       levETIRAcetam (KEPPRA) 1000 MG tablet Take 2,000 mg by mouth 2 (two) times a day.       acetaminophen (TYLENOL) 325 MG tablet Take 650 mg by mouth every 6 (six) hours as needed.        atenolol (TENORMIN) 100 MG tablet Take 50 mg by mouth daily.       diazepam intensol (DIAZEPAM) 5 mg/mL Conc concentrated solution Take 5 mg by mouth see administration instructions. As needed for seizure, 5mg x1 initially, then 2.5 mg every 5 minutes up to 37.5mg/7.5ml       diltiazem (CARDIZEM CD) 180 MG 24 hr capsule Take 180 mg by mouth daily.        mg capsule Take 1 capsule by mouth as needed.       lisinopriL (PRINIVIL,ZESTRIL) 10 MG tablet Take 10 mg by mouth daily.       sertraline (ZOLOFT) 100 MG tablet Take 200 mg by mouth daily.       XARELTO 20 mg tablet Take 20 mg by mouth daily before breakfast.        [DISCONTINUED] dexAMETHasone (DECADRON) 2 MG tablet Take 2 mg by mouth daily.       [DISCONTINUED] lacosamide (VIMPAT) 100 mg Tab Take 200 mg by mouth 2  (two) times a day.       [DISCONTINUED] levETIRAcetam (KEPPRA) 1000 MG tablet Take 2,000 mg by mouth 2 (two) times a day.       [DISCONTINUED] levETIRAcetam (KEPPRA) 500 MG tablet Take 1 tablet (500 mg total) by mouth at bedtime. 30 tablet 0     No current facility-administered medications on file prior to visit.      Shellfish containing products, Lorazepam, Nsaids (non-steroidal anti-inflammatory drug), and Other allergy (see comments)  Social History     Socioeconomic History     Marital status:      Spouse name: Not on file     Number of children: Not on file     Years of education: Not on file     Highest education level: Not on file   Occupational History     Not on file   Social Needs     Financial resource strain: Not on file     Food insecurity     Worry: Not on file     Inability: Not on file     Transportation needs     Medical: Not on file     Non-medical: Not on file   Tobacco Use     Smoking status: Former Smoker     Packs/day: 1.00     Years: 3.00     Pack years: 3.00     Quit date: 1977     Years since quittin.2     Smokeless tobacco: Never Used   Substance and Sexual Activity     Alcohol use: Not Currently     Alcohol/week: 1.0 standard drinks     Types: 1 Cans of beer per week     Drug use: Never     Sexual activity: Not on file   Lifestyle     Physical activity     Days per week: Not on file     Minutes per session: Not on file     Stress: Not on file   Relationships     Social connections     Talks on phone: Not on file     Gets together: Not on file     Attends Judaism service: Not on file     Active member of club or organization: Not on file     Attends meetings of clubs or organizations: Not on file     Relationship status: Not on file     Intimate partner violence     Fear of current or ex partner: Not on file     Emotionally abused: Not on file     Physically abused: Not on file     Forced sexual activity: Not on file   Other Topics Concern     Not on file   Social History  Narrative     Not on file       ROS:  Reviewed with Jayden Lackey today.    General  Constitutional (WDL): Exceptions to WDL  Fatigue: Fatigue relieved by rest  EENT  Eye Disorder (WDL): Exceptions to WDL(R-sided field cut)  Blurred Vision: Intervention not indicated  Ear Disorder (WDL): All ear disorder elements are within defined limits  Respiratory       Respiratory (WDL): Within Defined Limits  Cardiovascular  Cardiovascular (WDL): All cardiovascular elements are within defined limits  Endocrine     Gastrointestinal  Gastrointestinal (WDL): Exceptions to WDL  Constipation: Occasional or intermittent symptoms, occasional use of stool softeners, laxatives, dietary modification, or enema  Musculoskeletal  Musculoskeletal and Connetive Tissue Disorders (WDL): Exceptions to WDL  Muscle Weakness : Symptomatic, perceived by patient but not evident on physical exam  Integumentary               Integumentary (WDL): All integumentary elements are within defined limits  Neurological  Neurosensory (WDL): Exceptions to WDL  Peripheral Motor Neuropathy: Moderate symptoms, limiting instrumental ADL  Ataxia: Moderate symptoms, limiting instrumental ADL(R weakness, leans left, uses cane)  Confusion: Moderate disorientation, limiting instrumental ADL(always under supervision)  Psychological/Emotional   Patient Coping: Accepting;Depression  Hematological/Lymphatic  Lymph (WDL): All lymph disorder elements are within defined limits  Dermatologic     Genitourinary/Reproductive  Genitourinary (WDL): All genitourinary elements are within defined limits  Reproductive     Pain              Currently in Pain: No/denies   AUA Assessment                    Accompanied by  Accompanied by: Family Member(spouse, Oz)      Objective:        Exam:  There were no vitals filed for this visit.        Recent Labs:   Recent Results (from the past 168 hour(s))   Platelet Count   Result Value Ref Range    Platelets 208 140 - 440 thou/uL    Levetiracetam [Keppra ]   Result Value Ref Range    Levetiracetam 53.4 (H) 6.0 - 46.0 ug/mL   Lacosamide [Vimpat ]   Result Value Ref Range    Scan Result See Scanned Report     Lacosamide 9.7 1.0 - 10.0 ug/mL    Desmethyllacosamide 0.8 ug/mL   Valproic Acid (Depakene )   Result Value Ref Range    Valproic Acid 51.1 50.0 - 150.0 ug/mL   Valproic Acid, Free (Depakene , Free)   Result Value Ref Range    Scan Result See Scanned Report     Valproic Acid, Unbound 5.4 (L) 6.0 - 20.0 ug/mL       Imaging: Imaging results 30 days:   Interface, Radiant Ib - 03/19/2021  3:39 PM CDT  Formatting of this note might be different from the original.  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  ICD-10: Glioblastoma (H)    Comparison: 2/1/2021.    Technique: Multiplanar T1-weighted, axial FLAIR, and susceptibility  images were obtained without intravenous contrast. Following  intravenous gadolinium-based contrast administration, axial  T2-weighted, diffusion, and T1-weighted images (in multiple planes)  were obtained.    Contrast: 10.0mL Gadavist     Findings: Postsurgical changes of left parieto-occipital craniotomy  and mass resection. Enhancing focus in the anterior/inferior margin of  the resection cavity has increased in size now measuring 2.4 x 3.1 x  1.5 cm (series 21 image 146 and series 22 image 102), previously 1.5 x  1.2 x 0.9 on 2/1/2021. There are multiple relatively hypoenhancing  nodular foci within the lesion which demonstrate increased cerebral  blood volume on perfusion images. There is mild associated restricted  diffusion.    No additional suspicious enhancing intracranial foci or abnormal  diffusion restriction. No evidence of intracranial hemorrhage,  significant mass effect, or midline shift. Ventricles are  proportionate the cerebral sulci with ex vacuo dilation of the left  lateral ventricle temporal horn. Major vascular  flow voids are intact.    Impression:  Findings concerning for recurrent malignancy at the anterior margin of  the left temporoparietal resection cavity with nodular enhancing mass  demonstrating increased cerebral blood volume and diffusion  restriction. Posttreatment changes could have similar appearance but  is thought less likely.    I have personally reviewed the examination and initial interpretation  and I agree with the findings.    MEHDI ARGUELLO MD    Pathology:   No results found for this or any previous visit (from the past 8760 hour(s)).  Pathology Results       Benign - Core biopsy, Left - 6/5/2019      Pathology Code Malignancy Type    Fat necrosis     Inflammation           Additional Information            Concordance: Not Entered                 Phone call duration: 50 minutes    70 minutes spent on the date of the encounter doing chart review, review of outside records, review of test results, interpretation of tests, patient visit, documentation and discussion with family , discussion with Dr Olmos .       I, Nasra Bailey MD personally performed the services described in this documentation, as scribed by Dyllan Acuña in my presence, and it is both accurate and complete.    Signed by: Nasra Bailey MD, MPH

## 2021-06-16 NOTE — TELEPHONE ENCOUNTER
In preparation for tomorrow's simulation CT appointment, noticed that surgery is still showing up as scheduled at [facility] Neurosurgery for tomorrow, as is our radiation planning appointment. I called pt's spouse, Oz to make sure we were on the same page and proceeding with radiation, not surgery at this time. She said yes, and she's tried to call and cancel that surgery but it's still showing up on [facility] schedule as well. I called the Neurosurgery team at [phone] at this time to make sure that surgery was cancelled, an IC there confirmed it's showing up as cancelled for them.

## 2021-06-16 NOTE — PROGRESS NOTES
"Jayden Lackey is a 66 y.o. male who is being evaluated via a billable telephone visit.      The patient has been notified of following:     \"This telephone visit will be conducted via a call between you and your physician/provider. We have found that certain health care needs can be provided without the need for a physical exam.  This service lets us provide the care you need with a short phone conversation.  If a prescription is necessary we can send it directly to your pharmacy.  If lab work is needed we can place an order for that and you can then stop by our lab to have the test done at a later time.    Telephone visits are billed at different rates depending on your insurance coverage. During this emergency period, for some insurers they may be billed the same as an in-person visit.  Please reach out to your insurance provider with any questions.    If during the course of the call the physician/provider feels a telephone visit is not appropriate, you will not be charged for this service.\"    Patient has given verbal consent to a Telephone visit? Yes    What phone number would you like to be contacted at? [phone]    Patient would like to receive their AVS by AVS Preference: Mail a copy.    Writer spoke with Jayden's wife, Oz via phone. Dr. Bailey will call Oz and convert to a video visit if needed. See flowsheet for full assessment. Meds updated.     Maryanne Landeros RN      "

## 2021-06-16 NOTE — TELEPHONE ENCOUNTER
Received email from Dr. Bailey, requesting to have [facility] radiology images uploaded to [facility].  Faxed a request to  [employer] to complete this request ASAP.  Dr. Nasra Olmos is requesting that the patient should be seen by Dr. Bailey / Griselda SILVERIO. Nurse Navigator was cc'd in the same email to contact patient to schedule appointment.

## 2021-06-16 NOTE — TELEPHONE ENCOUNTER
I called and reached wife, Cristiana.  We have them scheduled for a virtual visit on Tuesday, 3- beginning at 2:15 pm with Nurse and 2:30 pm with Dr. Bailey.  Cristiana requested a virtual visit as it is difficult to transport Jayden and makes it much easier.  I did send a message to Dr. Bailey.  No other needs at this time.

## 2021-06-16 NOTE — PROGRESS NOTES
Exam: [facility]    Date: 4/14/2021  There were no vitals filed for this visit.    Please ask your patient the following questions before you give any injection of a contrast media. If someone other than the patient is responding to these questions, please indicate the respondent's name and relationship to the patient.    Respondent Name:                  Relationship to patient:    Name: Jayden Lackey        List any allergies:   Allergies   Allergen Reactions     Shellfish Containing Products Shortness Of Breath, Nausea And Vomiting and Swelling     Lorazepam Other (See Comments)     Hallucinations, delirium     Nsaids (Non-Steroidal Anti-Inflammatory Drug)      Brain tumor- was told better to stay away      Other Allergy (See Comments)      Marina ferrell doesn't remember reaction       Does the patient have renal disease?     No  Does the patient have diabetes?   No  If patient has diabetes, is metformin or metformin combination drug currently prescribed (i.e. Actoplus Met, Avandmet, Fortamet, Glucophage, Glucovance, Glumetza, Junamet, Prandimet, Metaglip, or Riomet)?       No  Is the patient pregnant? No  Is the patient on dialysis? No  Does the patient have cancer? Yes  Does the patient have a history of cancer? Yes  When was the patient diagnosed? recurrent  Treatment: [facility]  Date of last chemo treatment: on Temodar    LAB RESULTS       CREATININE       Lab Results   Component Value Date    CREATININE 0.79 04/14/2021           (Normal Range: Male: 0.6-1.5 mg/dL  Female: 0.5-1.3mg/dL)   (A Creatinine result greater than 6.0 mg/dL is a Critical value-the ordering provider must be   notified)        LASTLAB(EGFR,GFRNONAA,GFRAA)@ ml/min/1.73M2   (Normal Range >60)         CT Only  If the eGFR is less than 30 the radiologist must be informed  If labs are abnormal, was the physician informed?  Yes   Staff s Initials HC      This procedure involves an intravenous contrast media injection.  IV started in the LW w/22 PILY Watts  blood return.   Flush given for Saline injection; Dose 10 cc     Anabel Chase

## 2021-06-17 NOTE — PROGRESS NOTES
RADIATION ONCOLOGY WEEKLY TREATMENT VISIT NOTE    Assessment:     1. Glioblastoma (H)          Impression/Plan:   66 y.o. male with GBM left occipital lobe, pathology IDH1/2 wildtype, MGMT promoter methylated. Residual right sided homonymous hemianopsia, visual processing delay. Treatments to date below.        1. Performance status progressively worsening, auras and aphasia stable since start of  4mg qam.  Significant fatigue and generalized weakness which is expected to worsen before improving. Feel will get durable response with 2000 cGy over 4 fractions and will omit final fraction.    2. Continue Decadron 4mg QAM. Will continue until Avastin on 5/10/2021. Kept at lower dose due to irritability but if significant fatigue may increase to 4mg two times a day over weekend.     3. Follow up in 4 weeks.     4. Post radiation imaging and long term follow up per medical oncology.    5. Activity modification PRN fatigue.    6. Auras but no further seizures.     Radiation: Site: Left occipital  Stereotactic Radiosurgery: Yes  Stereotactic Radiosurgery date: 05/04/21  Today's Dose: 1500  Total Dose for Brain: 2000  Today's Fraction/Total Fraction Brain: 3/4      Subjective:      HPI: Jayden Lackey is a 66 y.o. male who was treated with radiation therapy for left occipital GBM, WHO grade IV, MGMT promoter methylation present, IDH wild type.      Local treatment to date:  1) Craniotomy and tumor resection on 3/22/2019  2) Radiation therapy, 6000 cGy over 30 fractions completed on 6/11/2019   3) Surgical resection on 10/24/2019  4) SRS for tumor control, 2000 cGy over 4 fractions to be completed on 5/6/2021      He was only able to tolerate one cycle of lomustine due to chemotherapy-induced fatigue. Watched on MRI, MRI Head w/wo contrast 03/2021 showing progression deep to resection cavity.      Per neurology 4/5/2021, GTC and complex partial seizures controlled with addition of divalproex to relatively high doses of  levetiracetam and lacosamide. Chronically lethargic but no new symptoms emerged with addition of Divalproex. Radiation therapy and/or resection would likely cause either decrease in seizure frequency or no change at all over the long term and less likely to cause persisting increase in seizure frequency.    The following portions of the patient's history were reviewed and updated as appropriate: allergies, current medications, past family history, past medical history, past social history, past surgical history and problem list.    Assessment                  Body Site: Head and Neck Stereotactic Radiosurgery: Yes  Stereotactic Radiosurgery date: 21  Thrush: 0: Absent  Ocular/Visual - other : 1: Mild  Middle ear/hearin: Normal  Tinnitus: 2: Yes, chronic (greater than 3 months)                    Emotional Alteration Copin: Effective  Comfort Alteration KPS: 60% Requires assistance, but can meet most meeds with assistance  Fatigue (ONS scale) : 6: Moderate Fatigue  Pain Location: denies   Nutrition Alteration Anorexia: 0: None  Nausea: 0: None  Vomitin: None  Dyspepsia and/or Heartburn: 0: None  Skin Alteration Skin Sensation: 0: No problem  Skin Reaction: 1: Faint erythema or dry desquamation  Alopecia: 0: Normal      Objective:     Exam:     Vitals:    21 1213   BP: (!) 164/94   Pulse: (!) 52   SpO2: 98%       Wt Readings from Last 8 Encounters:   20 (!) 243 lb 6.4 oz (110.4 kg)   20 215 lb (97.5 kg)   19 215 lb (97.5 kg)   19 215 lb (97.5 kg)   19 219 lb (99.3 kg)   09/10/19 217 lb 1.6 oz (98.5 kg)   19 212 lb 11.2 oz (96.5 kg)   19 220 lb 12.8 oz (100.2 kg)       General: Alert and oriented. Sitting comfortably.   Patient non acute appearing, asymptomatic. Mask on. No cough, no respiratory distress.   Jayden has mild erythema to scalp.    Treatment Summary to Date    Aria chart and setup information reviewed    Nasra GONZALEZ MD personally  performed the services described in this documentation, as scribed by Dyllan Acuña in my presence, and it is both accurate and complete.    Signed by: Nasra Bailey MD, MPH

## 2021-06-17 NOTE — TELEPHONE ENCOUNTER
Cristiana called to let me know Jayden had more aphasia this am after first SRS treatment yesterday.  Will give 4mg dexamethasone qam instead of days of treatment only and sleep with head of bed elevated or in recliner to minimize edema and need for steroids. This can also happen with his seizures so Cristiana has given him Valium.     Again opening discussed that radiation could have more toxicity and less benefit and that we would go treatment by treatment. Ok to cancel treatment tomorrow if necessary and resume/reconsider Friday.

## 2021-06-17 NOTE — PROGRESS NOTES
Pt here for their final radiation treatment. DC instructions given verbally and in writing to pt's wife, she verbalized their understanding. Encouraged family to make 4 week f/u apt on their way out today.

## 2021-06-17 NOTE — TELEPHONE ENCOUNTER
Oz called and said the [facility] in [place] is requiring new scripts for the medications be sent to them. She also said the patient is out of the Vimpat 200MG tablets so they are wanting to  today.

## 2021-06-19 NOTE — LETTER
Letter by Griselda Gomez RN at      Author: Griselda Gomez RN Service: -- Author Type: --    Filed:  Encounter Date: 4/9/2019 Status: (Other)       Dear Jayden Lackey    Thank you for choosing [facility] for your care.  We are committed to providing you with the highest quality and compassionate healthcare services.  The following information pertains to your first appointment with our clinic.    Date/Time of appointment:  Friday, April 12th, 2019 arrival of 1 pm please!    Note:  This allows time to complete forms, possible labs and nursing assessment.    Name of your Physician: Nasra Bailey MD    What to bring to your appointment:    Completed Patient History/Initial Nursing Assessment and Medication/Allergy List (these forms were sent to you).    Any paperwork or films from your physician that we have asked you to bring.    Your current insurance card(s).    Parking:    Please refer to the map included to direct you to [facility].    The Radiation Oncology parking lot is west of the main entrance, this is free parking and is right next to the Cancer Care Entrance.    Come in the Cancer Care Entrance and check in.        We hope these instructions are helpful to you.  If you have any questions or concerns, please call us at [phone].  It is our pleasure to assist you.    Warm Regards,  Griselda Gomez  Nurse Navigator  [phone]

## 2021-07-20 NOTE — TELEPHONE ENCOUNTER
lacosamide (VIMPAT) 200 MG TABS tablet  Last Written Prescription Date:  6/18/21  Last Fill Quantity: 60,   # refills: 0  Last Office Visit : 6/1/21  Future Office visit: none      Routing refill request to provider for review/approval because:  controlled.

## 2021-09-23 NOTE — PROGRESS NOTES
Several attempts have been made to reach patient to schedule follow up.    Noted no contact with [facility] in several months.  Upon updating Care everywhere  Noted patient has moved to [place]  And appears to be pursuing hospice or private duty nursing per last encounter in August 2021

## 2022-01-24 ENCOUNTER — TELEPHONE (OUTPATIENT)
Dept: ONCOLOGY | Facility: CLINIC | Age: 67
End: 2022-01-24
Payer: MEDICARE

## 2022-01-24 NOTE — TELEPHONE ENCOUNTER
Called and offered condolences.   RNCC to change chart status to .   Nasra Olmos MD  Neuro-oncology  2022

## 2022-04-29 NOTE — TELEPHONE ENCOUNTER
Jayden was admitted to hospital on Fri with more seizures. Discharged sat mj with referral to Epilepsy center at [facility] as [facility] was full. Oz would like to speak to you briefly today to see if you are in agreement with this. Morning is best time to call  
negative...

## 2022-07-12 NOTE — PROGRESS NOTES
Pt attached paperwork to message, she would like to pick it up Thursday.    S: Doing well.    O:  Exam:  General: Arousable to voice; In No Acute Distress  Pulm: Breathing Comfortably on room air  Mental status: A&O x 2-3  Cranial Nerves: Right Homonymous Hemianopsia  Strength:      Del Tr Bi WE WF Gr  R 5 5 5 5 5 5  L 5 5 5 5 5 5     HF KE KF DF PF   R 5 5 5 5 5   L 5 5 5 5 5     Pronator Drift: Absent  Sensory: Intact to Light Touch  Reflexes: No Hyperreflexia, Fontenot s or Clonus Present; Toes Down-Going Bilaterally  INCISION: Clean, Dry and Intact with Surgical Dressing     Assessment: Mr. Lackey is a 64 year old male with recurrent Left Occipital Glioblastoma. Now S/P Left Craniotomy for Tumor Resection. Doing well post-operatively.      Plan:     Neuro:   Neuro Checks per ICU protocol  Anti-epileptics: Continue Keppra 1G Q 12 HR and Vimpat 100 mg Q 12 HR  Cerebral Edema: Decadron Taper to Off Over 2 Weeks   Sutures out: Dissolvable  Required imaging: Post-Operative MRI Brain with gross total resection  Post-Operative Pneumocephalus: Treated w/ Bedrest and Non-Rebreather Mask x24 hrs, now off high flow nasal cannula, out of bed    Cardiovascular:  Maintain SBP < 140 mmHg  Continue home Diltiazem 180 mg QD    Pulmonary:   Incentive Spirometry    Gastrointestinal:  Advance to Regular Diet  Bowel Regimen w/ Senna-Docusate and Miralax  Anti-Emetics PRN Nausea and Vomiting   Protonix 40 mg daily while on Decadron     Renal:   Monitor I/O     Heme:   No issues   Maintain INR < 1.4; Platelet > 100K; Fibrinogen > 200, Hemoglobin > 8  SCDs to Bilateral Lower Extremities for DVT Prophylaxis     Endocrine:   No issues    Infectious:   Tonja-Operative Antibiotics Completed  Continue cefazolin until discharge, then switch to cephalexin  Monitor for Fever and Leukocytosis     PT/OT: home with outpatient therapies    DISPO: 6A    Barriers: Evaluation by therapies      Prashanth Calles M.D.  Neurosurgery Resident, PGY-2    Please contact neurosurgery resident on call with questions.    Dial * * *[phone],  enter [phone] when prompted.

## 2022-09-04 NOTE — LETTER
"1/13/2021       RE: Jayden Lackey  [address]  [facility] [address]     Dear Colleague,    Thank you for referring your patient, Jayden Lackey, to the [facility]CER [facility] at [facility]. Please see a copy of my visit note below.    Jayden is a 66 year old who is being evaluated via a billable video visit.      How would you like to obtain your AVS? MyChart  If the video visit is dropped, the invitation should be resent by: Text to cell phone: [phone]  Will anyone else be joining your video visit? No      Vitals - Patient Reported  Weight (Patient Reported): 108.9 kg (240 lb)  Height (Patient Reported): 190.5 cm (6' 3\")  BMI (Based on Pt Reported Ht/Wt): 30  Pain Score: No Pain (0)      I have reviewed and updated patient's allergy and medication list.    Concerns: none  Refills: none      Lizz Roe [facility]    Palliative Care Outpatient Clinic    (This note was transcribed using voice recognition software. While I review and edit the transcription, I may miss errors, and the software sometimes does unexpected capitalizations and formatting that I miss. Please let me know of any serious mistranscriptions and I will addend this note.)    Patient ID:  Medical - 65 year old M Hx of left occipital lobe glioblastoma s/p gross total resection on 3/22/2019. He completed chemoradiotherapy June 2019. Imaging in 9/2019 was concerning for and pathology in 10/2019 was consistent with recurrent tumor. He was started on lomustine, but stopped after 1 cycle due to significant chemotherapy-induced fatigue. Currently not on cancer-directed therapy and is being monitored with imaging surveillance.   Has GTC and partial seizures which have sig impaired qol; word finding challenges & other cognitive changes.     Social - lives with wife Oz who is his major caregiver. They are in relative COVID isolation.    Care Planning - No HCD on chart but they have " "one at home. NARAYAN LUCIOST on chart. Code status discussion 1/2021 palliative visit: full code.   Follows with [facility] palliative [facility].       History:  History gathered today from: patient, family/loved ones, medical chart including recent onc and neurology notes. Neurol started divalproex recently.   He is with his wife today who supplements hx    I am seeing them today covering for a colleague who's out on maternity leave.    They feel the divalproex has helped; no partial or GTC seizure they know of since then.     Oz notes their major concerns for palliative care have been about need for help at home, etc, and notes he's currently pretty stable and there are no acute needs.    He continues to have difficulty with word finding. He is walking every day. They don't think he need professional help at home.    Jayden notes he wishes he was more independent and wishes he didn't need any help.  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  He is walking a little more though.  Oz notes they are trying to accept where he's at but notes so many losses.    His cognition perhaps continue to slowly deteriorate.    He talks about hearing his prognosis was <2 years when he was initially diagnosed and that he appears to be outliving that.     Mood is good \"antiddepressants really helped\"    PE: There were no vitals taken for this visit.   Wt Readings from Last 3 Encounters:   10/13/20 108.9 kg (240 lb)   06/26/20 108.3 kg (238 lb 12.8 oz)   03/31/20 107.3 kg (236 lb 8 oz)     Ambulates slowly with obvious mild L hemiparesis.   Alert, speech slow but entirely fluent; obvious word finding problems, syntax and sentences normal, complete, he is able to 'speak in full paragraphs' with some word finding difficulties. Does not initiate much speech but responds well to Qs, prompts.    Data reviewed:  I reviewed recent labs and imaging, my comments:  Cr 0.7  Hgb 10    MRI " Dec  Impression: In this patient with history of glioblastoma status post  surgical resection and chemoradiation:  1. Postsurgical changes of left parieto-occipital craniotomy with new  hazy/nodular enhancement along the resection cavity/inferior margin of  the left lateral ventricle. Findings are nonspecific and may represent  posttreatment changes however progression could have a similar  appearance. Attention is recommended on follow-up imaging.  2. Unchanged left hippocampus volume loss.    Impression & Recommendations:  67 yo M with recurrent GBM currently receiving supportive-only care, complicated by seizures and cognitive impairments    They are grateful for the improvements in his seizure control recently.  Struggles with qol impairments from his cognitive impairments but overall stable, making the best of things, making meaning.     We discussed ACP and code status decisions -he has discussed these before. He very much wants to remain full code right now and seems to understand what that means. Oz very much supports this. They acknowledge there will likely be a time in the future when they change that but not currently. Lengthy discussion about mood/coping; overall they are doing well. They are being helped by visiting with palliative [facility] and should continue that.     I answered their Qs about COVID vaccination - currently I think he'll be in [place] stage 1c, but I noted that it's still a fluid situation jaskaran with recent [facility] chatter about asap offering it to all people over 65 years. I let them know there may not be any sort of 'active case finding' when he move into stage 1c so they should continue to ask at every visit    45 minutes spent on the date of the encounter doing chart review, history and exam, documentation and further activities as noted above.    Thank you for involving us in the patient's care.   Ronaldo Banuelos MD / Palliative Medicine / Text me via [facility].    Follow-up 3 months with  Dr Grullon      Video Start Time: 11:10 AM  Video-Visit Details    Type of service:  Video Visit    Video End Time:11:41 AM    Originating Location (pt. Location): Home    Distant Location (provider location):  Home    Platform used for Video Visit: Isaías        Again, thank you for allowing me to participate in the care of your patient.      Sincerely,    Ronaldo Banuelos MD       1

## 2024-04-13 NOTE — TELEPHONE ENCOUNTER
PA Initiation    Medication: Temozolomide- PA Pending  Insurance Company: [employer] ([facility]) - Phone [phone] Fax [phone]  Pharmacy Filling the Rx: [facility] - [place], [place] - [facility] [address]  Filling Pharmacy Phone:    Filling Pharmacy Fax:    Start Date: 4/8/2019      
Prior Authorization Approval    Authorization Effective Date: 4/8/2019  Authorization Expiration Date: 4/8/2020  Medication: Temozolomide- Approved  Approved Dose/Quantity: 180mg 42 tabs per 42 days  Reference #: Case ID- [id number]   Insurance Company: [employer] ([facility]) - Phone [phone] Fax [phone]  Expected CoPay: $0.00     CoPay Card Available: No    Foundation Assistance Needed: NA  Which Pharmacy is filling the prescription (Not needed for infusion/clinic administered): [facility] - [place], [place] - [address]  Pharmacy Notified: Yes  Patient Notified: Yes      
[id number] [facility] [address] [facility] [address]

## (undated) DEVICE — ADH SKIN CLOSURE PREMIERPRO EXOFIN 1.0ML 3470

## (undated) DEVICE — SU NUROLON 4-0 TF CR 8X18" C584D

## (undated) DEVICE — CLIP RANEY

## (undated) DEVICE — PREP POVIDONE IODINE SOLUTION 10% 4OZ

## (undated) DEVICE — BUR ROUTER 1.4X12.8MM ANSPACH S-1R

## (undated) DEVICE — PACK CRANIOTOMY

## (undated) DEVICE — ESU ELEC BLADE 2.75" COATED/INSULATED E1455

## (undated) DEVICE — SURGICEL HEMOSTAT 4X8" 1952

## (undated) DEVICE — PERFORATOR 14MM CODMAN

## (undated) DEVICE — Device

## (undated) DEVICE — DRAPE POUCH IRR 1016

## (undated) DEVICE — DRSG PRIMAPORE 03 1/8X6" 66000318

## (undated) DEVICE — PIN SKULL MAYFIELD ADULT TITANIUM 3/PK A1120

## (undated) DEVICE — SOL NACL 0.9% IRRIG 1000ML BOTTLE 2F7124

## (undated) DEVICE — DRAPE MICROSCOPE LEICA 54X150" AR8033650

## (undated) DEVICE — SPONGE COTTONOID 1/2X3" 20-07S

## (undated) DEVICE — MARKER SPHERES PASSIVE MEDT PACK 5 8801075

## (undated) DEVICE — LINEN TOWEL PACK X6 WHITE 5487

## (undated) DEVICE — ESU GROUND PAD ADULT W/CORD E7507

## (undated) DEVICE — RETR ELASTIC STAYS LONE STAR BLUNT DUAL LEAD 3550-1G

## (undated) DEVICE — SYR 30ML LL W/O NDL 302832

## (undated) DEVICE — PACK GOWN 3/PK DISP XL SBA32GPFCB

## (undated) DEVICE — SOL NACL 0.9% 10ML VIAL 0409-4888-02

## (undated) DEVICE — SPONGE COTTONOID 1/4X1/4" 20-01S

## (undated) DEVICE — CATH TRAY FOLEY 16FR BARDEX W/TEMP PRB URINE METER 319416AM

## (undated) DEVICE — ESU CORD BIPOLAR AND IRR TUBING AESCULAP US355

## (undated) DEVICE — NDL BLUNT 18GA 1" W/O FILTER 305181

## (undated) DEVICE — WIPES FOLEY CARE SURESTEP PROVON DFC100

## (undated) DEVICE — DRAPE CRANIOTOMY W/POUCH 9450

## (undated) DEVICE — SOL RINGERS LACTATED 1000ML BAG 2B2324X

## (undated) DEVICE — PAD CHUX UNDERPAD 23X24" 7136

## (undated) DEVICE — SPECIMEN BAG MEDIVAC SUCTION WHITE SOCK 65652-122

## (undated) DEVICE — ADH FLOSEAL W/HUMAN THROMBIN 5ML W/APPLICATOR TIP ADS201844

## (undated) DEVICE — SPONGE COTTONOID 1/2X1 1/2" 20-06S

## (undated) DEVICE — SOL RINGERS LACTATED 1000ML BAG 07953-09

## (undated) DEVICE — NDL ANGIOCATH 14GA 1.25" 4048

## (undated) DEVICE — SU VICRYL 2-0 CT-2 CR 8X18" J726D

## (undated) DEVICE — SPONGE COTTONOID 1/2X1/2" 20-04S

## (undated) DEVICE — DRAPE SHEET REV FOLD 3/4 9349

## (undated) DEVICE — SPONGE SURGIFOAM 100 1974

## (undated) DEVICE — LINEN TOWEL PACK X30 5481

## (undated) DEVICE — DECANTER BAG 2002S

## (undated) DEVICE — SUCTION MANIFOLD DORNOCH ULTRA CART UL-CL500

## (undated) DEVICE — PREP POVIDONE IODINE SCRUB 7.5% 4OZ APL82212

## (undated) DEVICE — DRAPE POUCH INSTRUMENT 1018

## (undated) DEVICE — ESU PENCIL W/COATED BLADE E2450H

## (undated) DEVICE — PREP SKIN SCRUB TRAY 4461A

## (undated) DEVICE — SPONGE COTTONOID 1X3" 20-10S

## (undated) DEVICE — SU ETHILON 3-0 PS-1 18" 1663H

## (undated) DEVICE — PREP CHLORAPREP CLEAR 3ML 260400

## (undated) DEVICE — GLOVE PROTEXIS BLUE W/NEU-THERA 8.5  2D73EB85

## (undated) DEVICE — CRANIOTOME ADULT ANSPACH A-CRN

## (undated) DEVICE — SPONGE TONSIL W/STRING MED 23275-680

## (undated) DEVICE — SOL WATER IRRIG 1000ML BOTTLE 2F7114

## (undated) DEVICE — SU MONOCRYL 3-0 PS-1 27" Y936H

## (undated) DEVICE — GLOVE PROTEXIS MICRO 7.5  2D73PM75

## (undated) DEVICE — CATH TRAY FOLEY SURESTEP 16FR W/TMP PRB STLK LATEX A319416AM

## (undated) DEVICE — STRAP UNIVERSAL POSITIONING 2-PIECE 4X47X76" 91-287

## (undated) DEVICE — DRAPE U SPLIT 74X120" 29440

## (undated) RX ORDER — PROPOFOL 10 MG/ML
INJECTION, EMULSION INTRAVENOUS
Status: DISPENSED
Start: 2019-03-22

## (undated) RX ORDER — DEXAMETHASONE SODIUM PHOSPHATE 4 MG/ML
INJECTION, SOLUTION INTRA-ARTICULAR; INTRALESIONAL; INTRAMUSCULAR; INTRAVENOUS; SOFT TISSUE
Status: DISPENSED
Start: 2019-03-22

## (undated) RX ORDER — BUPIVACAINE HYDROCHLORIDE AND EPINEPHRINE 5; 5 MG/ML; UG/ML
INJECTION, SOLUTION EPIDURAL; INTRACAUDAL; PERINEURAL
Status: DISPENSED
Start: 2019-03-22

## (undated) RX ORDER — LABETALOL 20 MG/4 ML (5 MG/ML) INTRAVENOUS SYRINGE
Status: DISPENSED
Start: 2019-03-22

## (undated) RX ORDER — CEFAZOLIN SODIUM 2 G/100ML
INJECTION, SOLUTION INTRAVENOUS
Status: DISPENSED
Start: 2019-03-22

## (undated) RX ORDER — HYDRALAZINE HYDROCHLORIDE 20 MG/ML
INJECTION INTRAMUSCULAR; INTRAVENOUS
Status: DISPENSED
Start: 2019-10-24

## (undated) RX ORDER — SODIUM CHLORIDE 9 MG/ML
INJECTION, SOLUTION INTRAVENOUS
Status: DISPENSED
Start: 2019-03-22

## (undated) RX ORDER — HYDRALAZINE HYDROCHLORIDE 20 MG/ML
INJECTION INTRAMUSCULAR; INTRAVENOUS
Status: DISPENSED
Start: 2019-03-22

## (undated) RX ORDER — FENTANYL CITRATE 50 UG/ML
INJECTION, SOLUTION INTRAMUSCULAR; INTRAVENOUS
Status: DISPENSED
Start: 2019-10-24

## (undated) RX ORDER — LIDOCAINE HYDROCHLORIDE 20 MG/ML
INJECTION, SOLUTION EPIDURAL; INFILTRATION; INTRACAUDAL; PERINEURAL
Status: DISPENSED
Start: 2019-03-22

## (undated) RX ORDER — CEFAZOLIN SODIUM 2 G/100ML
INJECTION, SOLUTION INTRAVENOUS
Status: DISPENSED
Start: 2019-10-24

## (undated) RX ORDER — FENTANYL CITRATE 50 UG/ML
INJECTION, SOLUTION INTRAMUSCULAR; INTRAVENOUS
Status: DISPENSED
Start: 2019-03-22

## (undated) RX ORDER — LABETALOL HYDROCHLORIDE 5 MG/ML
INJECTION, SOLUTION INTRAVENOUS
Status: DISPENSED
Start: 2019-10-24

## (undated) RX ORDER — ONDANSETRON 2 MG/ML
INJECTION INTRAMUSCULAR; INTRAVENOUS
Status: DISPENSED
Start: 2019-10-24

## (undated) RX ORDER — SODIUM CHLORIDE 9 MG/ML
INJECTION, SOLUTION INTRAVENOUS
Status: DISPENSED
Start: 2019-10-24

## (undated) RX ORDER — HYDROMORPHONE HYDROCHLORIDE 1 MG/ML
INJECTION, SOLUTION INTRAMUSCULAR; INTRAVENOUS; SUBCUTANEOUS
Status: DISPENSED
Start: 2019-03-22

## (undated) RX ORDER — EPHEDRINE SULFATE 50 MG/ML
INJECTION, SOLUTION INTRAMUSCULAR; INTRAVENOUS; SUBCUTANEOUS
Status: DISPENSED
Start: 2019-03-22

## (undated) RX ORDER — BACITRACIN 50000 [IU]/1
INJECTION, POWDER, FOR SOLUTION INTRAMUSCULAR
Status: DISPENSED
Start: 2019-10-24

## (undated) RX ORDER — BACITRACIN 50000 [IU]/1
INJECTION, POWDER, FOR SOLUTION INTRAMUSCULAR
Status: DISPENSED
Start: 2019-03-22

## (undated) RX ORDER — CEFAZOLIN SODIUM 1 G/3ML
INJECTION, POWDER, FOR SOLUTION INTRAMUSCULAR; INTRAVENOUS
Status: DISPENSED
Start: 2019-03-22